# Patient Record
Sex: MALE | Race: WHITE | NOT HISPANIC OR LATINO | Employment: OTHER | URBAN - METROPOLITAN AREA
[De-identification: names, ages, dates, MRNs, and addresses within clinical notes are randomized per-mention and may not be internally consistent; named-entity substitution may affect disease eponyms.]

---

## 2017-05-12 ENCOUNTER — TRANSFERRED RECORDS (OUTPATIENT)
Dept: HEALTH INFORMATION MANAGEMENT | Facility: CLINIC | Age: 62
End: 2017-05-12

## 2019-01-02 ENCOUNTER — TELEPHONE (OUTPATIENT)
Dept: INTERNAL MEDICINE | Facility: CLINIC | Age: 64
End: 2019-01-02

## 2019-01-02 NOTE — TELEPHONE ENCOUNTER
Reason for call:  Other   Patient called regarding (reason for call): Pt is new in [facility] and really wants to see Dr Garcia to establish care.  Please contact Pt to advise if exception can be made  Additional comments:     Phone number to reach patient:  Home number on file [phone] (home)    Best Time:  any    Can we leave a detailed message on this number?  Not Applicable

## 2019-01-06 NOTE — TELEPHONE ENCOUNTER
Was patient recommended to see me by a physician, friend or family member? I have made most of these exceptions under these circumstances.

## 2019-01-09 NOTE — TELEPHONE ENCOUNTER
Called pt, states he was looking at the [employer] website, already scheduled with a different provider. Ade Braun RN

## 2019-02-05 ENCOUNTER — OFFICE VISIT (OUTPATIENT)
Dept: INTERNAL MEDICINE | Facility: CLINIC | Age: 64
End: 2019-02-05
Payer: COMMERCIAL

## 2019-02-05 VITALS
WEIGHT: 299 LBS | SYSTOLIC BLOOD PRESSURE: 132 MMHG | BODY MASS INDEX: 41.86 KG/M2 | RESPIRATION RATE: 20 BRPM | OXYGEN SATURATION: 99 % | TEMPERATURE: 98 F | DIASTOLIC BLOOD PRESSURE: 82 MMHG | HEIGHT: 71 IN | HEART RATE: 80 BPM

## 2019-02-05 DIAGNOSIS — E78.00 HYPERCHOLESTEREMIA: ICD-10-CM

## 2019-02-05 DIAGNOSIS — D86.9 SARCOIDOSIS: ICD-10-CM

## 2019-02-05 DIAGNOSIS — Z96.643 S/P HIP REPLACEMENT, BILATERAL: ICD-10-CM

## 2019-02-05 DIAGNOSIS — R97.20 ELEVATED PROSTATE SPECIFIC ANTIGEN (PSA): ICD-10-CM

## 2019-02-05 DIAGNOSIS — Z12.5 SCREENING FOR PROSTATE CANCER: ICD-10-CM

## 2019-02-05 DIAGNOSIS — E66.01 MORBID OBESITY (H): ICD-10-CM

## 2019-02-05 DIAGNOSIS — I10 ESSENTIAL HYPERTENSION: Primary | ICD-10-CM

## 2019-02-05 DIAGNOSIS — Z96.653 S/P TKR (TOTAL KNEE REPLACEMENT), BILATERAL: ICD-10-CM

## 2019-02-05 DIAGNOSIS — H91.93 BILATERAL HEARING LOSS, UNSPECIFIED HEARING LOSS TYPE: ICD-10-CM

## 2019-02-05 DIAGNOSIS — Z90.49 S/P APPENDECTOMY: ICD-10-CM

## 2019-02-05 LAB
ALBUMIN SERPL-MCNC: 3.7 G/DL (ref 3.4–5)
ALP SERPL-CCNC: 70 U/L (ref 40–150)
ALT SERPL W P-5'-P-CCNC: 32 U/L (ref 0–70)
ANION GAP SERPL CALCULATED.3IONS-SCNC: 4 MMOL/L (ref 3–14)
AST SERPL W P-5'-P-CCNC: 30 U/L (ref 0–45)
BILIRUB SERPL-MCNC: 0.7 MG/DL (ref 0.2–1.3)
BUN SERPL-MCNC: 11 MG/DL (ref 7–30)
CALCIUM SERPL-MCNC: 9.3 MG/DL (ref 8.5–10.1)
CHLORIDE SERPL-SCNC: 103 MMOL/L (ref 94–109)
CHOLEST SERPL-MCNC: 79 MG/DL
CO2 SERPL-SCNC: 29 MMOL/L (ref 20–32)
CREAT SERPL-MCNC: 0.89 MG/DL (ref 0.66–1.25)
ERYTHROCYTE [DISTWIDTH] IN BLOOD BY AUTOMATED COUNT: 14.4 % (ref 10–15)
GFR SERPL CREATININE-BSD FRML MDRD: >90 ML/MIN/{1.73_M2}
GLUCOSE SERPL-MCNC: 111 MG/DL (ref 70–99)
HCT VFR BLD AUTO: 52.3 % (ref 40–53)
HDLC SERPL-MCNC: 38 MG/DL
HGB BLD-MCNC: 17 G/DL (ref 13.3–17.7)
LDLC SERPL CALC-MCNC: 16 MG/DL
MCH RBC QN AUTO: 29 PG (ref 26.5–33)
MCHC RBC AUTO-ENTMCNC: 32.5 G/DL (ref 31.5–36.5)
MCV RBC AUTO: 89 FL (ref 78–100)
NONHDLC SERPL-MCNC: 41 MG/DL
PLATELET # BLD AUTO: 149 10E9/L (ref 150–450)
POTASSIUM SERPL-SCNC: 3.9 MMOL/L (ref 3.4–5.3)
PROT SERPL-MCNC: 8.3 G/DL (ref 6.8–8.8)
RBC # BLD AUTO: 5.86 10E12/L (ref 4.4–5.9)
SODIUM SERPL-SCNC: 136 MMOL/L (ref 133–144)
TRIGL SERPL-MCNC: 126 MG/DL
TSH SERPL DL<=0.005 MIU/L-ACNC: 2.23 MU/L (ref 0.4–4)
WBC # BLD AUTO: 7.6 10E9/L (ref 4–11)

## 2019-02-05 PROCEDURE — G0103 PSA SCREENING: HCPCS | Performed by: INTERNAL MEDICINE

## 2019-02-05 PROCEDURE — 99204 OFFICE O/P NEW MOD 45 MIN: CPT | Performed by: INTERNAL MEDICINE

## 2019-02-05 PROCEDURE — 80061 LIPID PANEL: CPT | Performed by: INTERNAL MEDICINE

## 2019-02-05 PROCEDURE — 36415 COLL VENOUS BLD VENIPUNCTURE: CPT | Performed by: INTERNAL MEDICINE

## 2019-02-05 PROCEDURE — 80053 COMPREHEN METABOLIC PANEL: CPT | Performed by: INTERNAL MEDICINE

## 2019-02-05 PROCEDURE — 84443 ASSAY THYROID STIM HORMONE: CPT | Performed by: INTERNAL MEDICINE

## 2019-02-05 PROCEDURE — 85027 COMPLETE CBC AUTOMATED: CPT | Performed by: INTERNAL MEDICINE

## 2019-02-05 RX ORDER — MULTIVITAMIN
1 TABLET ORAL EVERY MORNING
Qty: 90 TABLET | Refills: 3 | COMMUNITY
Start: 2019-02-05

## 2019-02-05 RX ORDER — ATORVASTATIN CALCIUM 40 MG/1
40 TABLET, FILM COATED ORAL EVERY MORNING
Qty: 90 TABLET | Refills: 3 | COMMUNITY
Start: 2019-02-05 | End: 2019-07-23

## 2019-02-05 RX ORDER — HYDROCHLOROTHIAZIDE 12.5 MG/1
12.5 TABLET ORAL EVERY MORNING
Qty: 180 TABLET | Refills: 3 | COMMUNITY
Start: 2019-02-05 | End: 2019-07-23

## 2019-02-05 RX ORDER — CETIRIZINE HYDROCHLORIDE 10 MG/1
10 TABLET ORAL EVERY MORNING
Qty: 90 TABLET | Refills: 3 | COMMUNITY
Start: 2019-02-05

## 2019-02-05 ASSESSMENT — MIFFLIN-ST. JEOR: SCORE: 2160.45

## 2019-02-05 NOTE — PROGRESS NOTES
SUBJECTIVE:   Cy Contreras is a 63 year old male who presents to clinic today for the following health issues:    Fasting. New Patient/Transfer of Care. Hx Sarcoidosis.     Hyperlipidemia Follow-Up      Rate your low fat/cholesterol diet?: not monitoring fat    Taking statin?  Yes, no muscle aches from statin    Other lipid medications/supplements?:  none    Hypertension Follow-up      Outpatient blood pressures are not being checked.    Low Salt Diet: no added salt      Amount of exercise or physical activity: 3-4 days/week for an average of 15-30 minutes    Problems taking medications regularly: No    Medication side effects: none    Diet: low salt      HPI:   Derek Contreras a 63 year old male here to establish care. Past medical history, surgical history, social history, medications and allergies reviewed and updated in chart. Family history is negative for premature ASCVD/CVA or cancer    For years he has gotten care at [facility]. Moved herewith his wife to be closer to their two children and 2 grandchildren. 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     [facility] has thought he had Sarcoidosis but he never received any treatment. He has had chronic dyspnea on exertion for years but no cough or wheezing.Now that he is retired he uses a treadmill almost every day. Is working hard to loose wt as he knows that would help his breathing.     He had a stress echo in the past 2 years that was unremarkable. And [facility] did pulmonary functions every few years the last was a year ago.     He has hypertension and has no side effects on his medication. He also is on Lipitor for elevated cholesterol. He takes a baby ASA every day.     His hearing has been getting worse slowly and he is ready to see an audiologist and consider hearing aids.     Problem list and histories reviewed & adjusted, as indicated.  Additional history: as documented    BP Readings from Last 3 Encounters:   02/05/19 132/82    Wt Readings  "from Last 3 Encounters:   02/05/19 135.6 kg (299 lb)                    Reviewed and updated as needed this visit by clinical staff  Tobacco  Allergies  Med Hx  Surg Hx  Fam Hx  Soc Hx      Reviewed and updated as needed this visit by Provider         ROS:  Constitutional, HEENT, cardiovascular, pulmonary, GI, , musculoskeletal, neuro, skin, endocrine and psych systems are negative, except as otherwise noted.    OBJECTIVE:     /82 (BP Location: Right arm, Patient Position: Chair, Cuff Size: Adult Large)   Pulse 80   Temp 98  F (36.7  C) (Oral)   Resp 20   Ht 1.791 m (5' 10.5\")   Wt 135.6 kg (299 lb)   SpO2 99%   BMI 42.30 kg/m    Body mass index is 42.3 kg/m .  GENERAL: healthy, alert and no distress  EYES: Eyes grossly normal to inspection, PERRL and conjunctivae and sclerae normal  HENT: ear canals and TM's normal, nose and mouth without ulcers or lesions  NECK: no adenopathy, no asymmetry, masses, or scars and thyroid normal to palpation  RESP: lungs clear to auscultation - no rales, rhonchi or wheezes  CV: regular rate and rhythm, normal S1 S2, no S3 or S4, no murmur, click or rub, no peripheral edema and peripheral pulses strong  ABDOMEN: soft, nontender, no hepatosplenomegaly, no masses and bowel sounds normal  MS: no gross musculoskeletal defects noted, no edema  NEURO: Normal strength and tone, mentation intact and speech normal  PSYCH: mentation appears normal, affect normal/bright      ASSESSMENT/PLAN:       1. Essential hypertension  under good control Continue current medications. Update labs  - TSH with free T4 reflex  - Comprehensive metabolic panel (BMP + Alb, Alk Phos, ALT, AST, Total. Bili, TP)  - CBC with platelets  - Lipid Profile    2. Morbid obesity (H)  Strongly encourage wt loss, will help with his breathing. He seems both motivated and knowledgeable as to what he needs to do with his diet    3. Sarcoidosis  Seemingly stable. Would like to get pulmonary functions from " [facility]    4. Bilateral hearing loss, unspecified hearing loss type  Will refer to   - OTOLARYNGOLOGY REFERRAL    5. Hypercholesteremia  Update labs  - Comprehensive metabolic panel (BMP + Alb, Alk Phos, ALT, AST, Total. Bili, TP)  - Lipid Profile    6. Screening for prostate cancer  Due for  - PSA, screen    7. S/P hip replacement, bilateral       8. S/P TKR (total knee replacement), bilateral       9. S/P appendectomy         Follow up in 6 months     Carlyn Payne MD  [facility]

## 2019-02-06 LAB — PSA SERPL-ACNC: 29.2 UG/L (ref 0–4)

## 2019-02-11 DIAGNOSIS — R97.20 ELEVATED PROSTATE SPECIFIC ANTIGEN (PSA): Primary | ICD-10-CM

## 2019-02-12 ENCOUNTER — OFFICE VISIT (OUTPATIENT)
Dept: UROLOGY | Facility: CLINIC | Age: 64
End: 2019-02-12
Payer: COMMERCIAL

## 2019-02-12 VITALS
OXYGEN SATURATION: 95 % | SYSTOLIC BLOOD PRESSURE: 150 MMHG | HEART RATE: 101 BPM | HEIGHT: 72 IN | DIASTOLIC BLOOD PRESSURE: 80 MMHG | WEIGHT: 299 LBS | BODY MASS INDEX: 40.5 KG/M2

## 2019-02-12 DIAGNOSIS — N20.0 NEPHROLITHIASIS: Primary | ICD-10-CM

## 2019-02-12 DIAGNOSIS — N40.0 ENLARGED PROSTATE: ICD-10-CM

## 2019-02-12 DIAGNOSIS — R97.20 ELEVATED PROSTATE SPECIFIC ANTIGEN (PSA): ICD-10-CM

## 2019-02-12 LAB
ALBUMIN UR-MCNC: NEGATIVE MG/DL
APPEARANCE UR: CLEAR
BILIRUB UR QL STRIP: NEGATIVE
COLOR UR AUTO: YELLOW
GLUCOSE UR STRIP-MCNC: NEGATIVE MG/DL
HGB UR QL STRIP: NEGATIVE
KETONES UR STRIP-MCNC: NEGATIVE MG/DL
LEUKOCYTE ESTERASE UR QL STRIP: NEGATIVE
NITRATE UR QL: NEGATIVE
PH UR STRIP: 7 PH (ref 5–7)
PSA SERPL-MCNC: 41.2 NG/ML (ref 0–4)
RESIDUAL VOLUME (RV) (EXTERNAL): 10
SOURCE: NORMAL
SP GR UR STRIP: 1.01 (ref 1–1.03)
UROBILINOGEN UR STRIP-ACNC: 0.2 EU/DL (ref 0.2–1)

## 2019-02-12 PROCEDURE — 99244 OFF/OP CNSLTJ NEW/EST MOD 40: CPT | Mod: 25 | Performed by: UROLOGY

## 2019-02-12 PROCEDURE — 81003 URINALYSIS AUTO W/O SCOPE: CPT | Performed by: UROLOGY

## 2019-02-12 PROCEDURE — 84153 ASSAY OF PSA TOTAL: CPT | Performed by: UROLOGY

## 2019-02-12 PROCEDURE — 51798 US URINE CAPACITY MEASURE: CPT | Performed by: UROLOGY

## 2019-02-12 PROCEDURE — 36415 COLL VENOUS BLD VENIPUNCTURE: CPT | Performed by: UROLOGY

## 2019-02-12 RX ORDER — CIPROFLOXACIN 500 MG/1
500 TABLET, FILM COATED ORAL 2 TIMES DAILY
Qty: 6 TABLET | Refills: 0 | Status: SHIPPED | OUTPATIENT
Start: 2019-02-12 | End: 2019-03-28

## 2019-02-12 ASSESSMENT — MIFFLIN-ST. JEOR: SCORE: 2184.26

## 2019-02-12 ASSESSMENT — PAIN SCALES - GENERAL: PAINLEVEL: NO PAIN (0)

## 2019-02-12 NOTE — PROGRESS NOTES
[facility]  Main Office: [address]  [address]  [place] [address]       CHIEF COMPLAINT:  Elevated PSA    HISTORY:   I was asked by Dr. Payne to see this 63-year-old gentleman who presents with an elevated PSA. He recently moved here from [facility] and was receiving his primary care through the [facility]. He is not certain if his PSA had been checked there previously. When he established care with Dr. Payne last week he was on the elevated PSA of 29.2. He has no urinary symptoms or complaints. He has no nocturia. He reports normal urinary stream. No frequency or urgency. No history of gross hematuria or infections. He has no family history of prostate cancer.    After an initial discussion today we rechecked his PSA todaybefore examination and his PSA today is 41.2.    He also incidentally notes to me that he thinks he has passed kidney stones recently. He has seen stones dropped into the toilet recently.  He has had no pain from this. He has no prior history of stones.      PAST MEDICAL HISTORY: History reviewed. No pertinent past medical history.    PAST SURGICAL HISTORY:   Past Surgical History:   Procedure Laterality Date     APPENDECTOMY       C TOTAL HIP ARTHROPLASTY Bilateral      C TOTAL KNEE ARTHROPLASTY Bilateral      VASECTOMY         FAMILY HISTORY:   Family History   Problem Relation Age of Onset     Alzheimer Disease Mother      Heart Disease Father        SOCIAL HISTORY:   Social History     Tobacco Use     Smoking status: Never Smoker     Smokeless tobacco: Never Used   Substance Use Topics     Alcohol use: Yes        Not on File      Current Outpatient Medications:      aspirin (ASA) 81 MG tablet, Take 1 tablet (81 mg) by mouth daily, Disp: 90 tablet, Rfl: 3     atorvastatin (LIPITOR) 40 MG tablet, Take 1 tablet (40 mg) by mouth daily, Disp: 90 tablet, Rfl: 3     cetirizine (ZYRTEC) 10 MG tablet, Take 1 tablet (10 mg) by mouth daily, Disp: 90 tablet, Rfl: 3      ciprofloxacin (CIPRO) 500 MG tablet, Take 1 tablet (500 mg) by mouth 2 times daily for 3 days, Disp: 6 tablet, Rfl: 0     hydrochlorothiazide (HYDRODIURIL) 12.5 MG tablet, Take 2 tablets (25 mg) by mouth daily, Disp: 180 tablet, Rfl: 3     multivitamin (ONE-DAILY) tablet, Take 1 tablet by mouth daily, Disp: 90 tablet, Rfl: 3    Review Of Systems:  Skin: No rash, pruritis, or skin pigmentation  Eyes: No changes in vision  Ears/Nose/Throat: No changes in hearing, no nosebleeds  Respiratory: No shortness of breath, dyspnea on exertion, cough, or hemoptysis  Cardiovascular: No chest pain or palpitations  Gastrointestinal: No diarrhea or constipation. No abdominal pain. No hematochezia  Genitourinary: see HPI  Musculoskeletal: No pain or swelling of joints, normal range of motion  Neurologic: No weakness or tremors  Psychiatric: No recent changes in memory or mood  Hematologic/Lymphatic/Immunologic: No easy bruising or enlarged lymph nodes  Endocrine: No weight gain or loss      PHYSICAL EXAM:    /80 (BP Location: Left arm, Patient Position: Sitting, Cuff Size: Adult Regular)   Pulse 101   Ht 1.829 m (6')   Wt 135.6 kg (299 lb)   SpO2 95%   BMI 40.55 kg/m    General appearance: In NAD, conversant  HEENT: Normocephalic and atraumatic, anicteric sclera  Cardiovascular: Not examined  Respiratory: normal, non-labored breathing  Gastrointestinal: negative, Abdomen soft, non-tender, and non-distended.   Musculoskeletal: Not Examined  Peripheral Vascular/extremity: No peripheral edema  Skin: Normal temperature, turgor, and texture. No rash  Psychiatric: Appropriate affect, alert and oriented to person, place, and time    Penis: Normal  Scrotal skin: Normal, no lesions  Testicles: Normal to palpation bilaterally  Epididymis: Normal to palpation bilaterally  Lymphatic: Normal inguinal lymph nodes  Digital Rectal Exam: his prostate is moderately enlarged, benign and symmetric to palpation. I am not able to palpate the  base of the prostate due to body habitus.    Cystoscopy: Not done      PSA: 41.2    UA RESULTS:  Recent Labs   Lab Test 02/12/19  1245   COLOR Yellow   APPEARANCE Clear   URINEGLC Negative   URINEBILI Negative   URINEKETONE Negative   SG 1.015   UBLD Negative   URINEPH 7.0   PROTEIN Negative   UROBILINOGEN 0.2   NITRITE Negative   LEUKEST Negative       Bladder Scan:     Other Labs:      Imaging Studies: None      CLINICAL IMPRESSION:   Elevated PSA, enlarged prostate, possible kidney stones    PLAN:   Is PSA has been significantly elevated on 2 recent checks. It is also quite volatile but he has no evidence of urinary tract infection, no evidence of prostatitis on exam, and no urinary symptoms. We discussed his options and I recommended a prostate biopsy to evaluate him for potential prostate cancer. We discussed prostate biopsy in detail today along with its risks, including bleeding and infection. All of his questions were answered. He wishes to proceed. Ciprofloxacin prescribed for prophylaxis.    I also recommended a noncontrast CT scan since he reports possible kidney stones being passed recently. We will get the CT scan this week before he returns for his biopsy next week.      Matteo Coughlin MD

## 2019-02-12 NOTE — LETTER
Date:February 13, 2019      Patient was self referred, no letter generated. Do not send.        [facility] Health Information

## 2019-02-12 NOTE — LETTER
2/12/2019       RE: Derek Contreras  [address] [place] [address]     Dear Colleague,    Thank you for referring your patient, Derek Contreras, to the [facility] [place] [facility]. Please see a copy of my visit note below.    [facility]  Main Office: [address]  [address]  [place] [address]       CHIEF COMPLAINT:  Elevated PSA    HISTORY:   I was asked by Dr. Payne to see this 63-year-old Alondra who presents with an elevated PSA. He recently moved here from [place] and was receiving his primary care through the [facility]. He is not certain if his PSA had been checked there previously. When he established care with Dr. Payne last week he was on the elevated PSA of 29.2. He has no urinary symptoms or complaints. He has no nocturia. He reports normal urinary stream. No frequency or urgency. No history of gross hematuria or infections. He has no family history of prostate cancer.    After an initial discussion today we rechecked his PSA todaybefore examination and his PSA today is 41.2.    He also incidentally notes to me that he thinks he has passed kidney stones recently. He has seen stones dropped into the toilet recently.  He has had no pain from this. He has no prior history of stones.      PAST MEDICAL HISTORY: History reviewed. No pertinent past medical history.    PAST SURGICAL HISTORY:   Past Surgical History:   Procedure Laterality Date     APPENDECTOMY       C TOTAL HIP ARTHROPLASTY Bilateral      C TOTAL KNEE ARTHROPLASTY Bilateral      VASECTOMY         FAMILY HISTORY:   Family History   Problem Relation Age of Onset     Alzheimer Disease Mother      Heart Disease Father        SOCIAL HISTORY:   Social History     Tobacco Use     Smoking status: Never Smoker     Smokeless tobacco: Never Used   Substance Use Topics     Alcohol use: Yes        Not on File      Current Outpatient  Medications:      aspirin (ASA) 81 MG tablet, Take 1 tablet (81 mg) by mouth daily, Disp: 90 tablet, Rfl: 3     atorvastatin (LIPITOR) 40 MG tablet, Take 1 tablet (40 mg) by mouth daily, Disp: 90 tablet, Rfl: 3     cetirizine (ZYRTEC) 10 MG tablet, Take 1 tablet (10 mg) by mouth daily, Disp: 90 tablet, Rfl: 3     ciprofloxacin (CIPRO) 500 MG tablet, Take 1 tablet (500 mg) by mouth 2 times daily for 3 days, Disp: 6 tablet, Rfl: 0     hydrochlorothiazide (HYDRODIURIL) 12.5 MG tablet, Take 2 tablets (25 mg) by mouth daily, Disp: 180 tablet, Rfl: 3     multivitamin (ONE-DAILY) tablet, Take 1 tablet by mouth daily, Disp: 90 tablet, Rfl: 3    Review Of Systems:  Skin: No rash, pruritis, or skin pigmentation  Eyes: No changes in vision  Ears/Nose/Throat: No changes in hearing, no nosebleeds  Respiratory: No shortness of breath, dyspnea on exertion, cough, or hemoptysis  Cardiovascular: No chest pain or palpitations  Gastrointestinal: No diarrhea or constipation. No abdominal pain. No hematochezia  Genitourinary: see HPI  Musculoskeletal: No pain or swelling of joints, normal range of motion  Neurologic: No weakness or tremors  Psychiatric: No recent changes in memory or mood  Hematologic/Lymphatic/Immunologic: No easy bruising or enlarged lymph nodes  Endocrine: No weight gain or loss      PHYSICAL EXAM:    /80 (BP Location: Left arm, Patient Position: Sitting, Cuff Size: Adult Regular)   Pulse 101   Ht 1.829 m (6')   Wt 135.6 kg (299 lb)   SpO2 95%   BMI 40.55 kg/m     General appearance: In NAD, conversant  HEENT: Normocephalic and atraumatic, anicteric sclera  Cardiovascular: Not examined  Respiratory: normal, non-labored breathing  Gastrointestinal: negative, Abdomen soft, non-tender, and non-distended.   Musculoskeletal: Not Examined  Peripheral Vascular/extremity: No peripheral edema  Skin: Normal temperature, turgor, and texture. No rash  Psychiatric: Appropriate affect, alert and oriented to person,  place, and time    Penis: Normal  Scrotal skin: Normal, no lesions  Testicles: Normal to palpation bilaterally  Epididymis: Normal to palpation bilaterally  Lymphatic: Normal inguinal lymph nodes  Digital Rectal Exam: his prostate is moderately enlarged, benign and symmetric to palpation. I am not able to palpate the base of the prostate due to body habitus.    Cystoscopy: Not done      PSA: 41.2    UA RESULTS:  Recent Labs   Lab Test 02/12/19  1245   COLOR Yellow   APPEARANCE Clear   URINEGLC Negative   URINEBILI Negative   URINEKETONE Negative   SG 1.015   UBLD Negative   URINEPH 7.0   PROTEIN Negative   UROBILINOGEN 0.2   NITRITE Negative   LEUKEST Negative       Bladder Scan:     Other Labs:      Imaging Studies: None      CLINICAL IMPRESSION:   Elevated PSA, enlarged prostate, possible kidney stones    PLAN:   Is PSA has been significantly elevated on 2 recent checks. It is also quite volatile but he has no evidence of urinary tract infection, no evidence of prostatitis on exam, and no urinary symptoms. We discussed his options and I recommended a prostate biopsy to evaluate him for potential prostate cancer. We discussed prostate biopsy in detail today along with its risks, including bleeding and infection. All of his questions were answered. He wishes to proceed. Ciprofloxacin prescribed for prophylaxis.    I also recommended a noncontrast CT scan since he reports possible kidney stones being passed recently. We will get the CT scan this week before he returns for his biopsy next week.      Matteo Coughlin MD      Again, thank you for allowing me to participate in the care of your patient.      Sincerely,    Matteo Coughlin MD

## 2019-02-12 NOTE — NURSING NOTE
Chief Complaint   Patient presents with     Clinic Care Coordination - Follow-up     Pt here for elevated PSA     Bindu Guerra CMA

## 2019-02-12 NOTE — PATIENT INSTRUCTIONS
Ultrasound Guided Prostate Biopsy    What is a prostate biopsy? A prostate biopsy is a relatively painless procedure performed in the physician's office, outpatient facility or hospital.  A long, thin needle is inserted into the prostate to collect a sample of tissue from the prostate.  These samples are then examined by a pathologist for abnormal cells.    Why do I need a prostate biopsy? During a man's lifetime the prostate gradually increases in size.  The patient may or may not experience symptoms.  Symptoms of an enlarged prostate include:    Increased frequency of urination    Decreased force of urinary stream    Trouble with urination    Awakening at night to urinate    When the prostate is examined, the physician may feel a nodule (hard or firm growth) which would require a biopsy.    Another reason for having a prostate biopsy is an increase in the prostate specific androgen (PSA) in your blood.  A prostate biopsy may be necessary to determine the cause of the increased PSA.    Your physician will also perform an ultrasound (an image created by sound waves).  The ultrasound is performed by placing a small probe in the rectum to image the prostate gland.    Preparation for the Prostate Biopsy    1. Blood thinning medications must be stopped prior to your biopsy.  Please stop the following medication the appropriate number of days before:  a. 10 days-acetylsalicylic acid, vitamin E, fish oil, aspirin, baby aspirin  b. 7 days- Plavix, Brilinta, ibuprofen (Advil, Motrin, Aleve)  c. 5 days-Pradaxa  d. 4 days-Coumadin/warfarin  e. 3 days-Eliquis, Xarelto    2.  If you have any bleeding problems (thin blood), please tell your doctor.    3.  If you have been told by another doctor to take antibiotics before dental work or surgery, please tell the doctor.  This is common for patients that have had a joint replacement.    YOU HAVE BEEN PRESCRIBED AN ANTIBIOTIC.  Please follow the directions written on the bottle.   The directions usually will tell you to start the antibiotic the day before your biopsy.  You will continue taking the medication until it is gone.    The day of the biopsy you will be asked to use an enema one hour before you leave for your appointment.    Unless you have been prescribed a sedative (Valium or a similar drug) you will be able to drive to and from your appointment.  If you have taken a sedative you must have a ride.    AFTER THE BIOPSY    DANGER SIGNS    Small amount of blood in the urine for 10-14 days Excessive blood in the urine, stool or ejaculate  Small amount of blood in the stool for 48 hours Fever over 100 or chills  Small amount of blood in the ejaculate for up to Frequent urination or burning when you urinate   4 weeks          CALL THE DOCTOR'S OFFICE -[phone] IF YOU HAVE ANY OF THESE SYMPTOMS.  IF YOU CANNOT CONTACT THE OFFICE, GO TO THE EMERGENCY ROOM.          Your biopsy is scheduled on____02/21/19___________________________ at our [place] office.  Please be at the office at    ___765am_________________.  A follow up visit has been scheduled for you on ____03/07/19________________@___1130am____________     at the  ___[place]___________________________office. Your doctor will discuss your results with you at this visit

## 2019-02-21 ENCOUNTER — HOSPITAL ENCOUNTER (OUTPATIENT)
Dept: CT IMAGING | Facility: CLINIC | Age: 64
Discharge: HOME | End: 2019-02-21
Attending: UROLOGY | Admitting: UROLOGY
Payer: COMMERCIAL

## 2019-02-21 ENCOUNTER — OFFICE VISIT (OUTPATIENT)
Dept: UROLOGY | Facility: CLINIC | Age: 64
End: 2019-02-21
Payer: COMMERCIAL

## 2019-02-21 VITALS
DIASTOLIC BLOOD PRESSURE: 62 MMHG | HEART RATE: 60 BPM | HEIGHT: 72 IN | WEIGHT: 299 LBS | SYSTOLIC BLOOD PRESSURE: 148 MMHG | BODY MASS INDEX: 40.5 KG/M2

## 2019-02-21 DIAGNOSIS — R97.20 ELEVATED PROSTATE SPECIFIC ANTIGEN (PSA): Primary | ICD-10-CM

## 2019-02-21 DIAGNOSIS — N20.0 NEPHROLITHIASIS: ICD-10-CM

## 2019-02-21 PROCEDURE — 55700 ZZHC BIOPSY PROSTATE NEEDLE/PUNCH: CPT | Performed by: UROLOGY

## 2019-02-21 PROCEDURE — 76872 US TRANSRECTAL: CPT | Performed by: UROLOGY

## 2019-02-21 PROCEDURE — 88305 TISSUE EXAM BY PATHOLOGIST: CPT | Performed by: UROLOGY

## 2019-02-21 PROCEDURE — 74176 CT ABD & PELVIS W/O CONTRAST: CPT

## 2019-02-21 ASSESSMENT — PAIN SCALES - GENERAL: PAINLEVEL: NO PAIN (0)

## 2019-02-21 ASSESSMENT — MIFFLIN-ST. JEOR: SCORE: 2184.26

## 2019-02-21 NOTE — PROGRESS NOTES
Derek Contreras is here for a transrectal altrasound guided needle biopsy of the prostate for a significant risk of potentially lethal prostate cancer.    The risks, benefits, of the procudure were discussed.  All questions were answered.  A written informed consent was obtained.      PSA   Date Value Ref Range Status   02/05/2019 29.20 (H) 0 - 4 ug/L Final     Comment:     Assay Method:  Chemiluminescence using Siemens Vista analyzer       An enema was completed and 500 mg of Cipro twice daily was started prior to the biopsy.  After obtaining informed consent patient was placed in lateral decubitus position.  The ultrasound probe was placed in the rectum.  The prostate was numbed using ultrasound guidance with 1% lidocaine 5 mls along each nerve bundle.      The volume was measured and estimated to be 28 cubic centimeters.      US images were used to guide the biopsies of the prostate.  12 cores were taken with 6 on each side, 2 at the base,  2 at the midgland and  2 at the apex.  The patient tolerated the procedure well.      We will follow up with the results in 7-10 days and contact patient with these results.

## 2019-02-21 NOTE — NURSING NOTE
Chief Complaint   Patient presents with     Prostate Biopsy     Elevated PSA      Prior to the start of the procedure and with procedural staff participation, I verbally confirmed the patient s identity using two indicators, relevant allergies, that the procedure was appropriate and matched the consent or emergent situation, and that the correct equipment/implants were available. Immediately prior to starting the procedure I conducted the Time Out with the procedural staff and re-confirmed the patient s name, procedure, and site/side. (The Joint Commission universal protocol was followed.)  Yes    Sedation (Moderate or Deep): None  Consent read and signed: Yes     Allergies   Allergen Reactions     Morphine Sulfate Nausea and Vomiting and Cramps     Pre-operative antibiotics taken: Yes  Aspirin or other blood thinning medications not taken in 7-10 days:  Yes  Time of Fleet's enema: 5:30am    Darya Braun LPN

## 2019-02-21 NOTE — LETTER
2/21/2019       RE: Derek Contreras  [address] [place] [address]     Dear Colleague,    Thank you for referring your patient, Derek Contreras, to the [facility] [place] [facility]. Please see a copy of my visit note below.    Chief Complaint   Patient presents with     Prostate Biopsy     Elevated PSA      Prior to the start of the procedure and with procedural staff participation, I verbally confirmed the patient s identity using two indicators, relevant allergies, that the procedure was appropriate and matched the consent or emergent situation, and that the correct equipment/implants were available. Immediately prior to starting the procedure I conducted the Time Out with the procedural staff and re-confirmed the patient s name, procedure, and site/side. (The Joint Commission universal protocol was followed.)  Yes    Sedation (Moderate or Deep): None    Darya Braun LPN      Derek Contreras is here for a transrectal altrasound guided needle biopsy of the prostate for a significant risk of potentially lethal prostate cancer.    The risks, benefits, of the procudure were discussed.  All questions were answered.  A written informed consent was obtained.      PSA   Date Value Ref Range Status   02/05/2019 29.20 (H) 0 - 4 ug/L Final     Comment:     Assay Method:  Chemiluminescence using Siemens Vista analyzer       An enema was completed and 500 mg of Cipro twice daily was started prior to the biopsy.  After obtaining informed consent patient was placed in lateral decubitus position.  The ultrasound probe was placed in the rectum.  The prostate was numbed using ultrasound guidance with 1% lidocaine 5 mls along each nerve bundle.      The volume was measured and estimated to be 28 cubic centimeters.      US images were used to guide the biopsies of the prostate.  12 cores were taken with 6 on each side, 2 at the  base,  2 at the midgland and  2 at the apex.  The patient tolerated the procedure well.      We will follow up with the results in 7-10 days and contact patient with these results.        Again, thank you for allowing me to participate in the care of your patient.      Sincerely,    Matteo Coughlin MD

## 2019-02-21 NOTE — LETTER
Date:February 25, 2019      Patient was self referred, no letter generated. Do not send.        [facility] Information

## 2019-02-21 NOTE — PATIENT INSTRUCTIONS
[facility]  Transrectal Ultrasound  Post Operative Information    The physician who performed your Transrectal Ultrasound is Dr. Coughlin (telephone number [phone]).  Please contact this doctor if you have any problems or questions.  If unable to reach your doctor, please return to the Emergency Department.      Take one antibiotic the evening of the procedure and then as directed on your prescription.    Drink at least 6-8 glasses of fluids for the first 48 hours.    Avoid heavy lifting and strenuous activity for 48 hours.    Avoid sexual intercourse for the first 24 hours.    No aspirin or ibuprofen products (Motrin, Advil, Nuprin, ect.) for one week.  You may take acetaminophen (Tylenol) for pain.    You may notice a small amount of blood on the tissue after a bowel movement.    You may pass blood with clots in your urine following the procedure.  The amount will decrease with time but may be visible for up to two weeks.     You make have blood in your semen for 4 weeks after the procedure.    You may experience mild perineal (groin area) discomfort after the procedure.    Please call you doctor if you have any of the follow symptoms:  Fever  Increase in the amount of blood passed  Severe discomfort or pain

## 2019-02-21 NOTE — PROGRESS NOTES
Chief Complaint   Patient presents with     Prostate Biopsy     Elevated PSA      Prior to the start of the procedure and with procedural staff participation, I verbally confirmed the patient s identity using two indicators, relevant allergies, that the procedure was appropriate and matched the consent or emergent situation, and that the correct equipment/implants were available. Immediately prior to starting the procedure I conducted the Time Out with the procedural staff and re-confirmed the patient s name, procedure, and site/side. (The Joint Commission universal protocol was followed.)  Yes    Sedation (Moderate or Deep): None    Darya Braun LPN

## 2019-02-22 LAB — COPATH REPORT: NORMAL

## 2019-02-26 ENCOUNTER — TELEPHONE (OUTPATIENT)
Dept: SURGERY | Facility: CLINIC | Age: 64
End: 2019-02-26

## 2019-02-26 DIAGNOSIS — C61 PROSTATE CANCER (H): Primary | ICD-10-CM

## 2019-02-28 ENCOUNTER — HOSPITAL ENCOUNTER (OUTPATIENT)
Dept: NUCLEAR MEDICINE | Facility: CLINIC | Age: 64
Setting detail: NUCLEAR MEDICINE
End: 2019-02-28
Attending: UROLOGY
Payer: COMMERCIAL

## 2019-02-28 ENCOUNTER — HOSPITAL ENCOUNTER (OUTPATIENT)
Dept: NUCLEAR MEDICINE | Facility: CLINIC | Age: 64
Setting detail: NUCLEAR MEDICINE
Discharge: HOME | End: 2019-02-28
Attending: UROLOGY | Admitting: UROLOGY
Payer: COMMERCIAL

## 2019-02-28 DIAGNOSIS — C61 PROSTATE CANCER (H): ICD-10-CM

## 2019-02-28 PROCEDURE — 78306 BONE IMAGING WHOLE BODY: CPT

## 2019-02-28 PROCEDURE — 34300033 ZZH RX 343: Performed by: UROLOGY

## 2019-02-28 PROCEDURE — A9561 TC99M OXIDRONATE: HCPCS | Performed by: UROLOGY

## 2019-02-28 RX ADMIN — Medication 25 MCI.: at 13:06

## 2019-03-07 ENCOUNTER — TELEPHONE (OUTPATIENT)
Dept: INTERNAL MEDICINE | Facility: CLINIC | Age: 64
End: 2019-03-07

## 2019-03-07 ENCOUNTER — OFFICE VISIT (OUTPATIENT)
Dept: UROLOGY | Facility: CLINIC | Age: 64
End: 2019-03-07
Payer: COMMERCIAL

## 2019-03-07 VITALS
DIASTOLIC BLOOD PRESSURE: 66 MMHG | BODY MASS INDEX: 40.36 KG/M2 | SYSTOLIC BLOOD PRESSURE: 132 MMHG | HEIGHT: 72 IN | HEART RATE: 80 BPM | WEIGHT: 298 LBS

## 2019-03-07 DIAGNOSIS — C61 PROSTATE CANCER (H): Primary | ICD-10-CM

## 2019-03-07 DIAGNOSIS — R91.8 PULMONARY NODULES: ICD-10-CM

## 2019-03-07 DIAGNOSIS — R59.0 HILAR ADENOPATHY: Primary | ICD-10-CM

## 2019-03-07 PROCEDURE — 99214 OFFICE O/P EST MOD 30 MIN: CPT | Performed by: UROLOGY

## 2019-03-07 ASSESSMENT — PAIN SCALES - GENERAL: PAINLEVEL: NO PAIN (0)

## 2019-03-07 ASSESSMENT — MIFFLIN-ST. JEOR: SCORE: 2179.72

## 2019-03-07 NOTE — PROGRESS NOTES
Office Visit Note  [facility]  [phone]    UROLOGIC DIAGNOSES:   T1C Tricia 4+3=7 prostate cancer    CURRENT INTERVENTIONS:       HISTORY:   Derek returns to clinic today along with his wife to discuss recent prostate biopsy results. jenny was found to have a [place] 4+3 = 7 prostate cancer at the right apex. He also had a Tricia 3+4 = 7 prostate cancer found at the right base and at the left apex. A fourth core was positive for [place] grade 6 cancer. The other 8 cores were negative. Because his PSA was significantly elevated prior to the biopsy are recommended a CT scan and bone scan be performed. He has a history of sarcoidosis and a CT scan revealed hilar and mediastinal lymphadenopathy.Hehas not yet established a local pulmonologist so it is difficult to say as to whether these findings are unchanged from previous scans  For his sarcoidosis. His bone scan was negative. His prostate was only slightly enlarged measuring 28 cm . He has no urinary symptoms or complaints. He has felt well since his biopsy. He has had no prior abdominal surgeries.      PAST MEDICAL HISTORY: No past medical history on file.    PAST SURGICAL HISTORY:   Past Surgical History:   Procedure Laterality Date     APPENDECTOMY       C TOTAL HIP ARTHROPLASTY Bilateral      C TOTAL KNEE ARTHROPLASTY Bilateral      VASECTOMY         FAMILY HISTORY:   Family History   Problem Relation Age of Onset     Alzheimer Disease Mother      Heart Disease Father        SOCIAL HISTORY:   Social History     Tobacco Use     Smoking status: Never Smoker     Smokeless tobacco: Never Used   Substance Use Topics     Alcohol use: Yes       Current Outpatient Medications   Medication     aspirin (ASA) 81 MG tablet     atorvastatin (LIPITOR) 40 MG tablet     cetirizine (ZYRTEC) 10 MG tablet     hydrochlorothiazide (HYDRODIURIL) 12.5 MG tablet     multivitamin (ONE-DAILY) tablet     No current facility-administered medications for this visit.           PHYSICAL EXAM:    /66 (BP Location: Left arm)   Pulse 80   Ht 1.829 m (6')   Wt 135.2 kg (298 lb)   BMI 40.42 kg/m      Constitutional: Well developed. Conversant and in no acute distress  Eyes: Anicteric sclera, conjunctiva clear, normal extraocular movements  ENT: Normocephalic and atraumatic,   Skin: Warm and dry. No rashes or lesions  Cardiac: No peripheral edema  Back/Flank: Not done  CNS/PNS: Normal musculature and movements, moves all extremities normally  Respiratory: Normal non-labored breathing  Abdomen: Soft nontender and nondistended  Peripheral Vascular: No peripheral edema  Mental Status/Psych: Alert and Oriented x 3. Normal mood and affect    Penis: Not done  Scrotal Skin: Not done  Testicles: Not done  Epididymis: Not done  Digital Rectal Exam:     Cystoscopy: Not done    Imaging: None    Urinalysis: UA RESULTS:  Recent Labs   Lab Test 02/12/19  1245   COLOR Yellow   APPEARANCE Clear   URINEGLC Negative   URINEBILI Negative   URINEKETONE Negative   SG 1.015   UBLD Negative   URINEPH 7.0   PROTEIN Negative   UROBILINOGEN 0.2   NITRITE Negative   LEUKEST Negative       PSA: 41    Post Void Residual:     Other labs: None today      IMPRESSION:  High risk prostate cancer    PLAN:  His significant elevated PSA puts him in the high risk category for localized prostate cancer. We do not see any evidence of metastatic disease on studies but we did discuss the risk of micrometastatic disease that may not be detected by any of our studies. We discussed  His treatment options. I would not recommend active surveillance given  These findings. I also would not recommend less invasive/ablative treatment options given his high-risk disease. I would recommend he consider either a radical prostatectomy or radiotherapy combined with 3 years of hormonal therapy. We discussed the pros and cons of each option. He stated his wish to undergo surgery if possible. We did discuss robotic prostatectomy in  detail today along with its risks and expected recovery. However, he does have these findings in the chest  On his CT scan that need to be sorted out first.I suspect that these may be similar to previous findings regarding his sarcoidosis but he needs to establish care with a local pulmonologist as soon as possible. I am also curious as to whether his sarcoidosis would increase his surgical risks and I would leave that question for the pulmonologist. He will attempt to establish care with a pulmonologist in the near future and then I will see him back soon after that to discuss his treatment options and get a treatment plan together.    Total Time: 45 min                                      Total in Consultation: 45 min      Matteo Coughlin M.D.

## 2019-03-07 NOTE — NURSING NOTE
Chief Complaint   Patient presents with     Clinic Care Coordination - Follow-up     Elevated PSA      Darya Braun LPN

## 2019-03-07 NOTE — LETTER
RE: Derek Contreras  [address] [place] [address]     Dear Colleague,    Thank you for referring your patient, Derek Contreras, to the [facility] [place] [facility]. Please see a copy of my visit note below.    Office Visit Note  [facility]  [phone]    UROLOGIC DIAGNOSES:   T1C [place] 4+3=7 prostate cancer    CURRENT INTERVENTIONS:     HISTORY:   Derek returns to clinic today along with his wife to discuss recent prostate biopsy results. hhe was found to have a [place] 4+3 = 7 prostate cancer at the right apex. He also had a Tricia 3+4 = 7 prostate cancer found at the right base and at the left apex. A fourth core was positive for [place] grade 6 cancer. The other 8 cores were negative. Because his PSA was significantly elevated prior to the biopsy are recommended a CT scan and bone scan be performed. He has a history of sarcoidosis and a CT scan revealed hilar and mediastinal lymphadenopathy.Hehas not yet established a local pulmonologist so it is difficult to say as to whether these findings are unchanged from previous scans  For his sarcoidosis. His bone scan was negative. His prostate was only slightly enlarged measuring 28 cm . He has no urinary symptoms or complaints. He has felt well since his biopsy. He has had no prior abdominal surgeries.      PAST MEDICAL HISTORY: No past medical history on file.    PAST SURGICAL HISTORY:   Past Surgical History:   Procedure Laterality Date     APPENDECTOMY       C TOTAL HIP ARTHROPLASTY Bilateral      C TOTAL KNEE ARTHROPLASTY Bilateral      VASECTOMY         FAMILY HISTORY:   Family History   Problem Relation Age of Onset     Alzheimer Disease Mother      Heart Disease Father        SOCIAL HISTORY:   Social History     Tobacco Use     Smoking status: Never Smoker     Smokeless tobacco: Never Used   Substance Use Topics     Alcohol use: Yes       Current  Outpatient Medications   Medication     aspirin (ASA) 81 MG tablet     atorvastatin (LIPITOR) 40 MG tablet     cetirizine (ZYRTEC) 10 MG tablet     hydrochlorothiazide (HYDRODIURIL) 12.5 MG tablet     multivitamin (ONE-DAILY) tablet     No current facility-administered medications for this visit.      PHYSICAL EXAM:    /66 (BP Location: Left arm)   Pulse 80   Ht 1.829 m (6')   Wt 135.2 kg (298 lb)   BMI 40.42 kg/m       Constitutional: Well developed. Conversant and in no acute distress  Eyes: Anicteric sclera, conjunctiva clear, normal extraocular movements  ENT: Normocephalic and atraumatic,   Skin: Warm and dry. No rashes or lesions  Cardiac: No peripheral edema  Back/Flank: Not done  CNS/PNS: Normal musculature and movements, moves all extremities normally  Respiratory: Normal non-labored breathing  Abdomen: Soft nontender and nondistended  Peripheral Vascular: No peripheral edema  Mental Status/Psych: Alert and Oriented x 3. Normal mood and affect    Penis: Not done  Scrotal Skin: Not done  Testicles: Not done  Epididymis: Not done  Digital Rectal Exam:     Cystoscopy: Not done    Imaging: None    Urinalysis: UA RESULTS:  Recent Labs   Lab Test 02/12/19  1245   COLOR Yellow   APPEARANCE Clear   URINEGLC Negative   URINEBILI Negative   URINEKETONE Negative   SG 1.015   UBLD Negative   URINEPH 7.0   PROTEIN Negative   UROBILINOGEN 0.2   NITRITE Negative   LEUKEST Negative       PSA: 41    Post Void Residual:     Other labs: None today      IMPRESSION:  High risk prostate cancer    PLAN:  His significant elevated PSA puts him in the high risk category for localized prostate cancer. We do not see any evidence of metastatic disease on studies but we did discuss the risk of micrometastatic disease that may not be detected by any of our studies. We discussed  His treatment options. I would not recommend active surveillance given  These findings. I also would not recommend less invasive/ablative treatment  options given his high-risk disease. I would recommend he consider either a radical prostatectomy or radiotherapy combined with 3 years of hormonal therapy. We discussed the pros and cons of each option. He stated his wish to undergo surgery if possible. We did discuss robotic prostatectomy in detail today along with its risks and expected recovery. However, he does have these findings in the chest  On his CT scan that need to be sorted out first.I suspect that these may be similar to previous findings regarding his sarcoidosis but he needs to establish care with a local pulmonologist as soon as possible. I am also curious as to whether his sarcoidosis would increase his surgical risks and I would leave that question for the pulmonologist. He will attempt to establish care with a pulmonologist in the near future and then I will see him back soon after that to discuss his treatment options and get a treatment plan together.    Total Time: 45 min                                      Total in Consultation: 45 min    Matteo Coughlin M.D.

## 2019-03-07 NOTE — TELEPHONE ENCOUNTER
Pt calling to get referral for a pulmonologist.      Can call pt  At [phone] and leave a detailed message if needed on VM.    Patrica Mayo  Pt Service Rep.

## 2019-03-11 ENCOUNTER — HOSPITAL ENCOUNTER (OUTPATIENT)
Dept: CT IMAGING | Facility: CLINIC | Age: 64
Discharge: HOME | End: 2019-03-11
Attending: INTERNAL MEDICINE | Admitting: INTERNAL MEDICINE
Payer: COMMERCIAL

## 2019-03-11 DIAGNOSIS — R59.0 HILAR ADENOPATHY: ICD-10-CM

## 2019-03-11 DIAGNOSIS — R91.8 PULMONARY NODULES: ICD-10-CM

## 2019-03-11 PROCEDURE — 71250 CT THORAX DX C-: CPT

## 2019-03-14 ENCOUNTER — TRANSFERRED RECORDS (OUTPATIENT)
Dept: HEALTH INFORMATION MANAGEMENT | Facility: CLINIC | Age: 64
End: 2019-03-14

## 2019-03-18 ENCOUNTER — TELEPHONE (OUTPATIENT)
Dept: INTERNAL MEDICINE | Facility: CLINIC | Age: 64
End: 2019-03-18

## 2019-03-18 ENCOUNTER — HOSPITAL ENCOUNTER (OUTPATIENT)
Facility: CLINIC | Age: 64
End: 2019-03-18
Attending: INTERNAL MEDICINE | Admitting: INTERNAL MEDICINE
Payer: COMMERCIAL

## 2019-03-18 ENCOUNTER — ANESTHESIA EVENT (OUTPATIENT)
Dept: SURGERY | Facility: CLINIC | Age: 64
End: 2019-03-18

## 2019-03-18 DIAGNOSIS — C61 MALIGNANT NEOPLASM OF PROSTATE (H): Primary | ICD-10-CM

## 2019-03-18 DIAGNOSIS — R91.8 OTHER NONSPECIFIC ABNORMAL FINDING OF LUNG FIELD: ICD-10-CM

## 2019-03-18 DIAGNOSIS — R59.0 LOCALIZED ENLARGED LYMPH NODES: ICD-10-CM

## 2019-03-18 NOTE — TELEPHONE ENCOUNTER
patient dropped off a disc with imaging from the [facility]. The disc will be sent to [facility] radiology to be scanned in. The patient requests that this also be read by one of our radiologists per Dr Fish's request.  Dr Payne can you make that request?  Patient is aware Dr Payne is out of office until 3/26/19.

## 2019-03-19 DIAGNOSIS — C61 MALIGNANT NEOPLASM OF PROSTATE (H): ICD-10-CM

## 2019-03-19 DIAGNOSIS — R59.0 LOCALIZED ENLARGED LYMPH NODES: ICD-10-CM

## 2019-03-19 DIAGNOSIS — R91.8 OTHER NONSPECIFIC ABNORMAL FINDING OF LUNG FIELD: ICD-10-CM

## 2019-03-19 PROCEDURE — 36415 COLL VENOUS BLD VENIPUNCTURE: CPT | Performed by: INTERNAL MEDICINE

## 2019-03-19 PROCEDURE — 82164 ANGIOTENSIN I ENZYME TEST: CPT | Mod: 90 | Performed by: INTERNAL MEDICINE

## 2019-03-19 PROCEDURE — 99000 SPECIMEN HANDLING OFFICE-LAB: CPT | Performed by: INTERNAL MEDICINE

## 2019-03-20 LAB — ACE SERPL-CCNC: 33 U/L (ref 9–67)

## 2019-03-25 ENCOUNTER — TELEPHONE (OUTPATIENT)
Dept: PULMONOLOGY | Facility: CLINIC | Age: 64
End: 2019-03-25

## 2019-03-25 NOTE — TELEPHONE ENCOUNTER
Complete    ONCOLOGY INTAKE: Records Information      APPT INFORMATION:  Referring provider:  Elmer  Referring provider s clinic:    Reason for visit/diagnosis:  hilar Adenopathy    Were the records received with the referral (via [employer])? No, internal referral    Has patient been seen for any external appt for this diagnosis (enter clinic/location)?     ADDITIONAL INFORMATION:  Complete: Chest CT on 3/11

## 2019-03-26 NOTE — TELEPHONE ENCOUNTER
"Per [facility], the radiologists \"will not re-read or over read studies done elsewhere\". Patient advised of this.  He is now asking if most recent CT scan from 3/11/19 can be compared with studies done at [facility]. Tried calling [facility] radiology again twice without answer to see if they received the disc and to ask if they will compare studies. OCHOA Santana R.N.    "

## 2019-03-27 ENCOUNTER — TELEPHONE (OUTPATIENT)
Dept: PULMONOLOGY | Facility: CLINIC | Age: 64
End: 2019-03-27

## 2019-03-27 ENCOUNTER — OFFICE VISIT (OUTPATIENT)
Dept: PULMONOLOGY | Facility: CLINIC | Age: 64
End: 2019-03-27
Attending: INTERNAL MEDICINE
Payer: COMMERCIAL

## 2019-03-27 VITALS
HEART RATE: 82 BPM | SYSTOLIC BLOOD PRESSURE: 141 MMHG | OXYGEN SATURATION: 91 % | BODY MASS INDEX: 40.43 KG/M2 | WEIGHT: 298.5 LBS | TEMPERATURE: 98.1 F | DIASTOLIC BLOOD PRESSURE: 88 MMHG | RESPIRATION RATE: 16 BRPM | HEIGHT: 72 IN

## 2019-03-27 DIAGNOSIS — R91.8 PULMONARY NODULES: ICD-10-CM

## 2019-03-27 DIAGNOSIS — R59.0 MEDIASTINAL ADENOPATHY: Primary | ICD-10-CM

## 2019-03-27 DIAGNOSIS — R59.0 HILAR ADENOPATHY: ICD-10-CM

## 2019-03-27 PROCEDURE — G0463 HOSPITAL OUTPT CLINIC VISIT: HCPCS | Mod: ZF

## 2019-03-27 RX ORDER — PREDNISONE 10 MG/1
TABLET ORAL
Refills: 2 | COMMUNITY
Start: 2019-03-14 | End: 2019-03-28

## 2019-03-27 RX ORDER — ALBUTEROL SULFATE 90 UG/1
AEROSOL, METERED RESPIRATORY (INHALATION) EVERY 4 HOURS PRN
Refills: 4 | COMMUNITY
Start: 2019-03-14 | End: 2020-09-29

## 2019-03-27 ASSESSMENT — PAIN SCALES - GENERAL: PAINLEVEL: NO PAIN (0)

## 2019-03-27 ASSESSMENT — MIFFLIN-ST. JEOR: SCORE: 2182.12

## 2019-03-27 NOTE — TELEPHONE ENCOUNTER
Spoke with patient to schedule procedure with Dr. Felicia Leger   Procedure was scheduled on 03/29 at [facility] OR  Patient will have H&P with PAC, 03/28  Patient is aware a / is needed day of surgery.   Surgery letter was sent via [employer], patient has my direct contact information for any further questions.

## 2019-03-27 NOTE — PROGRESS NOTES
[facility]  Lung Nodule Clinic Note  March 27, 2019    Chief complaint:  Derek Contreras is a 63 year old male seen in the Pulmonary Clinic  for   Chief Complaint   Patient presents with     Oncology Clinic Visit     new pt lung nodule        Assessment and Plan:  #1 bilateral hilar and mediastinal lymph node enlargements for many years.  Patient was seen at [facility] 8 years ago when he was told that he probably has sarcoidosis based on CT images but no biopsies were performed.  He was recently seen by [place] [facility] and suggested to have biopsies.  He has no other systemic findings (symptoms) suggesting sarcoidosis such as eye problems, joint issues or skin rash.  He was recently given a prednisone burst and inhaled steroids which helped his breathing.  His CT scan of of the chest also reveals elevated right hemidiaphragm at least for 5 years based on previous CT scans done outside institution.  His pulmonary function test is also reviewed reveals mixed obstructive and restrictive defect.  His diffusing capacity was decreased at 54% of predicted.  This pulmonary function test performed 2 weeks ago at [facility].  We reviewed his CT findings as well as other records.  I indicated him that we will plan to do flexible bronchoscopy with endobronchial ultrasound-guided lymph node biopsies, transbronchial biopsies and bronchoalveolar lavage I discussed pros and cons and diagnostic yield of the procedure.  He is in agreement with proceeding the procedure.  He indicates that he had difficulty with intubation in his previous surgeries however he cannot recall the details therefore I will send him to preanesthesia clinic for further evaluation.    History of Present Illness:  This is a 63 years old gentleman with history of prostate cancer and pending surgical resection.  Comes in today for second opinion and further evaluation of mediastinal lymph node enlargements and pulmonary nodules.  His main symptom  is dyspnea on exertion and occasional cough both improved after given prednisone burst and inhaled steroids.    Exposure history: Asbestos;  No , TB;  No , Radiation;   No     Allergies   Allergen Reactions     Cat Hair Extract Itching     itchy tearful eyes     Adhesive Tape Rash     Iodine Rash        No past medical history on file.     Past Surgical History:   Procedure Laterality Date     APPENDECTOMY       C TOTAL HIP ARTHROPLASTY Bilateral      C TOTAL KNEE ARTHROPLASTY Bilateral      VASECTOMY          Social History     Socioeconomic History     Marital status:      Spouse name: Not on file     Number of children: Not on file     Years of education: Not on file     Highest education level: Not on file   Occupational History     Not on file   Social Needs     Financial resource strain: Not on file     Food insecurity:     Worry: Not on file     Inability: Not on file     Transportation needs:     Medical: Not on file     Non-medical: Not on file   Tobacco Use     Smoking status: Never Smoker     Smokeless tobacco: Never Used   Substance and Sexual Activity     Alcohol use: Yes     Drug use: No     Sexual activity: Yes     Partners: Female   Lifestyle     Physical activity:     Days per week: Not on file     Minutes per session: Not on file     Stress: Not on file   Relationships     Social connections:     Talks on phone: Not on file     Gets together: Not on file     Attends Jew service: Not on file     Active member of club or organization: Not on file     Attends meetings of clubs or organizations: Not on file     Relationship status: Not on file     Intimate partner violence:     Fear of current or ex partner: Not on file     Emotionally abused: Not on file     Physically abused: Not on file     Forced sexual activity: Not on file   Other Topics Concern     Parent/sibling w/ CABG, MI or angioplasty before 65F 55M? Not Asked   Social History Narrative     Not on file        Family History    Problem Relation Age of Onset     Alzheimer Disease Mother      Heart Disease Father         Immunization History   Administered Date(s) Administered     HepB, Unspecified 01/12/2000, 03/07/2000, 07/19/2000     Influenza (H1N1) 10/15/2018     Pneumo Conj 13-V (2010&after) 04/01/2018     Pneumococcal 23 valent 06/19/2018     TDAP Vaccine (Adacel) 04/18/2011     Zoster vaccine, live 12/27/2016       Current Outpatient Medications   Medication Sig     albuterol (PROAIR HFA/PROVENTIL HFA/VENTOLIN HFA) 108 (90 Base) MCG/ACT inhaler INHALE 2 PUFFS BY MOUTH Q 4 TO 6 H FOR COUGH AND BREATHLESSNESS     aspirin (ASA) 81 MG tablet Take 1 tablet (81 mg) by mouth daily     atorvastatin (LIPITOR) 40 MG tablet Take 1 tablet (40 mg) by mouth daily     BREO ELLIPTA 200-25 MCG/INH Inhaler INL 1 PUFF PO AT THE SAME TIME Q DAY. RINSE AND SPIT AFTER U     cetirizine (ZYRTEC) 10 MG tablet Take 1 tablet (10 mg) by mouth daily     hydrochlorothiazide (HYDRODIURIL) 12.5 MG tablet Take 2 tablets (25 mg) by mouth daily     multivitamin (ONE-DAILY) tablet Take 1 tablet by mouth daily     predniSONE (DELTASONE) 10 MG tablet      No current facility-administered medications for this visit.         Review of Systems:  I have done 10 points of review systems and pertinent findings are  ,otherwise negative.    Physical examination  Constitutional: Alert, oriented, not in distress  Vitals: B/P: 141/88, T: 98.1, P: 82, R: 16  Eyes: No icterus, nystagmus, pupils isocoric  ENT/Mouth:  No ear discharge, moist mucosa, no ulceration, tonsillar hypertrophy  Cardiovascular: Normal S1 and S2, no additional heart sounds, murmur, rub, normal peripheral pulses  Respiratory: Both hemithoraces are symmetrical, normal to palpation, no dullness to percussion, auscultation of lungs clear  Musculoskeletal: Normal muscle mass, no dephormity  Integumentary:  No rash, normal texture and turgor, no edema  Neurological: Alert, orientedx3, no motor deficits, cranial  nerves grossly normal  Psychiatric:  Mood and affect are appropriate with insight into his/her medical condition    Data:  Lab Results   Component Value Date    WBC 7.6 02/05/2019     Lab Results   Component Value Date    RBC 5.86 02/05/2019     Lab Results   Component Value Date    HGB 17.0 02/05/2019     Lab Results   Component Value Date    HCT 52.3 02/05/2019     Lab Results   Component Value Date    MCV 89 02/05/2019     Lab Results   Component Value Date    MCH 29.0 02/05/2019     Lab Results   Component Value Date    MCHC 32.5 02/05/2019     Lab Results   Component Value Date    RDW 14.4 02/05/2019     Lab Results   Component Value Date     02/05/2019       Lab Results   Component Value Date     02/05/2019      Lab Results   Component Value Date    POTASSIUM 3.9 02/05/2019     Lab Results   Component Value Date    CHLORIDE 103 02/05/2019     Lab Results   Component Value Date    ANTONIO 9.3 02/05/2019     Lab Results   Component Value Date    CO2 29 02/05/2019     Lab Results   Component Value Date    BUN 11 02/05/2019     Lab Results   Component Value Date    CR 0.89 02/05/2019     Lab Results   Component Value Date     02/05/2019       Thank you for allowing me participate in the care of Derek Contreras.    MOHINI Leger MD        Answers for HPI/ROS submitted by the patient on 3/25/2019   General Symptoms: No  Skin Symptoms: No  HENT Symptoms: No  EYE SYMPTOMS: No  HEART SYMPTOMS: No  LUNG SYMPTOMS: No  INTESTINAL SYMPTOMS: No  URINARY SYMPTOMS: No  REPRODUCTIVE SYMPTOMS: No  SKELETAL SYMPTOMS: No  BLOOD SYMPTOMS: No  NERVOUS SYSTEM SYMPTOMS: No  MENTAL HEALTH SYMPTOMS: No

## 2019-03-27 NOTE — NURSING NOTE
"Oncology Rooming Note    March 27, 2019 10:12 AM   Derek Contreras is a 63 year old male who presents for:    Chief Complaint   Patient presents with     Oncology Clinic Visit     new pt lung nodule      Initial Vitals: /88 (BP Location: Right arm, Patient Position: Sitting, Cuff Size: Adult Regular)   Pulse 82   Temp 98.1  F (36.7  C) (Oral)   Resp 16   Ht 1.829 m (6' 0.01\")   Wt 135.4 kg (298 lb 8 oz)   SpO2 91%   BMI 40.47 kg/m   Estimated body mass index is 40.47 kg/m  as calculated from the following:    Height as of this encounter: 1.829 m (6' 0.01\").    Weight as of this encounter: 135.4 kg (298 lb 8 oz). Body surface area is 2.62 meters squared.  No Pain (0) Comment: Data Unavailable   No LMP for male patient.  Allergies reviewed: Yes  Medications reviewed: Yes    Medications: Medication refills not needed today.  Pharmacy name entered into [employer]: [facility] [address] [place] [id number] [facility] AT [place] [address] & [facility]    Clinical concerns: none        Rachel Cuate, CMA              "

## 2019-03-27 NOTE — LETTER
3/27/2019     RE: Derek Contreras  [id number] [place] [address]     Dear Colleague,    Thank you for referring your patient, Derek Contreras, to the [facility] at [facility]. Please see a copy of my visit note below.    [facility]  Lung Nodule Clinic Note  March 27, 2019    Chief complaint:  Derek Contreras is a 63 year old male seen in the Pulmonary Clinic  for   Chief Complaint   Patient presents with     Oncology Clinic Visit     new pt lung nodule        Assessment and Plan:  #1 bilateral hilar and mediastinal lymph node enlargements for many years.  Patient was seen at [facility] 8 years ago when he was told that he probably has sarcoidosis based on CT images but no biopsies were performed.  He was recently seen by [place] [facility] and suggested to have biopsies.  He has no other systemic findings (symptoms) suggesting sarcoidosis such as eye problems, joint issues or skin rash.  He was recently given a prednisone burst and inhaled steroids which helped his breathing.  His CT scan of of the chest also reveals elevated right hemidiaphragm at least for 5 years based on previous CT scans done outside institution.  His pulmonary function test is also reviewed reveals mixed obstructive and restrictive defect.  His diffusing capacity was decreased at 54% of predicted.  This pulmonary function test performed 2 weeks ago at [facility].  We reviewed his CT findings as well as other records.  I indicated him that we will plan to do flexible bronchoscopy with endobronchial ultrasound-guided lymph node biopsies, transbronchial biopsies and bronchoalveolar lavage I discussed pros and cons and diagnostic yield of the procedure.  He is in agreement with proceeding the procedure.  He indicates that he had difficulty with intubation in his previous surgeries however he cannot recall the details therefore I will send him to  preanesthesia clinic for further evaluation.    History of Present Illness:  This is a 63 years old gentleman with history of prostate cancer and pending surgical resection.  Comes in today for second opinion and further evaluation of mediastinal lymph node enlargements and pulmonary nodules.  His main symptom is dyspnea on exertion and occasional cough both improved after given prednisone burst and inhaled steroids.    Exposure history: Asbestos;  No , TB;  No , Radiation;   No     Allergies   Allergen Reactions     Cat Hair Extract Itching     itchy tearful eyes     Adhesive Tape Rash     Iodine Rash        No past medical history on file.     Past Surgical History:   Procedure Laterality Date     APPENDECTOMY       C TOTAL HIP ARTHROPLASTY Bilateral      C TOTAL KNEE ARTHROPLASTY Bilateral      VASECTOMY          Social History     Socioeconomic History     Marital status:      Spouse name: Not on file     Number of children: Not on file     Years of education: Not on file     Highest education level: Not on file   Occupational History     Not on file   Social Needs     Financial resource strain: Not on file     Food insecurity:     Worry: Not on file     Inability: Not on file     Transportation needs:     Medical: Not on file     Non-medical: Not on file   Tobacco Use     Smoking status: Never Smoker     Smokeless tobacco: Never Used   Substance and Sexual Activity     Alcohol use: Yes     Drug use: No     Sexual activity: Yes     Partners: Female   Lifestyle     Physical activity:     Days per week: Not on file     Minutes per session: Not on file     Stress: Not on file   Relationships     Social connections:     Talks on phone: Not on file     Gets together: Not on file     Attends Yarsani service: Not on file     Active member of club or organization: Not on file     Attends meetings of clubs or organizations: Not on file     Relationship status: Not on file     Intimate partner violence:     Fear  of current or ex partner: Not on file     Emotionally abused: Not on file     Physically abused: Not on file     Forced sexual activity: Not on file   Other Topics Concern     Parent/sibling w/ CABG, MI or angioplasty before 65F 55M? Not Asked   Social History Narrative     Not on file        Family History   Problem Relation Age of Onset     Alzheimer Disease Mother      Heart Disease Father         Immunization History   Administered Date(s) Administered     HepB, Unspecified 01/12/2000, 03/07/2000, 07/19/2000     Influenza (H1N1) 10/15/2018     Pneumo Conj 13-V (2010&after) 04/01/2018     Pneumococcal 23 valent 06/19/2018     TDAP Vaccine (Adacel) 04/18/2011     Zoster vaccine, live 12/27/2016       Current Outpatient Medications   Medication Sig     albuterol (PROAIR HFA/PROVENTIL HFA/VENTOLIN HFA) 108 (90 Base) MCG/ACT inhaler INHALE 2 PUFFS BY MOUTH Q 4 TO 6 H FOR COUGH AND BREATHLESSNESS     aspirin (ASA) 81 MG tablet Take 1 tablet (81 mg) by mouth daily     atorvastatin (LIPITOR) 40 MG tablet Take 1 tablet (40 mg) by mouth daily     BREO ELLIPTA 200-25 MCG/INH Inhaler INL 1 PUFF PO AT THE SAME TIME Q DAY. RINSE AND SPIT AFTER U     cetirizine (ZYRTEC) 10 MG tablet Take 1 tablet (10 mg) by mouth daily     hydrochlorothiazide (HYDRODIURIL) 12.5 MG tablet Take 2 tablets (25 mg) by mouth daily     multivitamin (ONE-DAILY) tablet Take 1 tablet by mouth daily     predniSONE (DELTASONE) 10 MG tablet      No current facility-administered medications for this visit.         Review of Systems:  I have done 10 points of review systems and pertinent findings are  ,otherwise negative.    Physical examination  Constitutional: Alert, oriented, not in distress  Vitals: B/P: 141/88, T: 98.1, P: 82, R: 16  Eyes: No icterus, nystagmus, pupils isocoric  ENT/Mouth:  No ear discharge, moist mucosa, no ulceration, tonsillar hypertrophy  Cardiovascular: Normal S1 and S2, no additional heart sounds, murmur, rub, normal peripheral  pulses  Respiratory: Both hemithoraces are symmetrical, normal to palpation, no dullness to percussion, auscultation of lungs clear  Musculoskeletal: Normal muscle mass, no dephormity  Integumentary:  No rash, normal texture and turgor, no edema  Neurological: Alert, orientedx3, no motor deficits, cranial nerves grossly normal  Psychiatric:  Mood and affect are appropriate with insight into his/her medical condition    Data:  Lab Results   Component Value Date    WBC 7.6 02/05/2019     Lab Results   Component Value Date    RBC 5.86 02/05/2019     Lab Results   Component Value Date    HGB 17.0 02/05/2019     Lab Results   Component Value Date    HCT 52.3 02/05/2019     Lab Results   Component Value Date    MCV 89 02/05/2019     Lab Results   Component Value Date    MCH 29.0 02/05/2019     Lab Results   Component Value Date    MCHC 32.5 02/05/2019     Lab Results   Component Value Date    RDW 14.4 02/05/2019     Lab Results   Component Value Date     02/05/2019       Lab Results   Component Value Date     02/05/2019      Lab Results   Component Value Date    POTASSIUM 3.9 02/05/2019     Lab Results   Component Value Date    CHLORIDE 103 02/05/2019     Lab Results   Component Value Date    ANTONIO 9.3 02/05/2019     Lab Results   Component Value Date    CO2 29 02/05/2019     Lab Results   Component Value Date    BUN 11 02/05/2019     Lab Results   Component Value Date    CR 0.89 02/05/2019     Lab Results   Component Value Date     02/05/2019     Thank you for allowing me participate in the care of Derek Contreras.    MOHINI Leger MD    Answers for HPI/ROS submitted by the patient on 3/25/2019   General Symptoms: No  Skin Symptoms: No  HENT Symptoms: No  EYE SYMPTOMS: No  HEART SYMPTOMS: No  LUNG SYMPTOMS: No  INTESTINAL SYMPTOMS: No  URINARY SYMPTOMS: No  REPRODUCTIVE SYMPTOMS: No  SKELETAL SYMPTOMS: No  BLOOD SYMPTOMS: No  NERVOUS SYSTEM SYMPTOMS: No  MENTAL HEALTH SYMPTOMS: No      Again,  thank you for allowing me to participate in the care of your patient.      Sincerely,    Curtis Leger MD

## 2019-03-28 ENCOUNTER — ANESTHESIA EVENT (OUTPATIENT)
Dept: SURGERY | Facility: CLINIC | Age: 64
End: 2019-03-28
Payer: COMMERCIAL

## 2019-03-28 ENCOUNTER — OFFICE VISIT (OUTPATIENT)
Dept: SURGERY | Facility: CLINIC | Age: 64
End: 2019-03-28
Payer: COMMERCIAL

## 2019-03-28 VITALS
BODY MASS INDEX: 40.39 KG/M2 | SYSTOLIC BLOOD PRESSURE: 169 MMHG | WEIGHT: 298.2 LBS | TEMPERATURE: 97.7 F | DIASTOLIC BLOOD PRESSURE: 80 MMHG | OXYGEN SATURATION: 97 % | HEART RATE: 86 BPM | HEIGHT: 72 IN | RESPIRATION RATE: 18 BRPM

## 2019-03-28 DIAGNOSIS — Z01.818 PRE-OPERATIVE EXAMINATION: Primary | ICD-10-CM

## 2019-03-28 RX ORDER — ACETAMINOPHEN 500 MG
1000 TABLET ORAL PRN
Status: ON HOLD | COMMUNITY
End: 2019-05-23

## 2019-03-28 ASSESSMENT — MIFFLIN-ST. JEOR: SCORE: 2180.63

## 2019-03-28 NOTE — ANESTHESIA PREPROCEDURE EVALUATION
Anesthesia Pre-Procedure Evaluation    Patient: Derek Contreras   MRN:     [id number] Gender:   male   Age:    63 year old :      1955        Preoperative Diagnosis: * No surgery found *        Past Medical History:   Diagnosis Date     Claustrophobia      Hypercholesteremia      Hypertension      Mediastinal adenopathy      Obesity      BEULAH (obstructive sleep apnea)      Prostate cancer (H)      Sarcoidosis       Past Surgical History:   Procedure Laterality Date     APPENDECTOMY       C TOTAL HIP ARTHROPLASTY Bilateral      C TOTAL KNEE ARTHROPLASTY Bilateral      VASECTOMY            Anesthesia Evaluation     . Pt has had prior anesthetic. Type: General, MAC and Regional    History of anesthetic complications   - difficult intubation        ROS/MED HX    ENT/Pulmonary:     (+)sleep apnea, asthma Treatment: Inhaler daily and Inhaler prn,  doesn't use CPAP , . Other pulmonary disease sarcoidosis.    Neurologic:  - neg neurologic ROS     Cardiovascular:     (+) Dyslipidemia, hypertension----. : . . KIRBY, . :. . Previous cardiac testing Echodate:11results:EF 66%Stress Testdate:16 results:ECG reviewed date:16 results:SR, PVC and left anterior fascicular block date: results:          METS/Exercise Tolerance: Comment: Walk on treadmill 1 mile holding on to railings. Walking outside: daily  >4 METS   Hematologic:        (-) history of blood clots and History of Transfusion   Musculoskeletal:   (+) , , other musculoskeletal- S/p TKA bilateral and s/p SILVERIO bilateral      GI/Hepatic:  - neg GI/hepatic ROS       Renal/Genitourinary:  - ROS Renal section negative       Endo:     (+) Obesity, .      Psychiatric:     (+) psychiatric history other (comment) (claustrophobia only with CPAP)      Infectious Disease:  - neg infectious disease ROS       Malignancy:   (+) Malignancy History of Prostate  Prostate CA Active status post,         Other:    (+) no H/O Chronic Pain,no other significant disability  "                       PHYSICAL EXAM:   Mental Status/Neuro: A/A/O; Age Appropriate   Airway: Facies: Thick Neck  Mallampati: II  Mouth/Opening: Full  TM distance: > 6 cm  Neck ROM: Limited   Respiratory: Auscultation: CTAB     Resp. Rate: Normal     Resp. Effort: Normal      CV: Rhythm: Regular  Heart: Normal Sounds  Pulses: Normal   Comments:      Dental:  Dentures: Upper; Lower                  Lab Results   Component Value Date    WBC 7.6 02/05/2019    HGB 17.0 02/05/2019    HCT 52.3 02/05/2019     (L) 02/05/2019     02/05/2019    POTASSIUM 3.9 02/05/2019    CHLORIDE 103 02/05/2019    CO2 29 02/05/2019    BUN 11 02/05/2019    CR 0.89 02/05/2019     (H) 02/05/2019    ANTONIO 9.3 02/05/2019    ALBUMIN 3.7 02/05/2019    PROTTOTAL 8.3 02/05/2019    ALT 32 02/05/2019    AST 30 02/05/2019    ALKPHOS 70 02/05/2019    BILITOTAL 0.7 02/05/2019    TSH 2.23 02/05/2019       Preop Vitals  BP Readings from Last 3 Encounters:   03/27/19 141/88   03/07/19 132/66   02/21/19 148/62    Pulse Readings from Last 3 Encounters:   03/27/19 82   03/07/19 80   02/21/19 60      Resp Readings from Last 3 Encounters:   03/27/19 16   02/05/19 20    SpO2 Readings from Last 3 Encounters:   03/27/19 91%   02/12/19 95%   02/05/19 99%      Temp Readings from Last 1 Encounters:   03/27/19 98.1  F (36.7  C) (Oral)    Ht Readings from Last 1 Encounters:   03/27/19 1.829 m (6' 0.01\")      Wt Readings from Last 1 Encounters:   03/27/19 135.4 kg (298 lb 8 oz)    Estimated body mass index is 40.47 kg/m  as calculated from the following:    Height as of 3/27/19: 1.829 m (6' 0.01\").    Weight as of 3/27/19: 135.4 kg (298 lb 8 oz).     LDA:            Assessment:   ASA SCORE: 3    NPO Status: > 6 hours since completed Solid [employer]   Documentation: H&P complete; Preop Testing complete; Consents complete   Proceeding: Proceed without further delay  Tobacco Use:  NO Active use of Tobacco/UNKNOWN Tobacco use status     Plan:   Anes. Type:  " General   Pre-Induction: Midazolam IV      Airway: Oral ETT; CMAC/VL     Advanced: Difficult Airway Cart; FOB (awake fiberoptic intubation)   Access/Monitoring: PIV   Maintenance: Balanced   Emergence: Recovery Site (PACU/ICU)   Logistics: Same Day Surgery     Postop Pain/Sedation Strategy:  Standard-Options: Opioids PRN     PONV Management:  Adult Risk Factors:, Non-Smoker, Postop Opioids  Prevention: Ondansetron; Dexamethasone     CONSENT: Direct conversation   Plan and risks discussed with: Patient   Blood Products: Consent Deferred (Minimal Blood Loss)                  PAC Discussion and Assessment    ASA Classification: 3  Case is suitable for: [place]  Anesthetic techniques and relevant risks discussed: GA  Invasive monitoring and risk discussed:   Types:   Possibility and Risk of blood transfusion discussed:   NPO instructions given:   Additional anesthetic preparation and risks discussed:   Needs early admission to pre-op area:   Other:     PAC Resident/NP Anesthesia Assessment:  Derek is a 63-year-old man who is scheduled for flexible bronchoscopy, endobronchial ultrasound, transbronchial biopsy on 3/29/2019 by Dr. Leger in treatment of mediastinal adenopathy.  PAC referral for risk assessment and optimization for anesthesia with comorbid conditions of history of difficult intubation, hypertension, hyperlipidemia, allergies, sarcoidosis, obstructive sleep apnea, asthma, obesity, prostate cancer, claustrophobia:    Pre-operative considerations:  1.  Cardiac:    ~ HTN - ASA 81 mg and hydrochlorothiazide. Has been holding ASA 81 mg since 3/14/19. Will continue to hold. Hold hydrochlorothiazide DOS.   ~ HLD - Continue atorvastatin DOS.   ~ Previous EKG (12/27/16), echo (5/25/11) and stress test (12/27/16) done at [place]. Sinus rhythm with PVC and left anterior fascicular block. EF 66%, no ischemia or wall motion abnormalities. Functional status- METS >4, patient walks daily outside or on the treadmill for 1  baldo.  Low risk surgery with 0.9% (RCRI #) risk of major adverse cardiac event. No further cardiac testing indicated.     2.  Pulm:    ~ Airway feasible. Patient with history of difficult intubation with hip and knee replacement at [facility]. Patient with limited ROM of the neck. Also evaluated by Dr. Rasmussen. See below for any further recommendations.   ~ BEULAH - secondary to claustrophobia the patient doesn't tolerate CPAP  ~ Asthma - Recent PFTs on 3/14/19 show FEV1 1.41, FEV1/FVC 71%. Continue on albuterol and ellipta. Patient reports KIRBY and cough improved after 8 day tapering course of prednisone and albuterol. Hasn't used albuterol for 2 weeks.   ~ Sarcoidosis/Mediastinal adenopathy - procedure as above.   ~ Allergies - continue on Zyrtec.     3. Endo: Obesity, class III: BMI 40. Consideration for safe lifting techniques due to BMI    4. GI:  Risk of PONV score = 1.  If > 2, anti-emetic intervention recommended.    5. : Prostate cancer - Patient followed by Dr. Coughlin. Denies any urinary symptoms.     6. Psych: Claustrophobia only with CPAP.     7. Musculoskeletal: S/p bilateral total knee and total hip replacement. Consideration for careful positioning and padding.     VTE risk: 3%    Patient is optimized and is acceptable candidate for the proposed procedure.  No further diagnostic evaluation is needed.     Patient also evaluated by Dr. Rasmussen. See recommendations below.     For further details of assessment, testing, and physical exam please see H and P completed on same date.    Radha Acuna PA-C        Mid-Level Provider/Resident: Radha Acuna PA-C  Date: 3/28/19  Time:     Attending Anesthesiologist Anesthesia Assessment:  Reviewed history and discussed difficult intubation history with patient.  Difficult intubations occurred at [facility] which prompted a change in his management to regional anesthesia in subsequent surgeries.  Knowing the [facility] system as I do, he can be presumed to have a difficult  intubation.  On physical examination, he has a shallow oropharynx and limited neck mobility.    I strongly recommend an awake intubation for the patient and have counseled him on the likelihood of an awake fiberoptic intubation for the bronchoscopy with Dr. Leger.  Given the fiberoptic intubation equipment required to perform the therapeutic bronchoscopy will already be in the OR, airway management should be uneventful.  I advised Dr. Dumont, EB OOD, of the patient and issue.    No further evaluation is required prior to the procedure.    Reviewed and Signed by PAC Anesthesiologist  Anesthesiologist: Grady  Date: March 29, 2019  Time:   Pass/Fail: Pass  Disposition:     PAC Pharmacist Assessment:        Pharmacist:   Date:   Time:        Radha Acuna PA-C

## 2019-03-28 NOTE — H&P
Pre-Operative H & P     CC:  Preoperative exam to assess for increased cardiopulmonary risk while undergoing surgery and anesthesia.    Date of Encounter: 3/28/2019  Primary Care Physician:  Wilfredo Reyes     Reason for visit: pre operative examination, mediastinal adenopathy    HPI  Derek Contreras is a 63 year old male who presents for pre-operative H & P in preparation for flexible bronchoscopy, endobronchial ultrasound, transbronchial biopsy with Dr. Leger on 3/29/19 at [facility].     The patient is a 63 year old man who has had symptoms of dyspnea and cough for the past 8 years.  8 years ago was evaluated at the [facility] and based on CT images was diagnosed with sarcoidosis. The patient does currently have prostate cancer and would like to pursue surgical resection. Due to his pulmonary symptoms prior to surgery, he was referred to the [facility] on 3/14/19. They suggested that he have biopsies given his known mediastinal lymph node enlargement and pulmonary nodules. He did complete a course of prednisone and inhaled steroids which helped his breathing. He had PFTs which showed mixed obstructive and restructure defect. He then was seen by Dr. Leger on 3/27/19 for a second opinion and is now scheduled for the procedure as above.     History is obtained from the patient and chart review    Past Medical History  Past Medical History:   Diagnosis Date     Asthma      Claustrophobia      Hypercholesteremia      Hypertension      Mediastinal adenopathy      Obesity      BEULAH (obstructive sleep apnea)      Prostate cancer (H)      Sarcoidosis        Past Surgical History  Past Surgical History:   Procedure Laterality Date     APPENDECTOMY       C TOTAL HIP ARTHROPLASTY Bilateral      C TOTAL KNEE ARTHROPLASTY Bilateral      VASECTOMY         Hx of Blood transfusions/reactions: denies     Hx of abnormal bleeding or anti-platelet use: ASA 81 MG  - On hold since 3/14/19    Menstrual history: No LMP for male patient.:     Steroid use in the last year: prednisone 8 day taper course 3/14/19-3/21/19    Personal or FH with difficulty with Anesthesia:  yes    Prior to Admission Medications  Current Outpatient Medications   Medication Sig Dispense Refill     acetaminophen (TYLENOL) 500 MG tablet Take 1,000 mg by mouth as needed for mild pain       aspirin (ASA) 81 MG tablet Take 81 mg by mouth every morning ON HOLD FOR SURGERY SINCE 03/14/2019 90 tablet 3     atorvastatin (LIPITOR) 40 MG tablet Take 40 mg by mouth every morning  90 tablet 3     BREO ELLIPTA 200-25 MCG/INH Inhaler INL 1 PUFF PO AT THE SAME TIME Q DAY. RINSE AND SPIT AFTER U  4     cetirizine (ZYRTEC) 10 MG tablet Take 10 mg by mouth every morning  90 tablet 3     hydrochlorothiazide (HYDRODIURIL) 12.5 MG tablet Take 12.5 mg by mouth every morning  180 tablet 3     multivitamin (ONE-DAILY) tablet Take 1 tablet by mouth every morning  90 tablet 3     albuterol (PROAIR HFA/PROVENTIL HFA/VENTOLIN HFA) 108 (90 Base) MCG/ACT inhaler INHALE 2 PUFFS BY MOUTH Q 4 TO 6 H FOR COUGH AND BREATHLESSNESS  4       Allergies  Allergies   Allergen Reactions     Cat Hair Extract Itching     itchy tearful eyes     Adhesive Tape Rash     Iodine Rash       Social History  Social History     Socioeconomic History     Marital status:      Spouse name: Not on file     Number of children: Not on file     Years of education: Not on file     Highest education level: Not on file   Occupational History     Not on file   Social Needs     Financial resource strain: Not on file     Food insecurity:     Worry: Not on file     Inability: Not on file     Transportation needs:     Medical: Not on file     Non-medical: Not on file   Tobacco Use     Smoking status: Never Smoker     Smokeless tobacco: Never Used   Substance and Sexual Activity     Alcohol use: Yes     Comment: twice a week     Drug use: No     Sexual activity: Yes      Partners: Female   Lifestyle     Physical activity:     Days per week: Not on file     Minutes per session: Not on file     Stress: Not on file   Relationships     Social connections:     Talks on phone: Not on file     Gets together: Not on file     Attends Jewish service: Not on file     Active member of club or organization: Not on file     Attends meetings of clubs or organizations: Not on file     Relationship status: Not on file     Intimate partner violence:     Fear of current or ex partner: Not on file     Emotionally abused: Not on file     Physically abused: Not on file     Forced sexual activity: Not on file   Other Topics Concern     Parent/sibling w/ CABG, MI or angioplasty before 65F 55M? Not Asked   Social History Narrative     Not on file       Family History  Family History   Problem Relation Age of Onset     Alzheimer Disease Mother      Heart Disease Father        Review of Systems      ROS/MED HX    ENT/Pulmonary:     (+)sleep apnea, asthma Treatment: Inhaler daily and Inhaler prn,  doesn't use CPAP , . Other pulmonary disease sarcoidosis.    Neurologic:  - neg neurologic ROS     Cardiovascular:     (+) Dyslipidemia, hypertension----. : . . KIRBY, . :. . Previous cardiac testing Echodate:5/25/11results:EF 66%Stress Testdate:12/27/16 results:ECG reviewed date:12/27/16 results:SR, PVC and left anterior fascicular block date: results:          METS/Exercise Tolerance: Comment: Walk on treadmill 1 mile holding on to railings. Walking outside: daily  >4 METS   Hematologic:        (-) history of blood clots and History of Transfusion   Musculoskeletal:   (+) , , other musculoskeletal- S/p TKR bilateral and s/p THR bilateral      GI/Hepatic:  - neg GI/hepatic ROS       Renal/Genitourinary:  - ROS Renal section negative       Endo:     (+) Obesity, .      Psychiatric:     (+) psychiatric history other (comment) (claustrophobia only with CPAP)      Infectious Disease:  - neg infectious disease ROS   "     Malignancy:   (+) Malignancy History of Prostate  Prostate CA Active status post,         Other:    (+) no H/O Chronic Pain,no other significant disability          The complete review of systems is negative other than noted in the HPI or here.   Temp: 97.7  F (36.5  C) Temp src: Oral BP: 169/80 Pulse: 86   Resp: 18 SpO2: 97 %         298 lbs 3.2 oz  6' 0\"   Body mass index is 40.44 kg/m .       Physical Exam  Constitutional: Awake, alert, cooperative, no apparent distress, and appears stated age. Obese  Eyes: Pupils equal, round and reactive to light, extra ocular muscles intact, sclera clear, conjunctiva normal.  HENT: Normocephalic, oral pharynx with moist mucus membranes, dentures. No goiter appreciated.   Respiratory: Clear to auscultation bilaterally, no crackles or wheezing.  Cardiovascular: Regular rate and rhythm, normal S1 and S2, and no murmur noted.  Carotids +2, no bruits. No edema. Palpable pulses to radial  DP and PT arteries.   GI: Normal bowel sounds, soft, non-distended, non-tender, exam limited secondary to body habitus  Lymph/Hematologic: No cervical lymphadenopathy and no supraclavicular lymphadenopathy.  Genitourinary:  defer  Skin: Warm and dry.  No rashes at anticipated surgical site.   Musculoskeletal: Limited ROM of neck. There is no redness, warmth, or swelling of the joints. Gross motor strength is normal.    Neurologic: Awake, alert, oriented to name, place and time. Cranial nerves II-XII are grossly intact. Gait is normal.   Neuropsychiatric: Calm, cooperative. Normal affect.     Labs: (personally reviewed)  Lab Results   Component Value Date    WBC 7.6 02/05/2019    HGB 17.0 02/05/2019    HCT 52.3 02/05/2019     (L) 02/05/2019     02/05/2019    POTASSIUM 3.9 02/05/2019    CHLORIDE 103 02/05/2019    CO2 29 02/05/2019    BUN 11 02/05/2019    CR 0.89 02/05/2019     (H) 02/05/2019    ANTONIO 9.3 02/05/2019    ALBUMIN 3.7 02/05/2019    PROTTOTAL 8.3 02/05/2019    ALT 32 " 2019    AST 30 2019    ALKPHOS 70 2019    BILITOTAL 0.7 2019    TSH 2.23 2019     EK16  Sinus rhythm, premature ventricular complexes complexes singly and in repair, left anterior fascicular block  Cardiac echo: 2011  Normal left ventricular chamber size, calculi left ventricular ejection fraction 66%, no regional wall motion abnormalities, indeterminate left ventricular diastolic function grade, estimated right ventricular systolic pressure 28 mg of mercury, no significant valvular heart disease, no previous study to compare  Stress test: 2016  Submaximal test, limits sensitivity of findings, stress ECG is negative for ischemia, exercise capacity is limited, normal blood pressure response to exercise, normal heart rate response to exercise, abnormal heart rate recovery, minor degree arrhythmias noted, no chest pain reported during test  PFT's: 3/14/19  Spirometry reveals a moderate mixed obstructive/restrictive impairment. Lung volumes demonstrate moderate restriction. Diffusing capcity when corrected for alveolar volume is normal. The flow-volume loop is narrowed and coved both.     The patient's records and results personally reviewed by this provider.     Outside records reviewed from: [facility] everywhere    ASSESSMENT and PLAN  Derek is a 63-year-old man who is scheduled for flexible bronchoscopy, endobronchial ultrasound, transbronchial biopsy on 3/29/2019 by Dr. Leger in treatment of mediastinal adenopathy.  PAC referral for risk assessment and optimization for anesthesia with comorbid conditions of history of difficult intubation, hypertension, hyperlipidemia, allergies, sarcoidosis, obstructive sleep apnea, asthma, obesity, prostate cancer, claustrophobia:    Pre-operative considerations:  1.  Cardiac:    ~ HTN - ASA 81 mg and hydrochlorothiazide. Has been holding ASA 81 mg since 3/14/19. Will continue to hold. Hold hydrochlorothiazide DOS.   ~ HLD - Continue  atorvastatin DOS.   ~ Previous EKG (12/27/16), echo (5/25/11) and stress test (12/27/16) done at [facility]. Sinus rhythm with PVC and left anterior fascicular block. EF 66%, no ischemia or wall motion abnormalities. Functional status- METS >4, patient walks daily outside or on the treadmill for 1 mile.  Low risk surgery with 0.9% (RCRI #) risk of major adverse cardiac event. No further cardiac testing indicated.     2.  Pulm:    ~ Airway feasible. Patient with history of difficult intubation with hip and knee replacement at [facility]. Patient with limited ROM of the neck. Also evaluated by Dr. Rasmussen and will plan for likelihood of an awake fiberoptic intubation for the bronchoscopy with Dr. Leger.  Anesthesia team for tomorrow has been updated about the patient.   ~ BEULAH - secondary to claustrophobia the patient doesn't tolerate CPAP  ~ Asthma - Recent PFTs on 3/14/19 show FEV1 1.41, FEV1/FVC 71%. Continue on albuterol and ellipta. Patient reports KIRBY and cough improved after 8 day tapering course of prednisone and albuterol. Hasn't used albuterol for 2 weeks.   ~ Sarcoidosis/Mediastinal adenopathy - procedure as above.   ~ Allergies - continue on Zyrtec.     3. Endo: Obesity, class III: BMI 40. Consideration for safe lifting techniques due to BMI    4. GI:  Risk of PONV score = 1.  If > 2, anti-emetic intervention recommended.    5. : Prostate cancer - Patient followed by Dr. Coughlin. Denies any urinary symptoms.     6. Psych: Claustrophobia only with CPAP.     7. Musculoskeletal: S/p bilateral total knee and total hip replacement. Consideration for careful positioning and padding.     VTE risk: 3%    Patient was discussed with Dr Rasmussen.    The patient is optimized for their procedure. AVS with information on surgery time/arrival time, meds and NPO status given by nursing staff.        Radha Acuna PA-C  Preoperative Assessment Center  [facility]  Clinic and Surgery Center  Phone:  [phone]  Fax: [phone]

## 2019-03-28 NOTE — PATIENT INSTRUCTIONS
Preparing for Your Surgery      Name:  Derek Contreras   MRN:  [id number]   :  1955   Today's Date:  3/28/2019     Arriving for surgery:  Surgery date:  3/29/19  Arrival time:  7 am    Please come to:  [facility] [address]  [address] [place]  [address]    -   parking is available in front of the hospital from 5:15 am to 8:00 pm    -  Stop at the Information Desk in the lobby    -   Inform the information person that you are here for surgery. An escort to 3C will be provided. If you would not like an escort, please proceed to 3C on the 3rd floor. [phone]     -  Bring your ID and insurance card.    What can I eat or drink?  -  You may have solid food or milk products until 8 hours prior to your surgery. (Until 1 am )  -  You may have water, apple juice or 7up/Sprite until 2 hours prior to your surgery. (Until 7 am )    Which medicines can I take?       -  Do not bring your own medications to the hospital, except for inhalers.        -  Follow Pulmonary Clinic instructions regarding Ibuprofen. If no instructions given, NO Ibuprofen the day prior to surgery.         -  Patient reports Aspirin is on hold. Last dose 3-14-19.        -  Hold Multivitamin starting now, if you haven't already.    -  Do NOT take these medications in the morning, the day of surgery:  Hydrochlorothiazide    -  Please take these medications the morning of surgery:  Atorvastatin, Cetirizine, Breo inhaler,      Albuterol inhaler if needed      Acetaminophen (Tylenol) if needed    How do I prepare myself?  -  Take two showers: one the night before surgery; and one the morning of surgery.         Use Scrubcare or Hibiclens to wash from neck down.  You may use your own shampoo and conditioner. No other hair products.   -  Do NOT use lotion, powder, colognes, deodorant, or antiperspirant the day of your surgery.  -  Do NOT wear any jewelry.    Questions or Concerns:  If you have  questions or concerns prior to your surgery, call [phone]. (Mon - Fri   8 am- 5:30 pm)  Questions about surgery, contact your Surgeons office.      AFTER YOUR SURGERY  Breathing exercises   Breathing exercises help you recover faster. Take deep breaths and let the air out slowly. This will:     Help you wake up after surgery.    Help prevent complications like pneumonia.  Preventing complications will help you go home sooner.   We may give you a breathing device (incentive spirometer) to encourage you to breathe deeply.   Nausea and vomiting   You may feel sick to your stomach after surgery; if so, let your nurse know.    Pain control:  After surgery, you may have pain. Our goal is to help you manage your pain. Pain medicine will help you feel comfortable enough to do activities that will help you heal.  These activities may include breathing exercises, walking and physical therapy.   To help your health care team treat your pain we will ask: 1) If you have pain  2) where it is located 3) describe your pain in your words  Methods of pain control include medications given by mouth, vein or by nerve block for some surgeries.  Sequential Compression Device (SCD) or Pneumo Boots:  You may need to wear SCD S on your legs or feet. These are wraps connected to a machine that pumps in air and releases it. The repeated pumping helps prevent blood clots from forming.

## 2019-03-29 ENCOUNTER — ANESTHESIA (OUTPATIENT)
Dept: SURGERY | Facility: CLINIC | Age: 64
End: 2019-03-29
Payer: COMMERCIAL

## 2019-03-29 ENCOUNTER — TRANSFERRED RECORDS (OUTPATIENT)
Dept: SURGERY | Facility: CLINIC | Age: 64
End: 2019-03-29

## 2019-03-29 ENCOUNTER — HOSPITAL ENCOUNTER (OUTPATIENT)
Facility: CLINIC | Age: 64
Discharge: HOME | End: 2019-03-29
Attending: INTERNAL MEDICINE | Admitting: INTERNAL MEDICINE
Payer: COMMERCIAL

## 2019-03-29 ENCOUNTER — ANESTHESIA (OUTPATIENT)
Dept: SURGERY | Facility: CLINIC | Age: 64
End: 2019-03-29

## 2019-03-29 VITALS
HEIGHT: 72 IN | HEART RATE: 96 BPM | DIASTOLIC BLOOD PRESSURE: 78 MMHG | RESPIRATION RATE: 18 BRPM | SYSTOLIC BLOOD PRESSURE: 127 MMHG | OXYGEN SATURATION: 93 % | BODY MASS INDEX: 39.95 KG/M2 | WEIGHT: 294.98 LBS | TEMPERATURE: 96.6 F

## 2019-03-29 LAB
APPEARANCE FLD: NORMAL
CD19 CELLS NFR BRONCH: 1 %
CD3 CELLS NFR BRONCH: 90 %
CD3+CD4+ CELLS NFR BRONCH: 64 %
CD3+CD4+ CELLS/CD3+CD8+ CLL BRONCH: 2.29 %
CD3+CD8+ CELLS NFR BRONCH: 28 %
CD3-CD16+CD56+ CELLS NFR SPEC: 5 %
COLOR FLD: COLORLESS
COPATH REPORT: NORMAL
EOSINOPHIL NFR FLD MANUAL: 1 %
GLUCOSE BLDC GLUCOMTR-MCNC: 103 MG/DL (ref 70–99)
GRAM STN SPEC: NORMAL
IFC SPECIMEN: NORMAL
KOH PREP SPEC: NORMAL
LYMPHOCYTES NFR FLD MANUAL: 11 %
MONOS+MACROS NFR FLD MANUAL: 82 %
NEUTS BAND NFR FLD MANUAL: 6 %
RBC # FLD: NORMAL /UL
SPECIMEN SOURCE FLD: NORMAL
SPECIMEN SOURCE: NORMAL
SPECIMEN SOURCE: NORMAL
T-CELL SUBSET INTERPRETATION: NORMAL
WBC # FLD AUTO: 102 /UL

## 2019-03-29 PROCEDURE — 27210794 ZZH OR GENERAL SUPPLY STERILE: Performed by: INTERNAL MEDICINE

## 2019-03-29 PROCEDURE — 87070 CULTURE OTHR SPECIMN AEROBIC: CPT | Performed by: INTERNAL MEDICINE

## 2019-03-29 PROCEDURE — 36000064 ZZH SURGERY LEVEL 4 EA 15 ADDTL MIN - UMMC: Performed by: INTERNAL MEDICINE

## 2019-03-29 PROCEDURE — 89051 BODY FLUID CELL COUNT: CPT | Performed by: INTERNAL MEDICINE

## 2019-03-29 PROCEDURE — 88172 CYTP DX EVAL FNA 1ST EA SITE: CPT | Performed by: INTERNAL MEDICINE

## 2019-03-29 PROCEDURE — 87205 SMEAR GRAM STAIN: CPT | Performed by: INTERNAL MEDICINE

## 2019-03-29 PROCEDURE — 37000009 ZZH ANESTHESIA TECHNICAL FEE, EACH ADDTL 15 MIN: Performed by: INTERNAL MEDICINE

## 2019-03-29 PROCEDURE — 25800030 ZZH RX IP 258 OP 636: Performed by: ANESTHESIOLOGY

## 2019-03-29 PROCEDURE — 37000008 ZZH ANESTHESIA TECHNICAL FEE, 1ST 30 MIN: Performed by: INTERNAL MEDICINE

## 2019-03-29 PROCEDURE — 87015 SPECIMEN INFECT AGNT CONCNTJ: CPT | Mod: 91 | Performed by: INTERNAL MEDICINE

## 2019-03-29 PROCEDURE — 86356 MONONUCLEAR CELL ANTIGEN: CPT | Performed by: INTERNAL MEDICINE

## 2019-03-29 PROCEDURE — 87102 FUNGUS ISOLATION CULTURE: CPT | Mod: 91 | Performed by: INTERNAL MEDICINE

## 2019-03-29 PROCEDURE — 87210 SMEAR WET MOUNT SALINE/INK: CPT | Performed by: INTERNAL MEDICINE

## 2019-03-29 PROCEDURE — 87206 SMEAR FLUORESCENT/ACID STAI: CPT | Performed by: INTERNAL MEDICINE

## 2019-03-29 PROCEDURE — 25000128 H RX IP 250 OP 636: Performed by: NURSE ANESTHETIST, CERTIFIED REGISTERED

## 2019-03-29 PROCEDURE — 87015 SPECIMEN INFECT AGNT CONCNTJ: CPT | Performed by: INTERNAL MEDICINE

## 2019-03-29 PROCEDURE — 88305 TISSUE EXAM BY PATHOLOGIST: CPT | Performed by: INTERNAL MEDICINE

## 2019-03-29 PROCEDURE — 40000170 ZZH STATISTIC PRE-PROCEDURE ASSESSMENT II: Performed by: INTERNAL MEDICINE

## 2019-03-29 PROCEDURE — 71000027 ZZH RECOVERY PHASE 2 EACH 15 MINS: Performed by: INTERNAL MEDICINE

## 2019-03-29 PROCEDURE — 25000132 ZZH RX MED GY IP 250 OP 250 PS 637: Performed by: NURSE ANESTHETIST, CERTIFIED REGISTERED

## 2019-03-29 PROCEDURE — 25000125 ZZHC RX 250: Performed by: NURSE ANESTHETIST, CERTIFIED REGISTERED

## 2019-03-29 PROCEDURE — 36000062 ZZH SURGERY LEVEL 4 1ST 30 MIN - UMMC: Performed by: INTERNAL MEDICINE

## 2019-03-29 PROCEDURE — 88108 CYTOPATH CONCENTRATE TECH: CPT | Performed by: INTERNAL MEDICINE

## 2019-03-29 PROCEDURE — 88312 SPECIAL STAINS GROUP 1: CPT | Performed by: INTERNAL MEDICINE

## 2019-03-29 PROCEDURE — 87102 FUNGUS ISOLATION CULTURE: CPT | Performed by: INTERNAL MEDICINE

## 2019-03-29 PROCEDURE — 71000014 ZZH RECOVERY PHASE 1 LEVEL 2 FIRST HR: Performed by: INTERNAL MEDICINE

## 2019-03-29 PROCEDURE — 82962 GLUCOSE BLOOD TEST: CPT

## 2019-03-29 PROCEDURE — 88173 CYTOPATH EVAL FNA REPORT: CPT | Mod: 91 | Performed by: INTERNAL MEDICINE

## 2019-03-29 PROCEDURE — 87116 MYCOBACTERIA CULTURE: CPT | Performed by: INTERNAL MEDICINE

## 2019-03-29 PROCEDURE — 00000155 ZZHCL STATISTIC H-CELL BLOCK W/STAIN: Performed by: INTERNAL MEDICINE

## 2019-03-29 PROCEDURE — 87106 FUNGI IDENTIFICATION YEAST: CPT | Performed by: INTERNAL MEDICINE

## 2019-03-29 RX ORDER — ONDANSETRON 2 MG/ML
4 INJECTION INTRAMUSCULAR; INTRAVENOUS EVERY 30 MIN PRN
Status: DISCONTINUED | OUTPATIENT
Start: 2019-03-29 | End: 2019-03-29 | Stop reason: HOSPADM

## 2019-03-29 RX ORDER — FENTANYL CITRATE 50 UG/ML
25-50 INJECTION, SOLUTION INTRAMUSCULAR; INTRAVENOUS
Status: DISCONTINUED | OUTPATIENT
Start: 2019-03-29 | End: 2019-03-29 | Stop reason: HOSPADM

## 2019-03-29 RX ORDER — SODIUM CHLORIDE, SODIUM LACTATE, POTASSIUM CHLORIDE, CALCIUM CHLORIDE 600; 310; 30; 20 MG/100ML; MG/100ML; MG/100ML; MG/100ML
INJECTION, SOLUTION INTRAVENOUS CONTINUOUS
Status: DISCONTINUED | OUTPATIENT
Start: 2019-03-29 | End: 2019-03-29 | Stop reason: HOSPADM

## 2019-03-29 RX ORDER — FENTANYL CITRATE 50 UG/ML
INJECTION, SOLUTION INTRAMUSCULAR; INTRAVENOUS PRN
Status: DISCONTINUED | OUTPATIENT
Start: 2019-03-29 | End: 2019-03-29

## 2019-03-29 RX ORDER — ALBUTEROL SULFATE 90 UG/1
AEROSOL, METERED RESPIRATORY (INHALATION) PRN
Status: DISCONTINUED | OUTPATIENT
Start: 2019-03-29 | End: 2019-03-29

## 2019-03-29 RX ORDER — ONDANSETRON 2 MG/ML
INJECTION INTRAMUSCULAR; INTRAVENOUS PRN
Status: DISCONTINUED | OUTPATIENT
Start: 2019-03-29 | End: 2019-03-29

## 2019-03-29 RX ORDER — LIDOCAINE HYDROCHLORIDE 20 MG/ML
INJECTION, SOLUTION INFILTRATION; PERINEURAL PRN
Status: DISCONTINUED | OUTPATIENT
Start: 2019-03-29 | End: 2019-03-29

## 2019-03-29 RX ORDER — ONDANSETRON 4 MG/1
4 TABLET, ORALLY DISINTEGRATING ORAL EVERY 30 MIN PRN
Status: DISCONTINUED | OUTPATIENT
Start: 2019-03-29 | End: 2019-03-29 | Stop reason: HOSPADM

## 2019-03-29 RX ORDER — PROPOFOL 10 MG/ML
INJECTION, EMULSION INTRAVENOUS PRN
Status: DISCONTINUED | OUTPATIENT
Start: 2019-03-29 | End: 2019-03-29

## 2019-03-29 RX ORDER — LIDOCAINE 40 MG/G
CREAM TOPICAL
Status: DISCONTINUED | OUTPATIENT
Start: 2019-03-29 | End: 2019-03-29 | Stop reason: HOSPADM

## 2019-03-29 RX ORDER — ESMOLOL HYDROCHLORIDE 10 MG/ML
INJECTION INTRAVENOUS PRN
Status: DISCONTINUED | OUTPATIENT
Start: 2019-03-29 | End: 2019-03-29

## 2019-03-29 RX ORDER — HYDROMORPHONE HYDROCHLORIDE 1 MG/ML
.3-.5 INJECTION, SOLUTION INTRAMUSCULAR; INTRAVENOUS; SUBCUTANEOUS EVERY 5 MIN PRN
Status: DISCONTINUED | OUTPATIENT
Start: 2019-03-29 | End: 2019-03-29 | Stop reason: HOSPADM

## 2019-03-29 RX ORDER — PROPOFOL 10 MG/ML
INJECTION, EMULSION INTRAVENOUS CONTINUOUS PRN
Status: DISCONTINUED | OUTPATIENT
Start: 2019-03-29 | End: 2019-03-29

## 2019-03-29 RX ORDER — DEXAMETHASONE SODIUM PHOSPHATE 4 MG/ML
INJECTION, SOLUTION INTRA-ARTICULAR; INTRALESIONAL; INTRAMUSCULAR; INTRAVENOUS; SOFT TISSUE PRN
Status: DISCONTINUED | OUTPATIENT
Start: 2019-03-29 | End: 2019-03-29

## 2019-03-29 RX ORDER — GLYCOPYRROLATE 0.2 MG/ML
INJECTION, SOLUTION INTRAMUSCULAR; INTRAVENOUS PRN
Status: DISCONTINUED | OUTPATIENT
Start: 2019-03-29 | End: 2019-03-29

## 2019-03-29 RX ORDER — NALOXONE HYDROCHLORIDE 0.4 MG/ML
.1-.4 INJECTION, SOLUTION INTRAMUSCULAR; INTRAVENOUS; SUBCUTANEOUS
Status: DISCONTINUED | OUTPATIENT
Start: 2019-03-29 | End: 2019-03-29 | Stop reason: HOSPADM

## 2019-03-29 RX ADMIN — DEXAMETHASONE SODIUM PHOSPHATE 10 MG: 4 INJECTION, SOLUTION INTRA-ARTICULAR; INTRALESIONAL; INTRAMUSCULAR; INTRAVENOUS; SOFT TISSUE at 09:20

## 2019-03-29 RX ADMIN — FENTANYL CITRATE 25 MCG: 50 INJECTION, SOLUTION INTRAMUSCULAR; INTRAVENOUS at 08:58

## 2019-03-29 RX ADMIN — MIDAZOLAM 2 MG: 1 INJECTION INTRAMUSCULAR; INTRAVENOUS at 08:54

## 2019-03-29 RX ADMIN — PROPOFOL 100 MG: 10 INJECTION, EMULSION INTRAVENOUS at 09:06

## 2019-03-29 RX ADMIN — SODIUM CHLORIDE, POTASSIUM CHLORIDE, SODIUM LACTATE AND CALCIUM CHLORIDE: 600; 310; 30; 20 INJECTION, SOLUTION INTRAVENOUS at 08:45

## 2019-03-29 RX ADMIN — ALBUTEROL SULFATE 6 PUFF: 90 AEROSOL, METERED RESPIRATORY (INHALATION) at 09:46

## 2019-03-29 RX ADMIN — PROPOFOL 175 MCG/KG/MIN: 10 INJECTION, EMULSION INTRAVENOUS at 09:06

## 2019-03-29 RX ADMIN — ONDANSETRON 4 MG: 2 INJECTION INTRAMUSCULAR; INTRAVENOUS at 09:20

## 2019-03-29 RX ADMIN — LIDOCAINE HYDROCHLORIDE 5 ML: 20 SOLUTION ORAL; TOPICAL at 08:45

## 2019-03-29 RX ADMIN — GLYCOPYRROLATE 0.2 MG: 0.2 INJECTION, SOLUTION INTRAMUSCULAR; INTRAVENOUS at 08:45

## 2019-03-29 RX ADMIN — FENTANYL CITRATE 75 MCG: 50 INJECTION, SOLUTION INTRAMUSCULAR; INTRAVENOUS at 09:05

## 2019-03-29 RX ADMIN — ESMOLOL HYDROCHLORIDE 30 MG: 10 INJECTION, SOLUTION INTRAVENOUS at 09:15

## 2019-03-29 RX ADMIN — ROCURONIUM BROMIDE 50 MG: 10 INJECTION INTRAVENOUS at 09:06

## 2019-03-29 RX ADMIN — SUGAMMADEX 200 MG: 100 INJECTION, SOLUTION INTRAVENOUS at 09:43

## 2019-03-29 RX ADMIN — LIDOCAINE HYDROCHLORIDE 100 MG: 20 INJECTION, SOLUTION INFILTRATION; PERINEURAL at 08:58

## 2019-03-29 ASSESSMENT — MIFFLIN-ST. JEOR: SCORE: 2166

## 2019-03-29 NOTE — ANESTHESIA POSTPROCEDURE EVALUATION
Anesthesia POST Procedure Evaluation    Patient: Derek Contreras   MRN:     [id number] Gender:   male   Age:    63 year old :      1955        Preoperative Diagnosis: Mediastinal Adenopathy   Procedure(s):  Flexible Bronchoscopy, Endobronchial Ultrasound  Transbronchial Biopsies   Postop Comments: No value filed.       Anesthesia Type:  General    Reportable Event: NO     PAIN: Uncomplicated   Sign Out status: Comfortable, Well controlled pain     PONV: No PONV   Sign Out status:  No Nausea or Vomiting     Neuro/Psych: Uneventful perioperative course   Sign Out Status: Preoperative baseline; Age appropriate mentation     Airway/Resp.: Uneventful perioperative course   Sign Out Status: Non labored breathing, age appropriate RR; Resp. Status within EXPECTED Parameters     CV: Uneventful perioperative course   Sign Out status: Appropriate BP and perfusion indices; Appropriate HR/Rhythm     Disposition:   Sign Out in:  PACU  Disposition:  Phase II; Home  Recovery Course: Uneventful  Follow-Up: Not required           Last Anesthesia Record Vitals:  CRNA VITALS  3/29/2019 0931 - 3/29/2019 1031      3/29/2019             NIBP:  128/86    Pulse:  103    SpO2:  99 %    Resp Rate (observed):  16          Last PACU/Preop Vitals:  Vitals:    19 1015 19 1030 19 1114   BP: 131/89 139/86 127/78   Pulse:  96 96   Resp: 16 16 18   Temp:  36.8  C (98.3  F) 35.9  C (96.6  F)   SpO2: 98% 94% 93%         Electronically Signed By: Andrey Collado MD, 2019, 11:19 AM

## 2019-03-29 NOTE — OR NURSING
Writing RN notified Dr. Andrey Collado, [facility] anesthesia, patient has not had any labs drawn in the past month. See results review for February Labs. No labs needed per Dr. Collado.

## 2019-03-29 NOTE — PROCEDURES
INTERVENTIONAL PULMONOLOGY     Procedure(s):    A flexible bronchoscopy  BAL  EBUS-TBNA (3 sites)    Indication:  Bilateral lymph node enlargements    Attending of Record:  Felicia Leger MD    Interventional Pulmonary Fellow   None    Trainees Present:   None    Medications:    General Anesthesia - See anesthesia flowsheet for details    Sedation Time:   Per Anesthesia Care Provider    Time Out:  Performed    I have reviewed the patient's medical record and indication for the procedure. Physical examination was performed.  The risks, benefits and alternatives of the procedure were discussed with the the patient in detail and he had the opportunity to ask questions.  I discussed in particular the potential complications including risks of minor or life-threatening bleeding and/or infection, respiratory failure, vocal cord trauma / paralysis, pneumothorax, and discomfort. Sedation risks were also discussed including abnormal heart rhythms, low blood pressure, and respiratory failure. All questions were answered to the best of my ability.  Verbal and written informed consent was obtained.  The proposed procedure and the patient's identification were verified prior to the procedure by the physician and the nurse.    The patient was assessed for the adequacy for the procedure and to receive medications.   Mental Status:  Alert and oriented x 3  Airway examination:  Class I (Complete visualization of soft palate)  Pulmonary:  Clear to ausculation bilaterally  CV:  RRR, no murmurs or gallops  ASA Grade:  (II)  Mild systemic disease    After clinical evaluation and reviewing the indication, risks, alternatives and benefits of the procedure the patient was deemed to be in satisfactory condition to undergo the procedure.      A Tuberculosis risk assessment was performed:  The patient has no known RISK of Tuberculosis    The procedure was performed in a negative airflow room: The patient could not be moved to a negative  airflow room because of no risk of TB    Maneuvers / Procedure:      Airway Examination: A complete airway examination was performed from the distal trachea to the subsegmental level in each lobe of both lungs.  Pertinent findings include no endobronchial lesions.       BAL: The bronchoscope was wedged into the Lingula bronchus. A total of 120cc of saline was instilled and a total of 55 was aspirated. This was sent for analysis.   EBUS-TBNA: The EBUS scope was inserted and biopsies were obtained from Station 7 with 7 passes, 7 samples obtained with NENA present, a combination of suction and no suction was used to obtain the samples  Station 12L with 4 passes, 4 samples obtained with NENA present, a combination of suction and no suction was used to obtain the samples  Station 4R with 4 passes, 4 samples obtained with NENA present, a combination of suction and no suction was used to obtain the samples. EBUS samples were sent for cytology, bacterial culture, gram stain, fungal culture, KOH prep and CD4:CD8 ratio    Any disposable equipment was visually inspected and deemed to be intact immediately post procedure.      Relevant Pictures    Station 7 biopsy        12L biopsy      4R biopsy        Recommendations:     -->  Home  -->  Await for cytology, cultures      HMariano Leger MD  [phone]

## 2019-03-29 NOTE — ANESTHESIA CARE TRANSFER NOTE
Patient: Derek Contreras    Procedure(s):  Flexible Bronchoscopy, Endobronchial Ultrasound  Transbronchial Biopsies    Diagnosis: Mediastinal Adenopathy  Diagnosis Additional Information: No value filed.    Anesthesia Type:   No value filed.     Note:  Airway :Face Mask  Patient transferred to:PACU  Comments: Pt denies pain or nausea. Report given to RN. Handoff Report: Identifed the Patient, Identified the Reponsible Provider, Reviewed the pertinent medical history, Discussed the surgical course, Reviewed Intra-OP anesthesia mangement and issues during anesthesia, Set expectations for post-procedure period and Allowed opportunity for questions and acknowledgement of understanding      Vitals: (Last set prior to Anesthesia Care Transfer)    CRNA VITALS  3/29/2019 0931 - 3/29/2019 1006      3/29/2019             NIBP:  128/86    Pulse:  103    SpO2:  99 %    Resp Rate (observed):  16                Electronically Signed By: DENY Le CRNA  March 29, 2019  10:06 AM

## 2019-03-29 NOTE — DISCHARGE INSTRUCTIONS
[facility]  Same-Day Surgery   Adult Discharge Orders & Instructions     For 24 hours after surgery    1. Get plenty of rest.  A responsible adult must stay with you for at least 24 hours after you leave the hospital.   2. Do not drive or use heavy equipment.  If you have weakness or tingling, don't drive or use heavy equipment until this feeling goes away.  3. Do not drink alcohol.  4. Avoid strenuous or risky activities.  Ask for help when climbing stairs.   5. You may feel lightheaded.  IF so, sit for a few minutes before standing.  Have someone help you get up.   6. If you have nausea (feel sick to your stomach): Drink only clear liquids such as apple juice, ginger ale, broth or 7-Up.  Rest may also help.  Be sure to drink enough fluids.  Move to a regular diet as you feel able.  7. You may have a slight fever. Call the doctor if your fever is over 100 F (37.7 C) (taken under the tongue) or lasts longer than 24 hours.  8. You may have a dry mouth, a sore throat, muscle aches or trouble sleeping.  These should go away after 24 hours.  9. Do not make important or legal decisions.   Call your doctor for any of the followin.  Signs of infection (fever, growing tenderness at the surgery site, a large amount of drainage or bleeding, severe pain, foul-smelling drainage, redness, swelling).    2. It has been over 8 to 10 hours since surgery and you are still not able to urinate (pass water).    3.  Headache for over 24 hours.    4.  Numbness, tingling or weakness the day after surgery (if you had spinal anesthesia).  To contact a doctor, call __Dr. Leger's office at [phone]   or:        [phone] and ask for the resident on call for   Pulmonology (answered 24 hours a day)      Emergency Department:    [facility]: [phone]       (TTY for hearing impaired: [phone])    [facility]: [phone]       (TTY for hearing impaired:  [phone])        Post Bronchoscopy Patient Instructions:    March 29, 2019  Derek Contreras    Your procedure completed (bronchoscopy with biopsies) without any immediate complications.  You may cough up scant amount of blood for the next 12 hours. If you have excessive cough with blood, chest pain, shortness of breath, please report to the closest emergency room.    You may experience low grade (less than 100.5 F) fever next 24 hours, if so you can take tylenol. If the fever persists more than 24 hours contact to our office or your primary care provider.    Our office (Thoracic/Pulmonary--[phone]) will call you as soon as the results of biopsies are ready.    May resume your regular diet as it was prior to procedure.    Should you have any question, please do not hesitate to call our office.    MOHINI Leger MD

## 2019-03-31 LAB
BACTERIA SPEC CULT: NORMAL
SPECIMEN SOURCE: NORMAL

## 2019-04-01 LAB
ACID FAST STN SPEC QL: NORMAL
ACID FAST STN SPEC QL: NORMAL
SPECIMEN SOURCE: NORMAL

## 2019-04-03 LAB — COPATH REPORT: NORMAL

## 2019-04-18 ENCOUNTER — OFFICE VISIT (OUTPATIENT)
Dept: UROLOGY | Facility: CLINIC | Age: 64
End: 2019-04-18
Payer: COMMERCIAL

## 2019-04-18 VITALS
BODY MASS INDEX: 39.55 KG/M2 | HEIGHT: 72 IN | WEIGHT: 292 LBS | HEART RATE: 93 BPM | DIASTOLIC BLOOD PRESSURE: 84 MMHG | OXYGEN SATURATION: 93 % | SYSTOLIC BLOOD PRESSURE: 122 MMHG

## 2019-04-18 DIAGNOSIS — C61 PROSTATE CANCER (H): Primary | ICD-10-CM

## 2019-04-18 PROCEDURE — 99213 OFFICE O/P EST LOW 20 MIN: CPT | Performed by: UROLOGY

## 2019-04-18 RX ORDER — CEFAZOLIN SODIUM 1 G
1 VIAL (EA) INJECTION SEE ADMIN INSTRUCTIONS
Status: CANCELLED | OUTPATIENT
Start: 2019-04-18

## 2019-04-18 ASSESSMENT — PAIN SCALES - GENERAL: PAINLEVEL: NO PAIN (0)

## 2019-04-18 ASSESSMENT — MIFFLIN-ST. JEOR: SCORE: 2152.5

## 2019-04-18 NOTE — PROGRESS NOTES
"Office Visit Note  [facility]  [phone]    UROLOGIC DIAGNOSES:   T1C Tricia 4+3 = 7 prostate cancer    CURRENT INTERVENTIONS:       HISTORY:   Derek returns to clinic today for prostate cancer followup. He has seen a pulmonologist for bronchoscopy plus biopsies. He has been verbally given an \"all clear\" but has not yet officially followed up with his pulmonologist for the results. He is interested in further discussing surgery for prostate cancer      PAST MEDICAL HISTORY:   Past Medical History:   Diagnosis Date     Asthma      Claustrophobia      Hypercholesteremia      Hypertension      Mediastinal adenopathy      Obesity      BEULAH (obstructive sleep apnea)      Prostate cancer (H)      Sarcoidosis        PAST SURGICAL HISTORY:   Past Surgical History:   Procedure Laterality Date     APPENDECTOMY       C TOTAL HIP ARTHROPLASTY Bilateral      C TOTAL KNEE ARTHROPLASTY Bilateral      ENDOBRONCHIAL ULTRASOUND FLEXIBLE N/A 3/29/2019    Procedure: Flexible Bronchoscopy, Endobronchial Ultrasound;  Surgeon: Curtis Leger MD;  Location: [facility] OR     VASECTOMY         FAMILY HISTORY:   Family History   Problem Relation Age of Onset     Alzheimer Disease Mother      Heart Disease Father        SOCIAL HISTORY:   Social History     Tobacco Use     Smoking status: Never Smoker     Smokeless tobacco: Never Used   Substance Use Topics     Alcohol use: Yes     Comment: twice a week       Current Outpatient Medications   Medication     acetaminophen (TYLENOL) 500 MG tablet     albuterol (PROAIR HFA/PROVENTIL HFA/VENTOLIN HFA) 108 (90 Base) MCG/ACT inhaler     aspirin (ASA) 81 MG tablet     atorvastatin (LIPITOR) 40 MG tablet     BREO ELLIPTA 200-25 MCG/INH Inhaler     cetirizine (ZYRTEC) 10 MG tablet     hydrochlorothiazide (HYDRODIURIL) 12.5 MG tablet     multivitamin (ONE-DAILY) tablet     No current facility-administered medications for this visit.          PHYSICAL EXAM:    There were no vitals taken " for this visit.    Constitutional: Well developed. Conversant and in no acute distress  Eyes: Anicteric sclera, conjunctiva clear, normal extraocular movements  ENT: Normocephalic and atraumatic,   Skin: Warm and dry. No rashes or lesions  Cardiac: No peripheral edema  Back/Flank: Not done  CNS/PNS: Normal musculature and movements, moves all extremities normally  Respiratory: Normal non-labored breathing  Abdomen: Soft nontender and nondistended  Peripheral Vascular: No peripheral edema  Mental Status/Psych: Alert and Oriented x 3. Normal mood and affect    Penis: Not done  Scrotal Skin: Not done  Testicles: Not done  Epididymis: Not done  Digital Rectal Exam:     Cystoscopy: Not done    Imaging: None    Urinalysis: [facility] RESULTS:  Recent Labs   Lab Test 02/12/19  1245   COLOR Yellow   APPEARANCE Clear   URINEGLC Negative   URINEBILI Negative   URINEKETONE Negative   SG 1.015   UBLD Negative   URINEPH 7.0   PROTEIN Negative   UROBILINOGEN 0.2   NITRITE Negative   LEUKEST Negative       PSA:     Post Void Residual:     Other labs: None today      IMPRESSION:  Prostate cancer    PLAN:  We again discussed Tx options for prostate cancer, he wishes to proceed with robotic assisted laparoscopic radical prostatectomy with bilateral pelvic lymph node dissection. The risks of the procedure were discussed. I will await final word from his pulmonologist, but if he is cleared for surgery by pulmonology we will get him scheduled in the operating room    Total Time: 15 min                                      Total in Consultation: 15 min      Matteo Coughlin M.D.

## 2019-04-18 NOTE — LETTER
"  RE: Derek Contreras  [address] [place] [address]     Dear Colleague,    Thank you for referring your patient, Derek Contreras, to the [facility] at [facility]. Please see a copy of my visit note below.    Office Visit Note  [facility]  [phone]    UROLOGIC DIAGNOSES:   T1C Tricia 4+3 = 7 prostate cancer    CURRENT INTERVENTIONS:       HISTORY:   Derek returns to clinic today for prostate cancer followup. He has seen a pulmonologist for bronchoscopy plus biopsies. He has been verbally given an \"all clear\" but has not yet officially followed up with his pulmonologist for the results. He is interested in further discussing surgery for prostate cancer      PAST MEDICAL HISTORY:   Past Medical History:   Diagnosis Date     Asthma      Claustrophobia      Hypercholesteremia      Hypertension      Mediastinal adenopathy      Obesity      BEULAH (obstructive sleep apnea)      Prostate cancer (H)      Sarcoidosis        PAST SURGICAL HISTORY:   Past Surgical History:   Procedure Laterality Date     APPENDECTOMY       C TOTAL HIP ARTHROPLASTY Bilateral      C TOTAL KNEE ARTHROPLASTY Bilateral      ENDOBRONCHIAL ULTRASOUND FLEXIBLE N/A 3/29/2019    Procedure: Flexible Bronchoscopy, Endobronchial Ultrasound;  Surgeon: Curtis Leger MD;  Location: [facility] OR     VASECTOMY       FAMILY HISTORY:   Family History   Problem Relation Age of Onset     Alzheimer Disease Mother      Heart Disease Father      SOCIAL HISTORY:   Social History     Tobacco Use     Smoking status: Never Smoker     Smokeless tobacco: Never Used   Substance Use Topics     Alcohol use: Yes     Comment: twice a week       Current Outpatient Medications   Medication     acetaminophen (TYLENOL) 500 MG tablet     albuterol (PROAIR HFA/PROVENTIL HFA/VENTOLIN HFA) 108 (90 Base) MCG/ACT inhaler     aspirin (ASA) 81 MG tablet     atorvastatin " (LIPITOR) 40 MG tablet     BREO ELLIPTA 200-25 MCG/INH Inhaler     cetirizine (ZYRTEC) 10 MG tablet     hydrochlorothiazide (HYDRODIURIL) 12.5 MG tablet     multivitamin (ONE-DAILY) tablet     No current facility-administered medications for this visit.      PHYSICAL EXAM:    There were no vitals taken for this visit.    Constitutional: Well developed. Conversant and in no acute distress  Eyes: Anicteric sclera, conjunctiva clear, normal extraocular movements  ENT: Normocephalic and atraumatic,   Skin: Warm and dry. No rashes or lesions  Cardiac: No peripheral edema  Back/Flank: Not done  CNS/PNS: Normal musculature and movements, moves all extremities normally  Respiratory: Normal non-labored breathing  Abdomen: Soft nontender and nondistended  Peripheral Vascular: No peripheral edema  Mental Status/Psych: Alert and Oriented x 3. Normal mood and affect    Penis: Not done  Scrotal Skin: Not done  Testicles: Not done  Epididymis: Not done  Digital Rectal Exam:     Cystoscopy: Not done    Imaging: None    Urinalysis: [facility] RESULTS:  Recent Labs   Lab Test 02/12/19  1245   COLOR Yellow   APPEARANCE Clear   URINEGLC Negative   URINEBILI Negative   URINEKETONE Negative   SG 1.015   UBLD Negative   URINEPH 7.0   PROTEIN Negative   UROBILINOGEN 0.2   NITRITE Negative   LEUKEST Negative     PSA:     Post Void Residual:     Other labs: None today    IMPRESSION:  Prostate cancer    PLAN:  We again discussed Tx options for prostate cancer, he wishes to proceed with robotic assisted laparoscopic radical prostatectomy with bilateral pelvic lymph node dissection. The risks of the procedure were discussed. I will await final word from his pulmonologist, but if he is cleared for surgery by pulmonology we will get him scheduled in the operating room    Total Time: 15 min                                      Total in Consultation: 15 min    Again, thank you for allowing me to participate in the care of your patient.       Sincerely,    Matteo Coughlin MD

## 2019-04-18 NOTE — NURSING NOTE
Chief Complaint   Patient presents with     Clinic Care Coordination - Follow-up     Pt here to discuss surgery     Bindu Guerra CMA

## 2019-04-23 ENCOUNTER — TELEPHONE (OUTPATIENT)
Dept: PULMONOLOGY | Facility: CLINIC | Age: 64
End: 2019-04-23

## 2019-04-23 DIAGNOSIS — D86.9 SARCOIDOSIS: Primary | ICD-10-CM

## 2019-04-26 LAB
BACTERIA SPEC CULT: NORMAL
FUNGUS SPEC CULT: ABNORMAL
FUNGUS SPEC CULT: ABNORMAL
SPECIMEN SOURCE: ABNORMAL
SPECIMEN SOURCE: NORMAL

## 2019-05-07 ENCOUNTER — OFFICE VISIT (OUTPATIENT)
Dept: INTERNAL MEDICINE | Facility: CLINIC | Age: 64
End: 2019-05-07
Payer: COMMERCIAL

## 2019-05-07 VITALS
HEART RATE: 100 BPM | SYSTOLIC BLOOD PRESSURE: 136 MMHG | RESPIRATION RATE: 20 BRPM | WEIGHT: 294 LBS | HEIGHT: 72 IN | OXYGEN SATURATION: 93 % | TEMPERATURE: 98.3 F | DIASTOLIC BLOOD PRESSURE: 76 MMHG | BODY MASS INDEX: 39.82 KG/M2

## 2019-05-07 DIAGNOSIS — J45.909 MODERATE ASTHMA WITHOUT COMPLICATION, UNSPECIFIED WHETHER PERSISTENT: ICD-10-CM

## 2019-05-07 DIAGNOSIS — E66.01 MORBID OBESITY (H): ICD-10-CM

## 2019-05-07 DIAGNOSIS — D86.9 SARCOIDOSIS: ICD-10-CM

## 2019-05-07 DIAGNOSIS — C61 PROSTATE CANCER (H): ICD-10-CM

## 2019-05-07 DIAGNOSIS — I10 ESSENTIAL HYPERTENSION: ICD-10-CM

## 2019-05-07 DIAGNOSIS — Z01.818 PREOP GENERAL PHYSICAL EXAM: Primary | ICD-10-CM

## 2019-05-07 LAB
ALBUMIN SERPL-MCNC: 2.4 G/DL (ref 3.4–5)
ALBUMIN UR-MCNC: NEGATIVE MG/DL
ALP SERPL-CCNC: 78 U/L (ref 40–150)
ALT SERPL W P-5'-P-CCNC: 36 U/L (ref 0–70)
ANION GAP SERPL CALCULATED.3IONS-SCNC: 4 MMOL/L (ref 3–14)
APPEARANCE UR: CLEAR
AST SERPL W P-5'-P-CCNC: 31 U/L (ref 0–45)
BILIRUB SERPL-MCNC: 0.8 MG/DL (ref 0.2–1.3)
BILIRUB UR QL STRIP: NEGATIVE
BUN SERPL-MCNC: 11 MG/DL (ref 7–30)
CALCIUM SERPL-MCNC: 9 MG/DL (ref 8.5–10.1)
CHLORIDE SERPL-SCNC: 105 MMOL/L (ref 94–109)
CO2 SERPL-SCNC: 29 MMOL/L (ref 20–32)
COLOR UR AUTO: YELLOW
CREAT SERPL-MCNC: 0.96 MG/DL (ref 0.66–1.25)
ERYTHROCYTE [DISTWIDTH] IN BLOOD BY AUTOMATED COUNT: 14.9 % (ref 10–15)
GFR SERPL CREATININE-BSD FRML MDRD: 83 ML/MIN/{1.73_M2}
GLUCOSE SERPL-MCNC: 105 MG/DL (ref 70–99)
GLUCOSE UR STRIP-MCNC: NEGATIVE MG/DL
HCT VFR BLD AUTO: 50 % (ref 40–53)
HGB BLD-MCNC: 16.4 G/DL (ref 13.3–17.7)
HGB UR QL STRIP: NEGATIVE
KETONES UR STRIP-MCNC: NEGATIVE MG/DL
LEUKOCYTE ESTERASE UR QL STRIP: NEGATIVE
MCH RBC QN AUTO: 29.2 PG (ref 26.5–33)
MCHC RBC AUTO-ENTMCNC: 32.8 G/DL (ref 31.5–36.5)
MCV RBC AUTO: 89 FL (ref 78–100)
NITRATE UR QL: NEGATIVE
PH UR STRIP: 7 PH (ref 5–7)
PLATELET # BLD AUTO: 153 10E9/L (ref 150–450)
POTASSIUM SERPL-SCNC: 3.8 MMOL/L (ref 3.4–5.3)
PROT SERPL-MCNC: 8 G/DL (ref 6.8–8.8)
RBC # BLD AUTO: 5.61 10E12/L (ref 4.4–5.9)
RBC #/AREA URNS AUTO: NORMAL /HPF
SODIUM SERPL-SCNC: 138 MMOL/L (ref 133–144)
SOURCE: NORMAL
SP GR UR STRIP: 1.01 (ref 1–1.03)
UROBILINOGEN UR STRIP-ACNC: 0.2 EU/DL (ref 0.2–1)
WBC # BLD AUTO: 7.6 10E9/L (ref 4–11)
WBC #/AREA URNS AUTO: NORMAL /HPF

## 2019-05-07 PROCEDURE — 36415 COLL VENOUS BLD VENIPUNCTURE: CPT | Performed by: INTERNAL MEDICINE

## 2019-05-07 PROCEDURE — 80053 COMPREHEN METABOLIC PANEL: CPT | Performed by: INTERNAL MEDICINE

## 2019-05-07 PROCEDURE — 99215 OFFICE O/P EST HI 40 MIN: CPT | Performed by: INTERNAL MEDICINE

## 2019-05-07 PROCEDURE — 85027 COMPLETE CBC AUTOMATED: CPT | Performed by: INTERNAL MEDICINE

## 2019-05-07 PROCEDURE — 81001 URINALYSIS AUTO W/SCOPE: CPT | Performed by: INTERNAL MEDICINE

## 2019-05-07 ASSESSMENT — MIFFLIN-ST. JEOR: SCORE: 2161.58

## 2019-05-07 NOTE — PROGRESS NOTES
[place] [address]  [facility] [address]  [phone]  Dept: [phone]    PRE-OP EVALUATION:  Today's date: 2019    Derek Contreras (: 1955) presents for pre-operative evaluation assessment.    Primary Physician: Carlyn Payne  Type of Anesthesia Anticipated: General    Patient has a Health Care Directive or Living Will:  YES     Preop Questions 2019   Who is doing your surgery? Dr Coughlin   What are you having done? Remove Prostate   Date of Surgery/Procedure: May 23   Facility or Hospital where procedure/surgery will be performed: [facility]   1.  Do you have a history of Heart attack, stroke, stent, coronary bypass surgery, or other heart surgery? No   2.  Do you ever have any pain or discomfort in your chest? No   3.  Do you have a history of  Heart Failure? No   4.   Are you troubled by shortness of breath when:  walking on a level surface, or up a slight hill, or at night? No   5.  Do you currently have a cold, bronchitis or other respiratory infection? No   6.  Do you have a cough, shortness of breath, or wheezing? No   7.  Do you sometimes get pains in the calves of your legs when you walk? No   8. Do you or anyone in your family have previous history of blood clots? No   9.  Do you or does anyone in your family have a serious bleeding problem such as prolonged bleeding following surgeries or cuts? No   10. Have you ever had problems with anemia or been told to take iron pills? No   11. Have you had any abnormal blood loss such as black, tarry or bloody stools? No   12. Have you ever had a blood transfusion? No   13. Have you or any of your relatives ever had problems with anesthesia? No   14. Do you have sleep apnea, excessive snoring or daytime drowsiness? No   15. Do you have any prosthetic heart valves? No   16. Do you have prosthetic joints? YES -           HPI:     HPI related to upcoming procedure: elevated PSA without  symptoms. Biopsy positive for prostate cancer. Going in for resection.      See problem list for active medical problems.  Problems all longstanding and stable, except as noted/documented.  See ROS for pertinent symptoms related to these conditions.                                                                                                                                                          .    MEDICAL HISTORY:     Patient Active Problem List    Diagnosis Date Noted     Morbid obesity (H) 02/05/2019     Priority: Medium     Sarcoidosis 02/05/2019     Priority: Medium     Essential hypertension 02/05/2019     Priority: Medium     S/P hip replacement, bilateral 02/05/2019     Priority: Medium     S/P TKR (total knee replacement), bilateral 02/05/2019     Priority: Medium     S/P appendectomy 02/05/2019     Priority: Medium     Bilateral hearing loss, unspecified hearing loss type 02/05/2019     Priority: Medium     Hypercholesteremia 02/05/2019     Priority: Medium      Past Medical History:   Diagnosis Date     Asthma      Claustrophobia      Hypercholesteremia      Hypertension      Mediastinal adenopathy      Obesity      BEULAH (obstructive sleep apnea)      Prostate cancer (H)      Sarcoidosis      Past Surgical History:   Procedure Laterality Date     APPENDECTOMY       C TOTAL HIP ARTHROPLASTY Bilateral      C TOTAL KNEE ARTHROPLASTY Bilateral      ENDOBRONCHIAL ULTRASOUND FLEXIBLE N/A 3/29/2019    Procedure: Flexible Bronchoscopy, Endobronchial Ultrasound;  Surgeon: Curtis Leger MD;  Location: [facility] OR     VASECTOMY       Current Outpatient Medications   Medication Sig Dispense Refill     acetaminophen (TYLENOL) 500 MG tablet Take 1,000 mg by mouth as needed for mild pain       albuterol (PROAIR HFA/PROVENTIL HFA/VENTOLIN HFA) 108 (90 Base) MCG/ACT inhaler INHALE 2 PUFFS BY MOUTH Q 4 TO 6 H FOR COUGH AND BREATHLESSNESS  4     aspirin (ASA) 81 MG tablet Take 81 mg by mouth every morning ON HOLD  FOR SURGERY SINCE 03/14/2019 90 tablet 3     atorvastatin (LIPITOR) 40 MG tablet Take 40 mg by mouth every morning  90 tablet 3     BREO ELLIPTA 200-25 MCG/INH Inhaler INL 1 PUFF PO AT THE SAME TIME Q DAY. RINSE AND SPIT AFTER U  4     cetirizine (ZYRTEC) 10 MG tablet Take 10 mg by mouth every morning  90 tablet 3     hydrochlorothiazide (HYDRODIURIL) 12.5 MG tablet Take 12.5 mg by mouth every morning  180 tablet 3     multivitamin (ONE-DAILY) tablet Take 1 tablet by mouth every morning  90 tablet 3     OTC products: None, except as noted above    Allergies   Allergen Reactions     Cat Hair Extract Itching     itchy tearful eyes     Adhesive Tape Rash     Iodine Rash      Latex Allergy: NO    Social History     Tobacco Use     Smoking status: Never Smoker     Smokeless tobacco: Never Used   Substance Use Topics     Alcohol use: Yes     Comment: twice a week     History   Drug Use No       REVIEW OF SYSTEMS:   CONSTITUTIONAL: NEGATIVE for fever, chills, change in weight  INTEGUMENTARY/SKIN: NEGATIVE for worrisome rashes, moles or lesions  EYES: NEGATIVE for vision changes or irritation  ENT/MOUTH: NEGATIVE for ear, mouth and throat problems  RESP: NEGATIVE for significant cough or SOB  BREAST: NEGATIVE for masses, tenderness or discharge  CV: NEGATIVE for chest pain, palpitations or peripheral edema  GI: NEGATIVE for nausea, abdominal pain, heartburn, or change in bowel habits  : NEGATIVE for frequency, dysuria, or hematuria  MUSCULOSKELETAL: NEGATIVE for significant arthralgias or myalgia  NEURO: NEGATIVE for weakness, dizziness or paresthesias  ENDOCRINE: NEGATIVE for temperature intolerance, skin/hair changes  HEME: NEGATIVE for bleeding problems  PSYCHIATRIC: NEGATIVE for changes in mood or affect    EXAM:   There were no vitals taken for this visit.    GENERAL APPEARANCE: healthy, alert and no distress     EYES: EOMI,  PERRL     HENT: ear canals and TM's normal and nose and mouth without ulcers or lesions      NECK: no adenopathy, no asymmetry, masses, or scars and thyroid normal to palpation     RESP: lungs clear to auscultation - no rales, rhonchi or wheezes     CV: regular rates and rhythm, normal S1 S2, no S3 or S4 and no murmur, click or rub     ABDOMEN:  soft, nontender, no HSM or masses and bowel sounds normal     MS: extremities normal- no gross deformities noted, no evidence of inflammation in joints, FROM in all extremities.     SKIN: no suspicious lesions or rashes     NEURO: Normal strength and tone, sensory exam grossly normal, mentation intact and speech normal     PSYCH: mentation appears normal. and affect normal/bright     LYMPHATICS: No cervical adenopathy    DIAGNOSTICS:     EKG: stress test at [place] 12/27 no sign of ischemia  Labs Drawn and in Process:   Unresulted Labs Ordered in the Past 30 Days of this Admission     Date and Time Order Name Status Description    3/29/2019 0941 AFB Culture Non Blood Preliminary           Recent Labs   Lab Test 02/05/19  0847   HGB 17.0   *      POTASSIUM 3.9   CR 0.89        IMPRESSION:   Reason for surgery/procedure: prostatectomy   Diagnosis/reason for consult: asthma, sarcoidosis    The proposed surgical procedure is considered INTERMEDIATE risk.    REVISED CARDIAC RISK INDEX  The patient has the following serious cardiovascular risks for perioperative complications such as (MI, PE, VFib and 3  AV Block):  No serious cardiac risks  INTERPRETATION: 1 risks: Class II (low risk - 0.9% complication rate)    The patient has the following additional risks for perioperative complications:  Morbid obesity    No diagnosis found.    RECOMMENDATIONS:     --Consult hospital rounder / IM to assist post-op medical management    --Patient is to take all scheduled medications on the day of surgery EXCEPT for modifications listed below.    APPROVAL GIVEN to proceed with proposed procedure, without further diagnostic evaluation       Signed Electronically by: Carlyn  Danya Payne MD    Copy of this evaluation report is provided to requesting physician.    Portal Preop Guidelines    Revised Cardiac Risk Index

## 2019-05-08 ASSESSMENT — ASTHMA QUESTIONNAIRES: ACT_TOTALSCORE: 25

## 2019-05-23 ENCOUNTER — ANESTHESIA (OUTPATIENT)
Dept: SURGERY | Facility: CLINIC | Age: 64
End: 2019-05-23
Payer: COMMERCIAL

## 2019-05-23 ENCOUNTER — ANESTHESIA EVENT (OUTPATIENT)
Dept: SURGERY | Facility: CLINIC | Age: 64
End: 2019-05-23
Payer: COMMERCIAL

## 2019-05-23 ENCOUNTER — HOSPITAL ENCOUNTER (OUTPATIENT)
Facility: CLINIC | Age: 64
Discharge: HOME | End: 2019-05-25
Attending: UROLOGY | Admitting: UROLOGY
Payer: COMMERCIAL

## 2019-05-23 DIAGNOSIS — R42 VERTIGO: ICD-10-CM

## 2019-05-23 DIAGNOSIS — C61 PROSTATE CANCER (H): Primary | ICD-10-CM

## 2019-05-23 LAB
ANION GAP SERPL CALCULATED.3IONS-SCNC: 7 MMOL/L (ref 3–14)
BUN SERPL-MCNC: 10 MG/DL (ref 7–30)
BUN SERPL-MCNC: 11 MG/DL (ref 7–30)
CALCIUM SERPL-MCNC: 8.5 MG/DL (ref 8.5–10.1)
CALCIUM SERPL-MCNC: 8.9 MG/DL (ref 8.5–10.1)
CHLORIDE SERPL-SCNC: 103 MMOL/L (ref 94–109)
CHLORIDE SERPL-SCNC: 106 MMOL/L (ref 94–109)
CO2 SERPL-SCNC: 26 MMOL/L (ref 20–32)
CO2 SERPL-SCNC: 27 MMOL/L (ref 20–32)
CREAT SERPL-MCNC: 0.9 MG/DL (ref 0.66–1.25)
CREAT SERPL-MCNC: 0.92 MG/DL (ref 0.66–1.25)
ERYTHROCYTE [DISTWIDTH] IN BLOOD BY AUTOMATED COUNT: 14.2 % (ref 10–15)
GFR SERPL CREATININE-BSD FRML MDRD: 87 ML/MIN/{1.73_M2}
GFR SERPL CREATININE-BSD FRML MDRD: 90 ML/MIN/{1.73_M2}
GLUCOSE SERPL-MCNC: 103 MG/DL (ref 70–99)
GLUCOSE SERPL-MCNC: 140 MG/DL (ref 70–99)
HCT VFR BLD AUTO: 47.6 % (ref 40–53)
HGB BLD-MCNC: 15.9 G/DL (ref 13.3–17.7)
MAGNESIUM SERPL-MCNC: 2.5 MG/DL (ref 1.6–2.3)
MCH RBC QN AUTO: 29.6 PG (ref 26.5–33)
MCHC RBC AUTO-ENTMCNC: 33.4 G/DL (ref 31.5–36.5)
MCV RBC AUTO: 89 FL (ref 78–100)
PHOSPHATE SERPL-MCNC: 2.5 MG/DL (ref 2.5–4.5)
PLATELET # BLD AUTO: 158 10E9/L (ref 150–450)
POTASSIUM SERPL-SCNC: 3.7 MMOL/L (ref 3.4–5.3)
POTASSIUM SERPL-SCNC: 3.9 MMOL/L (ref 3.4–5.3)
RBC # BLD AUTO: 5.38 10E12/L (ref 4.4–5.9)
SODIUM SERPL-SCNC: 137 MMOL/L (ref 133–144)
SODIUM SERPL-SCNC: 140 MMOL/L (ref 133–144)
WBC # BLD AUTO: 17 10E9/L (ref 4–11)

## 2019-05-23 PROCEDURE — 25000566 ZZH SEVOFLURANE, EA 15 MIN: Performed by: UROLOGY

## 2019-05-23 PROCEDURE — 25000125 ZZHC RX 250: Performed by: NURSE ANESTHETIST, CERTIFIED REGISTERED

## 2019-05-23 PROCEDURE — 25000128 H RX IP 250 OP 636: Performed by: UROLOGY

## 2019-05-23 PROCEDURE — 36000087 ZZH SURGERY LEVEL 8 EA 15 ADDTL MIN: Performed by: UROLOGY

## 2019-05-23 PROCEDURE — 38571 LAPAROSCOPY LYMPHADENECTOMY: CPT | Mod: 51 | Performed by: UROLOGY

## 2019-05-23 PROCEDURE — 55866 LAPS SURG PRST8ECT RPBIC RAD: CPT | Performed by: UROLOGY

## 2019-05-23 PROCEDURE — 40000169 ZZH STATISTIC PRE-PROCEDURE ASSESSMENT I: Performed by: UROLOGY

## 2019-05-23 PROCEDURE — 84100 ASSAY OF PHOSPHORUS: CPT | Performed by: ANESTHESIOLOGY

## 2019-05-23 PROCEDURE — 80048 BASIC METABOLIC PNL TOTAL CA: CPT | Performed by: ANESTHESIOLOGY

## 2019-05-23 PROCEDURE — 25800025 ZZH RX 258: Performed by: UROLOGY

## 2019-05-23 PROCEDURE — 25000128 H RX IP 250 OP 636: Performed by: NURSE ANESTHETIST, CERTIFIED REGISTERED

## 2019-05-23 PROCEDURE — 88309 TISSUE EXAM BY PATHOLOGIST: CPT | Mod: 26 | Performed by: UROLOGY

## 2019-05-23 PROCEDURE — 37000008 ZZH ANESTHESIA TECHNICAL FEE, 1ST 30 MIN: Performed by: UROLOGY

## 2019-05-23 PROCEDURE — 85027 COMPLETE CBC AUTOMATED: CPT | Performed by: UROLOGY

## 2019-05-23 PROCEDURE — 25000125 ZZHC RX 250: Performed by: UROLOGY

## 2019-05-23 PROCEDURE — 83735 ASSAY OF MAGNESIUM: CPT | Performed by: ANESTHESIOLOGY

## 2019-05-23 PROCEDURE — 25800030 ZZH RX IP 258 OP 636: Performed by: NURSE ANESTHETIST, CERTIFIED REGISTERED

## 2019-05-23 PROCEDURE — 27210794 ZZH OR GENERAL SUPPLY STERILE: Performed by: UROLOGY

## 2019-05-23 PROCEDURE — 25800030 ZZH RX IP 258 OP 636: Performed by: UROLOGY

## 2019-05-23 PROCEDURE — 37000009 ZZH ANESTHESIA TECHNICAL FEE, EACH ADDTL 15 MIN: Performed by: UROLOGY

## 2019-05-23 PROCEDURE — 36415 COLL VENOUS BLD VENIPUNCTURE: CPT | Performed by: UROLOGY

## 2019-05-23 PROCEDURE — 36000085 ZZH SURGERY LEVEL 8 1ST 30 MIN: Performed by: UROLOGY

## 2019-05-23 PROCEDURE — 71000012 ZZH RECOVERY PHASE 1 LEVEL 1 FIRST HR: Performed by: UROLOGY

## 2019-05-23 PROCEDURE — 80048 BASIC METABOLIC PNL TOTAL CA: CPT | Performed by: UROLOGY

## 2019-05-23 PROCEDURE — 25000128 H RX IP 250 OP 636: Performed by: ANESTHESIOLOGY

## 2019-05-23 PROCEDURE — 88309 TISSUE EXAM BY PATHOLOGIST: CPT | Performed by: UROLOGY

## 2019-05-23 PROCEDURE — 36415 COLL VENOUS BLD VENIPUNCTURE: CPT | Performed by: ANESTHESIOLOGY

## 2019-05-23 RX ORDER — ONDANSETRON 4 MG/1
4 TABLET, ORALLY DISINTEGRATING ORAL EVERY 30 MIN PRN
Status: DISCONTINUED | OUTPATIENT
Start: 2019-05-23 | End: 2019-05-23 | Stop reason: HOSPADM

## 2019-05-23 RX ORDER — ONDANSETRON 2 MG/ML
INJECTION INTRAMUSCULAR; INTRAVENOUS PRN
Status: DISCONTINUED | OUTPATIENT
Start: 2019-05-23 | End: 2019-05-23

## 2019-05-23 RX ORDER — PROCHLORPERAZINE MALEATE 10 MG
10 TABLET ORAL EVERY 6 HOURS PRN
Status: DISCONTINUED | OUTPATIENT
Start: 2019-05-23 | End: 2019-05-23

## 2019-05-23 RX ORDER — CETIRIZINE HYDROCHLORIDE 10 MG/1
10 TABLET ORAL EVERY MORNING
Status: DISCONTINUED | OUTPATIENT
Start: 2019-05-24 | End: 2019-05-25 | Stop reason: HOSPADM

## 2019-05-23 RX ORDER — ONDANSETRON 4 MG/1
4 TABLET, ORALLY DISINTEGRATING ORAL EVERY 6 HOURS PRN
Status: DISCONTINUED | OUTPATIENT
Start: 2019-05-23 | End: 2019-05-23

## 2019-05-23 RX ORDER — LIDOCAINE 40 MG/G
CREAM TOPICAL
Status: DISCONTINUED | OUTPATIENT
Start: 2019-05-23 | End: 2019-05-25 | Stop reason: HOSPADM

## 2019-05-23 RX ORDER — BUPIVACAINE HYDROCHLORIDE 2.5 MG/ML
INJECTION, SOLUTION INFILTRATION; PERINEURAL PRN
Status: DISCONTINUED | OUTPATIENT
Start: 2019-05-23 | End: 2019-05-23 | Stop reason: HOSPADM

## 2019-05-23 RX ORDER — NALOXONE HYDROCHLORIDE 0.4 MG/ML
.1-.4 INJECTION, SOLUTION INTRAMUSCULAR; INTRAVENOUS; SUBCUTANEOUS
Status: DISCONTINUED | OUTPATIENT
Start: 2019-05-23 | End: 2019-05-23

## 2019-05-23 RX ORDER — FENTANYL CITRATE 50 UG/ML
INJECTION, SOLUTION INTRAMUSCULAR; INTRAVENOUS PRN
Status: DISCONTINUED | OUTPATIENT
Start: 2019-05-23 | End: 2019-05-23

## 2019-05-23 RX ORDER — ONDANSETRON 2 MG/ML
4 INJECTION INTRAMUSCULAR; INTRAVENOUS EVERY 6 HOURS PRN
Status: DISCONTINUED | OUTPATIENT
Start: 2019-05-23 | End: 2019-05-25 | Stop reason: HOSPADM

## 2019-05-23 RX ORDER — HYDROCHLOROTHIAZIDE 12.5 MG/1
12.5 TABLET ORAL EVERY MORNING
Status: DISCONTINUED | OUTPATIENT
Start: 2019-05-24 | End: 2019-05-25 | Stop reason: HOSPADM

## 2019-05-23 RX ORDER — SODIUM CHLORIDE, SODIUM LACTATE, POTASSIUM CHLORIDE, CALCIUM CHLORIDE 600; 310; 30; 20 MG/100ML; MG/100ML; MG/100ML; MG/100ML
INJECTION, SOLUTION INTRAVENOUS CONTINUOUS
Status: DISCONTINUED | OUTPATIENT
Start: 2019-05-23 | End: 2019-05-23 | Stop reason: HOSPADM

## 2019-05-23 RX ORDER — KETOROLAC TROMETHAMINE 30 MG/ML
30 INJECTION, SOLUTION INTRAMUSCULAR; INTRAVENOUS EVERY 6 HOURS
Status: COMPLETED | OUTPATIENT
Start: 2019-05-23 | End: 2019-05-24

## 2019-05-23 RX ORDER — ONDANSETRON 2 MG/ML
4 INJECTION INTRAMUSCULAR; INTRAVENOUS EVERY 30 MIN PRN
Status: CANCELLED | OUTPATIENT
Start: 2019-05-23

## 2019-05-23 RX ORDER — SENNA AND DOCUSATE SODIUM 50; 8.6 MG/1; MG/1
1 TABLET, FILM COATED ORAL 2 TIMES DAILY PRN
Qty: 30 TABLET | Refills: 0 | Status: SHIPPED | OUTPATIENT
Start: 2019-05-23 | End: 2019-09-11

## 2019-05-23 RX ORDER — ALBUTEROL SULFATE 90 UG/1
2 AEROSOL, METERED RESPIRATORY (INHALATION) EVERY 4 HOURS PRN
Status: DISCONTINUED | OUTPATIENT
Start: 2019-05-23 | End: 2019-05-25 | Stop reason: HOSPADM

## 2019-05-23 RX ORDER — ONDANSETRON 2 MG/ML
4 INJECTION INTRAMUSCULAR; INTRAVENOUS EVERY 30 MIN PRN
Status: DISCONTINUED | OUTPATIENT
Start: 2019-05-23 | End: 2019-05-23 | Stop reason: HOSPADM

## 2019-05-23 RX ORDER — PROCHLORPERAZINE MALEATE 10 MG
10 TABLET ORAL EVERY 6 HOURS PRN
Status: DISCONTINUED | OUTPATIENT
Start: 2019-05-23 | End: 2019-05-25 | Stop reason: HOSPADM

## 2019-05-23 RX ORDER — NALOXONE HYDROCHLORIDE 0.4 MG/ML
.1-.4 INJECTION, SOLUTION INTRAMUSCULAR; INTRAVENOUS; SUBCUTANEOUS
Status: DISCONTINUED | OUTPATIENT
Start: 2019-05-23 | End: 2019-05-25 | Stop reason: HOSPADM

## 2019-05-23 RX ORDER — SODIUM CHLORIDE, SODIUM LACTATE, POTASSIUM CHLORIDE, CALCIUM CHLORIDE 600; 310; 30; 20 MG/100ML; MG/100ML; MG/100ML; MG/100ML
INJECTION, SOLUTION INTRAVENOUS CONTINUOUS PRN
Status: DISCONTINUED | OUTPATIENT
Start: 2019-05-23 | End: 2019-05-23

## 2019-05-23 RX ORDER — OXYCODONE HYDROCHLORIDE 5 MG/1
5-10 TABLET ORAL
Qty: 20 TABLET | Refills: 0 | Status: SHIPPED | OUTPATIENT
Start: 2019-05-23 | End: 2019-05-25

## 2019-05-23 RX ORDER — OXYCODONE HYDROCHLORIDE 5 MG/1
5-10 TABLET ORAL
Status: DISCONTINUED | OUTPATIENT
Start: 2019-05-23 | End: 2019-05-25 | Stop reason: HOSPADM

## 2019-05-23 RX ORDER — NEOSTIGMINE METHYLSULFATE 1 MG/ML
VIAL (ML) INJECTION PRN
Status: DISCONTINUED | OUTPATIENT
Start: 2019-05-23 | End: 2019-05-23

## 2019-05-23 RX ORDER — HYDROMORPHONE HYDROCHLORIDE 1 MG/ML
.3-.5 INJECTION, SOLUTION INTRAMUSCULAR; INTRAVENOUS; SUBCUTANEOUS EVERY 5 MIN PRN
Status: CANCELLED | OUTPATIENT
Start: 2019-05-23

## 2019-05-23 RX ORDER — CEFAZOLIN SODIUM 1 G/3ML
1 INJECTION, POWDER, FOR SOLUTION INTRAMUSCULAR; INTRAVENOUS SEE ADMIN INSTRUCTIONS
Status: DISCONTINUED | OUTPATIENT
Start: 2019-05-23 | End: 2019-05-23 | Stop reason: HOSPADM

## 2019-05-23 RX ORDER — ATORVASTATIN CALCIUM 40 MG/1
40 TABLET, FILM COATED ORAL EVERY MORNING
Status: DISCONTINUED | OUTPATIENT
Start: 2019-05-25 | End: 2019-05-25 | Stop reason: HOSPADM

## 2019-05-23 RX ORDER — DEXAMETHASONE SODIUM PHOSPHATE 4 MG/ML
INJECTION, SOLUTION INTRA-ARTICULAR; INTRALESIONAL; INTRAMUSCULAR; INTRAVENOUS; SOFT TISSUE PRN
Status: DISCONTINUED | OUTPATIENT
Start: 2019-05-23 | End: 2019-05-23

## 2019-05-23 RX ORDER — HYDROMORPHONE HYDROCHLORIDE 1 MG/ML
0.2 INJECTION, SOLUTION INTRAMUSCULAR; INTRAVENOUS; SUBCUTANEOUS
Status: DISCONTINUED | OUTPATIENT
Start: 2019-05-23 | End: 2019-05-25 | Stop reason: HOSPADM

## 2019-05-23 RX ORDER — FENTANYL CITRATE 50 UG/ML
25-50 INJECTION, SOLUTION INTRAMUSCULAR; INTRAVENOUS
Status: DISCONTINUED | OUTPATIENT
Start: 2019-05-23 | End: 2019-05-23 | Stop reason: HOSPADM

## 2019-05-23 RX ORDER — ONDANSETRON 2 MG/ML
4 INJECTION INTRAMUSCULAR; INTRAVENOUS EVERY 6 HOURS PRN
Status: DISCONTINUED | OUTPATIENT
Start: 2019-05-23 | End: 2019-05-23

## 2019-05-23 RX ORDER — ONDANSETRON 4 MG/1
4 TABLET, ORALLY DISINTEGRATING ORAL EVERY 6 HOURS PRN
Status: DISCONTINUED | OUTPATIENT
Start: 2019-05-23 | End: 2019-05-25 | Stop reason: HOSPADM

## 2019-05-23 RX ORDER — MAGNESIUM HYDROXIDE 1200 MG/15ML
LIQUID ORAL PRN
Status: DISCONTINUED | OUTPATIENT
Start: 2019-05-23 | End: 2019-05-23 | Stop reason: HOSPADM

## 2019-05-23 RX ORDER — ACETAMINOPHEN 325 MG/1
650 TABLET ORAL EVERY 6 HOURS PRN
Status: DISCONTINUED | OUTPATIENT
Start: 2019-05-23 | End: 2019-05-25 | Stop reason: HOSPADM

## 2019-05-23 RX ORDER — FENTANYL CITRATE 50 UG/ML
25-50 INJECTION, SOLUTION INTRAMUSCULAR; INTRAVENOUS
Status: CANCELLED | OUTPATIENT
Start: 2019-05-23

## 2019-05-23 RX ORDER — HEPARIN SODIUM 5000 [USP'U]/.5ML
5000 INJECTION, SOLUTION INTRAVENOUS; SUBCUTANEOUS EVERY 8 HOURS
Status: DISCONTINUED | OUTPATIENT
Start: 2019-05-24 | End: 2019-05-25 | Stop reason: HOSPADM

## 2019-05-23 RX ORDER — CALCIUM CARBONATE 500 MG/1
1000 TABLET, CHEWABLE ORAL 4 TIMES DAILY PRN
Status: DISCONTINUED | OUTPATIENT
Start: 2019-05-23 | End: 2019-05-25 | Stop reason: HOSPADM

## 2019-05-23 RX ORDER — CEFAZOLIN SODIUM IN 0.9 % NACL 3 G/100 ML
3 INTRAVENOUS SOLUTION, PIGGYBACK (ML) INTRAVENOUS
Status: COMPLETED | OUTPATIENT
Start: 2019-05-23 | End: 2019-05-23

## 2019-05-23 RX ORDER — SODIUM CHLORIDE, SODIUM LACTATE, POTASSIUM CHLORIDE, CALCIUM CHLORIDE 600; 310; 30; 20 MG/100ML; MG/100ML; MG/100ML; MG/100ML
INJECTION, SOLUTION INTRAVENOUS CONTINUOUS
Status: CANCELLED | OUTPATIENT
Start: 2019-05-23

## 2019-05-23 RX ORDER — SODIUM CHLORIDE 9 MG/ML
INJECTION, SOLUTION INTRAVENOUS CONTINUOUS
Status: DISCONTINUED | OUTPATIENT
Start: 2019-05-23 | End: 2019-05-25 | Stop reason: HOSPADM

## 2019-05-23 RX ORDER — GLYCOPYRROLATE 0.2 MG/ML
INJECTION, SOLUTION INTRAMUSCULAR; INTRAVENOUS PRN
Status: DISCONTINUED | OUTPATIENT
Start: 2019-05-23 | End: 2019-05-23

## 2019-05-23 RX ORDER — ONDANSETRON 4 MG/1
4 TABLET, ORALLY DISINTEGRATING ORAL EVERY 30 MIN PRN
Status: CANCELLED | OUTPATIENT
Start: 2019-05-23

## 2019-05-23 RX ORDER — PROPOFOL 10 MG/ML
INJECTION, EMULSION INTRAVENOUS PRN
Status: DISCONTINUED | OUTPATIENT
Start: 2019-05-23 | End: 2019-05-23

## 2019-05-23 RX ORDER — HYDROMORPHONE HYDROCHLORIDE 1 MG/ML
.3-.5 INJECTION, SOLUTION INTRAMUSCULAR; INTRAVENOUS; SUBCUTANEOUS EVERY 5 MIN PRN
Status: DISCONTINUED | OUTPATIENT
Start: 2019-05-23 | End: 2019-05-23 | Stop reason: HOSPADM

## 2019-05-23 RX ADMIN — ROCURONIUM BROMIDE 20 MG: 10 INJECTION INTRAVENOUS at 12:50

## 2019-05-23 RX ADMIN — DEXMEDETOMIDINE HYDROCHLORIDE 0.5 MCG/KG/HR: 100 INJECTION, SOLUTION INTRAVENOUS at 12:13

## 2019-05-23 RX ADMIN — SODIUM CHLORIDE: 9 INJECTION, SOLUTION INTRAVENOUS at 19:39

## 2019-05-23 RX ADMIN — SODIUM CHLORIDE, POTASSIUM CHLORIDE, SODIUM LACTATE AND CALCIUM CHLORIDE: 600; 310; 30; 20 INJECTION, SOLUTION INTRAVENOUS at 12:13

## 2019-05-23 RX ADMIN — ROCURONIUM BROMIDE 10 MG: 10 INJECTION INTRAVENOUS at 14:31

## 2019-05-23 RX ADMIN — CEFAZOLIN 1 G: 1 INJECTION, POWDER, FOR SOLUTION INTRAMUSCULAR; INTRAVENOUS at 14:43

## 2019-05-23 RX ADMIN — ROCURONIUM BROMIDE 20 MG: 10 INJECTION INTRAVENOUS at 13:19

## 2019-05-23 RX ADMIN — Medication 3 G: at 12:44

## 2019-05-23 RX ADMIN — GLYCOPYRROLATE 0.8 MG: 0.2 INJECTION, SOLUTION INTRAMUSCULAR; INTRAVENOUS at 15:32

## 2019-05-23 RX ADMIN — DEXMEDETOMIDINE HYDROCHLORIDE 12 MCG: 100 INJECTION, SOLUTION INTRAVENOUS at 13:37

## 2019-05-23 RX ADMIN — ROCURONIUM BROMIDE 10 MG: 10 INJECTION INTRAVENOUS at 15:01

## 2019-05-23 RX ADMIN — ROCURONIUM BROMIDE 50 MG: 10 INJECTION INTRAVENOUS at 12:24

## 2019-05-23 RX ADMIN — ROCURONIUM BROMIDE 10 MG: 10 INJECTION INTRAVENOUS at 14:15

## 2019-05-23 RX ADMIN — GLYCOPYRROLATE 0.4 MG: 0.2 INJECTION, SOLUTION INTRAMUSCULAR; INTRAVENOUS at 12:13

## 2019-05-23 RX ADMIN — NEOSTIGMINE METHYLSULFATE 5 MG: 1 INJECTION, SOLUTION INTRAVENOUS at 15:32

## 2019-05-23 RX ADMIN — DEXMEDETOMIDINE HYDROCHLORIDE 20 MCG: 100 INJECTION, SOLUTION INTRAVENOUS at 12:19

## 2019-05-23 RX ADMIN — ONDANSETRON 4 MG: 2 INJECTION INTRAMUSCULAR; INTRAVENOUS at 12:28

## 2019-05-23 RX ADMIN — PHENYLEPHRINE HYDROCHLORIDE 100 MCG: 10 INJECTION INTRAVENOUS at 14:56

## 2019-05-23 RX ADMIN — HYDROMORPHONE HYDROCHLORIDE 0.5 MG: 1 INJECTION, SOLUTION INTRAMUSCULAR; INTRAVENOUS; SUBCUTANEOUS at 15:33

## 2019-05-23 RX ADMIN — ONDANSETRON 4 MG: 2 INJECTION INTRAMUSCULAR; INTRAVENOUS at 15:22

## 2019-05-23 RX ADMIN — KETOROLAC TROMETHAMINE 30 MG: 30 INJECTION, SOLUTION INTRAMUSCULAR; INTRAVENOUS at 19:46

## 2019-05-23 RX ADMIN — HYDROMORPHONE HYDROCHLORIDE 0.5 MG: 1 INJECTION, SOLUTION INTRAMUSCULAR; INTRAVENOUS; SUBCUTANEOUS at 17:02

## 2019-05-23 RX ADMIN — FENTANYL CITRATE 50 MCG: 50 INJECTION, SOLUTION INTRAMUSCULAR; INTRAVENOUS at 12:51

## 2019-05-23 RX ADMIN — PROPOFOL 20 MG: 10 INJECTION, EMULSION INTRAVENOUS at 12:19

## 2019-05-23 RX ADMIN — DEXAMETHASONE SODIUM PHOSPHATE 4 MG: 4 INJECTION, SOLUTION INTRA-ARTICULAR; INTRALESIONAL; INTRAMUSCULAR; INTRAVENOUS; SOFT TISSUE at 12:28

## 2019-05-23 RX ADMIN — HYDROMORPHONE HYDROCHLORIDE 0.5 MG: 1 INJECTION, SOLUTION INTRAMUSCULAR; INTRAVENOUS; SUBCUTANEOUS at 13:14

## 2019-05-23 RX ADMIN — FENTANYL CITRATE 50 MCG: 50 INJECTION, SOLUTION INTRAMUSCULAR; INTRAVENOUS at 12:56

## 2019-05-23 RX ADMIN — PROPOFOL 150 MG: 10 INJECTION, EMULSION INTRAVENOUS at 12:24

## 2019-05-23 RX ADMIN — MIDAZOLAM 2 MG: 1 INJECTION INTRAMUSCULAR; INTRAVENOUS at 12:18

## 2019-05-23 RX ADMIN — SODIUM CHLORIDE, POTASSIUM CHLORIDE, SODIUM LACTATE AND CALCIUM CHLORIDE: 600; 310; 30; 20 INJECTION, SOLUTION INTRAVENOUS at 13:50

## 2019-05-23 ASSESSMENT — LIFESTYLE VARIABLES: TOBACCO_USE: 0

## 2019-05-23 NOTE — PLAN OF CARE
Pt ok'd to transfer to 88 by MD Feliz. Pt to room via cart with NA in escort.  All belongings sent with pt.  Family juhi notified of transfer.

## 2019-05-23 NOTE — BRIEF OP NOTE
[facility]    Brief Operative Note    Pre-operative diagnosis: PROSTATE CANCER  Post-operative diagnosis * No post-op diagnosis entered *  Procedure: Procedure(s):  ROBOTIC ASSISTED LAPAROSCOPIC RADICAL PROSTATECTOMY WITH BILATERAL PELVIC LYMPH NODE DISSECTION  Surgeon: Surgeon(s) and Role:     * Matteo Coughlin MD - Primary  Anesthesia: General   Estimated blood loss: Less than 50 ml  Drains:  20 Fr myers catheter, 19 Fr CHEMA drain  Specimens:   ID Type Source Tests Collected by Time Destination   A : PROSTATE WITH BILATERAL PELVIC LYMPH NODES Tissue Prostate SURGICAL PATHOLOGY EXAM Matteo Coughlin MD 5/23/2019  3:28 PM      Findings:   None.  Complications: None.  Implants:  * No implants in log *

## 2019-05-23 NOTE — ANESTHESIA POSTPROCEDURE EVALUATION
Patient: Derek Contreras    Procedure(s):  ROBOTIC ASSISTED LAPAROSCOPIC RADICAL PROSTATECTOMY WITH BILATERAL PELVIC LYMPH NODE DISSECTION    Diagnosis:PROSTATE CANCER  Diagnosis Additional Information: No value filed.    Anesthesia Type:  General, ETT, Awake Technique    Note:  Anesthesia Post Evaluation    Patient location during evaluation: PACU  Patient participation: Able to fully participate in evaluation  Level of consciousness: awake  Pain management: adequate  Airway patency: patent  Cardiovascular status: acceptable  Respiratory status: acceptable  Hydration status: acceptable  PONV: none     Anesthetic complications: None          Last vitals:  Vitals:    05/23/19 1610 05/23/19 1620 05/23/19 1630   BP: 134/88 139/86 147/82   Pulse: 98 95 96   Resp: 20 18 20   Temp: 36.8  C (98.2  F) 36.7  C (98.1  F) 36.8  C (98.2  F)   SpO2: 92% 91% 92%         Electronically Signed By: Dewayne Feliz MD  May 23, 2019  4:41 PM

## 2019-05-23 NOTE — PROGRESS NOTES
Admission medication history interview status for the 5/23/2019  admission is complete. See EPIC admission navigator for prior to admission medications     Medication history source reliability:Good    Medication history interview source(s):Patient    Medication history resources (including written lists, pill bottles, clinic record):None    Primary pharmacy.[facility]    Additional medication history information not noted on PTA med list :None    Time spent in this activity: 45 minutes    Prior to Admission medications    Medication Sig Last Dose Taking? Auth Provider   albuterol (PROAIR HFA/PROVENTIL HFA/VENTOLIN HFA) 108 (90 Base) MCG/ACT inhaler INHALE 2 PUFFS BY MOUTH Q 4 TO 6 H FOR COUGH AND BREATHLESSNESS more than a week at prn Yes Reported, Patient   aspirin (ASA) 81 MG tablet Take 81 mg by mouth every morning ON HOLD FOR SURGERY SINCE 03/14/2019 5/13/2019 Yes Carlyn Payne MD   atorvastatin (LIPITOR) 40 MG tablet Take 40 mg by mouth every morning  5/23/2019 at 0700 Yes Carlyn Payne MD   BREO ELLIPTA 200-25 MCG/INH Inhaler INL 1 PUFF PO AT THE SAME TIME Q DAY. RINSE AND SPIT AFTER U 5/23/2019 at 0700 Yes Reported, Patient   cetirizine (ZYRTEC) 10 MG tablet Take 10 mg by mouth every morning  5/23/2019 at 0700 Yes Carlyn Payne MD   hydrochlorothiazide (HYDRODIURIL) 12.5 MG tablet Take 12.5 mg by mouth every morning  5/21/2019 Yes Carlyn Payne MD   multivitamin (ONE-DAILY) tablet Take 1 tablet by mouth every morning  5/22/2019 at Unknown time Yes Carlyn Payne MD

## 2019-05-23 NOTE — ANESTHESIA CARE TRANSFER NOTE
Patient: Derek Contreras    Procedure(s):  ROBOTIC ASSISTED LAPAROSCOPIC RADICAL PROSTATECTOMY WITH BILATERAL PELVIC LYMPH NODE DISSECTION    Diagnosis: PROSTATE CANCER  Diagnosis Additional Information: No value filed.    Anesthesia Type:   General, ETT, Awake Technique     Note:  Airway :Face Mask  Patient transferred to:PACU  Comments: Neuromuscular blockade reversed after TOF 4/4, spontaneous respirations, adequate tidal volumes, followed commands to voice, oropharynx suctioned with soft flexible catheter, extubated atraumatically, extubated with suction, airway patent after extubation.  Oxygen via facemask at 8 liters per minute to PACU. Oxygen tubing connected to wall O2 in PACU, SpO2, NiBP, and EKG monitors and alarms on and functioning, Vivi Hugger warmer connected to patient gown, report on patient's clinical status given to PACU RN, RN questions answered. Handoff Report: Identifed the Patient, Identified the Reponsible Provider, Reviewed the pertinent medical history, Discussed the surgical course, Reviewed Intra-OP anesthesia mangement and issues during anesthesia, Set expectations for post-procedure period and Allowed opportunity for questions and acknowledgement of understanding      Vitals: (Last set prior to Anesthesia Care Transfer)    CRNA VITALS  5/23/2019 1521 - 5/23/2019 1556      5/23/2019             Pulse:  93    Ht Rate:  110    SpO2:  96 %    Resp Rate (observed):  1  (Abnormal)     Resp Rate (set):  10                Electronically Signed By: DENY Villalobos CRNA  May 23, 2019  3:56 PM

## 2019-05-23 NOTE — ANESTHESIA PREPROCEDURE EVALUATION
Anesthesia Pre-Procedure Evaluation    Patient: Derek Contreras   MRN: [id number] : 1955          Preoperative Diagnosis: PROSTATE CANCER    Procedure(s):  ROBOTIC ASSISTED XI LAPAROSCOPIC PROSTATECTOMY, BILATERAL PELVIC LYMPH NODE DISSECTION    Past Medical History:   Diagnosis Date     Asthma      Claustrophobia      Hypercholesteremia      Hypertension      Mediastinal adenopathy      Obesity      BEULAH (obstructive sleep apnea)      Prostate cancer (H)      Sarcoidosis      Past Surgical History:   Procedure Laterality Date     APPENDECTOMY       C TOTAL HIP ARTHROPLASTY Bilateral      C TOTAL KNEE ARTHROPLASTY Bilateral      ENDOBRONCHIAL ULTRASOUND FLEXIBLE N/A 3/29/2019    Procedure: Flexible Bronchoscopy, Endobronchial Ultrasound;  Surgeon: Curtis Leger MD;  Location: [facility] OR     VASECTOMY         Anesthesia Evaluation     . Pt has had prior anesthetic. Type: General, MAC and Regional    History of anesthetic complications   - difficult intubation        ROS/MED HX    ENT/Pulmonary:     (+)sleep apnea, Intermittent asthma Treatment: Inhaler daily and Inhaler prn,  doesn't use CPAP , . Other pulmonary disease Sarcoidosis.   (-) tobacco use   Neurologic:  - neg neurologic ROS     Cardiovascular:     (+) Dyslipidemia, hypertension----. : . . KIRBY, . :. . Previous cardiac testing Echodate:11results:EF 66%Stress Testdate:16 results:ECG reviewed date:16 results:SR, PVC and left anterior fascicular block date: results:          METS/Exercise Tolerance:  3 - Able to walk 1-2 blocks without stopping   Hematologic:        (-) history of blood clots and History of Transfusion   Musculoskeletal:   (+) arthritis,  -       GI/Hepatic:  - neg GI/hepatic ROS       Renal/Genitourinary:  - ROS Renal section negative       Endo:     (+) Obesity (Class 3), .      Psychiatric:     (+) psychiatric history other (comment) (claustrophobia only with CPAP)      Infectious Disease:  - neg infectious  disease ROS       Malignancy:   (+) Malignancy History of Prostate  Prostate CA Active status post,         Other:    (+) no H/O Chronic Pain,no other significant disability                         Physical Exam  Normal systems: cardiovascular and pulmonary    Airway   Mallampati: III  TM distance: >3 FB  Neck ROM: full    Dental   (+) missing, caps and chipped    Cardiovascular       Pulmonary             Lab Results   Component Value Date    WBC 7.6 05/07/2019    HGB 16.4 05/07/2019    HCT 50.0 05/07/2019     05/07/2019     05/23/2019    POTASSIUM 3.7 05/23/2019    CHLORIDE 103 05/23/2019    CO2 29 05/07/2019    BUN 11 05/07/2019    CR 0.96 05/07/2019     (H) 05/07/2019    ANTONIO 9.0 05/07/2019    ALBUMIN 2.4 (L) 05/07/2019    PROTTOTAL 8.0 05/07/2019    ALT 36 05/07/2019    AST 31 05/07/2019    ALKPHOS 78 05/07/2019    BILITOTAL 0.8 05/07/2019    TSH 2.23 02/05/2019       Preop Vitals  BP Readings from Last 3 Encounters:   05/23/19 135/65   05/07/19 136/76   04/18/19 122/84    Pulse Readings from Last 3 Encounters:   05/07/19 100   04/18/19 93   03/29/19 96      Resp Readings from Last 3 Encounters:   05/23/19 20   05/07/19 20   03/29/19 18    SpO2 Readings from Last 3 Encounters:   05/23/19 96%   05/07/19 93%   04/18/19 93%      Temp Readings from Last 1 Encounters:   05/23/19 35.7  C (96.2  F)    Ht Readings from Last 1 Encounters:   05/07/19 1.829 m (6')      Wt Readings from Last 1 Encounters:   05/07/19 133.4 kg (294 lb)    Estimated body mass index is 39.87 kg/m  as calculated from the following:    Height as of 5/7/19: 1.829 m (6').    Weight as of 5/7/19: 133.4 kg (294 lb).       Anesthesia Plan      History & Physical Review  History and physical reviewed and following examination; no interval change.    ASA Status:  3 .    NPO Status:  > 8 hours    Plan for General, ETT and Awake Technique with Intravenous and Propofol induction. Maintenance will be Balanced.    PONV prophylaxis:   Ondansetron (or other 5HT-3), Dexamethasone or Solumedrol and Other (See comment) (Propofol gtt)  Additional equipment: Fiberoptic bronchoscope and Difficult Airway Cart Precedex gtt      Postoperative Care  Postoperative pain management:  IV analgesics, Oral pain medications and Multi-modal analgesia.      Consents  Anesthetic plan, risks, benefits and alternatives discussed with:  Patient..                 Jelani Dong MD

## 2019-05-24 ENCOUNTER — APPOINTMENT (OUTPATIENT)
Dept: PHYSICAL THERAPY | Facility: CLINIC | Age: 64
End: 2019-05-24
Attending: NURSE PRACTITIONER
Payer: COMMERCIAL

## 2019-05-24 LAB
ANION GAP SERPL CALCULATED.3IONS-SCNC: 1 MMOL/L (ref 3–14)
BUN SERPL-MCNC: 12 MG/DL (ref 7–30)
CALCIUM SERPL-MCNC: 8.1 MG/DL (ref 8.5–10.1)
CHLORIDE SERPL-SCNC: 109 MMOL/L (ref 94–109)
CO2 SERPL-SCNC: 31 MMOL/L (ref 20–32)
CREAT FLD-MCNC: 0.8 MG/DL
CREAT SERPL-MCNC: 0.86 MG/DL (ref 0.66–1.25)
ERYTHROCYTE [DISTWIDTH] IN BLOOD BY AUTOMATED COUNT: 14.2 % (ref 10–15)
GFR SERPL CREATININE-BSD FRML MDRD: >90 ML/MIN/{1.73_M2}
GLUCOSE SERPL-MCNC: 113 MG/DL (ref 70–99)
HCT VFR BLD AUTO: 44 % (ref 40–53)
HGB BLD-MCNC: 14.6 G/DL (ref 13.3–17.7)
MCH RBC QN AUTO: 29.6 PG (ref 26.5–33)
MCHC RBC AUTO-ENTMCNC: 33.2 G/DL (ref 31.5–36.5)
MCV RBC AUTO: 89 FL (ref 78–100)
PHOSPHATE SERPL-MCNC: 3.9 MG/DL (ref 2.5–4.5)
PLATELET # BLD AUTO: 139 10E9/L (ref 150–450)
POTASSIUM SERPL-SCNC: 4.2 MMOL/L (ref 3.4–5.3)
RBC # BLD AUTO: 4.93 10E12/L (ref 4.4–5.9)
SODIUM SERPL-SCNC: 141 MMOL/L (ref 133–144)
SPECIMEN SOURCE FLD: NORMAL
TSH SERPL DL<=0.005 MIU/L-ACNC: 0.46 MU/L (ref 0.4–4)
WBC # BLD AUTO: 13.4 10E9/L (ref 4–11)

## 2019-05-24 PROCEDURE — 25000132 ZZH RX MED GY IP 250 OP 250 PS 637: Performed by: UROLOGY

## 2019-05-24 PROCEDURE — 97162 PT EVAL MOD COMPLEX 30 MIN: CPT | Mod: GP | Performed by: PHYSICAL THERAPIST

## 2019-05-24 PROCEDURE — 25800030 ZZH RX IP 258 OP 636: Performed by: UROLOGY

## 2019-05-24 PROCEDURE — 84443 ASSAY THYROID STIM HORMONE: CPT | Performed by: UROLOGY

## 2019-05-24 PROCEDURE — 93010 ELECTROCARDIOGRAM REPORT: CPT | Performed by: INTERNAL MEDICINE

## 2019-05-24 PROCEDURE — 36415 COLL VENOUS BLD VENIPUNCTURE: CPT | Performed by: UROLOGY

## 2019-05-24 PROCEDURE — 97530 THERAPEUTIC ACTIVITIES: CPT | Mod: GP | Performed by: PHYSICAL THERAPIST

## 2019-05-24 PROCEDURE — 80048 BASIC METABOLIC PNL TOTAL CA: CPT | Performed by: UROLOGY

## 2019-05-24 PROCEDURE — 85027 COMPLETE CBC AUTOMATED: CPT | Performed by: UROLOGY

## 2019-05-24 PROCEDURE — 99207 ZZC CDG-CODE CATEGORY CHANGED: CPT | Performed by: NURSE PRACTITIONER

## 2019-05-24 PROCEDURE — 93005 ELECTROCARDIOGRAM TRACING: CPT

## 2019-05-24 PROCEDURE — 25000132 ZZH RX MED GY IP 250 OP 250 PS 637: Performed by: NURSE PRACTITIONER

## 2019-05-24 PROCEDURE — 84100 ASSAY OF PHOSPHORUS: CPT | Performed by: NURSE PRACTITIONER

## 2019-05-24 PROCEDURE — 99205 OFFICE O/P NEW HI 60 MIN: CPT | Performed by: NURSE PRACTITIONER

## 2019-05-24 PROCEDURE — 84100 ASSAY OF PHOSPHORUS: CPT | Performed by: UROLOGY

## 2019-05-24 PROCEDURE — 25000128 H RX IP 250 OP 636: Performed by: UROLOGY

## 2019-05-24 PROCEDURE — 82570 ASSAY OF URINE CREATININE: CPT | Performed by: UROLOGY

## 2019-05-24 RX ORDER — MECLIZINE HCL 12.5 MG 12.5 MG/1
12.5 TABLET ORAL 3 TIMES DAILY PRN
Status: DISCONTINUED | OUTPATIENT
Start: 2019-05-24 | End: 2019-05-25 | Stop reason: HOSPADM

## 2019-05-24 RX ADMIN — HEPARIN SODIUM 5000 UNITS: 5000 INJECTION, SOLUTION INTRAVENOUS; SUBCUTANEOUS at 15:35

## 2019-05-24 RX ADMIN — KETOROLAC TROMETHAMINE 30 MG: 30 INJECTION, SOLUTION INTRAMUSCULAR; INTRAVENOUS at 18:49

## 2019-05-24 RX ADMIN — KETOROLAC TROMETHAMINE 30 MG: 30 INJECTION, SOLUTION INTRAMUSCULAR; INTRAVENOUS at 03:14

## 2019-05-24 RX ADMIN — SODIUM CHLORIDE 1000 ML: 9 INJECTION, SOLUTION INTRAVENOUS at 11:13

## 2019-05-24 RX ADMIN — MECLIZINE 12.5 MG: 12.5 TABLET ORAL at 15:35

## 2019-05-24 RX ADMIN — SODIUM CHLORIDE: 9 INJECTION, SOLUTION INTRAVENOUS at 04:15

## 2019-05-24 RX ADMIN — MECLIZINE 12.5 MG: 12.5 TABLET ORAL at 20:56

## 2019-05-24 RX ADMIN — SODIUM CHLORIDE: 9 INJECTION, SOLUTION INTRAVENOUS at 13:57

## 2019-05-24 RX ADMIN — HYDROCHLOROTHIAZIDE 12.5 MG: 12.5 TABLET ORAL at 10:17

## 2019-05-24 RX ADMIN — CETIRIZINE HYDROCHLORIDE 10 MG: 10 TABLET, FILM COATED ORAL at 10:18

## 2019-05-24 RX ADMIN — ACETAMINOPHEN 650 MG: 325 TABLET, FILM COATED ORAL at 10:33

## 2019-05-24 RX ADMIN — FLUTICASONE FUROATE AND VILANTEROL TRIFENATATE 1 PUFF: 200; 25 POWDER RESPIRATORY (INHALATION) at 10:18

## 2019-05-24 RX ADMIN — HEPARIN SODIUM 5000 UNITS: 5000 INJECTION, SOLUTION INTRAVENOUS; SUBCUTANEOUS at 23:56

## 2019-05-24 RX ADMIN — KETOROLAC TROMETHAMINE 30 MG: 30 INJECTION, SOLUTION INTRAMUSCULAR; INTRAVENOUS at 12:11

## 2019-05-24 NOTE — PLAN OF CARE
Discharge Planner PT   Patient plan for discharge: Didn't state but indicated home.   Current status: Patient seen for initial eval and treatment provided. Patient and wife report patient was active and independent prior to admission. Patient was placed in Trendelenberg position to be scooted up in bed around 5 yesterday after surgery and after that he started having vertiginous symptoms and has been unable to tolerate even sitting up due to these symptoms. Therapist got him partially to sitting one time and into full sitting the second time but he only tolerated for about 10 seconds due to strong vertiginous symptoms. Tried testing with pillows under his back and head extended but he could not tolerate this either. Smooth pursuit and saccades -. No spontaneous nystagmus or gaze evoked nystagmus. Subjective report of symptoms indicates peripheral cause of vertigo (BPPV or hypofunction) but unable to verify this due to inability to tolerate position. Had nurse give patient meclizine and came back after that took affect to see if he would tolerate positions better but still couldn't. Therapist requested patient have meclizine 2 times tonight and then not have it tomorrow until seen by PT.   Barriers to return to prior living situation: Unable to tolerate any functional mobility.   Recommendations for discharge: Dependent on resolution of vertigo.   Rationale for recommendations: Patient currently unable to even tolerate sitting on edge of bed.        Entered by: Zeynep Guaman 05/24/2019 5:28 PM

## 2019-05-24 NOTE — OP NOTE
Procedure Date: 05/23/2019      SURGEON:  aMtteo Coughlin MD       ASSISTANT:  Torres Wild MD, Tamara Norman MD       PREOPERATIVE DIAGNOSIS:  Prostate cancer.      POSTOPERATIVE DIAGNOSIS:  Prostate cancer.      PROCEDURE PERFORMED:  Robotic-assisted laparoscopic radical prostatectomy with bilateral pelvic lymph node dissection, bilateral wide excision procedure.      ANESTHESIA:  General.      COMPLICATIONS:  None.      ESTIMATED BLOOD LOSS:  100 mL.      INDICATIONS FOR PROCEDURE:  Derek Contreras is a 63-year-old gentleman with an elevated PSA, was found to have a [place] 4+3 equals 7 prostate cancer.  He now presents for robotic-assisted laparoscopic radical prostatectomy.      DETAILS OF THE PROCEDURE:  Risks and benefits of the procedure were explained in detail to the patient and informed consent was obtained.  The patient was brought to the operating room and placed on the supine on the table where he underwent general endotracheal anesthetic.  He was then put in a slight frogleg position and secured to the table.  The abdomen was shaved and prepped and draped in standard sterile fashion.      After appropriate timeout, I inserted a Link catheter through the urethra on the field.  I tented up the umbilicus and inserted the Veress needle at the umbilicus.  I achieved 50 mmHg pressure with CO2 in the abdomen.  The camera port was then placed above the umbilicus.  I inspected the abdomen and no injury had occurred.  Under direct visualization, I then placed 2 right-sided robotic arm ports and one on the left side.  A 12 mm assistant port was placed off to the left side of the camera port.  The patient was then brought to a 25-degree Trendelenburg position and the robot was docked.      At the end the case, I took down some adhesions of the sigmoid colon.  I then incised anteriorly into the peritoneal reflection until I identified vas deferens and seminal vesicles bilaterally.  The vas deferens were cut on  each side and the 4 structures were cleared of their surrounding tissues.  I then incised Denonvilliers fascia at the midline and dissected the posterior plane of the prostate up to the apex of the prostate along the midline.  I released my retraction of the sigmoid colon and then and then incised the medial umbilical ligament on each side to develop the space of Retzius and to bring down the bladder.  This was carried through until I saw my pubic arch and endopelvic fascia.  The endopelvic fascia was cleared of its surrounding tissues and the superficial dorsal vein of the prostate was taken down with bipolar electrocautery.  I then incised endopelvic fascia on each side to identify the lateral limits of the prostate.        I next pulled on the Link catheter to identify the prostatovesical junction and I incised anteriorly into the bladder neck to free the anterior bladder neck from the anterior base of the prostate.  This dissection was carried through until I reached my Link catheter.  The Link catheter balloon was let down and pulled through for retraction purposes.  I then created a new bladder neck and incised the posterior bladder neck away from the posterior base of the prostate.  This dissection was carried through until I reached my initial dissection with the  vas deferens and seminal vesicles.  I then took down the pedicles of the prostate with a series of Weck clips.  I did not perform these on either side.  I continued to clip all the way to the apex, leaving neurovascular bundle attached to the prostate on each side.  I next looked anteriorly and incised the dorsal venous complex anterior with minimal bleeding.  I then cut the urethra sharply to remove the prostate.  The specimen was placed in an Endo Catch specimen bag through the assistant port.      I next performed a bilateral pelvic lymph node dissection by dissecting the lymph node packet between the cephalic vein and obturator nerve on each  side.  This was taken down with a series of Weck clips.  These were removed from the body and sent for pathology.  I then performed the vesicourethral reanastomosis by running a double-armed 3-0 V-Loc suture at the 6 o'clock and ending at the 12 o'clock position bilaterally.  The needles were removed and a new Link catheter was placed.  The bladder was irrigated thoroughly and output was clear.  I removed the left-sided instruments and placed a Aryan-Noriega drain at this site.  The Aryan-Noriega drain was then tacked at the skin level with a 2-0 silk suture.  The robot was then undocked.  The patient was brought out of Trendelenburg position.  I increased the size of the supraumbilical incision such that I could remove the Endo Catch specimen intact through this site.  Fascia was reclosed at this site with 4 interrupted #1 PDS sutures.  Skin incisions were all closed with 4-0 Monocryl  subcuticular stitches and covered with Dermabond.  The procedure was concluded at this point.      The patient tolerated the procedure without complications.  He went to the post-anesthetic care in good condition.  He will go to the hospital floor for overnight from there.         JOSE CARABALLO MD             D: 2019   T: 2019   MT: EDOUARD      Name:     LISETH GIRON   MRN:      [id number]        Account:        [id number]   :      1955           Procedure Date: 2019      Document: [id number]

## 2019-05-24 NOTE — PROGRESS NOTES
Urology  Progress Note    No acute events overnight. Feeling dizzy when ambulating but otherwise without complaint. Pain controlled. No nausea or vomiting. Tolerating ice chips with no flatus.     Exam  /81 (BP Location: Right arm)   Pulse 92   Temp 96.2  F (35.7  C)   Resp 16   SpO2 95%   No acute distress  Unlabored breathing  Abdomen soft, nontender, nondistended. Incisions C/D/I with dermabond  CHEMA serosanguinous  Myers with clear yellow urine in tubing    /300  CHEMA 70/NR    Labs  WBC 13.4  Hgb 14.6  Cr 0.86  CHEMA Cr 0.8    Assessment/Plan  63 year old y/o male POD#1 s/p RALP.    -Advance diet  -CHEMA out prior to discharge  -Anticipate discharge today vs tomorrow with myers in place pending improvement in dizziness, tolerance of regular diet    Discussed with Dr. Coughlin.     Torres Wild, PGY-5  Urology Resident

## 2019-05-24 NOTE — CONSULTS
[facility]  Consult Note - Hospitalist Service     Date of Admission:  5/23/2019  Consult Requested by: Dr. Torres Wild  Reason for Consult: Dizziness    Assessment & Plan   Derek Contreras is a 63 year old male admitted on 5/23/2019. He postop day 1 from robotic assisted laparoscopic radical prostatectomy with bilateral pelvic lymph node dissection.  PMH includes prostate CA, vertigo, hypertension, hyperlipidemia, sarcoidosis.    Prostate cancer S/P laparoscopic radical prostatectomy with bilateral pelvic lymph node dissection, bilateral wide excision procedure  Procedure completed by Dr. Coughlin, under general anesthesia.  No surgical complications noted.   cc. Hgb stable>14, BMP stable.   --Defer management to primary, urology surgery    Post operative Dizziness 2/2 suspected vertigo  Hx of Vertigo, tinnitus  Vitals stable since surgery, BP ~140s/60s, HR 80-90s, sats ~95% on RA. Volume status, net positive +2L since admission.  On exam, the patient has no neurological focal deficits, pupils are reactive, round and brisk.  Nystagmus present when looking to the right on eye exam, tinnitus present chronically.  Dizziness is provoked only by position change and is completely resolved when still.  Patient's wife, states after surgery patient was placed in Trendelenburg position to be boosted in bed, and his symptoms are probably started following this. The patient has a history of vertigo and has had a successful Epley maneuver.  DDx: most likely vertigo, considered arrhythmia, metabolic derangements, medication side effects (anesthesia, but no narcotics post op), infectious process (WBC mildly elevated, but trending down, suspected stress response after surgery, no fever/chills, or hypotension)  --orthostatics unable to be performed due to profound dizziness, surgery provided 500 cc NS bolus  --telemetry/EKG- sinus rhythm with frequent PVCs, RBBB, Q waves V1  --add on phosphorus,  TSH  --Vertigo treatment-PT consult for Epley maneuver, meclizine 3 times daily as needed  --consider neuroimaging if symptoms do not respond to vertigo treatment, will defer to Hospitalist marina on 5/25  --neuro checks every 4 hours    Hypertension  Hyperlipidemia   [PTA regimen hydrochlorothiazide, Lipitor, ASA]  Typically well controlled on PTA regimen, BP regimen has been restarted. BP ~140s/60s, denies symptoms referable to his CP.  --continue HCTZ    Asthma, BEULAH   Sarcoidosis, 3/2018  Had PFTs done showing mixed obstructive and restructure defect prompting evaluation by [facility] due to mediastinal lymph node enlargement and pulmonary nodules. Sarcoidosis was then diagnosed via flex bronch, endobronchial ultrasound and transbronchial biopsy with Dr. Leger in March 2019. No other systemic symptoms of sarcoidis  --PTA Breo Ellipta  --could consider dizziness is related to sarcoidosis (cardiac vs neuro)    The patient's care was discussed with the Attending Physician, Dr. Vasiliy Murillo, Bedside Nurse, Patient and Patient's Family.    Deidra Samson, DENY [facility]    ______________________________________________________________________    Chief Complaint   Postoperative dizziness    History is obtained from the patient    History of Present Illness   Derek Contreras is a 63 year old male who is postop day 1 from a prostatectomy with bilateral pelvic lymph node dissection, bilateral wide excision procedure.  The patient is a past medical history of prostate cancer, vertigo, hypertension, hyperlipidemia, sarcoidosis.  Of note, the patient has had treatment for vertigo approximately 10 years prior, with Epley maneuver successfully.  The patient's wife notes, following surgery the patient was placed in a Trendelenburg position for boosting in the bed, and she notes the symptoms abruptly started following this.    I evaluate the patient with his wife at the bedside in the patient's  room.  On first arrival, the patient appears well he is in no distress, appears comfortable with no pain.  He denies any dyspnea, headache, vision changes, nausea/vomiting, chest pain, heaviness, palpitations, fever/chills.    Review of Systems   The 10 point Review of Systems is negative other than noted in the HPI or here.     Past Medical History    I have reviewed this patient's medical history and updated it with pertinent information if needed.   Past Medical History:   Diagnosis Date     Asthma      Claustrophobia      Hypercholesteremia      Hypertension      Mediastinal adenopathy      Obesity      BEULAH (obstructive sleep apnea)      Prostate cancer (H)      Sarcoidosis        Past Surgical History   I have reviewed this patient's surgical history and updated it with pertinent information if needed.  Past Surgical History:   Procedure Laterality Date     APPENDECTOMY       C TOTAL HIP ARTHROPLASTY Bilateral      C TOTAL KNEE ARTHROPLASTY Bilateral      DAVINCI PROSTATECTOMY, LYMPHADENECTOMY N/A 5/23/2019    Procedure: ROBOTIC ASSISTED LAPAROSCOPIC RADICAL PROSTATECTOMY WITH BILATERAL PELVIC LYMPH NODE DISSECTION;  Surgeon: Matteo Coughlin MD;  Location: [facility] OR     ENDOBRONCHIAL ULTRASOUND FLEXIBLE N/A 3/29/2019    Procedure: Flexible Bronchoscopy, Endobronchial Ultrasound;  Surgeon: Curtis Leger MD;  Location: [facility] OR     VASECTOMY         Social History   I have reviewed this patient's social history and updated it with pertinent information if needed.  Social History     Tobacco Use     Smoking status: Never Smoker     Smokeless tobacco: Never Used   Substance Use Topics     Alcohol use: Yes     Comment: twice a week     Drug use: No       Family History   I have reviewed this patient's family history and updated it with pertinent information if needed.   Family History   Problem Relation Age of Onset     Alzheimer Disease Mother      Heart Disease Father        Medications   I have reviewed  this patient's current medications    Allergies   Allergies   Allergen Reactions     Cat Hair Extract Itching     itchy tearful eyes     Adhesive Tape Rash     Iodine Rash       Physical Exam   Vital Signs: Temp: 98.2  F (36.8  C) Temp src: Axillary BP: 140/65 Pulse: 92 Heart Rate: 97 Resp: 16 SpO2: 91 % O2 Device: None (Room air) Oxygen Delivery: 1.5 LPM  Weight: 0 lbs 0 oz    Constitutional: awake, alert, cooperative, no apparent distress, and appears stated age  Eyes: lids and lashes normal, pupils equal, round and reactive to light, extra-ocular muscles intact, sclera clear, vision intact, visual fields intact to confrontation bilaterally, and nystagmus present in right eye when looking to the right  Respiratory: no increased work of breathing, good air exchange and clear to auscultation, no crackles or wheezing  Cardiovascular: regular rate and rhythm with ectopic beats, no murmur noted and no edema  GI: firm and tense, distended, tenderness noted diffusely and lap sites are well approximated, no drainage or discharge, no signs of infection  Skin: normal skin color, texture, turgor with exception of abdominal sites   Neurologic: Mental Status Exam:  Level of Alertness:   awake  Orientation:   person, place, time  Memory:   normal  Cranial Nerves:  II: Visual acuity:  normal  II: Visual fields:  normal  III: Pupils:  equal, round, reactive to light  III,IV,VI: Extra Ocular Movements: intact  V: Facial sensation:  intact  VII: Facial strength: intact  VIII: Hearing:  Non intact  XI: Shoulder shrug:  intact  Motor Exam:  Motor exam is symmetrical 5 out of 5 all extremities bilaterally  Neuropsychiatric: General: normal, calm and normal eye contact  Level of consciousness: alert / normal  Affect: normal    Data   I personally reviewed the EKG tracing showing SR with freq PVCs, RBBB and qs in V1.  Results for orders placed or performed during the hospital encounter of 05/23/19 (from the past 24 hour(s))   CBC with  platelets   Result Value Ref Range    WBC 17.0 (H) 4.0 - 11.0 10e9/L    RBC Count 5.38 4.4 - 5.9 10e12/L    Hemoglobin 15.9 13.3 - 17.7 g/dL    Hematocrit 47.6 40.0 - 53.0 %    MCV 89 78 - 100 fl    MCH 29.6 26.5 - 33.0 pg    MCHC 33.4 31.5 - 36.5 g/dL    RDW 14.2 10.0 - 15.0 %    Platelet Count 158 150 - 450 10e9/L   Basic metabolic panel   Result Value Ref Range    Sodium 140 133 - 144 mmol/L    Potassium 3.9 3.4 - 5.3 mmol/L    Chloride 106 94 - 109 mmol/L    Carbon Dioxide 27 20 - 32 mmol/L    Anion Gap 7 3 - 14 mmol/L    Glucose 140 (H) 70 - 99 mg/dL    Urea Nitrogen 11 7 - 30 mg/dL    Creatinine 0.90 0.66 - 1.25 mg/dL    GFR Estimate 90 >60 mL/min/[1.73_m2]    GFR Estimate If Black >90 >60 mL/min/[1.73_m2]    Calcium 8.5 8.5 - 10.1 mg/dL   Basic metabolic panel   Result Value Ref Range    Sodium 141 133 - 144 mmol/L    Potassium 4.2 3.4 - 5.3 mmol/L    Chloride 109 94 - 109 mmol/L    Carbon Dioxide 31 20 - 32 mmol/L    Anion Gap 1 (L) 3 - 14 mmol/L    Glucose 113 (H) 70 - 99 mg/dL    Urea Nitrogen 12 7 - 30 mg/dL    Creatinine 0.86 0.66 - 1.25 mg/dL    GFR Estimate >90 >60 mL/min/[1.73_m2]    GFR Estimate If Black >90 >60 mL/min/[1.73_m2]    Calcium 8.1 (L) 8.5 - 10.1 mg/dL   CBC with platelets   Result Value Ref Range    WBC 13.4 (H) 4.0 - 11.0 10e9/L    RBC Count 4.93 4.4 - 5.9 10e12/L    Hemoglobin 14.6 13.3 - 17.7 g/dL    Hematocrit 44.0 40.0 - 53.0 %    MCV 89 78 - 100 fl    MCH 29.6 26.5 - 33.0 pg    MCHC 33.2 31.5 - 36.5 g/dL    RDW 14.2 10.0 - 15.0 %    Platelet Count 139 (L) 150 - 450 10e9/L   Phosphorus   Result Value Ref Range    Phosphorus 3.9 2.5 - 4.5 mg/dL   TSH with free T4 reflex   Result Value Ref Range    TSH 0.46 0.40 - 4.00 mU/L   Creatinine fluid   Result Value Ref Range    Creatinine Fluid Source Aryan Noriega drainage     Creatinine Fluid 0.8 mg/dL   EKG 12-lead, tracing only   Result Value Ref Range    Interpretation ECG Click View Image link to view waveform and result

## 2019-05-24 NOTE — PLAN OF CARE
Pt with sig vetigo, unable to lift body off bed at all without sig symptoms. MD aware, PT to see. Can try meclizine after PT.  IVF bolus given without improvements. VSS.  Pt with mild abd pain, tordol and tylenol effective.  Antonio full liq, can advance. HypoBS, enc movement of LE and IS as unable to get OOB. Wife at bedside.

## 2019-05-24 NOTE — PROGRESS NOTES
Pt arrived on unit @1800.  VSS on 2L NC. Denies pain.  Orders for NPO w/ sips of clears until notified other.  Link with adequate output, bowel sounds faint.  Has not been out of bed yet.  PIV infusing NS@100.  L CHEMA with sm amt of bloody output.  Continue to monitor, possible discharge tomorrow.

## 2019-05-24 NOTE — PROGRESS NOTES
"   05/24/19 1445   Quick Adds   Quick Adds Edema Eval   Living Environment   Lives With spouse   Living Arrangements   ([facility])   Home Accessibility not wheelchair accessible;stairs to enter home   Number of Stairs, Main Entrance 2  (no rail)   Stair Railings, Main Entrance none   Transportation Anticipated car, drives self;family or friend will provide   Living Environment Comment Patient is active watching [employer].    Self-Care   Usual Activity Tolerance good   Current Activity Tolerance good   Functional Level Prior   Ambulation 0-->independent   Transferring 0-->independent   Toileting 0-->independent   Bathing 0-->independent   Communication 0-->understands/communicates without difficulty   Swallowing 0-->swallows foods/liquids without difficulty   Cognition 0 - no cognition issues reported   Fall history within last six months no   Which of the above functional risks had a recent onset or change? none   General Information   Onset of Illness/Injury or Date of Surgery - Date 05/23/19   Referring Physician Deidra Samson   Patient/Family Goals Statement Didn't state.    Pertinent History of Current Problem (include personal factors and/or comorbidities that impact the POC) After surgery about 9pm when they tried to move him, patient noticed \"the whole room was spinning.\" Reports ringing in his ears chronically. Denies nausea and vomiting.    Precautions/Limitations   (Has several small abdominal incision. )   General Info Comments Recently diagnosed with asthma.    Cognitive Status Examination   Orientation orientation to person, place and time   Level of Consciousness alert   Follows Commands and Answers Questions 100% of the time   Personal Safety and Judgment intact   Memory intact   Pain Assessment   Patient Currently in Pain No   Range of Motion (ROM)   ROM Quick Adds No deficits were identified   Bed Mobility   Bed Mobility Comments Unable to get to sitting even with assist of 2 due to " vertiginous symptoms.    Oculomotor Exam   Smooth Pursuit Normal   Saccades Normal   Infrared Goggle Exam or Frenzel Lense Exam   Exam completed with Infrared Goggles   Spontaneous Nystagmus Negative   Gaze Evoked Nystagmus Negative   Positional Testing Comments Attempted times 2 to get to seated position but couldn't tolerate. Attempted to extend head over pillow to attempt testing but pateint unable to tolerate so unable to complete positional testing.    General Therapy Interventions   Planned Therapy Interventions bed mobility training;gait training;transfer training;other (see comments)  (Further evaluation and treatment of vestibular symptoms. )   Clinical Impression   Criteria for Skilled Therapeutic Intervention yes, treatment indicated   PT Diagnosis Strong vertigo with changing from supine to sitting position.    Influenced by the following impairments Vertigo   Functional limitations due to impairments Unable to tolerate sitting position or lying flat due to vertigo   Clinical Presentation Evolving/Changing   Clinical Presentation Rationale Testing limited by strong symptoms. Needing to modify testing.    Clinical Decision Making (Complexity) Moderate complexity   Therapy Frequency` daily   Predicted Duration of Therapy Intervention (days/wks) 2-3 days   Anticipated Equipment Needs at Discharge   (Unsure. )   Anticipated Discharge Disposition   (Dependent upon resolution of vertigo. )   Risk & Benefits of therapy have been explained Yes   Patient, Family & other staff in agreement with plan of care Yes   Total Evaluation Time   Total Evaluation Time (Minutes) 15

## 2019-05-24 NOTE — PROVIDER NOTIFICATION
MD Notification    Notified Person: lisa meeks    Notified Person Name:    Notification Date/Time: 5/24/2019 10:50 AM      Notification Interaction:    Purpose of Notification: extreme dizziness with any movement off of bed, even slighty dizziness with HOB elevated or lowered    Orders Received: Will consider hospitalist and neuro consult, TBD    Comments:

## 2019-05-24 NOTE — PLAN OF CARE
A & O x 4, VSS, weaned to RA.  C/o some pain, gave scheduled toradol. Per order, NPO w/sips and tolerating. Has not been able to get out of bed yet 2nd to being too dizzy, have made 2 attempts. Myers patent, BS hypoactive and no flatus yet. Lap sites w/liquid bandage, no drainage; myers patent w/yellow UOP; CHEMA dressing w/shadow drainage, bright red output. IVF infusing at 100 mL/hr. Discharge when medically stable with myers.

## 2019-05-25 ENCOUNTER — APPOINTMENT (OUTPATIENT)
Dept: PHYSICAL THERAPY | Facility: CLINIC | Age: 64
End: 2019-05-25
Attending: UROLOGY
Payer: COMMERCIAL

## 2019-05-25 VITALS
DIASTOLIC BLOOD PRESSURE: 80 MMHG | TEMPERATURE: 97.9 F | HEART RATE: 81 BPM | RESPIRATION RATE: 16 BRPM | OXYGEN SATURATION: 92 % | SYSTOLIC BLOOD PRESSURE: 142 MMHG

## 2019-05-25 LAB
MYCOBACTERIUM SPEC CULT: NORMAL
MYCOBACTERIUM SPEC CULT: NORMAL
SPECIMEN SOURCE: NORMAL

## 2019-05-25 PROCEDURE — 99207 ZZC CDG-CODE CATEGORY CHANGED: CPT | Performed by: HOSPITALIST

## 2019-05-25 PROCEDURE — 97112 NEUROMUSCULAR REEDUCATION: CPT | Mod: GP | Performed by: PHYSICAL THERAPIST

## 2019-05-25 PROCEDURE — 99214 OFFICE O/P EST MOD 30 MIN: CPT | Performed by: HOSPITALIST

## 2019-05-25 PROCEDURE — 25000128 H RX IP 250 OP 636: Performed by: UROLOGY

## 2019-05-25 PROCEDURE — 25000132 ZZH RX MED GY IP 250 OP 250 PS 637: Performed by: NURSE PRACTITIONER

## 2019-05-25 PROCEDURE — 25000132 ZZH RX MED GY IP 250 OP 250 PS 637: Performed by: UROLOGY

## 2019-05-25 PROCEDURE — 97530 THERAPEUTIC ACTIVITIES: CPT | Mod: GP | Performed by: PHYSICAL THERAPIST

## 2019-05-25 RX ORDER — TRAMADOL HYDROCHLORIDE 50 MG/1
50 TABLET ORAL EVERY 6 HOURS PRN
Qty: 10 TABLET | Refills: 0 | Status: SHIPPED | OUTPATIENT
Start: 2019-05-25 | End: 2019-09-11

## 2019-05-25 RX ORDER — KETOROLAC TROMETHAMINE 15 MG/ML
15 INJECTION, SOLUTION INTRAMUSCULAR; INTRAVENOUS EVERY 6 HOURS PRN
Status: DISCONTINUED | OUTPATIENT
Start: 2019-05-25 | End: 2019-05-25 | Stop reason: HOSPADM

## 2019-05-25 RX ORDER — MECLIZINE HCL 12.5 MG 12.5 MG/1
12.5 TABLET ORAL 3 TIMES DAILY PRN
Qty: 21 TABLET | Refills: 0 | Status: SHIPPED | OUTPATIENT
Start: 2019-05-25 | End: 2019-09-11

## 2019-05-25 RX ADMIN — CETIRIZINE HYDROCHLORIDE 10 MG: 10 TABLET, FILM COATED ORAL at 08:37

## 2019-05-25 RX ADMIN — ONDANSETRON 4 MG: 2 INJECTION INTRAMUSCULAR; INTRAVENOUS at 09:03

## 2019-05-25 RX ADMIN — HYDROCHLOROTHIAZIDE 12.5 MG: 12.5 TABLET ORAL at 08:37

## 2019-05-25 RX ADMIN — MECLIZINE 12.5 MG: 12.5 TABLET ORAL at 03:34

## 2019-05-25 RX ADMIN — HEPARIN SODIUM 5000 UNITS: 5000 INJECTION, SOLUTION INTRAVENOUS; SUBCUTANEOUS at 08:36

## 2019-05-25 RX ADMIN — FLUTICASONE FUROATE AND VILANTEROL TRIFENATATE 1 PUFF: 200; 25 POWDER RESPIRATORY (INHALATION) at 08:44

## 2019-05-25 RX ADMIN — ATORVASTATIN CALCIUM 40 MG: 40 TABLET, FILM COATED ORAL at 08:37

## 2019-05-25 RX ADMIN — MECLIZINE 12.5 MG: 12.5 TABLET ORAL at 11:46

## 2019-05-25 RX ADMIN — ACETAMINOPHEN 650 MG: 325 TABLET, FILM COATED ORAL at 06:30

## 2019-05-25 NOTE — PROGRESS NOTES
[facility]    Medicine Progress Note - Hospitalist Service       Date of Admission:  5/23/2019  Assessment & Plan   Derek Contreras is a 63 year old male admitted on 5/23/2019. He is postop day 2 from robotic assisted laparoscopic radical prostatectomy with bilateral pelvic lymph node dissection.  PMH includes prostate CA, vertigo, hypertension, hyperlipidemia, sarcoidosis. Hospitalist service was consulted for medical management related to postop vertigo symptoms.     Prostate cancer S/P laparoscopic radical prostatectomy with bilateral pelvic lymph node dissection, bilateral wide excision procedure  Procedure completed by Dr. Coughlin, under general anesthesia.  No surgical complications noted.   cc. Hgb stable>14, BMP stable.   - Defer management to primary - Urology     Post operative Dizziness 2/2 suspected vertigo  Hx of Vertigo, tinnitus  Vitals stable since surgery, BP ~140s/60s, HR 80-90s, sats ~95% on RA. Volume status, net positive +2L since admission.  On exam, the patient has no neurological focal deficits, pupils are reactive, round and brisk.  Nystagmus present when looking to the right on eye exam, tinnitus present chronically.  Dizziness is provoked only by position change and is completely resolved when still.  Patient's wife, states after surgery patient was placed in Trendelenburg position to be boosted in bed, and his symptoms are probably started following this. The patient has a history of vertigo and has had a successful Epley maneuver.    DDx: likely vertigo, considered arrhythmia, metabolic derangements, medication side effects (anesthesia, but no narcotics post op), infectious process (WBC mildly elevated, but trending down, suspected stress response after surgery, no fever/chills, or hypotension)  - orthostatics unable to be performed due to profound dizziness, surgery provided 500 cc NS bolus  - telemetry/EKG- sinus rhythm with frequent PVCs, RBBB, Q waves  V1  - Phosphorus, TSH - normal  - PT consulted for Epley maneuver, meclizine 3 times daily as needed;   --> 5/25 per notes on 5/24, PT returning to re-attempt Epley today 5/25. If dizziness alleviated, will defer discharge plans to primary service (Urology)     Hypertension  Hyperlipidemia   [PTA regimen hydrochlorothiazide, Lipitor, ASA]  Typically well controlled on PTA regimen, BP regimen has been restarted. BP ~140s/60s, denies symptoms referable to his CP.  - continue HCTZ     Asthma, BEULAH   Sarcoidosis, 3/2018  Had PFTs done showing mixed obstructive and restructure defect prompting evaluation by [facility] due to mediastinal lymph node enlargement and pulmonary nodules. Sarcoidosis was then diagnosed via flex bronch, endobronchial ultrasound and transbronchial biopsy with Dr. Leger in March 2019. No other systemic symptoms of sarcoidis  - PTA Breo Ellipta    Diet: Regular Diet Adult    DVT Prophylaxis: Defer to primary service  Link Catheter: in place, indication: /GI/GYN Pelvic Procedure  Code Status: defer to primary service    Disposition Plan   Expected discharge: Defer to primary service.  Entered: Cody Arteaga MD 05/25/2019, 9:02 AM       The patient's care was discussed with the Bedside Nurse, Patient and Patient's Family.    Cody Arteaga MD  Hospitalist Service  [facility]    ______________________________________________________________________    Interval History   No new complaints overnight or this morning. Eager to discharge but has not really sat up or gotten up - awaiting PT Epley maneuver / vertigo management.     Data reviewed today: I reviewed all medications, new labs and imaging results over the last 24 hours. I personally reviewed no images or EKG's today.    Physical Exam   Vital Signs: Temp: 97.9  F (36.6  C) Temp src: Oral BP: 142/80 Pulse: 81 Heart Rate: 98 Resp: 16 SpO2: 92 % O2 Device: None (Room air)    Weight: 0 lbs 0 oz    Gen: NAD,  pleasant  HEENT: Normocephalic, EOMI, MMM  Resp: no crackles,  no wheezes, no increased work of resp  CV: S1S2 heard, reg rhythm, reg rate, no pedal edema  Abdo: soft, nontender, nondistended, bowel sounds present  Ext: calves nontender, well perfused  Neuro: AAOx3, CN grossly intact, no facial asymmetry      Data   Recent Labs   Lab 05/24/19  0534 05/23/19  1751 05/23/19  1048   WBC 13.4* 17.0*  --    HGB 14.6 15.9  --    MCV 89 89  --    * 158  --     140 137   POTASSIUM 4.2 3.9 3.7   CHLORIDE 109 106 103   CO2 31 27 26   BUN 12 11 10   CR 0.86 0.90 0.92   ANIONGAP 1* 7  --    ANTONIO 8.1* 8.5 8.9   * 140* 103*     No results found for this or any previous visit (from the past 24 hour(s)).

## 2019-05-25 NOTE — PLAN OF CARE
A & O x 4, VSS, tele: SR w/BBB and PVC's.  Neuros and CMS intact. No c/o pain. Still not OOB due to vertigo, PT working with patient. Gave meclizine at 0330 5/25 so patient can get another dose when advised by PT in AM. Tolerating regular diet. BS hypoactive, no gas yet. Link patent. Lap sites w/liquid bandage, no drainage. CHEMA dressing w/shadow drainage marked but not extended; output serosanguinous. Discharge pending.    0625 Addendum: pt now passing gas. C/o pain, gve prn tylenol.

## 2019-05-25 NOTE — PLAN OF CARE
Discharge Planner PT   Patient plan for discharge: Home  Current status: Pt with improved tolerance of supine>sit with long sit pivot technique (to avoid rolling). Prior to treatment unable to tolerate up to standing EOB. Pt tolerated Isaac Hallpike assessment this date; declined infrared goggles but symptoms most consistent with R BPPV as symptoms have latent onset and fatiguable. Pt tolerated Epley maneuver x1 with good resolution of symptoms (~75% improvement). Pt able to transfer and ambulate 100' with SBA, tolerated turning. Denied symptoms throughout all mobility except with return to bed, HOB at 60 deg.     Educated pt on precaution of not lying flat for 24hrs and having HOB elevated at least 30 deg at home/sleep in recliner for 1-2 nights. Educated on OP Vestibular PT for continued assessment/treatment. Pt/spouse feel if pt continues to feel this way into afternoon, feel safe returning home as spouse can provide 24hr assist and have children nearby to assist as well. Encouraged RN to attempt mobility 1 more time prior to potential disch; RN in agreement and in to give meclizine upon PT exit.  Barriers to return to prior living situation: supervision for mobility, mild symptoms persist  Recommendations for discharge: Home with 24hr assist, OP Vestibular PT  Rationale for recommendations: If pt continues to mobilize with just supervision, anticipate pt would be safe to return home with spouse and follow-up with OP Vestibular PT; handout/resources given. Pt/spouse in agreement with plan.       Entered by: Rafaela Jang 05/25/2019 12:00 PM

## 2019-05-25 NOTE — PLAN OF CARE
A/O x4. Neuros intact. Did not get out of bed this shift due to vertigo. Meclizine given x2. Lap sites WNL. Scant shadowing on CHEMA dressing- output blood/bright red. Link patent with adequate output- dre. Mild swelling noted in B hand- IV saline locked. Tele SR w/ frequent PVCs- house NP notified.

## 2019-05-25 NOTE — PROGRESS NOTES
/80 (BP Location: Right arm)   Pulse 81   Temp 97.9  F (36.6  C) (Oral)   Resp 16   SpO2 92%      The patient has clear urine, is getting some significant pain but now well controlled with Toradol.  Patient is troubled by vertigo, likely benign positional vertigo which he has had before.  The patient will be staying here today until this issue is resolved.  Will be followed by the hospital service for this.

## 2019-05-25 NOTE — PLAN OF CARE
Nursing note  Pt a/o x 4, up w/assist of 1/gb/walker in the room/hallway. Pt denies any pain, no c/o CP or headache. Pt c/o nausea and had emesis about 60 ml yellowish color this morning, prn Zofran given once. Pt c/o KIRBY with activities pt reported that was his baseline, that he has pulmonary issues. Pt denies any dizziness or lightheadedness, meclizine was given once after Epley maneuver was done per PT directions. C/o generalized weakness/fatigue. PT working with pt. VSS./80 (BP Location: Right arm)   Pulse 81   Temp 97.9  F (36.6  C) (Oral)   Resp 16   SpO2 92% patient was discharged home with wife and son. Pt and his wife watched home myers catheter care video, the writer demonstrated(how to change from drainage bag to leg bag, and leg bag back to drainage bag) it to pt and his wife. Both verbalized understanding. The writer gave pt all the supplies after teaching including a new drainage bag. All the necessary informations was also given to pt including prescriptions, pt verbalized understanding and signed. Pt was wheeled down by the floor aide.Will continue to monitor.

## 2019-05-25 NOTE — DISCHARGE INSTRUCTIONS
.Discharge Instructions following   Radical Prostatectomy/DaVinci Prostatectomy  [facility]  Diet:    Return to the diet you were on before surgery, unless you were given specific diet instructions.    It is important to drink 6-8 glasses of fluids per day at home.  At least 3-4 glasses should be water.  Activities:    Walk short distances and increase as your strength allows.    You may climb stairs.    Do not do strenuous exercise or heavy lifting until approved by the surgeon.    Do not drive until approved by your doctor.  Bathing/Incision Care    You may take a shower.  Pat your incisions dry.    If you have small strips of white tape (steri-strips) over your incision, they will fall off on their own in 7-10 days.    If staples are present, they will be removed in the office in one week.  What to expect after surgery:    You will be going home with a urinary catheter.  This catheter will be left in for about 2-3 weeks.  Refer to the  Catheter Care at Home  discharge instructions.  Be aware that: you may have bladder spasms because of the catheter, there may be some leaking or urine or blood around the catheter, and your urine may be slightly bloody.    Clean tip of penis/catheter twice daily as instructed.  Call your surgeon if you have these signs or symptoms:    Pain in your incision that is not relieved by a short rest or ordered pain medications.    Chills or temperature of 101 oF or greater.    Redness or swelling in your incision.    No urine in catheter bag or excessively bloody urine in your catheter.    Foul smelling green/yellow drainage from incisions.     Any questions or concerns.

## 2019-05-28 LAB
COPATH REPORT: NORMAL
INTERPRETATION ECG - MUSE: NORMAL

## 2019-05-29 ENCOUNTER — HOSPITAL ENCOUNTER (OUTPATIENT)
Dept: PHYSICAL THERAPY | Facility: CLINIC | Age: 64
End: 2019-05-29
Attending: HOSPITALIST
Payer: COMMERCIAL

## 2019-05-29 DIAGNOSIS — C61 PROSTATE CANCER (H): ICD-10-CM

## 2019-05-29 DIAGNOSIS — H81.11 BENIGN PAROXYSMAL POSITIONAL VERTIGO, RIGHT: Primary | ICD-10-CM

## 2019-05-29 DIAGNOSIS — R26.89 BALANCE PROBLEMS: ICD-10-CM

## 2019-05-29 PROCEDURE — 95992 CANALITH REPOSITIONING PROC: CPT | Mod: GP | Performed by: PHYSICAL THERAPIST

## 2019-05-29 PROCEDURE — 97162 PT EVAL MOD COMPLEX 30 MIN: CPT | Mod: GP | Performed by: PHYSICAL THERAPIST

## 2019-05-29 NOTE — PROGRESS NOTES
05/29/19 1030   Quick Adds   Quick Adds Vestibular Eval   Type of Visit Initial OP PT Evaluation   General Information   Start of Care Date 05/29/19   Referring Physician Cody Arteaga MD   Orders Evaluate and Treat as Indicated   Order Date 05/25/19   Medical Diagnosis Vertigo/BPPV; Prostate cancer   Onset of illness/injury or Date of Surgery 05/23/19   Surgical/Medical history reviewed Yes   Pertinent history of current problem (include personal factors and/or comorbidities that impact the POC) Patient is a 63 y.o. male referred to outpatient PT to address vertigo/BPPV.  Patient reported onset of vertigo following robotic assisted laparoscopic radical prostatectomy with bilateral pelvic lymph node dissection; patient was unable to tolerate sitting up.  PMH includes prostate CA, vertigo, hypertension, hyperlipidemia, sarcoidosis. Patient reporting history of vertigo x 2 in the past, denies changes in hearing or vision, and reports ringing in his ears chronically.  Patient   describes the dizziness as room spinning, it occurs with positional changes, and lasts < 1 minute.  Patient has been taking meclizine (last dose yesterday evening) and has been using a walker per recommendation of inpatient PT.  Patient participated in epley x 1 (right side) during hospital stay.  Patient reporting a slight improvement with dizziness since hospital discharge and has not had any episodes since yesterday evening.   Prior level of function comment active and independent no issues/concerns with mobility; ambulated without an assistive device   Current Community Support Family/friend caregiver  (wife)   Patient role/Employment history Retired  (mental health therapist)   Living environment House/[facility]   Home/Community Accessibility Comments 2 steps to enter from garage, once in home everything on one level   Current Assistive Devices Front Wheeled Walker   ADL Devices   (None)   Patient/Family Goals Statement resolve  dizziness; walk without walker   Fall Risk Screen   Fall screen completed by PT   Have you fallen 2 or more times in the past year? No   Have you fallen and had an injury in the past year? No   Is patient a fall risk? Yes   Fall screen comments elevated risk for falls when symptomatic   System Outcome Measures   Dizziness Handicap Inventory (score out of 100) A decrease in score by 17.18 or greater indicates a clinically significant change in symptoms. 62  (did not fillout 4 questions F3, F14, F17, E18)   Pain   Patient currently in pain No   Cognitive Status Examination   Orientation orientation to person, place and time   Level of Consciousness alert   Follows Commands and Answers Questions 100% of the time   Personal Safety and Judgment intact   Memory intact   Integumentary   Integumentary No deficits were identified   Posture   Posture Forward head position;Protracted shoulders   Range of Motion (ROM)   ROM Comment Not formally assessed, WFL for tasks performed   Strength   Strength Comments Not formally assessed, WFL for tasks performed   Bed Mobility   Bed Mobility Comments Reports independence   Transfer Skills   Transfer Comments Modified independence with use of bilateral upper extremities   Gait   Gait Comments Modified independence with use of front wheeled walker   Balance   Balance Comments Not formally assessed, no significant deficits observed with performance of functional mobility tasks with use of front wheeled walker.   Cervicogenic Screen   Neck ROM Active bilateral rotation and flexion WFL and symmetrical, some limitations noted with extension   Vertebral Artery Test Normal   Oculomotor Exam   Smooth Pursuit Normal   Saccades Normal   VOR Normal   Rapid Head Thrust Normal   Infrared Goggle Exam or Frenzel Lense Exam   Vestibular Suppressant in Last 24 Hours? Yes   Exam completed with Infrared Goggles   Spontaneous Nystagmus Negative   Gaze Evoked Nystagmus Negative   Everson-Hallpike (right) Other    Isaac-Hallpike (right) comments Patient very symptomatic once placed in position; difficulty observing eyes even with goggles on as patient had difficulty keeping eyes opened and when first placed in position attempted to get out of position.  Symptoms lasting < 10-15 seconds.   Isaac-Hallpike (Left) Negative   BPPV Canal(s)   (right BPPV; see below comments)   BPPV Type Canalithasis   Planned Therapy Interventions   Planned Therapy Interventions neuromuscular re-education;balance training;other (see comments)  (vestibular rehab)   Clinical Impression   Criteria for Skilled Therapeutic Interventions Met yes, treatment indicated   PT Diagnosis Right BPPV   Influenced by the following impairments Vertigo and/or dizziness with positional changes, deficits with balance when symptomatic    Functional limitations due to impairments Decreased safety with functional mobility and risk for falls when symptomatic; decreased tolerance for bed mobility   Clinical Presentation Evolving/Changing   Clinical Presentation Rationale Based on current presentation, PMH, and social support.   Clinical Decision Making (Complexity) Moderate complexity   Therapy Frequency 1 time/week   Predicted Duration of Therapy Intervention (days/wks) 30 days   Risk & Benefits of therapy have been explained Yes   Patient, Family & other staff in agreement with plan of care Yes   Clinical Impression Comments Patient presenting with signs and symptoms consistent with right BPPV; difficulty determining anterior vs posterior because patient having difficulty keeping eyes open and patient has been taking meclizine (had to switch rooms, therefore, could not use goggles after first isaac hallpike and epley).   Education Assessment   Preferred Learning Style Listening;Demonstration;Pictures/video   Barriers to Learning No barriers   GOALS   PT Eval Goals 1;2   Goal 1   Goal Identifier DHI   Goal Description The patient will score <6/100 on the DHI to demonstrate a  significant improvement in dizziness symptoms severity.   Target Date 06/27/19   Goal 2   Goal Identifier Positional testing   Goal Description Patient will be able to complete bilateral castrejon pike and roll tests without observable nystagmus or complaints of dizziness to demonstrate resolution of BPPV.   Target Date 06/27/19   Goal 3   Goal Identifier Mobility   Goal Description Patient will report resolution of dizziness/vertigo symptoms in order return to ambulating without an assistive device.   Target Date 06/27/19   Total Evaluation Time   PT Xochilt, Moderate Complexity Minutes ([id number]) 20

## 2019-05-30 ENCOUNTER — DOCUMENTATION ONLY (OUTPATIENT)
Dept: OTHER | Facility: CLINIC | Age: 64
End: 2019-05-30

## 2019-06-04 ENCOUNTER — HOSPITAL ENCOUNTER (OUTPATIENT)
Dept: PHYSICAL THERAPY | Facility: CLINIC | Age: 64
End: 2019-06-04
Payer: COMMERCIAL

## 2019-06-04 DIAGNOSIS — C61 PROSTATE CANCER (H): ICD-10-CM

## 2019-06-04 DIAGNOSIS — H81.11 BENIGN PAROXYSMAL POSITIONAL VERTIGO, RIGHT: ICD-10-CM

## 2019-06-04 DIAGNOSIS — R26.89 BALANCE PROBLEMS: Primary | ICD-10-CM

## 2019-06-04 PROCEDURE — 97112 NEUROMUSCULAR REEDUCATION: CPT | Mod: GP | Performed by: PHYSICAL THERAPIST

## 2019-06-05 ENCOUNTER — OFFICE VISIT (OUTPATIENT)
Dept: UROLOGY | Facility: CLINIC | Age: 64
End: 2019-06-05
Payer: COMMERCIAL

## 2019-06-05 DIAGNOSIS — C61 PROSTATE CANCER (H): Primary | ICD-10-CM

## 2019-06-05 PROCEDURE — 99211 OFF/OP EST MAY X REQ PHY/QHP: CPT

## 2019-06-05 NOTE — NURSING NOTE
Derek Contreras comes into clinic today at the request of Dr Coughlin  Ordering Provider for myers catheter removal .          This service provided today was under the supervising provider of the day Dr Coughlin, who was available if needed.      Derek was to see Isabelle MCDERMOTTtoday for removal of myers s/p prostatectomy. Urine is clear in drainage bag . Ok to remove  Per Dr Coughlin . Balloon deflated with 13 ml  And myers remove d with ease. Incontinent pad given to patient. H arnulfoill  F/u with Dr Coughlin in  6 weeks with a  PSA test.  Rani Corona LPN

## 2019-06-10 ENCOUNTER — HOSPITAL ENCOUNTER (OUTPATIENT)
Dept: PHYSICAL THERAPY | Facility: CLINIC | Age: 64
End: 2019-06-10
Payer: COMMERCIAL

## 2019-06-10 DIAGNOSIS — C61 PROSTATE CANCER (H): ICD-10-CM

## 2019-06-10 DIAGNOSIS — H81.11 BENIGN PAROXYSMAL POSITIONAL VERTIGO, RIGHT: ICD-10-CM

## 2019-06-10 DIAGNOSIS — R26.89 BALANCE PROBLEMS: Primary | ICD-10-CM

## 2019-06-10 PROCEDURE — 97110 THERAPEUTIC EXERCISES: CPT | Mod: GP | Performed by: PHYSICAL THERAPIST

## 2019-06-10 PROCEDURE — 97112 NEUROMUSCULAR REEDUCATION: CPT | Mod: GP | Performed by: PHYSICAL THERAPIST

## 2019-06-28 ENCOUNTER — HOSPITAL ENCOUNTER (OUTPATIENT)
Dept: PHYSICAL THERAPY | Facility: CLINIC | Age: 64
End: 2019-06-28
Payer: COMMERCIAL

## 2019-06-28 DIAGNOSIS — R26.89 BALANCE PROBLEMS: Primary | ICD-10-CM

## 2019-06-28 DIAGNOSIS — C61 PROSTATE CANCER (H): ICD-10-CM

## 2019-06-28 DIAGNOSIS — H81.11 BENIGN PAROXYSMAL POSITIONAL VERTIGO, RIGHT: ICD-10-CM

## 2019-06-28 PROCEDURE — 97112 NEUROMUSCULAR REEDUCATION: CPT | Mod: GP | Performed by: PHYSICAL THERAPIST

## 2019-06-28 NOTE — PROGRESS NOTES
Outpatient Physical Therapy Discharge Note     Patient: Derek Contreras  : 1955    Beginning/End Dates of Reporting Period:  2019 to 2019; total of 4 visits    Referring Provider: Cody Arteaga MD    Therapy Diagnosis: Right BBPV     Client Self Report: Patient arriving today and states that balance is better.  Patient reporting continued difficulty with toe to heel exercise at home, but it is getting better.  Patiet reports the he is walking more; will bring walking stick when walking longer distances but doesn't really use it.  Patient reports that he is back to prior level with even a little bit of improvement with regards to balance and activity.    Objective Measurements:  Objective Measure: FGA  Details: (on )    Outcome Measures (most recent score): 0/100 on 2019     Goals:  Goal Identifier MET: DHI   Goal Description The patient will score <6/100 on the DHI to demonstrate a significant improvement in dizziness symptoms severity.   Target Date 19   Date Met  19   Progress:     Goal Identifier MET: Positional testing   Goal Description Patient will be able to complete bilateral castrejon pike and roll tests without observable nystagmus or complaints of dizziness to demonstrate resolution of BPPV.   Target Date 19   Date Met  19   Progress:     Goal Identifier MET: Mobility   Goal Description Patient will report resolution of dizziness/vertigo symptoms in order return to ambulating without an assistive device.   Target Date 19   Date Met  19   Progress:     Progress Toward Goals: Patient has met all of the above goals and is ready to discharge to independent [facility] addressing balance and general conditioning.  FGA score today indicating no significant risk for falls.    Plan:  Discharge from therapy.    Discharge:    Reason for Discharge: Patient has met all goals.    Equipment Issued: N/A    Discharge Plan: Patient to continue home  program.  Patient to obtain a new order if he wants to address high level balance.

## 2019-07-08 PROBLEM — C61 PROSTATE CANCER (H): Status: ACTIVE | Noted: 2019-02-21

## 2019-07-11 DIAGNOSIS — C61 PROSTATE CANCER (H): ICD-10-CM

## 2019-07-11 PROCEDURE — 84153 ASSAY OF PSA TOTAL: CPT | Performed by: UROLOGY

## 2019-07-11 PROCEDURE — 36415 COLL VENOUS BLD VENIPUNCTURE: CPT | Performed by: UROLOGY

## 2019-07-12 LAB — PSA SERPL-MCNC: <0.01 UG/L (ref 0–4)

## 2019-07-15 ENCOUNTER — OFFICE VISIT (OUTPATIENT)
Dept: UROLOGY | Facility: CLINIC | Age: 64
End: 2019-07-15
Payer: COMMERCIAL

## 2019-07-15 VITALS — BODY MASS INDEX: 39.82 KG/M2 | HEIGHT: 72 IN | WEIGHT: 294 LBS | OXYGEN SATURATION: 96 % | HEART RATE: 90 BPM

## 2019-07-15 DIAGNOSIS — Z85.46 PERSONAL HISTORY OF MALIGNANT NEOPLASM OF PROSTATE: Primary | ICD-10-CM

## 2019-07-15 PROCEDURE — 99024 POSTOP FOLLOW-UP VISIT: CPT | Performed by: UROLOGY

## 2019-07-15 ASSESSMENT — MIFFLIN-ST. JEOR: SCORE: 2161.58

## 2019-07-15 ASSESSMENT — PAIN SCALES - GENERAL: PAINLEVEL: NO PAIN (0)

## 2019-07-15 NOTE — PROGRESS NOTES
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 His PSA is undetectable. He has felt well since his surgery. He had vertigo after the surgery that is now resolved.He says he is having minimal amounts of urinary leakage at this point but he is still wearing 3 pads per day. He otherwise has a normal urinary stream.      PAST MEDICAL HISTORY:   Past Medical History:   Diagnosis Date     Asthma      Claustrophobia      Hypercholesteremia      Hypertension      Mediastinal adenopathy      Obesity      BEULAH (obstructive sleep apnea)      Prostate cancer (H)      Sarcoidosis        PAST SURGICAL HISTORY:   Past Surgical History:   Procedure Laterality Date     APPENDECTOMY       C TOTAL HIP ARTHROPLASTY Bilateral      C TOTAL KNEE ARTHROPLASTY Bilateral      DAVINCI PROSTATECTOMY, LYMPHADENECTOMY N/A 5/23/2019    Procedure: ROBOTIC ASSISTED LAPAROSCOPIC RADICAL PROSTATECTOMY WITH BILATERAL PELVIC LYMPH NODE DISSECTION;  Surgeon: Matteo Coughlin MD;  Location: [facility] OR     ENDOBRONCHIAL ULTRASOUND FLEXIBLE N/A 3/29/2019    Procedure: Flexible Bronchoscopy, Endobronchial Ultrasound;  Surgeon: Curtis Leger MD;  Location: [facility] OR     VASECTOMY         FAMILY HISTORY:   Family History   Problem Relation Age of Onset     Alzheimer Disease Mother      Heart Disease Father        SOCIAL HISTORY:   Social History     Tobacco Use     Smoking status: Never Smoker     Smokeless tobacco: Never Used   Substance Use Topics     Alcohol use: Yes     Comment: twice a week       Current Outpatient Medications   Medication     albuterol (PROAIR HFA/PROVENTIL HFA/VENTOLIN HFA) 108 (90 Base) MCG/ACT inhaler     aspirin (ASA) 81 MG tablet     atorvastatin (LIPITOR) 40 MG tablet     BREO ELLIPTA 200-25 MCG/INH Inhaler      cetirizine (ZYRTEC) 10 MG tablet     hydrochlorothiazide (HYDRODIURIL) 12.5 MG tablet     multivitamin (ONE-DAILY) tablet     meclizine (ANTIVERT) 12.5 MG tablet     order for DME     SENNA-docusate sodium (SENNA S) 8.6-50 MG tablet     No current facility-administered medications for this visit.          PHYSICAL EXAM:    Pulse 90   Ht 1.829 m (6')   Wt 133.4 kg (294 lb)   SpO2 96%   BMI 39.87 kg/m      Constitutional: Well developed. Conversant and in no acute distress  Eyes: Anicteric sclera, conjunctiva clear, normal extraocular movements  ENT: Normocephalic and atraumatic,   Skin: Warm and dry. No rashes or lesions  Cardiac: No peripheral edema  Back/Flank: Not done  CNS/PNS: Normal musculature and movements, moves all extremities normally  Respiratory: Normal non-labored breathing  Abdomen: Soft nontender and nondistended  Peripheral Vascular: No peripheral edema  Mental Status/Psych: Alert and Oriented x 3. Normal mood and affect    Penis: Not done  Scrotal Skin: Not done  Testicles: Not done  Epididymis: Not done  Digital Rectal Exam:     Cystoscopy: Not done    Imaging: None    Urinalysis: UA RESULTS:  Recent Labs   Lab Test 05/07/19  0748   COLOR Yellow   APPEARANCE Clear   URINEGLC Negative   URINEBILI Negative   URINEKETONE Negative   SG 1.015   UBLD Negative   URINEPH 7.0   PROTEIN Negative   UROBILINOGEN 0.2   NITRITE Negative   LEUKEST Negative   RBCU O - 2   WBCU 0 - 5       PSA: Undetectable    Post Void Residual:     Other labs: None today      IMPRESSION:  Doing well, PSA undetectable    PLAN:  He is doing very well 2 months after his robotic prostatectomy. His PSA is undetectable.We discussed Kegel exercises.I will see him back in 3 months for his next PSA.      Matteo Coughlin M.D.

## 2019-07-15 NOTE — NURSING NOTE
Pt here po.  Pt denies any voiding trouble.  No pain.  Pt denies gross hem.  Pt denies trouble after having cath out.  NATO Watson CMA

## 2019-07-15 NOTE — LETTER
7/15/2019       RE: Derek Contreras  [address] [place] [address]     Dear Colleague,    Thank you for referring your patient, Derek Contreras, to the [facility] [place] at [facility]. Please see a copy of my visit note below.    Office Visit Note  [facility]  [phone]    UROLOGIC DIAGNOSES:   pT3a Tricia 4+3=7 prostate cancer    CURRENT INTERVENTIONS:   Robotic prostatectomy in May    HISTORY:   Derek returns to clinic today, now 2 months after his robotic prostatectomy. His PSA is undetectable. He has felt well since his surgery. He had vertigo after the surgery that is now resolved.He says he is having minimal amounts of urinary leakage at this point but he is still wearing 3 pads per day. He otherwise has a normal urinary stream.      PAST MEDICAL HISTORY:   Past Medical History:   Diagnosis Date     Asthma      Claustrophobia      Hypercholesteremia      Hypertension      Mediastinal adenopathy      Obesity      BEULAH (obstructive sleep apnea)      Prostate cancer (H)      Sarcoidosis        PAST SURGICAL HISTORY:   Past Surgical History:   Procedure Laterality Date     APPENDECTOMY       C TOTAL HIP ARTHROPLASTY Bilateral      C TOTAL KNEE ARTHROPLASTY Bilateral      DAVINCI PROSTATECTOMY, LYMPHADENECTOMY N/A 5/23/2019    Procedure: ROBOTIC ASSISTED LAPAROSCOPIC RADICAL PROSTATECTOMY WITH BILATERAL PELVIC LYMPH NODE DISSECTION;  Surgeon: Matteo Coughlin MD;  Location: [facility] OR     ENDOBRONCHIAL ULTRASOUND FLEXIBLE N/A 3/29/2019    Procedure: Flexible Bronchoscopy, Endobronchial Ultrasound;  Surgeon: Curtis Leger MD;  Location: [facility]     VASECTOMY         FAMILY HISTORY:   Family History   Problem Relation Age of Onset     Alzheimer Disease Mother      Heart Disease Father        SOCIAL HISTORY:   Social History     Tobacco Use     Smoking status: Never Smoker     Smokeless  tobacco: Never Used   Substance Use Topics     Alcohol use: Yes     Comment: twice a week       Current Outpatient Medications   Medication     albuterol (PROAIR HFA/PROVENTIL HFA/VENTOLIN HFA) 108 (90 Base) MCG/ACT inhaler     aspirin (ASA) 81 MG tablet     atorvastatin (LIPITOR) 40 MG tablet     BREO ELLIPTA 200-25 MCG/INH Inhaler     cetirizine (ZYRTEC) 10 MG tablet     hydrochlorothiazide (HYDRODIURIL) 12.5 MG tablet     multivitamin (ONE-DAILY) tablet     meclizine (ANTIVERT) 12.5 MG tablet     order for DME     SENNA-docusate sodium (SENNA S) 8.6-50 MG tablet     No current facility-administered medications for this visit.          PHYSICAL EXAM:    Pulse 90   Ht 1.829 m (6')   Wt 133.4 kg (294 lb)   SpO2 96%   BMI 39.87 kg/m       Constitutional: Well developed. Conversant and in no acute distress  Eyes: Anicteric sclera, conjunctiva clear, normal extraocular movements  ENT: Normocephalic and atraumatic,   Skin: Warm and dry. No rashes or lesions  Cardiac: No peripheral edema  Back/Flank: Not done  CNS/PNS: Normal musculature and movements, moves all extremities normally  Respiratory: Normal non-labored breathing  Abdomen: Soft nontender and nondistended  Peripheral Vascular: No peripheral edema  Mental Status/Psych: Alert and Oriented x 3. Normal mood and affect    Penis: Not done  Scrotal Skin: Not done  Testicles: Not done  Epididymis: Not done  Digital Rectal Exam:     Cystoscopy: Not done    Imaging: None    Urinalysis: UA RESULTS:  Recent Labs   Lab Test 05/07/19  0748   COLOR Yellow   APPEARANCE Clear   URINEGLC Negative   URINEBILI Negative   URINEKETONE Negative   SG 1.015   UBLD Negative   URINEPH 7.0   PROTEIN Negative   UROBILINOGEN 0.2   NITRITE Negative   LEUKEST Negative   RBCU O - 2   WBCU 0 - 5       PSA: Undetectable    Post Void Residual:     Other labs: None today      IMPRESSION:  Doing well, PSA undetectable    PLAN:  He is doing very well 2 months after his robotic prostatectomy.  His PSA is undetectable.We discussed Kegel exercises.I will see him back in 3 months for his next PSA.      Matteo Coughlin M.D.

## 2019-07-23 ENCOUNTER — MYC REFILL (OUTPATIENT)
Dept: INTERNAL MEDICINE | Facility: CLINIC | Age: 64
End: 2019-07-23

## 2019-07-23 DIAGNOSIS — E78.00 HYPERCHOLESTEREMIA: Primary | ICD-10-CM

## 2019-07-23 DIAGNOSIS — I10 ESSENTIAL HYPERTENSION: ICD-10-CM

## 2019-07-23 RX ORDER — ATORVASTATIN CALCIUM 40 MG/1
40 TABLET, FILM COATED ORAL EVERY MORNING
Qty: 90 TABLET | Refills: 1 | Status: SHIPPED | OUTPATIENT
Start: 2019-07-23 | End: 2020-01-16

## 2019-07-23 RX ORDER — HYDROCHLOROTHIAZIDE 12.5 MG/1
12.5 TABLET ORAL EVERY MORNING
Qty: 90 TABLET | Refills: 1 | Status: SHIPPED | OUTPATIENT
Start: 2019-07-23 | End: 2020-01-16

## 2019-07-23 NOTE — TELEPHONE ENCOUNTER
"Requested Prescriptions   Pending Prescriptions Disp Refills     atorvastatin (LIPITOR) 40 MG tablet 90 tablet 3     Sig: Take 1 tablet (40 mg) by mouth every morning       Statins Protocol Passed - 7/23/2019  8:16 AM        Passed - LDL on file in past 12 months     Recent Labs   Lab Test 02/05/19  0847   LDL 16             Passed - No abnormal creatine kinase in past 12 months     No lab results found.             Passed - Recent (12 mo) or future (30 days) visit within the authorizing provider's specialty     Patient had office visit in the last 12 months or has a visit in the next 30 days with authorizing provider or within the authorizing provider's specialty.  See \"Patient Info\" tab in inbasket, or \"Choose Columns\" in Meds & Orders section of the refill encounter.              Passed - Medication is active on med list        Passed - Patient is age 18 or older        hydrochlorothiazide (HYDRODIURIL) 12.5 MG tablet 180 tablet 3     Sig: Take 1 tablet (12.5 mg) by mouth every morning       Diuretics (Including Combos) Protocol Failed - 7/23/2019  8:16 AM        Failed - Blood pressure under 140/90 in past 12 months     BP Readings from Last 3 Encounters:   05/25/19 142/80   05/07/19 136/76   04/18/19 122/84                 Passed - Recent (12 mo) or future (30 days) visit within the authorizing provider's specialty     Patient had office visit in the last 12 months or has a visit in the next 30 days with authorizing provider or within the authorizing provider's specialty.  See \"Patient Info\" tab in inbasket, or \"Choose Columns\" in Meds & Orders section of the refill encounter.              Passed - Medication is active on med list        Passed - Patient is age 18 or older        Passed - Normal serum creatinine on file in past 12 months     Recent Labs   Lab Test 05/24/19  0534   CR 0.86              Passed - Normal serum potassium on file in past 12 months     Recent Labs   Lab Test 05/24/19  0534   POTASSIUM " 4.2                    Passed - Normal serum sodium on file in past 12 months     Recent Labs   Lab Test 05/24/19  0534                 Last office visit 5/7/19 patient advised follow up in 6 months. Prescription approved per [facility] Refill Protocol.

## 2019-07-31 ENCOUNTER — OFFICE VISIT (OUTPATIENT)
Dept: PULMONOLOGY | Facility: CLINIC | Age: 64
End: 2019-07-31
Payer: COMMERCIAL

## 2019-07-31 ENCOUNTER — PATIENT OUTREACH (OUTPATIENT)
Dept: PULMONOLOGY | Facility: CLINIC | Age: 64
End: 2019-07-31

## 2019-07-31 ENCOUNTER — ANCILLARY PROCEDURE (OUTPATIENT)
Dept: CT IMAGING | Facility: CLINIC | Age: 64
End: 2019-07-31
Attending: INTERNAL MEDICINE
Payer: COMMERCIAL

## 2019-07-31 VITALS
HEART RATE: 100 BPM | HEIGHT: 72 IN | RESPIRATION RATE: 18 BRPM | OXYGEN SATURATION: 95 % | BODY MASS INDEX: 39.82 KG/M2 | WEIGHT: 294 LBS | SYSTOLIC BLOOD PRESSURE: 134 MMHG | DIASTOLIC BLOOD PRESSURE: 81 MMHG

## 2019-07-31 DIAGNOSIS — D86.9 SARCOIDOSIS: Primary | ICD-10-CM

## 2019-07-31 DIAGNOSIS — Z85.46 PERSONAL HISTORY OF MALIGNANT NEOPLASM OF PROSTATE: ICD-10-CM

## 2019-07-31 DIAGNOSIS — D86.9 SARCOIDOSIS: ICD-10-CM

## 2019-07-31 LAB
6 MIN WALK (FT): 1200 FT
6 MIN WALK (FT): 990 FT
6 MIN WALK (M): 302 M
6 MIN WALK (M): 366 M
ALBUMIN SERPL-MCNC: 3.7 G/DL (ref 3.4–5)
ALP SERPL-CCNC: 90 U/L (ref 40–150)
ALT SERPL W P-5'-P-CCNC: 39 U/L (ref 0–70)
ANION GAP SERPL CALCULATED.3IONS-SCNC: 5 MMOL/L (ref 3–14)
AST SERPL W P-5'-P-CCNC: 30 U/L (ref 0–45)
BASOPHILS # BLD AUTO: 0.1 10E9/L (ref 0–0.2)
BASOPHILS NFR BLD AUTO: 0.6 %
BILIRUB DIRECT SERPL-MCNC: 0.2 MG/DL (ref 0–0.2)
BILIRUB SERPL-MCNC: 0.6 MG/DL (ref 0.2–1.3)
BUN SERPL-MCNC: 11 MG/DL (ref 7–30)
CALCIUM SERPL-MCNC: 8.9 MG/DL (ref 8.5–10.1)
CHLORIDE SERPL-SCNC: 106 MMOL/L (ref 94–109)
CO2 SERPL-SCNC: 26 MMOL/L (ref 20–32)
CREAT SERPL-MCNC: 0.87 MG/DL (ref 0.66–1.25)
DEPRECATED CALCIDIOL+CALCIFEROL SERPL-MC: 30 UG/L (ref 20–75)
DIFFERENTIAL METHOD BLD: NORMAL
EOSINOPHIL # BLD AUTO: 0.1 10E9/L (ref 0–0.7)
EOSINOPHIL NFR BLD AUTO: 1.5 %
ERYTHROCYTE [DISTWIDTH] IN BLOOD BY AUTOMATED COUNT: 13.2 % (ref 10–15)
GFR SERPL CREATININE-BSD FRML MDRD: >90 ML/MIN/{1.73_M2}
GLUCOSE SERPL-MCNC: 98 MG/DL (ref 70–99)
HCT VFR BLD AUTO: 48.7 % (ref 40–53)
HGB BLD-MCNC: 16 G/DL (ref 13.3–17.7)
IMM GRANULOCYTES # BLD: 0 10E9/L (ref 0–0.4)
IMM GRANULOCYTES NFR BLD: 0.3 %
LYMPHOCYTES # BLD AUTO: 1.3 10E9/L (ref 0.8–5.3)
LYMPHOCYTES NFR BLD AUTO: 15.4 %
MCH RBC QN AUTO: 29.2 PG (ref 26.5–33)
MCHC RBC AUTO-ENTMCNC: 32.9 G/DL (ref 31.5–36.5)
MCV RBC AUTO: 89 FL (ref 78–100)
MONOCYTES # BLD AUTO: 0.8 10E9/L (ref 0–1.3)
MONOCYTES NFR BLD AUTO: 9 %
NEUTROPHILS # BLD AUTO: 6.4 10E9/L (ref 1.6–8.3)
NEUTROPHILS NFR BLD AUTO: 73.2 %
NRBC # BLD AUTO: 0 10*3/UL
NRBC BLD AUTO-RTO: 0 /100
NT-PROBNP SERPL-MCNC: 43 PG/ML (ref 0–125)
PLATELET # BLD AUTO: 165 10E9/L (ref 150–450)
POTASSIUM SERPL-SCNC: 3.4 MMOL/L (ref 3.4–5.3)
PROT SERPL-MCNC: 8.2 G/DL (ref 6.8–8.8)
PSA SERPL-MCNC: <0.01 UG/L (ref 0–4)
PTH-INTACT SERPL-MCNC: 33 PG/ML (ref 18–80)
RBC # BLD AUTO: 5.48 10E12/L (ref 4.4–5.9)
SODIUM SERPL-SCNC: 138 MMOL/L (ref 133–144)
WBC # BLD AUTO: 8.7 10E9/L (ref 4–11)

## 2019-07-31 PROCEDURE — 36415 COLL VENOUS BLD VENIPUNCTURE: CPT | Performed by: INTERNAL MEDICINE

## 2019-07-31 PROCEDURE — 86612 BLASTOMYCES ANTIBODY: CPT | Performed by: INTERNAL MEDICINE

## 2019-07-31 PROCEDURE — 84153 ASSAY OF PSA TOTAL: CPT | Performed by: INTERNAL MEDICINE

## 2019-07-31 PROCEDURE — 82787 IGG 1 2 3 OR 4 EACH: CPT | Performed by: INTERNAL MEDICINE

## 2019-07-31 PROCEDURE — 83970 ASSAY OF PARATHORMONE: CPT | Performed by: INTERNAL MEDICINE

## 2019-07-31 PROCEDURE — 82784 ASSAY IGA/IGD/IGG/IGM EACH: CPT | Performed by: INTERNAL MEDICINE

## 2019-07-31 PROCEDURE — 86481 TB AG RESPONSE T-CELL SUSP: CPT | Performed by: INTERNAL MEDICINE

## 2019-07-31 PROCEDURE — 85025 COMPLETE CBC W/AUTO DIFF WBC: CPT | Performed by: INTERNAL MEDICINE

## 2019-07-31 PROCEDURE — 83520 IMMUNOASSAY QUANT NOS NONAB: CPT | Performed by: INTERNAL MEDICINE

## 2019-07-31 PROCEDURE — 86606 ASPERGILLUS ANTIBODY: CPT | Performed by: INTERNAL MEDICINE

## 2019-07-31 PROCEDURE — 80076 HEPATIC FUNCTION PANEL: CPT | Performed by: INTERNAL MEDICINE

## 2019-07-31 PROCEDURE — 86698 HISTOPLASMA ANTIBODY: CPT | Performed by: INTERNAL MEDICINE

## 2019-07-31 PROCEDURE — G0463 HOSPITAL OUTPT CLINIC VISIT: HCPCS | Mod: ZF

## 2019-07-31 PROCEDURE — 86635 COCCIDIOIDES ANTIBODY: CPT | Performed by: INTERNAL MEDICINE

## 2019-07-31 PROCEDURE — 80048 BASIC METABOLIC PNL TOTAL CA: CPT | Performed by: INTERNAL MEDICINE

## 2019-07-31 PROCEDURE — 82164 ANGIOTENSIN I ENZYME TEST: CPT | Performed by: INTERNAL MEDICINE

## 2019-07-31 PROCEDURE — 87385 HISTOPLASMA CAPSUL AG IA: CPT | Performed by: INTERNAL MEDICINE

## 2019-07-31 PROCEDURE — 82652 VIT D 1 25-DIHYDROXY: CPT | Performed by: INTERNAL MEDICINE

## 2019-07-31 PROCEDURE — 82306 VITAMIN D 25 HYDROXY: CPT | Performed by: INTERNAL MEDICINE

## 2019-07-31 PROCEDURE — 83880 ASSAY OF NATRIURETIC PEPTIDE: CPT | Performed by: INTERNAL MEDICINE

## 2019-07-31 ASSESSMENT — PAIN SCALES - GENERAL: PAINLEVEL: NO PAIN (0)

## 2019-07-31 ASSESSMENT — MIFFLIN-ST. JEOR: SCORE: 2161.71

## 2019-07-31 NOTE — PROGRESS NOTES
CHIEF COMPLAINT:  Possible sarcoidosis.      HISTORY OF PRESENT ILLNESS:  This is a very pleasant 63-year-old male who is here for followup of abnormal bronchoscopy results.  He had a bronchoscopy done in 03/2019 where there was a question of granulomatous inflammation for which this visit was set up.      On talking to Mr. Contreras he tells me that around 8 or so years back he had an evaluation done at the [facility], where he was suspected to have sarcoidosis based on abnormal chest imaging.  No other evaluation for extrapulmonary disease was done at that time.  More recently, he needed surgical intervention for prostate cancer.  At that time, chest imaging was performed which demonstrated nodular infiltrates and bilateral hilar and mediastinal adenopathy.  He saw Dr. Leger in March and ultimately had a bronchoscopy with BAL and EBUS-guided TBNA done.  His BAL had 102 white cells of which 1% was eosinophils, 11% were lymphocytes and 82% monocyte macrophages were present, and 6% neutrophils were there.  His CD4/CD8 ratio was 2.29.  TBNA of station 7 lymph node demonstrated rare noncaseating granulomas, 12L demonstrated noncaseating granulomas, but 4R was nondiagnostic.  On station 12L the AFB stain and GMS stains were negative.  He has since had prostate surgery done and this visit was set up.  Today, he tells me that he has minimal symptoms.  He had cough, dyspnea, both of which improved after him starting Breo Ellipta in March of this year.  He still has shortness of breath with activity.  He denies any orthopnea or PND.  He has an intermittent rash in the left forearm.  He denies any chest pain,  palpitations, nausea, vomiting, diarrhea, blurry vision or painful red eye, tingling, numbness, weakness, or lower extremity edema.      PAST MEDICAL HISTORY:   1.  Possible sarcoidosis based on abnormal chest imaging as far back as 2011 with a bronchoscopy demonstrating granulomatous lymphadenitis in 03/2019 with  negative lacey and GMS stain.  No eye examination done.  On my review he had an EKG done in 05/2019, which demonstrated right bundle branch block.   2.  Hypertension.   3.  Hypercholesterolemia.   4.  Hip and knee replacement surgeries.   5.  Seasonal allergies.   6.  Sleep disordered breathing, not currently on treatment.      CURRENT MEDICATIONS:  Reviewed by me and are listed in EHR.      SOCIAL HISTORY:  He is retired.  He was a mental health therapist.  His hobbies include walking, gardening and puzzles.  In the past he used to play golf and tennis.  They had a dog but none since 10/2018 and never had any birds.  He does not use hot tubs.  He lives in a house which is new, approximately a year old.  There is no mold in his current tolerance or otherwise.  There is no asbestos exposure.  He is a lifelong nonsmoker but was exposed to secondhand smoke when he was growing up.  He drinks 2-3 alcoholic drinks per week.      FAMILY HISTORY:  Two children and 1 sister, all without a diagnosis of sarcoidosis.      ALLERGIES:  Several seasonal allergies.      REVIEW OF SYSTEMS:  A comprehensive review of systems was done.  Positives include cough, shortness of breath, both of which are better since he has gone on Breo.  He still has dyspnea on exertion.  The remaining review of systems is complete and negative.      PHYSICAL EXAMINATION:   VITAL SIGNS:  Blood pressure 132/81, pulse rate 100, respiratory rate 18, O2 saturation 95% on room air, BMI is 39.7.   HEENT:  Normocephalic, atraumatic.  Pupils round, react to light, extraocular movement intact.  Moist mucosa.  Anicteric sclerae.   NECK:  No JVD, no lymphadenopathy, no thyromegaly.   HEART:  S1, S2, no murmurs, rubs, gallops.   CHEST:  Clear to auscultation.   EXTREMITIES:  No clubbing, no cyanosis, no edema.   NEUROLOGIC:  Nonfocal.      PFTs done today were reviewed by me.  He also had a 6-minute walk test done where he had desaturation to 87% at 2 minutes.  He had  to stop at that time.  He restarted without any oxygen supplementation and desaturate again to 86% at 6 minutes.  Total walk distance was 990 feet, which is below the lower limit of normal.  FVC is 2.16 liters, which is 44% of predicted.  FEV1 is 1.68 liters, which is 45% of predicted.  The ratio is 78.  Total lung capacity is 4.57, which is 60% of predicted.  DLCO is 20.74 which is 72% of predicted.  My interpretation is that he has moderate restriction with mildly decreased diffusion capacity.  I see an EKG which was done in which showed right bundle branch block.  Chest CT scan in March showed nodular infiltrates and bilateral hilar and mediastinal adenopathy.  The BAL and TBNA results of station 7, 12R and 4 are as mentioned above.      ASSESSMENT AND PLAN:  A 63-year-old male with a history of hypertension, hyperlipidemia, obesity, prostate cancer and abnormal chest imaging with TBNA in 03/2019 demonstrating granulomatous inflammation at station 7 and 12L lymph nodes with BAL demonstrating 102 white cells, 11% lymphocytes and CD4/CD8 ratio of 2.29.   1.  Sarcoidosis is a diagnosis of exclusion.  I think infectious etiologies have been excluded, but I will check IgE levels to make sure that is not contributing.  We will also check metabolic labs to assess calcium level, vitamin D and PTH.  We will check electrolytes.  CBC and differential is also being ordered.  I will check an ACE levels and soluable IL-2R.   2.  He does have pulmonary limitation.  This seems to be out of proportion to imaging abnormalities.  He does have elevated right hemidiaphragm and was told about this a long time back.  A sniff test is being ordered.  I am also checking a high-resolution chest CT scan to see if there is any interstitial involvement.   3.  EKG demonstrates right bundle branch block.  Cardiac MRI is being done.  He also had hypoxia with activity.  I am suspicious that he could have pulmonary hypertension.  An echo with  bubble study is being ordered for that.  We will check other metabolic labs to evaluate for extrapulmonary sarcoidosis.   4.  Obesity.  He will need to lose weight.   5.  Sleep disordered breathing.  There is a high likelihood of sleep apnea.  He will need a sleep evaluation later on.   6.  Exertional hypoxia and O2 titration studies are being ordered.      I will see him back in 3 months and we will review the results over the phone.        FVC FEV1 TLC DLCO   5/9/2011 2.74 (54%) 2.09 (53%) 4.70 (66%) 17.7 (59%)   12/1012 2.47 (51) 1.83 (48) 4.55 (66%) 17.1(59%)   1/14/2013 2.71 (57%) 2.20 (58%)     4/24/2013 2.56 (53%) 1.94 (51%)     3/14/19 1.98 (40%) 1.41 (38%) 3.85 (54%) 16 (52%)

## 2019-07-31 NOTE — PROGRESS NOTES
Contacted patient to discuss results of 6 minute walk test and O2 titration.  Patient should be using 2 LPM oxygen with activity.  Patient had been reluctant to use oxygen during testing.  Left message for patient requesting return call to discuss oxygen options.

## 2019-07-31 NOTE — NURSING NOTE
Chief Complaint   Patient presents with     Interstitial Lung Disease (ILD)     Consult Sarcoids      Hansa Bustamante CMA

## 2019-08-01 LAB
1,25(OH)2D SERPL-MCNC: 59.1 PG/ML (ref 19.9–79.3)
ACE SERPL-CCNC: 46 U/L (ref 9–67)
DLCOUNC-%PRED-PRE: 72 %
DLCOUNC-PRE: 20.74 ML/MIN/MMHG
DLCOUNC-PRED: 28.62 ML/MIN/MMHG
ERV-%PRED-PRE: 55 %
ERV-PRE: 0.39 L
ERV-PRED: 0.7 L
EXPTIME-PRE: 6.42 SEC
FEF2575-%PRED-PRE: 47 %
FEF2575-PRE: 1.37 L/SEC
FEF2575-PRED: 2.9 L/SEC
FEFMAX-%PRED-PRE: 69 %
FEFMAX-PRE: 6.53 L/SEC
FEFMAX-PRED: 9.37 L/SEC
FEV1-%PRED-PRE: 45 %
FEV1-PRE: 1.68 L
FEV1FEV6-PRE: 78 %
FEV1FEV6-PRED: 78 %
FEV1FVC-PRE: 78 %
FEV1FVC-PRED: 77 %
FEV1SVC-PRE: 72 %
FEV1SVC-PRED: 70 %
FIFMAX-PRE: 4.32 L/SEC
FIO2-PRE: 21 %
FIO2-PRE: 28 %
FRCPLETH-%PRED-PRE: 69 %
FRCPLETH-PRE: 2.62 L
FRCPLETH-PRED: 3.77 L
FVC-%PRED-PRE: 44 %
FVC-PRE: 2.16 L
FVC-PRED: 4.8 L
IC-%PRED-PRE: 42 %
IC-PRE: 1.94 L
IC-PRED: 4.55 L
IGG SERPL-MCNC: 1390 MG/DL (ref 695–1620)
IGG SERPL-MCNC: NORMAL MG/DL (ref 695–1620)
IGG1 SER-MCNC: NORMAL MG/DL (ref 300–856)
IGG2 SER-MCNC: NORMAL MG/DL (ref 158–761)
IGG3 SER-MCNC: NORMAL MG/DL (ref 24–192)
IGG4 SER-MCNC: NORMAL MG/DL (ref 11–86)
RVPLETH-%PRED-PRE: 86 %
RVPLETH-PRE: 2.23 L
RVPLETH-PRED: 2.57 L
TLCPLETH-%PRED-PRE: 60 %
TLCPLETH-PRE: 4.57 L
TLCPLETH-PRED: 7.53 L
VA-%PRED-PRE: 54 %
VA-PRE: 3.77 L
VC-%PRED-PRE: 44 %
VC-PRE: 2.33 L
VC-PRED: 5.25 L

## 2019-08-02 LAB
GAMMA INTERFERON BACKGROUND BLD IA-ACNC: 0.09 IU/ML
LAB SCANNED RESULT: NORMAL
LAB SCANNED RESULT: NORMAL
M TB IFN-G BLD-IMP: NEGATIVE
M TB IFN-G CD4+ BCKGRND COR BLD-ACNC: 8.81 IU/ML
MITOGEN IGNF BCKGRD COR BLD-ACNC: 0 IU/ML
MITOGEN IGNF BCKGRD COR BLD-ACNC: 0.01 IU/ML

## 2019-08-03 LAB
ASPERGILLUS AB TITR SER CF: NORMAL {TITER}
B DERMAT AB SER-ACNC: 0.6 IV
COCCIDIOIDES AB TITR SER CF: NORMAL {TITER}
H CAPSUL MYC AB TITR SER CF: NORMAL {TITER}
H CAPSUL YST AB TITR SER CF: NORMAL {TITER}
RESULT: NORMAL
SEND OUTS MISC TEST CODE: NORMAL
SEND OUTS MISC TEST SPECIMEN: NORMAL
TEST NAME: NORMAL

## 2019-08-06 ENCOUNTER — PATIENT OUTREACH (OUTPATIENT)
Dept: PULMONOLOGY | Facility: CLINIC | Age: 64
End: 2019-08-06

## 2019-08-06 NOTE — PROGRESS NOTES
Contacted patient regarding results of chest CT scan.  CT scan stable as compared to previous scan.  Patient mentioned that he bought a pulse oximeter and has been monitoring his oxygen saturation.  His oxygen saturation has been running between 87 and 92%.  Patient states that he is not ready for oxygen and wants to continue to monitor oxygen saturations and contemplate oxygen use over the next couple of weeks.

## 2019-08-12 ENCOUNTER — TEAM CONFERENCE (OUTPATIENT)
Dept: PULMONOLOGY | Facility: CLINIC | Age: 64
End: 2019-08-12

## 2019-08-12 NOTE — TELEPHONE ENCOUNTER
ILD Conference      Patient Name: Derek Contreras    Pertinent Clinical History: 63 year old  With abnormal bronchoscopy results.  Question of granulomatous inflammation.Originally told he had sarcoidosis in 2011.    Reason for conference discussion/Specific Question:      Referring Physician:      Radiology Interpretation: CT scan has nodules and adenopathy c/w sarcoidosis. Slightly improved nodules and adenopathy.      Pathology Interpretation: Non necrotizing granuloma. Special stains negative.  Biopsy c/w sarcoidosis.       There was a consensus recommendation for the following actions:       Pulmonary/ILD Provider: Dr. Jamie Martin

## 2019-08-22 NOTE — PROGRESS NOTES
CHIEF COMPLAINT:  Possible sarcoidosis.      HISTORY OF PRESENT ILLNESS:  This is a very pleasant 63-year-old male who is here for followup of abnormal bronchoscopy results.  He had a bronchoscopy done in 03/2019 where there was a question of granulomatous inflammation for which this visit was set up.      On talking to Mr. Contreras he tells me that around eight or so years back he had an evaluation done at the [facility], where he was suspected to have sarcoidosis based on abnormal chest imaging and no biopsy was performed. No other evaluation for extrapulmonary disease was done at that time.  More recently, he needed surgical intervention for prostate cancer.  At that time, chest imaging was performed which demonstrated nodular infiltrates and bilateral hilar and mediastinal adenopathy.  He saw Dr. Leger in March and ultimately had a bronchoscopy with BAL and EBUS-guided TBNA done.  His BAL had 102 white cells of which 1% was eosinophils, 11% were lymphocytes and 82% monocyte macrophages were present, and 6% neutrophils were there.  His CD4/CD8 ratio was 2.29.  TBNA of station 7 lymph node demonstrated rare noncaseating granulomas, 12L demonstrated noncaseating granulomas, but 4R was nondiagnostic.  On station 12L the AFB stain and GMS stains were negative.  He has since had prostate surgery done and this visit was set up.  Today, he tells me that he has minimal symptoms.  He had cough, dyspnea, both of which improved after him starting Breo Ellipta in March of this year.  He still has shortness of breath with activity.  He denies any orthopnea or PND.  He has an intermittent rash in the left forearm.  He denies any chest pain,  palpitations, nausea, vomiting, diarrhea, blurry vision or painful red eye, tingling, numbness, weakness, or lower extremity edema.      PAST MEDICAL HISTORY:   1.  Possible sarcoidosis based on abnormal chest imaging as far back as 2011 with a bronchoscopy demonstrating granulomatous  lymphadenitis in 03/2019 with negative lacey and GMS stain.  No eye examination done.  On my review he had an EKG done in 05/2019, which demonstrated right bundle branch block.   2.  Hypertension.   3.  Hypercholesterolemia.   4.  Hip and knee replacement surgeries.   5.  Seasonal allergies.   6.  Sleep disordered breathing, not currently on treatment.      CURRENT MEDICATIONS:  Reviewed by me and are listed in EHR.      SOCIAL HISTORY:  He is retired.  He was a mental health therapist.  His hobbies include walking, gardening and puzzles.  In the past he used to play golf and tennis.  They had a dog but none since 10/2018 and never had any birds.  He does not use hot tubs.  He lives in a house which is new, approximately a year old.  There is no mold in his current tolerance or otherwise.  There is no asbestos exposure.  He is a lifelong nonsmoker but was exposed to secondhand smoke when he was growing up.  He drinks 2-3 alcoholic drinks per week.      FAMILY HISTORY:  Two children and 1 sister, all without a diagnosis of sarcoidosis.      ALLERGIES:  Several seasonal allergies.      REVIEW OF SYSTEMS:  A comprehensive review of systems was done.  Positives include cough, shortness of breath, both of which are better since he has gone on Breo.  He still has dyspnea on exertion.  The remaining review of systems is complete and negative.      PHYSICAL EXAMINATION:   VITAL SIGNS:  Blood pressure 132/81, pulse rate 100, respiratory rate 18, O2 saturation 95% on room air, BMI is 39.7.   HEENT:  Normocephalic, atraumatic.  Pupils round, react to light, extraocular movement intact.  Moist mucosa.  Anicteric sclerae.   NECK:  No JVD, no lymphadenopathy, no thyromegaly.   HEART:  S1, S2, no murmurs, rubs, gallops.   CHEST:  Clear to auscultation.   EXTREMITIES:  No clubbing, no cyanosis, no edema.   NEUROLOGIC:  Nonfocal.         FVC FEV1 TLC DLCO   5/9/2011 2.74 (54%) 2.09 (53%) 4.70 (66%) 17.7 (59%)   12/1012 2.47 (51) 1.83  (48) 4.55 (66%) 17.1(59%)   1/14/2013 2.71 (57%) 2.20 (58%)     4/24/2013 2.56 (53%) 1.94 (51%)     3/14/19 1.98 (40%) 1.41 (38%) 3.85 (54%) 16 (52%)       PFTs done today were reviewed by me.  He also had a 6-minute walk test done where he had desaturation to 87% at 2 minutes.  He had to stop at that time.  He restarted without any oxygen supplementation and desaturate again to 86% at 6 minutes.  Total walk distance was 990 feet, which is below the lower limit of normal.  FVC is 2.16 liters, which is 44% of predicted.  FEV1 is 1.68 liters, which is 45% of predicted.  The ratio is 78.  Total lung capacity is 4.57, which is 60% of predicted.  DLCO is 20.74 which is 72% of predicted.  My interpretation is that he has moderate restriction with mildly decreased diffusion capacity.  I see an EKG which was done in which showed right bundle branch block.  Chest CT scan in March showed nodular infiltrates and bilateral hilar and mediastinal adenopathy.  The BAL and TBNA results of station 7, 12R and 4 are as mentioned above.      ASSESSMENT AND PLAN:  A 63-year-old male with a history of hypertension, hyperlipidemia, obesity, prostate cancer and abnormal chest imaging with TBNA in 03/2019 demonstrating granulomatous inflammation at station 7 and 12L lymph nodes with BAL demonstrating 102 white cells, 11% lymphocytes and CD4/CD8 ratio of 2.29.   1.  Sarcoidosis is a diagnosis of exclusion.  I think infectious etiologies have been excluded, but I will check IgG levels to make sure that is not contributing.  We will also check metabolic labs to assess calcium level, vitamin D and PTH.  We will check electrolytes.  CBC and differential is also being ordered.  I will check an ACE levels and soluable IL-2R.   2.  He does have pulmonary limitation.  This seems to be out of proportion to imaging abnormalities.  He does have elevated right hemidiaphragm and was told about this a long time back.  A sniff test is being ordered.  I am  also checking a high-resolution chest CT scan to see if there is any interstitial involvement.   3.  EKG demonstrates right bundle branch block.  Cardiac MRI is being ordered.  He also had hypoxia with activity.  I am suspicious that he could have pulmonary hypertension.  An echo with bubble study is being ordered for that.  We will check other metabolic labs to evaluate for extrapulmonary sarcoidosis.   4.  Obesity.  He will need to lose weight.   5.  Sleep disordered breathing.  There is a high likelihood of sleep apnea.  He will need a sleep evaluation later on.   6.  Exertional hypoxia and O2 titration studies are being ordered.      I will see him back in 3 months and we will review the results over the phone.     Addendum: Case discussed in radiology / pathology rounds. FNA station 7 and 12 L with non-necrotizing granulomas. CT scan is constant with a sarcoidosis pattern. Has declined oxygen use for now. Awaiting Sniff test and CMR.  CMR scheduled for 8/29/19.Echo for evaluation of PTHN also pending,

## 2019-08-29 ENCOUNTER — HOSPITAL ENCOUNTER (OUTPATIENT)
Dept: MRI IMAGING | Facility: CLINIC | Age: 64
Discharge: HOME | End: 2019-08-29
Attending: INTERNAL MEDICINE | Admitting: INTERNAL MEDICINE
Payer: COMMERCIAL

## 2019-08-29 ENCOUNTER — HOSPITAL ENCOUNTER (OUTPATIENT)
Dept: CARDIOLOGY | Facility: CLINIC | Age: 64
End: 2019-08-29
Attending: INTERNAL MEDICINE
Payer: COMMERCIAL

## 2019-08-29 VITALS — SYSTOLIC BLOOD PRESSURE: 128 MMHG | DIASTOLIC BLOOD PRESSURE: 93 MMHG

## 2019-08-29 DIAGNOSIS — D86.9 SARCOIDOSIS: ICD-10-CM

## 2019-08-29 PROCEDURE — 40000264 ECHOCARDIOGRAM COMPLETE

## 2019-08-29 PROCEDURE — 25000128 H RX IP 250 OP 636: Performed by: INTERNAL MEDICINE

## 2019-08-29 PROCEDURE — 25500064 ZZH RX 255 OP 636: Performed by: INTERNAL MEDICINE

## 2019-08-29 PROCEDURE — 40000065 ZZH STATISTIC EKG NON-CHARGEABLE

## 2019-08-29 PROCEDURE — 93017 CV STRESS TEST TRACING ONLY: CPT

## 2019-08-29 PROCEDURE — 93306 TTE W/DOPPLER COMPLETE: CPT | Mod: 26 | Performed by: INTERNAL MEDICINE

## 2019-08-29 PROCEDURE — 75563 CARD MRI W/STRESS IMG & DYE: CPT

## 2019-08-29 PROCEDURE — A9585 GADOBUTROL INJECTION: HCPCS | Performed by: INTERNAL MEDICINE

## 2019-08-29 PROCEDURE — 93010 ELECTROCARDIOGRAM REPORT: CPT | Mod: 76 | Performed by: INTERNAL MEDICINE

## 2019-08-29 RX ORDER — DIAZEPAM 5 MG
5 TABLET ORAL EVERY 30 MIN PRN
Status: DISCONTINUED | OUTPATIENT
Start: 2019-08-29 | End: 2019-08-30 | Stop reason: HOSPADM

## 2019-08-29 RX ORDER — REGADENOSON 0.08 MG/ML
0.4 INJECTION, SOLUTION INTRAVENOUS ONCE
Status: COMPLETED | OUTPATIENT
Start: 2019-08-29 | End: 2019-08-29

## 2019-08-29 RX ORDER — GADOBUTROL 604.72 MG/ML
10 INJECTION INTRAVENOUS ONCE
Status: COMPLETED | OUTPATIENT
Start: 2019-08-29 | End: 2019-08-29

## 2019-08-29 RX ORDER — ACYCLOVIR 200 MG/1
0-1 CAPSULE ORAL
Status: DISCONTINUED | OUTPATIENT
Start: 2019-08-29 | End: 2019-08-30 | Stop reason: HOSPADM

## 2019-08-29 RX ORDER — ACYCLOVIR 200 MG/1
30 CAPSULE ORAL ONCE
Status: DISCONTINUED | OUTPATIENT
Start: 2019-08-29 | End: 2019-08-30 | Stop reason: HOSPADM

## 2019-08-29 RX ORDER — AMINOPHYLLINE 25 MG/ML
100 INJECTION, SOLUTION INTRAVENOUS ONCE
Status: DISCONTINUED | OUTPATIENT
Start: 2019-08-29 | End: 2019-08-30 | Stop reason: HOSPADM

## 2019-08-29 RX ORDER — ALBUTEROL SULFATE 90 UG/1
2 AEROSOL, METERED RESPIRATORY (INHALATION) EVERY 5 MIN PRN
Status: DISCONTINUED | OUTPATIENT
Start: 2019-08-29 | End: 2019-08-30 | Stop reason: HOSPADM

## 2019-08-29 RX ADMIN — GADOBUTROL 10 ML: 604.72 INJECTION INTRAVENOUS at 14:15

## 2019-08-29 RX ADMIN — HUMAN ALBUMIN MICROSPHERES AND PERFLUTREN 6 ML: 10; .22 INJECTION, SOLUTION INTRAVENOUS at 12:30

## 2019-08-29 RX ADMIN — REGADENOSON 0.4 MG: 0.08 INJECTION, SOLUTION INTRAVENOUS at 14:28

## 2019-08-31 LAB
INTERPRETATION ECG - MUSE: NORMAL
INTERPRETATION ECG - MUSE: NORMAL

## 2019-09-09 ENCOUNTER — MYC MEDICAL ADVICE (OUTPATIENT)
Dept: INTERNAL MEDICINE | Facility: CLINIC | Age: 64
End: 2019-09-09

## 2019-09-11 ENCOUNTER — OFFICE VISIT (OUTPATIENT)
Dept: INTERNAL MEDICINE | Facility: CLINIC | Age: 64
End: 2019-09-11
Payer: COMMERCIAL

## 2019-09-11 VITALS
SYSTOLIC BLOOD PRESSURE: 124 MMHG | RESPIRATION RATE: 22 BRPM | HEIGHT: 72 IN | BODY MASS INDEX: 39.52 KG/M2 | WEIGHT: 291.8 LBS | OXYGEN SATURATION: 94 % | HEART RATE: 100 BPM | TEMPERATURE: 98.4 F | DIASTOLIC BLOOD PRESSURE: 76 MMHG

## 2019-09-11 DIAGNOSIS — J06.9 UPPER RESPIRATORY TRACT INFECTION, UNSPECIFIED TYPE: Primary | ICD-10-CM

## 2019-09-11 PROCEDURE — 99213 OFFICE O/P EST LOW 20 MIN: CPT | Performed by: INTERNAL MEDICINE

## 2019-09-11 RX ORDER — AMOXICILLIN 500 MG/1
500 CAPSULE ORAL 3 TIMES DAILY
Qty: 42 CAPSULE | Refills: 0 | Status: SHIPPED | OUTPATIENT
Start: 2019-09-11 | End: 2019-10-16

## 2019-09-11 ASSESSMENT — MIFFLIN-ST. JEOR: SCORE: 2151.6

## 2019-09-11 NOTE — PROGRESS NOTES
Subjective     Derek Contreras is a 63 year old male who presents to clinic today for the following health issues:    HPI     Cough for 2-3 weeks (from Wife).    HPI:   Denies any fever chills or night sweats. No shortness of breath. He has a history of asthma, seems stable. He is taking his inhaler. Bringing up yellow phlegm.     BP Readings from Last 3 Encounters:   09/11/19 124/76   08/29/19 (!) 128/93   07/31/19 134/81    Wt Readings from Last 3 Encounters:   09/11/19 132.4 kg (291 lb 12.8 oz)   07/31/19 133.4 kg (294 lb)   07/15/19 133.4 kg (294 lb)                 Reviewed and updated as needed this visit by Provider         Review of Systems   ROS COMP: Constitutional, HEENT, cardiovascular, pulmonary, gi and gu systems are negative, except as otherwise noted.      Objective    /76 (BP Location: Right arm, Patient Position: Chair, Cuff Size: Adult Large)   Pulse 100   Temp 98.4  F (36.9  C) (Oral)   Resp 22   Ht 1.829 m (6')   Wt 132.4 kg (291 lb 12.8 oz)   SpO2 94%   BMI 39.58 kg/m    Body mass index is 39.58 kg/m .  Physical Exam   GENERAL: healthy, alert and no distress  EYES: Eyes grossly normal to inspection, PERRL and conjunctivae and sclerae normal  HENT: ear canals and TM's normal, nose and mouth without ulcers or lesions  NECK: no adenopathy, no asymmetry, masses, or scars and thyroid normal to palpation  RESP: lungs clear to auscultation - no rales, rhonchi or wheezes  CV: regular rate and rhythm, normal S1 S2, no S3 or S4, no murmur, click or rub, no peripheral edema and peripheral pulses strong  ABDOMEN: soft, nontender, no hepatosplenomegaly, no masses and bowel sounds normal          Assessment & Plan     1. Upper respiratory tract infection, unspecified type   will try Amoxicillin   - amoxicillin (AMOXIL) 500 MG capsule; Take 1 capsule (500 mg) by mouth 3 times daily  Dispense: 42 capsule; Refill: 0     BMI:   Estimated body mass index is 39.58 kg/m  as calculated from the  following:    Height as of this encounter: 1.829 m (6').    Weight as of this encounter: 132.4 kg (291 lb 12.8 oz).       No follow-ups on file.    Carlyn Payne MD  [facility]

## 2019-09-13 ENCOUNTER — TEAM CONFERENCE (OUTPATIENT)
Dept: PULMONOLOGY | Facility: CLINIC | Age: 64
End: 2019-09-13

## 2019-09-13 DIAGNOSIS — R91.8 PULMONARY NODULES: ICD-10-CM

## 2019-09-13 DIAGNOSIS — I27.20 PULMONARY HYPERTENSION (H): Primary | ICD-10-CM

## 2019-09-13 DIAGNOSIS — D86.9 SARCOIDOSIS: ICD-10-CM

## 2019-09-13 NOTE — TELEPHONE ENCOUNTER
Sarcoidosis Multidisciplinary Meeting     Physician: Dr Martin     Question for Group: Any evidence of cardiac sarcoid?      Plan: No evidence of cardiac sarcoid but some concern about pulmonary hypertension, recommending right heart cath. Also recommending close monitoring of lung nodules due to hx of prostate cancer.      Patient Notification and Response: Informed patient, agreed with plan.

## 2019-09-19 DIAGNOSIS — R93.1 ABNORMAL ECHOCARDIOGRAM: ICD-10-CM

## 2019-09-19 DIAGNOSIS — R91.8 PULMONARY NODULES: Primary | ICD-10-CM

## 2019-09-23 ENCOUNTER — MYC MEDICAL ADVICE (OUTPATIENT)
Dept: INTERNAL MEDICINE | Facility: CLINIC | Age: 64
End: 2019-09-23

## 2019-09-24 ENCOUNTER — DOCUMENTATION ONLY (OUTPATIENT)
Dept: CARE COORDINATION | Facility: CLINIC | Age: 64
End: 2019-09-24

## 2019-09-24 NOTE — TELEPHONE ENCOUNTER
I talked with him. He clearly is getting better and I would do nothing more at this point. If he starts to get worse he will let me know.

## 2019-09-30 ENCOUNTER — DOCUMENTATION ONLY (OUTPATIENT)
Dept: UROLOGY | Facility: CLINIC | Age: 64
End: 2019-09-30

## 2019-09-30 DIAGNOSIS — N40.0 ENLARGED PROSTATE: Primary | ICD-10-CM

## 2019-10-02 ENCOUNTER — DOCUMENTATION ONLY (OUTPATIENT)
Dept: CARE COORDINATION | Facility: CLINIC | Age: 64
End: 2019-10-02

## 2019-10-10 DIAGNOSIS — N40.0 ENLARGED PROSTATE: ICD-10-CM

## 2019-10-10 LAB — PSA SERPL-MCNC: <0.01 UG/L (ref 0–4)

## 2019-10-10 PROCEDURE — 84153 ASSAY OF PSA TOTAL: CPT | Performed by: UROLOGY

## 2019-10-10 PROCEDURE — 36415 COLL VENOUS BLD VENIPUNCTURE: CPT | Performed by: UROLOGY

## 2019-10-14 ENCOUNTER — OFFICE VISIT (OUTPATIENT)
Dept: UROLOGY | Facility: CLINIC | Age: 64
End: 2019-10-14
Payer: COMMERCIAL

## 2019-10-14 VITALS
HEIGHT: 72 IN | SYSTOLIC BLOOD PRESSURE: 142 MMHG | HEART RATE: 88 BPM | DIASTOLIC BLOOD PRESSURE: 70 MMHG | WEIGHT: 292 LBS | BODY MASS INDEX: 39.55 KG/M2

## 2019-10-14 DIAGNOSIS — Z85.46 PERSONAL HISTORY OF MALIGNANT NEOPLASM OF PROSTATE: Primary | ICD-10-CM

## 2019-10-14 PROCEDURE — 99213 OFFICE O/P EST LOW 20 MIN: CPT | Performed by: UROLOGY

## 2019-10-14 ASSESSMENT — MIFFLIN-ST. JEOR: SCORE: 2152.5

## 2019-10-14 ASSESSMENT — PAIN SCALES - GENERAL: PAINLEVEL: NO PAIN (0)

## 2019-10-14 NOTE — PROGRESS NOTES
Office Visit Note  [facility]  [phone]    UROLOGIC DIAGNOSES:   pT3a [place] 4+3=7 prostate cancer    CURRENT INTERVENTIONS:   Robotic prostatectomy May 2019    HISTORY:   Derek returns to clinic today for prostate cancer follow-up.  His PSA remains undetectable.  He is feeling well.  He has minimal urinary leakage and wears 1 pad per day.      PAST MEDICAL HISTORY:   Past Medical History:   Diagnosis Date     Asthma      Claustrophobia      Hypercholesteremia      Hypertension      Mediastinal adenopathy      Obesity      BEULAH (obstructive sleep apnea)      Prostate cancer ([facility])      Sarcoidosis        PAST SURGICAL HISTORY:   Past Surgical History:   Procedure Laterality Date     APPENDECTOMY       C TOTAL HIP ARTHROPLASTY Bilateral      C TOTAL KNEE ARTHROPLASTY Bilateral      DAVINCI PROSTATECTOMY, LYMPHADENECTOMY N/A 5/23/2019    Procedure: ROBOTIC ASSISTED LAPAROSCOPIC RADICAL PROSTATECTOMY WITH BILATERAL PELVIC LYMPH NODE DISSECTION;  Surgeon: Matteo Coughlin MD;  Location: [facility] OR     ENDOBRONCHIAL ULTRASOUND FLEXIBLE N/A 3/29/2019    Procedure: Flexible Bronchoscopy, Endobronchial Ultrasound;  Surgeon: Curtis Leger MD;  Location: [facility] OR     VASECTOMY         FAMILY HISTORY:   Family History   Problem Relation Age of Onset     Alzheimer Disease Mother      Heart Disease Father        SOCIAL HISTORY:   Social History     Tobacco Use     Smoking status: Never Smoker     Smokeless tobacco: Never Used   Substance Use Topics     Alcohol use: Yes     Comment: twice a week       Current Outpatient Medications   Medication     albuterol (PROAIR HFA/PROVENTIL HFA/VENTOLIN HFA) 108 (90 Base) MCG/ACT inhaler     aspirin (ASA) 81 MG tablet     atorvastatin (LIPITOR) 40 MG tablet     BREO ELLIPTA 200-25 MCG/INH Inhaler     cetirizine (ZYRTEC) 10 MG tablet     hydrochlorothiazide (HYDRODIURIL) 12.5 MG tablet     multivitamin (ONE-DAILY) tablet     amoxicillin (AMOXIL) 500 MG capsule     No  current facility-administered medications for this visit.          PHYSICAL EXAM:    BP (!) 142/70   Pulse 88   Ht 1.829 m (6')   Wt 132.5 kg (292 lb)   BMI 39.60 kg/m      Constitutional: Well developed. Conversant and in no acute distress  Eyes: Anicteric sclera, conjunctiva clear, normal extraocular movements  ENT: Normocephalic and atraumatic,   Skin: Warm and dry. No rashes or lesions  Cardiac: No peripheral edema  Back/Flank: Not done  CNS/PNS: Normal musculature and movements, moves all extremities normally  Respiratory: Normal non-labored breathing  Abdomen: Soft nontender and nondistended  Peripheral Vascular: No peripheral edema  Mental Status/Psych: Alert and Oriented x 3. Normal mood and affect    Penis: Not done  Scrotal Skin: Not done  Testicles: Not done  Epididymis: Not done  Digital Rectal Exam:     Cystoscopy: Not done    Imaging: None    Urinalysis: [facility] RESULTS:  Recent Labs   Lab Test 05/07/19  0748   COLOR Yellow   APPEARANCE Clear   URINEGLC Negative   URINEBILI Negative   URINEKETONE Negative   SG 1.015   UBLD Negative   URINEPH 7.0   PROTEIN Negative   UROBILINOGEN 0.2   NITRITE Negative   LEUKEST Negative   RBCU O - 2   WBCU 0 - 5       PSA: Undetectable    Post Void Residual:     Other labs: None today      IMPRESSION:  Doing well, PSA undetectable    PLAN:  He is doing very well now 5 months out from his prostate surgery.  We discussed his PSA and discussed the surveillance plan.  I will see him back in 6 months for his next PSA check.      Matteo Coughlin M.D.

## 2019-10-14 NOTE — NURSING NOTE
Here for follow up. He reports urinating well and incontinence has improve however he has a cough right now so is leaking some with coughing..Rani Corona, ZANEN

## 2019-10-14 NOTE — LETTER
10/14/2019       RE: Derek Contreras  [id number] [place] [address]     Dear Colleague,    Thank you for referring your patient, Derek Contreras, to the [facility] [place] at [facility]. Please see a copy of my visit note below.    Office Visit Note  [facility]  [phone]    UROLOGIC DIAGNOSES:   pT3a [place] 4+3=7 prostate cancer    CURRENT INTERVENTIONS:   Robotic prostatectomy May 2019    HISTORY:   Derek returns to clinic today for prostate cancer follow-up.  His PSA remains undetectable.  He is feeling well.  He has minimal urinary leakage and wears 1 pad per day.    PAST MEDICAL HISTORY:   Past Medical History:   Diagnosis Date     Asthma      Claustrophobia      Hypercholesteremia      Hypertension      Mediastinal adenopathy      Obesity      BEULAH (obstructive sleep apnea)      Prostate cancer (H)      Sarcoidosis        PAST SURGICAL HISTORY:   Past Surgical History:   Procedure Laterality Date     APPENDECTOMY       C TOTAL HIP ARTHROPLASTY Bilateral      C TOTAL KNEE ARTHROPLASTY Bilateral      DAVINCI PROSTATECTOMY, LYMPHADENECTOMY N/A 5/23/2019    Procedure: ROBOTIC ASSISTED LAPAROSCOPIC RADICAL PROSTATECTOMY WITH BILATERAL PELVIC LYMPH NODE DISSECTION;  Surgeon: Matteo Coughlin MD;  Location: [facility] OR     ENDOBRONCHIAL ULTRASOUND FLEXIBLE N/A 3/29/2019    Procedure: Flexible Bronchoscopy, Endobronchial Ultrasound;  Surgeon: Curtis Leger MD;  Location: [facility] OR     VASECTOMY       FAMILY HISTORY:   Family History   Problem Relation Age of Onset     Alzheimer Disease Mother      Heart Disease Father      SOCIAL HISTORY:   Social History     Tobacco Use     Smoking status: Never Smoker     Smokeless tobacco: Never Used   Substance Use Topics     Alcohol use: Yes     Comment: twice a week     Current Outpatient Medications   Medication     albuterol (PROAIR HFA/PROVENTIL  HFA/VENTOLIN HFA) 108 (90 Base) MCG/ACT inhaler     aspirin (ASA) 81 MG tablet     atorvastatin (LIPITOR) 40 MG tablet     BREO ELLIPTA 200-25 MCG/INH Inhaler     cetirizine (ZYRTEC) 10 MG tablet     hydrochlorothiazide (HYDRODIURIL) 12.5 MG tablet     multivitamin (ONE-DAILY) tablet     amoxicillin (AMOXIL) 500 MG capsule     No current facility-administered medications for this visit.      PHYSICAL EXAM:  BP (!) 142/70   Pulse 88   Ht 1.829 m (6')   Wt 132.5 kg (292 lb)   BMI 39.60 kg/m       Constitutional: Well developed. Conversant and in no acute distress  Eyes: Anicteric sclera, conjunctiva clear, normal extraocular movements  ENT: Normocephalic and atraumatic,   Skin: Warm and dry. No rashes or lesions  Cardiac: No peripheral edema  Back/Flank: Not done  CNS/PNS: Normal musculature and movements, moves all extremities normally  Respiratory: Normal non-labored breathing  Abdomen: Soft nontender and nondistended  Peripheral Vascular: No peripheral edema  Mental Status/Psych: Alert and Oriented x 3. Normal mood and affect    Penis: Not done  Scrotal Skin: Not done  Testicles: Not done  Epididymis: Not done  Digital Rectal Exam:     Cystoscopy: Not done    Imaging: None    Urinalysis: U[facility] RESULTS:  Recent Labs   Lab Test 05/07/19  0748   COLOR Yellow   APPEARANCE Clear   URINEGLC Negative   URINEBILI Negative   URINEKETONE Negative   SG 1.015   UBLD Negative   URINEPH 7.0   PROTEIN Negative   UROBILINOGEN 0.2   NITRITE Negative   LEUKEST Negative   RBCU O - 2   WBCU 0 - 5     PSA: Undetectable    Post Void Residual:     Other labs: None today    IMPRESSION:  Doing well, PSA undetectable    PLAN:  He is doing very well now 5 months out from his prostate surgery.  We discussed his PSA and discussed the surveillance plan.  I will see him back in 6 months for his next PSA check.      Matteo Coughlin M.D.

## 2019-10-16 ENCOUNTER — OFFICE VISIT (OUTPATIENT)
Dept: INTERNAL MEDICINE | Facility: CLINIC | Age: 64
End: 2019-10-16
Payer: COMMERCIAL

## 2019-10-16 VITALS
WEIGHT: 296.8 LBS | OXYGEN SATURATION: 93 % | HEIGHT: 72 IN | SYSTOLIC BLOOD PRESSURE: 128 MMHG | RESPIRATION RATE: 18 BRPM | DIASTOLIC BLOOD PRESSURE: 80 MMHG | HEART RATE: 100 BPM | BODY MASS INDEX: 40.2 KG/M2 | TEMPERATURE: 98.3 F

## 2019-10-16 DIAGNOSIS — R05.3 PERSISTENT COUGH FOR 3 WEEKS OR LONGER: Primary | ICD-10-CM

## 2019-10-16 DIAGNOSIS — J45.40 MODERATE PERSISTENT ASTHMA WITHOUT COMPLICATION: ICD-10-CM

## 2019-10-16 DIAGNOSIS — Z86.2 HISTORY OF SARCOIDOSIS: ICD-10-CM

## 2019-10-16 PROCEDURE — 99214 OFFICE O/P EST MOD 30 MIN: CPT | Performed by: FAMILY MEDICINE

## 2019-10-16 RX ORDER — AZITHROMYCIN 250 MG/1
TABLET, FILM COATED ORAL
Qty: 6 TABLET | Refills: 0 | Status: SHIPPED | OUTPATIENT
Start: 2019-10-16 | End: 2019-10-28

## 2019-10-16 RX ORDER — FLUTICASONE PROPIONATE 50 MCG
1-2 SPRAY, SUSPENSION (ML) NASAL DAILY
Qty: 16 G | Refills: 0 | Status: ON HOLD | OUTPATIENT
Start: 2019-10-16 | End: 2022-01-19

## 2019-10-16 ASSESSMENT — ENCOUNTER SYMPTOMS
HEMATURIA: 0
SHORTNESS OF BREATH: 0
UNEXPECTED WEIGHT CHANGE: 0
DYSURIA: 0

## 2019-10-16 ASSESSMENT — MIFFLIN-ST. JEOR: SCORE: 2174.28

## 2019-10-16 NOTE — PROGRESS NOTES
SUBJECTIVE:   Derek Contreras is a 63 year old male presenting with a chief complaint of   Chief Complaint   Patient presents with     URI     cough since August, was seeing in early September by Dr Payne and prescribed amoxicillin,still has cough, ( had recent prostate surgery and cough is really bothering him, because it causes leaking )        He is an established patient of [facility].    HPI: The patient is a 63-year-old male, with a known history of moderate persistent asthma (on Breo Ellipta) and sarcoidosis, followed by Pulmonary.  Patient presents with a persistent cough, starting 8+ weeks ago.  Patient was last seen for this 9/11/2019 (reviewed in Epic), at which time he was diagnosed with a URI and prescribed Amoxicillin.    Patient states that he had some improvement with Amoxicillin treatment.  He states that his primary MD considered prescribing Prednisone if his cough did not improve.  Recent Pulmonary appointment was rescheduled, per patient.  Patient has been monitoring his oxygen saturation, which has been 91 to 92% on room air with exercise.  Cough is productive of light phlegm, seeming to originate from the back of his throat.  Throat is slightly irritated, with some postnasal drainage noted.  Patient denies history of seasonal allergies.  Patient is able to exercise and do his usual activities without chest pain, shortness of breath, wheezing, or exertional symptoms.  No fever, chills, nausea, vomiting, hemoptysis, edema, or orthopnea reported.  No recent travel.  Wife and grandchildren recently had similar symptoms, which were responsive to Amoxicillin treatment.  Patient presents for reevaluation today, given his persistent cough.    Patient is post recent prostate surgery 5/2019, with cough induced stress incontinence noted.  No dysuria, abdominal pain, or other UTI symptoms reported.  Patient was seen by Urology 10/10/2019, at which time his PSA was < 0.01.     Review of Systems    Constitutional: Negative for unexpected weight change.   Respiratory: Negative for shortness of breath.    Cardiovascular: Negative for chest pain.   Genitourinary: Negative for dysuria and hematuria.       Patient Active Problem List   Diagnosis     Morbid obesity (H)     Sarcoidosis     Essential hypertension     S/P hip replacement, bilateral     S/P TKR (total knee replacement), bilateral     S/P appendectomy     Bilateral hearing loss, unspecified hearing loss type     Hypercholesteremia     Moderate asthma without complication     Prostate cancer (H)       Past Medical History:   Diagnosis Date     Asthma      Claustrophobia      Hypercholesteremia      Hypertension      Mediastinal adenopathy      Obesity      BEULAH (obstructive sleep apnea)      Prostate cancer (H)      Sarcoidosis        Allergies   Allergen Reactions     Cat Hair Extract Itching     itchy tearful eyes     Adhesive Tape Rash     Iodine Rash       Family History   Problem Relation Age of Onset     Alzheimer Disease Mother      Heart Disease Father        Current Outpatient Medications   Medication Sig Dispense Refill     albuterol (PROAIR HFA/PROVENTIL HFA/VENTOLIN HFA) 108 (90 Base) MCG/ACT inhaler INHALE 2 PUFFS BY MOUTH Q 4 TO 6 H FOR COUGH AND BREATHLESSNESS  4     aspirin (ASA) 81 MG tablet Take 81 mg by mouth every morning ON HOLD FOR SURGERY SINCE 03/14/2019 90 tablet 3     atorvastatin (LIPITOR) 40 MG tablet Take 1 tablet (40 mg) by mouth every morning 90 tablet 1     BREO ELLIPTA 200-25 MCG/INH Inhaler INL 1 PUFF PO AT THE SAME TIME Q DAY. RINSE AND SPIT AFTER U  4     cetirizine (ZYRTEC) 10 MG tablet Take 10 mg by mouth every morning  90 tablet 3     hydrochlorothiazide (HYDRODIURIL) 12.5 MG tablet Take 1 tablet (12.5 mg) by mouth every morning 90 tablet 1     multivitamin (ONE-DAILY) tablet Take 1 tablet by mouth every morning  90 tablet 3       Social History     Tobacco Use     Smoking status: Never Smoker     Smokeless tobacco:  Never Used   Substance Use Topics     Alcohol use: Yes     Comment: twice a week       OBJECTIVE  /80   Pulse 100   Temp 98.3  F (36.8  C) (Oral)   Resp 18   Ht 1.829 m (6')   Wt 134.6 kg (296 lb 12.8 oz)   SpO2 93%   BMI 40.25 kg/m      Physical Exam    GENERAL APPEARANCE:  Awake, alert, and in no acute distress.  PSYCHIATRIC:  Pleasant affect.  HEENT:  Sclera anicteric.  No conjunctivitis.  PERRLA.  Extraocular movements are intact.  Bilateral TM's and canals are within normal limits.  Mild nasal congestion, with clear postnasal drainage.  Sinuses are not tender to palpation.  No significant erythema in the posterior pharynx.  No edema or exudates of the oral mucosa or posterior pharynx.  Mucous membranes moist.  NECK:  Spontaneous full range of motion.  No thyromegaly or mass.  No lymphadenopathy.  HEART:  Normal S1, S2.  Regular rate and rhythm.  No murmurs, rubs, or gallops.  LUNGS:  No respiratory distress.  Good air entry throughout the lung fields.  No wheezes, rales, or rhonchi.  ABDOMEN:  Not distended.  Soft.  Not tender.  No mass.  EXTREMITIES:  Moves 4 extremities.   No edema or calf tenderness.  NEUROLOGIC:  Gait within normal limits.  No facial droop or acute neurologic deficits.  SKIN:  No rash.    Labs:  Urinalysis discussed with and declined by patient.    Chest X-ray:  Discussed with and declined by patient.      ASSESSMENT:      ICD-10-CM    1. Persistent cough for 3 weeks or longer.  Differential was considered, including acute bronchitis and postnasal drainage.  Moderate persistent asthma seems to be well-controlled on Breo Ellipta currently.  Patient has a known history of sarcoidosis, followed by Pulmonary.  Doubt pulmonary embolus or CHF at this time. R05 azithromycin (ZITHROMAX) 250 MG tablet     fluticasone (FLONASE) 50 MCG/ACT nasal spray   2. Moderate persistent asthma without complication. J45.40    3. History of sarcoidosis. Z86.2         PLAN:    Patient declines a Chest  X-ray, Urinalysis, or further evaluation today.    Patient declines Prednisone, but he is in agreement with the following plan:    Patient Instructions     Symptomatic cares, humidified air, fluids, and rest, only as discussed.    Over-the-counter medications (e.g. cough suppressants, cough drops), only as discussed.    Continue your Breo Ellipta, as before.    Use Albuterol every 4 hours as needed.    Z-pack, as prescribed.    Flonase x 2 weeks, as discussed/prescribed.    Follow up if:  fever, worsening symptoms, or not gradually improving over the next week.    Follow-up with your Pulmonary specialist, as discussed.    Follow up in the ER immediately if:  chest pain > 5 minutes (outside of coughing), breathing concerns, hemoptysis (coughing up blood), or other severe/emergent symptoms.    Discussed risks and benefits of treatment strategies, as noted in the Assessment and Plan sections.    The patient was discharged ambulatory and in stable condition post discussion of follow up.     The above dictation was composed using Dragon software.    Diana Lima MD

## 2019-10-16 NOTE — PATIENT INSTRUCTIONS
Symptomatic cares, humidified air, fluids, and rest, only as discussed.    Over-the-counter medications (e.g. cough suppressants, cough drops), only as discussed.    Continue your Breo Ellipta, as before.    Use Albuterol every 4 hours as needed.    Z-pack, as prescribed.    Flonase x 2 weeks, as discussed/prescribed.    Follow up if:  fever, worsening symptoms, or not gradually improving over the next week.    Follow-up with your Pulmonary specialist, as discussed.    Follow up in the ER immediately if:  chest pain > 5 minutes (outside of coughing), breathing concerns, hemoptysis (coughing up blood), or other severe/emergent symptoms.

## 2019-10-17 NOTE — PROVIDER NOTIFICATION
Text page sent to Deidra Samson- BARBARA: At 1542, pt had PVCs every other to bigeminy and trigeminy. Asymptomatic (except vertigo) and VSS. PVCs are less frequent now. I know you did a workup but wanted you to be aware.     1630- Deidra Samson called back and stated she is aware. No change to plan of care.   Patient is a 79y old  Male who presents with a chief complaint of SOB (16 Oct 2019 10:51)      BRIEF HOSPITAL COURSE:     79M w/ PMH listed below, admitted to ICU with AECOPD, requiring high level BiPAP support, and further complicated by afib with RVR.     Events last 24 hours:   -BiPAp need now much less and afib with RVR has resolved.     PAST MEDICAL & SURGICAL HISTORY:  PVD (peripheral vascular disease)  Hypertension  COPD (chronic obstructive pulmonary disease): on 2L at home and BiPAP at night; intubated 6/18  Osteomyelitis  Dyslipidemia  CAD (Coronary Artery Disease): s/p 3v CABG 2004  Diabetes Mellitus, Type II  S/P primary angioplasty with coronary stent  Compound fracture: left leg  CABG (Coronary Artery Bypass Graft): 2004      Review of Systems:  CONSTITUTIONAL: No fever, chills, or fatigue  RESPIRATORY: No cough, wheezing, chills or hemoptysis; No shortness of breath  CARDIOVASCULAR: No chest pain, palpitations, dizziness, or leg swelling  GASTROINTESTINAL: No abdominal or epigastric pain. No nausea, vomiting, or hematemesis; No diarrhea or constipation. No melena or hematochezia.        Medications:  piperacillin/tazobactam IVPB.. 3.375 Gram(s) IV Intermittent every 8 hours  vancomycin  IVPB 1000 milliGRAM(s) IV Intermittent every 12 hours    metoprolol tartrate 25 milliGRAM(s) Oral daily  tamsulosin 0.4 milliGRAM(s) Oral at bedtime    ALBUTerol/ipratropium for Nebulization 3 milliLiter(s) Nebulizer every 6 hours  buDESOnide    Inhalation Suspension 0.5 milliGRAM(s) Inhalation two times a day  guaiFENesin  milliGRAM(s) Oral every 12 hours    acetaminophen   Tablet .. 650 milliGRAM(s) Oral every 6 hours PRN      aspirin enteric coated 81 milliGRAM(s) Oral daily  clopidogrel Tablet 75 milliGRAM(s) Oral daily  heparin  Injectable 5000 Unit(s) SubCutaneous every 8 hours    pantoprazole  Injectable 40 milliGRAM(s) IV Push daily      atorvastatin 40 milliGRAM(s) Oral at bedtime  dextrose 40% Gel 15 Gram(s) Oral once PRN  dextrose 50% Injectable 12.5 Gram(s) IV Push once  dextrose 50% Injectable 25 Gram(s) IV Push once  dextrose 50% Injectable 25 Gram(s) IV Push once  glucagon  Injectable 1 milliGRAM(s) IntraMuscular once PRN  insulin lispro (HumaLOG) corrective regimen sliding scale   SubCutaneous every 4 hours  predniSONE   Tablet 50 milliGRAM(s) Oral every 12 hours    dextrose 5%. 1000 milliLiter(s) IV Continuous <Continuous>    influenza   Vaccine 0.5 milliLiter(s) IntraMuscular once              ICU Vital Signs Last 24 Hrs  T(C): 36.8 (17 Oct 2019 00:01), Max: 36.9 (16 Oct 2019 20:08)  T(F): 98.2 (17 Oct 2019 00:01), Max: 98.5 (16 Oct 2019 20:08)  HR: 87 (17 Oct 2019 02:00) (82 - 130)  BP: 133/63 (17 Oct 2019 02:00) (106/55 - 137/62)  BP(mean): 90 (17 Oct 2019 02:00) (77 - 93)  RR: 23 (17 Oct 2019 02:00) (15 - 46)  SpO2: 100% (17 Oct 2019 02:00) (99% - 100%)      ABG - ( 16 Oct 2019 04:51 )  pH, Arterial: 7.23  pH, Blood: x     /  pCO2: 73    /  pO2: 296   / HCO3: 25    / Base Excess: 2.3   /  SaO2: 100                 I&O's Detail    15 Oct 2019 07:01  -  16 Oct 2019 07:00  --------------------------------------------------------  IN:    sodium chloride 0.9%: 300 mL    Solution: 250 mL  Total IN: 550 mL    OUT:  Total OUT: 0 mL    Total NET: 550 mL      16 Oct 2019 07:01  -  17 Oct 2019 03:06  --------------------------------------------------------  IN:    Oral Fluid: 550 mL    sodium chloride 0.9%: 800 mL    Solution: 250 mL    Solution: 200 mL  Total IN: 1800 mL    OUT:    Voided: 1470 mL  Total OUT: 1470 mL    Total NET: 330 mL            LABS:                        12.4   13.73 )-----------( 317      ( 16 Oct 2019 01:35 )             40.5     10-16    142  |  104  |  15  ----------------------------<  153<H>  4.3   |  30  |  1.20    Ca    9.9      16 Oct 2019 01:35    TPro  7.2  /  Alb  4.0  /  TBili  0.4  /  DBili  x   /  AST  23  /  ALT  36  /  AlkPhos  90  10-16      CARDIAC MARKERS ( 16 Oct 2019 17:24 )  .021 ng/mL / x     / x     / x     / x      CARDIAC MARKERS ( 16 Oct 2019 08:23 )  .021 ng/mL / x     / x     / x     / x      CARDIAC MARKERS ( 16 Oct 2019 01:35 )  <.015 ng/mL / x     / 87 U/L / x     / 3.9 ng/mL      CAPILLARY BLOOD GLUCOSE      POCT Blood Glucose.: 241 mg/dL (17 Oct 2019 01:56)        CULTURES:  Rapid RVP Result: NotDetec (10-16-19 @ 16:28)      Physical Examination:    General: No acute distress.  Alert, oriented, interactive, nonfocal    HEENT: Pupils equal, reactive to light.  Symmetric.    PULM: Clear to auscultation bilaterally, no significant sputum production    CVS: Regular rate and rhythm, no murmurs, rubs, or gallops    ABD: Soft, nondistended, nontender, normoactive bowel sounds, no masses    EXT: No edema, nontender    SKIN: Warm and well perfused, no rashes noted.

## 2019-10-28 ENCOUNTER — OFFICE VISIT (OUTPATIENT)
Dept: OPHTHALMOLOGY | Facility: CLINIC | Age: 64
End: 2019-10-28
Attending: OPHTHALMOLOGY
Payer: COMMERCIAL

## 2019-10-28 DIAGNOSIS — D86.9 SARCOIDOSIS: ICD-10-CM

## 2019-10-28 DIAGNOSIS — H25.13 NUCLEAR SENILE CATARACT OF BOTH EYES: Primary | ICD-10-CM

## 2019-10-28 PROBLEM — E66.01 MORBID OBESITY (H): Status: ACTIVE | Noted: 2017-03-10

## 2019-10-28 PROCEDURE — G0463 HOSPITAL OUTPT CLINIC VISIT: HCPCS | Mod: ZF

## 2019-10-28 ASSESSMENT — SLIT LAMP EXAM - LIDS
COMMENTS: NORMAL
COMMENTS: NORMAL

## 2019-10-28 ASSESSMENT — REFRACTION_WEARINGRX
OS_CYLINDER: +1.25
OS_SPHERE: -2.25
OD_CYLINDER: +1.00
OD_AXIS: 070
OD_SPHERE: -2.00
OS_AXIS: 085
SPECS_TYPE: SVL DIST

## 2019-10-28 ASSESSMENT — CUP TO DISC RATIO
OD_RATIO: 0.2
OS_RATIO: 0.2

## 2019-10-28 ASSESSMENT — TONOMETRY
IOP_METHOD: ICARE
OS_IOP_MMHG: 12
OD_IOP_MMHG: 9

## 2019-10-28 ASSESSMENT — CONF VISUAL FIELD
METHOD: COUNTING FINGERS
OS_NORMAL: 1
OD_NORMAL: 1

## 2019-10-28 ASSESSMENT — VISUAL ACUITY
OD_SC: J2
OS_SC: J2
METHOD: SNELLEN - LINEAR
CORRECTION_TYPE: GLASSES
OD_CC: 20/20
OS_CC: 20/20

## 2019-10-28 ASSESSMENT — EXTERNAL EXAM - RIGHT EYE: OD_EXAM: NORMAL

## 2019-10-28 ASSESSMENT — EXTERNAL EXAM - LEFT EYE: OS_EXAM: NORMAL

## 2019-10-28 NOTE — PROGRESS NOTES
CC: baseline eye exam, h/o sarcoidosis    HPI: Derek Contreras is a 63 year old male here for comprehensive eye exam. He was clinically diagnosed with sarcoidosis ~10 years ago but definitively confirmed via bronchoscopy in 2019. It was recommended that he have a baseline eye exam, as he has never had an eye exam with an ophthalmologist (had previously seen optometrists, last exam 1.5 years ago).     Denies history of prior ocular surgeries or injuries. Denies any problems with his vision. Family history of glaucoma (mother).    Interval Hx: First visit to Dr. Huff. Wears glasses for distance only. No pain, redness, tearing, new flashes, floaters, diplopia. No sensitivity to light    PMHx: Sarcoidosis, prostate cancer (s/p prostatectomy in May 2019, now in remission), hypertension (well controlled)  POHx: none  Gtts: none    ASSESSMENT/PLAN:   1)Sarcoidosis  -diagnosed via bronchoscopy, mostly pulmonary involvement  -no history of ocular involvement and eyes quiet today  -recommend annual eye exam, sooner with symptoms of redness, light sensitivity, flashes, floaters    2)Cataract, both eyes  -NVS, mild nuclear sclerosis OU    3) Family history of glaucoma  -mother  -IOPs low/normal, optic nerves appear healthy  -recommend annual exam    RTC 1 year, sooner matt Lares MD  [id number], [facility] Fellow  Ophthalmology    Attending Physician Attestation:  Complete documentation of historical and exam elements from today's encounter can be found in the full encounter summary report (not reduplicated in this progress note).  I personally obtained the chief complaint(s) and history of present illness.  I confirmed and edited as necessary the review of systems, past medical/surgical history, family history, social history, and examination findings as documented by others; and I examined the patient myself.  I personally reviewed the relevant tests, images, and reports as documented above.  I formulated and edited  as necessary the assessment and plan and discussed the findings and management plan with the patient and family. - Miles Huff MD

## 2019-10-28 NOTE — LETTER
10/28/2019       RE: Derek Contreras  [id number] [place] [address]     Dear Colleague,    Thank you for referring your patient, Derek Contreras, to the EYE CLINIC at [facility]. Please see a copy of my visit note below.    CC: baseline eye exam, h/o sarcoidosis    HPI: Derek Contreras is a 63 year old male here for comprehensive eye exam. He was clinically diagnosed with sarcoidosis ~10 years ago but definitively confirmed via bronchoscopy in 2019. It was recommended that he have a baseline eye exam, as he has never had an eye exam with an ophthalmologist (had previously seen optometrists, last exam 1.5 years ago).     Denies history of prior ocular surgeries or injuries. Denies any problems with his vision. Family history of glaucoma (mother).    Interval Hx: First visit to Dr. Huff. Wears glasses for distance only. No pain, redness, tearing, new flashes, floaters, diplopia. No sensitivity to light    PMHx: Sarcoidosis, prostate cancer (s/p prostatectomy in May 2019, now in remission), hypertension (well controlled)  POHx: none  Gtts: none    ASSESSMENT/PLAN:   1)Sarcoidosis  -diagnosed via bronchoscopy, mostly pulmonary involvement  -no history of ocular involvement and eyes quiet today  -recommend annual eye exam, sooner with symptoms of redness, light sensitivity, flashes, floaters    2)Cataract, both eyes  -NVS, mild nuclear sclerosis OU    3) Family history of glaucoma  -mother  -IOPs low/normal, optic nerves appear healthy  -recommend annual exam    RTC 1 year, sooner prn        Again, thank you for allowing me to participate in the care of your patient.      Sincerely,    Miles Huff MD

## 2019-10-30 ENCOUNTER — OFFICE VISIT (OUTPATIENT)
Dept: PULMONOLOGY | Facility: CLINIC | Age: 64
End: 2019-10-30
Attending: INTERNAL MEDICINE
Payer: COMMERCIAL

## 2019-10-30 ENCOUNTER — ANCILLARY PROCEDURE (OUTPATIENT)
Dept: GENERAL RADIOLOGY | Facility: CLINIC | Age: 64
End: 2019-10-30
Attending: INTERNAL MEDICINE
Payer: COMMERCIAL

## 2019-10-30 VITALS
OXYGEN SATURATION: 95 % | HEIGHT: 72 IN | SYSTOLIC BLOOD PRESSURE: 134 MMHG | DIASTOLIC BLOOD PRESSURE: 76 MMHG | BODY MASS INDEX: 40.09 KG/M2 | HEART RATE: 67 BPM | WEIGHT: 296 LBS

## 2019-10-30 DIAGNOSIS — D86.9 SARCOIDOSIS: ICD-10-CM

## 2019-10-30 DIAGNOSIS — D86.9 SARCOIDOSIS: Primary | ICD-10-CM

## 2019-10-30 DIAGNOSIS — G47.33 OBSTRUCTIVE SLEEP APNEA: ICD-10-CM

## 2019-10-30 DIAGNOSIS — G47.33 OBSTRUCTIVE SLEEP APNEA: Primary | ICD-10-CM

## 2019-10-30 DIAGNOSIS — D86.0 PULMONARY SARCOIDOSIS (H): Primary | ICD-10-CM

## 2019-10-30 LAB
6 MIN WALK (FT): 1045 FT
6 MIN WALK (FT): 808 FT
6 MIN WALK (M): 246 M
6 MIN WALK (M): 319 M

## 2019-10-30 PROCEDURE — G0463 HOSPITAL OUTPT CLINIC VISIT: HCPCS

## 2019-10-30 RX ORDER — PREDNISONE 10 MG/1
30 TABLET ORAL DAILY
Qty: 21 TABLET | Refills: 0 | Status: SHIPPED | OUTPATIENT
Start: 2019-10-30 | End: 2019-11-06

## 2019-10-30 RX ORDER — LEVOFLOXACIN 250 MG/1
750 TABLET, FILM COATED ORAL DAILY
Qty: 21 TABLET | Refills: 0 | Status: SHIPPED | OUTPATIENT
Start: 2019-10-30 | End: 2019-11-06

## 2019-10-30 ASSESSMENT — ASTHMA QUESTIONNAIRES
QUESTION_1 LAST FOUR WEEKS HOW MUCH OF THE TIME DID YOUR ASTHMA KEEP YOU FROM GETTING AS MUCH DONE AT WORK, SCHOOL OR AT HOME: NONE OF THE TIME
QUESTION_3 LAST FOUR WEEKS HOW OFTEN DID YOUR ASTHMA SYMPTOMS (WHEEZING, COUGHING, SHORTNESS OF BREATH, CHEST TIGHTNESS OR PAIN) WAKE YOU UP AT NIGHT OR EARLIER THAN USUAL IN THE MORNING: NOT AT ALL
QUESTION_4 LAST FOUR WEEKS HOW OFTEN HAVE YOU USED YOUR RESCUE INHALER OR NEBULIZER MEDICATION (SUCH AS ALBUTEROL): NOT AT ALL
ACT_TOTALSCORE: 23
QUESTION_5 LAST FOUR WEEKS HOW WOULD YOU RATE YOUR ASTHMA CONTROL: WELL CONTROLLED
QUESTION_2 LAST FOUR WEEKS HOW OFTEN HAVE YOU HAD SHORTNESS OF BREATH: ONCE OR TWICE A WEEK

## 2019-10-30 ASSESSMENT — PAIN SCALES - GENERAL: PAINLEVEL: NO PAIN (0)

## 2019-10-30 ASSESSMENT — MIFFLIN-ST. JEOR: SCORE: 2170.65

## 2019-10-30 NOTE — NURSING NOTE
Chief Complaint   Patient presents with     Follow Up     sarcoids     Vitals were taken and medications were reconciled.     JACKIE Peñaloza

## 2019-10-30 NOTE — LETTER
10/30/2019       RE: Derek Contreras  [address] [place] [address]     Dear Colleague,    Thank you for referring your patient, Derek Contreras, to the [facility] at [facility]. Please see a copy of my visit note below.    Reason for Visit  Derek Contreras is a 63 year old year old male who is being seen for   Pulmonary HPI    The patient was seen and examined by Jamie Martin MD     Mr. Contreras was seen in pulmonary clinic on 7/31/2019 for his initial visit with me.  He has a long-standing history of abnormal chest imaging for which he had EBUS guided TBNA in March 2019 that demonstrated nonnecrotizing granulomas.  The plan at the time of last clinic visit was to review his pathology in multidisciplinary conference, evaluate the  right bundle branch block with cardiac MRI, obtain a sniff test for elevated right hemidiaphragm and obtain labs including IgG, fungal serologies, urine histo antigen to evaluate for alternate causes of granulomatous inflammation and do a metabolic work-up to identify the extent of involvement.  He comes in for follow-up.  In the interim, we reviewed his pathology and findings are consistent with sarcoidosis.  His cardiac MRI did not show any LGE but there was a question of low EF (50%) and  elevated right-sided pressures.  An evaluation for pulmonary hypertension was recommended and a clinic visit is scheduled for November 11.  For his hypoxia he decided to decline nasal cannula oxygen supplementation and monitor his oxygen levels    He comes in today with no major changes.  He tells me that most of the times his oxygen levels are in low to mid 90s but with activity he might see lower numbers.  He has known sleep disordered breathing and he is not using CPAP.  He continues to be dyspneic with one block of activity.  For the last 3 weeks he has had cough, sputum production and nasal  congestion for which she is received a course of amoxicillin and Z-Eleazar without significant improvement.  He has no orthopnea PND or other symptoms.    Current Outpatient Medications   Medication     albuterol (PROAIR HFA/PROVENTIL HFA/VENTOLIN HFA) 108 (90 Base) MCG/ACT inhaler     aspirin (ASA) 81 MG tablet     atorvastatin (LIPITOR) 40 MG tablet     BREO ELLIPTA 200-25 MCG/INH Inhaler     cetirizine (ZYRTEC) 10 MG tablet     fluticasone (FLONASE) 50 MCG/ACT nasal spray     hydrochlorothiazide (HYDRODIURIL) 12.5 MG tablet     multivitamin (ONE-DAILY) tablet     No current facility-administered medications for this visit.      Allergies   Allergen Reactions     Cat Hair Extract Itching     itchy tearful eyes     Adhesive Tape Rash     Iodine Rash     Past Medical History:   Diagnosis Date     Asthma      Claustrophobia      Hypercholesteremia      Hypertension      Mediastinal adenopathy      Obesity      BEULAH (obstructive sleep apnea)      Prostate cancer (H)      Sarcoidosis        Past Surgical History:   Procedure Laterality Date     APPENDECTOMY       C TOTAL HIP ARTHROPLASTY Bilateral      C TOTAL KNEE ARTHROPLASTY Bilateral      DAVINCI PROSTATECTOMY, LYMPHADENECTOMY N/A 5/23/2019    Procedure: ROBOTIC ASSISTED LAPAROSCOPIC RADICAL PROSTATECTOMY WITH BILATERAL PELVIC LYMPH NODE DISSECTION;  Surgeon: Matteo Coughlin MD;  Location: [facility] OR     ENDOBRONCHIAL ULTRASOUND FLEXIBLE N/A 3/29/2019    Procedure: Flexible Bronchoscopy, Endobronchial Ultrasound;  Surgeon: Curtis Leger MD;  Location: [facility] OR     VASECTOMY         Social History     Socioeconomic History     Marital status:      Spouse name: Not on file     Number of children: Not on file     Years of education: Not on file     Highest education level: Not on file   Occupational History     Not on file   Social Needs     Financial resource strain: Not on file     Food insecurity:     Worry: Not on file     Inability: Not on file      Transportation needs:     Medical: Not on file     Non-medical: Not on file   Tobacco Use     Smoking status: Never Smoker     Smokeless tobacco: Never Used   Substance and Sexual Activity     Alcohol use: Yes     Comment: twice a week     Drug use: No     Sexual activity: Yes     Partners: Female   Lifestyle     Physical activity:     Days per week: Not on file     Minutes per session: Not on file     Stress: Not on file   Relationships     Social connections:     Talks on phone: Not on file     Gets together: Not on file     Attends Buddhism service: Not on file     Active member of club or organization: Not on file     Attends meetings of clubs or organizations: Not on file     Relationship status: Not on file     Intimate partner violence:     Fear of current or ex partner: Not on file     Emotionally abused: Not on file     Physically abused: Not on file     Forced sexual activity: Not on file   Other Topics Concern     Parent/sibling w/ CABG, MI or angioplasty before 65F 55M? Not Asked   Social History Narrative     Not on file       ROS Pulmonary    A complete ROS was otherwise negative except as noted in the HPI.  /76   Pulse 67   Ht 1.829 m (6')   Wt 134.3 kg (296 lb)   SpO2 95%   BMI 40.14 kg/m     Exam:   GENERAL APPEARANCE: Well developed, well nourished, alert, and in no apparent distress.  EYES: PERRL, EOMI  HENT: Nasal mucosa with no edema and no hyperemia.  MOUTH: Oral mucosa is moist, without any lesions, no tonsillar enlargement, no oropharyngeal exudate.  NECK: supple, no masses, no thyromegaly.  LYMPHATICS: No significant axillary, cervical, or supraclavicular nodes.  RESP: normal percussion, good air flow throughout.  No crackles. No rhonchi. No wheezes.  CV: Normal S1, S2, regular rhythm, normal rate. No murmur.  No rub. No gallop. No LE edema.   ABDOMEN:  Bowel sounds normal, soft, nontender, no HSM or masses.   MS: extremities normal. No clubbing. No cyanosis.  SKIN: no rash on  limited exam  NEURO: Mentation intact, speech normal, normal strength and tone, normal gait and stance    Results:   PFTs done today were reviewed by me with the patient.  He also had a 6-minute walk test done on 4 L nasal cannula oxygen supplementation where his O2 saturation decreased from 97% to 93%.      FVC FEV1 TLC DLCO   5/9/2011 2.74 (54%) 2.09 (53%) 4.70 (66%) 17.7 (59%)   12/1012 2.47 (51) 1.83 (48) 4.55 (66%) 17.1(59%)   1/14/2013 2.71 (57%) 2.20 (58%)       4/24/2013 2.56 (53%) 1.94 (51%)       3/14/19 1.98 (40%) 1.41 (38%) 3.85 (54%) 16 (52%)   10/30/19 1.95 (40%  1.48 L (40%)   18.75 (65%)     Sniff test with normal movement of the Right hemidiaphragm.  Assessment and plan:   A 63-year-old male with a history of hypertension, hyperlipidemia, obesity, prostate cancer and abnormal chest imaging with TBNA in 03/2019 demonstrating granulomatous inflammation at station 7 and 12L lymph nodes with BAL demonstrating 102 white cells, 11% lymphocytes and CD4/CD8 ratio of 2.29.   1.  Pulmonary: Moderate restriction on PFTs, hypoxia with minimal changes on chest CT scan.  The formal read of the echo did not report PFO.  The patient is somewhat reluctant to use supplemental oxygen.  There is no history of venous thrombolic disease for him but he reports his mother had venous thrombosis.  No other siblings with reported history of clotting.  I do not think there is active inflammation to consider anti-inflammatory therapy at this point.  I would be curious to see what the formal read on sniff test would be.  2.  Extrapulmonary: No evidence of LGE on cardiac MRI.  No evidence of ocular inflammation.  Metabolic labs within normal range.  We will continue to monitor  3.  Suggestion of pulmonary hypertension on cardiac MRI: He is scheduled for evaluation and pulmonary hypertension clinic.  I suspect he will require a VQ scan.  He does have untreated sleep apnea.  A sleep referral is being made.  I believe his hypoxia is  also contributing and we counseled him about it.  4.  Obesity: Not interested in pursuing a weight loss program at this point but will try to lose 10 pounds by the time of next clinic visit in 3 months.  5.  A request is been placed for pulmonary rehab.    Addendum: Case discussed in sarcoidosis pulmonary/radiology meeting.  Minimal parenchymal disease.  Spirometry likely decreased secondary to obesity and restrictive process.  Unable to completely explain the extent of restriction as CT scan of the chest is quite unremarkable.  Right hemidiaphragm elevated but sniff test demonstrating normal excursion.  We will check MVV, MIP, and repeat.    Again, thank you for allowing me to participate in the care of your patient.      Sincerely,    Jamie Martin MD

## 2019-10-30 NOTE — PROGRESS NOTES
Reason for Visit  Derek Contreras is a 63 year old year old male who is being seen for   Pulmonary HPI    The patient was seen and examined by Jamie Martin MD     Mr. Contreras was seen in pulmonary clinic on 7/31/2019 for his initial visit with me.  He has a long-standing history of abnormal chest imaging for which he had EBUS guided TBNA in March 2019 that demonstrated nonnecrotizing granulomas.  The plan at the time of last clinic visit was to review his pathology in multidisciplinary conference, evaluate the  right bundle branch block with cardiac MRI, obtain a sniff test for elevated right hemidiaphragm and obtain labs including IgG, fungal serologies, urine histo antigen to evaluate for alternate causes of granulomatous inflammation and do a metabolic work-up to identify the extent of involvement.  He comes in for follow-up.  In the interim, we reviewed his pathology and findings are consistent with sarcoidosis.  His cardiac MRI did not show any LGE but there was a question of low EF (50%) and  elevated right-sided pressures.  An evaluation for pulmonary hypertension was recommended and a clinic visit is scheduled for November 11.  For his hypoxia he decided to decline nasal cannula oxygen supplementation and monitor his oxygen levels    He comes in today with no major changes.  He tells me that most of the times his oxygen levels are in low to mid 90s but with activity he might see lower numbers.  He has known sleep disordered breathing and he is not using CPAP.  He continues to be dyspneic with one block of activity.  For the last 3 weeks he has had cough, sputum production and nasal congestion for which she is received a course of amoxicillin and Z-Eleazar without significant improvement.  He has no orthopnea PND or other symptoms.    Current Outpatient Medications   Medication     albuterol (PROAIR HFA/PROVENTIL HFA/VENTOLIN HFA) 108 (90 Base) MCG/ACT inhaler     aspirin (ASA) 81 MG tablet      atorvastatin (LIPITOR) 40 MG tablet     BREO ELLIPTA 200-25 MCG/INH Inhaler     cetirizine (ZYRTEC) 10 MG tablet     fluticasone (FLONASE) 50 MCG/ACT nasal spray     hydrochlorothiazide (HYDRODIURIL) 12.5 MG tablet     multivitamin (ONE-DAILY) tablet     No current facility-administered medications for this visit.      Allergies   Allergen Reactions     Cat Hair Extract Itching     itchy tearful eyes     Adhesive Tape Rash     Iodine Rash     Past Medical History:   Diagnosis Date     Asthma      Claustrophobia      Hypercholesteremia      Hypertension      Mediastinal adenopathy      Obesity      BEULAH (obstructive sleep apnea)      Prostate cancer (H)      Sarcoidosis        Past Surgical History:   Procedure Laterality Date     APPENDECTOMY       C TOTAL HIP ARTHROPLASTY Bilateral      C TOTAL KNEE ARTHROPLASTY Bilateral      DAVINCI PROSTATECTOMY, LYMPHADENECTOMY N/A 5/23/2019    Procedure: ROBOTIC ASSISTED LAPAROSCOPIC RADICAL PROSTATECTOMY WITH BILATERAL PELVIC LYMPH NODE DISSECTION;  Surgeon: Matteo Coughlin MD;  Location: [facility] OR     ENDOBRONCHIAL ULTRASOUND FLEXIBLE N/A 3/29/2019    Procedure: Flexible Bronchoscopy, Endobronchial Ultrasound;  Surgeon: Curtis Leger MD;  Location: [facility] OR     VASECTOMY         Social History     Socioeconomic History     Marital status:      Spouse name: Not on file     Number of children: Not on file     Years of education: Not on file     Highest education level: Not on file   Occupational History     Not on file   Social Needs     Financial resource strain: Not on file     Food insecurity:     Worry: Not on file     Inability: Not on file     Transportation needs:     Medical: Not on file     Non-medical: Not on file   Tobacco Use     Smoking status: Never Smoker     Smokeless tobacco: Never Used   Substance and Sexual Activity     Alcohol use: Yes     Comment: twice a week     Drug use: No     Sexual activity: Yes     Partners: Female   Lifestyle      Physical activity:     Days per week: Not on file     Minutes per session: Not on file     Stress: Not on file   Relationships     Social connections:     Talks on phone: Not on file     Gets together: Not on file     Attends Bahai service: Not on file     Active member of club or organization: Not on file     Attends meetings of clubs or organizations: Not on file     Relationship status: Not on file     Intimate partner violence:     Fear of current or ex partner: Not on file     Emotionally abused: Not on file     Physically abused: Not on file     Forced sexual activity: Not on file   Other Topics Concern     Parent/sibling w/ CABG, MI or angioplasty before 65F 55M? Not Asked   Social History Narrative     Not on file       ROS Pulmonary    A complete ROS was otherwise negative except as noted in the HPI.  /76   Pulse 67   Ht 1.829 m (6')   Wt 134.3 kg (296 lb)   SpO2 95%   BMI 40.14 kg/m    Exam:   GENERAL APPEARANCE: Well developed, well nourished, alert, and in no apparent distress.  EYES: PERRL, EOMI  HENT: Nasal mucosa with no edema and no hyperemia.  MOUTH: Oral mucosa is moist, without any lesions, no tonsillar enlargement, no oropharyngeal exudate.  NECK: supple, no masses, no thyromegaly.  LYMPHATICS: No significant axillary, cervical, or supraclavicular nodes.  RESP: normal percussion, good air flow throughout.  No crackles. No rhonchi. No wheezes.  CV: Normal S1, S2, regular rhythm, normal rate. No murmur.  No rub. No gallop. No LE edema.   ABDOMEN:  Bowel sounds normal, soft, nontender, no HSM or masses.   MS: extremities normal. No clubbing. No cyanosis.  SKIN: no rash on limited exam  NEURO: Mentation intact, speech normal, normal strength and tone, normal gait and stance    Results:   PFTs done today were reviewed by me with the patient.  He also had a 6-minute walk test done on 4 L nasal cannula oxygen supplementation where his O2 saturation decreased from 97% to 93%.      FVC FEV1  TLC DLCO   5/9/2011 2.74 (54%) 2.09 (53%) 4.70 (66%) 17.7 (59%)   12/1012 2.47 (51) 1.83 (48) 4.55 (66%) 17.1(59%)   1/14/2013 2.71 (57%) 2.20 (58%)       4/24/2013 2.56 (53%) 1.94 (51%)       3/14/19 1.98 (40%) 1.41 (38%) 3.85 (54%) 16 (52%)   10/30/19 1.95 (40%  1.48 L (40%)   18.75 (65%)     Sniff test with normal movement of the Right hemidiaphragm.  Assessment and plan:   A 63-year-old male with a history of hypertension, hyperlipidemia, obesity, prostate cancer and abnormal chest imaging with TBNA in 03/2019 demonstrating granulomatous inflammation at station 7 and 12L lymph nodes with BAL demonstrating 102 white cells, 11% lymphocytes and CD4/CD8 ratio of 2.29.   1.  Pulmonary: Moderate restriction on PFTs, hypoxia with minimal changes on chest CT scan.  The formal read of the echo did not report PFO.  The patient is somewhat reluctant to use supplemental oxygen.  There is no history of venous thrombolic disease for him but he reports his mother had venous thrombosis.  No other siblings with reported history of clotting.  I do not think there is active inflammation to consider anti-inflammatory therapy at this point.  I would be curious to see what the formal read on sniff test would be.  2.  Extrapulmonary: No evidence of LGE on cardiac MRI.  No evidence of ocular inflammation.  Metabolic labs within normal range.  We will continue to monitor  3.  Suggestion of pulmonary hypertension on cardiac MRI: He is scheduled for evaluation and pulmonary hypertension clinic.  I suspect he will require a VQ scan.  He does have untreated sleep apnea.  A sleep referral is being made.  I believe his hypoxia is also contributing and we counseled him about it.  4.  Obesity: Not interested in pursuing a weight loss program at this point but will try to lose 10 pounds by the time of next clinic visit in 3 months.  5.  A request is been placed for pulmonary rehab.    Addendum: Case discussed in sarcoidosis pulmonary/radiology  meeting.  Minimal parenchymal disease.  Spirometry likely decreased secondary to obesity and restrictive process.  Unable to completely explain the extent of restriction as CT scan of the chest is quite unremarkable.  Right hemidiaphragm elevated but sniff test demonstrating normal excursion.  We will check MVV, MIP, and repeat.

## 2019-10-31 ENCOUNTER — PRE VISIT (OUTPATIENT)
Dept: CARDIOLOGY | Facility: CLINIC | Age: 64
End: 2019-10-31

## 2019-10-31 DIAGNOSIS — J06.9 URI (UPPER RESPIRATORY INFECTION): ICD-10-CM

## 2019-10-31 LAB
GRAM STN SPEC: ABNORMAL
Lab: ABNORMAL
SPECIMEN SOURCE: ABNORMAL

## 2019-10-31 PROCEDURE — 87070 CULTURE OTHR SPECIMN AEROBIC: CPT | Performed by: INTERNAL MEDICINE

## 2019-10-31 PROCEDURE — 87077 CULTURE AEROBIC IDENTIFY: CPT | Performed by: INTERNAL MEDICINE

## 2019-10-31 PROCEDURE — 87205 SMEAR GRAM STAIN: CPT | Performed by: INTERNAL MEDICINE

## 2019-10-31 PROCEDURE — 87185 SC STD ENZYME DETCJ PER NZM: CPT | Performed by: INTERNAL MEDICINE

## 2019-10-31 ASSESSMENT — ASTHMA QUESTIONNAIRES: ACT_TOTALSCORE: 23

## 2019-10-31 NOTE — TELEPHONE ENCOUNTER
New Pulmonary Hypertension Patient Form   Patient Name:  Derek Contreras Age: 63M [birth year]   Referring Provider:  Dr. Martin MRN: [id number]   Date Test Epic Media/Scan Care Everywhere     [facility] ?  ?   ?      Angio/Stress ?  ?   ?     7/31/19 Echo ?  ?   ?      EKG ?   ?   ?     10/30/19 6MWT ?   ?   ?     10/30/19 PFT ?   ?   ?    3/14/19 Sleep Study ?  ?   ?     7/31/19 Chest CT ?  ?   ?      V/Q Scan ?   ?   ?      Abd/Liver US ?   ?   ?      Misc:  ?   ?   ?         ?   ?   ?         ?   ?   ?      Pt has a PMH of hypertension, hyperlipidemia, obesity, prostate cancer and abnormal chest imaging with TBNA in 03/2019 demonstrating granulomatous inflammation at station 7 and 12L lymph nodes with BAL  7/31 Echo [facility] - RV not well visualized but TAPSE suggest Normal Function.  EF 55-60%   RVSP not assessed.  8/29 - Cardiac MRI - RV mod enlarged and function mild reduced.  LGE shows no MI, fibrosis or infiltrative disease.  10/30 - 6MWT - 246 m walked

## 2019-11-02 LAB
BACTERIA SPEC CULT: ABNORMAL
BACTERIA SPEC CULT: ABNORMAL
SPECIMEN SOURCE: ABNORMAL

## 2019-11-04 LAB
DLCOUNC-%PRED-PRE: 65 %
DLCOUNC-PRE: 18.75 ML/MIN/MMHG
DLCOUNC-PRED: 28.62 ML/MIN/MMHG
ERV-%PRED-PRE: 27 %
ERV-PRE: 0.19 L
ERV-PRED: 0.68 L
EXPTIME-PRE: 7.57 SEC
FEF2575-%PRED-PRE: 38 %
FEF2575-PRE: 1.1 L/SEC
FEF2575-PRED: 2.9 L/SEC
FEFMAX-%PRED-PRE: 51 %
FEFMAX-PRE: 4.83 L/SEC
FEFMAX-PRED: 9.37 L/SEC
FEV1-%PRED-PRE: 40 %
FEV1-PRE: 1.48 L
FEV1FEV6-PRE: 77 %
FEV1FEV6-PRED: 78 %
FEV1FVC-PRE: 76 %
FEV1FVC-PRED: 77 %
FEV1SVC-PRE: 71 %
FEV1SVC-PRED: 70 %
FIFMAX-PRE: 2.83 L/SEC
FIO2-PRE: 21 %
FVC-%PRED-PRE: 40 %
FVC-PRE: 1.95 L
FVC-PRED: 4.8 L
IC-%PRED-PRE: 41 %
IC-PRE: 1.9 L
IC-PRED: 4.57 L
VA-%PRED-PRE: 52 %
VA-PRE: 3.66 L
VC-%PRED-PRE: 39 %
VC-PRE: 2.09 L
VC-PRED: 5.25 L

## 2019-11-05 RX ORDER — LEVOFLOXACIN 250 MG/1
750 TABLET, FILM COATED ORAL DAILY
Qty: 30 TABLET | Refills: 1 | Status: SHIPPED | OUTPATIENT
Start: 2019-11-05 | End: 2019-12-05

## 2019-11-06 ENCOUNTER — TELEPHONE (OUTPATIENT)
Dept: PULMONOLOGY | Facility: CLINIC | Age: 64
End: 2019-11-06

## 2019-11-06 NOTE — TELEPHONE ENCOUNTER
----- Message from Jamie Martin MD sent at 11/5/2019  8:10 PM CST -----  Could you please call the patient and discuss the results. We should start him on Levofloxacin 750 mg Daily x 10 days

## 2019-11-06 NOTE — TELEPHONE ENCOUNTER
Spoke with patient to discuss sputum results per Dr. Martin's request.   Pt recently completed the levaquin course that was ordered by Dr. Martin last week and reports feeling much better; confirmed with Dr. Martin that he does not want patient to take additional course of antibiotics.     Patient updated; agrees with plan and will not  the additional levaquin Rx.

## 2019-11-07 ENCOUNTER — TEAM CONFERENCE (OUTPATIENT)
Dept: PULMONOLOGY | Facility: CLINIC | Age: 64
End: 2019-11-07

## 2019-11-07 DIAGNOSIS — D86.9 SARCOIDOSIS: Primary | ICD-10-CM

## 2019-11-07 NOTE — TELEPHONE ENCOUNTER
Sarcoidosis Multidisciplinary Meeting     Physician: Dr. Jamie Martin   Question for Group: PFT's are abnormal and out of proportion to what lung parenchyma on CT shows. Could patient have neuromuscular dysfunction?     Plan: Obtain MIP/MEP and MVV.     Patient Notification and Response: Patient notified and agrees with plan.  He will get testing done on Monday, November 11 prior to appointment in PH clinic.

## 2019-11-11 ENCOUNTER — OFFICE VISIT (OUTPATIENT)
Dept: CARDIOLOGY | Facility: CLINIC | Age: 64
End: 2019-11-11
Attending: INTERNAL MEDICINE
Payer: COMMERCIAL

## 2019-11-11 VITALS
SYSTOLIC BLOOD PRESSURE: 147 MMHG | DIASTOLIC BLOOD PRESSURE: 85 MMHG | WEIGHT: 292 LBS | HEART RATE: 94 BPM | HEIGHT: 72 IN | OXYGEN SATURATION: 96 % | BODY MASS INDEX: 39.55 KG/M2

## 2019-11-11 DIAGNOSIS — I27.20 PULMONARY HYPERTENSION (H): Primary | ICD-10-CM

## 2019-11-11 DIAGNOSIS — E61.1 IRON DEFICIENCY: ICD-10-CM

## 2019-11-11 DIAGNOSIS — D86.9 SARCOIDOSIS: ICD-10-CM

## 2019-11-11 DIAGNOSIS — R06.02 SOB (SHORTNESS OF BREATH): ICD-10-CM

## 2019-11-11 DIAGNOSIS — E78.5 DYSLIPIDEMIA: ICD-10-CM

## 2019-11-11 DIAGNOSIS — Z11.59 NEED FOR HEPATITIS B SCREENING TEST: ICD-10-CM

## 2019-11-11 LAB
EXPTIME-PRE: 6.6 SEC
FEF2575-%PRED-PRE: 55 %
FEF2575-PRE: 1.6 L/SEC
FEF2575-PRED: 2.9 L/SEC
FEFMAX-%PRED-PRE: 69 %
FEFMAX-PRE: 6.48 L/SEC
FEFMAX-PRED: 9.37 L/SEC
FEV1-%PRED-PRE: 47 %
FEV1-PRE: 1.74 L
FEV1FEV6-PRE: 81 %
FEV1FEV6-PRED: 78 %
FEV1FVC-PRE: 79 %
FEV1FVC-PRED: 77 %
FIFMAX-PRE: 4.19 L/SEC
FVC-%PRED-PRE: 45 %
FVC-PRE: 2.2 L
FVC-PRED: 4.8 L
MEP-PRE: 159 CMH2O
MIP-PRE: -63 CMH2O
MVV-%PRED-PRE: 53 %
MVV-PRE: 75 L/MIN
MVV-PRED: 142 L/MIN

## 2019-11-11 PROCEDURE — 99205 OFFICE O/P NEW HI 60 MIN: CPT | Mod: ZP | Performed by: INTERNAL MEDICINE

## 2019-11-11 PROCEDURE — G0463 HOSPITAL OUTPT CLINIC VISIT: HCPCS | Mod: ZF

## 2019-11-11 RX ORDER — LIDOCAINE 40 MG/G
CREAM TOPICAL
Status: CANCELLED | OUTPATIENT
Start: 2019-11-11

## 2019-11-11 ASSESSMENT — MIFFLIN-ST. JEOR: SCORE: 2152.5

## 2019-11-11 ASSESSMENT — ENCOUNTER SYMPTOMS
WHEEZING: 0
SNORES LOUDLY: 0
COUGH DISTURBING SLEEP: 0
TASTE DISTURBANCE: 0
SORE THROAT: 0
POSTURAL DYSPNEA: 0
TROUBLE SWALLOWING: 0
HEMOPTYSIS: 0
SPUTUM PRODUCTION: 1
SMELL DISTURBANCE: 0
DYSPNEA ON EXERTION: 0
SHORTNESS OF BREATH: 0
SINUS PAIN: 0
COUGH: 1
NECK MASS: 0
SINUS CONGESTION: 0
HOARSE VOICE: 0

## 2019-11-11 ASSESSMENT — PAIN SCALES - GENERAL: PAINLEVEL: NO PAIN (0)

## 2019-11-11 NOTE — LETTER
2019      RE: Derek Contreras  [address] [place] [address]       Service Date: 2019      Jamie Martin MD   [facility]    [address] [place] [address]      Carlyn Payne MD   [facility] [address] [place] [address]      RE: Derek Contreras    MRN: [id number]   : 1955      Dear Doctors Mario and Elmer:       We had the pleasure of seeing Mr. Derek Contreras in our Pulmonary Hypertension Clinic at the [facility].  Although you are familiar with his history, please allow me to summarize it for the purpose of our records.      Mr. Contreras is a very pleasant, 63-year-old male who carries a diagnosis of sarcoidosis, which was diagnosed approximately 7 years ago.  He was healthy up until 56 when he started to notice exertional shortness of breath.  CT scan of the chest showed nodular opacities concerning for sarcoidosis.  He was evaluated at the [facility], and he was given a diagnosis of sarcoidosis.  He did not have a biopsy at that time.      He has been having exertional shortness of breath since then, which has been gradually getting worse to the point this past March he was not able to do his activities of daily living.  He was seen by a local pulmonologist.  He was diagnosed with asthma.  He was started on Breo and albuterol inhaler.  At the same time, he was also referred to Dr. Jamie Martin for the diagnosis of sarcoidosis.  He had a mediastinal lymph node biopsy, which showed noncaseating granuloma.  He states that his symptom of exertional shortness of breath has improved since March after being on Breo and albuterol.  He has no symptoms at rest.  He is independent for his activities of daily living.  He can walk up to a block.  I would currently characterize him as NYHA functional class IIB.  He has no PND and no orthopnea.  He has no leg swelling.  He has no abdominal  distention.  He has no exertional presyncope or syncope.  He has no exertional chest pain or chest pressure.      As a part of his sarcoidosis workup, he had a cardiac MRI that showed a dilated right ventricle with moderately reduced right ventricular systolic function with an estimated ejection fraction of 43%.  He also had flattening of the intraventricular septum concerning for pressure and volume overload.  His EKG also showed a right bundle branch block.  His echo was a technically difficult study and could not well visualize the right ventricle.  Given his flattening of the intraventricular septum and dilated right ventricle, he was referred to us for further evaluation to exclude pulmonary hypertension.      PAST MEDICAL HISTORY:   1.  Asthma diagnosed in 03/2019, on bronchodilator therapy.   2.  Sarcoidosis, biopsy proven with pulmonary involvement.   3.  Obstructive sleep apnea.  The patient has not been compliant with his CPAP therapy due to inconvenience.   4.  Hypertension.      PAST SURGICAL HISTORY:   1.  Bilateral hip replacement.   2.  Bilateral knee replacement.   3.  Prostate resection for localized cancer.      MEDICATIONS:    Current Outpatient Medications   Medication Sig     albuterol (PROAIR HFA/PROVENTIL HFA/VENTOLIN HFA) 108 (90 Base) MCG/ACT inhaler every 4 hours as needed      aspirin (ASA) 81 MG tablet Take 81 mg by mouth every morning ON HOLD FOR SURGERY SINCE 03/14/2019     atorvastatin (LIPITOR) 40 MG tablet Take 1 tablet (40 mg) by mouth every morning     BREO ELLIPTA 200-25 MCG/INH Inhaler INL 1 PUFF PO AT THE SAME TIME Q DAY. RINSE AND SPIT AFTER U     cetirizine (ZYRTEC) 10 MG tablet Take 10 mg by mouth every morning      fluticasone (FLONASE) 50 MCG/ACT nasal spray Spray 1-2 sprays into both nostrils daily     hydrochlorothiazide (HYDRODIURIL) 12.5 MG tablet Take 1 tablet (12.5 mg) by mouth every morning     levofloxacin (LEVAQUIN) 250 MG tablet Take 3 tablets (750 mg) by mouth  daily for 10 days     multivitamin (ONE-DAILY) tablet Take 1 tablet by mouth every morning      No current facility-administered medications for this visit.      REVIEW OF SYSTEMS:  A detailed 10 point review of systems was obtained as described in the History of Present Illness.  All other systems were reviewed and are negative.       PERSONAL AND SOCIAL HISTORY:  He is  and living with his wife.  They have 2 grown kids who are healthy.  He is retired.  He worked as a mental health therapist.  He does not smoke.  He drinks alcohol socially.  He does not use any illicit drugs.      FAMILY HISTORY:  His father had coronary artery bypass grafting surgery and a myocardial infarction.  He had his MI at the age of 50s.  Mom had atrial fibrillation in her older age.  He has a sibling who is healthy.      PHYSICAL EXAMINATION:  He was comfortable.  He was in no apparent distress.  His blood pressure was 147/85.  His pulse rate was 94.  His respiratory rate was 16.  He was saturating 96% on room air.  He weighed 292 pounds.  He was 6 feet tall.  His BMI was 39.6.  He had no pallor, cyanosis or jaundice.  His neck exam revealed no jugular venous distention.  His carotids were 1+ bilaterally.  He had no palpable P2 or parasternal heave.  Cardiac auscultation revealed a normal S1 and a normal S2; no murmur, rub or gallop.  Auscultation of his lungs revealed normal vesicular breath sounds bilaterally.  He had no crepitations or wheezing.  His abdomen was soft with normal bowel sounds; no tenderness, no rigidity, no guarding.  He had no focal neurological deficit.  His extremities showed trace edema.      STUDIES:  His EKG showed sinus rhythm, a right bundle branch block and PVCs.      His echocardiogram dated 08/2019 showed normal left ventricular size and systolic function with an estimated ejection fraction of 55%-60%.  His right ventricle could not be well visualized.  He had mild left atrial enlargement.  His  interatrial septum was intact by bubble study.  He had no significant valvular abnormalities.  He did not have significant tricuspid regurgitation to estimate his right ventricular systolic pressure.  He did not have any evidence of diastolic dysfunction, either.      His cardiac MRI dated 08/29/2019 showed normal left ventricular size and systolic function with an estimated ejection fraction of 50%.  He had PVCs and systolic flattening of the intraventricular septum.  His RV was moderately enlarged on visual examination, but not based on volumes.  His right ventricular ejection fraction was moderately reduced with an estimated ejection fraction of 43%.  He had a severely enlarged right atrium.  His left atrium was mildly enlarged.  He had trace mitral regurgitation.  Delayed gadolinium enhancement showed no fibrosis, MI or infiltrative disease.  His regurgitant stress perfusion showed no ischemia.  He had no pericardial effusion or thickening.      He had a 6 minute walk test on 10/30/2019.  He walked for 319 m.  The lowest oxygen saturation on room air was 92%.  His heart rate increased up to 137.      His most recent pulmonary function tests showed an FVC of 45%, FEV1 of 47%, FEV1/FVC of 79%, TLCO of 60% predicted and DLCO of 65% predicted.      He had a high-resolution CT scan of the chest on 07/31/2019 that showed mediastinal and perihilar lymphadenopathy and numerous solitary pulmonary nodules, but no parenchymal lung disease.      ASSESSMENT AND PLAN:      Mr. Derek Contreras is a 63-year-old gentleman with pulmonary sarcoidosis who was recently found to have a dilated right ventricle with reduced systolic function concerning for coexisting pulmonary hypertension.      Mr. Conrteras is definitely at risk for pulmonary hypertension given his sarcoidosis.  He does have moderate restrictive lung disease, which could increase his risk of pulmonary hypertension due to hypoxemia.  As you know, sarcoidosis by itself can  cause pulmonary hypertension due to direct vascular involvement.      Thus, I have recommended him to have a right heart catheterization, which is the diagnostic test of choice for pulmonary hypertension.  This will help us confirm the diagnosis as well as assess the severity and look for vasodilator response.  We will also do a V/Q scan to exclude chronic thromboembolic pulmonary hypertension.  He will also get a routine serological workup for pulmonary hypertension.      He appears euvolemic currently.  We will decide on further treatment after he completes further workup.  He does not have significant parenchymal lung disease to explain his restriction.  I wonder whether this is due to his obesity and chest wall abnormality.  We will discuss this with Dr. Jamie Martin.      It was a pleasure meeting Mr. Liseth Contreras in our Pulmonary Hypertension Clinic at the [facility].  We thank you for involving us in his care.  Please do not hesitate to call us in the interim if you have any further questions.      Sincerely,   Torrey Hirsch MD   [facility]pertension  Heart Failure, Transplant, and Mechanical Circulatory Support Cardiology   Cardiovascular Division  [facility]   [phone]               D: 2019   T: 2019   MT: roverto      Name:     LISETH CONTRERAS   MRN:      [id number]        Account:      [id number]   :      1955           Service Date: 2019      Document: [id number]        Torrey Hirsch MD

## 2019-11-11 NOTE — PROGRESS NOTES
Service Date: 2019      Jamie Martin MD   [facility]    [address] [place] [address]      Carlyn Payne MD   [facility] [address] [place] [address]      RE: Derek Contreras    MRN: [id number]   : 1955      Dear Doctors Mario and Elmer:       We had the pleasure of seeing Mr. Derek Contreras in our Pulmonary Hypertension Clinic at the [facility].  Although you are familiar with his history, please allow me to summarize it for the purpose of our records.      Mr. Contreras is a very pleasant, 63-year-old male who carries a diagnosis of sarcoidosis, which was diagnosed approximately 7 years ago.  He was healthy up until 56 when he started to notice exertional shortness of breath.  CT scan of the chest showed nodular opacities concerning for sarcoidosis.  He was evaluated at the [facility], and he was given a diagnosis of sarcoidosis.  He did not have a biopsy at that time.      He has been having exertional shortness of breath since then, which has been gradually getting worse to the point this past March he was not able to do his activities of daily living.  He was seen by a local pulmonologist.  He was diagnosed with asthma.  He was started on Breo and albuterol inhaler.  At the same time, he was also referred to Dr. Jamie Martin for the diagnosis of sarcoidosis.  He had a mediastinal lymph node biopsy, which showed noncaseating granuloma.  He states that his symptom of exertional shortness of breath has improved since March after being on Breo and albuterol.  He has no symptoms at rest.  He is independent for his activities of daily living.  He can walk up to a block.  I would currently characterize him as NYHA functional class IIB.  He has no PND and no orthopnea.  He has no leg swelling.  He has no abdominal distention.  He has no exertional presyncope or syncope.  He has no exertional chest pain or chest  pressure.      As a part of his sarcoidosis workup, he had a cardiac MRI that showed a dilated right ventricle with moderately reduced right ventricular systolic function with an estimated ejection fraction of 43%.  He also had flattening of the intraventricular septum concerning for pressure and volume overload.  His EKG also showed a right bundle branch block.  His echo was a technically difficult study and could not well visualize the right ventricle.  Given his flattening of the intraventricular septum and dilated right ventricle, he was referred to us for further evaluation to exclude pulmonary hypertension.      PAST MEDICAL HISTORY:   1.  Asthma diagnosed in 03/2019, on bronchodilator therapy.   2.  Sarcoidosis, biopsy proven with pulmonary involvement.   3.  Obstructive sleep apnea.  The patient has not been compliant with his CPAP therapy due to inconvenience.   4.  Hypertension.      PAST SURGICAL HISTORY:   1.  Bilateral hip replacement.   2.  Bilateral knee replacement.   3.  Prostate resection for localized cancer.      MEDICATIONS:    Current Outpatient Medications   Medication Sig     albuterol (PROAIR HFA/PROVENTIL HFA/VENTOLIN HFA) 108 (90 Base) MCG/ACT inhaler every 4 hours as needed      aspirin (ASA) 81 MG tablet Take 81 mg by mouth every morning ON HOLD FOR SURGERY SINCE 03/14/2019     atorvastatin (LIPITOR) 40 MG tablet Take 1 tablet (40 mg) by mouth every morning     BREO ELLIPTA 200-25 MCG/INH Inhaler INL 1 PUFF PO AT THE SAME TIME Q DAY. RINSE AND SPIT AFTER U     cetirizine (ZYRTEC) 10 MG tablet Take 10 mg by mouth every morning      fluticasone (FLONASE) 50 MCG/ACT nasal spray Spray 1-2 sprays into both nostrils daily     hydrochlorothiazide (HYDRODIURIL) 12.5 MG tablet Take 1 tablet (12.5 mg) by mouth every morning     levofloxacin (LEVAQUIN) 250 MG tablet Take 3 tablets (750 mg) by mouth daily for 10 days     multivitamin (ONE-DAILY) tablet Take 1 tablet by mouth every morning      No  current facility-administered medications for this visit.      REVIEW OF SYSTEMS:  A detailed 10 point review of systems was obtained as described in the History of Present Illness.  All other systems were reviewed and are negative.       PERSONAL AND SOCIAL HISTORY:  He is  and living with his wife.  They have 2 grown kids who are healthy.  He is retired.  He worked as a mental health therapist.  He does not smoke.  He drinks alcohol socially.  He does not use any illicit drugs.      FAMILY HISTORY:  His father had coronary artery bypass grafting surgery and a myocardial infarction.  He had his MI at the age of 50s.  Mom had atrial fibrillation in her older age.  He has a sibling who is healthy.      PHYSICAL EXAMINATION:  He was comfortable.  He was in no apparent distress.  His blood pressure was 147/85.  His pulse rate was 94.  His respiratory rate was 16.  He was saturating 96% on room air.  He weighed 292 pounds.  He was 6 feet tall.  His BMI was 39.6.  He had no pallor, cyanosis or jaundice.  His neck exam revealed no jugular venous distention.  His carotids were 1+ bilaterally.  He had no palpable P2 or parasternal heave.  Cardiac auscultation revealed a normal S1 and a normal S2; no murmur, rub or gallop.  Auscultation of his lungs revealed normal vesicular breath sounds bilaterally.  He had no crepitations or wheezing.  His abdomen was soft with normal bowel sounds; no tenderness, no rigidity, no guarding.  He had no focal neurological deficit.  His extremities showed trace edema.      STUDIES:  His EKG showed sinus rhythm, a right bundle branch block and PVCs.      His echocardiogram dated 08/2019 showed normal left ventricular size and systolic function with an estimated ejection fraction of 55%-60%.  His right ventricle could not be well visualized.  He had mild left atrial enlargement.  His interatrial septum was intact by bubble study.  He had no significant valvular abnormalities.  He did not  have significant tricuspid regurgitation to estimate his right ventricular systolic pressure.  He did not have any evidence of diastolic dysfunction, either.      His cardiac MRI dated 08/29/2019 showed normal left ventricular size and systolic function with an estimated ejection fraction of 50%.  He had PVCs and systolic flattening of the intraventricular septum.  His RV was moderately enlarged on visual examination, but not based on volumes.  His right ventricular ejection fraction was moderately reduced with an estimated ejection fraction of 43%.  He had a severely enlarged right atrium.  His left atrium was mildly enlarged.  He had trace mitral regurgitation.  Delayed gadolinium enhancement showed no fibrosis, MI or infiltrative disease.  His regurgitant stress perfusion showed no ischemia.  He had no pericardial effusion or thickening.      He had a 6 minute walk test on 10/30/2019.  He walked for 319 m.  The lowest oxygen saturation on room air was 92%.  His heart rate increased up to 137.      His most recent pulmonary function tests showed an FVC of 45%, FEV1 of 47%, FEV1/FVC of 79%, TLCO of 60% predicted and DLCO of 65% predicted.      He had a high-resolution CT scan of the chest on 07/31/2019 that showed mediastinal and perihilar lymphadenopathy and numerous solitary pulmonary nodules, but no parenchymal lung disease.      ASSESSMENT AND PLAN:      Mr. Derek Contreras is a 63-year-old gentleman with pulmonary sarcoidosis who was recently found to have a dilated right ventricle with reduced systolic function concerning for coexisting pulmonary hypertension.      Mr. Contreras is definitely at risk for pulmonary hypertension given his sarcoidosis.  He does have moderate restrictive lung disease, which could increase his risk of pulmonary hypertension due to hypoxemia.  As you know, sarcoidosis by itself can cause pulmonary hypertension due to direct vascular involvement.      Thus, I have recommended him to have  a right heart catheterization, which is the diagnostic test of choice for pulmonary hypertension.  This will help us confirm the diagnosis as well as assess the severity and look for vasodilator response.  We will also do a V/Q scan to exclude chronic thromboembolic pulmonary hypertension.  He will also get a routine serological workup for pulmonary hypertension.      He appears euvolemic currently.  We will decide on further treatment after he completes further workup.  He does not have significant parenchymal lung disease to explain his restriction.  I wonder whether this is due to his obesity and chest wall abnormality.  We will discuss this with Dr. Jamie Martin.      It was a pleasure meeting Mr. Liseth Contreras in our Pulmonary Hypertension Clinic at the [facility].  We thank you for involving us in his care.  Please do not hesitate to call us in the interim if you have any further questions.      Sincerely,   Torrey Hirsch MD   [facility] Pulmonary Hypertension  Heart Failure, Transplant, and Mechanical Circulatory Support Cardiology   Cardiovascular Division  [facility]   [phone]               D: 2019   T: 2019   MT: roverto      Name:     LISETH CONTRERAS   MRN:      [id number]        Account:      [id number]   :      1955           Service Date: 2019      Document: [id number]

## 2019-11-11 NOTE — LETTER
2019      RE: Derek Contreras  [address] [place] [address]       Dear Colleague,    Thank you for the opportunity to participate in the care of your patient, Derek Contreras, at the [facility]. Please see a copy of my visit note below.    Service Date: 2019      Jamie Martin MD   [facility]    [address] [place] [address]      Carlyn Payne MD   [facility] [address] [place] [address]      RE: Derek Contreras    MRN: [id number]   : 1955      Dear Doctors Mario and Elmer:       We had the pleasure of seeing Mr. Derek Contreras in our Pulmonary Hypertension Clinic at the [facility].  Although you are familiar with his history, please allow me to summarize it for the purpose of our records.      Mr. Contreras is a very pleasant, 63-year-old male who carries a diagnosis of sarcoidosis, which was diagnosed approximately 7 years ago.  He was healthy up until 56 when he started to notice exertional shortness of breath.  CT scan of the chest showed nodular opacities concerning for sarcoidosis.  He was evaluated at the [facility], and he was given a diagnosis of sarcoidosis.  He did not have a biopsy at that time.      He has been having exertional shortness of breath since then, which has been gradually getting worse to the point this past March he was not able to do his activities of daily living.  He was seen by a local pulmonologist.  He was diagnosed with asthma.  He was started on Breo and albuterol inhaler.  At the same time, he was also referred to Dr. Jamie Martin for the diagnosis of sarcoidosis.  He had a mediastinal lymph node biopsy, which showed noncaseating granuloma.  He states that his symptom of exertional shortness of breath has improved since March after being on Breo and albuterol.  He has no symptoms at  rest.  He is independent for his activities of daily living.  He can walk up to a block.  I would currently characterize him as NYHA functional class IIB.  He has no PND and no orthopnea.  He has no leg swelling.  He has no abdominal distention.  He has no exertional presyncope or syncope.  He has no exertional chest pain or chest pressure.      As a part of his sarcoidosis workup, he had a cardiac MRI that showed a dilated right ventricle with moderately reduced right ventricular systolic function with an estimated ejection fraction of 43%.  He also had flattening of the intraventricular septum concerning for pressure and volume overload.  His EKG also showed a right bundle branch block.  His echo was a technically difficult study and could not well visualize the right ventricle.  Given his flattening of the intraventricular septum and dilated right ventricle, he was referred to us for further evaluation to exclude pulmonary hypertension.      PAST MEDICAL HISTORY:   1.  Asthma diagnosed in 03/2019, on bronchodilator therapy.   2.  Sarcoidosis, biopsy proven with pulmonary involvement.   3.  Obstructive sleep apnea.  The patient has not been compliant with his CPAP therapy due to inconvenience.   4.  Hypertension.      PAST SURGICAL HISTORY:   1.  Bilateral hip replacement.   2.  Bilateral knee replacement.   3.  Prostate resection for localized cancer.      MEDICATIONS:    Current Outpatient Medications   Medication Sig     albuterol (PROAIR HFA/PROVENTIL HFA/VENTOLIN HFA) 108 (90 Base) MCG/ACT inhaler every 4 hours as needed      aspirin (ASA) 81 MG tablet Take 81 mg by mouth every morning ON HOLD FOR SURGERY SINCE 03/14/2019     atorvastatin (LIPITOR) 40 MG tablet Take 1 tablet (40 mg) by mouth every morning     BREO ELLIPTA 200-25 MCG/INH Inhaler INL 1 PUFF PO AT THE SAME TIME Q DAY. RINSE AND SPIT AFTER U     cetirizine (ZYRTEC) 10 MG tablet Take 10 mg by mouth every morning      fluticasone (FLONASE) 50  MCG/ACT nasal spray Spray 1-2 sprays into both nostrils daily     hydrochlorothiazide (HYDRODIURIL) 12.5 MG tablet Take 1 tablet (12.5 mg) by mouth every morning     levofloxacin (LEVAQUIN) 250 MG tablet Take 3 tablets (750 mg) by mouth daily for 10 days     multivitamin (ONE-DAILY) tablet Take 1 tablet by mouth every morning      No current facility-administered medications for this visit.      REVIEW OF SYSTEMS:  A detailed 10 point review of systems was obtained as described in the History of Present Illness.  All other systems were reviewed and are negative.       PERSONAL AND SOCIAL HISTORY:  He is  and living with his wife.  They have 2 grown kids who are healthy.  He is retired.  He worked as a mental health therapist.  He does not smoke.  He drinks alcohol socially.  He does not use any illicit drugs.      FAMILY HISTORY:  His father had coronary artery bypass grafting surgery and a myocardial infarction.  He had his MI at the age of 50s.  Mom had atrial fibrillation in her older age.  He has a sibling who is healthy.      PHYSICAL EXAMINATION:  He was comfortable.  He was in no apparent distress.  His blood pressure was 147/85.  His pulse rate was 94.  His respiratory rate was 16.  He was saturating 96% on room air.  He weighed 292 pounds.  He was 6 feet tall.  His BMI was 39.6.  He had no pallor, cyanosis or jaundice.  His neck exam revealed no jugular venous distention.  His carotids were 1+ bilaterally.  He had no palpable P2 or parasternal heave.  Cardiac auscultation revealed a normal S1 and a normal S2; no murmur, rub or gallop.  Auscultation of his lungs revealed normal vesicular breath sounds bilaterally.  He had no crepitations or wheezing.  His abdomen was soft with normal bowel sounds; no tenderness, no rigidity, no guarding.  He had no focal neurological deficit.  His extremities showed trace edema.      STUDIES:  His EKG showed sinus rhythm, a right bundle branch block and PVCs.      His  echocardiogram dated 08/2019 showed normal left ventricular size and systolic function with an estimated ejection fraction of 55%-60%.  His right ventricle could not be well visualized.  He had mild left atrial enlargement.  His interatrial septum was intact by bubble study.  He had no significant valvular abnormalities.  He did not have significant tricuspid regurgitation to estimate his right ventricular systolic pressure.  He did not have any evidence of diastolic dysfunction, either.      His cardiac MRI dated 08/29/2019 showed normal left ventricular size and systolic function with an estimated ejection fraction of 50%.  He had PVCs and systolic flattening of the intraventricular septum.  His RV was moderately enlarged on visual examination, but not based on volumes.  His right ventricular ejection fraction was moderately reduced with an estimated ejection fraction of 43%.  He had a severely enlarged right atrium.  His left atrium was mildly enlarged.  He had trace mitral regurgitation.  Delayed gadolinium enhancement showed no fibrosis, MI or infiltrative disease.  His regurgitant stress perfusion showed no ischemia.  He had no pericardial effusion or thickening.      He had a 6 minute walk test on 10/30/2019.  He walked for 319 m.  The lowest oxygen saturation on room air was 92%.  His heart rate increased up to 137.      His most recent pulmonary function tests showed an FVC of 45%, FEV1 of 47%, FEV1/FVC of 79%, TLCO of 60% predicted and DLCO of 65% predicted.      He had a high-resolution CT scan of the chest on 07/31/2019 that showed mediastinal and perihilar lymphadenopathy and numerous solitary pulmonary nodules, but no parenchymal lung disease.      ASSESSMENT AND PLAN:      Mr. Derek Contreras is a 63-year-old gentleman with pulmonary sarcoidosis who was recently found to have a dilated right ventricle with reduced systolic function concerning for coexisting pulmonary hypertension.      Mr. Contreras is  definitely at risk for pulmonary hypertension given his sarcoidosis.  He does have moderate restrictive lung disease, which could increase his risk of pulmonary hypertension due to hypoxemia.  As you know, sarcoidosis by itself can cause pulmonary hypertension due to direct vascular involvement.      Thus, I have recommended him to have a right heart catheterization, which is the diagnostic test of choice for pulmonary hypertension.  This will help us confirm the diagnosis as well as assess the severity and look for vasodilator response.  We will also do a V/Q scan to exclude chronic thromboembolic pulmonary hypertension.  He will also get a routine serological workup for pulmonary hypertension.      He appears euvolemic currently.  We will decide on further treatment after he completes further workup.  He does not have significant parenchymal lung disease to explain his restriction.  I wonder whether this is due to his obesity and chest wall abnormality.  We will discuss this with Dr. Jamie Martin.      It was a pleasure meeting Mr. Liseth Contreras in our Pulmonary Hypertension Clinic at the [facility].  We thank you for involving us in his care.  Please do not hesitate to call us in the interim if you have any further questions.      Sincerely,   Torrey Hirsch MD   [facility]  Heart Failure, Transplant, and Mechanical Circulatory Support Cardiology   Cardiovascular Division  [facility]   [phone]         D: 2019   T: 2019   MT: [email or web address]      Name:     LISETH CONTRERAS   MRN:      [id number]        Account:      [id number]   :      1955           Service Date: 2019      Document: [id number]

## 2019-11-11 NOTE — NURSING NOTE
Procedures and/or Testing: Patient given instructions regarding  V/Q Scan,. Discussed purpose, preparation, procedure and when to expect results reported back to the patient. Patient demonstrated understanding of this information and agreed to call with further questions or concerns.    Right Heart Catheterization: Patient was instructed regarding right heart catheterization, including discussion of the procedure, preparation, intra-procedural steps, and recovery at home. Patient demonstrated understanding of this information and agreed to call with further questions or concerns.    Med Reconcile: Reviewed and verified all current medications with the patient. The updated medication list was printed and given to the patient.    Patient was instructed to return for the next laboratory testing Prior to [facility]    Diet: Patient instructed regarding a heart healthy diet, including discussion of reduced fat and sodium intake. Patient demonstrated understanding of this information and agreed to call with further questions or concerns.    Return Appointment: Patient given instructions regarding scheduling next clinic visit. Patient demonstrated understanding of this information and agreed to call with further questions or concerns.    Patient stated he understood all health information given and agreed to call with further questions or concerns.    Medication Changes:   No medication changes at this time. Please continue current medication regiment.    Patient Instructions:  1. Continue staying active and eat a heart healthy diet.    2. Please keep current list of medications with you at all times.    3. Remember to weigh yourself daily after voiding and before you consume any food or beverages and log the numbers.  If you have gained 2 pounds overnight or 5 pounds in a week contact us immediately for medication adjustments or further instructions.    4. **Please call us immediately if you have any syncope (fainting or passing  out), chest pain, edema (swelling or weight gain), or decline in your functional status (general decline in how you are feeling).    Check-In  Time Check-In Location Estimated Length Procedure   Name     12/11/19 @ 6:30am   [facility]  waiting room 60-90 minutes Right Heart Catheterization**     Procedure Preparations & Instructions     This is an invasive procedure that DOES require preparation:    - Nothing to eat for 6 hours   - You may have clear liquids up until the time of your procedure  - A ride should be arranged for you in the instance you are unable to drive home, however you should be able to function as you normally would after the procedure     Check-In  Time Check-In Location Estimated Length Procedure   Name     11/21/19 @ 9am   [facility]   waiting room 60 minutes VQ/Lung Perfusion Scan**   Procedure Preparations & Instructions     This is a non-invasive procedure and does NOT require any preparation       Follow up Appointment Information:  -Next office visit will be decided after all testing is complete

## 2019-11-11 NOTE — PATIENT INSTRUCTIONS
Medication Changes:   No medication changes at this time. Please continue current medication regiment.    Patient Instructions:  1. Continue staying active and eat a heart healthy diet.    2. Please keep current list of medications with you at all times.    3. Remember to weigh yourself daily after voiding and before you consume any food or beverages and log the numbers.  If you have gained 2 pounds overnight or 5 pounds in a week contact us immediately for medication adjustments or further instructions.    4. **Please call us immediately if you have any syncope (fainting or passing out), chest pain, edema (swelling or weight gain), or decline in your functional status (general decline in how you are feeling).    Check-In  Time Check-In Location Estimated Length Procedure   Name     12/11/19 @ 6:30am   [facility]  waiting room 60-90 minutes Right Heart Catheterization**     Procedure Preparations & Instructions     This is an invasive procedure that DOES require preparation:    - Nothing to eat for 6 hours   - You may have clear liquids up until the time of your procedure  - A ride should be arranged for you in the instance you are unable to drive home, however you should be able to function as you normally would after the procedure     Check-In  Time Check-In Location Estimated Length Procedure   Name     11/21/19 @ 9am   [facility]   waiting room 60 minutes VQ/Lung Perfusion Scan**   Procedure Preparations & Instructions     This is a non-invasive procedure and does NOT require any preparation       Follow up Appointment Information:  -Next office visit will be decided after all testing is complete    We are located on the third floor of the Clinic and [facility] Center ([facility] on the [facility].  Our address is     [address] [place] [address] [address] [place] [address]      Thank you for allowing us to be a part of your care here at the [facility]    If you have  questions or concerns please contact us at:    Honey Jackson, RN, BSN    Amanda Diop (Schedule,Prior Auth)  Nurse Coordinator     Clinic   Pulmonary Hypertension   Pulmonary Hypertension  [facility]  (Phone)[phone]   (Phone) [phone]        (Fax)[phone]                ** Please note that you will NOT receive a reminder call regarding your scheduled testing, reminder calls are for provider appointments only.  If you are scheduled for testing within the [facility] system you may receive a call regarding pre-registration for billing purposes only.**     Support Group:  Pulmonary Hypertension Association  Https://www.phassociation.org/  **Look at the Events Tab** They even have Support Groups that you can call into    [facility] PH Support Group  Second Saturday of the Month from 1-3 PM   Location: [address] [place] [id number]  Leader: Lovely Mejia  Phone: [phone] or [phone]  Email: [email or web address]

## 2019-11-21 ENCOUNTER — HOSPITAL ENCOUNTER (OUTPATIENT)
Dept: NUCLEAR MEDICINE | Facility: CLINIC | Age: 64
Setting detail: NUCLEAR MEDICINE
Discharge: HOME | End: 2019-11-21
Attending: INTERNAL MEDICINE | Admitting: INTERNAL MEDICINE
Payer: COMMERCIAL

## 2019-11-21 ENCOUNTER — HOSPITAL ENCOUNTER (OUTPATIENT)
Dept: GENERAL RADIOLOGY | Facility: CLINIC | Age: 64
Discharge: HOME | End: 2019-11-21
Attending: INTERNAL MEDICINE | Admitting: INTERNAL MEDICINE
Payer: COMMERCIAL

## 2019-11-21 DIAGNOSIS — Z11.59 NEED FOR HEPATITIS B SCREENING TEST: ICD-10-CM

## 2019-11-21 DIAGNOSIS — R06.02 SOB (SHORTNESS OF BREATH): ICD-10-CM

## 2019-11-21 DIAGNOSIS — I27.20 PULMONARY HYPERTENSION (H): ICD-10-CM

## 2019-11-21 DIAGNOSIS — R06.02 SHORTNESS OF BREATH: ICD-10-CM

## 2019-11-21 DIAGNOSIS — E78.5 DYSLIPIDEMIA: ICD-10-CM

## 2019-11-21 DIAGNOSIS — E61.1 IRON DEFICIENCY: ICD-10-CM

## 2019-11-21 PROCEDURE — 34300033 ZZH RX 343: Performed by: INTERNAL MEDICINE

## 2019-11-21 PROCEDURE — 27210210 NM LUNG SCAN VENTILATION AND PERFUSION

## 2019-11-21 PROCEDURE — A9540 TC99M MAA: HCPCS | Performed by: INTERNAL MEDICINE

## 2019-11-21 PROCEDURE — A9567 TECHNETIUM TC-99M AEROSOL: HCPCS | Performed by: INTERNAL MEDICINE

## 2019-11-21 PROCEDURE — 71046 X-RAY EXAM CHEST 2 VIEWS: CPT

## 2019-11-21 RX ADMIN — KIT FOR THE PREPARATION OF TECHNETIUM TC 99M ALBUMIN AGGREGATED 7 MILLICURIE: 2.5 INJECTION, POWDER, FOR SOLUTION INTRAVENOUS at 09:55

## 2019-11-21 RX ADMIN — KIT FOR THE PREPARATION OF TECHNETIUM TC 99M PENTETATE 2 MILLICURIE: 20 INJECTION, POWDER, LYOPHILIZED, FOR SOLUTION INTRAVENOUS; RESPIRATORY (INHALATION) at 09:48

## 2019-12-11 ENCOUNTER — APPOINTMENT (OUTPATIENT)
Dept: MEDSURG UNIT | Facility: CLINIC | Age: 64
End: 2019-12-11
Attending: INTERNAL MEDICINE
Payer: MEDICARE

## 2019-12-11 ENCOUNTER — TELEPHONE (OUTPATIENT)
Dept: CARDIOLOGY | Facility: CLINIC | Age: 64
End: 2019-12-11

## 2019-12-11 ENCOUNTER — HOSPITAL ENCOUNTER (OUTPATIENT)
Facility: CLINIC | Age: 64
Discharge: HOME | End: 2019-12-11
Attending: INTERNAL MEDICINE | Admitting: INTERNAL MEDICINE
Payer: MEDICARE

## 2019-12-11 VITALS
BODY MASS INDEX: 39.55 KG/M2 | SYSTOLIC BLOOD PRESSURE: 152 MMHG | RESPIRATION RATE: 20 BRPM | WEIGHT: 292 LBS | DIASTOLIC BLOOD PRESSURE: 86 MMHG | OXYGEN SATURATION: 96 % | HEIGHT: 72 IN | TEMPERATURE: 97.8 F

## 2019-12-11 DIAGNOSIS — E78.5 DYSLIPIDEMIA: ICD-10-CM

## 2019-12-11 DIAGNOSIS — M34.9 SCLERODERMA (H): Primary | ICD-10-CM

## 2019-12-11 DIAGNOSIS — E61.1 IRON DEFICIENCY: ICD-10-CM

## 2019-12-11 DIAGNOSIS — R06.02 SOB (SHORTNESS OF BREATH): ICD-10-CM

## 2019-12-11 DIAGNOSIS — I27.20 PULMONARY HYPERTENSION (H): ICD-10-CM

## 2019-12-11 DIAGNOSIS — Z11.59 NEED FOR HEPATITIS B SCREENING TEST: ICD-10-CM

## 2019-12-11 LAB
ALBUMIN SERPL-MCNC: 3.6 G/DL (ref 3.4–5)
ALP SERPL-CCNC: 86 U/L (ref 40–150)
ALT SERPL W P-5'-P-CCNC: 35 U/L (ref 0–70)
ANION GAP SERPL CALCULATED.3IONS-SCNC: 7 MMOL/L (ref 3–14)
AST SERPL W P-5'-P-CCNC: 26 U/L (ref 0–45)
BILIRUB DIRECT SERPL-MCNC: 0.1 MG/DL (ref 0–0.2)
BILIRUB SERPL-MCNC: 0.6 MG/DL (ref 0.2–1.3)
BUN SERPL-MCNC: 13 MG/DL (ref 7–30)
CALCIUM SERPL-MCNC: 9.3 MG/DL (ref 8.5–10.1)
CHLORIDE SERPL-SCNC: 105 MMOL/L (ref 94–109)
CHOLEST SERPL-MCNC: 84 MG/DL
CO2 SERPL-SCNC: 24 MMOL/L (ref 20–32)
CREAT SERPL-MCNC: 0.83 MG/DL (ref 0.66–1.25)
CRP SERPL-MCNC: 3.4 MG/L (ref 0–8)
ERYTHROCYTE [DISTWIDTH] IN BLOOD BY AUTOMATED COUNT: 14 % (ref 10–15)
GFR SERPL CREATININE-BSD FRML MDRD: >90 ML/MIN/{1.73_M2}
GLUCOSE SERPL-MCNC: 92 MG/DL (ref 70–99)
HBV CORE AB SERPL QL IA: NONREACTIVE
HBV SURFACE AB SERPL IA-ACNC: 6.61 M[IU]/ML
HBV SURFACE AG SERPL QL IA: NONREACTIVE
HCT VFR BLD AUTO: 53.3 % (ref 40–53)
HCV AB SERPL QL IA: NONREACTIVE
HDLC SERPL-MCNC: 45 MG/DL
HGB BLD-MCNC: 17 G/DL (ref 13.3–17.7)
HIV 1+2 AB+HIV1 P24 AG SERPL QL IA: NONREACTIVE
INR PPP: 1.17 (ref 0.86–1.14)
IRON SATN MFR SERPL: 18 % (ref 15–46)
IRON SERPL-MCNC: 51 UG/DL (ref 35–180)
LDLC SERPL CALC-MCNC: 17 MG/DL
MCH RBC QN AUTO: 28.6 PG (ref 26.5–33)
MCHC RBC AUTO-ENTMCNC: 31.9 G/DL (ref 31.5–36.5)
MCV RBC AUTO: 90 FL (ref 78–100)
NONHDLC SERPL-MCNC: 39 MG/DL
NT-PROBNP SERPL-MCNC: 21 PG/ML (ref 0–125)
PLATELET # BLD AUTO: 168 10E9/L (ref 150–450)
POTASSIUM SERPL-SCNC: 3.8 MMOL/L (ref 3.4–5.3)
PROT SERPL-MCNC: 8.3 G/DL (ref 6.8–8.8)
RBC # BLD AUTO: 5.94 10E12/L (ref 4.4–5.9)
SODIUM SERPL-SCNC: 137 MMOL/L (ref 133–144)
TIBC SERPL-MCNC: 278 UG/DL (ref 240–430)
TRIGL SERPL-MCNC: 110 MG/DL
TSH SERPL DL<=0.005 MIU/L-ACNC: 1.92 MU/L (ref 0.4–4)
WBC # BLD AUTO: 8.3 10E9/L (ref 4–11)

## 2019-12-11 PROCEDURE — 85613 RUSSELL VIPER VENOM DILUTED: CPT | Performed by: INTERNAL MEDICINE

## 2019-12-11 PROCEDURE — 85610 PROTHROMBIN TIME: CPT | Performed by: INTERNAL MEDICINE

## 2019-12-11 PROCEDURE — 86431 RHEUMATOID FACTOR QUANT: CPT | Performed by: INTERNAL MEDICINE

## 2019-12-11 PROCEDURE — G0499 HEPB SCREEN HIGH RISK INDIV: HCPCS | Performed by: INTERNAL MEDICINE

## 2019-12-11 PROCEDURE — 40000166 ZZH STATISTIC PP CARE STAGE 1

## 2019-12-11 PROCEDURE — 80061 LIPID PANEL: CPT | Performed by: INTERNAL MEDICINE

## 2019-12-11 PROCEDURE — 85027 COMPLETE CBC AUTOMATED: CPT | Performed by: INTERNAL MEDICINE

## 2019-12-11 PROCEDURE — 27210788 ZZH MANIFOLD CR3: Performed by: INTERNAL MEDICINE

## 2019-12-11 PROCEDURE — 84443 ASSAY THYROID STIM HORMONE: CPT | Performed by: INTERNAL MEDICINE

## 2019-12-11 PROCEDURE — 83550 IRON BINDING TEST: CPT | Performed by: INTERNAL MEDICINE

## 2019-12-11 PROCEDURE — 84238 ASSAY NONENDOCRINE RECEPTOR: CPT | Performed by: INTERNAL MEDICINE

## 2019-12-11 PROCEDURE — C1894 INTRO/SHEATH, NON-LASER: HCPCS | Performed by: INTERNAL MEDICINE

## 2019-12-11 PROCEDURE — 85730 THROMBOPLASTIN TIME PARTIAL: CPT | Performed by: INTERNAL MEDICINE

## 2019-12-11 PROCEDURE — 83520 IMMUNOASSAY QUANT NOS NONAB: CPT | Performed by: INTERNAL MEDICINE

## 2019-12-11 PROCEDURE — 83880 ASSAY OF NATRIURETIC PEPTIDE: CPT | Performed by: INTERNAL MEDICINE

## 2019-12-11 PROCEDURE — 87389 HIV-1 AG W/HIV-1&-2 AB AG IA: CPT | Performed by: INTERNAL MEDICINE

## 2019-12-11 PROCEDURE — 25000125 ZZHC RX 250: Performed by: INTERNAL MEDICINE

## 2019-12-11 PROCEDURE — 80048 BASIC METABOLIC PNL TOTAL CA: CPT | Performed by: INTERNAL MEDICINE

## 2019-12-11 PROCEDURE — 86704 HEP B CORE ANTIBODY TOTAL: CPT | Performed by: INTERNAL MEDICINE

## 2019-12-11 PROCEDURE — 86706 HEP B SURFACE ANTIBODY: CPT | Performed by: INTERNAL MEDICINE

## 2019-12-11 PROCEDURE — G0472 HEP C SCREEN HIGH RISK/OTHER: HCPCS | Performed by: INTERNAL MEDICINE

## 2019-12-11 PROCEDURE — 93451 RIGHT HEART CATH: CPT | Performed by: INTERNAL MEDICINE

## 2019-12-11 PROCEDURE — 36415 COLL VENOUS BLD VENIPUNCTURE: CPT | Performed by: INTERNAL MEDICINE

## 2019-12-11 PROCEDURE — 86038 ANTINUCLEAR ANTIBODIES: CPT | Performed by: INTERNAL MEDICINE

## 2019-12-11 PROCEDURE — 80076 HEPATIC FUNCTION PANEL: CPT | Performed by: INTERNAL MEDICINE

## 2019-12-11 PROCEDURE — 86140 C-REACTIVE PROTEIN: CPT | Performed by: INTERNAL MEDICINE

## 2019-12-11 PROCEDURE — 93451 RIGHT HEART CATH: CPT | Mod: 26 | Performed by: INTERNAL MEDICINE

## 2019-12-11 PROCEDURE — 27210794 ZZH OR GENERAL SUPPLY STERILE: Performed by: INTERNAL MEDICINE

## 2019-12-11 PROCEDURE — 00000401 ZZHCL STATISTIC THROMBIN TIME NC: Performed by: INTERNAL MEDICINE

## 2019-12-11 PROCEDURE — 86039 ANTINUCLEAR ANTIBODIES (ANA): CPT | Performed by: INTERNAL MEDICINE

## 2019-12-11 PROCEDURE — 83540 ASSAY OF IRON: CPT | Performed by: INTERNAL MEDICINE

## 2019-12-11 RX ORDER — LIDOCAINE 40 MG/G
CREAM TOPICAL
Status: COMPLETED | OUTPATIENT
Start: 2019-12-11 | End: 2019-12-11

## 2019-12-11 RX ADMIN — LIDOCAINE: 40 CREAM TOPICAL at 13:19

## 2019-12-11 ASSESSMENT — MIFFLIN-ST. JEOR: SCORE: 2147.5

## 2019-12-11 NOTE — PROGRESS NOTES
Patient was seen with Dr. Hirsch in 2A post procedure where all results were reviewed and plan for follow up was discussed.      Follow up in 1 year with office visit, CMRI, Stress test and labs.  Patient will have 7 day ZioPatch placed soon.  Patient verbalized understanding, agreed with plan and denied any further questions. Diana Dillard RN on 12/11/2019 at 4:08 PM

## 2019-12-11 NOTE — IP AVS SNAPSHOT
MRN:[id number]                      After Visit Summary   12/11/2019    Derek Contreras    MRN: [id number]           Visit Information        Department      12/11/2019 12:19 PM [address] [facility]          Review of your medicines      UNREVIEWED medicines. Ask your doctor about these medicines       Dose / Directions   albuterol 108 (90 Base) MCG/ACT inhaler  Commonly known as:  PROAIR HFA/PROVENTIL HFA/VENTOLIN HFA      every 4 hours as needed  Refills:  4     aspirin 81 MG tablet  Commonly known as:  ASA      Dose:  81 mg  Take 81 mg by mouth every morning ON HOLD FOR SURGERY SINCE 03/14/2019  Quantity:  90 tablet  Refills:  3     atorvastatin 40 MG tablet  Commonly known as:  LIPITOR  Used for:  Hypercholesteremia      Dose:  40 mg  Take 1 tablet (40 mg) by mouth every morning  Quantity:  90 tablet  Refills:  1     Breo Ellipta 200-25 MCG/INH inhaler  Generic drug:  fluticasone-vilanterol      INL 1 PUFF PO AT THE SAME TIME Q DAY. RINSE AND SPIT AFTER U  Refills:  4     cetirizine 10 MG tablet  Commonly known as:  zyrTEC      Dose:  10 mg  Take 10 mg by mouth every morning  Quantity:  90 tablet  Refills:  3     fluticasone 50 MCG/ACT nasal spray  Commonly known as:  Flonase  Used for:  Persistent cough for 3 weeks or longer      Dose:  1-2 spray  Spray 1-2 sprays into both nostrils daily  Quantity:  16 g  Refills:  0     hydrochlorothiazide 12.5 MG tablet  Commonly known as:  HYDRODIURIL  Used for:  Essential hypertension      Dose:  12.5 mg  Take 1 tablet (12.5 mg) by mouth every morning  Quantity:  90 tablet  Refills:  1     * levofloxacin 250 MG tablet  Commonly known as:  LEVAQUIN  Ask about: Should I take this medication?      Dose:  750 mg  Take 3 tablets (750 mg) by mouth daily for 7 days  Quantity:  21 tablet  Refills:  0     * levofloxacin 250 MG tablet  Commonly known as:  LEVAQUIN  Used for:  URI (upper respiratory infection)      Dose:  750 mg  Take 3 tablets (750 mg) by  mouth daily  Quantity:  30 tablet  Refills:  0     multivitamin tablet      Dose:  1 tablet  Take 1 tablet by mouth every morning  Quantity:  90 tablet  Refills:  3     predniSONE 10 MG tablet  Commonly known as:  DELTASONE  Ask about: Should I take this medication?      Dose:  30 mg  Take 3 tablets (30 mg) by mouth daily for 7 days  Quantity:  21 tablet  Refills:  0         * This list has 2 medication(s) that are the same as other medications prescribed for you. Read the directions carefully, and ask your doctor or other care provider to review them with you.                  Protect others around you: Learn how to safely use, store and throw away your medicines at www.disposemymeds.org.       Follow-ups after your visit       Your next 10 appointments already scheduled    Dec 12, 2019  9:10 AM CST  New Sleep Patient with Shannon Patel MD  [facility] ([facility]) [address] [facility] [address]  [phone]      Feb 05, 2020  8:00 AM CST  SIX MINUTE WALK with UC PFL C, UC PFL 6 MINUTE WALK 2  [facility] Pulmonary Function Testing ([facility]) [address]  3rd Floor  [facility] [address] [phone]      Feb 05, 2020 10:00 AM CST  (Arrive by 9:45 AM)  Return Interstitial Lung with Jamie Martin MD  [facility] ([facility]) [address]  [address] [place] [address]  [phone]      Apr 09, 2020  8:30 AM CDT  LAB with RI LAB  [facility] ([facility]) [address]  [facility] [address]  [phone]   Please do not eat 10-12 hours before your appointment if you are coming in fasting for labs on lipids, cholesterol, or glucose (sugar). Does not apply to pregnant women. Water, tea and black coffee (with nothing added) is okay. Do not drink other fluids, diet soda or gum. If you have  concerns about taking your medications, please send a message by clicking on Secure Messaging, Message Your Care Team.     Apr 13, 2020  8:30 AM CDT  Return Visit with Matteo Coughlin MD  [facility] ([facility]) [address]  [address]  [facility] [address] [phone]         Care Instructions       Further instructions from your care team       [facility]                        Interventional Cardiology  Discharge Instructions   Post Right Heart Cath      AFTER YOU GO HOME:    DO drink plenty of fluids    DO resume your regular diet and medications unless otherwise instructed by your Primary Physician    Do Not scrub the procedure site vigorously    No lotion or powder to the puncture site for 3 days    CALL YOUR PRIMARY PHYSICIAN IF: You may resume all normal activity.  Monitor neck site for bleeding, swelling, or voice changes. If you notice bleeding or swelling immediately apply pressure to the site and call number below to speak with Cardiology Fellow.  If you experience any changes in your breathing you should call your doctor immediately or come to the closest Emergency Department.  Do not drive yourself.    ADDITIONAL INSTRUCTIONS: Medications: You are to resume all home medications including anticoagulation therapy unless otherwise advised by your primary cardiologist or nurse coordinator.    Follow Up:   1)Follow up with Dr. Hirsch in 1 year with Cardiac MRI, Exercise Stress test and labs.  2)We will have a ZioPatch monitor mailed to your home to be worn for 7 days.  Please allow 1 week for us to receive results after you mail it back.      If you have any questions or concerns regarding your procedure site please call [phone] at anytime and ask for Cardiology Fellow on call.  They are available 24 hours a day.  You may also contact the Cardiology Clinic after hours number at  [phone].                                                       Telephone Numbers [phone] Monday-Friday 8:00 am to 4:30 pm    [phone] After 4:30 pm Monday-Friday, Weekends & Holidays  Ask for Interventional Cardiologist on call. Someone is on call 24 hours/day   [facility] toll free number [phone] Monday-Friday 8:00 am to 4:30 pm   [facility] Emergency Dept [phone]                   Statement of Approval (From admission, onward)     Ordered          12/11/19 8288  I have reviewed and agree with all the recommendations and orders detailed in this document.  EFFECTIVE NOW     Approved and electronically signed by:  Antonio Christopher PA-C                   Additional Information About Your Visit       [employer] Information    [employer] gives you secure access to your electronic health record. If you see a primary care provider, you can also send messages to your care team and make appointments. If you have questions, please call your primary care clinic.  If you do not have a primary care provider, please call [phone] and they will assist you.       Care EveryWhere ID    This is your Care EveryWhere ID. This could be used by other organizations to access your [facility] medical records  [id number]       Your Vitals Were  Most recent update: 12/11/2019  4:05 PM    Blood Pressure   152/86   (BP Location: Left arm)          Temperature   97.8  F (36.6  C) (Oral)          Respirations   20          Height   1.829 m (6')          Weight   132.5 kg (292 lb)             Pulse Oximetry   96%    BMI (Body Mass Index)   39.60 kg/m           Primary Care Provider Office Phone # Fax #    Carlyn Payne [phone]      Equal Access to Services    ERIN [facility] AH: Hadii manjeet kennedy hadasho Soomaali, waaxda luqadaha, qaybta kaalmada adeegyada, darren shell. So [facility] [phone].    ATENCIÓN: Si habla español, tiene a pearce disposición servicios gratuitos de  jeri reisrossy. Alex al [phone].    We comply with applicable federal and state civil rights laws, including the [place] Human Rights Act. We do not discriminate on the basis of race, color, creed, Bahai, national origin, marital status, age, disability, sex, sexual orientation, or gender identity.       Thank you!    Thank you for choosing [facility] for your care. Our goal is always to provide you with excellent care. Hearing back from our patients is one way we can continue to improve our services. Please take a few minutes to complete the written survey that you may receive in the mail after you visit with us. Thank you!            Medication List      ASK your doctor about these medications          Morning Afternoon Evening Bedtime As Needed    albuterol 108 (90 Base) MCG/ACT inhaler  Also known as:  PROAIR HFA/PROVENTIL HFA/VENTOLIN HFA  INSTRUCTIONS:  every 4 hours as needed                     aspirin 81 MG tablet  Also known as:  ASA  INSTRUCTIONS:  Take 81 mg by mouth every morning ON HOLD FOR SURGERY SINCE 03/14/2019                     atorvastatin 40 MG tablet  Also known as:  LIPITOR  INSTRUCTIONS:  Take 1 tablet (40 mg) by mouth every morning                     Breo Ellipta 200-25 MCG/INH inhaler  INSTRUCTIONS:  INL 1 PUFF PO AT THE SAME TIME Q DAY. RINSE AND SPIT AFTER U  Generic drug:  fluticasone-vilanterol                     cetirizine 10 MG tablet  Also known as:  zyrTEC  INSTRUCTIONS:  Take 10 mg by mouth every morning                     fluticasone 50 MCG/ACT nasal spray  Also known as:  Flonase  INSTRUCTIONS:  Spray 1-2 sprays into both nostrils daily                     hydrochlorothiazide 12.5 MG tablet  Also known as:  HYDRODIURIL  INSTRUCTIONS:  Take 1 tablet (12.5 mg) by mouth every morning                     * levofloxacin 250 MG tablet  Also known as:  LEVAQUIN  INSTRUCTIONS:  Take 3 tablets (750 mg) by mouth daily for 7 days  Ask about: Should I take this  medication?                     * levofloxacin 250 MG tablet  Also known as:  LEVAQUIN  INSTRUCTIONS:  Take 3 tablets (750 mg) by mouth daily                     multivitamin tablet  INSTRUCTIONS:  Take 1 tablet by mouth every morning                     predniSONE 10 MG tablet  Also known as:  DELTASONE  INSTRUCTIONS:  Take 3 tablets (30 mg) by mouth daily for 7 days  Ask about: Should I take this medication?                        * This list has 2 medication(s) that are the same as other medications prescribed for you. Read the directions carefully, and ask your doctor or other care provider to review them with you.

## 2019-12-11 NOTE — IP AVS SNAPSHOT
[address] [address]                                    After Visit Summary   12/11/2019    Derek Contreras    MRN: [id number]           After Visit Summary Signature Page    I have received my discharge instructions, and my questions have been answered. I have discussed any challenges I see with this plan with the nurse or doctor.    ..........................................................................................................................................  Patient/Patient Representative Signature      ..........................................................................................................................................  Patient Representative Print Name and Relationship to Patient    ..................................................               ................................................  Date                                   Time    ..........................................................................................................................................  Reviewed by Signature/Title    ...................................................              ..............................................  Date                                               Time          22EPIC Rev 08/18

## 2019-12-11 NOTE — PROGRESS NOTES
Returned to unit following RHC. Right neck dressing in place. No sedation. Alert and orient. No complaint of nausea or discomfort. Respiratory status stable. Vital signs within normal limits. Discharge instructions given and pt voiced understanding. No scripts needed from pharmacy. Adequate for discharge. Discharge to home with family.

## 2019-12-11 NOTE — DISCHARGE INSTRUCTIONS
[facility]                        Interventional Cardiology  Discharge Instructions   Post Right Heart Cath      AFTER YOU GO HOME:    DO drink plenty of fluids    DO resume your regular diet and medications unless otherwise instructed by your Primary Physician    Do Not scrub the procedure site vigorously    No lotion or powder to the puncture site for 3 days    CALL YOUR PRIMARY PHYSICIAN IF: You may resume all normal activity.  Monitor neck site for bleeding, swelling, or voice changes. If you notice bleeding or swelling immediately apply pressure to the site and call number below to speak with Cardiology Fellow.  If you experience any changes in your breathing you should call your doctor immediately or come to the closest Emergency Department.  Do not drive yourself.    ADDITIONAL INSTRUCTIONS: Medications: You are to resume all home medications including anticoagulation therapy unless otherwise advised by your primary cardiologist or nurse coordinator.    Follow Up:   1)Follow up with Dr. Hirsch in 1 year with Cardiac MRI, Exercise Stress test and labs.  2)We will have a ZioPatch monitor mailed to your home to be worn for 7 days.  Please allow 1 week for us to receive results after you mail it back.      If you have any questions or concerns regarding your procedure site please call [phone] at anytime and ask for Cardiology Fellow on call.  They are available 24 hours a day.  You may also contact the Cardiology Clinic after hours number at [phone].                                                       Telephone Numbers [phone] Monday-Friday 8:00 am to 4:30 pm    [phone] After 4:30 pm Monday-Friday, Weekends & Holidays  Ask for Interventional Cardiologist on call. Someone is on call 24 hours/day   [facility] toll free number [phone] Monday-Friday 8:00 am to 4:30 pm   [facility] Emergency Dept [phone]

## 2019-12-11 NOTE — PROGRESS NOTES
Mr. Contreras returns today after completing his evaluation for pulmonary hypertension.    As you know he is a very pleasant 63-year-old gentleman who we saw recently in clinic for evaluation of pulmonary hypertension given his dilated right ventricle and mildly reduced right ventricular function on cardiac MRI.    His VQ scan was low probability for pulmonary thromboembolism.    His pulmonary function tests showed reduced lung volumes consistent with restrictive lung disease from sarcoidosis.    His serological work-up for pulmonary hypertension including GEOVANNA rheumatoid factor and HIV hepatitis and other serologies are still pending.    His NT proBNP is within normal limits.    His right heart catheterization showed normal PA pressure, normal biventricular filling pressure, normal cardiac output.    I personally again reviewed his cardiac MRI with our imaging cardiologist.  He does not have any evidence of late gadolinium enhancement to suggest cardiac sarcoidosis.  His left ventricular function is also mildly reduced.  His right ventricle appears to be dilated but normal by volume assessment.  His right ventricular function was also mildly reduced.      Assessment and Plan    I am delighted to say that Mr. Contreras does not have pulmonary hypertension.  However, the etiology for his mild right ventricular dilatation and dysfunction is not totally clear.  He does not have evidence of cardiac sarcoidosis.  It is possible that his RV dilatation and dysfunction is secondary to his hypoxia and restrictive lung disease.  We have recently published showing that RV function is disproportionately low in patients with chronic lung disease.  Furthermore, there is data suggesting that patients with COPD have RV dysfunction even in the absence of pulmonary hypertension.  I highly suspect that this is the case with him.    Given his PVCs, to be certain, I have recommended him to have a 7-day ZIO patch to rule out PVC induced  ventricular dysfunction.    On a positive note, he does not have any clinical evidence of right ventricular failure.  His NT proBNP is normal.    I do not think there is a necessity to change his therapy at this time in point.    I have recommended him to return to our clinic in a year with a repeat cardiac MRI to assess his right ventricular size and function.  I havrecommended him to call us in the interim of any further worsening symptoms.  We will we will follow-up on his zio patch monitor.    It was a pleasure meeting Mr. Derek Contreras in our pulmonary hypertension clinic at [facility].  We thank you for involving us in his care.  Please do not hesitate to call us if you have any further questions.  Patch results.    Sincerely,  Torrey Hirsch MD   [facility]  Heart Failure, Transplant, and Mechanical Circulatory Support Cardiology   Cardiovascular Division  [facility]   [phone]

## 2019-12-11 NOTE — TELEPHONE ENCOUNTER
Patient was seen in 2A post testing with Dr. Hirsch.  Plan is for patient to have 7 day Zio patch now, then 1 year follow up with [facility], labs and Exercise ST (NB). Diana Dillard RN on 12/11/2019 at 4:13 PM

## 2019-12-12 ENCOUNTER — OFFICE VISIT (OUTPATIENT)
Dept: SLEEP MEDICINE | Facility: CLINIC | Age: 64
End: 2019-12-12
Attending: INTERNAL MEDICINE
Payer: MEDICARE

## 2019-12-12 VITALS
RESPIRATION RATE: 18 BRPM | DIASTOLIC BLOOD PRESSURE: 84 MMHG | BODY MASS INDEX: 39.55 KG/M2 | WEIGHT: 292 LBS | SYSTOLIC BLOOD PRESSURE: 141 MMHG | OXYGEN SATURATION: 93 % | HEART RATE: 81 BPM | HEIGHT: 72 IN

## 2019-12-12 DIAGNOSIS — I10 HYPERTENSION, UNSPECIFIED TYPE: ICD-10-CM

## 2019-12-12 DIAGNOSIS — E66.01 MORBID OBESITY (H): ICD-10-CM

## 2019-12-12 DIAGNOSIS — D86.0 PULMONARY SARCOIDOSIS (H): ICD-10-CM

## 2019-12-12 DIAGNOSIS — G47.33 OBSTRUCTIVE SLEEP APNEA: Primary | ICD-10-CM

## 2019-12-12 LAB
IL6 SERPL-MCNC: 4.39 PG/ML
LA PPP-IMP: NEGATIVE
RHEUMATOID FACT SER NEPH-ACNC: 20 IU/ML (ref 0–20)
STFR SERPL-SCNC: 3.9 MG/L (ref 2.2–5)

## 2019-12-12 PROCEDURE — 99204 OFFICE O/P NEW MOD 45 MIN: CPT | Performed by: PEDIATRICS

## 2019-12-12 ASSESSMENT — MIFFLIN-ST. JEOR: SCORE: 2147.5

## 2019-12-12 NOTE — PATIENT INSTRUCTIONS
Your BMI is Body mass index is 39.6 kg/m .  Weight management is a personal decision.  If you are interested in exploring weight loss strategies, the following discussion covers the approaches that may be successful. Body mass index (BMI) is one way to tell whether you are at a healthy weight, overweight, or obese. It measures your weight in relation to your height.  A BMI of 18.5 to 24.9 is in the healthy range. A person with a BMI of 25 to 29.9 is considered overweight, and someone with a BMI of 30 or greater is considered obese. More than two-thirds of American adults are considered overweight or obese.  Being overweight or obese increases the risk for further weight gain. Excess weight may lead to heart disease and diabetes.  Creating and following plans for healthy eating and physical activity may help you improve your health.  Weight control is part of healthy lifestyle and includes exercise, emotional health, and healthy eating habits. Careful eating habits lifelong are the mainstay of weight control. Though there are significant health benefits from weight loss, long-term weight loss with diet alone may be very difficult to achieve- studies show long-term success with dietary management in less than 10% of people. Attaining a healthy weight may be especially difficult to achieve in those with severe obesity. In some cases, medications, devices and surgical management might be considered.  What can you do?  If you are overweight or obese and are interested in methods for weight loss, you should discuss this with your provider.     Consider reducing daily calorie intake by 500 calories.     Keep a food journal.     Avoiding skipping meals, consider cutting portions instead.    Diet combined with exercise helps maintain muscle while optimizing fat loss. Strength training is particularly important for building and maintaining muscle mass. Exercise helps reduce stress, increase energy, and improves fitness.  Increasing exercise without diet control, however, may not burn enough calories to loose weight.       Start walking three days a week 10-20 minutes at a time    Work towards walking thirty minutes five days a week     Eventually, increase the speed of your walking for 1-2 minutes at time    In addition, we recommend that you review healthy lifestyles and methods for weight loss available through the National Institutes of Health patient information sites:  http://win.niddk.nih.gov/publications/index.htm    And look into health and wellness programs that may be available through your health insurance provider, employer, local community center, or amy club.    Weight management plan: Patient was referred to their PCP to discuss a diet and exercise plan.

## 2019-12-12 NOTE — PROGRESS NOTES
[facility]  Outpatient Sleep Medicine Consultation, New Patient      Name: Derek Contreras MRN# [id number]   Age: 64 year old YOB: 1955     Date of Consultation: December 12, 2019  Consultation is requested by: Jamie Martin MD  [address] [facility] [address]  [place] [address]  Primary care provider: Carlyn Payne           Assessment and Plan:     1. Obstructive sleep apnea  Patient is being evaluated for Obstructive Sleep Apnea (BEULAH).  He has a history of sleep disordered breathing but reports that snoring has improved over time.  The patient is willing to try Pap therapy again despite his previous experience with claustrophobia.  An attended split-night polysomnogram study (PSG) is recommended so transcutaneous monitoring can be achieved. If insurance will not cover an in-lab study, home study testing will be performed. Patient has agreed to this plan.  Counseling included a comprehensive review of diagnostic and therapeutic strategies as well as risks of inadequate therapy.  He will follow up via [facility] for results.    2. Pulmonary sarcoidosis (H)  Patients with sarcoidosis have increased risk for sleep apnea related to increased upper airway resistance, steroid-induced obesity, or parenchymal lung disease.      3. Morbid obesity (H)  Patients with obesity are at higher risk for sleep apnea due to fat deposition in the posterior airway and tongue.  Sleep disruption associated with untreated sleep disorders (including, but not limited to sleep apnea) can cause hormonal disruption that predisposes individuals to gain weight.  Weight loss can lead to improvement in sleep apnea severity.  Counseled regarding weight loss through diet modification and increased physical activity.     4. Hypertension, unspecified type  BEULAH is an independent risk factor for hypertension; risk for hypertension is related to severity of sleep apnea and episodic nocturnal  "hypoxemia.  Untreated BEULAH can cause loss of blood pressure dipping during nighttime sleep.  PAP therapy can improve blood pressure control in individuals with co-existing BEULAH and hypertension.  Patient's blood pressure was noted to be elevated today.  Additionally, I noted an irregularly irregular heart rate on exam.    The patient was strongly advised to avoid driving, operating any heavy machinery or engaging in other hazardous situations while drowsy or sleepy.  Patient was counseled on the importance of driving while alert and to pull over if drowsy.           Chief Complaint/Reason for Consult:     \"Continue evaluation for treatment of sarcoidosis\"         History of Present Illness:     Derek Contreras is a 64 year old male who I am seeing for the first time in my adult sleep medicine clinic.  He reports being diagnosed with obstructive sleep apnea circa 2011.  He recalls that his sleep apnea was mild but data from the sleep test is not available to me.  He was prescribed CPAP at that time which he briefly attempted but ultimately discontinued use due to discomfort with his nasal mask.  He attributes discomfort to claustrophobia.    Since that time he reports that his snoring has improved.  Apneic events are not observed.  He perceives that his sleep quality is adequate.  His score on the Elliott Sleepiness Scale is normal at 3 out of 24.  He consumes soda but otherwise no caffeine intake.  He is a non-smoker.  He estimates 6 to 7 hours of sleep at night.  He may awaken once to use the bathroom.  He denies symptoms consistent with parasomnia, limb movement disorder, or hypersomnia disorder.  Family members are known to snore but there have been no formal diagnoses of sleep apnea.  Patient lives at home with his wife Araceli.  He is a retired mental health therapist.    In addition to sarcoidosis medical history is significant for a diagnosis of COPD responsive to inhaled treatments, hip replacement, knee " was replaced, prostate cancer removal earlier this year, and hypertension.  I noted that today's blood pressure is elevated with systolic at 141 mmHg.  He reports undergoing a heart catheterization yesterday which did not reveal disease per his report.  Cardiac dysrhythmias were not noted.           Medications:     Current Outpatient Medications   Medication Sig     albuterol (PROAIR HFA/PROVENTIL HFA/VENTOLIN HFA) 108 (90 Base) MCG/ACT inhaler every 4 hours as needed      aspirin (ASA) 81 MG tablet Take 81 mg by mouth every morning ON HOLD FOR SURGERY SINCE 03/14/2019     atorvastatin (LIPITOR) 40 MG tablet Take 1 tablet (40 mg) by mouth every morning     BREO ELLIPTA 200-25 MCG/INH Inhaler INL 1 PUFF PO AT THE SAME TIME Q DAY. RINSE AND SPIT AFTER U     cetirizine (ZYRTEC) 10 MG tablet Take 10 mg by mouth every morning      fluticasone (FLONASE) 50 MCG/ACT nasal spray Spray 1-2 sprays into both nostrils daily     hydrochlorothiazide (HYDRODIURIL) 12.5 MG tablet Take 1 tablet (12.5 mg) by mouth every morning     levofloxacin (LEVAQUIN) 250 MG tablet Take 3 tablets (750 mg) by mouth daily     multivitamin (ONE-DAILY) tablet Take 1 tablet by mouth every morning      No current facility-administered medications for this visit.         Allergies   Allergen Reactions     Cat Hair Extract Itching     itchy tearful eyes     Adhesive Tape Rash     Iodine Rash            Past Medical History:     Past Medical History:   Diagnosis Date     Asthma      Claustrophobia      CPAP (continuous positive airway pressure) dependence      Dyslipidemia      [place] (hard of hearing)      Hypercholesteremia      Hypertension      Iron deficiency      Mediastinal adenopathy      Obesity      BEULAH (obstructive sleep apnea)      Prostate cancer (H)      Pulmonary hypertension (H)      Sarcoidosis              Past Surgical History:      Past Surgical History:   Procedure Laterality Date     APPENDECTOMY       C TOTAL HIP ARTHROPLASTY Bilateral       C TOTAL KNEE ARTHROPLASTY Bilateral      DAVINCI PROSTATECTOMY, LYMPHADENECTOMY N/A 5/23/2019    Procedure: ROBOTIC ASSISTED LAPAROSCOPIC RADICAL PROSTATECTOMY WITH BILATERAL PELVIC LYMPH NODE DISSECTION;  Surgeon: Matteo Coughlin MD;  Location: [facility] OR     ENDOBRONCHIAL ULTRASOUND FLEXIBLE N/A 3/29/2019    Procedure: Flexible Bronchoscopy, Endobronchial Ultrasound;  Surgeon: Curtis Leger MD;  Location: [facility] OR     VASECTOMY              Social History:     Social History     Tobacco Use     Smoking status: Never Smoker     Smokeless tobacco: Never Used   Substance Use Topics     Alcohol use: Yes     Comment: twice a week            Family History:     Family History   Problem Relation Age of Onset     Alzheimer Disease Mother      Heart Disease Father      Glaucoma No family hx of      Macular Degeneration No family hx of      Diabetes No family hx of      Thyroid Disease No family hx of              Review of Systems:     A complete 10 point review of systems was negative other than HPI or as commented below.         Physical Examination:   BP (!) 141/84   Pulse 81   Resp 18   Ht 1.829 m (6')   Wt 132.5 kg (292 lb)   SpO2 93%   BMI 39.60 kg/m     Neck Circumference: 18.5 inches   Constitutional:  Awake, alert, cooperative, in no apparent distress  Eyes: No icterus  ENT: Mallampati Class: II.  Nose: patent.  Tongue:  large.  Cardiovascular: Irregularly irregular S1 and S2, soft systolic murmur  Neck: Supple, no thyroid enlargement  Pulmonary:  Chest symmetric, lungs clear bilaterally and no crackles, wheezes or rales  Extremities:  No pretibial edema  Skin:  No rash or significant lesions visible  Gait:  Normal  Neurologic: Alert, oriented, no focal neurological deficit  Psychological: euthymic; affect appropriate            Data: All pertinent previous laboratory data reviewed     No results found for: PH, PHARTERIAL, PO2, [id number], SAT, PCO2, HCO3, BASEEXCESS, JUAN ANTONIO, BEB  Lab Results    Component Value Date    TSH 1.92 12/11/2019    TSH 0.46 05/24/2019     Lab Results   Component Value Date    GLC 92 12/11/2019    GLC 98 07/31/2019     Lab Results   Component Value Date    HGB 17.0 12/11/2019    HGB 16.0 07/31/2019     Lab Results   Component Value Date    BUN 13 12/11/2019    BUN 11 07/31/2019    CR 0.83 12/11/2019    CR 0.87 07/31/2019     Lab Results   Component Value Date    AST 26 12/11/2019    AST 30 07/31/2019    ALT 35 12/11/2019    ALT 39 07/31/2019    ALKPHOS 86 12/11/2019    ALKPHOS 90 07/31/2019    BILITOTAL 0.6 12/11/2019    BILITOTAL 0.6 07/31/2019     No results found for: UAMP, UBARB, BENZODIAZEUR, UCANN, UCOC, OPIT, UPCP    Shannon Patel MD   12/12/2019   [address], [facility] [address]  [place] [address] [phone]    Copy to: Carlyn Payne

## 2019-12-12 NOTE — NURSING NOTE
No chief complaint on file.      Initial BP (!) 146/83   Pulse 81   Resp 18   Ht 1.829 m (6')   Wt 132.5 kg (292 lb)   SpO2 93%   BMI 39.60 kg/m   Estimated body mass index is 39.6 kg/m  as calculated from the following:    Height as of this encounter: 1.829 m (6').    Weight as of this encounter: 132.5 kg (292 lb).    Medication Reconciliation: complete    Neck circumference: 18.5 inches / 47 centimeters.    ESS 3    Rina Parikh MA

## 2019-12-13 LAB
ANA PAT SER IF-IMP: ABNORMAL
ANA SER QL IF: POSITIVE
ANA TITR SER IF: ABNORMAL {TITER}

## 2020-01-06 ENCOUNTER — THERAPY VISIT (OUTPATIENT)
Dept: SLEEP MEDICINE | Facility: CLINIC | Age: 65
End: 2020-01-06
Payer: MEDICARE

## 2020-01-06 ENCOUNTER — DOCUMENTATION ONLY (OUTPATIENT)
Dept: SLEEP MEDICINE | Facility: CLINIC | Age: 65
End: 2020-01-06

## 2020-01-06 DIAGNOSIS — G47.33 OBSTRUCTIVE SLEEP APNEA: ICD-10-CM

## 2020-01-06 PROCEDURE — 95810 POLYSOM 6/> YRS 4/> PARAM: CPT | Performed by: PEDIATRICS

## 2020-01-08 DIAGNOSIS — R09.02 HYPOXEMIA: ICD-10-CM

## 2020-01-08 DIAGNOSIS — G47.33 OSA (OBSTRUCTIVE SLEEP APNEA): Primary | ICD-10-CM

## 2020-01-08 NOTE — PROCEDURES
SLEEP STUDY INTERPRETATION  DIAGNOSTIC POLYSOMNOGRAPHY REPORT      Patient: LISETH GIRON  YOB: 1955  Study Date: 1/6/2020  MRN: [id number]  Referring Provider: -  Ordering Provider: Shannon Green    Indications for Polysomnography: The patient is a 64 y old Male who is 6' and weighs 292.0 lbs. His BMI is 40.0, Calvin sleepiness scale 3.0 and neck circumference is 47.0 cm. He reports being diagnosed with obstructive sleep apnea circa 2011.  He recalls that his sleep apnea was mild.  He was prescribed CPAP at that time which he briefly attempted but ultimately discontinued use due to discomfort with his nasal mask.  Since that time he reports that his snoring has improved.  Apneic events are not observed.  He perceives that his sleep quality is adequate.  His score on the Calvin Sleepiness Scale is normal at 3 out of 24.  He consumes soda but otherwise no caffeine intake.  He is a non-smoker.  In addition to sarcoidosis medical history is significant for a diagnosis of COPD responsive to inhaled treatments, hip replacement, knee replacement, prostate cancer s/p prostatectomy, and hypertension.      Polysomnogram Data: A full night polysomnogram recorded the standard physiologic parameters including EEG, EOG, EMG, ECG, nasal and oral airflow. Respiratory parameters of chest and abdominal movements were recorded with respiratory inductance plethysmography. Oxygen saturation was recorded by pulse oximetry. Hypopnea scoring rule used: 1B 4%.    Sleep Architecture: Sufficient sleep was captured for interpretation, although overall sleep efficiency was reduced (45.6%) due to delayed sleep latency and awakenings throughout the recording. Supine sleep was not captured.    The total recording time of the polysomnogram was 497.9 minutes. The total sleep time was 227.0 minutes. Sleep latency was increased at 47.8 minutes without the use of a sleep aid. REM latency was 285.0 minutes. Arousal index  was increased at 27.8 arousals per hour. Sleep efficiency was decreased at 45.6% due to awakenings that were both brief and prolonged. Wake after sleep onset was 190.5 minutes. The patient spent 11.5% of total sleep time in Stage N1, 40.1% in Stage N2, 32.6% in Stage N3, and 15.9% in REM.  Supine sleep was not captured.    Respiration: Obstructive sleep apnea was demonstrated, with respiratory events essentially isolated to REM sleep.  Oxyhemoglobin desaturations were prominent during REM, and the patient did not always recover baseline saturations before onset of the next airway obstruction.  Nocturnal hypoxemia was present; there were 23 minutes with SpO2 less than or equal to 88%.  Hypoventilation was not observed.  The study may be limited due to the absence of supine sleep.      Events ? The polysomnogram revealed a presence of 0 obstructive, 0 central, and 0 mixed apneas resulting in an apnea index of 0 events per hour. There were 19 obstructive hypopneas and 0 central hypopneas resulting in an obstructive hypopnea index of 5.0 and central hypopnea index of 0 events per hour. The combined apnea/hypopnea index was 5.0 events per hour (central apnea/hypopnea index was 0 events per hour). The REM AHI was 25.0 events per hour. Supine sleep was not captured. The RERA index was 5.8 events per hour.  The RDI was 10.8 events per hour.    Snoring - was reported as loud.    Respiratory rate and pattern - was consistent with sleep-disordered breathing during REM sleep.    Sustained Sleep Associated Hypoventilation - Transcutaneous carbon dioxide monitoring was used; hypoventilation was not present with a maximum change from 35.9 to 47.6 mmHg and 0 minutes at or greater than 55 mmHg.    Sleep Associated Hypoxemia - (Greater than 5 minutes O2 sat at or below 88%) was present. Baseline oxygen saturation was 92.3%. Lowest oxygen saturation was 77.1%. Time spent less than or equal to 88% was 23.2 minutes. Time spent less than  or equal to 89% was 31.1 minutes.    Movement Activity: Periodic limb movements were observed but limb movements did not cause a significant number of arousals from sleep.     Periodic Limb Activity - There were 59 PLMs during the entire study. The PLM index was 15.6 movements per hour. The PLM Arousal Index was 1.9 per hour.    REM EMG Activity - Excessive muscle activity was not present.    Nocturnal Behavior - Abnormal sleep related behaviors were not noted during sleep.     Bruxism - None apparent.    Cardiac Summary: An irregularly irregular heart rhythm was observed intermittently throughout the recording.    The average pulse rate was 74.0 bpm. The minimum pulse rate was 46.0 bpm while the maximum pulse rate was 117.5 bpm.  An irregularly irregular heart rhythm was observed intermittently throughout the recording.       Assessment:     Obstructive sleep apnea was demonstrated (AHI 5; REM AHI 25), with respiratory events essentially isolated to REM sleep.  The study may be limited due to the absence of supine sleep.      Oxyhemoglobin desaturations were prominent during REM, and the patient did not always recover baseline saturations before onset of the next airway obstruction.  Nocturnal hypoxemia was present; there were 23 minutes with SpO2 less than or equal to 88%.      Hypoventilation was not observed.      An irregularly irregular heart rhythm was observed intermittently throughout the recording.    Recommendations:    Recommend repeat polysomnography with full night titration of positive airway pressure therapy for the control of sleep disordered breathing.    Advice regarding the risks of drowsy driving.    Suggest optimizing sleep schedule and avoiding sleep deprivation.    Weight management (if BMI > 30).    Diagnostic Codes:   Obstructive Sleep Apnea G47.33  Sleep Hypoxemia/Hypoventilation G47.36     1/6/2020 [place] [facility] Sleep Study (292.0 lbs) - AHI 5.0, RDI 10.8, Supine AHI -, REM AHI  25.0, Low O2 77.1%, Time Spent ?88% 23.2 minutes / Time Spent ?89% 31.1 minutes.      _____________________________________   Electronically Signed By:  Shannon Green MD  1/8/2020

## 2020-01-12 ENCOUNTER — THERAPY VISIT (OUTPATIENT)
Dept: SLEEP MEDICINE | Facility: CLINIC | Age: 65
End: 2020-01-12
Payer: MEDICARE

## 2020-01-12 DIAGNOSIS — R09.02 HYPOXEMIA: ICD-10-CM

## 2020-01-12 DIAGNOSIS — G47.33 OSA (OBSTRUCTIVE SLEEP APNEA): ICD-10-CM

## 2020-01-12 PROCEDURE — 95811 POLYSOM 6/>YRS CPAP 4/> PARM: CPT | Performed by: PEDIATRICS

## 2020-01-13 DIAGNOSIS — E78.00 HYPERCHOLESTEREMIA: ICD-10-CM

## 2020-01-13 DIAGNOSIS — I10 ESSENTIAL HYPERTENSION: ICD-10-CM

## 2020-01-13 NOTE — PROGRESS NOTES
Completed a all night titration PSG per provider order.    Preliminary AHI .  A final therapeutic PAP pressure was achieved.    Supine REM was not seen on therapeutic pressure.    Patient reports feeling refreshed in AM.

## 2020-01-14 ENCOUNTER — MYC REFILL (OUTPATIENT)
Dept: INTERNAL MEDICINE | Facility: CLINIC | Age: 65
End: 2020-01-14

## 2020-01-14 DIAGNOSIS — E78.00 HYPERCHOLESTEREMIA: ICD-10-CM

## 2020-01-14 DIAGNOSIS — I10 ESSENTIAL HYPERTENSION: ICD-10-CM

## 2020-01-14 RX ORDER — ATORVASTATIN CALCIUM 40 MG/1
40 TABLET, FILM COATED ORAL EVERY MORNING
Qty: 90 TABLET | Refills: 1 | Status: CANCELLED | OUTPATIENT
Start: 2020-01-14

## 2020-01-14 RX ORDER — HYDROCHLOROTHIAZIDE 12.5 MG/1
12.5 TABLET ORAL EVERY MORNING
Qty: 90 TABLET | Refills: 1 | Status: CANCELLED | OUTPATIENT
Start: 2020-01-14

## 2020-01-14 NOTE — TELEPHONE ENCOUNTER
"Requested Prescriptions   Pending Prescriptions Disp Refills     hydrochlorothiazide (HYDRODIURIL) 12.5 MG tablet [Pharmacy Med Name:   HYDROCHLOROTHIAZIDE 12.5MG TABLETS]    Last Written Prescription Date:  7/23/19  Last Fill Quantity: 90,  # refills: 1   Last office visit: 10/16/2019 with prescribing provider:  Hemanth   Future Office Visit:     90 tablet 1     Sig: TAKE 1 TABLET(12.5 MG) BY MOUTH EVERY MORNING       Diuretics (Including Combos) Protocol Failed - 1/13/2020 12:43 PM        Failed - Blood pressure under 140/90 in past 12 months     BP Readings from Last 3 Encounters:   12/12/19 (!) 141/84   12/11/19 (!) 152/86   11/11/19 (!) 147/85                 Passed - Recent (12 mo) or future (30 days) visit within the authorizing provider's specialty     Patient has had an office visit with the authorizing provider or a provider within the authorizing providers department within the previous 12 mos or has a future within next 30 days. See \"Patient Info\" tab in inbasket, or \"Choose Columns\" in Meds & Orders section of the refill encounter.              Passed - Medication is active on med list        Passed - Patient is age 18 or older        Passed - Normal serum creatinine on file in past 12 months     Recent Labs   Lab Test 12/11/19  1254   CR 0.83              Passed - Normal serum potassium on file in past 12 months     Recent Labs   Lab Test 12/11/19  1254   POTASSIUM 3.8                    Passed - Normal serum sodium on file in past 12 months     Recent Labs   Lab Test 12/11/19  1254                 atorvastatin (LIPITOR) 40 MG tablet [Pharmacy Med Name: ATORVASTATIN 40MG   TABLETS]    Last Written Prescription Date:  7/23/19  Last Fill Quantity: 90,  # refills: 1   Last office visit: 10/16/2019 with prescribing provider:  Hemanth  Future Office Visit:     90 tablet 1     Sig: TAKE 1 TABLET(40 MG) BY MOUTH EVERY MORNING       Statins Protocol Passed - 1/13/2020 12:43 PM        Passed - LDL on file " "in past 12 months     Recent Labs   Lab Test 12/11/19  1254   LDL 17             Passed - No abnormal creatine kinase in past 12 months     No lab results found.             Passed - Recent (12 mo) or future (30 days) visit within the authorizing provider's specialty     Patient has had an office visit with the authorizing provider or a provider within the authorizing providers department within the previous 12 mos or has a future within next 30 days. See \"Patient Info\" tab in inbasket, or \"Choose Columns\" in Meds & Orders section of the refill encounter.              Passed - Medication is active on med list        Passed - Patient is age 18 or older           "

## 2020-01-15 ENCOUNTER — DOCUMENTATION ONLY (OUTPATIENT)
Dept: SLEEP MEDICINE | Facility: CLINIC | Age: 65
End: 2020-01-15

## 2020-01-15 DIAGNOSIS — G47.33 OSA (OBSTRUCTIVE SLEEP APNEA): Primary | ICD-10-CM

## 2020-01-15 DIAGNOSIS — R09.02 HYPOXEMIA: ICD-10-CM

## 2020-01-15 LAB — SLPCOMP: NORMAL

## 2020-01-15 NOTE — PROCEDURES
SLEEP STUDY INTERPRETATION  TITRATION STUDY      Patient: LISETH GIRON  YOB: 1955  Study Date: 1/12/2020  MRN: [id number]  Referring Provider: SELF  Ordering Provider: JORGE ALBERTO OSBORN MD    Indications for Polysomnography: The patient is a 64 y old Male who is 6' and weighs 292.0 lbs. His BMI is 40.0, [place] sleepiness scale 3.0 and neck circumference is 47.0 cm.  He had a diagnostic polysomnogram study on 1/6/2020.  Obstructive sleep apnea was demonstrated (AHI 5; REM AHI 25), with respiratory events essentially isolated to REM sleep.  Supine sleep was not captured.  Oxyhemoglobin desaturations were prominent during REM, and the patient did not always recover baseline saturations before onset of the next airway obstruction.  Nocturnal hypoxemia was present; there were 23 minutes with SpO2 less than or equal to 88%. Hypoventilation was not observed.  An irregularly irregular heart rhythm was observed intermittently throughout the recording.    Polysomnogram Data: A full night polysomnogram recorded the standard physiologic parameters including EEG, EOG, EMG, ECG, nasal and oral airflow. Respiratory parameters of chest and abdominal movements were recorded with respiratory inductance plethysmography. Oxygen saturation was recorded by pulse oximetry. Hypopnea scoring rule used: 1B 4%.    Treatment PSG  Sleep Architecture: Total sleep time was reduced due to prolonged awakenings during the recording. Sleep quality improved significantly in the last two hours of the study.  The patient had difficulty maintaining supine sleep; very little supine sleep was captured.   The total recording time of the polysomnogram was 469.8 minutes. The total sleep time was 256.5 minutes. Sleep latency was mildly increased at 30.3 minutes without the use of a sleep aid. REM latency was 307.0 minutes. Arousal index was normal at 14.0 arousals per hour. Sleep efficiency was decreased at 54.6%. Wake after sleep onset was 177.5  minutes. The patient spent 9.2% of total sleep time in Stage N1, 46.6% in Stage N2, 28.1% in Stage N3, and 16.2% in REM. Time in REM supine was 0 minutes.    Respiration: CPAP at 8 cmH2O was effective at resolving obstructive sleep apnea and improving oxyhemoglobin saturations to the normal range. REM/supine sleep was not captured.   Hypoxemia was observed during REM/lateral sleep at a pressure of 7 cmH2O.  Hypoxemia resolved at a pressure of 8 cmH2O.  Supplemental oxygen was not indicated during this study.      The patient was titrated at pressures ranging from CPAP 5 cmH2O up to CPAP 8 cmH2O. The final pressure achieved was CPAP 8 cmH2O with a residual AHI of 0 events per hour. Time in REM supine on final pressure was - minutes.     This titration was considered adequate (residual AHI with 75% decrease or above constraints without REM supine sleep at final pressure).     Snoring - was reported as mild with low CPAP pressures.    Respiratory rate and pattern - was notable for normal respiratory rate and pattern.    Sustained Sleep Associated Hypoventilation - Transcutaneous carbon dioxide monitoring was not used, however significant hypoventilation was not suggested by oximetry.    Sleep Associated Hypoxemia - (Greater than 5 minutes O2 sat at or below 88%) was present during this study during REM sleep at 7 cmH2O.  Hypoxemia resolved when the CPAP pressure was increased to 8 cmH2O.  Baseline oxygen saturation was 93.0%. Lowest oxygen saturation was 85.3%. Time spent less than or equal to 88% was 6.1 minutes. Time spent less than or equal to 89% was 7.3 minutes.    Movement Activity: Leg movements were observed but did not cause a significant number of arousals from sleep.    Periodic Limb Activity - There were 56 PLMs during the entire study. The PLM index was 13.1 movements per hour. The PLM Arousal Index was 0.7 per hour.    REM EMG Activity - Excessive muscle activity was not present.    Nocturnal Behavior -  Abnormal sleep related behaviors were not noted during sleep.     Bruxism - None apparent.    Cardiac Summary: An irregular heart rhythm was observed intermittently throughout the study.  The average pulse rate was 74.0 bpm. The minimum pulse rate was 45.6 bpm while the maximum pulse rate was 111.0 bpm. An irregular heart rhythm was observed intermittently throughout the study.    Assessment:     CPAP at 8 cmH2O was effective at resolving obstructive sleep apnea and improving oxyhemoglobin saturations to the normal range. REM/supine sleep was not captured.   Hypoxemia was observed during REM/lateral sleep at a pressure of 7 cmH2O.  Hypoxemia resolved at a pressure of 8 cmH2O.  Supplemental oxygen was not indicated during this study.    An irregular heart rhythm was observed intermittently throughout the study.    Recommendations:    Treatment of BEULAH with Auto?titrating PAP therapy with a range of 8 cmH2O to 15 cmH2O. Recommend clinical follow up with sleep management team, including review of compliance measures.    An irregular heart rhythm is observed on both the diagnostic and treatment sleep studies.  Recommend follow-up with primary care provider.      Advice regarding the risks of drowsy driving.    Suggest optimizing sleep schedule and avoiding sleep deprivation.    Weight management (if BMI > 30).    Pharmacologic therapy should be used for management of restless legs syndrome only if present and clinically indicated and not based on the presence of periodic limb movements alone.    Diagnostic Codes:   Obstructive Sleep Apnea G47.33  Sleep Hypoxemia/Hypoventilation G47.36     1/12/2020 [place] Titration Sleep Study (292.0 lbs) - Treatment was titrated to a pressure of CPAP 8 with an AHI of 0; nocturnal hypoxemia during REM resolved at 8 cmH2O. REM supine sleep was not captured during this study.     _____________________________________   Electronically Signed By:  Shannon Green MD  1/15/2020

## 2020-01-16 ENCOUNTER — TRANSFERRED RECORDS (OUTPATIENT)
Dept: HEALTH INFORMATION MANAGEMENT | Facility: CLINIC | Age: 65
End: 2020-01-16

## 2020-01-16 ENCOUNTER — MYC MEDICAL ADVICE (OUTPATIENT)
Dept: PULMONOLOGY | Facility: CLINIC | Age: 65
End: 2020-01-16

## 2020-01-16 DIAGNOSIS — D86.9 SARCOIDOSIS: Primary | ICD-10-CM

## 2020-01-16 RX ORDER — ATORVASTATIN CALCIUM 40 MG/1
TABLET, FILM COATED ORAL
Qty: 90 TABLET | Refills: 2 | Status: SHIPPED | OUTPATIENT
Start: 2020-01-16 | End: 2020-10-02

## 2020-01-16 RX ORDER — HYDROCHLOROTHIAZIDE 12.5 MG/1
TABLET ORAL
Qty: 90 TABLET | Refills: 2 | Status: SHIPPED | OUTPATIENT
Start: 2020-01-16 | End: 2020-10-02

## 2020-01-16 NOTE — TELEPHONE ENCOUNTER
hydrochlorothiazide - Routing refill request to provider for review/approval because:  BP elevated    Atorvastatin - Prescription approved per [facility].

## 2020-01-17 ENCOUNTER — ALLIED HEALTH/NURSE VISIT (OUTPATIENT)
Dept: CARDIOLOGY | Facility: CLINIC | Age: 65
End: 2020-01-17
Attending: INTERNAL MEDICINE
Payer: MEDICARE

## 2020-01-17 DIAGNOSIS — M34.9 SCLERODERMA (H): ICD-10-CM

## 2020-01-17 DIAGNOSIS — R06.02 SOB (SHORTNESS OF BREATH): ICD-10-CM

## 2020-01-17 PROCEDURE — 0296T ZIO PATCH HOLTER ADULT PEDIATRIC GREATER THAN 48 HRS: CPT | Mod: ZF

## 2020-01-17 PROCEDURE — 0298T ZZC EXT ECG > 48HR TO 21 DAY REVIEW AND INTERPRETATN: CPT | Performed by: INTERNAL MEDICINE

## 2020-01-17 RX ORDER — PREDNISONE 10 MG/1
TABLET ORAL
Qty: 50 TABLET | Refills: 0 | Status: SHIPPED | OUTPATIENT
Start: 2020-01-17 | End: 2020-03-16

## 2020-01-17 RX ORDER — AZITHROMYCIN 250 MG/1
TABLET, FILM COATED ORAL
Qty: 6 TABLET | Refills: 0 | Status: SHIPPED | OUTPATIENT
Start: 2020-01-17 | End: 2020-06-29

## 2020-01-20 ENCOUNTER — MYC MEDICAL ADVICE (OUTPATIENT)
Dept: PULMONOLOGY | Facility: CLINIC | Age: 65
End: 2020-01-20

## 2020-01-20 DIAGNOSIS — D86.9 SARCOIDOSIS: Primary | ICD-10-CM

## 2020-01-20 LAB — SLPCOMP: NORMAL

## 2020-01-24 ENCOUNTER — OFFICE VISIT (OUTPATIENT)
Dept: SLEEP MEDICINE | Facility: CLINIC | Age: 65
End: 2020-01-24
Payer: MEDICARE

## 2020-01-24 VITALS
SYSTOLIC BLOOD PRESSURE: 145 MMHG | HEIGHT: 72 IN | DIASTOLIC BLOOD PRESSURE: 85 MMHG | HEART RATE: 91 BPM | WEIGHT: 290 LBS | BODY MASS INDEX: 39.28 KG/M2 | OXYGEN SATURATION: 93 %

## 2020-01-24 DIAGNOSIS — G47.33 OSA (OBSTRUCTIVE SLEEP APNEA): Primary | ICD-10-CM

## 2020-01-24 PROCEDURE — 99214 OFFICE O/P EST MOD 30 MIN: CPT | Performed by: INTERNAL MEDICINE

## 2020-01-24 ASSESSMENT — MIFFLIN-ST. JEOR: SCORE: 2138.56

## 2020-01-24 NOTE — PATIENT INSTRUCTIONS
Your blood pressure was checked while you were in clinic today.  Please read the guidelines below about what these numbers mean and what you should do about them.  Your systolic blood pressure is the top number.  This is the pressure when the heart is pumping.  Your diastolic blood pressure is the bottom number.  This is the pressure in between beats.  If your systolic blood pressure is less than 120 and your diastolic blood pressure is less than 80, then your blood pressure is normal. There is nothing more that you need to do about it  If your systolic blood pressure is 120-139 or your diastolic blood pressure is 80-89, your blood pressure may be higher than it should be.  You should have your blood pressure re-checked within a year by a primary care provider.  If your systolic blood pressure is 140 or greater or your diastolic blood pressure is 90 or greater, you may have high blood pressure.  High blood pressure is treatable, but if left untreated over time it can put you at risk for heart attack, stroke, or kidney failure.  You should have your blood pressure re-checked by a primary care provider within the next four weeks.  Your BMI is There is no height or weight on file to calculate BMI.  Weight management is a personal decision.  If you are interested in exploring weight loss strategies, the following discussion covers the approaches that may be successful. Body mass index (BMI) is one way to tell whether you are at a healthy weight, overweight, or obese. It measures your weight in relation to your height.  A BMI of 18.5 to 24.9 is in the healthy range. A person with a BMI of 25 to 29.9 is considered overweight, and someone with a BMI of 30 or greater is considered obese. More than two-thirds of American adults are considered overweight or obese.  Being overweight or obese increases the risk for further weight gain. Excess weight may lead to heart disease and diabetes.  Creating and following plans for  healthy eating and physical activity may help you improve your health.  Weight control is part of healthy lifestyle and includes exercise, emotional health, and healthy eating habits. Careful eating habits lifelong are the mainstay of weight control. Though there are significant health benefits from weight loss, long-term weight loss with diet alone may be very difficult to achieve- studies show long-term success with dietary management in less than 10% of people. Attaining a healthy weight may be especially difficult to achieve in those with severe obesity. In some cases, medications, devices and surgical management might be considered.  What can you do?  If you are overweight or obese and are interested in methods for weight loss, you should discuss this with your provider.     Consider reducing daily calorie intake by 500 calories.     Keep a food journal.     Avoiding skipping meals, consider cutting portions instead.    Diet combined with exercise helps maintain muscle while optimizing fat loss. Strength training is particularly important for building and maintaining muscle mass. Exercise helps reduce stress, increase energy, and improves fitness. Increasing exercise without diet control, however, may not burn enough calories to loose weight.       Start walking three days a week 10-20 minutes at a time    Work towards walking thirty minutes five days a week     Eventually, increase the speed of your walking for 1-2 minutes at time    In addition, we recommend that you review healthy lifestyles and methods for weight loss available through the National Institutes of Health patient information sites:  http://win.niddk.nih.gov/publications/index.htm    And look into health and wellness programs that may be available through your health insurance provider, employer, local community center, or amy club.

## 2020-01-24 NOTE — NURSING NOTE
"Chief Complaint   Patient presents with     RECHECK     f/u sleep study results       Initial BP (!) 145/85   Pulse 91   Ht 1.829 m (6' 0.01\")   Wt 131.5 kg (290 lb)   SpO2 93%   BMI 39.32 kg/m   Estimated body mass index is 39.32 kg/m  as calculated from the following:    Height as of this encounter: 1.829 m (6' 0.01\").    Weight as of this encounter: 131.5 kg (290 lb).    Medication Reconciliation: complete      ESS 6  "

## 2020-01-26 NOTE — PROGRESS NOTES
SLEEP CLINIC FOLLOW UP VISIT:     Date of visit: 1/24/2020    Chief complaint/ Purpose of visit:review results of PSGs and discuss plan of care    HPI: Derek Contreras is a 64 y old male with h/o previously diagnosed with obstructive sleep apnea in 2011,  sarcoidosis, COPD responsive to inhaled treatments, hip replacement, knee replacement, prostate cancer s/p prostatectomy, and hypertension.    He underwent a diagnostic polysomnogram on January 6, 2020 that showed mild obstructive sleep apnea predominant during REM sleep with evidence of sleep associated hypoxemia.   He was recommend repeat polysomnography with full night titration of positive airway pressure therapy for the control of sleep-related breathing disorder. He underwent repeat polysomnography with CPAP  titration on 1/12/20 and it was observed at the CPAP at 8 cm water during REM sleep in lateral position disordered breathing events was effective in controlling the sleep-related breathing disorder.    He presents to sleep clinic  today to review the test results and discuss plan of care.  He is accompanied by his wife       Initial PSG results (1/6/20)    Obstructive sleep apnea was demonstrated (AHI 5; REM AHI 25), with respiratory events essentially isolated to REM sleep.  The study may be limited due to the absence of supine sleep.      Oxyhemoglobin desaturations were prominent during REM, and the patient did not always recover baseline saturations before onset of the next airway obstruction.  Nocturnal hypoxemia was present; there were 23 minutes with SpO2 less than or equal to 88%.      Hypoventilation was not observed.      An irregularly irregular heart rhythm was observed intermittently throughout the recording.     Repeat polysomnography(1/12/20)    CPAP at 8 cmH2O was effective at resolving obstructive sleep apnea and improving oxyhemoglobin saturations to the normal range. REM supine sleep was not captured.   Hypoxemia resolved at a pressure of  8 cmH2O.  Supplemental oxygen was not indicated during this study.    An irregular heart rhythm was observed intermittently throughout the study.    The test results were discussed with patient in detail.      Current meds:  Current Outpatient Medications   Medication Sig Dispense Refill     albuterol (PROAIR HFA/PROVENTIL HFA/VENTOLIN HFA) 108 (90 Base) MCG/ACT inhaler every 4 hours as needed   4     aspirin (ASA) 81 MG tablet Take 81 mg by mouth every morning ON HOLD FOR SURGERY SINCE 03/14/2019 90 tablet 3     atorvastatin (LIPITOR) 40 MG tablet TAKE 1 TABLET(40 MG) BY MOUTH EVERY MORNING 90 tablet 2     BREO ELLIPTA 200-25 MCG/INH Inhaler INL 1 PUFF PO AT THE SAME TIME Q DAY. RINSE AND SPIT AFTER U  4     cetirizine (ZYRTEC) 10 MG tablet Take 10 mg by mouth every morning  90 tablet 3     fluticasone (FLONASE) 50 MCG/ACT nasal spray Spray 1-2 sprays into both nostrils daily 16 g 0     hydrochlorothiazide (HYDRODIURIL) 12.5 MG tablet TAKE 1 TABLET(12.5 MG) BY MOUTH EVERY MORNING 90 tablet 2     multivitamin (ONE-DAILY) tablet Take 1 tablet by mouth every morning  90 tablet 3     predniSONE (DELTASONE) 10 MG tablet Take 40 mg (4 tabs) by mouth for 5 days,Take 30 mg (3 tabs) for 5 days,Take 20 mg (2 tabs) for 5 days,Take 10 mg (1 tab) for 5 days, then stop. 50 tablet 0     azithromycin (ZITHROMAX) 250 MG tablet Take 2 tablets (500 mg) the first day, then take 1 tablet (250 mg) by mouth daily for a total of 5 days. (Patient not taking: Reported on 1/24/2020) 6 tablet 0     levofloxacin (LEVAQUIN) 250 MG tablet Take 3 tablets (750 mg) by mouth daily (Patient not taking: Reported on 1/24/2020) 30 tablet 0     Problem list:  Patient Active Problem List   Diagnosis     Morbid obesity (H)     Pulmonary sarcoidosis (H)     Hypertension     S/P hip replacement, bilateral     S/P TKR (total knee replacement), bilateral     S/P appendectomy     Bilateral hearing loss, unspecified hearing loss type     Hypercholesteremia      "Moderate asthma without complication     Prostate cancer (H)     Pulmonary hypertension (H)     SOB (shortness of breath)     Dyslipidemia     Iron deficiency     Need for hepatitis B screening test       Past medical history, Past surgical history, Allergies, Social history, Family history: reviewed, per EMR    Exam:  BP (!) 145/85   Pulse 91   Ht 1.829 m (6' 0.01\")   Wt 131.5 kg (290 lb)   SpO2 93%   BMI 39.32 kg/m    General appearance:  in no apparent distress  Pt is dressed casually, cooperative with good eye contact.   Speech is spontaneous with regular rate and volume.   Mood: euthymic; affect congruent with full range and intensity.   Sensorium: awake, alert and oriented to person, place, time, and situation.      Assessment/plan:  Mild Obstructive sleep apnea, with respiratory events essentially isolated to REM sleep. (AHI 5; REM AHI 25) with sleep associated hypoxemia.  CPAP at pressure setting of 8 cmH2O was effective in controlling  the sleep-related breathing disorder during lateral REM sleep.  Patient reports that he is a side sleeper.  We discussed the consequences of untreated sleep-related breathing disorder.  Patient was interested in starting treatment with CPAP.  Prescription was provided for auto titrating CPAP with pressure settings between 8 to 11 cm water.  He was recommended to use the device regularly during sleep and instructed to get back to us if he has any interface problems or problems tolerating the pressure settings.  He will be followed through sleep therapy management program.  He will follow-up at  sleep clinic for a face-to-face visit in approximately 6 to 8 weeks after initiating PAP  treatment  when the compliance measures be reviewed.      We discussed weight management with diet exercise.      He will continue to follow-up with cardiology.      Patient was strongly advised to avoid driving, operating any heavy machinery or other hazardous situations while drowsy or " "sleepy.  Patient was counseled on the importance of driving while alert, to pull over if drowsy, or nap before getting into the vehicle if sleepy.     The above note was dictated using voice recognition software. Although reviewed after completion, some word and grammatical error may remain . Please contact the author for any clarifications.    \"I spent a total of 25 minutes face to face with Derek Contreras during today's office visit, all of which was spent  counseling, consulting, coordinating plan of care and going over both sleep studies and chart review.\"     Frank Barron MD   of Medicine,  Division of Pulmonary/Sleep Medicine  [facility].    "

## 2020-01-27 ENCOUNTER — DOCUMENTATION ONLY (OUTPATIENT)
Dept: SLEEP MEDICINE | Facility: CLINIC | Age: 65
End: 2020-01-27
Payer: MEDICARE

## 2020-01-27 NOTE — PROGRESS NOTES
Patient was offered choice of vendor and chose [facility].  Patient Derek Contreras was set up at [place] on January 27, 2020. Patient received a Resmed AirSense 10 Auto. Pressures were set at 8-11 cm H2O.   Patient s ramp is 5 cm H2O for Auto and FLEX/EPR is 2.  Patient received a Resmed Airfit N30I  Nasal mask size Medium, heated tubing and heated humidifier.  Patient does need to meet compliance. Patient has a follow up on 03/16/2020 with Dr. Barron.    JULEE DOUGLASS

## 2020-01-30 ENCOUNTER — DOCUMENTATION ONLY (OUTPATIENT)
Dept: SLEEP MEDICINE | Facility: CLINIC | Age: 65
End: 2020-01-30

## 2020-01-30 NOTE — PROGRESS NOTES
3 DAY STM VISIT    Diagnostic AHI: 5    PSG    Patient contacted for 3 day STM visit  Subjective measures:  Pt reports that he has not yet started to use the device    Replacement device: No  STM ordered by provider: Yes     Device type: Auto-CPAP  PAP settings from order::  CPAP min 8 cm  H20       CPAP max 11 cm  H20        Mask type:    Nasal Mask        Assessment: No usage reporting but has a profile in [facility].  Action plan: Patient to have 14 day STM visit. Patient has a follow up visit scheduled:   yes within 31-90 days of set up.    Total time spent on accessing, reviewing and interpreting remote patient PAP therapy data:   0 minutes      Total time spent with direct patient communication :   5 minutes

## 2020-02-05 ENCOUNTER — OFFICE VISIT (OUTPATIENT)
Dept: PULMONOLOGY | Facility: CLINIC | Age: 65
End: 2020-02-05
Attending: INTERNAL MEDICINE
Payer: MEDICARE

## 2020-02-05 ENCOUNTER — ANCILLARY PROCEDURE (OUTPATIENT)
Dept: CT IMAGING | Facility: CLINIC | Age: 65
End: 2020-02-05
Attending: INTERNAL MEDICINE
Payer: MEDICARE

## 2020-02-05 VITALS
WEIGHT: 292 LBS | SYSTOLIC BLOOD PRESSURE: 135 MMHG | HEIGHT: 72 IN | DIASTOLIC BLOOD PRESSURE: 80 MMHG | OXYGEN SATURATION: 92 % | BODY MASS INDEX: 39.55 KG/M2 | HEART RATE: 99 BPM

## 2020-02-05 DIAGNOSIS — G70.9 NEUROMUSCULAR WEAKNESS (H): ICD-10-CM

## 2020-02-05 DIAGNOSIS — D86.9 SARCOIDOSIS: ICD-10-CM

## 2020-02-05 DIAGNOSIS — D86.9 SARCOIDOSIS: Primary | ICD-10-CM

## 2020-02-05 DIAGNOSIS — G47.33 OBSTRUCTIVE SLEEP APNEA: ICD-10-CM

## 2020-02-05 LAB
6 MIN WALK (FT): 860 FT
6 MIN WALK (M): 262 M

## 2020-02-05 PROCEDURE — G0463 HOSPITAL OUTPT CLINIC VISIT: HCPCS

## 2020-02-05 ASSESSMENT — MIFFLIN-ST. JEOR: SCORE: 2147.5

## 2020-02-05 ASSESSMENT — PAIN SCALES - GENERAL: PAINLEVEL: NO PAIN (0)

## 2020-02-05 NOTE — LETTER
2/5/2020       RE: Derek Contreras  [id number] [place] [address]     Dear Colleague,    Thank you for referring your patient, Derek Contreras, to the [facility] at [facility]. Please see a copy of my visit note below.    Reason for Visit  Derek Contreras is a 64 year old year old male who is being seen for Follow Up (pt is here for a ild follow up for sarcoids)  Pulmonary HPI    The patient was seen and examined by Jamie Martin MD     Mr. Contreras comes in for follow-up.  He was last seen in the pulmonary clinic in October at that time he was found to have restrictive physiology on PFTs, hypoxia with minimal changes in chest imaging.  MIP, MVV and MEP were ordered.  He also had a pulmonary pretension evaluation pending.  His EF is 50% and there was a question of RV dysfunction.    The patient comes in today and has evaluation done for pulmonary pretension.  A right heart catheterization showed PA pressures of 30/12 with a mean PA pressure of 17 and pulmonary capillary wedge pressure of 5 mmHg.  VQ scan did not show any pulmonary abnormality.  He was also evaluated for sleep disordered breathing and was found to have sleep apnea for which CPAP 8 cm of water was recommended.  He has used it twice.    He has had respiratory tract infection with symptoms lasting for over 2 to 3 weeks now.  He was initially given levofloxacin which did not improve the symptoms.  He was subsequently given prednisone burst with azithromycin.  Of note he has some symptoms of allergy and was told that he has asthma based on wheezing episodes in the past.      Current Outpatient Medications   Medication     albuterol (PROAIR HFA/PROVENTIL HFA/VENTOLIN HFA) 108 (90 Base) MCG/ACT inhaler     aspirin (ASA) 81 MG tablet     atorvastatin (LIPITOR) 40 MG tablet     BREO ELLIPTA 200-25 MCG/INH Inhaler     cetirizine (ZYRTEC) 10 MG tablet      fluticasone (FLONASE) 50 MCG/ACT nasal spray     hydrochlorothiazide (HYDRODIURIL) 12.5 MG tablet     multivitamin (ONE-DAILY) tablet     predniSONE (DELTASONE) 10 MG tablet     azithromycin (ZITHROMAX) 250 MG tablet     levofloxacin (LEVAQUIN) 250 MG tablet     No current facility-administered medications for this visit.      Allergies   Allergen Reactions     Cat Hair Extract Itching     itchy tearful eyes     Adhesive Tape Rash     Iodine Rash     Past Medical History:   Diagnosis Date     Asthma      Claustrophobia      CPAP (continuous positive airway pressure) dependence      Dyslipidemia      [place] (hard of hearing)      Hypercholesteremia      Hypertension      Iron deficiency      Mediastinal adenopathy      Obesity      BEULAH (obstructive sleep apnea)      Prostate cancer (H)      Pulmonary hypertension (H)      Sarcoidosis        Past Surgical History:   Procedure Laterality Date     APPENDECTOMY       C TOTAL HIP ARTHROPLASTY Bilateral      C TOTAL KNEE ARTHROPLASTY Bilateral      CV RIGHT HEART CATH N/A 12/11/2019    Procedure: CV RIGHT HEART CATH;  Surgeon: Torrey Hirsch MD;  Location:  HEART CARDIAC CATH LAB     DAVINCI PROSTATECTOMY, LYMPHADENECTOMY N/A 5/23/2019    Procedure: ROBOTIC ASSISTED LAPAROSCOPIC RADICAL PROSTATECTOMY WITH BILATERAL PELVIC LYMPH NODE DISSECTION;  Surgeon: Matteo Coughlin MD;  Location: [facility] OR     ENDOBRONCHIAL ULTRASOUND FLEXIBLE N/A 3/29/2019    Procedure: Flexible Bronchoscopy, Endobronchial Ultrasound;  Surgeon: Curtis Leger MD;  Location: [facility] OR     VASECTOMY         Social History     Socioeconomic History     Marital status:      Spouse name: Not on file     Number of children: Not on file     Years of education: Not on file     Highest education level: Not on file   Occupational History     Not on file   Social Needs     Financial resource strain: Not on file     Food insecurity:     Worry: Not on file     Inability: Not on file      Transportation needs:     Medical: Not on file     Non-medical: Not on file   Tobacco Use     Smoking status: Never Smoker     Smokeless tobacco: Never Used   Substance and Sexual Activity     Alcohol use: Yes     Comment: twice a week     Drug use: No     Sexual activity: Yes     Partners: Female   Lifestyle     Physical activity:     Days per week: Not on file     Minutes per session: Not on file     Stress: Not on file   Relationships     Social connections:     Talks on phone: Not on file     Gets together: Not on file     Attends Scientologist service: Not on file     Active member of club or organization: Not on file     Attends meetings of clubs or organizations: Not on file     Relationship status: Not on file     Intimate partner violence:     Fear of current or ex partner: Not on file     Emotionally abused: Not on file     Physically abused: Not on file     Forced sexual activity: Not on file   Other Topics Concern     Parent/sibling w/ CABG, MI or angioplasty before 65F 55M? Not Asked   Social History Narrative     Not on file       ROS Pulmonary  A complete ROS was otherwise negative except as noted in the HPI.  /80   Pulse 99   Ht 1.829 m (6')   Wt 132.5 kg (292 lb)   SpO2 92%   BMI 39.60 kg/m     Exam:   GENERAL APPEARANCE: Well developed, well nourished, alert, and in no apparent distress.  EYES: PERRL, EOMI  HENT: Nasal mucosa with no edema and no hyperemia.   MOUTH: Oral mucosa is moist, without any lesions, no tonsillar enlargement, no oropharyngeal exudate.  NECK: supple, no masses, no thyromegaly.  LYMPHATICS: No significant axillary, cervical, or supraclavicular nodes.  RESP: normal percussion, good air flow throughout.  No crackles. No rhonchi. No wheezes.  CV: Normal S1, S2, regular rhythm, normal rate. No murmur.  No rub. No gallop. No LE edema.   MS: extremities normal. No clubbing. No cyanosis.  SKIN: no rash on limited exam  NEURO: Mentation intact, speech normal, normal strength  and tone, normal gait and stance    Results:    PFTs done today were reviewed by me with the patient.6-minute walk test on room air with decrease 6-minute walk distance and desaturation from 98% to 93%.  Below the lowest O2 sat at 92%.  FVC is 2.19 L which is 46% of predicted, FEV1 1.69 L which is 46% of predicted the ratio is 77.  Total lung capacity is 4.72 (62%) and DLCO is 21.84 which is 76% of predicted.  My interpretation is that he has mild to moderate restriction with mildly decreased diffusion capacity.    Assessment and plan: 64-year-old male with history of hypertension, hyperlipidemia, obesity, prior history of prostate cancer, abnormal chest imaging with TBNA demonstrating granulomatous Inflammation (station 7 and 12 L lymph nodes) with BAL demonstrating 102 white cells and 11% lymphocytes.  The CD4 CD8 ratio was 2.29.  Cardiac MRI with no LGE to suggest cardiac sarcoidosis.  Abnormal EF of 50%.  RV dilatation of unclear etiology but no pulmonary hypertension based on right heart cath.  Recent Holter is abnormal.  1.  Pulmonary: He has out of proportion decrease in his PFTs despite limited changes on his imaging.  His MIP and MVV are abnormal.  We will have him get evaluated by neurology to evaluate for neuromuscular weakness of the respiratory muscles.  He will see Dr. Plasencia for this.    2.  History of allergies with reports of wheezing by auscultation when other providers have examined him and currently on Breo.  He has had prolonged symptoms which improved with prednisone and antibiotic.  He is currently on the last 2 days of his prednisone burst.  We will continue to follow.    3.  Extrapulmonary symptoms concern about diaphragmatic weakness which was evaluated by neurology.  He also has abnormal Holter but negative cardiac MRI in August 2019. Discussed with Dr. Ferrell and plan for EP evaluation. We will also order CPET.  Labs done recently so will not repeat today.    4.  Obstructive sleep  apnea on CPAP.    I will see him back in 4 to 6 months.       Again, thank you for allowing me to participate in the care of your patient.      Sincerely,    Jamie Martin MD

## 2020-02-05 NOTE — NURSING NOTE
Chief Complaint   Patient presents with     Follow Up     pt is here for a ild follow up for sarcoids     Vitals were taken and medications were reconciled.     JACKIE Peñaloza

## 2020-02-05 NOTE — PROGRESS NOTES
Reason for Visit  Derek Contreras is a 64 year old year old male who is being seen for Follow Up (pt is here for a ild follow up for sarcoids)  Pulmonary HPI    The patient was seen and examined by Jamie Martin MD     Mr. Contreras comes in for follow-up.  He was last seen in the pulmonary clinic in October at that time he was found to have restrictive physiology on PFTs, hypoxia with minimal changes in chest imaging.  MIP, MVV and MEP were ordered.  He also had a pulmonary pretension evaluation pending.  His EF is 50% and there was a question of RV dysfunction.    The patient comes in today and has evaluation done for pulmonary pretension.  A right heart catheterization showed PA pressures of 30/12 with a mean PA pressure of 17 and pulmonary capillary wedge pressure of 5 mmHg.  VQ scan did not show any pulmonary abnormality.  He was also evaluated for sleep disordered breathing and was found to have sleep apnea for which CPAP 8 cm of water was recommended.  He has used it twice.    He has had respiratory tract infection with symptoms lasting for over 2 to 3 weeks now.  He was initially given levofloxacin which did not improve the symptoms.  He was subsequently given prednisone burst with azithromycin.  Of note he has some symptoms of allergy and was told that he has asthma based on wheezing episodes in the past.      Current Outpatient Medications   Medication     albuterol (PROAIR HFA/PROVENTIL HFA/VENTOLIN HFA) 108 (90 Base) MCG/ACT inhaler     aspirin (ASA) 81 MG tablet     atorvastatin (LIPITOR) 40 MG tablet     BREO ELLIPTA 200-25 MCG/INH Inhaler     cetirizine (ZYRTEC) 10 MG tablet     fluticasone (FLONASE) 50 MCG/ACT nasal spray     hydrochlorothiazide (HYDRODIURIL) 12.5 MG tablet     multivitamin (ONE-DAILY) tablet     predniSONE (DELTASONE) 10 MG tablet     azithromycin (ZITHROMAX) 250 MG tablet     levofloxacin (LEVAQUIN) 250 MG tablet     No current facility-administered medications for this  visit.      Allergies   Allergen Reactions     Cat Hair Extract Itching     itchy tearful eyes     Adhesive Tape Rash     Iodine Rash     Past Medical History:   Diagnosis Date     Asthma      Claustrophobia      CPAP (continuous positive airway pressure) dependence      Dyslipidemia      [place] (hard of hearing)      Hypercholesteremia      Hypertension      Iron deficiency      Mediastinal adenopathy      Obesity      BEULAH (obstructive sleep apnea)      Prostate cancer (H)      Pulmonary hypertension (H)      Sarcoidosis        Past Surgical History:   Procedure Laterality Date     APPENDECTOMY       C TOTAL HIP ARTHROPLASTY Bilateral      C TOTAL KNEE ARTHROPLASTY Bilateral      CV RIGHT HEART CATH N/A 12/11/2019    Procedure: CV RIGHT HEART CATH;  Surgeon: Torrey Hirsch MD;  [facility]     DAVINCI PROSTATECTOMY, LYMPHADENECTOMY N/A 5/23/2019    Procedure: ROBOTIC ASSISTED LAPAROSCOPIC RADICAL PROSTATECTOMY WITH BILATERAL PELVIC LYMPH NODE DISSECTION;  Surgeon: Matteo Coughlin MD;  Location: [facility]     ENDOBRONCHIAL ULTRASOUND FLEXIBLE N/A 3/29/2019    Procedure: Flexible Bronchoscopy, Endobronchial Ultrasound;  Surgeon: Curtis Leger MD;  Location: [facility]     VASECTOMY         Social History     Socioeconomic History     Marital status:      Spouse name: Not on file     Number of children: Not on file     Years of education: Not on file     Highest education level: Not on file   Occupational History     Not on file   Social Needs     Financial resource strain: Not on file     Food insecurity:     Worry: Not on file     Inability: Not on file     Transportation needs:     Medical: Not on file     Non-medical: Not on file   Tobacco Use     Smoking status: Never Smoker     Smokeless tobacco: Never Used   Substance and Sexual Activity     Alcohol use: Yes     Comment: twice a week     Drug use: No     Sexual activity: Yes     Partners: Female   Lifestyle      Physical activity:     Days per week: Not on file     Minutes per session: Not on file     Stress: Not on file   Relationships     Social connections:     Talks on phone: Not on file     Gets together: Not on file     Attends [employer] service: Not on file     Active member of club or organization: Not on file     Attends meetings of clubs or organizations: Not on file     Relationship status: Not on file     Intimate partner violence:     Fear of current or ex partner: Not on file     Emotionally abused: Not on file     Physically abused: Not on file     Forced sexual activity: Not on file   Other Topics Concern     Parent/sibling w/ CABG, MI or angioplasty before 65F 55M? Not Asked   Social History Narrative     Not on file       ROS Pulmonary  A complete ROS was otherwise negative except as noted in the HPI.  /80   Pulse 99   Ht 1.829 m (6')   Wt 132.5 kg (292 lb)   SpO2 92%   BMI 39.60 kg/m    Exam:   GENERAL APPEARANCE: Well developed, well nourished, alert, and in no apparent distress.  EYES: PERRL, EOMI  HENT: Nasal mucosa with no edema and no hyperemia.   MOUTH: Oral mucosa is moist, without any lesions, no tonsillar enlargement, no oropharyngeal exudate.  NECK: supple, no masses, no thyromegaly.  LYMPHATICS: No significant axillary, cervical, or supraclavicular nodes.  RESP: normal percussion, good air flow throughout.  No crackles. No rhonchi. No wheezes.  CV: Normal S1, S2, regular rhythm, normal rate. No murmur.  No rub. No gallop. No LE edema.   MS: extremities normal. No clubbing. No cyanosis.  SKIN: no rash on limited exam  NEURO: Mentation intact, speech normal, normal strength and tone, normal gait and stance    Results:    PFTs done today were reviewed by me with the patient.6-minute walk test on room air with decrease 6-minute walk distance and desaturation from 98% to 93%.  Below the lowest O2 sat at 92%.  FVC is 2.19 L which is 46% of predicted, FEV1 1.69 L which is 46% of predicted  the ratio is 77.  Total lung capacity is 4.72 (62%) and DLCO is 21.84 which is 76% of predicted.  My interpretation is that he has mild to moderate restriction with mildly decreased diffusion capacity.    Assessment and plan: 64-year-old male with history of hypertension, hyperlipidemia, obesity, prior history of prostate cancer, abnormal chest imaging with TBNA demonstrating granulomatous Inflammation (station 7 and 12 L lymph nodes) with BAL demonstrating 102 white cells and 11% lymphocytes.  The CD4 CD8 ratio was 2.29.  Cardiac MRI with no LGE to suggest cardiac sarcoidosis.  Abnormal EF of 50%.  RV dilatation of unclear etiology but no pulmonary hypertension based on right heart cath.  Recent Holter is abnormal.  1.  Pulmonary: He has out of proportion decrease in his PFTs despite limited changes on his imaging.  His MIP and MVV are abnormal.  We will have him get evaluated by neurology to evaluate for neuromuscular weakness of the respiratory muscles.  He will see Dr. Plasencia for this.    2.  History of allergies with reports of wheezing by auscultation when other providers have examined him and currently on Breo.  He has had prolonged symptoms which improved with prednisone and antibiotic.  He is currently on the last 2 days of his prednisone burst.  We will continue to follow.    3.  Extrapulmonary symptoms concern about diaphragmatic weakness which was evaluated by neurology.  He also has abnormal Holter but negative cardiac MRI in August 2019. Discussed with Dr. Ferrell and plan for EP evaluation. We will also order CPET.  Labs done recently so will not repeat today.    4.  Obstructive sleep apnea on CPAP.    I will see him back in 4 to 6 months.

## 2020-02-07 LAB
DLCOUNC-%PRED-PRE: 76 %
DLCOUNC-PRE: 21.84 ML/MIN/MMHG
DLCOUNC-PRED: 28.44 ML/MIN/MMHG
ERV-%PRED-PRE: 47 %
ERV-PRE: 0.33 L
ERV-PRED: 0.69 L
EXPTIME-PRE: 7.5 SEC
FEF2575-%PRED-PRE: 52 %
FEF2575-PRE: 1.48 L/SEC
FEF2575-PRED: 2.84 L/SEC
FEFMAX-%PRED-PRE: 62 %
FEFMAX-PRE: 5.84 L/SEC
FEFMAX-PRED: 9.28 L/SEC
FEV1-%PRED-PRE: 46 %
FEV1-PRE: 1.69 L
FEV1FEV6-PRE: 79 %
FEV1FEV6-PRED: 78 %
FEV1FVC-PRE: 77 %
FEV1FVC-PRED: 76 %
FEV1SVC-PRE: 70 %
FEV1SVC-PRED: 69 %
FIFMAX-PRE: 3.26 L/SEC
FRCPLETH-%PRED-PRE: 69 %
FRCPLETH-PRE: 2.63 L
FRCPLETH-PRED: 3.77 L
FVC-%PRED-PRE: 46 %
FVC-PRE: 2.19 L
FVC-PRED: 4.76 L
IC-%PRED-PRE: 46 %
IC-PRE: 2.09 L
IC-PRED: 4.54 L
RVPLETH-%PRED-PRE: 88 %
RVPLETH-PRE: 2.3 L
RVPLETH-PRED: 2.6 L
TLCPLETH-%PRED-PRE: 62 %
TLCPLETH-PRE: 4.72 L
TLCPLETH-PRED: 7.53 L
VA-%PRED-PRE: 62 %
VA-PRE: 4.33 L
VC-%PRED-PRE: 46 %
VC-PRE: 2.42 L
VC-PRED: 5.23 L

## 2020-02-11 ENCOUNTER — DOCUMENTATION ONLY (OUTPATIENT)
Dept: SLEEP MEDICINE | Facility: CLINIC | Age: 65
End: 2020-02-11

## 2020-02-11 NOTE — PROGRESS NOTES
14  DAY STM VISIT    Diagnostic AHI: 5    PSG    Subjective measures:  Pt states things are going well and has no issues or complaints.  Pt is benefiting from therapy.       Assessment: Pt not meeting objective benchmarks for compliance, late start, most nights over four hour.    Patient meeting subjective benchmarks.     Action plan: pt to have 30 day STM visit.      Device type: Auto-CPAP    PAP settings: CPAP min 8.0 cm  H20       CPAP max 11.0 cm  H20     95th% pressure 10.7 cm  H20     Mask type:  Nasal Mask    Objective measures: 14 day rolling measures      Compliance  21 %      Leak  25.73 lpm  last  upload      AHI 1.11   last  upload      Average number of minutes 120      Objective measure goal  Compliance   Goal >70%  Leak   Goal < 24 lpm  AHI  Goal < 5  Usage  Goal >240        Total time spent on accessing and interpreting remote patient PAP therapy data  10 minutes    Total time spent counseling, coaching  and reviewing PAP therapy data with patient  2 minutes    80999  no  [id number]  no (3 day STM)

## 2020-02-13 NOTE — TELEPHONE ENCOUNTER
RECORDS RECEIVED FROM:Internal   DATE RECEIVED:3.4.20   NOTES STATUS DETAILS   OFFICE NOTE from referring provider    Internal 9.13.19 Dr. Mario Ramirez clinic   OFFICE NOTE from other cardiologist    N/A    DISCHARGE SUMMARY from hospital    N/A    DISCHARGE REPORT from the ER   N/A    OPERATIVE REPORT    Internal 12.11.19 CV Right heart cath   MEDICATION LIST   Internal    LABS     BMP   Internal 12.11.19   CBC   Internal 12.11.19   CMP   N/A    Lipids   Internal 12.11.19   TSH   Internal 12.11.19   DIAGNOSTIC PROCEDURES     EKG   Internal 8.29.19 5.24.19  12.27.18 12.27.16     Monitor Reports   Internal 1.17.2020   IMAGING (DISC & REPORT)      Echo   N/A    Stress Tests   N/A    Cath   N/A    MRI/MRA   Internal 8.29.19   CT/CTA   N/A

## 2020-02-19 ENCOUNTER — ANCILLARY PROCEDURE (OUTPATIENT)
Dept: PET IMAGING | Facility: CLINIC | Age: 65
End: 2020-02-19
Attending: INTERNAL MEDICINE
Payer: MEDICARE

## 2020-02-19 DIAGNOSIS — D86.9 SARCOIDOSIS: ICD-10-CM

## 2020-02-19 DIAGNOSIS — I50.9 CHF (CONGESTIVE HEART FAILURE) (H): Primary | ICD-10-CM

## 2020-02-19 DIAGNOSIS — Z09 ENCOUNTER FOR FOLLOW-UP EXAMINATION AFTER COMPLETED TREATMENT FOR CONDITIONS OTHER THAN MALIGNANT NEOPLASM: ICD-10-CM

## 2020-02-19 LAB — GLUCOSE SERPL-MCNC: 87 MG/DL (ref 70–99)

## 2020-02-28 ENCOUNTER — DOCUMENTATION ONLY (OUTPATIENT)
Dept: SLEEP MEDICINE | Facility: CLINIC | Age: 65
End: 2020-02-28
Payer: MEDICARE

## 2020-02-28 ENCOUNTER — TEAM CONFERENCE (OUTPATIENT)
Dept: PULMONOLOGY | Facility: CLINIC | Age: 65
End: 2020-02-28

## 2020-02-28 NOTE — PROGRESS NOTES
30 DAY [facility] VISIT    Diagnostic AHI: 5  PSG    Message left for patient to return call.    Assessment: Pt meeting objective benchmarks.     Action plan: waiting for patient to return call.  and 2 week STM recheck appt scheduled  Patient has scheduled a follow up visit with Dr. Barron on 3/16/2020.   Device type: Auto-CPAP  PAP settings: CPAP min 8.0 cm  H20     CPAP max 11.0 cm  H20    95th% pressure 10.6 cm  H20   Mask type:  Nasal Mask  Objective measures: 14 day rolling measures      Compliance  21 %      Leak  24.08 lpm  last  upload      AHI 1.11   last  upload      Average number of minutes 215      Objective measure goal  Compliance   Goal >70%  Leak   Goal < 24 lpm  AHI  Goal < 5  Usage  Goal >240        Total time spent on accessing and interpreting remote patient PAP therapy data  10 minutes    Total time spent counseling, coaching  and reviewing PAP therapy data with patient  1 minutes     [id number] no this call  [id number] no  at 3 or 14 day [facility]

## 2020-02-28 NOTE — TELEPHONE ENCOUNTER
Sarcoidosis Multidisciplinary Meeting     Physician: Dr. Martin     Question for Group: Is this cardiac sarcoidosis?    Radiology:  No cardiac uptake.  Some type of perfusion abnormality which is probably not accurate on PET scan.  MRI normal.  No enhancement.  No evidence of cardiac sarcoidosis.  Has nonsustained VT on ZIo patch.      Plan: No indication to treat for sarcoid.  Will see Dr. Figueroa (scheduled in early March)     Patient Notification and Response: Left message for patient to return call.

## 2020-03-04 ENCOUNTER — OFFICE VISIT (OUTPATIENT)
Dept: CARDIOLOGY | Facility: CLINIC | Age: 65
End: 2020-03-04
Attending: INTERNAL MEDICINE
Payer: MEDICARE

## 2020-03-04 ENCOUNTER — PRE VISIT (OUTPATIENT)
Dept: CARDIOLOGY | Facility: CLINIC | Age: 65
End: 2020-03-04

## 2020-03-04 VITALS
WEIGHT: 295 LBS | DIASTOLIC BLOOD PRESSURE: 92 MMHG | OXYGEN SATURATION: 93 % | HEART RATE: 97 BPM | HEIGHT: 72 IN | SYSTOLIC BLOOD PRESSURE: 148 MMHG | BODY MASS INDEX: 39.96 KG/M2

## 2020-03-04 DIAGNOSIS — I47.29 PVT (PAROXYSMAL VENTRICULAR TACHYCARDIA) (H): Primary | ICD-10-CM

## 2020-03-04 LAB — INTERPRETATION ECG - MUSE: NORMAL

## 2020-03-04 PROCEDURE — 99204 OFFICE O/P NEW MOD 45 MIN: CPT | Mod: GC | Performed by: INTERNAL MEDICINE

## 2020-03-04 PROCEDURE — G0463 HOSPITAL OUTPT CLINIC VISIT: HCPCS | Mod: 25,ZF

## 2020-03-04 PROCEDURE — 93010 ELECTROCARDIOGRAM REPORT: CPT | Mod: ZP | Performed by: INTERNAL MEDICINE

## 2020-03-04 PROCEDURE — 93005 ELECTROCARDIOGRAM TRACING: CPT | Mod: ZF

## 2020-03-04 ASSESSMENT — ENCOUNTER SYMPTOMS
SHORTNESS OF BREATH: 1
POSTURAL DYSPNEA: 0
WHEEZING: 0
COUGH: 1
DYSPNEA ON EXERTION: 1
HEMOPTYSIS: 0
SNORES LOUDLY: 0
SPUTUM PRODUCTION: 0
COUGH DISTURBING SLEEP: 0

## 2020-03-04 ASSESSMENT — PAIN SCALES - GENERAL: PAINLEVEL: NO PAIN (0)

## 2020-03-04 ASSESSMENT — MIFFLIN-ST. JEOR: SCORE: 2161.11

## 2020-03-04 NOTE — PROGRESS NOTES
Electrophysiology Clinic Note  HPI:   Mr. Derek Contreras is a 65 yo male with a history of sarcoidois, HTN, HLD who presents for evaluation of NSVT noted on recent ZioPatch.  Was arousable diagnosed with sarcoidosis at [facility] back in 2013, which has since been confirmed with BAL.  He currently follows with Dr. Martin.  He saw Dr. Hirsch in November for concern of cardiac sarcoid and pulmonary hypertension.  Fortunately his cardiac MR shows no LGE to suggest infiltrative or inflammatory disease.  Further, cardiac MR showed mild dilated cardiomyopathy with an LVEF of 50% and RV EF of 43%.  He had a right heart cath performed in December which showed normal right, left, pulmonary artery pressures and Dr. Hirsch's assessment was no pulmonary hypertension and no evidence of cardiac sarcoidosis.  A ZIO monitor was ordered with plans for repeat cardiac MRI in 1 year.  ZIO Patch was notable for several episodes of NSVT, the longest of which lasted 19 beats.  Patient had no symptomatic complaints during the ZIO Patch.  He then had a myocardial PET scan, which showed no FDG uptake to suggest active cardiac sarcoid.  There was mention of decreased myocardial blood flow in the RCA distribution, however it should be noted that stress cardiac MR did not show any inferior wall abnormality.    Derek presents today for evaluation of NSVT noted on recent ZIO Patch.  Derek denies to me any history of lightheadedness/dizziness, presyncope, or syncope.  He does endorse increased shortness of breath, however is unsure if this is due to his pulmonary condition or if this could be a heart condition.  He denies any chest pain or palpitations.  No lower extremity edema.  No orthopnea/PND.    PAST MEDICAL HISTORY:  Past Medical History:   Diagnosis Date     Asthma      Claustrophobia      CPAP (continuous positive airway pressure) dependence      Dyslipidemia      [place] (hard of hearing)      Hypercholesteremia      Hypertension       Iron deficiency      Mediastinal adenopathy      Obesity      BEULAH (obstructive sleep apnea)      Prostate cancer (H)      Pulmonary hypertension (H)      Sarcoidosis        CURRENT MEDICATIONS:  Current Outpatient Medications   Medication Sig Dispense Refill     albuterol (PROAIR HFA/PROVENTIL HFA/VENTOLIN HFA) 108 (90 Base) MCG/ACT inhaler every 4 hours as needed   4     aspirin (ASA) 81 MG tablet Take 81 mg by mouth every morning ON HOLD FOR SURGERY SINCE 03/14/2019 90 tablet 3     atorvastatin (LIPITOR) 40 MG tablet TAKE 1 TABLET(40 MG) BY MOUTH EVERY MORNING 90 tablet 2     BREO ELLIPTA 200-25 MCG/INH Inhaler INL 1 PUFF PO AT THE SAME TIME Q DAY. RINSE AND SPIT AFTER U  4     cetirizine (ZYRTEC) 10 MG tablet Take 10 mg by mouth every morning  90 tablet 3     fluticasone (FLONASE) 50 MCG/ACT nasal spray Spray 1-2 sprays into both nostrils daily 16 g 0     hydrochlorothiazide (HYDRODIURIL) 12.5 MG tablet TAKE 1 TABLET(12.5 MG) BY MOUTH EVERY MORNING 90 tablet 2     multivitamin (ONE-DAILY) tablet Take 1 tablet by mouth every morning  90 tablet 3     azithromycin (ZITHROMAX) 250 MG tablet Take 2 tablets (500 mg) the first day, then take 1 tablet (250 mg) by mouth daily for a total of 5 days. (Patient not taking: Reported on 1/24/2020) 6 tablet 0     levofloxacin (LEVAQUIN) 250 MG tablet Take 3 tablets (750 mg) by mouth daily (Patient not taking: Reported on 3/4/2020) 30 tablet 0     predniSONE (DELTASONE) 10 MG tablet Take 40 mg (4 tabs) by mouth for 5 days,Take 30 mg (3 tabs) for 5 days,Take 20 mg (2 tabs) for 5 days,Take 10 mg (1 tab) for 5 days, then stop. (Patient not taking: Reported on 3/4/2020) 50 tablet 0       PAST SURGICAL HISTORY:  Past Surgical History:   Procedure Laterality Date     APPENDECTOMY       C TOTAL HIP ARTHROPLASTY Bilateral      C TOTAL KNEE ARTHROPLASTY Bilateral      CV RIGHT HEART CATH N/A 12/11/2019    Procedure: CV RIGHT HEART CATH;  Surgeon: Torrey Hirsch MD;  Location:   [facility]     DAVINCI PROSTATECTOMY, LYMPHADENECTOMY N/A 5/23/2019    Procedure: ROBOTIC ASSISTED LAPAROSCOPIC RADICAL PROSTATECTOMY WITH BILATERAL PELVIC LYMPH NODE DISSECTION;  Surgeon: Matteo Coughlin MD;  Location: [facility]     ENDOBRONCHIAL ULTRASOUND FLEXIBLE N/A 3/29/2019    Procedure: Flexible Bronchoscopy, Endobronchial Ultrasound;  Surgeon: Curtis Leger MD;  Location: [facility] OR     VASECTOMY         ALLERGIES:     Allergies   Allergen Reactions     Cat Hair Extract Itching     itchy tearful eyes     Adhesive Tape Rash     Iodine Rash       FAMILY HISTORY:  Family History   Problem Relation Age of Onset     Alzheimer Disease Mother      Heart Disease Father      Glaucoma No family hx of      Macular Degeneration No family hx of      Diabetes No family hx of      Thyroid Disease No family hx of        SOCIAL HISTORY:  Social History     Tobacco Use     Smoking status: Never Smoker     Smokeless tobacco: Never Used   Substance Use Topics     Alcohol use: Yes     Comment: twice a week     Drug use: No       ROS:   A comprehensive 10 point review of systems negative other than as mentioned in HPI.  Exam:  BP (!) 148/92 (BP Location: Right arm, Patient Position: Chair, Cuff Size: Adult Large)   Pulse 97   Ht 1.829 m (6')   Wt 133.8 kg (295 lb)   SpO2 93%   BMI 40.01 kg/m    GENERAL APPEARANCE: alert and no distress  HEENT: no icterus, no xanthelasmas, normal pupil size and reaction, normal palate, mucosa moist, no central cyanosis  NECK: no asymmetry, masses, or scars, thyroid normal to palpation and no bruits, JVP not elevated  RESPIRATORY: lungs clear to auscultation - no rales, rhonchi or wheezes, no use of accessory muscles, no retractions, respirations are unlabored, normal respiratory rate  CARDIOVASCULAR: regular rhythm, normal S1 with physiologic split S2, no S3 or S4 and no murmur, click or rub, precordium quiet with normal PMI.  ABDOMEN: soft, non tender, bowel sounds  normal, non-distended  EXTREMITIES: peripheral pulses normal, no edema  NEURO: alert and oriented to person/place/time, normal speech, gait and affect  SKIN: no ecchymoses, no rashes  PSYCH: normal affect, cooperative    Labs:  Reviewed.     Testing/Procedures:  PULMONARY FUNCTION TESTS:   PFT Latest Ref Rng & Units 2/5/2020   FVC L 2.19   FEV1 L 1.69   FVC% % 46   FEV1% % 46     ECHOCARDIOGRAM: 7/31/19  Interpretation Summary  Global and regional left ventricular function is normal with an EF of 55-60%.  Right ventricle not well visualized, but TAPSE and tissue doppler assessment  is consistent with normal global function.  No hemodynamically significant valve abnormalities.  No pericardial effusion is present.        No prior study for comparison.    Right heart cath 12/11/19  Conclusion       Normal pulmonary artery presure at rest    Normal biventricualr filling pressure    Normal cardiac output    Mildly reduced biventricular function in the setting of sarcoidosis    No MRI evidence of cardiac sarcoidosis.    Plan to do Zio monitor to assess PVC's burden anf repeat cardiac MRI in a year       Cardiac MRI 8/29/19  1. The LV is normal in cavity size and wall thickness. The global systolic function is normal. The LVEF is  50%. There is systolic flattening of the interventricular septum and global dyssynchrony due to PVCs.     2. The RV is moderately enlarged based on the visual examination. The global systolic function is mildly  reduced. The RVEF is 43%.      3. The left atrium is mildly enlarged and the right atrium is severely enlarged.     4. There is at least mild mitral regurgitation and trace aortic regurgitation.     5. Late gadolinium enhancement imaging shows no MI, fibrosis or infiltrative disease.      6. Regadenoson stress perfusion imaging shows no ischemia.     7. There is no pericardial effusion or thickening.     8. There is no intracardiac thrombus.     CONCLUSIONS: Non-ischemic cardiomyopathy,  likely due to pulmonary hypertension and dyssynchrony from PVCs.   There is mildly reduced biventricular function.  There is no evidence of myocardial perfusion defects or  myocardial fibrosis.     PET Myocardial Perfusion 2/19/20  Impression:  1. No FDG active cardiac sarcoid.  2. Decreased perfusion in the inferoseptal wall, findings may be  related to sequela of previous cardiac sarcoid with microvascular  injury.  3. Enlarged left ventricle with borderline low ejection fraction.  4. Decreased myocardial blood flow in the RCA distribution.  5. Findings of a dilated cardiomyopathy a concern for possible  ischemia/myocardial injury of the septum, consider CTA or coronary  angiography for further evaluation of this region.  6. Chest and upper abdominal hypermetabolic lymphadenopathy which is  indicative of active systemic sarcoid.    ZioPatch 1/17/20      Assessment and Plan:   Mr. Derek Contreras is a 65 yo male with a history of sarcoidois, HTN, HLD who presents for evaluation of NSVT noted on recent ZioPatch.  Patient has known pulmonary sarcoidosis, however has no evidence for cardiac sarcoidosis.  He had recent stress cardiac MRI as well as cardiac PET scan which showed no active disease.  There is some discrepancy in the PET scan suggesting some inferior wall malperfusion, however this was not noted on cardiac MRI, and quite frankly the PET scan is sensitive or specific for perfusion defects compared to stress cardiac MRI.  Patient does complain of shortness of breath and it is possible he may have some either macrovascular or microvascular coronary disease, however the normal MRI stress suggests against this. I do think it is reasonable to repeat a stress cardiac MRI (along with planned cMRI with contrast and flow) to further rule out possible ischemic etiology for SOB. Will plan to do this around June.     As for the NSVT noted on ZIO Patch, patient does not have evidence for active sarcoid.  Further he does  not have any high risk features such as unexplained presyncope or syncope and the longest run was only 19 beats.  As a result there is no indication for further intervention such as defibrillator.  It should be noted, that while he does have occasional PVCs on EKG and that was noted on cardiac MRI, his PVC burden was low on the cardiac monitor and thus not suggestive of an etiology for his  Cardiomyopathy.    # NSVT  # PVCs  - No high risk features to suggest need for ICD  - could start metoprolol if PVCs become more prevalent    # Pulmonary Sarcoidosis  # SOB  - will include stress cMRI in addition to w/ contrast and flow and move up schedule to ~June to exclude cardiac etiology for SOB  - Continue to encourage increased exercise.     - follow up with Dr. Hirsch in 1 year as planned. Follow with EP as needed.    The patient states understanding and is agreeable with plan.     This patient was seen and evaluated with Dr. Figueroa. The above note reflects our joint assessment and plan.   Abdias Kumar MD  Cardiovascular Fellow    EP STAFF NOTE  Patient seen and examined and management plan personally reviewed with the patient. I agree with the note above by the CV/EP fellow.  Anuel Figueroa MD [employer]  Cardiology - Electrophysiology

## 2020-03-04 NOTE — PATIENT INSTRUCTIONS
You were seen in the Electrophysiology today by: Dr. Figueroa    Plan:     Labs/Tests Needed:    Stress cardiac MRI    Follow up visit: see Dr. Hirsch as scheduled      Your Care Team:  EP Cardiology   Telephone Number     Sherry Velazquez RN [phone]     For scheduling appts or procedures:    Pao Gamble   [phone]   For the Device Clinic (Pacemakers, ICDs, Loop Recorders)    During business hours: [phone]  After business hours:   [phone]- select option 4 and ask for job code [phone].       Cardiovascular Clinic:   [address] [place] [address]. [place] [address]      As always, Thank you for trusting us with your health care needs!        Cardiac Stress MRI:  Please arrive to the Gold Waiting Room in the Main Hospital.   1. NO caffeine for 12 hours prior to test (includes coffee (decaf too), tea (decaf too), soda, chocolate, energy drinks, etc. )  2. Nothing to eat or drink 3 hours prior.  3. You will be required to lay flat and follow breath-hold instructions.   4. You will need to remove all metal and answer a safety questionnaire.

## 2020-03-04 NOTE — LETTER
3/4/2020      RE: Derek Contreras  [address] [place] [address]       Dear Colleague,    Thank you for the opportunity to participate in the care of your patient, Derek Contreras, at the [facility] at [facility]. Please see a copy of my visit note below.    Electrophysiology Clinic Note  HPI:   Mr. Derek Contreras is a 65 yo male with a history of sarcoidois, HTN, HLD who presents for evaluation of NSVT noted on recent ZioPatch.  Was arousable diagnosed with sarcoidosis at [facility] back in 2013, which has since been confirmed with BAL.  He currently follows with Dr. Martin.  He saw Dr. Hirsch in November for concern of cardiac sarcoid and pulmonary hypertension.  Fortunately his cardiac MR shows no LGE to suggest infiltrative or inflammatory disease.  Further, cardiac MR showed mild dilated cardiomyopathy with an LVEF of 50% and RV EF of 43%.  He had a right heart cath performed in December which showed normal right, left, pulmonary artery pressures and Dr. Hirsch's assessment was no pulmonary hypertension and no evidence of cardiac sarcoidosis.  A ZIO monitor was ordered with plans for repeat cardiac MRI in 1 year.  ZIO Patch was notable for several episodes of NSVT, the longest of which lasted 19 beats.  Patient had no symptomatic complaints during the ZIO Patch.  He then had a myocardial PET scan, which showed no FDG uptake to suggest active cardiac sarcoid.  There was mention of decreased myocardial blood flow in the RCA distribution, however it should be noted that stress cardiac MR did not show any inferior wall abnormality.    Derek presents today for evaluation of NSVT noted on recent ZIO Patch.  Derek denies to me any history of lightheadedness/dizziness, presyncope, or syncope.  He does endorse increased shortness of breath, however is unsure if this is due to his pulmonary condition or if this could be a heart condition.  He  denies any chest pain or palpitations.  No lower extremity edema.  No orthopnea/PND.    PAST MEDICAL HISTORY:  Past Medical History:   Diagnosis Date     Asthma      Claustrophobia      CPAP (continuous positive airway pressure) dependence      Dyslipidemia      [place] (hard of hearing)      Hypercholesteremia      Hypertension      Iron deficiency      Mediastinal adenopathy      Obesity      BEULAH (obstructive sleep apnea)      Prostate cancer (H)      Pulmonary hypertension (H)      Sarcoidosis        CURRENT MEDICATIONS:  Current Outpatient Medications   Medication Sig Dispense Refill     albuterol (PROAIR HFA/PROVENTIL HFA/VENTOLIN HFA) 108 (90 Base) MCG/ACT inhaler every 4 hours as needed   4     aspirin (ASA) 81 MG tablet Take 81 mg by mouth every morning ON HOLD FOR SURGERY SINCE 03/14/2019 90 tablet 3     atorvastatin (LIPITOR) 40 MG tablet TAKE 1 TABLET(40 MG) BY MOUTH EVERY MORNING 90 tablet 2     BREO ELLIPTA 200-25 MCG/INH Inhaler INL 1 PUFF PO AT THE SAME TIME Q DAY. RINSE AND SPIT AFTER U  4     cetirizine (ZYRTEC) 10 MG tablet Take 10 mg by mouth every morning  90 tablet 3     fluticasone (FLONASE) 50 MCG/ACT nasal spray Spray 1-2 sprays into both nostrils daily 16 g 0     hydrochlorothiazide (HYDRODIURIL) 12.5 MG tablet TAKE 1 TABLET(12.5 MG) BY MOUTH EVERY MORNING 90 tablet 2     multivitamin (ONE-DAILY) tablet Take 1 tablet by mouth every morning  90 tablet 3     azithromycin (ZITHROMAX) 250 MG tablet Take 2 tablets (500 mg) the first day, then take 1 tablet (250 mg) by mouth daily for a total of 5 days. (Patient not taking: Reported on 1/24/2020) 6 tablet 0     levofloxacin (LEVAQUIN) 250 MG tablet Take 3 tablets (750 mg) by mouth daily (Patient not taking: Reported on 3/4/2020) 30 tablet 0     predniSONE (DELTASONE) 10 MG tablet Take 40 mg (4 tabs) by mouth for 5 days,Take 30 mg (3 tabs) for 5 days,Take 20 mg (2 tabs) for 5 days,Take 10 mg (1 tab) for 5 days, then stop. (Patient not taking: Reported  on 3/4/2020) 50 tablet 0       PAST SURGICAL HISTORY:  Past Surgical History:   Procedure Laterality Date     APPENDECTOMY       C TOTAL HIP ARTHROPLASTY Bilateral      C TOTAL KNEE ARTHROPLASTY Bilateral      CV RIGHT HEART CATH N/A 12/11/2019    Procedure: CV RIGHT HEART CATH;  Surgeon: Torrey Hirsch MD;  Location:  HEART CARDIAC CATH LAB     DAVINCI PROSTATECTOMY, LYMPHADENECTOMY N/A 5/23/2019    Procedure: ROBOTIC ASSISTED LAPAROSCOPIC RADICAL PROSTATECTOMY WITH BILATERAL PELVIC LYMPH NODE DISSECTION;  Surgeon: Matteo Coughlin MD;  Location: [facility] OR     ENDOBRONCHIAL ULTRASOUND FLEXIBLE N/A 3/29/2019    Procedure: Flexible Bronchoscopy, Endobronchial Ultrasound;  Surgeon: Curtis Leger MD;  Location: [facility] OR     VASECTOMY         ALLERGIES:     Allergies   Allergen Reactions     Cat Hair Extract Itching     itchy tearful eyes     Adhesive Tape Rash     Iodine Rash       FAMILY HISTORY:  Family History   Problem Relation Age of Onset     Alzheimer Disease Mother      Heart Disease Father      Glaucoma No family hx of      Macular Degeneration No family hx of      Diabetes No family hx of      Thyroid Disease No family hx of        SOCIAL HISTORY:  Social History     Tobacco Use     Smoking status: Never Smoker     Smokeless tobacco: Never Used   Substance Use Topics     Alcohol use: Yes     Comment: twice a week     Drug use: No       ROS:   A comprehensive 10 point review of systems negative other than as mentioned in HPI.  Exam:  BP (!) 148/92 (BP Location: Right arm, Patient Position: Chair, Cuff Size: Adult Large)   Pulse 97   Ht 1.829 m (6')   Wt 133.8 kg (295 lb)   SpO2 93%   BMI 40.01 kg/m     GENERAL APPEARANCE: alert and no distress  HEENT: no icterus, no xanthelasmas, normal pupil size and reaction, normal palate, mucosa moist, no central cyanosis  NECK: no asymmetry, masses, or scars, thyroid normal to palpation and no bruits, JVP not elevated  RESPIRATORY: lungs clear to  auscultation - no rales, rhonchi or wheezes, no use of accessory muscles, no retractions, respirations are unlabored, normal respiratory rate  CARDIOVASCULAR: regular rhythm, normal S1 with physiologic split S2, no S3 or S4 and no murmur, click or rub, precordium quiet with normal PMI.  ABDOMEN: soft, non tender, bowel sounds normal, non-distended  EXTREMITIES: peripheral pulses normal, no edema  NEURO: alert and oriented to person/place/time, normal speech, gait and affect  SKIN: no ecchymoses, no rashes  PSYCH: normal affect, cooperative    Labs:  Reviewed.     Testing/Procedures:  PULMONARY FUNCTION TESTS:   PFT Latest Ref Rng & Units 2/5/2020   FVC L 2.19   FEV1 L 1.69   FVC% % 46   FEV1% % 46     ECHOCARDIOGRAM: 7/31/19  Interpretation Summary  Global and regional left ventricular function is normal with an EF of 55-60%.  Right ventricle not well visualized, but TAPSE and tissue doppler assessment  is consistent with normal global function.  No hemodynamically significant valve abnormalities.  No pericardial effusion is present.        No prior study for comparison.    Right heart cath 12/11/19  Conclusion       Normal pulmonary artery presure at rest    Normal biventricualr filling pressure    Normal cardiac output    Mildly reduced biventricular function in the setting of sarcoidosis    No MRI evidence of cardiac sarcoidosis.    Plan to do Zio monitor to assess PVC's burden anf repeat cardiac MRI in a year       Cardiac MRI 8/29/19  1. The LV is normal in cavity size and wall thickness. The global systolic function is normal. The LVEF is  50%. There is systolic flattening of the interventricular septum and global dyssynchrony due to PVCs.     2. The RV is moderately enlarged based on the visual examination. The global systolic function is mildly  reduced. The RVEF is 43%.      3. The left atrium is mildly enlarged and the right atrium is severely enlarged.     4. There is at least mild mitral regurgitation  and trace aortic regurgitation.     5. Late gadolinium enhancement imaging shows no MI, fibrosis or infiltrative disease.      6. Regadenoson stress perfusion imaging shows no ischemia.     7. There is no pericardial effusion or thickening.     8. There is no intracardiac thrombus.     CONCLUSIONS: Non-ischemic cardiomyopathy, likely due to pulmonary hypertension and dyssynchrony from PVCs.   There is mildly reduced biventricular function.  There is no evidence of myocardial perfusion defects or  myocardial fibrosis.     PET Myocardial Perfusion 2/19/20  Impression:  1. No FDG active cardiac sarcoid.  2. Decreased perfusion in the inferoseptal wall, findings may be  related to sequela of previous cardiac sarcoid with microvascular  injury.  3. Enlarged left ventricle with borderline low ejection fraction.  4. Decreased myocardial blood flow in the RCA distribution.  5. Findings of a dilated cardiomyopathy a concern for possible  ischemia/myocardial injury of the septum, consider CTA or coronary  angiography for further evaluation of this region.  6. Chest and upper abdominal hypermetabolic lymphadenopathy which is  indicative of active systemic sarcoid.    ZioPatch 1/17/20      Assessment and Plan:   Mr. Derek Contreras is a 63 yo male with a history of sarcoidois, HTN, HLD who presents for evaluation of NSVT noted on recent ZioPatch.  Patient has known pulmonary sarcoidosis, however has no evidence for cardiac sarcoidosis.  He had recent stress cardiac MRI as well as cardiac PET scan which showed no active disease.  There is some discrepancy in the PET scan suggesting some inferior wall malperfusion, however this was not noted on cardiac MRI, and quite frankly the PET scan is sensitive or specific for perfusion defects compared to stress cardiac MRI.  Patient does complain of shortness of breath and it is possible he may have some either macrovascular or microvascular coronary disease, however the normal MRI stress  suggests against this. I do think it is reasonable to repeat a stress cardiac MRI (along with planned cMRI with contrast and flow) to further rule out possible ischemic etiology for SOB. Will plan to do this around June.     As for the NSVT noted on ZIO Patch, patient does not have evidence for active sarcoid.  Further he does not have any high risk features such as unexplained presyncope or syncope and the longest run was only 19 beats.  As a result there is no indication for further intervention such as defibrillator.  It should be noted, that while he does have occasional PVCs on EKG and that was noted on cardiac MRI, his PVC burden was low on the cardiac monitor and thus not suggestive of an etiology for his  Cardiomyopathy.    # NSVT  # PVCs  - No high risk features to suggest need for ICD  - could start metoprolol if PVCs become more prevalent    # Pulmonary Sarcoidosis  # SOB  - will include stress cMRI in addition to w/ contrast and flow and move up schedule to ~June to exclude cardiac etiology for SOB  - Continue to encourage increased exercise.     - follow up with Dr. Hirsch in 1 year as planned. Follow with EP as needed.    The patient states understanding and is agreeable with plan.     This patient was seen and evaluated with Dr. Figueroa. The above note reflects our joint assessment and plan.   Abdias Kumar MD  Cardiovascular Fellow    EP STAFF NOTE  Patient seen and examined and management plan personally reviewed with the patient. I agree with the note above by the CV/EP fellow.    Aneul Figueroa MD [facility]  Cardiology - Electrophysiology

## 2020-03-05 ENCOUNTER — DOCUMENTATION ONLY (OUTPATIENT)
Dept: SLEEP MEDICINE | Facility: CLINIC | Age: 65
End: 2020-03-05

## 2020-03-05 NOTE — PROGRESS NOTES
STM Recheck: Patient wakes up and has to readust the mask. Patient trying to make CPAP work. He will practice using CPAP during the day. Will recheck data in 2 weeks.

## 2020-03-11 ENCOUNTER — HEALTH MAINTENANCE LETTER (OUTPATIENT)
Age: 65
End: 2020-03-11

## 2020-03-11 DIAGNOSIS — I27.20 PULMONARY HYPERTENSION (H): ICD-10-CM

## 2020-03-11 DIAGNOSIS — R06.02 SOB (SHORTNESS OF BREATH): Primary | ICD-10-CM

## 2020-03-16 ENCOUNTER — VIRTUAL VISIT (OUTPATIENT)
Dept: SLEEP MEDICINE | Facility: CLINIC | Age: 65
End: 2020-03-16
Payer: MEDICARE

## 2020-03-16 DIAGNOSIS — G47.33 OSA (OBSTRUCTIVE SLEEP APNEA): ICD-10-CM

## 2020-03-16 DIAGNOSIS — G47.33 OSA ON CPAP: Primary | ICD-10-CM

## 2020-03-16 PROCEDURE — 99442 ZZC PHYSICIAN TELEPHONE EVALUATION 11-20 MIN: CPT | Performed by: INTERNAL MEDICINE

## 2020-03-16 NOTE — PROGRESS NOTES
"Derek Contreras is a 64 year old male who is being evaluated via a billable telephone visit.      The patient has been notified of following:     \"This telephone visit will be conducted via a call between you and your physician/provider. We have found that certain health care needs can be provided without the need for a physical exam.  This service lets us provide the care you need with a short phone conversation.  If a prescription is necessary we can send it directly to your pharmacy.  If lab work is needed we can place an order for that and you can then stop by our lab to have the test done at a later time.    If during the course of the call the physician/provider feels a telephone visit is not appropriate, you will not be charged for this service.\"     Derek Contreras's chief complaint/purpose of viist:  for follow-up of BEULAH and to review the CPAP compliance measures   Chief Complaint   Patient presents with     RECHECK     Follow up beulah, cpap check     HPI: Derek Contreras is a 64 y old male with h/o previously diagnosed with obstructive sleep apnea in 2011, sarcoidosis, COPD responsive to inhaled treatments, hip replacement, knee replacement, prostate cancer s/p prostatectomy, and hypertension.  He underwent a diagnostic polysomnogram on January 6, 2020 that showed mild obstructive sleep apnea predominant during REM sleep with evidence of sleep associated hypoxemia.   He was recommend repeat polysomnography with full night titration of positive airway pressure therapy for the control of sleep-related breathing disorder. He underwent repeat polysomnography with CPAP  titration on 1/12/20 and it was observed at the CPAP at 8 cm water during REM sleep in lateral position disordered breathing events was effective in controlling the sleep-related breathing disorder.     He was set up at [facility] on January 27, 2020. Patient received a Resmed AirSense 10 Auto with ressures were set at 8-11 cm H2O. " Patient s ramp is 5 cm H2O for Auto and FLEX/EPR is 2.  Patient received a Resmed Airfit N30I  Nasal mask size Medium, heated tubing and heated humidifier.  Patient does need to meet compliance.       He is currently being treated with auto titrating CPAP with pressure settings 8-11 cmH2O. He is using a nasal mask and denies any  interface concerns.  It took a while for him to get used to but his usage getting better.  He denies any air hunger with the device. There have not been any reports of snoring or awakenings due to gasping for air or observed apneas during sleep with the device.  Patient reports improved sleep quality with the device and notices improvement in his overall energy levels and reduced daytime sleepiness.  DOWNLOADABLE COMPLIANCE DATA:   From February 15, 2020 through March 15, 2020 reveals that he used the device for 30/30 days with an averag use of 4.12 hours on the days used. 95th percentile on leak was 25.7 L/min. Residual AHI was 1.0 per hour. Median pressure settings: 9.6; 95th percentile : 10.6 and max pressure: 10.8 cm  water    Previous sleep study results:  Initial PSG results (1/6/20)    Obstructive sleep apnea was demonstrated (AHI 5; REM AHI 25), with respiratory events essentially isolated to REM sleep.  The study may be limited due to the absence of supine sleep.      Oxyhemoglobin desaturations were prominent during REM, and the patient did not always recover baseline saturations before onset of the next airway obstruction.  Nocturnal hypoxemia was present; there were 23 minutes with SpO2 less than or equal to 88%.      Hypoventilation was not observed.      An irregularly irregular heart rhythm was observed intermittently throughout the recording.     Repeat polysomnography(1/12/20)    CPAP at 8 cmH2O was effective at resolving obstructive sleep apnea and improving oxyhemoglobin saturations to the normal range. REM supine sleep was not captured.   Hypoxemia resolved at a pressure  "of 8 cmH2O.  Supplemental oxygen was not indicated during this study.    An irregular heart rhythm was observed intermittently throughout the study.      I have reviewed and updated the patient's Past Medical History, Social History, Family History and Medication List.    ALLERGIES  Cat hair extract; Adhesive tape; and Iodine      Assessment/Plan:  (G47.33) BEULAH (obstructive sleep apnea)  (primary encounter diagnosis)  Comment: Patient's reports improvement in the compliance and  is getting acclimated to treatment and reports positive benefits with the device usage including improved sleep quality, resolution of snoring with the device and improvement in the energy levels.  Based on compliance measures, BEULAH is well controlled with CPAP at the current pressure setting.    Based on the compliance download, recommended revision of the pressure settings on his auto CPAP to range between 8 to 13 cm water.  We discussed the need to meet the compliance requirements and encouraged him to use the device during the entire sleep duration and also to use it during naps.  He was instructed to get the supplies for the device regularly replaced.  He will continue follow-up at pulmonary clinic.  We discussed weight management with diet and exercise.  Patient was instructed to avoid driving or operating heavy machinery if drowsy or sleepy prevent accidents.    Plan: Follow-up at the sleep clinic in 1 year or sooner if any concerns.    Phone call contact time  Call Started at 8:40  Call Ended at 8:56    I have reviewed the note as documented above.  This accurately captures the substance of my conversation with the patient.    The above note was dictated using voice recognition software. Although reviewed after completion, some word and grammatical error may remain . Please contact the author for any clarifications.    \"I spent a total of 16 minutes face to face with Derek Contreras during today's office  visit. Over 50% of this " "time was spent counseling the patient and  coordinating care regarding , BEULAH, CPAP treatment, going over PAP download/sleep study and chart review..\"     Frank Barron MD   of Medicine,  [facility].  [facility] [address] [phone]  Dept: [phone]    "

## 2020-03-18 ENCOUNTER — MYC MEDICAL ADVICE (OUTPATIENT)
Dept: INTERNAL MEDICINE | Facility: CLINIC | Age: 65
End: 2020-03-18

## 2020-03-18 ENCOUNTER — DOCUMENTATION ONLY (OUTPATIENT)
Dept: SLEEP MEDICINE | Facility: CLINIC | Age: 65
End: 2020-03-18
Payer: MEDICARE

## 2020-03-19 NOTE — TELEPHONE ENCOUNTER
As covering provider, I reviewed the chart.  He recently saw Cardiology on 3/4/2020.  If patient has a BP cuff he can take his BP 1-2 times/week and record on paper.  Goal is < 140/90 consistently.  If BP is consistently higher then notify the clinic in 1 month. If any issues arises, contact the clinic.

## 2020-03-19 NOTE — TELEPHONE ENCOUNTER
BP Readings from Last 3 Encounters:   03/04/20 (!) 148/92   02/05/20 135/80   01/24/20 (!) 145/85

## 2020-03-19 NOTE — TELEPHONE ENCOUNTER
"Please see patient's mychart messages below regarding blood pressure readings.    Patient would like to wait until \"after all of the virus issues are taken care of\" before he comes to the clinic for an appointment.     Patient states he has been taking his Hydrochlorothiazide 12.5mg daily but not monitoring his blood pressure outside of appointments.    BP Readings from Last 3 Encounters:   03/04/20 (!) 148/92   02/05/20 135/80   01/24/20 (!) 145/85       Next step?    Please advise, thanks.      "

## 2020-03-23 ENCOUNTER — TELEPHONE (OUTPATIENT)
Dept: SLEEP MEDICINE | Facility: CLINIC | Age: 65
End: 2020-03-23

## 2020-03-27 ENCOUNTER — DOCUMENTATION ONLY (OUTPATIENT)
Dept: SLEEP MEDICINE | Facility: CLINIC | Age: 65
End: 2020-03-27
Payer: MEDICARE

## 2020-03-27 NOTE — PROGRESS NOTES
STM Recheck: Patient called and said his pressure is to high. Patient called again and stated he figured out his machine his water chamber wasn't in all the way. Patient no longer has dry mouth.

## 2020-04-09 DIAGNOSIS — Z85.46 PERSONAL HISTORY OF MALIGNANT NEOPLASM OF PROSTATE: ICD-10-CM

## 2020-04-09 LAB — PSA SERPL-MCNC: <0.01 UG/L (ref 0–4)

## 2020-04-09 PROCEDURE — 36415 COLL VENOUS BLD VENIPUNCTURE: CPT | Performed by: UROLOGY

## 2020-04-09 PROCEDURE — 84153 ASSAY OF PSA TOTAL: CPT | Performed by: UROLOGY

## 2020-04-20 ENCOUNTER — VIRTUAL VISIT (OUTPATIENT)
Dept: UROLOGY | Facility: CLINIC | Age: 65
End: 2020-04-20
Payer: MEDICARE

## 2020-04-20 VITALS — WEIGHT: 295 LBS | HEIGHT: 72 IN | BODY MASS INDEX: 39.96 KG/M2

## 2020-04-20 DIAGNOSIS — Z85.46 PERSONAL HISTORY OF MALIGNANT NEOPLASM OF PROSTATE: Primary | ICD-10-CM

## 2020-04-20 PROCEDURE — 99207 ZZC NO BILLABLE SERVICE THIS VISIT: CPT | Performed by: UROLOGY

## 2020-04-20 ASSESSMENT — MIFFLIN-ST. JEOR: SCORE: 2161.11

## 2020-04-20 ASSESSMENT — PAIN SCALES - GENERAL: PAINLEVEL: NO PAIN (0)

## 2020-04-20 NOTE — NURSING NOTE
Chief Complaint   Patient presents with     Hx Prostate Cancer     Review latest PSA results     Gila Crenshaw, EMT

## 2020-04-20 NOTE — PROGRESS NOTES
"Derek Contreras is a 64 year old male who is being evaluated via a billable telephone visit.      The patient has been notified of following:     \"This telephone visit will be conducted via a call between you and your physician/provider. We have found that certain health care needs can be provided without the need for a physical exam.  This service lets us provide the care you need with a short phone conversation.  If a prescription is necessary we can send it directly to your pharmacy.  If lab work is needed we can place an order for that and you can then stop by our lab to have the test done at a later time.    Telephone visits are billed at different rates depending on your insurance coverage. During this emergency period, for some insurers they may be billed the same as an in-person visit.  Please reach out to your insurance provider with any questions.    If during the course of the call the physician/provider feels a telephone visit is not appropriate, you will not be charged for this service.\"    Patient has given verbal consent for Telephone visit?  Yes    How would you like to obtain your AVS?     Additional provider notes: Derek was set up for a phone visit today due to Covid pandemic. His PSA remains undetectable. He continues to have no urinary leakage and feels well    We discussed the surveillance plan. I recommended he had another PSA checked in 6 months for prostate cancer surveillance    Phone call duration: 2 minutes    Matteo Coughlin M.D.  [facility]        "

## 2020-05-13 ENCOUNTER — TELEPHONE (OUTPATIENT)
Dept: NEUROLOGY | Facility: CLINIC | Age: 65
End: 2020-05-13

## 2020-05-19 ENCOUNTER — OFFICE VISIT (OUTPATIENT)
Dept: NEUROLOGY | Facility: CLINIC | Age: 65
End: 2020-05-19
Payer: MEDICARE

## 2020-05-19 DIAGNOSIS — M62.81 MUSCLE WEAKNESS (GENERALIZED): Primary | ICD-10-CM

## 2020-05-19 DIAGNOSIS — G60.8 SENSORY POLYNEUROPATHY: ICD-10-CM

## 2020-05-19 DIAGNOSIS — J96.10 CHRONIC RESPIRATORY FAILURE, UNSPECIFIED WHETHER WITH HYPOXIA OR HYPERCAPNIA (H): ICD-10-CM

## 2020-05-19 DIAGNOSIS — R79.9 ABNORMAL FINDING OF BLOOD CHEMISTRY, UNSPECIFIED: ICD-10-CM

## 2020-05-19 DIAGNOSIS — M62.81 MUSCLE WEAKNESS (GENERALIZED): ICD-10-CM

## 2020-05-19 DIAGNOSIS — R94.131 MYOTONIC DISCHARGES ON EMG: ICD-10-CM

## 2020-05-19 LAB
CK SERPL-CCNC: 118 U/L (ref 30–300)
HBA1C MFR BLD: 5.6 % (ref 0–5.6)
VIT B12 SERPL-MCNC: 522 PG/ML (ref 193–986)

## 2020-05-19 PROCEDURE — 86334 IMMUNOFIX E-PHORESIS SERUM: CPT | Performed by: PSYCHIATRY & NEUROLOGY

## 2020-05-19 PROCEDURE — 82784 ASSAY IGA/IGD/IGG/IGM EACH: CPT | Performed by: PSYCHIATRY & NEUROLOGY

## 2020-05-19 NOTE — LETTER
5/19/2020       RE: Derek Contreras  [address] [place] [address]     Dear Colleague,    Thank you for referring your patient, Derek Contreras, to the [facility] EMG at [facility]. Please see a copy of my visit note below.    H&P: 64 year old man with history of sarcoidosis proven by TBNA 7 years ago, referred with concerns about diaphragm muscle weakness causing his dyspnea. Chronic dyspnea on exertion, not particularly changed recently. History of obstructive component (COPD)? responsive to inhalers. PFT show restrictive pattern (FEV1/FVC both 44-45%, ratio nl), with little decline over years. MIP low (-63) but MEP normal. Previous X ray and chest CT showed elevated right hemidiaphragm but no significant interstitial lung disease from sarcoidosis. Patient denies orthopnea. Has BEULAH on CPAP. He denies muscle weakness in upper or lower extremities. He has no cramps, twitching, myoglobinuria, dysphagia, dysarthria, ptosis, or diplopia. Obese, weight has increased in the last years.   Neuromuscular exam shows normal cranial nerves II-XI and normal strength of neck flexors, extensors, and all proximal and distal muscles in 4 limbs. Reflexes normal in upper extremities, 2+ left knee, 1+ right (both prosthetic), absent ankles. Toes downgoing.  EMG today showed abnormal spontaneous activity in thoracic paraspinal and trapezius muscle possibly indicative of an axial myopathy. Complementary ultrasonographic study showed mild atrophy of the right hemidiaphragm (thickness at rest 0.12 cm, normal is 0.14 and above), and maximal thickness of 0.16 cm after deep breath (1.333, normal is >1.2).    Impression: It is possible that Mr Contreras has a myopathy restricted to the truncal muscles and diaphragm. This may be due to sarcoidosis but this should be proven before taking action. I will order labs today including CK and leukocyte acid maltase activity to rule out Pompe  disease. If they are negative, I would consider and recommend a muscle biopsy (left trapezius). I would not treat him empirically with immunosuppression until the above workup is completed. The EMG also showed a mild sensory polyneuropathy and I will investigate that too with basic labs (A1c, immunofixation, B12).     Glynn Omer MD                  [facility]  Electrodiagnostic Laboratory    Nerve Conduction & EMG Report          Patient:       Derek Contreras  Patient ID:    [id number]  Gender:        Male  YOB: 1955  Age:           64 Years 5 Months        Referring Physician: Jamie Martin MD    History & Examination:    64 year old man with history of sarcoidosis, proven by hilar node biopsy, and chronic restrictive respiratory dysfunction without sufficient abnormalities on chest CT to explain it. Query neuromuscular causes of weakness/respiratory failure.     Techniques: Motor and sensory nerve conduction studies were done with surface recording electrodes. EMG was done with a concentric needle electrode.     Results:    Right median and ulnar antidromic sensory NCSs were normal. Left sural SNAP was absent. Right sural antidromic sensory NCS showed attenuated SNAP amplitude and normal conduction velocity. Right median motor NCS showed prolonged distal latency, normal CMAP amplitudes, and conduction velocity. Right ulnar, deep peroneal, and tibial motor NCSs were normal.  Right phrenic motor NCS done with the Gonzalez technique showed attenuated CMAP amplitude. The left phrenic motor response was absent. 3 Hz repetitive nerve stimulation of the right ulnar nerve at the wrist was normal at rest as well as after 1 min of maximal isometric exercise. EMG of right TA, medial gastrocnemius, vastus lateralis, tensor fascia latae, FDI, triceps, deltoid, and infraspinatus, was normal. EMG of the right mid thoracic paraspinal muscles showed 3+ fibs and myotonic discharges. EMG of the  right upper trapezius showed 2+ fibs, myotonic discharges, and some small, short duration, polyphasic MUPs with probably normal recruitment patterns.     Interpretation:    Abnormal study. 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 If biopsy is contemplated, the left trapezius would be an appropriate target. 2) A length dependent, axonal sensory polyneuropathy, and 3) A possible right median neuropathy at the wrist. The very low right and absent left phrenic motor nerve responses may be due to myopathy or denervation of the diaphragm, but the results should be interpreted with caution as the study was technically difficult due to obesity.     EMG Physician:    Glynn Omer MD       Sensory NCS      Nerve / Sites Rec. Site Onset Peak NP Amp Ref. PP Amp Dist Burak Ref. Temp     ms ms  V  V  V cm m/s m/s  C   R MEDIAN - Dig II Anti      Wrist Dig II 2.71 3.70 10.0 10.0 19.4 14 51.7 48.0 33   R ULNAR - Dig V Anti      Wrist Dig V 2.50 3.33 9.7 8.0 7.7 12.5 50.0 48.0 33.2   R SURAL - Lat Mall 60      Calf Ankle 3.02 4.01 1.5 5.0 1.1 14 46.3 38.0 31.4   L SURAL - Lat Mall 60      Calf Ankle NR NR NR 5.0 NR 14 NR 38.0 31.6       Motor NCS      Nerve / Sites Rec. Site Lat Ref. Amp Ref. Rel Amp Dist Burak Ref. Dur. Area Temp.     ms ms mV mV % cm m/s m/s ms %  C   R MEDIAN - APB      Wrist APB 4.84 4.40 7.3 5.0 100 8   6.30 100 33.2      Elbow APB 9.43  7.1  97.4 24.5 53.5 48.0 6.77 94.8 33.1   R ULNAR - ADM      Wrist ADM 3.02 3.50 12.1 5.0 100 8   7.03 100 32.4      B.Elbow ADM 6.98  10.8  89.6 21 53.1 48.0 6.61 94.5 32.5      A.Elbow ADM 8.65  10.6  87.6 9 54.0 48.0 6.41 92.2 32.5   R DEEP PERONEAL - EDB 60      Ankle EDB 4.22 6.00 2.8 2.0 100 8   5.05 100 31.5      FibHead EDB 12.08  2.3  82 32 40.7 38.0 5.78 74.7 31.7      Pop Fos EDB 14.11  2.3  83.4 9 44.3 38.0 5.83 75.5 31.7   R TIBIAL - AH       Ankle AH 4.11 6.00 4.9 4.0 100 8   5.73 100 31.5      Pop Fos AH 16.15  3.9  80.9 46 38.2 38.0 7.34 90.7 31.4   R PHRENIC - Xiphoid      Post SCM Border Xiphoid Pr 8.85  0.2  100      24.8   L PHRENIC - Xiphoid      Post SCM Border Xiphoid Pr NR  NR  NR    NR NR 24.8       Rep Nerve Stim 6      Muscle / Train Time Rate Amp 4-1 Fac Ampl Area 4-1 Fac Area     pps mV % % mVms % %   R ABD DIG MIN (UL)   Baseline 0:00:00 3 12.7 -1.4 100 36.3 -5.8 100   Post Exercise : 1 0:01:19 3 13.6 -1.3 107 40.8 -4.6 112       EMG Summary Table     Spontaneous MUAP Recruitment    IA Fib/PSW Fasc H.F. Amp Dur. PPP Pattern   R. TIB ANTERIOR N None None None N N 1+ N   R. GASTROCN (MED) N None None None N N N N   R. VAST LATERALIS N None None None N N N N   R. T FASCIA ARACELI Increased None None None N N N N   R. FIRST D INTEROSS N None None None N N N N   R. TRICEPS N None None None N N N N   R. DELTOID N None None None N N N N   R. THOR PSP (M) N 3+ None Myotonia N N N N   R. INFRASPINATUS N None None None N N N N   R. LUMB PSP (U) N None None None N N N N   R. TRAPEZIUS (U) N 2+ None Myotonia 1- 1- 2+ N                                  Again, thank you for allowing me to participate in the care of your patient.      Sincerely,    Glynn Omer MD

## 2020-05-19 NOTE — PROGRESS NOTES
H&P: 64 year old man with history of sarcoidosis proven by TBNA 7 years ago, referred with concerns about diaphragm muscle weakness causing his dyspnea. Chronic dyspnea on exertion, not particularly changed recently. History of obstructive component (COPD)? responsive to inhalers. PFT show restrictive pattern (FEV1/FVC both 44-45%, ratio nl), with little decline over years. MIP low (-63) but MEP normal. Previous X ray and chest CT showed elevated right hemidiaphragm but no significant interstitial lung disease from sarcoidosis. Patient denies orthopnea. Has BEULAH on CPAP. He denies muscle weakness in upper or lower extremities. He has no cramps, twitching, myoglobinuria, dysphagia, dysarthria, ptosis, or diplopia. Obese, weight has increased in the last years.   Neuromuscular exam shows normal cranial nerves II-XI and normal strength of neck flexors, extensors, and all proximal and distal muscles in 4 limbs. Reflexes normal in upper extremities, 2+ left knee, 1+ right (both prosthetic), absent ankles. Toes downgoing.  EMG today showed abnormal spontaneous activity in thoracic paraspinal and trapezius muscle possibly indicative of an axial myopathy. Complementary ultrasonographic study showed mild atrophy of the right hemidiaphragm (thickness at rest 0.12 cm, normal is 0.14 and above), and maximal thickness of 0.16 cm after deep breath (1.333, normal is >1.2).    Impression: It is possible that Mr Contreras has a myopathy restricted to the truncal muscles and diaphragm. This may be due to sarcoidosis but this should be proven before taking action. I will order labs today including CK and leukocyte acid maltase activity to rule out Pompe disease. If they are negative, I would consider and recommend a muscle biopsy (left trapezius). I would not treat him empirically with immunosuppression until the above workup is completed. The EMG also showed a mild sensory polyneuropathy and I will investigate that too with basic labs  (A1c, immunofixation, B12).     Glynn Omer MD                  [facility]  Electrodiagnostic Laboratory    Nerve Conduction & EMG Report          Patient:       Derek Contreras  Patient ID:    [id number]  Gender:        Male  YOB: 1955  Age:           64 Years 5 Months        Referring Physician: Jamie Martin MD    History & Examination:    64 year old man with history of sarcoidosis, proven by hilar node biopsy, and chronic restrictive respiratory dysfunction without sufficient abnormalities on chest CT to explain it. Query neuromuscular causes of weakness/respiratory failure.     Techniques: Motor and sensory nerve conduction studies were done with surface recording electrodes. EMG was done with a concentric needle electrode.     Results:    Right median and ulnar antidromic sensory NCSs were normal. Left sural SNAP was absent. Right sural antidromic sensory NCS showed attenuated SNAP amplitude and normal conduction velocity. Right median motor NCS showed prolonged distal latency, normal CMAP amplitudes, and conduction velocity. Right ulnar, deep peroneal, and tibial motor NCSs were normal.  Right phrenic motor NCS done with the Gonzalez technique showed attenuated CMAP amplitude. The left phrenic motor response was absent. 3 Hz repetitive nerve stimulation of the right ulnar nerve at the wrist was normal at rest as well as after 1 min of maximal isometric exercise. EMG of right TA, medial gastrocnemius, vastus lateralis, tensor fascia latae, FDI, triceps, deltoid, and infraspinatus, was normal. EMG of the right mid thoracic paraspinal muscles showed 3+ fibs and myotonic discharges. EMG of the right upper trapezius showed 2+ fibs, myotonic discharges, and some small, short duration, polyphasic MUPs with probably normal recruitment patterns.     Interpretation:    Abnormal study. 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 If biopsy is contemplated, the left trapezius would be an appropriate target. 2) A length dependent, axonal sensory polyneuropathy, and 3) A possible right median neuropathy at the wrist. The very low right and absent left phrenic motor nerve responses may be due to myopathy or denervation of the diaphragm, but the results should be interpreted with caution as the study was technically difficult due to obesity.     EMG Physician:    Glynn Omer MD       Sensory NCS      Nerve / Sites Rec. Site Onset Peak NP Amp Ref. PP Amp Dist Burak Ref. Temp     ms ms  V  V  V cm m/s m/s  C   R MEDIAN - Dig II Anti      Wrist Dig II 2.71 3.70 10.0 10.0 19.4 14 51.7 48.0 33   R ULNAR - Dig V Anti      Wrist Dig V 2.50 3.33 9.7 8.0 7.7 12.5 50.0 48.0 33.2   R SURAL - Lat Mall 60      Calf Ankle 3.02 4.01 1.5 5.0 1.1 14 46.3 38.0 31.4   L SURAL - Lat Mall 60      Calf Ankle NR NR NR 5.0 NR 14 NR 38.0 31.6       Motor NCS      Nerve / Sites Rec. Site Lat Ref. Amp Ref. Rel Amp Dist Burak Ref. Dur. Area Temp.     ms ms mV mV % cm m/s m/s ms %  C   R MEDIAN - APB      Wrist APB 4.84 4.40 7.3 5.0 100 8   6.30 100 33.2      Elbow APB 9.43  7.1  97.4 24.5 53.5 48.0 6.77 94.8 33.1   R ULNAR - ADM      Wrist ADM 3.02 3.50 12.1 5.0 100 8   7.03 100 32.4      B.Elbow ADM 6.98  10.8  89.6 21 53.1 48.0 6.61 94.5 32.5      A.Elbow ADM 8.65  10.6  87.6 9 54.0 48.0 6.41 92.2 32.5   R DEEP PERONEAL - EDB 60      Ankle EDB 4.22 6.00 2.8 2.0 100 8   5.05 100 31.5      FibHead EDB 12.08  2.3  82 32 40.7 38.0 5.78 74.7 31.7      Pop Fos EDB 14.11  2.3  83.4 9 44.3 38.0 5.83 75.5 31.7   R TIBIAL - AH      Ankle AH 4.11 6.00 4.9 4.0 100 8   5.73 100 31.5      Pop Fos AH 16.15  3.9  80.9 46 38.2 38.0 7.34 90.7 31.4   R PHRENIC - Xiphoid      Post SCM Border Xiphoid Pr 8.85  0.2  100      24.8   L PHRENIC - Xiphoid      Post SCM Border Xiphoid Pr NR  NR  NR    NR NR 24.8       Rep Nerve  Stim 6      Muscle / Train Time Rate Amp 4-1 Fac Ampl Area 4-1 Fac Area     pps mV % % mVms % %   R ABD DIG MIN (UL)   Baseline 0:00:00 3 12.7 -1.4 100 36.3 -5.8 100   Post Exercise : 1 0:01:19 3 13.6 -1.3 107 40.8 -4.6 112       EMG Summary Table     Spontaneous MUAP Recruitment    IA Fib/PSW Fasc H.F. Amp Dur. PPP Pattern   R. TIB ANTERIOR N None None None N N 1+ N   R. GASTROCN (MED) N None None None N N N N   R. VAST LATERALIS N None None None N N N N   R. T FASCIA ARACELI Increased None None None N N N N   R. FIRST D INTEROSS N None None None N N N N   R. TRICEPS N None None None N N N N   R. DELTOID N None None None N N N N   R. THOR PSP (M) N 3+ None Myotonia N N N N   R. INFRASPINATUS N None None None N N N N   R. LUMB PSP (U) N None None None N N N N   R. TRAPEZIUS (U) N 2+ None Myotonia 1- 1- 2+ N

## 2020-05-20 LAB
IGA SERPL-MCNC: 913 MG/DL (ref 84–499)
IGG SERPL-MCNC: 1393 MG/DL (ref 610–1616)
IGM SERPL-MCNC: 72 MG/DL (ref 35–242)
MISCELLANEOUS TEST: NORMAL
PROT PATTERN SERPL IFE-IMP: ABNORMAL

## 2020-05-21 LAB
RESULT: NORMAL
SEND OUTS MISC TEST CODE: NORMAL
SEND OUTS MISC TEST SPECIMEN: NORMAL
TEST NAME: NORMAL

## 2020-05-22 ENCOUNTER — PATIENT OUTREACH (OUTPATIENT)
Dept: ONCOLOGY | Facility: CLINIC | Age: 65
End: 2020-05-22

## 2020-05-22 DIAGNOSIS — D47.2 MGUS (MONOCLONAL GAMMOPATHY OF UNKNOWN SIGNIFICANCE): Primary | ICD-10-CM

## 2020-05-22 NOTE — PROGRESS NOTES
New Patient Oncology Nurse Navigator Note     Referring provider:    Glynn Omer       Referring Clinic/Organization: [facility]     Referred to: hematology    Requested provider (if applicable): First available - did not specify     Referral Received: 05/21/20       Evaluation for : MGUS     Clinical History (per Nurse review of records provided):  reviewed referral recent lab  May 19, 2020      IGA  84 - 499 mg/dL  [id number]          Clinical Assessment / Barriers to Care (Per Nurse):    OUTGOING CALL to pt: Introduced self as role of navigator with [email or web address] / [email or web address]. Pt states he prefers [place] location if possible and understands that we will set up video consult and next call will be from our scheduling intake team. Pt voiced understanding of above instructions and information and denied further questions      Records Location: [facility]     Records Needed:       Additional testing needed prior to consult: CBC/diff, CMP, SPEP, serum free light chains, VLAD with serum immunoglobulins, B2-microglobulins    Ready to schedule for NPS with Dr Trimble at [facility] 6/2    Malena Perkins, RN, BSN, OCN

## 2020-05-26 NOTE — TELEPHONE ENCOUNTER
ONCOLOGY INTAKE: Records Information      APPT INFORMATION:  Referring provider:  Glynn Omer MD  Referring provider s clinic:  [facility]  Reason for visit/diagnosis: MGUS (monoclonal gammopathy of unknown significance).   Has patient been notified of appointment date and time?: Yes    RECORDS INFORMATION:  Were the records received with the referral (via [employer])? No    Has patient been seen for any external appt for this diagnosis? No    If yes, where? N/a    Has patient had any imaging or procedures outside of [place] for this condition? No      If Yes, where? N/a    ADDITIONAL INFORMATION:  Advise Pt of lab orders

## 2020-05-27 DIAGNOSIS — D47.2 MGUS (MONOCLONAL GAMMOPATHY OF UNKNOWN SIGNIFICANCE): ICD-10-CM

## 2020-05-27 LAB
BASOPHILS # BLD AUTO: 0 10E9/L (ref 0–0.2)
BASOPHILS NFR BLD AUTO: 0.5 %
DIFFERENTIAL METHOD BLD: ABNORMAL
EOSINOPHIL # BLD AUTO: 0.2 10E9/L (ref 0–0.7)
EOSINOPHIL NFR BLD AUTO: 2.9 %
ERYTHROCYTE [DISTWIDTH] IN BLOOD BY AUTOMATED COUNT: 13.9 % (ref 10–15)
HCT VFR BLD AUTO: 49.7 % (ref 40–53)
HGB BLD-MCNC: 16.1 G/DL (ref 13.3–17.7)
LYMPHOCYTES # BLD AUTO: 1.5 10E9/L (ref 0.8–5.3)
LYMPHOCYTES NFR BLD AUTO: 19.8 %
MCH RBC QN AUTO: 29.1 PG (ref 26.5–33)
MCHC RBC AUTO-ENTMCNC: 32.4 G/DL (ref 31.5–36.5)
MCV RBC AUTO: 90 FL (ref 78–100)
MONOCYTES # BLD AUTO: 0.8 10E9/L (ref 0–1.3)
MONOCYTES NFR BLD AUTO: 10.2 %
NEUTROPHILS # BLD AUTO: 5 10E9/L (ref 1.6–8.3)
NEUTROPHILS NFR BLD AUTO: 66.6 %
PLATELET # BLD AUTO: 146 10E9/L (ref 150–450)
RBC # BLD AUTO: 5.54 10E12/L (ref 4.4–5.9)
WBC # BLD AUTO: 7.6 10E9/L (ref 4–11)

## 2020-05-27 PROCEDURE — 00000402 ZZHCL STATISTIC TOTAL PROTEIN: Performed by: INTERNAL MEDICINE

## 2020-05-27 PROCEDURE — 84165 PROTEIN E-PHORESIS SERUM: CPT | Performed by: INTERNAL MEDICINE

## 2020-05-27 PROCEDURE — 80053 COMPREHEN METABOLIC PANEL: CPT | Performed by: INTERNAL MEDICINE

## 2020-05-27 PROCEDURE — 82232 ASSAY OF BETA-2 PROTEIN: CPT | Performed by: INTERNAL MEDICINE

## 2020-05-27 PROCEDURE — 36415 COLL VENOUS BLD VENIPUNCTURE: CPT | Performed by: INTERNAL MEDICINE

## 2020-05-27 PROCEDURE — 82784 ASSAY IGA/IGD/IGG/IGM EACH: CPT | Performed by: INTERNAL MEDICINE

## 2020-05-27 PROCEDURE — 86334 IMMUNOFIX E-PHORESIS SERUM: CPT | Performed by: INTERNAL MEDICINE

## 2020-05-27 PROCEDURE — 83883 ASSAY NEPHELOMETRY NOT SPEC: CPT | Performed by: INTERNAL MEDICINE

## 2020-05-27 PROCEDURE — 85025 COMPLETE CBC W/AUTO DIFF WBC: CPT | Performed by: INTERNAL MEDICINE

## 2020-05-27 NOTE — TELEPHONE ENCOUNTER
RECORDS STATUS - ALL OTHER DIAGNOSIS      RECORDS RECEIVED FROM: [facility]   DATE RECEIVED: 6/2/2020    NOTES STATUS DETAILS   OFFICE NOTE from referring provider Complete  Glynn Omer MD   [facility]      OFFICE NOTE from medical oncologist Complete EPIC   DISCHARGE SUMMARY from hospital N/A    DISCHARGE REPORT from the ER     OPERATIVE REPORT N/A    MEDICATION LIST Complete EPIC   CLINICAL TRIAL TREATMENTS TO DATE     LABS     PATHOLOGY REPORTS     ANYTHING RELATED TO DIAGNOSIS Complete Labs last updated on 5/27/2020    GENONOMIC TESTING     TYPE:     IMAGING (NEED IMAGES & REPORT)     CT SCANS     MRI     MAMMO     ULTRASOUND     PET

## 2020-05-28 LAB
ALBUMIN SERPL ELPH-MCNC: 3.6 G/DL (ref 3.7–5.1)
ALBUMIN SERPL-MCNC: 3.4 G/DL (ref 3.4–5)
ALP SERPL-CCNC: 69 U/L (ref 40–150)
ALPHA1 GLOB SERPL ELPH-MCNC: 0.3 G/DL (ref 0.2–0.4)
ALPHA2 GLOB SERPL ELPH-MCNC: 0.7 G/DL (ref 0.5–0.9)
ALT SERPL W P-5'-P-CCNC: 29 U/L (ref 0–70)
ANION GAP SERPL CALCULATED.3IONS-SCNC: 10 MMOL/L (ref 3–14)
AST SERPL W P-5'-P-CCNC: 25 U/L (ref 0–45)
B-GLOBULIN SERPL ELPH-MCNC: 1.4 G/DL (ref 0.6–1)
B2 MICROGLOB SERPL-MCNC: 2.6 MG/L
BILIRUB SERPL-MCNC: 0.6 MG/DL (ref 0.2–1.3)
BUN SERPL-MCNC: 13 MG/DL (ref 7–30)
CALCIUM SERPL-MCNC: 8.6 MG/DL (ref 8.5–10.1)
CHLORIDE SERPL-SCNC: 105 MMOL/L (ref 94–109)
CO2 SERPL-SCNC: 24 MMOL/L (ref 20–32)
CREAT SERPL-MCNC: 0.85 MG/DL (ref 0.66–1.25)
GAMMA GLOB SERPL ELPH-MCNC: 1.3 G/DL (ref 0.7–1.6)
GFR SERPL CREATININE-BSD FRML MDRD: >90 ML/MIN/{1.73_M2}
GLUCOSE SERPL-MCNC: 100 MG/DL (ref 70–99)
M PROTEIN SERPL ELPH-MCNC: 0.5 G/DL
POTASSIUM SERPL-SCNC: 3.8 MMOL/L (ref 3.4–5.3)
PROT PATTERN SERPL ELPH-IMP: ABNORMAL
PROT SERPL-MCNC: 7.8 G/DL (ref 6.8–8.8)
SODIUM SERPL-SCNC: 139 MMOL/L (ref 133–144)

## 2020-05-30 LAB
IGA SERPL-MCNC: 847 MG/DL (ref 84–499)
IGG SERPL-MCNC: 1287 MG/DL (ref 610–1616)
IGM SERPL-MCNC: 68 MG/DL (ref 35–242)
KAPPA LC UR-MCNC: 10.73 MG/DL (ref 0.33–1.94)
KAPPA LC/LAMBDA SER: 4.47 {RATIO} (ref 0.26–1.65)
LAMBDA LC SERPL-MCNC: 2.4 MG/DL (ref 0.57–2.63)
PROT PATTERN SERPL IFE-IMP: ABNORMAL

## 2020-06-02 ENCOUNTER — HOSPITAL ENCOUNTER (OUTPATIENT)
Facility: CLINIC | Age: 65
Setting detail: SPECIMEN
End: 2020-06-02
Attending: PSYCHIATRY & NEUROLOGY
Payer: MEDICARE

## 2020-06-02 ENCOUNTER — VIRTUAL VISIT (OUTPATIENT)
Dept: ONCOLOGY | Facility: CLINIC | Age: 65
End: 2020-06-02
Attending: PSYCHIATRY & NEUROLOGY
Payer: MEDICARE

## 2020-06-02 ENCOUNTER — PRE VISIT (OUTPATIENT)
Dept: ONCOLOGY | Facility: CLINIC | Age: 65
End: 2020-06-02

## 2020-06-02 DIAGNOSIS — D47.2 MGUS (MONOCLONAL GAMMOPATHY OF UNKNOWN SIGNIFICANCE): ICD-10-CM

## 2020-06-02 PROCEDURE — 40001009 ZZH VIDEO/TELEPHONE VISIT; NO CHARGE

## 2020-06-02 PROCEDURE — 99213 OFFICE O/P EST LOW 20 MIN: CPT | Mod: 95 | Performed by: INTERNAL MEDICINE

## 2020-06-02 NOTE — PROGRESS NOTES
"Derek Contreras is a 64 year old male who is being evaluated via a billable video visit.      The patient has been notified of following:     \"This video visit will be conducted via a call between you and your physician/provider. We have found that certain health care needs can be provided without the need for an in-person physical exam.  This service lets us provide the care you need with a video conversation.  If a prescription is necessary we can send it directly to your pharmacy.  If lab work is needed we can place an order for that and you can then stop by our lab to have the test done at a later time.    Video visits are billed at different rates depending on your insurance coverage.  Please reach out to your insurance provider with any questions.    If during the course of the call the physician/provider feels a video visit is not appropriate, you will not be charged for this service.\"    Patient has given verbal consent for Video visit? Yes    How would you like to obtain your AVS? Kaurhart    Patient would like the video invitation sent by: Text to cell phone: [phone]    Will anyone else be joining your video visit? No     Please use Washington Ngo CMA on 6/2/2020 at 12:51 PM        Video-Visit Details    Type of service:  Video Visit    Video Start Time: 1:08 pm  Video End Time: 1:15 PM    Originating Location (pt. Location): Home    Distant Location (provider location):  [facility] ([facility]3]     Platform used for Video Visit: [facility]    Hematology/Oncology New Patient Note      Today's Date: 06/02/20    Reason for Consult: MGUS      HISTORY OF PRESENT ILLNESS: Derek Contreras is a 64 year old male with PMHx of sleep apnea, sarcoidosis, COPD, prostate cancer s/p prostatectomy, history of hip and knee replacement, HTN who presents with MGUS. An VLAD was checked by his neurologist, which showed an elevated IgA level.      He says that he " feels fantastic currently.      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  Kappa light chain is elevated to 10.73, with kappa/lambda ratio of 4.47.  His hemoglobin, calcium, and renal function are normal.          REVIEW OF SYSTEMS:   14 point ROS was reviewed and is negative other than as noted above in HPI.       HOME MEDICATIONS:  Current Outpatient Medications   Medication Sig Dispense Refill     albuterol (PROAIR HFA/PROVENTIL HFA/VENTOLIN HFA) 108 (90 Base) MCG/ACT inhaler every 4 hours as needed   4     aspirin (ASA) 81 MG tablet Take 81 mg by mouth every morning ON HOLD FOR SURGERY SINCE 03/14/2019 90 tablet 3     atorvastatin (LIPITOR) 40 MG tablet TAKE 1 TABLET(40 MG) BY MOUTH EVERY MORNING 90 tablet 2     azithromycin (ZITHROMAX) 250 MG tablet Take 2 tablets (500 mg) the first day, then take 1 tablet (250 mg) by mouth daily for a total of 5 days. 6 tablet 0     azithromycin (ZITHROMAX) 250 MG tablet Take 2 tablets (500 mg) the first day, then take 1 tablet (250 mg) by mouth daily for a total of 5 days. 6 tablet 0     BREO ELLIPTA 200-25 MCG/INH Inhaler INL 1 PUFF PO AT THE SAME TIME Q DAY. RINSE AND SPIT AFTER U 1 Inhaler 3     cetirizine (ZYRTEC) 10 MG tablet Take 10 mg by mouth every morning  90 tablet 3     fluticasone (FLONASE) 50 MCG/ACT nasal spray Spray 1-2 sprays into both nostrils daily 16 g 0     hydrochlorothiazide (HYDRODIURIL) 12.5 MG tablet TAKE 1 TABLET(12.5 MG) BY MOUTH EVERY MORNING 90 tablet 2     multivitamin (ONE-DAILY) tablet Take 1 tablet by mouth every morning  90 tablet 3         ALLERGIES:  Allergies   Allergen Reactions     Cat Hair Extract Itching     itchy tearful eyes     Adhesive Tape Rash     Iodine Rash         PAST MEDICAL HISTORY:  Past Medical History:   Diagnosis Date     Asthma      Claustrophobia      CPAP (continuous positive airway pressure) dependence      Dyslipidemia      [place] (hard of hearing)       Hypercholesteremia      Hypertension      Iron deficiency      Mediastinal adenopathy      Obesity      BEULAH (obstructive sleep apnea)      Prostate cancer (H)      Pulmonary hypertension (H)      Sarcoidosis          PAST SURGICAL HISTORY:  Past Surgical History:   Procedure Laterality Date     APPENDECTOMY       C TOTAL HIP ARTHROPLASTY Bilateral      C TOTAL KNEE ARTHROPLASTY Bilateral      CV RIGHT HEART CATH N/A 12/11/2019    Procedure: CV RIGHT HEART CATH;  Surgeon: Torrey Hirsch MD;  Location:  HEART CARDIAC CATH LAB     DAVINCI PROSTATECTOMY, LYMPHADENECTOMY N/A 5/23/2019    Procedure: ROBOTIC ASSISTED LAPAROSCOPIC RADICAL PROSTATECTOMY WITH BILATERAL PELVIC LYMPH NODE DISSECTION;  Surgeon: Matteo Coughlin MD;  Location: [facility] OR     ENDOBRONCHIAL ULTRASOUND FLEXIBLE N/A 3/29/2019    Procedure: Flexible Bronchoscopy, Endobronchial Ultrasound;  Surgeon: Curtis Leger MD;  Location: [facility] OR     VASECTOMY           SOCIAL HISTORY:  Social History     Socioeconomic History     Marital status:      Spouse name: Not on file     Number of children: Not on file     Years of education: Not on file     Highest education level: Not on file   Occupational History     Not on file   Social Needs     Financial resource strain: Not on file     Food insecurity     Worry: Not on file     Inability: Not on file     Transportation needs     Medical: Not on file     Non-medical: Not on file   Tobacco Use     Smoking status: Never Smoker     Smokeless tobacco: Never Used   Substance and Sexual Activity     Alcohol use: Yes     Comment: twice a week     Drug use: No     Sexual activity: Yes     Partners: Female   Lifestyle     Physical activity     Days per week: Not on file     Minutes per session: Not on file     Stress: Not on file   Relationships     Social connections     Talks on phone: Not on file     Gets together: Not on file     Attends Anabaptism service: Not on file     Active member of club  or organization: Not on file     Attends meetings of clubs or organizations: Not on file     Relationship status: Not on file     Intimate partner violence     Fear of current or ex partner: Not on file     Emotionally abused: Not on file     Physically abused: Not on file     Forced sexual activity: Not on file   Other Topics Concern     Parent/sibling w/ CABG, MI or angioplasty before 65F 55M? Not Asked   Social History Narrative     Not on file         FAMILY HISTORY:  Family History   Problem Relation Age of Onset     Alzheimer Disease Mother      Heart Disease Father      Glaucoma No family hx of      Macular Degeneration No family hx of      Diabetes No family hx of      Thyroid Disease No family hx of          PHYSICAL EXAM:  Vital signs:  There were no vitals taken for this visit.   ECO  GENERAL/CONSTITUTIONAL: No acute distress. Healthy, alert. Wife also on video call.  EYES: No scleral icterus.  No redness or discharge.    RESPIRATORY: No audible wheeze, cough, or visible cyanosis.  No visible retractions or increased work of breathing.  Able to speak fully in complete sentences.  NEUROLOGIC: Alert, oriented, answers questions appropriately. No tremor. Mentation intact and speech normal  INTEGUMENTARY: No jaundice.  No obvious rash or skin lesions.  PSYCHIATRIC:  Mentation appears normal, affect normal/bright, judgement and insight intact, normal speech and appearance well-groomed.    The rest of a comprehensive physical exam is deferred due to public health emergency video visit restrictions.      LABS:  CBC RESULTS:   Recent Labs   Lab Test 20  0831   WBC 7.6   RBC 5.54   HGB 16.1   HCT 49.7   MCV 90   MCH 29.1   MCHC 32.4   RDW 13.9   *       Recent Labs   Lab Test 20  0831 20  1000 19  1254     --  137   POTASSIUM 3.8  --  3.8   CHLORIDE 105  --  105   CO2 24  --  24   ANIONGAP 10  --  7   * 87 92   BUN 13  --  13   CR 0.85  --  0.83   ANTONIO 8.6  --  9.3      SPEP:  Monoclonal peak:  5/27/20: 0.5 g/dL IgA kappa    Component      Latest Ref Rng & Units 5/27/2020   [place] Free Lt Chain      0.33 - 1.94 mg/dL 10.73 (H)   Lambda Free Lt Chain      0.57 - 2.63 mg/dL 2.40   Kappa Lambda Ratio      0.26 - 1.65 4.47 (H)           ASSESSMENT/PLAN:  Derek oCntreras is a 64 year old male with:    1) MGUS: Monoclonal peak of 0.5 g/dL, IgA kappa.  Kappa/lambda ratio of 4.47.  His hemoglobin, calcium, and renal function are normal.  We discussed MGUS.  We can likely monitor this for now.  He has no evidence of progression to multiple myeloma or indication for treatment at this point.  We will obtain skeletal survey to rule out lytic lesions.  If that is negative, we can monitor his labs in ~3-4 months.  If still stable then, can likely go out to every 6 months.  -skeletal survey  -RTC in 3-4 months with CBC, CMP, SPEP, serum free light chains, serum immunoglobulins    2) History of prostate cancer: s/p prostatectomy  -followed by urology    3) COPD, sarcoidosis:  -followed by pulmonology        Radha Trimble MD  Hematology/Oncology  [facility]

## 2020-06-02 NOTE — LETTER
"    6/2/2020         RE: Derek Contreras  [address] [place] [address]        Dear Colleague,    Thank you for referring your patient, Derek Contreras, to the [facility]. Please see a copy of my visit note below.    Derek Contreras is a 64 year old male who is being evaluated via a billable video visit.      The patient has been notified of following:     \"This video visit will be conducted via a call between you and your physician/provider. We have found that certain health care needs can be provided without the need for an in-person physical exam.  This service lets us provide the care you need with a video conversation.  If a prescription is necessary we can send it directly to your pharmacy.  If lab work is needed we can place an order for that and you can then stop by our lab to have the test done at a later time.    Video visits are billed at different rates depending on your insurance coverage.  Please reach out to your insurance provider with any questions.    If during the course of the call the physician/provider feels a video visit is not appropriate, you will not be charged for this service.\"    Patient has given verbal consent for Video visit? Yes    How would you like to obtain your AVS? MyChart    Patient would like the video invitation sent by: Text to cell phone: [phone]    Will anyone else be joining your video visit? No     Please use Washington Ngo CMA on 6/2/2020 at 12:51 PM        Video-Visit Details    Type of service:  Video Visit    Video Start Time: 1:08 pm  Video End Time: 1:15 PM    Originating Location (pt. Location): Home    Distant Location (provider location):  [facility]     Platform used for Video Visit: [employer]      [facility]    Hematology/Oncology New Patient Note      Today's Date: 06/02/20    Reason for Consult: MGUS      HISTORY OF PRESENT ILLNESS: Derek Contreras is a 64 " year old male with PMHx of sleep apnea, sarcoidosis, COPD, prostate cancer s/p prostatectomy, history of hip and knee replacement, HTN who presents with MGUS. An VLAD was checked by his neurologist, which showed an elevated IgA level.      He says that he feels fantastic currently.      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  Kappa light chain is elevated to 10.73, with kappa/lambda ratio of 4.47.  His hemoglobin, calcium, and renal function are normal.          REVIEW OF SYSTEMS:   14 point ROS was reviewed and is negative other than as noted above in HPI.       HOME MEDICATIONS:  Current Outpatient Medications   Medication Sig Dispense Refill     albuterol (PROAIR HFA/PROVENTIL HFA/VENTOLIN HFA) 108 (90 Base) MCG/ACT inhaler every 4 hours as needed   4     aspirin (ASA) 81 MG tablet Take 81 mg by mouth every morning ON HOLD FOR SURGERY SINCE 03/14/2019 90 tablet 3     atorvastatin (LIPITOR) 40 MG tablet TAKE 1 TABLET(40 MG) BY MOUTH EVERY MORNING 90 tablet 2     azithromycin (ZITHROMAX) 250 MG tablet Take 2 tablets (500 mg) the first day, then take 1 tablet (250 mg) by mouth daily for a total of 5 days. 6 tablet 0     azithromycin (ZITHROMAX) 250 MG tablet Take 2 tablets (500 mg) the first day, then take 1 tablet (250 mg) by mouth daily for a total of 5 days. 6 tablet 0     BREO ELLIPTA 200-25 MCG/INH Inhaler INL 1 PUFF PO AT THE SAME TIME Q DAY. RINSE AND SPIT AFTER U 1 Inhaler 3     cetirizine (ZYRTEC) 10 MG tablet Take 10 mg by mouth every morning  90 tablet 3     fluticasone (FLONASE) 50 MCG/ACT nasal spray Spray 1-2 sprays into both nostrils daily 16 g 0     hydrochlorothiazide (HYDRODIURIL) 12.5 MG tablet TAKE 1 TABLET(12.5 MG) BY MOUTH EVERY MORNING 90 tablet 2     multivitamin (ONE-DAILY) tablet Take 1 tablet by mouth every morning  90 tablet 3         ALLERGIES:  Allergies   Allergen Reactions     Cat Hair Extract Itching     itchy tearful eyes      Adhesive Tape Rash     Iodine Rash         PAST MEDICAL HISTORY:  Past Medical History:   Diagnosis Date     Asthma      Claustrophobia      CPAP (continuous positive airway pressure) dependence      Dyslipidemia      [place] (hard of hearing)      Hypercholesteremia      Hypertension      Iron deficiency      Mediastinal adenopathy      Obesity      BEULAH (obstructive sleep apnea)      Prostate cancer (H)      Pulmonary hypertension (H)      Sarcoidosis          PAST SURGICAL HISTORY:  Past Surgical History:   Procedure Laterality Date     APPENDECTOMY       C TOTAL HIP ARTHROPLASTY Bilateral      C TOTAL KNEE ARTHROPLASTY Bilateral      CV RIGHT HEART CATH N/A 12/11/2019    Procedure: CV RIGHT HEART CATH;  Surgeon: Torrey Hirsch MD;  Location:  HEART CARDIAC CATH LAB     DAVINCI PROSTATECTOMY, LYMPHADENECTOMY N/A 5/23/2019    Procedure: ROBOTIC ASSISTED LAPAROSCOPIC RADICAL PROSTATECTOMY WITH BILATERAL PELVIC LYMPH NODE DISSECTION;  Surgeon: Matteo Coughlin MD;  Location:  OR     ENDOBRONCHIAL ULTRASOUND FLEXIBLE N/A 3/29/2019    Procedure: Flexible Bronchoscopy, Endobronchial Ultrasound;  Surgeon: Curtis Leger MD;  Location: [facility] OR     VASECTOMY           SOCIAL HISTORY:  Social History     Socioeconomic History     Marital status:      Spouse name: Not on file     Number of children: Not on file     Years of education: Not on file     Highest education level: Not on file   Occupational History     Not on file   Social Needs     Financial resource strain: Not on file     Food insecurity     Worry: Not on file     Inability: Not on file     Transportation needs     Medical: Not on file     Non-medical: Not on file   Tobacco Use     Smoking status: Never Smoker     Smokeless tobacco: Never Used   Substance and Sexual Activity     Alcohol use: Yes     Comment: twice a week     Drug use: No     Sexual activity: Yes     Partners: Female   Lifestyle     Physical activity     Days per week:  Not on file     Minutes per session: Not on file     Stress: Not on file   Relationships     Social connections     Talks on phone: Not on file     Gets together: Not on file     Attends Tenriism service: Not on file     Active member of club or organization: Not on file     Attends meetings of clubs or organizations: Not on file     Relationship status: Not on file     Intimate partner violence     Fear of current or ex partner: Not on file     Emotionally abused: Not on file     Physically abused: Not on file     Forced sexual activity: Not on file   Other Topics Concern     Parent/sibling w/ CABG, MI or angioplasty before 65F 55M? Not Asked   Social History Narrative     Not on file         FAMILY HISTORY:  Family History   Problem Relation Age of Onset     Alzheimer Disease Mother      Heart Disease Father      Glaucoma No family hx of      Macular Degeneration No family hx of      Diabetes No family hx of      Thyroid Disease No family hx of          PHYSICAL EXAM:  Vital signs:  There were no vitals taken for this visit.   ECO  GENERAL/CONSTITUTIONAL: No acute distress. Healthy, alert. Wife also on video call.  EYES: No scleral icterus.  No redness or discharge.    RESPIRATORY: No audible wheeze, cough, or visible cyanosis.  No visible retractions or increased work of breathing.  Able to speak fully in complete sentences.  NEUROLOGIC: Alert, oriented, answers questions appropriately. No tremor. Mentation intact and speech normal  INTEGUMENTARY: No jaundice.  No obvious rash or skin lesions.  PSYCHIATRIC:  Mentation appears normal, affect normal/bright, judgement and insight intact, normal speech and appearance well-groomed.    The rest of a comprehensive physical exam is deferred due to public health emergency video visit restrictions.      LABS:  CBC RESULTS:   Recent Labs   Lab Test 20  0831   WBC 7.6   RBC 5.54   HGB 16.1   HCT 49.7   MCV 90   MCH 29.1   MCHC 32.4   RDW 13.9   *        Recent Labs   Lab Test 05/27/20  0831 02/19/20  1000 12/11/19  1254     --  137   POTASSIUM 3.8  --  3.8   CHLORIDE 105  --  105   CO2 24  --  24   ANIONGAP 10  --  7   * 87 92   BUN 13  --  13   CR 0.85  --  0.83   ANTONIO 8.6  --  9.3     SPEP:  Monoclonal peak:  5/27/20: 0.5 g/dL IgA kappa    Component      Latest Ref Rng & Units 5/27/2020   [place] Free Lt Chain      0.33 - 1.94 mg/dL 10.73 (H)   Lambda Free Lt Chain      0.57 - 2.63 mg/dL 2.40   Kappa Lambda Ratio      0.26 - 1.65 4.47 (H)           ASSESSMENT/PLAN:  Derek Contreras is a 64 year old male with:    1) MGUS: Monoclonal peak of 0.5 g/dL, IgA kappa.  Kappa/lambda ratio of 4.47.  His hemoglobin, calcium, and renal function are normal.  We discussed MGUS.  We can likely monitor this for now.  He has no evidence of progression to multiple myeloma or indication for treatment at this point.  We will obtain skeletal survey to rule out lytic lesions.  If that is negative, we can monitor his labs in ~3-4 months.  If still stable then, can likely go out to every 6 months.  -skeletal survey  -RTC in 3-4 months with CBC, CMP, SPEP, serum free light chains, serum immunoglobulins    2) History of prostate cancer: s/p prostatectomy  -followed by urology    3) COPD, sarcoidosis:  -followed by pulmonology        Radha Trimble MD  Hematology/Oncology  [facility]      Again, thank you for allowing me to participate in the care of your patient.        Sincerely,        Radha Trimble MD

## 2020-06-02 NOTE — LETTER
"    6/2/2020         RE: Derek Contreras  [address] [place] [address]        Dear Colleague,    Thank you for referring your patient, Derek Contreras, to the [facility]. Please see a copy of my visit note below.    Derek Contreras is a 64 year old male who is being evaluated via a billable video visit.      The patient has been notified of following:     \"This video visit will be conducted via a call between you and your physician/provider. We have found that certain health care needs can be provided without the need for an in-person physical exam.  This service lets us provide the care you need with a video conversation.  If a prescription is necessary we can send it directly to your pharmacy.  If lab work is needed we can place an order for that and you can then stop by our lab to have the test done at a later time.    Video visits are billed at different rates depending on your insurance coverage.  Please reach out to your insurance provider with any questions.    If during the course of the call the physician/provider feels a video visit is not appropriate, you will not be charged for this service.\"    Patient has given verbal consent for Video visit? Yes    How would you like to obtain your AVS? MyChart    Patient would like the video invitation sent by: Text to cell phone: [phone]    Will anyone else be joining your video visit? No     Please use Washington Ngo CMA on 6/2/2020 at 12:51 PM        Video-Visit Details    Type of service:  Video Visit    Video Start Time: 1:08 pm  Video End Time: 1:15 PM    Originating Location (pt. Location): Home    Distant Location (provider location):  [facility]     Platform used for Video Visit: Yvon[place]ander      [facility]    Hematology/Oncology New Patient Note      Today's Date: 06/02/20    Reason for Consult: MGUS      HISTORY OF PRESENT ILLNESS: Derek Contreras is a 64 " year old male with PMHx of sleep apnea, sarcoidosis, COPD, prostate cancer s/p prostatectomy, history of hip and knee replacement, HTN who presents with MGUS. An VLAD was checked by his neurologist, which showed an elevated IgA level.      He says that he feels fantastic currently.      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  Kappa light chain is elevated to 10.73, with kappa/lambda ratio of 4.47.  His hemoglobin, calcium, and renal function are normal.          REVIEW OF SYSTEMS:   14 point ROS was reviewed and is negative other than as noted above in HPI.       HOME MEDICATIONS:  Current Outpatient Medications   Medication Sig Dispense Refill     albuterol (PROAIR HFA/PROVENTIL HFA/VENTOLIN HFA) 108 (90 Base) MCG/ACT inhaler every 4 hours as needed   4     aspirin (ASA) 81 MG tablet Take 81 mg by mouth every morning ON HOLD FOR SURGERY SINCE 03/14/2019 90 tablet 3     atorvastatin (LIPITOR) 40 MG tablet TAKE 1 TABLET(40 MG) BY MOUTH EVERY MORNING 90 tablet 2     azithromycin (ZITHROMAX) 250 MG tablet Take 2 tablets (500 mg) the first day, then take 1 tablet (250 mg) by mouth daily for a total of 5 days. 6 tablet 0     azithromycin (ZITHROMAX) 250 MG tablet Take 2 tablets (500 mg) the first day, then take 1 tablet (250 mg) by mouth daily for a total of 5 days. 6 tablet 0     BREO ELLIPTA 200-25 MCG/INH Inhaler INL 1 PUFF PO AT THE SAME TIME Q DAY. RINSE AND SPIT AFTER U 1 Inhaler 3     cetirizine (ZYRTEC) 10 MG tablet Take 10 mg by mouth every morning  90 tablet 3     fluticasone (FLONASE) 50 MCG/ACT nasal spray Spray 1-2 sprays into both nostrils daily 16 g 0     hydrochlorothiazide (HYDRODIURIL) 12.5 MG tablet TAKE 1 TABLET(12.5 MG) BY MOUTH EVERY MORNING 90 tablet 2     multivitamin (ONE-DAILY) tablet Take 1 tablet by mouth every morning  90 tablet 3         ALLERGIES:  Allergies   Allergen Reactions     Cat Hair Extract Itching     itchy tearful eyes      Adhesive Tape Rash     Iodine Rash         PAST MEDICAL HISTORY:  Past Medical History:   Diagnosis Date     Asthma      Claustrophobia      CPAP (continuous positive airway pressure) dependence      Dyslipidemia      [place] (hard of hearing)      Hypercholesteremia      Hypertension      Iron deficiency      Mediastinal adenopathy      Obesity      BEULAH (obstructive sleep apnea)      Prostate cancer (H)      Pulmonary hypertension (H)      Sarcoidosis          PAST SURGICAL HISTORY:  Past Surgical History:   Procedure Laterality Date     APPENDECTOMY       C TOTAL HIP ARTHROPLASTY Bilateral      C TOTAL KNEE ARTHROPLASTY Bilateral      CV RIGHT HEART CATH N/A 12/11/2019    Procedure: CV RIGHT HEART CATH;  Surgeon: Torrey Hirsch MD;  Location:  HEART CARDIAC CATH LAB     DAVINCI PROSTATECTOMY, LYMPHADENECTOMY N/A 5/23/2019    Procedure: ROBOTIC ASSISTED LAPAROSCOPIC RADICAL PROSTATECTOMY WITH BILATERAL PELVIC LYMPH NODE DISSECTION;  Surgeon: Matteo Coughlin MD;  Location:  OR     ENDOBRONCHIAL ULTRASOUND FLEXIBLE N/A 3/29/2019    Procedure: Flexible Bronchoscopy, Endobronchial Ultrasound;  Surgeon: Curtis Leger MD;  Location: [facility] OR     VASECTOMY           SOCIAL HISTORY:  Social History     Socioeconomic History     Marital status:      Spouse name: Not on file     Number of children: Not on file     Years of education: Not on file     Highest education level: Not on file   Occupational History     Not on file   Social Needs     Financial resource strain: Not on file     Food insecurity     Worry: Not on file     Inability: Not on file     Transportation needs     Medical: Not on file     Non-medical: Not on file   Tobacco Use     Smoking status: Never Smoker     Smokeless tobacco: Never Used   Substance and Sexual Activity     Alcohol use: Yes     Comment: twice a week     Drug use: No     Sexual activity: Yes     Partners: Female   Lifestyle     Physical activity     Days per week:  Not on file     Minutes per session: Not on file     Stress: Not on file   Relationships     Social connections     Talks on phone: Not on file     Gets together: Not on file     Attends Mu-ism service: Not on file     Active member of club or organization: Not on file     Attends meetings of clubs or organizations: Not on file     Relationship status: Not on file     Intimate partner violence     Fear of current or ex partner: Not on file     Emotionally abused: Not on file     Physically abused: Not on file     Forced sexual activity: Not on file   Other Topics Concern     Parent/sibling w/ CABG, MI or angioplasty before 65F 55M? Not Asked   Social History Narrative     Not on file         FAMILY HISTORY:  Family History   Problem Relation Age of Onset     Alzheimer Disease Mother      Heart Disease Father      Glaucoma No family hx of      Macular Degeneration No family hx of      Diabetes No family hx of      Thyroid Disease No family hx of          PHYSICAL EXAM:  Vital signs:  There were no vitals taken for this visit.   ECO  GENERAL/CONSTITUTIONAL: No acute distress. Healthy, alert. Wife also on video call.  EYES: No scleral icterus.  No redness or discharge.    RESPIRATORY: No audible wheeze, cough, or visible cyanosis.  No visible retractions or increased work of breathing.  Able to speak fully in complete sentences.  NEUROLOGIC: Alert, oriented, answers questions appropriately. No tremor. Mentation intact and speech normal  INTEGUMENTARY: No jaundice.  No obvious rash or skin lesions.  PSYCHIATRIC:  Mentation appears normal, affect normal/bright, judgement and insight intact, normal speech and appearance well-groomed.    The rest of a comprehensive physical exam is deferred due to public health emergency video visit restrictions.      LABS:  CBC RESULTS:   Recent Labs   Lab Test 20  0831   WBC 7.6   RBC 5.54   HGB 16.1   HCT 49.7   MCV 90   MCH 29.1   MCHC 32.4   RDW 13.9   *        Recent Labs   Lab Test 05/27/20  0831 02/19/20  1000 12/11/19  1254     --  137   POTASSIUM 3.8  --  3.8   CHLORIDE 105  --  105   CO2 24  --  24   ANIONGAP 10  --  7   * 87 92   BUN 13  --  13   CR 0.85  --  0.83   ANTONIO 8.6  --  9.3     SPEP:  Monoclonal peak:  5/27/20: 0.5 g/dL IgA kappa    Component      Latest Ref Rng & Units 5/27/2020   [place] Free Lt Chain      0.33 - 1.94 mg/dL 10.73 (H)   Lambda Free Lt Chain      0.57 - 2.63 mg/dL 2.40   Kappa Lambda Ratio      0.26 - 1.65 4.47 (H)           ASSESSMENT/PLAN:  Derek Contreras is a 64 year old male with:    1) MGUS: Monoclonal peak of 0.5 g/dL, IgA kappa.  Kappa/lambda ratio of 4.47.  His hemoglobin, calcium, and renal function are normal.  We discussed MGUS.  We can likely monitor this for now.  He has no evidence of progression to multiple myeloma or indication for treatment at this point.  We will obtain skeletal survey to rule out lytic lesions.  If that is negative, we can monitor his labs in ~3-4 months.  If still stable then, can likely go out to every 6 months.  -skeletal survey  -RTC in 3-4 months with CBC, CMP, SPEP, serum free light chains, serum immunoglobulins    2) History of prostate cancer: s/p prostatectomy  -followed by urology    3) COPD, sarcoidosis:  -followed by pulmonology        Radha Trimble MD  Hematology/Oncology  [facility]      Again, thank you for allowing me to participate in the care of your patient.        Sincerely,        Radha Trimble MD

## 2020-06-03 NOTE — PATIENT INSTRUCTIONS
Skeletal survey scheduled 6/9  Appt order for follow-up with Dr. Trimble has been deferred until 8/2, per Carrie Mckinney, RN, BSN, [facility]  Patient Care Coordinator  [facility]  [phone]

## 2020-06-09 ENCOUNTER — HOSPITAL ENCOUNTER (OUTPATIENT)
Dept: GENERAL RADIOLOGY | Facility: CLINIC | Age: 65
Discharge: HOME | End: 2020-06-09
Attending: INTERNAL MEDICINE | Admitting: INTERNAL MEDICINE
Payer: MEDICARE

## 2020-06-09 DIAGNOSIS — D47.2 MGUS (MONOCLONAL GAMMOPATHY OF UNKNOWN SIGNIFICANCE): ICD-10-CM

## 2020-06-09 PROCEDURE — 77075 RADEX OSSEOUS SURVEY COMPL: CPT

## 2020-06-10 ENCOUNTER — PATIENT OUTREACH (OUTPATIENT)
Dept: ONCOLOGY | Facility: CLINIC | Age: 65
End: 2020-06-10

## 2020-06-10 ENCOUNTER — VIRTUAL VISIT (OUTPATIENT)
Dept: PULMONOLOGY | Facility: CLINIC | Age: 65
End: 2020-06-10
Attending: INTERNAL MEDICINE
Payer: MEDICARE

## 2020-06-10 DIAGNOSIS — D86.9 SARCOIDOSIS: Primary | ICD-10-CM

## 2020-06-10 RX ORDER — MONTELUKAST SODIUM 10 MG/1
10 TABLET ORAL AT BEDTIME
Qty: 30 TABLET | Refills: 3 | Status: SHIPPED | OUTPATIENT
Start: 2020-06-10 | End: 2020-06-29

## 2020-06-10 NOTE — LETTER
"    6/10/2020         RE: Derek Contreras  [address] [place] [address]        Dear Colleague,    Thank you for referring your patient, Derek Contreras, to the [facility]. Please see a copy of my visit note below.    Derek Contreras is a 64 year old male who is being evaluated via a billable video visit.      The patient has been notified of following:     \"This video visit will be conducted via a call between you and your physician/provider. We have found that certain health care needs can be provided without the need for an in-person physical exam.  This service lets us provide the care you need with a video conversation.  If a prescription is necessary we can send it directly to your pharmacy.  If lab work is needed we can place an order for that and you can then stop by our lab to have the test done at a later time.    Video visits are billed at different rates depending on your insurance coverage.  Please reach out to your insurance provider with any questions.    If during the course of the call the physician/provider feels a video visit is not appropriate, you will not be charged for this service.\"    Patient has given verbal consent for Video visit? Yes    How would you like to obtain your AVS? [facility]    Patient would like the video invitation sent by: Send to e-mail at:   [email or web address]  Will anyone else be joining your video visit? No      Reason for Visit  Derek Contreras is a 64 year old year old male who is being evaluated for sarcoidosis.  Pulmonary HPI    Mr. Contreras was last seen in pulmonary clinic on February 5, 2020.  At that time he reported doing quite well after receiving a course of levofloxacin which did not help his symptoms but improvement in symptoms with prednisone burst and azithromycin.  The plan was to consider evaluation for neuromuscular weakness due to low MIP/MEP and a restrictive pattern on PFTs without " significant pulmonary parenchymal infiltrates.  He has since been evaluated in the neuromuscular clinic.  There was concern for truncal/diaphragmatic weakness for which several labs were ordered.  There is a concern for plasma cell disorder/ MGUS for which the patient saw Dr. Trimble and skeletal surveys were ordered.    Today he tells me that after brief improvement in February the cough has returned.  He reports that he clears his throat and sometimes has productive sputum.  He has no fevers or chills.  There is no blood in the sputum.  The cough is not always productive.  It is quite bothersome and is preventing him from sleeping at night.    There is no gastroesophageal reflux symptoms.  Denies any postnasal drainage.  He is on Brio Ellipta and Zyrtec.    Current Outpatient Medications   Medication     albuterol (PROAIR HFA/PROVENTIL HFA/VENTOLIN HFA) 108 (90 Base) MCG/ACT inhaler     aspirin (ASA) 81 MG tablet     atorvastatin (LIPITOR) 40 MG tablet     azithromycin (ZITHROMAX) 250 MG tablet     azithromycin (ZITHROMAX) 250 MG tablet     BREO ELLIPTA 200-25 MCG/INH Inhaler     cetirizine (ZYRTEC) 10 MG tablet     fluticasone (FLONASE) 50 MCG/ACT nasal spray     hydrochlorothiazide (HYDRODIURIL) 12.5 MG tablet     multivitamin (ONE-DAILY) tablet     No current facility-administered medications for this visit.      Allergies   Allergen Reactions     Cat Hair Extract Itching     itchy tearful eyes     Adhesive Tape Rash     Iodine Rash     Past Medical History:   Diagnosis Date     Asthma      Claustrophobia      CPAP (continuous positive airway pressure) dependence      Dyslipidemia      [place] (hard of hearing)      Hypercholesteremia      Hypertension      Iron deficiency      Mediastinal adenopathy      Obesity      BEULAH (obstructive sleep apnea)      Prostate cancer (H)      Pulmonary hypertension (H)      Sarcoidosis        Past Surgical History:   Procedure Laterality Date     APPENDECTOMY       C TOTAL HIP  ARTHROPLASTY Bilateral      C TOTAL KNEE ARTHROPLASTY Bilateral      CV RIGHT HEART CATH N/A 12/11/2019    Procedure: CV RIGHT HEART CATH;  Surgeon: Torrey Hirsch MD;  Location: [facility] HEART CARDIAC CATH LAB     DAVINCI PROSTATECTOMY, LYMPHADENECTOMY N/A 5/23/2019    Procedure: ROBOTIC ASSISTED LAPAROSCOPIC RADICAL PROSTATECTOMY WITH BILATERAL PELVIC LYMPH NODE DISSECTION;  Surgeon: Matteo Coughlin MD;  Location: [facility] OR     ENDOBRONCHIAL ULTRASOUND FLEXIBLE N/A 3/29/2019    Procedure: Flexible Bronchoscopy, Endobronchial Ultrasound;  Surgeon: Curtis Leger MD;  Location: [facility] OR     VASECTOMY         Social History     Socioeconomic History     Marital status:      Spouse name: Not on file     Number of children: Not on file     Years of education: Not on file     Highest education level: Not on file   Occupational History     Not on file   Social Needs     Financial resource strain: Not on file     Food insecurity     Worry: Not on file     Inability: Not on file     Transportation needs     Medical: Not on file     Non-medical: Not on file   Tobacco Use     Smoking status: Never Smoker     Smokeless tobacco: Never Used   Substance and Sexual Activity     Alcohol use: Yes     Comment: twice a week     Drug use: No     Sexual activity: Yes     Partners: Female   Lifestyle     Physical activity     Days per week: Not on file     Minutes per session: Not on file     Stress: Not on file   Relationships     Social connections     Talks on phone: Not on file     Gets together: Not on file     Attends Jehovah's witness service: Not on file     Active member of club or organization: Not on file     Attends meetings of clubs or organizations: Not on file     Relationship status: Not on file     Intimate partner violence     Fear of current or ex partner: Not on file     Emotionally abused: Not on file     Physically abused: Not on file     Forced sexual activity: Not on file   Other Topics Concern      Parent/sibling w/ CABG, MI or angioplasty before 65F 55M? Not Asked   Social History Narrative     Not on file       ROS Pulmonary    A complete ROS was otherwise negative except as noted in the HPI.  There were no vitals taken for this visit.  Exam:   Limited exam due to COVID PHE.  Appears in no distress.  Breathing looks comfortable.    Results:  Recent lab studies reviewed.  Beta-2 microglobulin was elevated at 2.6.  IgA levels on VLAD were elevated.    PET / CT 2/19/20: 1. No FDG active cardiac sarcoid., 2. Decreased perfusion in the inferoseptal wall, findings may be related to sequela of previous cardiac sarcoid with microvascular injury. 3. Enlarged left ventricle with borderline low ejection fraction.  4. Decreased myocardial blood flow in the RCA distribution. 5. Findings of a dilated cardiomyopathy a concern for possible ischemia/myocardial injury of the septum, consider CTA or coronary  angiography for further evaluation of this region.  6. Chest and upper abdominal hypermetabolic lymphadenopathy which is  indicative of active systemic sarcoid.    Assessment and plan: 64-year-old male with history of hypertension, hyperlipidemia, obesity, prior history of prostate cancer, abnormal chest imaging with TBNA (3/29/2019) demonstrating granulomatous Inflammation (station 7 and 12 L lymph nodes) with BAL demonstrating 102 white cells and 11% lymphocytes.  The CD4 CD8 ratio was 2.29.  Cardiac MRI with no LGE to suggest cardiac sarcoidosis.  Abnormal EF of 50%.  RV dilatation of unclear etiology but no pulmonary hypertension based on right heart cath with mean PAP 17 mm Hg (12/11/2019) .     1.  Pulmonary: Restrictive PFTs without significant parenchymal changes.  Decreased MIP/MEP suggestive of neuromuscular weakness.  Evaluation pending currently.  2.  Cough: This is persistent and troublesome.  There is question of allergies.  The patient has eczema.  Denies any postnasal drainage.  No gastroesophageal reflux.   Currently on Breo and Zyrtec without improvement.  The differential diagnosis includes cough variant asthma versus airway sarcoid versus others.  We will maximize medical management for 6 weeks to see if there is any improvement.  He will switch from Breo to Advair 500-50.  We will add omeprazole 40 mg once a day.  Will add Singulair.  He will try these for 6 weeks to see if there is any improvement in his symptoms.  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  It has been recognized as a rare manifestation.  PET scan in February with extranodal FDG uptake.  Cardiac MR in August 2019 with LVEF of 50%, RVEF of 43% and no LGE.  Mean PAP is 17 mmHg (despite flattening septum on cardiac MRI).  Most recent ophthalmology visit in October 2019.    For sleep disordered breathing on NIPPV    The patient will take the above medications for 6 weeks and see if there is any improvement in his cough.  If there is not will need to have in person visit with full PFTs, FeNO and labs and consider systemic anti-inflammatory therapy.    Video-Visit Details    Type of service:  Video Visit    Video Start Time: 9:47 AM  Video End Time: 10:14 AM    Originating Location (pt. Location): Home    Distant Location (provider location):  [facility]     Platform used for Video Visit: Amparo Martin MD

## 2020-06-10 NOTE — PROGRESS NOTES
Notes recorded by Radha Trimble MD on 6/10/2020 at 3:54 PM CDT   Please let patient know that his skeletal survey showed no lytic lesions.       Writer called Derek to discuss the above results from his Bone survey done 6/9/20.     Derek verbalized understanding and thanked writer for the call. He had no further questions or concerns.   Christiane Mckinney RN, BSN, MAOM  Patient Care Coordinator  [facility]  [phone]

## 2020-06-10 NOTE — PROGRESS NOTES
"Derek Contreras is a 64 year old male who is being evaluated via a billable video visit.      The patient has been notified of following:     \"This video visit will be conducted via a call between you and your physician/provider. We have found that certain health care needs can be provided without the need for an in-person physical exam.  This service lets us provide the care you need with a video conversation.  If a prescription is necessary we can send it directly to your pharmacy.  If lab work is needed we can place an order for that and you can then stop by our lab to have the test done at a later time.    Video visits are billed at different rates depending on your insurance coverage.  Please reach out to your insurance provider with any questions.    If during the course of the call the physician/provider feels a video visit is not appropriate, you will not be charged for this service.\"    Patient has given verbal consent for Video visit? Yes    How would you like to obtain your AVS? Sindhu    Patient would like the video invitation sent by: Send to e-mail at:   [email or web address]  Will anyone else be joining your video visit? No      Reason for Visit  Derek Contreras is a 64 year old year old male who is being evaluated for sarcoidosis.  Pulmonary HPI    Mr. Contreras was last seen in pulmonary clinic on February 5, 2020.  At that time he reported doing quite well after receiving a course of levofloxacin which did not help his symptoms but improvement in symptoms with prednisone burst and azithromycin.  The plan was to consider evaluation for neuromuscular weakness due to low MIP/MEP and a restrictive pattern on PFTs without significant pulmonary parenchymal infiltrates.  He has since been evaluated in the neuromuscular clinic.  There was concern for truncal/diaphragmatic weakness for which several labs were ordered.  There is a concern for plasma cell disorder/ MGUS for which the patient saw  " [place] and skeletal surveys were ordered.    Today he tells me that after brief improvement in February the cough has returned.  He reports that he clears his throat and sometimes has productive sputum.  He has no fevers or chills.  There is no blood in the sputum.  The cough is not always productive.  It is quite bothersome and is preventing him from sleeping at night.    There is no gastroesophageal reflux symptoms.  Denies any postnasal drainage.  He is on Brio Ellipta and Zyrtec.    Current Outpatient Medications   Medication     albuterol (PROAIR HFA/PROVENTIL HFA/VENTOLIN HFA) 108 (90 Base) MCG/ACT inhaler     aspirin (ASA) 81 MG tablet     atorvastatin (LIPITOR) 40 MG tablet     azithromycin (ZITHROMAX) 250 MG tablet     azithromycin (ZITHROMAX) 250 MG tablet     BREO ELLIPTA 200-25 MCG/INH Inhaler     cetirizine (ZYRTEC) 10 MG tablet     fluticasone (FLONASE) 50 MCG/ACT nasal spray     hydrochlorothiazide (HYDRODIURIL) 12.5 MG tablet     multivitamin (ONE-DAILY) tablet     No current facility-administered medications for this visit.      Allergies   Allergen Reactions     Cat Hair Extract Itching     itchy tearful eyes     Adhesive Tape Rash     Iodine Rash     Past Medical History:   Diagnosis Date     Asthma      Claustrophobia      CPAP (continuous positive airway pressure) dependence      Dyslipidemia      [place] (hard of hearing)      Hypercholesteremia      Hypertension      Iron deficiency      Mediastinal adenopathy      Obesity      BEULAH (obstructive sleep apnea)      Prostate cancer (H)      Pulmonary hypertension (H)      Sarcoidosis        Past Surgical History:   Procedure Laterality Date     APPENDECTOMY       C TOTAL HIP ARTHROPLASTY Bilateral      C TOTAL KNEE ARTHROPLASTY Bilateral      CV RIGHT HEART CATH N/A 12/11/2019    Procedure: CV RIGHT HEART CATH;  Surgeon: Torrey Hirsch MD;  Location:  HEART CARDIAC CATH LAB     DAVINCI PROSTATECTOMY, LYMPHADENECTOMY N/A 5/23/2019    Procedure:  ROBOTIC ASSISTED LAPAROSCOPIC RADICAL PROSTATECTOMY WITH BILATERAL PELVIC LYMPH NODE DISSECTION;  Surgeon: Matteo Coughlin MD;  Location: [facility] OR     ENDOBRONCHIAL ULTRASOUND FLEXIBLE N/A 3/29/2019    Procedure: Flexible Bronchoscopy, Endobronchial Ultrasound;  Surgeon: Curtis Leger MD;  Location: [facility] OR     VASECTOMY         Social History     Socioeconomic History     Marital status:      Spouse name: Not on file     Number of children: Not on file     Years of education: Not on file     Highest education level: Not on file   Occupational History     Not on file   Social Needs     Financial resource strain: Not on file     Food insecurity     Worry: Not on file     Inability: Not on file     Transportation needs     Medical: Not on file     Non-medical: Not on file   Tobacco Use     Smoking status: Never Smoker     Smokeless tobacco: Never Used   Substance and Sexual Activity     Alcohol use: Yes     Comment: twice a week     Drug use: No     Sexual activity: Yes     Partners: Female   Lifestyle     Physical activity     Days per week: Not on file     Minutes per session: Not on file     Stress: Not on file   Relationships     Social connections     Talks on phone: Not on file     Gets together: Not on file     Attends [employer] service: Not on file     Active member of club or organization: Not on file     Attends meetings of clubs or organizations: Not on file     Relationship status: Not on file     Intimate partner violence     Fear of current or ex partner: Not on file     Emotionally abused: Not on file     Physically abused: Not on file     Forced sexual activity: Not on file   Other Topics Concern     Parent/sibling w/ CABG, MI or angioplasty before 65F 55M? Not Asked   Social History Narrative     Not on file       ROS Pulmonary    A complete ROS was otherwise negative except as noted in the HPI.  There were no vitals taken for this visit.  Exam:   Limited exam due to COVID PHE.   Appears in no distress.  Breathing looks comfortable.    Results:  Recent lab studies reviewed.  Beta-2 microglobulin was elevated at 2.6.  IgA levels on VLAD were elevated.    PET / CT 2/19/20: 1. No FDG active cardiac sarcoid., 2. Decreased perfusion in the inferoseptal wall, findings may be related to sequela of previous cardiac sarcoid with microvascular injury. 3. Enlarged left ventricle with borderline low ejection fraction.  4. Decreased myocardial blood flow in the RCA distribution. 5. Findings of a dilated cardiomyopathy a concern for possible ischemia/myocardial injury of the septum, consider CTA or coronary  angiography for further evaluation of this region.  6. Chest and upper abdominal hypermetabolic lymphadenopathy which is  indicative of active systemic sarcoid.    Assessment and plan: 64-year-old male with history of hypertension, hyperlipidemia, obesity, prior history of prostate cancer, abnormal chest imaging with TBNA (3/29/2019) demonstrating granulomatous Inflammation (station 7 and 12 L lymph nodes) with BAL demonstrating 102 white cells and 11% lymphocytes.  The CD4 CD8 ratio was 2.29.  Cardiac MRI with no LGE to suggest cardiac sarcoidosis.  Abnormal EF of 50%.  RV dilatation of unclear etiology but no pulmonary hypertension based on right heart cath with mean PAP 17 mm Hg (12/11/2019) .     1.  Pulmonary: Restrictive PFTs without significant parenchymal changes.  Decreased MIP/MEP suggestive of neuromuscular weakness.  Evaluation pending currently.  2.  Cough: This is persistent and troublesome.  There is question of allergies.  The patient has eczema.  Denies any postnasal drainage.  No gastroesophageal reflux.  Currently on Breo and Zyrtec without improvement.  The differential diagnosis includes cough variant asthma versus airway sarcoid versus others.  We will maximize medical management for 6 weeks to see if there is any improvement.  He will switch from Breo to Advair 500-50.  We will  add omeprazole 40 mg once a day.  Will add Singulair.  He will try these for 6 weeks to see if there is any improvement in his symptoms.  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  It has been recognized as a rare manifestation.  PET scan in February with extranodal FDG uptake.  Cardiac MR in August 2019 with LVEF of 50%, RVEF of 43% and no LGE.  Mean PAP is 17 mmHg (despite flattening septum on cardiac MRI).  Most recent ophthalmology visit in October 2019.    For sleep disordered breathing on NIPPV    The patient will take the above medications for 6 weeks and see if there is any improvement in his cough.  If there is not will need to have in person visit with full PFTs, FeNO and labs and consider systemic anti-inflammatory therapy.    Video-Visit Details    Type of service:  Video Visit    Video Start Time: 9:47 AM  Video End Time: 10:14 AM    Originating Location (pt. Location): Home    Distant Location (provider location):  [facility]     Platform used for Video Visit: Amparo Martin MD

## 2020-06-15 NOTE — TELEPHONE ENCOUNTER
FUTURE VISIT INFORMATION      FUTURE VISIT INFORMATION:    Date: 6/24/2020    Time: 3:20PM    [place] [facility] [place]ERRAL INFORMATION:    Referring provider:  Dr Omer    Referring providers clinic:  [facility]     Reason for visit/diagnosis  Right upper trapezius muscle biopsy - ref'd by Dr. Omer ( [facility]), per pt    RECORDS REQUESTED FROM:       Clinic name Comments Records Status Imaging Status   [facility] 6/2/2020 consult with Dr Atilio JUDD    [facility]  5/19/2020 note from Dr Kan JUDD

## 2020-06-22 DIAGNOSIS — D86.9 SARCOIDOSIS: ICD-10-CM

## 2020-06-23 NOTE — PROGRESS NOTES
Dear Dr. Omer:    I had the pleasure of meeting Derek Contreras in consultation today at the [facility] at your request.     History of Present Illness:   Derek Contreras is a 64 year old man who is referred for evaluation for trapezius biopsy for workup of a myopathy. The patient is undergoing workup with neurology. The patient denies any shoulder weakness. He denies any issues with range of motion of the shoulders. He himself has not noticed any issues. He denies any previous issues with his neck or shoulders.      Past medical history: hypertension, hyperlipidemia, obesity, prostate cancer, sleep disordered breathing, COPD, sarcoidosis, MGUS    Past surgical history: both hip and knee replacement, prostatectomy    Social history: No smoking history, no chewing tobacco, 1-2 alcoholic beverages per week. Lives with wife. Retired. Mental health therapist.     Family history: Negative for muscle degenerative diseases    MEDICATIONS:     Current Outpatient Medications   Medication Sig Dispense Refill     albuterol (PROAIR HFA/PROVENTIL HFA/VENTOLIN HFA) 108 (90 Base) MCG/ACT inhaler every 4 hours as needed   4     aspirin (ASA) 81 MG tablet Take 81 mg by mouth every morning ON HOLD FOR SURGERY SINCE 03/14/2019 90 tablet 3     atorvastatin (LIPITOR) 40 MG tablet TAKE 1 TABLET(40 MG) BY MOUTH EVERY MORNING 90 tablet 2     azithromycin (ZITHROMAX) 250 MG tablet Take 2 tablets (500 mg) the first day, then take 1 tablet (250 mg) by mouth daily for a total of 5 days. 6 tablet 0     azithromycin (ZITHROMAX) 250 MG tablet Take 2 tablets (500 mg) the first day, then take 1 tablet (250 mg) by mouth daily for a total of 5 days. 6 tablet 0     BREO ELLIPTA 200-25 MCG/INH Inhaler INL 1 PUFF PO AT THE SAME TIME Q DAY. RINSE AND SPIT AFTER U 1 Inhaler 3     cetirizine (ZYRTEC) 10 MG tablet Take 10 mg by mouth every morning  90 tablet 3     fluticasone (FLONASE) 50 MCG/ACT nasal spray Spray  1-2 sprays into both nostrils daily 16 g 0     fluticasone-salmeterol (ADVAIR) 500-50 MCG/DOSE inhaler Inhale 1 puff into the lungs 2 times daily 1 Inhaler 12     hydrochlorothiazide (HYDRODIURIL) 12.5 MG tablet TAKE 1 TABLET(12.5 MG) BY MOUTH EVERY MORNING 90 tablet 2     montelukast (SINGULAIR) 10 MG tablet Take 1 tablet (10 mg) by mouth At Bedtime 30 tablet 3     multivitamin (ONE-DAILY) tablet Take 1 tablet by mouth every morning  90 tablet 3     omeprazole (PRILOSEC) 20 MG DR capsule Take 2 capsules (40 mg) by mouth daily 90 capsule 3       ALLERGIES:    Allergies   Allergen Reactions     Cat Hair Extract Itching     itchy tearful eyes     Adhesive Tape Rash     Iodine Rash       HABITS/SOCIAL HISTORY:   No smoking history, no chewing tobacco, 1-2 alcoholic beverages per week.   Lives with wife.   Retired, previous mental health therapist.     Social History     Socioeconomic History     Marital status:      Spouse name: Not on file     Number of children: Not on file     Years of education: Not on file     Highest education level: Not on file   Occupational History     Not on file   Social Needs     Financial resource strain: Not on file     Food insecurity     Worry: Not on file     Inability: Not on file     Transportation needs     Medical: Not on file     Non-medical: Not on file   Tobacco Use     Smoking status: Never Smoker     Smokeless tobacco: Never Used   Substance and Sexual Activity     Alcohol use: Yes     Comment: twice a week     Drug use: No     Sexual activity: Yes     Partners: Female   Lifestyle     Physical activity     Days per week: Not on file     Minutes per session: Not on file     Stress: Not on file   Relationships     Social connections     Talks on phone: Not on file     Gets together: Not on file     Attends Mandaeism service: Not on file     Active member of club or organization: Not on file     Attends meetings of clubs or organizations: Not on file     Relationship  status: Not on file     Intimate partner violence     Fear of current or ex partner: Not on file     Emotionally abused: Not on file     Physically abused: Not on file     Forced sexual activity: Not on file   Other Topics Concern     Parent/sibling w/ CABG, MI or angioplasty before 65F 55M? Not Asked   Social History Narrative     Not on file       PAST MEDICAL HISTORY:   Past Medical History:   Diagnosis Date     Asthma      Claustrophobia      CPAP (continuous positive airway pressure) dependence      Dyslipidemia      [place] (hard of hearing)      Hypercholesteremia      Hypertension      Iron deficiency      Mediastinal adenopathy      Obesity      BEULAH (obstructive sleep apnea)      Prostate cancer (H)      Pulmonary hypertension (H)      Sarcoidosis         PAST SURGICAL HISTORY:   Past Surgical History:   Procedure Laterality Date     APPENDECTOMY       C TOTAL HIP ARTHROPLASTY Bilateral      C TOTAL KNEE ARTHROPLASTY Bilateral      CV RIGHT HEART CATH N/A 12/11/2019    Procedure: CV RIGHT HEART CATH;  Surgeon: Torrey Hirsch MD;  Location:  HEART CARDIAC CATH LAB     DAVINCI PROSTATECTOMY, LYMPHADENECTOMY N/A 5/23/2019    Procedure: ROBOTIC ASSISTED LAPAROSCOPIC RADICAL PROSTATECTOMY WITH BILATERAL PELVIC LYMPH NODE DISSECTION;  Surgeon: Matteo Coughlin MD;  Location:  OR     ENDOBRONCHIAL ULTRASOUND FLEXIBLE N/A 3/29/2019    Procedure: Flexible Bronchoscopy, Endobronchial Ultrasound;  Surgeon: Curtis Leger MD;  Location: [facility] OR     VASECTOMY         FAMILY HISTORY:    Family History   Problem Relation Age of Onset     Alzheimer Disease Mother      Heart Disease Father      Glaucoma No family hx of      Macular Degeneration No family hx of      Diabetes No family hx of      Thyroid Disease No family hx of        REVIEW OF SYSTEMS:  12 point ROS was negative other than the symptoms noted above in the HPI.  Patient Supplied Answers to Review of Systems  [facility] ROS 6/24/2020   Ears,  Nose, Throat Ringing/noise in ears   Cardiopulmonary Cough         PHYSICAL EXAMINATION:   BP (!) 149/93   Pulse (!) 49   Temp 96.7  F (35.9  C)   Resp 15   Ht 1.829 m (6')   Wt 137.4 kg (303 lb)   SpO2 100%   BMI 41.09 kg/m    Appearance:   normal; NAD, age-appropriate appearance, well-developed, obese   Communication:   normal; communicates verbally, normal voice quality   Head/Face:   inspection -  Normal; no scars or visible lesions   Facial strength -  Normal and symmetric   Skin:  normal, no rash   Ears:  auricle (AD) -  normal  auricle (AS) -  Normal  Normal clinical speech reception   Nose:  Ext. inspection -  Normal   Oral Cavity:  lips -  Normal mucosa, oral competence, and stoma size   upper and lower partial, healthy gingival mucosa   Hard palate, buccal, floor of mouth mucosa normal   Tongue - normal movement, no lesions   Neck: No visible mass or asymmetry   No scars  Normal range of motion   Lymphatic:  no abnormal nodes   Cardiovascular:  warm, pink, well-perfused extremities without swelling, tenderness, or edema   Respiratory:  Normal respiratory effort, no stridor   Neuro/Psych.:  mood/affect -  normal  mental status -  normal         RESULTS REVIEWED:   I reviewed the notes from medical oncology, neurology, and internal medicine which are summarized above      IMPRESSION AND PLAN:   Derek Contreras is a 64 year old man with concerns for a myopathy. Neurology is requesting a left trapezius biopsy per the clinic note. I discussed with the patient that the consult request we received says right trapezius biopsy.  I sent a message to the neurology team to clarify their preference on where the biopsy should come from.    I discussed possible places of access for the trapezius muscle to the patient.  We discussed the risks of the procedure including bleeding, infection, scarring, seroma formation.  We discussed this would be performed as an outpatient.  He will need a preop history/physical which  he prefers to do with anesthesia clinic.  He will need preop COVID testing.    We will work on finding a surgical date.      Thank you very much for the opportunity to participate in the care of your patient.      Ade Villa MD, M.D.  Otolaryngology- Head & Neck Surgery      This note was dictated with voice recognition software and then edited. Please excuse any unintentional errors.         CC:  Glynn Omer MD  Department of Neurology  [facility]

## 2020-06-24 ENCOUNTER — PREP FOR PROCEDURE (OUTPATIENT)
Dept: OTOLARYNGOLOGY | Facility: CLINIC | Age: 65
End: 2020-06-24

## 2020-06-24 ENCOUNTER — HOSPITAL ENCOUNTER (OUTPATIENT)
Facility: AMBULATORY SURGERY CENTER | Age: 65
End: 2020-06-24
Attending: OTOLARYNGOLOGY
Payer: MEDICARE

## 2020-06-24 ENCOUNTER — OFFICE VISIT (OUTPATIENT)
Dept: OTOLARYNGOLOGY | Facility: CLINIC | Age: 65
End: 2020-06-24
Attending: PSYCHIATRY & NEUROLOGY
Payer: MEDICARE

## 2020-06-24 ENCOUNTER — PRE VISIT (OUTPATIENT)
Dept: OTOLARYNGOLOGY | Facility: CLINIC | Age: 65
End: 2020-06-24

## 2020-06-24 VITALS
RESPIRATION RATE: 15 BRPM | SYSTOLIC BLOOD PRESSURE: 149 MMHG | OXYGEN SATURATION: 100 % | BODY MASS INDEX: 41.04 KG/M2 | TEMPERATURE: 96.7 F | DIASTOLIC BLOOD PRESSURE: 93 MMHG | HEART RATE: 49 BPM | WEIGHT: 303 LBS | HEIGHT: 72 IN

## 2020-06-24 DIAGNOSIS — G72.9 MYOPATHY: Primary | ICD-10-CM

## 2020-06-24 DIAGNOSIS — G72.9 MYOPATHY: ICD-10-CM

## 2020-06-24 ASSESSMENT — MIFFLIN-ST. JEOR: SCORE: 2197.4

## 2020-06-24 ASSESSMENT — PAIN SCALES - GENERAL: PAINLEVEL: NO PAIN (0)

## 2020-06-24 NOTE — LETTER
6/24/2020       RE: Derek Contreras  [address] [place] [address]     Dear Colleague,    Thank you for referring your patient, Derek Contreras, to the [facility] EAR NOSE AND THROAT at [facility]. Please see a copy of my visit note below.    Dear Dr. Omer:    I had the pleasure of meeting Derek Contreras in consultation today at the [facility] at your request.     History of Present Illness:   Derek Contreras is a 64 year old man who is referred for evaluation for trapezius biopsy for workup of a myopathy. The patient is undergoing workup with neurology. The patient denies any shoulder weakness. He denies any issues with range of motion of the shoulders. He himself has not noticed any issues. He denies any previous issues with his neck or shoulders.      Past medical history: hypertension, hyperlipidemia, obesity, prostate cancer, sleep disordered breathing, COPD, sarcoidosis, MGUS    Past surgical history: both hip and knee replacement, prostatectomy    Social history: No smoking history, no chewing tobacco, 1-2 alcoholic beverages per week. Lives with wife. Retired. Mental health therapist.     Family history: Negative for muscle degenerative diseases    MEDICATIONS:     Current Outpatient Medications   Medication Sig Dispense Refill     albuterol (PROAIR HFA/PROVENTIL HFA/VENTOLIN HFA) 108 (90 Base) MCG/ACT inhaler every 4 hours as needed   4     aspirin (ASA) 81 MG tablet Take 81 mg by mouth every morning ON HOLD FOR SURGERY SINCE 03/14/2019 90 tablet 3     atorvastatin (LIPITOR) 40 MG tablet TAKE 1 TABLET(40 MG) BY MOUTH EVERY MORNING 90 tablet 2     azithromycin (ZITHROMAX) 250 MG tablet Take 2 tablets (500 mg) the first day, then take 1 tablet (250 mg) by mouth daily for a total of 5 days. 6 tablet 0     azithromycin (ZITHROMAX) 250 MG tablet Take 2 tablets (500 mg) the first day, then take 1 tablet (250  mg) by mouth daily for a total of 5 days. 6 tablet 0     BREO ELLIPTA 200-25 MCG/INH Inhaler INL 1 PUFF PO AT THE SAME TIME Q DAY. RINSE AND SPIT AFTER U 1 Inhaler 3     cetirizine (ZYRTEC) 10 MG tablet Take 10 mg by mouth every morning  90 tablet 3     fluticasone (FLONASE) 50 MCG/ACT nasal spray Spray 1-2 sprays into both nostrils daily 16 g 0     fluticasone-salmeterol (ADVAIR) 500-50 MCG/DOSE inhaler Inhale 1 puff into the lungs 2 times daily 1 Inhaler 12     hydrochlorothiazide (HYDRODIURIL) 12.5 MG tablet TAKE 1 TABLET(12.5 MG) BY MOUTH EVERY MORNING 90 tablet 2     montelukast (SINGULAIR) 10 MG tablet Take 1 tablet (10 mg) by mouth At Bedtime 30 tablet 3     multivitamin (ONE-DAILY) tablet Take 1 tablet by mouth every morning  90 tablet 3     omeprazole (PRILOSEC) 20 MG DR capsule Take 2 capsules (40 mg) by mouth daily 90 capsule 3       ALLERGIES:    Allergies   Allergen Reactions     Cat Hair Extract Itching     itchy tearful eyes     Adhesive Tape Rash     Iodine Rash       HABITS/SOCIAL HISTORY:   No smoking history, no chewing tobacco, 1-2 alcoholic beverages per week.   Lives with wife.   Retired, previous mental health therapist.     Social History     Socioeconomic History     Marital status:      Spouse name: Not on file     Number of children: Not on file     Years of education: Not on file     Highest education level: Not on file   Occupational History     Not on file   Social Needs     Financial resource strain: Not on file     Food insecurity     Worry: Not on file     Inability: Not on file     Transportation needs     Medical: Not on file     Non-medical: Not on file   Tobacco Use     Smoking status: Never Smoker     Smokeless tobacco: Never Used   Substance and Sexual Activity     Alcohol use: Yes     Comment: twice a week     Drug use: No     Sexual activity: Yes     Partners: Female   Lifestyle     Physical activity     Days per week: Not on file     Minutes per session: Not on file      Stress: Not on file   Relationships     Social connections     Talks on phone: Not on file     Gets together: Not on file     Attends [employer] service: Not on file     Active member of club or organization: Not on file     Attends meetings of clubs or organizations: Not on file     Relationship status: Not on file     Intimate partner violence     Fear of current or ex partner: Not on file     Emotionally abused: Not on file     Physically abused: Not on file     Forced sexual activity: Not on file   Other Topics Concern     Parent/sibling w/ CABG, MI or angioplasty before 65F 55M? Not Asked   Social History Narrative     Not on file       PAST MEDICAL HISTORY:   Past Medical History:   Diagnosis Date     Asthma      Claustrophobia      CPAP (continuous positive airway pressure) dependence      Dyslipidemia      [place] (hard of hearing)      Hypercholesteremia      Hypertension      Iron deficiency      Mediastinal adenopathy      Obesity      BEULAH (obstructive sleep apnea)      Prostate cancer (H)      Pulmonary hypertension (H)      Sarcoidosis         PAST SURGICAL HISTORY:   Past Surgical History:   Procedure Laterality Date     APPENDECTOMY       C TOTAL HIP ARTHROPLASTY Bilateral      C TOTAL KNEE ARTHROPLASTY Bilateral      CV RIGHT HEART CATH N/A 12/11/2019    Procedure: CV RIGHT HEART CATH;  Surgeon: Torrey Hirshc MD;  Location:  HEART CARDIAC CATH LAB     DAVINCI PROSTATECTOMY, LYMPHADENECTOMY N/A 5/23/2019    Procedure: ROBOTIC ASSISTED LAPAROSCOPIC RADICAL PROSTATECTOMY WITH BILATERAL PELVIC LYMPH NODE DISSECTION;  Surgeon: Matteo Coughlin MD;  Location:  OR     ENDOBRONCHIAL ULTRASOUND FLEXIBLE N/A 3/29/2019    Procedure: Flexible Bronchoscopy, Endobronchial Ultrasound;  Surgeon: Curtis Leger MD;  Location:  OR     VASECTOMY         FAMILY HISTORY:    Family History   Problem Relation Age of Onset     Alzheimer Disease Mother      Heart Disease Father      Glaucoma No  family hx of      Macular Degeneration No family hx of      Diabetes No family hx of      Thyroid Disease No family hx of        REVIEW OF SYSTEMS:  12 point ROS was negative other than the symptoms noted above in the HPI.  Patient Supplied Answers to Review of Systems  [facility] ROS 6/24/2020   Ears, Nose, Throat Ringing/noise in ears   Cardiopulmonary Cough         PHYSICAL EXAMINATION:   BP (!) 149/93   Pulse (!) 49   Temp 96.7  F (35.9  C)   Resp 15   Ht 1.829 m (6')   Wt 137.4 kg (303 lb)   SpO2 100%   BMI 41.09 kg/m    Appearance:   normal; NAD, age-appropriate appearance, well-developed, obese   Communication:   normal; communicates verbally, normal voice quality   Head/Face:   inspection -  Normal; no scars or visible lesions   Facial strength -  Normal and symmetric   Skin:  normal, no rash   Ears:  auricle (AD) -  normal  auricle (AS) -  Normal  Normal clinical speech reception   Nose:  Ext. inspection -  Normal   Oral Cavity:  lips -  Normal mucosa, oral competence, and stoma size   upper and lower partial, healthy gingival mucosa   Hard palate, buccal, floor of mouth mucosa normal   Tongue - normal movement, no lesions   Neck: No visible mass or asymmetry   No scars  Normal range of motion   Lymphatic:  no abnormal nodes   Cardiovascular:  warm, pink, well-perfused extremities without swelling, tenderness, or edema   Respiratory:  Normal respiratory effort, no stridor   Neuro/Psych.:  mood/affect -  normal  mental status -  normal         RESULTS REVIEWED:   I reviewed the notes from medical oncology, neurology, and internal medicine which are summarized above      IMPRESSION AND PLAN:   Derek Contreras is a 64 year old man with concerns for a myopathy. Neurology is requesting a left trapezius biopsy per the clinic note. I discussed with the patient that the consult request we received says right trapezius biopsy.  I sent a message to the neurology team to clarify their preference on where the biopsy  should come from.    I discussed possible places of access for the trapezius muscle to the patient.  We discussed the risks of the procedure including bleeding, infection, scarring, seroma formation.  We discussed this would be performed as an outpatient.  He will need a preop history/physical which he prefers to do with anesthesia clinic.  He will need preop COVID testing.    We will work on finding a surgical date.      Thank you very much for the opportunity to participate in the care of your patient.      Ade Villa MD, M.D.  Otolaryngology- Head & Neck Surgery      This note was dictated with voice recognition software and then edited. Please excuse any unintentional errors.         CC:  Glynn Omer MD  Department of Neurology  [facility]      Again, thank you for allowing me to participate in the care of your patient.      Sincerely,    Ade Villa MD

## 2020-06-24 NOTE — NURSING NOTE
Teaching Flowsheet - ENT   Relevant Diagnosis: myopathy   Teaching Topic: trapezius muscle biopsy   Person(s) involved in teaching: patient        Motivation Level:  Asks Questions:   Yes  Eager to Learn:   Yes  Cooperative:   Yes  Receptive (willing/able to accept information):   Yes  Comments: Reviewed pre-op H and P,  NPO prior to  surgery,  pre-op scrub (given Hibiclens)  Reviewed post-op  cares , activity and pain.     Patient demonstrates understanding of the following:  Reason for the appointment, diagnosis and treatment plan:   Yes  Knowledge of proper use of medications and conditions for which they are ordered (with special attention to potential side effects or drug interactions):  stop aspirin products 1 week before surgery Yes  Which situations necessitate calling provider and whom to contact:   Yes  Nutritional needs and diet plan:   Yes  Pain management techniques:   Yes  Patient instructed on hand hygiene:  Yes  How and/when to access community resources:   Yes     Infection Prevention:  Patient   demonstrates understanding of the following:  Surgical procedure site care taught Yes  Signs and symptoms of infection taught Yes  Wound care taught Yes  Instructional Materials Used/Given: pre- op booklet,verbal  Instruction.    Rubia Merrill, RN, BSN

## 2020-06-24 NOTE — NURSING NOTE
Chief Complaint   Patient presents with     Consult     right upper trapezius muscle biopsy      Blood pressure (!) 149/93, pulse (!) 49, temperature 96.7  F (35.9  C), resp. rate 15, height 1.829 m (6'), weight 137.4 kg (303 lb), SpO2 100 %.    Omar Brewster LPN

## 2020-06-25 DIAGNOSIS — Z11.59 ENCOUNTER FOR SCREENING FOR OTHER VIRAL DISEASES: Primary | ICD-10-CM

## 2020-06-25 RX ORDER — CEFAZOLIN SODIUM 1 G/50ML
1 SOLUTION INTRAVENOUS SEE ADMIN INSTRUCTIONS
Status: CANCELLED | OUTPATIENT
Start: 2020-06-25

## 2020-06-25 RX ORDER — CEFAZOLIN SODIUM IN 0.9 % NACL 3 G/100 ML
3 INTRAVENOUS SOLUTION, PIGGYBACK (ML) INTRAVENOUS
Status: CANCELLED | OUTPATIENT
Start: 2020-06-25

## 2020-06-25 RX ORDER — DEXAMETHASONE SODIUM PHOSPHATE 10 MG/ML
10 INJECTION, SOLUTION INTRAMUSCULAR; INTRAVENOUS ONCE
Status: CANCELLED | OUTPATIENT
Start: 2020-06-25 | End: 2020-06-25

## 2020-06-25 NOTE — TELEPHONE ENCOUNTER
FUTURE VISIT INFORMATION      SURGERY INFORMATION:    Date: 7/1/20    [facility]    Surgeon:  Ade Villa MD     Anesthesia Type:  general    Procedure: left trapezius muscle biopsy     Consult: ov 6/24 Daisy    RECORDS REQUESTED FROM:       Primary Care Provider: Carlyn Payne MD [facility]    Pertinent Medical History: hypertension    Most recent EKG+ Tracing: 3/4/20    Most recent ECHO: 7/31/19    Most recent Cardiac Stress Test: 12/11/19    Most recent PFT's: 2/5/20    Most recent Sleep Study: 1/12/20

## 2020-06-29 ENCOUNTER — ANESTHESIA EVENT (OUTPATIENT)
Dept: SURGERY | Facility: CLINIC | Age: 65
End: 2020-06-29

## 2020-06-29 ENCOUNTER — OFFICE VISIT (OUTPATIENT)
Dept: SURGERY | Facility: CLINIC | Age: 65
End: 2020-06-29
Payer: MEDICARE

## 2020-06-29 ENCOUNTER — APPOINTMENT (OUTPATIENT)
Dept: LAB | Facility: CLINIC | Age: 65
End: 2020-06-29
Payer: MEDICARE

## 2020-06-29 ENCOUNTER — PRE VISIT (OUTPATIENT)
Dept: SURGERY | Facility: CLINIC | Age: 65
End: 2020-06-29

## 2020-06-29 VITALS
BODY MASS INDEX: 41.45 KG/M2 | DIASTOLIC BLOOD PRESSURE: 90 MMHG | HEIGHT: 72 IN | OXYGEN SATURATION: 94 % | RESPIRATION RATE: 16 BRPM | TEMPERATURE: 98.1 F | WEIGHT: 306 LBS | SYSTOLIC BLOOD PRESSURE: 129 MMHG | HEART RATE: 86 BPM

## 2020-06-29 DIAGNOSIS — Z01.818 PRE-OP EXAMINATION: Primary | ICD-10-CM

## 2020-06-29 DIAGNOSIS — J45.909 MODERATE ASTHMA WITHOUT COMPLICATION: Primary | ICD-10-CM

## 2020-06-29 DIAGNOSIS — Z11.59 ENCOUNTER FOR SCREENING FOR OTHER VIRAL DISEASES: ICD-10-CM

## 2020-06-29 DIAGNOSIS — D86.9 SARCOIDOSIS: ICD-10-CM

## 2020-06-29 PROCEDURE — U0003 INFECTIOUS AGENT DETECTION BY NUCLEIC ACID (DNA OR RNA); SEVERE ACUTE RESPIRATORY SYNDROME CORONAVIRUS 2 (SARS-COV-2) (CORONAVIRUS DISEASE [COVID-19]), AMPLIFIED PROBE TECHNIQUE, MAKING USE OF HIGH THROUGHPUT TECHNOLOGIES AS DESCRIBED BY CMS-2020-01-R: HCPCS | Performed by: OTOLARYNGOLOGY

## 2020-06-29 RX ORDER — MONTELUKAST SODIUM 10 MG/1
10 TABLET ORAL AT BEDTIME
Qty: 30 TABLET | Refills: 3 | Status: SHIPPED | OUTPATIENT
Start: 2020-06-29 | End: 2020-06-30

## 2020-06-29 ASSESSMENT — MIFFLIN-ST. JEOR: SCORE: 2211.01

## 2020-06-29 ASSESSMENT — PAIN SCALES - GENERAL: PAINLEVEL: NO PAIN (0)

## 2020-06-29 ASSESSMENT — COPD QUESTIONNAIRES
CAT_SEVERITY: MILD
COPD: 1

## 2020-06-29 NOTE — PATIENT INSTRUCTIONS
Preparing for Your Surgery      Name:  Derek Contreras   MRN:  [id number]   :  1955   Today's Date:  2020     Arriving for surgery:  Surgery date:  2020  Arrival time:  6:30 am    Restrictions due to COVID 19:  No visitors at the Surgery Center   parking is not available     Please come to:    [facility]  [address] [place] [phone]     Parking has been suspended due to the Covid 19 Crisis    Please arrive at the [facility] Entrance  And follow instructions for entering the building  You will be escorted to the 5th floor by a staff member       What can I eat or drink?    -  You may eat and drink normally until 8 hours before surgery (midnight)  -  You may have clear liquids up to 4 hours before surgery (4 am)    --No alcohol for at least 24 hours before surgery    Which medicines can I take?    Hold aspirin for 7 days before surgery     Hold multivitamins for 7 days before surgery  Hold supplements for 7 days before surgery  Hold Ibuprofen (Advil, Motrin) for 1 day before surgery--unless otherwise directed by surgeon  Hold Naproxen (Aleve) for 4 days before surgery    -  DO NOT take the following medications the day of surgery:  Hydrochlorothiazide        -  PLEASE TAKE the following medications the day of surgery   Inhalers as usual and bring to surgery center  Atorvastatin  Cetirizine (Zyrtec)  Fluticasone (flonase)          How do I prepare myself?  - Please take 2 showers before surgery using Scrubcare or Hibiclens soap    Use this soap only from the neck to your toes     Leave the soap on your skin for one minute--then rinse thoroughly.      You may use your own shampoo and conditioner; no other hair products.   -Please remove all jewelry and body piercings  -No lotions, deodorants or fragrance  -No makeup or fingernail polish   -  Bring your ID and insurance card.  - If you use a CPAP machine please bring it to the  Surgery Center         ALL PATIENTS ARE REQUIRED TO HAVE A RESPONSIBLE ADULT TO DRIVE AND BE IN ATTENDANCE WITH THEM FOR 24 HOURS FOLLOWING SURGERY       Questions or Concerns:    -For questions regarding the day of surgery please contact the Ambulatory Surgery Center at [phone].    -If you have health changes between today and your surgery please contact your surgeon.     For questions after surgery please call your surgeons office.

## 2020-06-29 NOTE — LETTER
June 30, 2020        Derek Contreras  [address]   [place] [address]    This letter provides a written record that you were tested for COVID-19 on 6/29/20.       Your result was negative. This means that we didn t find the virus that causes COVID-19 in your sample. A test may show negative when you do actually have the virus. This can happen when the virus is in the early stages of infection, before you feel illness symptoms.    If you have symptoms   Stay home and away from others (self-isolate) until you meet ALL of the guidelines below:    You ve had no fever--and no medicine that reduces fever--for 3 full days (72 hours). And      Your other symptoms have gotten better. For example, your cough or breathing has improved. And     At least 10 days have passed since your symptoms started.    During this time:    Stay home. Don t go to work, school or anywhere else.     Stay in your own room, including for meals. Use your own bathroom if you can.    Stay away from others in your home. No hugging, kissing or shaking hands. No visitors.    Clean  high touch  surfaces often (doorknobs, counters, handles, etc.). Use a household cleaning spray or wipes. You can find a full list on the EPA website at www.epa.gov/pesticide-registration/list-n-disinfectants-use-against-sars-cov-2.    Cover your mouth and nose with a mask, tissue or washcloth to avoid spreading germs.    Wash your hands and face often with soap and water.    Going back to work  Check with your employer for any guidelines to follow for going back to work.    Employers: This document serves as formal notice that your employee tested negative for COVID-19, as of the testing date shown above.

## 2020-06-29 NOTE — ANESTHESIA PREPROCEDURE EVALUATION
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]   Procedure(s):  left trapezius muscle biopsy     LABS:  CBC:   Lab Results   Component Value Date    WBC 7.6 2020    WBC 8.3 2019    HGB 16.1 2020    HGB 17.0 2019    HCT 49.7 2020    HCT 53.3 (H) 2019     (L) 2020     2019     BMP:   Lab Results   Component Value Date     2020     2019    POTASSIUM 3.8 2020    POTASSIUM 3.8 2019    CHLORIDE 105 2020    CHLORIDE 105 2019    CO2 24 2020    CO2 24 2019    BUN 13 2020    BUN 13 2019    CR 0.85 2020    CR 0.83 2019     (H) 2020    GLC 87 2020     COAGS:   Lab Results   Component Value Date    INR 1.17 (H) 2019     POC:   Lab Results   Component Value Date     (H) 2019     OTHER:   Lab Results   Component Value Date    A1C 5.6 2020    ANTONIO 8.6 2020    PHOS 3.9 2019    MAG 2.5 (H) 2019    ALBUMIN 3.4 2020    PROTTOTAL 7.8 2020    ALT 29 2020    AST 25 2020    ALKPHOS 69 2020    BILITOTAL 0.6 2020    TSH 1.92 2019    CRP 3.4 2019        Preop Vitals    BP Readings from Last 3 Encounters:   20 (!) 149/93   20 (!) 148/92   20 135/80    Pulse Readings from Last 3 Encounters:   20 (!) 49   20 97   20 99      Resp Readings from Last 3 Encounters:   20 15   19 18   19 20    SpO2 Readings from Last 3 Encounters:   20 100%   20 93%   20 92%      Temp Readings from Last 1 Encounters:   20 96.7  F (35.9  C)    Ht Readings from Last 1 Encounters:   20 1.829 m (6')      Wt Readings from Last 1 Encounters:   20 137.4 kg (303 lb)    Estimated body mass  index is 41.09 kg/m  as calculated from the following:    Height as of 6/24/20: 1.829 m (6').    Weight as of 6/24/20: 137.4 kg (303 lb).     LDA:  Urethral Catheter Latex;Straight-tip 20 fr (Active)   Number of days: 403        Past Medical History:   Diagnosis Date     Asthma      Claustrophobia      CPAP (continuous positive airway pressure) dependence      Dyslipidemia      [place] (hard of hearing)      Hypercholesteremia      Hypertension      Iron deficiency      Mediastinal adenopathy      Obesity      BEULAH (obstructive sleep apnea)      Prostate cancer (H)      Pulmonary hypertension (H)      Sarcoidosis       Past Surgical History:   Procedure Laterality Date     APPENDECTOMY       C TOTAL HIP ARTHROPLASTY Bilateral      C TOTAL KNEE ARTHROPLASTY Bilateral      CV RIGHT HEART CATH N/A 12/11/2019    Procedure: CV RIGHT HEART CATH;  Surgeon: Torrey Hirsch MD;  Location:  HEART CARDIAC CATH LAB     DAVINCI PROSTATECTOMY, LYMPHADENECTOMY N/A 5/23/2019    Procedure: ROBOTIC ASSISTED LAPAROSCOPIC RADICAL PROSTATECTOMY WITH BILATERAL PELVIC LYMPH NODE DISSECTION;  Surgeon: Matteo Coughlin MD;  Location:  OR     ENDOBRONCHIAL ULTRASOUND FLEXIBLE N/A 3/29/2019    Procedure: Flexible Bronchoscopy, Endobronchial Ultrasound;  Surgeon: Curtis Leger MD;  Location: [facility] OR     VASECTOMY        Allergies   Allergen Reactions     Cat Hair Extract Itching     itchy tearful eyes     Adhesive Tape Rash     Iodine Rash        Anesthesia Evaluation     . Pt has had prior anesthetic. Type: General    History of anesthetic complications   - difficult intubation  H/O difficulty intubation.  Used Glidesope with 5/2019 without difficulty.      ROS/MED HX    ENT/Pulmonary:     (+)sleep apnea, mild COPD (Uses maintenance inhaler as prescribed.  Cough, phlegm and KIBRY is at baseline. ), uses CPAP , . Other pulmonary disease Pulmonary sarcoidosis diagnosed about 10 years ago. .    Neurologic:  - neg neurologic ROS      Cardiovascular:     (+) Dyslipidemia, hypertension----. Taking blood thinners : Instructions Given to patient: Stopped ASA 81 mg 6/24/20. . . :. . pulmonary hypertension, Previous cardiac testing date:results:Stress Testdate: results:ECG reviewed date: results: date: results:          METS/Exercise Tolerance: Comment: Enjoys walking 20 minutes daily.  Uses a cane when walking a long distance.     Hematologic:  - neg hematologic  ROS       Musculoskeletal: Comment: Bilateral hip and knee replacements.  (+) arthritis,  -       GI/Hepatic:     (+) appendicitis (s/p appendectomy),       Renal/Genitourinary:  - ROS Renal section negative       Endo:     (+) Obesity, .      Psychiatric:  - neg psychiatric ROS       Infectious Disease:  - neg infectious disease ROS       Malignancy:   (+) Malignancy History of Prostate  Prostate CA Remission status post Surgery,         Other:    (+) No chance of pregnancy C-spine cleared: Yes, no H/O Chronic Pain,no other significant disability                        PHYSICAL EXAM:   Mental Status/Neuro: A/A/O   Airway: Facies: Thick Neck  Mallampati: II  Mouth/Opening: Full  TM distance: > 6 cm  Neck ROM: Limited   Respiratory: Auscultation: CTAB     Resp. Rate: Normal     Resp. Effort: Normal      CV: Rhythm: Irregular  Heart: Normal Sounds  Edema: None   Comments:      Dental: Dentures  Dentures: Upper; Lower                JZG FV AN PLAN NO PONV RULE       PAC Discussion and Assessment    ASA Classification: 3  Case is suitable for: ASC  Anesthetic techniques and relevant risks discussed: GA  Invasive monitoring and risk discussed:   Types:   Possibility and Risk of blood transfusion discussed:   NPO instructions given:   Additional anesthetic preparation and risks discussed:   Needs early admission to pre-op area:   Other:     PAC Resident/NP Anesthesia Assessment:  Derek Contreras is a 64-year-old male scheduled for left trapezius muscle biopsy on 7/1/20 with Dr. Villa at [facility] under general anesthesia.      Mr. Contreras was seen by Dr. Villa on 6/24/20 in consultation today at the [facility] for trapezius biopsy for workup of a myopathy.  Mr. Contreras was evaluated by neurology and they referred Mr. Contreras to Dr. Villa for the above procedure.  After Dr. Villa counseled Mr. Contreras on the above procedure, he opted to proceed.     Mr. Contreras has a history of sarcoidosis proven by [facility] seven years ago at [facility].  Mr. Contreras has chronic dyspnea on exertion and has undergone an extensive work-up with pulmonary, pulmonary hypertension clinic, electrophysiology , neurology and hematology.  The above procedure has been recommended by neurology for diagnostic purposes to determine treatment.  Per Dr. Omer note on 5/19/20, \"It is possible that Mr. Contreras has a myopathy restricted to the truncal muscles and diaphragm. This may be due to sarcoidosis but this should be proven before taking action\".     Additional notable past medical history: hypertension, hyperlipidemia, obesity, prostate cancer, sleep disordered breathing, COPD and MGUS.      PROCEDURES    Testing/Procedures:  PULMONARY FUNCTION TESTS:   PFT Latest Ref Rng & Units 2/5/2020  FVC L 2.19  FEV1 L 1.69  FVC% % 46  FEV1% % 46    ECHOCARDIOGRAM: 7/31/19  Interpretation Summary  Global and regional left ventricular function is normal with an EF of 55-60%.  Right ventricle not well visualized, but TAPSE and tissue doppler assessment  is consistent with normal global function.  No hemodynamically significant valve abnormalities.  No pericardial effusion is present.        No prior study for comparison.     Right heart cath 12/11/19  Conclusion        Normal pulmonary artery presure at rest    Normal biventricualr filling pressure    Normal cardiac output    Mildly reduced biventricular function in the setting of sarcoidosis    No MRI evidence of cardiac " "sarcoidosis.    Plan to do Zio monitor to assess PVC's burden anf repeat cardiac MRI in a year        Cardiac MRI 8/29/19  1. The LV is normal in cavity size and wall thickness. The global systolic function is normal. The LVEF is  50%. There is systolic flattening of the interventricular septum and global dyssynchrony due to PVCs.     2. The RV is moderately enlarged based on the visual examination. The global systolic function is mildly  reduced. The RVEF is 43%.      3. The left atrium is mildly enlarged and the right atrium is severely enlarged.     4. There is at least mild mitral regurgitation and trace aortic regurgitation.     5. Late gadolinium enhancement imaging shows no MI, fibrosis or infiltrative disease.      6. Regadenoson stress perfusion imaging shows no ischemia.     7. There is no pericardial effusion or thickening.     8. There is no intracardiac thrombus.     CONCLUSIONS: Non-ischemic cardiomyopathy, likely due to pulmonary hypertension and dyssynchrony from PVCs.   There is mildly reduced biventricular function.  There is no evidence of myocardial perfusion defects or  myocardial fibrosis.      PET Myocardial Perfusion 2/19/20  Impression:  1. No FDG active cardiac sarcoid.  2. Decreased perfusion in the inferoseptal wall, findings may be  related to sequela of previous cardiac sarcoid with microvascular  injury.  3. Enlarged left ventricle with borderline low ejection fraction.  4. Decreased myocardial blood flow in the RCA distribution.  5. Findings of a dilated cardiomyopathy a concern for possible  ischemia/myocardial injury of the septum, consider CTA or coronary  angiography for further evaluation of this region.  6. Chest and upper abdominal hypermetabolic lymphadenopathy which is  indicative of active systemic sarcoid.    Sleep studies  Initial PSG results (1/6/20)  \" Obstructive sleep apnea was demonstrated (AHI 5; REM AHI 25), with respiratory events essentially isolated to REM sleep. " " The study may be limited due to the absence of supine sleep.    \" Oxyhemoglobin desaturations were prominent during REM, and the patient did not always recover baseline saturations before onset of the next airway obstruction.  Nocturnal hypoxemia was present; there were 23 minutes with SpO2 less than or equal to 88%.    \" Hypoventilation was not observed.    \" An irregularly irregular heart rhythm was observed intermittently throughout the recording.     Repeat polysomnography(1/12/20)  \" CPAP at 8 cmH2O was effective at resolving obstructive sleep apnea and improving oxyhemoglobin saturations to the normal range. REM supine sleep was not captured.   Hypoxemia resolved at a pressure of 8 cmH2O.  Supplemental oxygen was not indicated during this study.  \" An irregular heart rhythm was observed intermittently throughout the study.          ZioPatch 1/17/20:  Multiple runs of NSVT.  No symptoms reported.     He has the following specific operative considerations:   - VTE risk:  1.8%  - Risk of PONV score = 1.  If > 2, anti-emetic intervention recommended.      #  Cardiology   Cardiology  - Electrophysiology evaluation on 3/4/20 for NSVT noted on ZIO patch. No pre-syncope or sycope with longest run 19 beats. No evidence for active cardiac sarcoid on evaluation.  PVC burden low on cardiac monitor thus not suggestive of an etiology for cardiomyopathy.  Please see Dr. Figueroa's note for further information.   - Mild right ventricular dilatation and dysfunction of unclear etiology.  Per Dr. Hirsch's note 12/11/19,\"he does not have evidence of cardiac sarcoidosis.  It is possible that his RV dilatation and dysfunction is secondary to his hypoxia and restrictive lung disease.  We have recently published showing that RV function is disproportionately low in patients with chronic lung disease.  Furthermore, there is data suggesting that patients with COPD have RV dysfunction even in the absence of pulmonary hypertension.  I " "highly suspect that this is the case with him\".  - METS:  >4.Normal cardiac MRI.  LVEF 55-60% on echo 7/2019.. Denies chest pain, palpitations, syncope, orthopnea, or PND.  RCRI : Congestive Heart Failure (pulmonary edema, PND, s3 donte, CXR with pulmonary congestion, basilar rales).  0.9 % risk of major adverse cardiac event.            #  Pulmonary   - no smoking hx  -   # Pulmonary  - Sarcoidosis, followed by pulmonary.    -           Chronic restrictive respiratory dysfunction without sufficient abnormalities on chest CT to explain it. Referred to neurology for possible neuromuscular causes of weakness/respiratory failure. Above procedure planned for diagnostic purposes.    - Asthma, allergy induced: reports cough, phlegm and KIRBY to be at baseline. LS clear on exam.  SpO2 94% on room air. Take inhalers as prescribed DOS.   - BEULAH, uses CPAP as prescribed.  Instructed to bring with DOS.     #  Endocrine   -  Obesity, BMI >40.0     #  Musculoskeletal   - bilateral knee and hip replacement.  Recommend careful positioning            - Anesthesia considerations:  Refer to PAC assessment in anesthesia records  - COVID testing per surgeon     Arrival time, NPO, shower and medication instructions provided by nursing staff today.  Preparing For Your Surgery handout given.  Patient was discussed with Dr Martinez.        Reviewed and Signed by PAC Mid-Level Provider/Resident  Mid-Level Provider/Resident: Renuka BARCLAY CNP  Date: 6/29/20  Time: 15:14    Attending Anesthesiologist Anesthesia Assessment:  I have examined the patient and reviewed the medical record.  I have discussed the patient with the SUSANNE and concur with her assessment  Patient is seen for evaluation prior to planned trapezius muscle biopsy under general anesthesia for confirmation of myopathy secondary to sarcoidosis.  The patient has a complicated medical history.  Patient has known biopsy-proven sarcoidosis.  Patient has known restrictive pulmonary " disease and chronic cough.  He is followed closely by pulmonology.  He is also noted to have an elevated right diaphragm of unclear etiology.  He is at baseline respiratory status on Advair, Brio Ellipta, and Singulair inhalers.  He is not on oxygen or oral steroids.  His saturation is between 96 and 100% on vital signs documented at various visits.  The patient has had multiple cardiac evaluations of his shortness of breath.  He is known to have mild dilated cardiomyopathy with an ejection fraction of 55-60% on echocardiogram July 2019.  A follow-up cardiac MRI demonstrated ejection fraction of 50% with flattened septum and mild dilation of right and left ventricles.  The possibility of this being caused by pulmonary hypertension and/or increased PVC load was raised.  A right heart catheterization was performed August 2019 and demonstrated normal pulmonary artery pressures.  AZO patch study was done demonstrating minimal SVT and infrequent PVCs.  A PET scan of the heart was read as possibly consistent with cardiac ischemia of the septum though in the narrative section of the medical record the cardiologist note that this may have been an over read since there is a normal MRI of the heart.    Patient was seen by neurology as part of his work-up for shortness of breath.  They found EMG evidence of atrophy in the thoracic paraspinal muscles and the trapezius.  They wish biopsy of trapezius muscle before starting immunosuppression for sarcoid induced myopathy.  Of note, the patient also has a history of difficult intubation.  He has to anesthesia records in our system documenting uneventful awake fiberoptic intubations followed by video laryngoscope evaluation demonstrating cords were visible.    Given the patient's complex cardiac history we have reached out to his cardiologist to see if any further testing is necessary prior to surgery under general anesthesia.  Please note the history of difficult intubation in the  past.  Final plan per attending anesthesiologist the day of surgery.      Reviewed and Signed by PAC Anesthesiologist  Anesthesiologist: Jose R Martinez MD  Date: 6/29/2020  Time:   Pass/Fail:   Disposition:     PAC Pharmacist Assessment:        Pharmacist:   Date:   Time:    Jose R Martinez MD

## 2020-06-29 NOTE — H&P
"  Pre-Operative H & P     CC:  Preoperative exam to assess for increased cardiopulmonary risk while undergoing surgery and anesthesia.    Date of Encounter: 6/29/2020  Primary Care Physician:  Carlyn Payne  Derek Contreras is a 64 year old male who presents for pre-operative H & P in preparation for left trapezius muscle biopsy on 7/1/20 with Dr. Villa at [facility] under general anesthesia.      Mr. Contreras was seen by Dr. Villa on 6/24/20 in consultation today at the [facility] for trapezius biopsy for workup of a myopathy.  Mr. Contreras was evaluated by neurology and they referred Mr. Contreras to Dr. Villa for the above procedure.  After Dr. Villa counseled Mr. Contreras on the above procedure, he opted to proceed.     Mr. Contreras has a history of sarcoidosis proven by [facility] seven years ago at [facility].  Mr. Contreras has chronic dyspnea on exertion and has undergone an extensive work-up with pulmonary, pulmonary hypertension clinic, electrophysiology , neurology and hematology.   When seen by neurology, the above procedure was recommended to determine if there is a neuromuscular causes of weakness/respiratory failure. Per Dr. Omer note on 5/19/20, \"It is possible that Mr. Contreras has a myopathy restricted to the truncal muscles and diaphragm. This may be due to sarcoidosis but this should be proven before taking action\". The above procedure has been recommended for diagnostic purposes.     Additional notable past medical history: hypertension, hyperlipidemia, obesity, prostate cancer s/p prostatectomy , BEULAH, asthma and MGUS.       History is obtained from the patient and electronic health record.     Past Medical History  Past Medical History:   Diagnosis Date     Asthma      Claustrophobia      CPAP (continuous positive airway pressure) dependence      Dyslipidemia      Belkofski (hard of hearing)      Hypercholesteremia      Hypertension  "     Iron deficiency      Mediastinal adenopathy      Obesity      BEULAH (obstructive sleep apnea)      Prostate cancer (H)      Pulmonary hypertension (H)      Sarcoidosis        Past Surgical History  Past Surgical History:   Procedure Laterality Date     APPENDECTOMY       C TOTAL HIP ARTHROPLASTY Bilateral      C TOTAL KNEE ARTHROPLASTY Bilateral      CV RIGHT HEART CATH N/A 12/11/2019    Procedure: CV RIGHT HEART CATH;  Surgeon: Torrey Hirsch MD;  Location: [facility]     DAVINCI PROSTATECTOMY, LYMPHADENECTOMY N/A 5/23/2019    Procedure: ROBOTIC ASSISTED LAPAROSCOPIC RADICAL PROSTATECTOMY WITH BILATERAL PELVIC LYMPH NODE DISSECTION;  Surgeon: Matteo Coughlin MD;  Location: [facility] OR     ENDOBRONCHIAL ULTRASOUND FLEXIBLE N/A 3/29/2019    Procedure: Flexible Bronchoscopy, Endobronchial Ultrasound;  Surgeon: Curtis Leger MD;  Location: [facility] OR     VASECTOMY         Hx of Blood transfusions/reactions: denies     Hx of abnormal bleeding or anti-platelet use: ASA    Steroid use in the last year: yes - steroid burst earlier this year.     Personal or FH with difficulty with Anesthesia:  H/O difficulty intubation.  Used Glidesope with 5/2019 without difficulty.        Prior to Admission Medications  Current Outpatient Medications   Medication Sig Dispense Refill     albuterol (PROAIR HFA/PROVENTIL HFA/VENTOLIN HFA) 108 (90 Base) MCG/ACT inhaler every 4 hours as needed   4     atorvastatin (LIPITOR) 40 MG tablet TAKE 1 TABLET(40 MG) BY MOUTH EVERY MORNING (Patient taking differently: Take 40 mg by mouth every morning ) 90 tablet 2     cetirizine (ZYRTEC) 10 MG tablet Take 10 mg by mouth every morning  90 tablet 3     fluticasone (FLONASE) 50 MCG/ACT nasal spray Spray 1-2 sprays into both nostrils daily (Patient taking differently: Spray 1-2 sprays into both nostrils as needed ) 16 g 0     fluticasone-salmeterol (ADVAIR) 500-50 MCG/DOSE inhaler Inhale 1 puff into the lungs 2 times daily 1  Inhaler 12     hydrochlorothiazide (HYDRODIURIL) 12.5 MG tablet TAKE 1 TABLET(12.5 MG) BY MOUTH EVERY MORNING (Patient taking differently: Take 12.5 mg by mouth every morning ) 90 tablet 2     multivitamin (ONE-DAILY) tablet Take 1 tablet by mouth every morning  90 tablet 3     aspirin (ASA) 81 MG tablet Take 81 mg by mouth every morning  90 tablet 3     montelukast (SINGULAIR) 10 MG tablet Take 1 tablet (10 mg) by mouth At Bedtime 30 tablet 3       Allergies  Allergies   Allergen Reactions     Cat Hair Extract Itching     itchy tearful eyes     Adhesive Tape Rash     Iodine Rash       Social History  Social History     Socioeconomic History     Marital status:      Spouse name: Not on file     Number of children: Not on file     Years of education: Not on file     Highest education level: Not on file   Occupational History     Not on file   Social Needs     Financial resource strain: Not on file     Food insecurity     Worry: Not on file     Inability: Not on file     Transportation needs     Medical: Not on file     Non-medical: Not on file   Tobacco Use     Smoking status: Never Smoker     Smokeless tobacco: Never Used   Substance and Sexual Activity     Alcohol use: Yes     Comment: twice a week     Drug use: No     Sexual activity: Yes     Partners: Female   Lifestyle     Physical activity     Days per week: Not on file     Minutes per session: Not on file     Stress: Not on file   Relationships     Social connections     Talks on phone: Not on file     Gets together: Not on file     Attends Zoroastrianism service: Not on file     Active member of club or organization: Not on file     Attends meetings of clubs or organizations: Not on file     Relationship status: Not on file     Intimate partner violence     Fear of current or ex partner: Not on file     Emotionally abused: Not on file     Physically abused: Not on file     Forced sexual activity: Not on file   Other Topics Concern     Parent/sibling w/  "CABG, MI or angioplasty before 65F 55M? Not Asked   Social History Narrative     Not on file       Family History  Family History   Problem Relation Age of Onset     Alzheimer Disease Mother      Heart Disease Father      Glaucoma No family hx of      Macular Degeneration No family hx of      Diabetes No family hx of      Thyroid Disease No family hx of      ROS/MED HX    ENT/Pulmonary:     (+)sleep apnea, mild COPD (Uses maintenance inhaler as prescribed.  Cough, phlegm and KIRBY is at baseline. ), uses CPAP , . Other pulmonary disease Pulmonary sarcoidosis diagnosed about 10 years ago. .    Neurologic:  - neg neurologic ROS     Cardiovascular:     (+) Dyslipidemia, hypertension----. Taking blood thinners : Instructions Given to patient: Stopped ASA 81 mg 6/24/20. . . :. . pulmonary hypertension, Previous cardiac testing date:results:Stress Testdate: results:ECG reviewed date: results: date: results:          METS/Exercise Tolerance: Comment: Enjoys walking 20 minutes daily.  Uses a cane when walking a long distance.     Hematologic:  - neg hematologic  ROS       Musculoskeletal: Comment: Bilateral hip and knee replacements.  (+) arthritis,  -       GI/Hepatic:     (+) appendicitis (s/p appendectomy),       Renal/Genitourinary:  - ROS Renal section negative       Endo:     (+) Obesity, .      Psychiatric:  - neg psychiatric ROS       Infectious Disease:  - neg infectious disease ROS       Malignancy:   (+) Malignancy History of Prostate  Prostate CA Remission status post Surgery,         Other:    (+) No chance of pregnancy C-spine cleared: Yes, no H/O Chronic Pain,no other significant disability          Temp: 98.1  F (36.7  C) Temp src: Oral BP: (!) 129/90 Pulse: 86   Resp: 16 SpO2: 94 %         306 lbs 0 oz  6' 0\"[PT REPORT[   Body mass index is 41.5 kg/m .       Physical Exam  Constitutional: Awake, alert, cooperative, no apparent distress, and appears stated age.  Eyes: Pupils equal, round and reactive to light, " extra ocular muscles intact, sclera clear, conjunctiva normal.  HENT: Normocephalic, oral pharynx with moist mucus membranes,upper and lower dentures.Thick neck with beard.  No goiter appreciated.   Respiratory: Clear to auscultation bilaterally, no crackles or wheezing.  Cardiovascular: Irregular rate and rhythm, normal S1 and S2, and no murmur noted.  Carotids +2, no bruits. No edema. Palpable pulses to radial  DP and PT arteries.   GI: Normal bowel sounds, soft and non-tender. Unable to adequately assess for hepatosplenomegaly given obese abdomen. No superficial masses noted.   Lymph/Hematologic: No cervical lymphadenopathy and no supraclavicular lymphadenopathy.  Genitourinary:  deferred  Skin: Warm and dry.    Musculoskeletal: Full ROM of neck.  Gross motor strength is normal.    Neurologic: Awake, alert, oriented to name, place and time.Gait is normal.   Neuropsychiatric: Calm, cooperative. Normal affect.     Labs: (personally reviewed)  Lab Results   Component Value Date    WBC 7.6 05/27/2020     Lab Results   Component Value Date    RBC 5.54 05/27/2020     Lab Results   Component Value Date    HGB 16.1 05/27/2020     Lab Results   Component Value Date    HCT 49.7 05/27/2020     Lab Results   Component Value Date    MCV 90 05/27/2020     Lab Results   Component Value Date    MCH 29.1 05/27/2020     Lab Results   Component Value Date    MCHC 32.4 05/27/2020     Lab Results   Component Value Date    RDW 13.9 05/27/2020     Lab Results   Component Value Date     05/27/2020       Last Comprehensive Metabolic Panel:  Sodium   Date Value Ref Range Status   05/27/2020 139 133 - 144 mmol/L Final     Potassium   Date Value Ref Range Status   05/27/2020 3.8 3.4 - 5.3 mmol/L Final     Chloride   Date Value Ref Range Status   05/27/2020 105 94 - 109 mmol/L Final     Carbon Dioxide   Date Value Ref Range Status   05/27/2020 24 20 - 32 mmol/L Final     Anion Gap   Date Value Ref Range Status   05/27/2020 10 3 - 14  mmol/L Final     Glucose   Date Value Ref Range Status   05/27/2020 100 (H) 70 - 99 mg/dL Final     Comment:     Non Fasting     Urea Nitrogen   Date Value Ref Range Status   05/27/2020 13 7 - 30 mg/dL Final     Creatinine   Date Value Ref Range Status   05/27/2020 0.85 0.66 - 1.25 mg/dL Final     GFR Estimate   Date Value Ref Range Status   05/27/2020 >90 >60 mL/min/[1.73_m2] Final     Comment:     Non  GFR Calc  Starting 12/18/2018, serum creatinine based estimated GFR (eGFR) will be   calculated using the Chronic Kidney Disease Epidemiology Collaboration   (CKD-EPI) equation.       Calcium   Date Value Ref Range Status   05/27/2020 8.6 8.5 - 10.1 mg/dL Final         PROCEDURES    EKG: Personally reviewed but formal cardiology read pending: SR with occasional PVCs 88 bpm, RBBB and left anterior fascicular block    Testing/Procedures:  PULMONARY FUNCTION TESTS:   PFT Latest Ref Rng & Units 2/5/2020  FVC L 2.19  FEV1 L 1.69  FVC% % 46  FEV1% % 46    ECHOCARDIOGRAM: 7/31/19  Interpretation Summary  Global and regional left ventricular function is normal with an EF of 55-60%.  Right ventricle not well visualized, but TAPSE and tissue doppler assessment  is consistent with normal global function.  No hemodynamically significant valve abnormalities.  No pericardial effusion is present.        No prior study for comparison.     Right heart cath 12/11/19  Conclusion        Normal pulmonary artery presure at rest    Normal biventricualr filling pressure    Normal cardiac output    Mildly reduced biventricular function in the setting of sarcoidosis    No MRI evidence of cardiac sarcoidosis.    Plan to do Zio monitor to assess PVC's burden anf repeat cardiac MRI in a year        Cardiac MRI 8/29/19  1. The LV is normal in cavity size and wall thickness. The global systolic function is normal. The LVEF is  50%. There is systolic flattening of the interventricular septum and global dyssynchrony due to  "PVCs.     2. The RV is moderately enlarged based on the visual examination. The global systolic function is mildly  reduced. The RVEF is 43%.      3. The left atrium is mildly enlarged and the right atrium is severely enlarged.     4. There is at least mild mitral regurgitation and trace aortic regurgitation.     5. Late gadolinium enhancement imaging shows no MI, fibrosis or infiltrative disease.      6. Regadenoson stress perfusion imaging shows no ischemia.     7. There is no pericardial effusion or thickening.     8. There is no intracardiac thrombus.     CONCLUSIONS: Non-ischemic cardiomyopathy, likely due to pulmonary hypertension and dyssynchrony from PVCs.   There is mildly reduced biventricular function.  There is no evidence of myocardial perfusion defects or  myocardial fibrosis.      PET Myocardial Perfusion 2/19/20  Impression:  1. No FDG active cardiac sarcoid.  2. Decreased perfusion in the inferoseptal wall, findings may be  related to sequela of previous cardiac sarcoid with microvascular  injury.  3. Enlarged left ventricle with borderline low ejection fraction.  4. Decreased myocardial blood flow in the RCA distribution.  5. Findings of a dilated cardiomyopathy a concern for possible  ischemia/myocardial injury of the septum, consider CTA or coronary  angiography for further evaluation of this region.  6. Chest and upper abdominal hypermetabolic lymphadenopathy which is  indicative of active systemic sarcoid.    Sleep studies  Initial PSG results (1/6/20)  \" Obstructive sleep apnea was demonstrated (AHI 5; REM AHI 25), with respiratory events essentially isolated to REM sleep.  The study may be limited due to the absence of supine sleep.    \" Oxyhemoglobin desaturations were prominent during REM, and the patient did not always recover baseline saturations before onset of the next airway obstruction.  Nocturnal hypoxemia was present; there were 23 minutes with SpO2 less than or equal to 88%.  " "  \" Hypoventilation was not observed.    \" An irregularly irregular heart rhythm was observed intermittently throughout the recording.     Repeat polysomnography(1/12/20)  \" CPAP at 8 cmH2O was effective at resolving obstructive sleep apnea and improving oxyhemoglobin saturations to the normal range. REM supine sleep was not captured.   Hypoxemia resolved at a pressure of 8 cmH2O.  Supplemental oxygen was not indicated during this study.  \" An irregular heart rhythm was observed intermittently throughout the study.          ZioPatch 1/17/20:  Multiple runs of NSVT.  No symptoms reported.       ASSESSMENT and PLAN  Derek Contreras is a 64 year old male scheduled to undergo  left trapezius muscle biopsy on 7/1/20 with Dr. Villa at [facility] under general anesthesia.      He has the following specific operative considerations:   - VTE risk:  1.8%  - Risk of PONV score = 1.  If > 2, anti-emetic intervention recommended.      #  Cardiology   - Electrophysiology evaluation on 3/4/20 for NSVT noted on ZIO patch. No pre-syncope or sycope with longest run 19 beats. No evidence for active cardiac sarcoid on evaluation.  PVC burden low on cardiac monitor thus not suggestive of an etiology for cardiomyopathy.  Please see Dr. Figueroa's note for further information.   - Mild right ventricular dilatation and dysfunction of unclear etiology.  Per Dr. Hirsch's note 12/11/19,\"he does not have evidence of cardiac sarcoidosis.  It is possible that his RV dilatation and dysfunction is secondary to his hypoxia and restrictive lung disease.  We have recently published showing that RV function is disproportionately low in patients with chronic lung disease.  Furthermore, there is data suggesting that patients with COPD have RV dysfunction even in the absence of pulmonary hypertension.  I highly suspect that this is the case with him\".  - METS:  >4.Normal cardiac MRI.  LVEF 55-60% on echo 7/2019. Denies chest " pain, palpitations, syncope, orthopnea or PND.  RCRI : No serious cardiac risks.RCRI : 0.4 % risk of major adverse cardiac event.          # Pulmonary  - Sarcoidosis, followed by pulmonary.    -           Chronic restrictive respiratory dysfunction without sufficient abnormalities on chest CT to explain it. Referred to neurology for possible neuromuscular causes of weakness/respiratory failure. Above procedure planned for diagnostic purposes.    - Asthma, allergy induced: reports cough, phlegm and KIRBY to be at baseline. LS clear on exam.  SpO2 94% on room air. Take inhalers as prescribed DOS.   - BEULAH, uses CPAP as prescribed.  Instructed to bring with DOS.       -  no smoking hx    #  Endocrine   -  Obesity, BMI >40.0     #  Musculoskeletal   - bilateral knee and hip replacement.  Recommend careful positioning            - Anesthesia considerations:  Refer to PAC assessment in anesthesia records  - COVID testing per surgeon     Arrival time, NPO, shower and medication instructions provided by nursing staff today.  Preparing For Your Surgery handout given.  Patient was discussed with Dr Martinez.      DENY Doty CNP    Attending Anesthesiologist Anesthesia Assessment:  I have examined the patient and reviewed the medical record.  I have discussed the patient with the SUSANNE and concur with her assessment  Patient is seen for evaluation prior to planned trapezius muscle biopsy under general anesthesia for confirmation of myopathy secondary to sarcoidosis.  The patient has a complicated medical history.  Patient has known biopsy-proven sarcoidosis.  Patient has known restrictive pulmonary disease and chronic cough.  He is followed closely by pulmonology.  He is also noted to have an elevated right diaphragm of unclear etiology.  He is at baseline respiratory status on Advair, Brio Ellipta, and Singulair inhalers.  He is not on oxygen or oral steroids.  His saturation is between 96 and 100% on vital signs  documented at various visits.  The patient has had multiple cardiac evaluations of his shortness of breath.  He is known to have mild dilated cardiomyopathy with an ejection fraction of 55-60% on echocardiogram July 2019.  A follow-up cardiac MRI demonstrated ejection fraction of 50% with flattened septum and mild dilation of right and left ventricles.  The possibility of this being caused by pulmonary hypertension and/or increased PVC load was raised.  A right heart catheterization was performed August 2019 and demonstrated normal pulmonary artery pressures.  AZO patch study was done demonstrating minimal SVT and infrequent PVCs.  A PET scan of the heart was read as possibly consistent with cardiac ischemia of the septum though in the narrative section of the medical record the cardiologist note that this may have been an over read since there is a normal MRI of the heart.    Patient was seen by neurology as part of his work-up for shortness of breath.  They found EMG evidence of atrophy in the thoracic paraspinal muscles and the trapezius.  They wish biopsy of trapezius muscle before starting immunosuppression for sarcoid induced myopathy.  Of note, the patient also has a history of difficult intubation.  He has to anesthesia records in our system documenting uneventful awake fiberoptic intubations followed by video laryngoscope evaluation demonstrating cords were visible.    Given the patient's complex cardiac history we have reached out to his cardiologist to see if any further testing is necessary prior to surgery under general anesthesia.  Please note the history of difficult intubation in the past.  Final plan per attending anesthesiologist the day of surgery.      Reviewed and Signed by PAC Anesthesiologist  Anesthesiologist: Jose R Martinez MD  Date: 6/29/2020    Preoperative Assessment Center  [facility]  Clinic and Surgery Center  Phone: [phone]  Fax: [phone]

## 2020-06-30 ENCOUNTER — TELEPHONE (OUTPATIENT)
Dept: OTOLARYNGOLOGY | Facility: CLINIC | Age: 65
End: 2020-06-30

## 2020-06-30 ENCOUNTER — TELEPHONE (OUTPATIENT)
Dept: PULMONOLOGY | Facility: CLINIC | Age: 65
End: 2020-06-30

## 2020-06-30 ENCOUNTER — PREP FOR PROCEDURE (OUTPATIENT)
Dept: OTOLARYNGOLOGY | Facility: CLINIC | Age: 65
End: 2020-06-30

## 2020-06-30 DIAGNOSIS — J45.909 MODERATE ASTHMA WITHOUT COMPLICATION: ICD-10-CM

## 2020-06-30 DIAGNOSIS — G72.9 MYOPATHY: Primary | ICD-10-CM

## 2020-06-30 DIAGNOSIS — D86.9 SARCOIDOSIS: ICD-10-CM

## 2020-06-30 LAB
INTERPRETATION ECG - MUSE: NORMAL
SARS-COV-2 RNA SPEC QL NAA+PROBE: NOT DETECTED
SPECIMEN SOURCE: NORMAL

## 2020-06-30 RX ORDER — MONTELUKAST SODIUM 10 MG/1
10 TABLET ORAL AT BEDTIME
Qty: 90 TABLET | Refills: 3 | Status: SHIPPED | OUTPATIENT
Start: 2020-06-30 | End: 2021-07-26

## 2020-06-30 NOTE — TELEPHONE ENCOUNTER
Patient called in regarding his scheduled surgery with Dr. Villa. Patient reports that he has a cough and would like to cancel surgery for tomorrow, 7/1 with Dr. Villa.     Explained to patient after anesthesia review they would either like him to undergo general anesthesia at our hospital, [place] instead of the [facility]. Otherwise, Dr. Villa could do local + MAC (Monitored Anesthesia) at the [facility]. Patient would like to hold off on scheduling at this time. Has direct number to reach out when he is feeling ready to schedule.   He would like MAC+Local at [facility].     Patient was notified of H&P requirements and covid testing. He is okay and will reach out in the near future.

## 2020-06-30 NOTE — PROGRESS NOTES
Case request entered for reschedule and to change to MAC instead of general.     Rubia Merrill, RN, BSN

## 2020-07-07 ENCOUNTER — HOSPITAL ENCOUNTER (OUTPATIENT)
Facility: CLINIC | Age: 65
End: 2020-07-07
Attending: OTOLARYNGOLOGY | Admitting: OTOLARYNGOLOGY
Payer: MEDICARE

## 2020-07-07 DIAGNOSIS — Z11.59 ENCOUNTER FOR SCREENING FOR OTHER VIRAL DISEASES: Primary | ICD-10-CM

## 2020-07-13 DIAGNOSIS — Z11.59 ENCOUNTER FOR SCREENING FOR OTHER VIRAL DISEASES: ICD-10-CM

## 2020-07-13 LAB
SARS-COV-2 RNA SPEC QL NAA+PROBE: NOT DETECTED
SPECIMEN SOURCE: NORMAL

## 2020-07-13 PROCEDURE — U0003 INFECTIOUS AGENT DETECTION BY NUCLEIC ACID (DNA OR RNA); SEVERE ACUTE RESPIRATORY SYNDROME CORONAVIRUS 2 (SARS-COV-2) (CORONAVIRUS DISEASE [COVID-19]), AMPLIFIED PROBE TECHNIQUE, MAKING USE OF HIGH THROUGHPUT TECHNOLOGIES AS DESCRIBED BY CMS-2020-01-R: HCPCS | Performed by: OTOLARYNGOLOGY

## 2020-07-14 ENCOUNTER — ANESTHESIA EVENT (OUTPATIENT)
Dept: SURGERY | Facility: AMBULATORY SURGERY CENTER | Age: 65
End: 2020-07-14

## 2020-07-14 ASSESSMENT — COPD QUESTIONNAIRES: CAT_SEVERITY: MILD

## 2020-07-14 NOTE — ANESTHESIA PREPROCEDURE EVALUATION
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]   Procedure(s):  left trapezius muscle biopsy     LABS:  CBC:   Lab Results   Component Value Date    WBC 7.6 2020    WBC 8.3 2019    HGB 16.1 2020    HGB 17.0 2019    HCT 49.7 2020    HCT 53.3 (H) 2019     (L) 2020     2019     BMP:   Lab Results   Component Value Date     2020     2019    POTASSIUM 3.8 2020    POTASSIUM 3.8 2019    CHLORIDE 105 2020    CHLORIDE 105 2019    CO2 24 2020    CO2 24 2019    BUN 13 2020    BUN 13 2019    CR 0.85 2020    CR 0.83 2019     (H) 2020    GLC 87 2020     COAGS:   Lab Results   Component Value Date    INR 1.17 (H) 2019     POC:   Lab Results   Component Value Date     (H) 2019     OTHER:   Lab Results   Component Value Date    A1C 5.6 2020    ANTONIO 8.6 2020    PHOS 3.9 2019    MAG 2.5 (H) 2019    ALBUMIN 3.4 2020    PROTTOTAL 7.8 2020    ALT 29 2020    AST 25 2020    ALKPHOS 69 2020    BILITOTAL 0.6 2020    TSH 1.92 2019    CRP 3.4 2019        Preop Vitals    BP Readings from Last 3 Encounters:   20 (!) 129/90   20 (!) 149/93   20 (!) 148/92    Pulse Readings from Last 3 Encounters:   20 86   20 (!) 49   20 97      Resp Readings from Last 3 Encounters:   20 16   20 15   19 18    SpO2 Readings from Last 3 Encounters:   20 94%   20 100%   20 93%      Temp Readings from Last 1 Encounters:   20 36.7  C (98.1  F) (Oral)    Ht Readings from Last 1 Encounters:   20 1.829 m (6')      Wt Readings from Last  Encounters:   20 138.8 kg (306 lb)    Estimated  "body mass index is 41.5 kg/m  as calculated from the following:    Height as of 6/29/20: 1.829 m (6').    Weight as of 6/29/20: 138.8 kg (306 lb).     LDA:  Urethral Catheter Latex;Straight-tip 20 fr (Active)   Number of days: 418        Past Medical History:   Diagnosis Date     Asthma      Claustrophobia      CPAP (continuous positive airway pressure) dependence      Dyslipidemia      [place] (hard of hearing)      Hypercholesteremia      Hypertension      Iron deficiency      Mediastinal adenopathy      Obesity      BEULAH (obstructive sleep apnea)      Prostate cancer (H)      Pulmonary hypertension (H)      Sarcoidosis       Past Surgical History:   Procedure Laterality Date     APPENDECTOMY       C TOTAL HIP ARTHROPLASTY Bilateral      C TOTAL KNEE ARTHROPLASTY Bilateral      CV RIGHT HEART CATH N/A 12/11/2019    Procedure: CV RIGHT HEART CATH;  Surgeon: Torrey Hirsch MD;  Location:  HEART CARDIAC CATH LAB     DAVINCI PROSTATECTOMY, LYMPHADENECTOMY N/A 5/23/2019    Procedure: ROBOTIC ASSISTED LAPAROSCOPIC RADICAL PROSTATECTOMY WITH BILATERAL PELVIC LYMPH NODE DISSECTION;  Surgeon: Matteo Coughlin MD;  Location:  OR     ENDOBRONCHIAL ULTRASOUND FLEXIBLE N/A 3/29/2019    Procedure: Flexible Bronchoscopy, Endobronchial Ultrasound;  Surgeon: Curtis Leger MD;  Location: [facility] OR     VASECTOMY        Allergies   Allergen Reactions     Cat Hair Extract Itching     itchy tearful eyes     Adhesive Tape Rash     Iodine Rash        Anesthesia Evaluation     . Pt has had prior anesthetic. Type: General    History of anesthetic complications   - difficult intubation  H/O difficulty intubation.  Used Glidesope with 5/2019 without difficulty.      ROS/MED HX    ENT/Pulmonary: Comment: 2/5/2020 Pulmonology note: \"PFTs done today were reviewed by me with the patient.6-minute walk test on room air with decrease 6-minute walk distance and desaturation from 98% to 93%.  Below the lowest O2 sat at 92%.  FVC is " "2.19 L which is 46% of predicted, FEV1 1.69 L which is 46% of predicted the ratio is 77.  Total lung capacity is 4.72 (62%) and DLCO is 21.84 which is 76% of predicted.  My interpretation is that he has mild to moderate restriction with mildly decreased diffusion capacity.\"    (+)sleep apnea, mild COPD (Uses maintenance inhaler as prescribed.  Cough, phlegm and KIRBY is at baseline. No exacerbation for a long time and no hospitalizations for this. ), uses CPAP , . Other pulmonary disease Pulmonary sarcoidosis diagnosed about 10 years ago. .    Neurologic:  - neg neurologic ROS     Cardiovascular:     (+) Dyslipidemia, hypertension----. Taking blood thinners : Instructions Given to patient: Stopped ASA 81 mg 6/24/20. . . :. dysrhythmias (Holter 1/2020 with frequent NSVT, ventricular ectopy) . pulmonary hypertension, Previous cardiac testing date:results:Stress Testdate: results:ECG reviewed date: results: date: results:          METS/Exercise Tolerance: Comment: Enjoys walking 20 minutes daily.  Uses a cane when walking a long distance.  3 - Able to walk 1-2 blocks without stopping   Hematologic:  - neg hematologic  ROS       Musculoskeletal: Comment: Bilateral hip and knee replacements.  (+) arthritis,  -       GI/Hepatic:     (+) appendicitis (s/p appendectomy),      (-) GERD   Renal/Genitourinary:  - ROS Renal section negative       Endo:     (+) Obesity, .      Psychiatric:  - neg psychiatric ROS       Infectious Disease:  - neg infectious disease ROS       Malignancy:   (+) Malignancy History of Prostate  Prostate CA Remission status post Surgery,         Other:    (+) No chance of pregnancy C-spine cleared: Yes, no H/O Chronic Pain,no other significant disability                        PHYSICAL EXAM:   Mental Status/Neuro: A/A/O   Airway: Facies: Thick Neck  Mallampati: III  Mouth/Opening: Full  TM distance: > 6 cm  Neck ROM: Limited   Respiratory: Auscultation: CTAB     Resp. Rate: Normal     Resp. Effort: " Normal      CV: Rhythm: Irregular  Heart: Normal Sounds  Edema: None   Comments: Heart rhythm: overall regular, with occasional ectopic beats     Dental: Dentures  Dentures: Upper; Lower                Assessment:   ASA SCORE: 3    H&P: History and physical reviewed and following examination; no interval change.   Smoking Status:  Non-Smoker/Unknown   NPO Status: NPO Appropriate     Plan:   Anes. Type:  MAC   Pre-Medication: None   Induction:  N/a   Airway: Native Airway   Access/Monitoring: PIV   Maintenance: N/a     Postop Plan:   Postop Pain: None  Postop Sedation/Airway: Not planned  Disposition: Outpatient     PONV Management:   Adult Risk Factors:, Non-Smoker   Prevention: Ondansetron     CONSENT: Direct conversation   Plan and risks discussed with: Patient          Comments for Plan/Consent:  Discussed plan for MAC, including risk of aspiration pneumonia, backup plan of general anesthesia and risks of general anesthesia, including sore throat/hoarse voice, abrasions/damage to lips/tongue/teeth, nausea, rare complications (including medication reactions, cardiac, pulmonary).  Discussed history of difficult airway, although good glidescope view after intubation during last anesthetic.   Discussed possible increased risk of hypoxia, need for intubation. Plan for lighter sedation.                   PAC Discussion and Assessment    ASA Classification: 3  Case is suitable for: ASC  Anesthetic techniques and relevant risks discussed: GA  Invasive monitoring and risk discussed:   Types:   Possibility and Risk of blood transfusion discussed:   NPO instructions given:   Additional anesthetic preparation and risks discussed:   Needs early admission to pre-op area:   Other:     PAC Resident/NP Anesthesia Assessment:  Derek Contreras is a 64-year-old male scheduled for left trapezius muscle biopsy on 7/1/20 with Dr. Villa at [facility] under general anesthesia.      Mr. Contreras was seen by Dr. Villa on  "6/24/20 in consultation today at the [facility] for trapezius biopsy for workup of a myopathy.  Mr. Contreras was evaluated by neurology and they referred Mr. Contreras to Dr. Villa for the above procedure.  After Dr. Villa counseled Mr. Contreras on the above procedure, he opted to proceed.     Mr. Contreras has a history of sarcoidosis proven by [facility] seven years ago at [facility].  Mr. Contreras has chronic dyspnea on exertion and has undergone an extensive work-up with pulmonary, pulmonary hypertension clinic, electrophysiology , neurology and hematology.  The above procedure has been recommended by neurology for diagnostic purposes to determine treatment.  Per Dr. Omer note on 5/19/20, \"It is possible that Mr. Contreras has a myopathy restricted to the truncal muscles and diaphragm. This may be due to sarcoidosis but this should be proven before taking action\".     Additional notable past medical history: hypertension, hyperlipidemia, obesity, prostate cancer, sleep disordered breathing, COPD and MGUS.      PROCEDURES    Testing/Procedures:  PULMONARY FUNCTION TESTS:   PFT Latest Ref Rng & Units 2/5/2020  FVC L 2.19  FEV1 L 1.69  FVC% % 46  FEV1% % 46    ECHOCARDIOGRAM: 7/31/19  Interpretation Summary  Global and regional left ventricular function is normal with an EF of 55-60%.  Right ventricle not well visualized, but TAPSE and tissue doppler assessment  is consistent with normal global function.  No hemodynamically significant valve abnormalities.  No pericardial effusion is present.        No prior study for comparison.     Right heart cath 12/11/19  Conclusion        Normal pulmonary artery presure at rest    Normal biventricualr filling pressure    Normal cardiac output    Mildly reduced biventricular function in the setting of sarcoidosis    No MRI evidence of cardiac sarcoidosis.    Plan to do Zio monitor to assess PVC's burden anf repeat cardiac MRI in a year        Cardiac " "MRI 8/29/19  1. The LV is normal in cavity size and wall thickness. The global systolic function is normal. The LVEF is  50%. There is systolic flattening of the interventricular septum and global dyssynchrony due to PVCs.     2. The RV is moderately enlarged based on the visual examination. The global systolic function is mildly  reduced. The RVEF is 43%.      3. The left atrium is mildly enlarged and the right atrium is severely enlarged.     4. There is at least mild mitral regurgitation and trace aortic regurgitation.     5. Late gadolinium enhancement imaging shows no MI, fibrosis or infiltrative disease.      6. Regadenoson stress perfusion imaging shows no ischemia.     7. There is no pericardial effusion or thickening.     8. There is no intracardiac thrombus.     CONCLUSIONS: Non-ischemic cardiomyopathy, likely due to pulmonary hypertension and dyssynchrony from PVCs.   There is mildly reduced biventricular function.  There is no evidence of myocardial perfusion defects or  myocardial fibrosis.      PET Myocardial Perfusion 2/19/20  Impression:  1. No FDG active cardiac sarcoid.  2. Decreased perfusion in the inferoseptal wall, findings may be  related to sequela of previous cardiac sarcoid with microvascular  injury.  3. Enlarged left ventricle with borderline low ejection fraction.  4. Decreased myocardial blood flow in the RCA distribution.  5. Findings of a dilated cardiomyopathy a concern for possible  ischemia/myocardial injury of the septum, consider CTA or coronary  angiography for further evaluation of this region.  6. Chest and upper abdominal hypermetabolic lymphadenopathy which is  indicative of active systemic sarcoid.    Sleep studies  Initial PSG results (1/6/20)  \" Obstructive sleep apnea was demonstrated (AHI 5; REM AHI 25), with respiratory events essentially isolated to REM sleep.  The study may be limited due to the absence of supine sleep.    \" Oxyhemoglobin desaturations were prominent " "during REM, and the patient did not always recover baseline saturations before onset of the next airway obstruction.  Nocturnal hypoxemia was present; there were 23 minutes with SpO2 less than or equal to 88%.    \" Hypoventilation was not observed.    \" An irregularly irregular heart rhythm was observed intermittently throughout the recording.     Repeat polysomnography(1/12/20)  \" CPAP at 8 cmH2O was effective at resolving obstructive sleep apnea and improving oxyhemoglobin saturations to the normal range. REM supine sleep was not captured.   Hypoxemia resolved at a pressure of 8 cmH2O.  Supplemental oxygen was not indicated during this study.  \" An irregular heart rhythm was observed intermittently throughout the study.          ZioPatch 1/17/20:  Multiple runs of NSVT.  No symptoms reported.     He has the following specific operative considerations:   - VTE risk:  1.8%  - Risk of PONV score = 1.  If > 2, anti-emetic intervention recommended.      #  Cardiology   Cardiology  - Electrophysiology evaluation on 3/4/20 for NSVT noted on ZIO patch. No pre-syncope or sycope with longest run 19 beats. No evidence for active cardiac sarcoid on evaluation.  PVC burden low on cardiac monitor thus not suggestive of an etiology for cardiomyopathy.  Please see Dr. Figueroa's note for further information.   - Mild right ventricular dilatation and dysfunction of unclear etiology.  Per Dr. Hirsch's note 12/11/19,\"he does not have evidence of cardiac sarcoidosis.  It is possible that his RV dilatation and dysfunction is secondary to his hypoxia and restrictive lung disease.  We have recently published showing that RV function is disproportionately low in patients with chronic lung disease.  Furthermore, there is data suggesting that patients with COPD have RV dysfunction even in the absence of pulmonary hypertension.  I highly suspect that this is the case with him\".  - METS:  >4.Normal cardiac MRI.  LVEF 55-60% on echo 7/2019.. " Denies chest pain, palpitations, syncope, orthopnea, or PND.  RCRI : Congestive Heart Failure (pulmonary edema, PND, s3 donte, CXR with pulmonary congestion, basilar rales).  0.9 % risk of major adverse cardiac event.            #  Pulmonary   - no smoking hx  -   # Pulmonary  - Sarcoidosis, followed by pulmonary.    -           Chronic restrictive respiratory dysfunction without sufficient abnormalities on chest CT to explain it. Referred to neurology for possible neuromuscular causes of weakness/respiratory failure. Above procedure planned for diagnostic purposes.    - Asthma, allergy induced: reports cough, phlegm and KIRBY to be at baseline. LS clear on exam.  SpO2 94% on room air. Take inhalers as prescribed DOS.   - BEULAH, uses CPAP as prescribed.  Instructed to bring with DOS.     #  Endocrine   -  Obesity, BMI >40.0     #  Musculoskeletal   - bilateral knee and hip replacement.  Recommend careful positioning            - Anesthesia considerations:  Refer to PAC assessment in anesthesia records  - COVID testing per surgeon     Arrival time, NPO, shower and medication instructions provided by nursing staff today.  Preparing For Your Surgery handout given.  Patient was discussed with Dr Martinez.        Reviewed and Signed by PAC Mid-Level Provider/Resident  Mid-Level Provider/Resident: Renuka BARCLAY CNP  Date: 6/29/20  Time: 15:14    Attending Anesthesiologist Anesthesia Assessment:  I have examined the patient and reviewed the medical record.  I have discussed the patient with the SUSANNE and concur with her assessment  Patient is seen for evaluation prior to planned trapezius muscle biopsy under general anesthesia for confirmation of myopathy secondary to sarcoidosis.  The patient has a complicated medical history.  Patient has known biopsy-proven sarcoidosis.  Patient has known restrictive pulmonary disease and chronic cough.  He is followed closely by pulmonology.  He is also noted to have an elevated right  diaphragm of unclear etiology.  He is at baseline respiratory status on Advair, Brio Ellipta, and Singulair inhalers.  He is not on oxygen or oral steroids.  His saturation is between 96 and 100% on vital signs documented at various visits.  The patient has had multiple cardiac evaluations of his shortness of breath.  He is known to have mild dilated cardiomyopathy with an ejection fraction of 55-60% on echocardiogram July 2019.  A follow-up cardiac MRI demonstrated ejection fraction of 50% with flattened septum and mild dilation of right and left ventricles.  The possibility of this being caused by pulmonary hypertension and/or increased PVC load was raised.  A right heart catheterization was performed August 2019 and demonstrated normal pulmonary artery pressures.  AZO patch study was done demonstrating minimal SVT and infrequent PVCs.  A PET scan of the heart was read as possibly consistent with cardiac ischemia of the septum though in the narrative section of the medical record the cardiologist note that this may have been an over read since there is a normal MRI of the heart.    Patient was seen by neurology as part of his work-up for shortness of breath.  They found EMG evidence of atrophy in the thoracic paraspinal muscles and the trapezius.  They wish biopsy of trapezius muscle before starting immunosuppression for sarcoid induced myopathy.  Of note, the patient also has a history of difficult intubation.  He has to anesthesia records in our system documenting uneventful awake fiberoptic intubations followed by video laryngoscope evaluation demonstrating cords were visible.    Given the patient's complex cardiac history we have reached out to his cardiologist to see if any further testing is necessary prior to surgery under general anesthesia.  Please note the history of difficult intubation in the past.  Final plan per attending anesthesiologist the day of surgery.      Reviewed and Signed by PAC  Anesthesiologist  Anesthesiologist: Jose R Martinez MD  Date: 6/29/2020  Time:   Pass/Fail:   Disposition:     PAC Pharmacist Assessment:        Pharmacist:   Date:   Time:    Gisele Estevez MD

## 2020-07-15 ENCOUNTER — ANESTHESIA (OUTPATIENT)
Dept: SURGERY | Facility: AMBULATORY SURGERY CENTER | Age: 65
End: 2020-07-15

## 2020-07-15 ENCOUNTER — HOSPITAL ENCOUNTER (OUTPATIENT)
Facility: AMBULATORY SURGERY CENTER | Age: 65
End: 2020-07-15
Attending: OTOLARYNGOLOGY
Payer: MEDICARE

## 2020-07-15 VITALS
HEIGHT: 72 IN | SYSTOLIC BLOOD PRESSURE: 139 MMHG | RESPIRATION RATE: 18 BRPM | BODY MASS INDEX: 41.45 KG/M2 | DIASTOLIC BLOOD PRESSURE: 85 MMHG | TEMPERATURE: 98 F | WEIGHT: 306 LBS | OXYGEN SATURATION: 96 % | HEART RATE: 92 BPM

## 2020-07-15 DIAGNOSIS — G72.9 MYOPATHY: ICD-10-CM

## 2020-07-15 DIAGNOSIS — J96.90 RESPIRATORY FAILURE, UNSPECIFIED CHRONICITY, UNSPECIFIED WHETHER WITH HYPOXIA OR HYPERCAPNIA (H): ICD-10-CM

## 2020-07-15 DIAGNOSIS — R94.131 ABNORMAL SPONTANEOUS ACTIVITY ON EMG: ICD-10-CM

## 2020-07-15 PROCEDURE — 88305 TISSUE EXAM BY PATHOLOGIST: CPT | Performed by: PSYCHIATRY & NEUROLOGY

## 2020-07-15 PROCEDURE — 88314 HISTOCHEMICAL STAINS ADD-ON: CPT | Performed by: PSYCHIATRY & NEUROLOGY

## 2020-07-15 PROCEDURE — 84999 UNLISTED CHEMISTRY PROCEDURE: CPT | Performed by: PSYCHIATRY & NEUROLOGY

## 2020-07-15 RX ORDER — LIDOCAINE HYDROCHLORIDE 20 MG/ML
INJECTION, SOLUTION INFILTRATION; PERINEURAL PRN
Status: DISCONTINUED | OUTPATIENT
Start: 2020-07-15 | End: 2020-07-15

## 2020-07-15 RX ORDER — ACETAMINOPHEN 325 MG/1
650 TABLET ORAL
Status: DISCONTINUED | OUTPATIENT
Start: 2020-07-15 | End: 2020-07-16 | Stop reason: HOSPADM

## 2020-07-15 RX ORDER — CEFAZOLIN SODIUM IN 0.9 % NACL 3 G/100 ML
3 INTRAVENOUS SOLUTION, PIGGYBACK (ML) INTRAVENOUS
Status: COMPLETED | OUTPATIENT
Start: 2020-07-15 | End: 2020-07-15

## 2020-07-15 RX ORDER — LIDOCAINE 40 MG/G
CREAM TOPICAL
Status: DISCONTINUED | OUTPATIENT
Start: 2020-07-15 | End: 2020-07-15 | Stop reason: HOSPADM

## 2020-07-15 RX ORDER — KETAMINE HYDROCHLORIDE 10 MG/ML
INJECTION, SOLUTION INTRAMUSCULAR; INTRAVENOUS PRN
Status: DISCONTINUED | OUTPATIENT
Start: 2020-07-15 | End: 2020-07-15

## 2020-07-15 RX ORDER — OXYCODONE HYDROCHLORIDE 5 MG/1
5 TABLET ORAL
Status: DISCONTINUED | OUTPATIENT
Start: 2020-07-15 | End: 2020-07-16 | Stop reason: HOSPADM

## 2020-07-15 RX ORDER — ONDANSETRON 2 MG/ML
INJECTION INTRAMUSCULAR; INTRAVENOUS PRN
Status: DISCONTINUED | OUTPATIENT
Start: 2020-07-15 | End: 2020-07-15

## 2020-07-15 RX ORDER — PROPOFOL 10 MG/ML
INJECTION, EMULSION INTRAVENOUS CONTINUOUS PRN
Status: DISCONTINUED | OUTPATIENT
Start: 2020-07-15 | End: 2020-07-15

## 2020-07-15 RX ORDER — SODIUM CHLORIDE, SODIUM LACTATE, POTASSIUM CHLORIDE, CALCIUM CHLORIDE 600; 310; 30; 20 MG/100ML; MG/100ML; MG/100ML; MG/100ML
INJECTION, SOLUTION INTRAVENOUS CONTINUOUS
Status: DISCONTINUED | OUTPATIENT
Start: 2020-07-15 | End: 2020-07-15 | Stop reason: HOSPADM

## 2020-07-15 RX ORDER — CEFAZOLIN SODIUM 1 G/50ML
1 SOLUTION INTRAVENOUS SEE ADMIN INSTRUCTIONS
Status: DISCONTINUED | OUTPATIENT
Start: 2020-07-15 | End: 2020-07-15 | Stop reason: HOSPADM

## 2020-07-15 RX ORDER — OXYCODONE HYDROCHLORIDE 5 MG/1
5-10 TABLET ORAL EVERY 4 HOURS PRN
Qty: 15 TABLET | Refills: 0 | Status: SHIPPED | OUTPATIENT
Start: 2020-07-15 | End: 2020-07-24

## 2020-07-15 RX ORDER — DEXAMETHASONE SODIUM PHOSPHATE 10 MG/ML
10 INJECTION, SOLUTION INTRAMUSCULAR; INTRAVENOUS ONCE
Status: DISCONTINUED | OUTPATIENT
Start: 2020-07-15 | End: 2020-07-15 | Stop reason: HOSPADM

## 2020-07-15 RX ORDER — LIDOCAINE HYDROCHLORIDE AND EPINEPHRINE 10; 10 MG/ML; UG/ML
INJECTION, SOLUTION INFILTRATION; PERINEURAL PRN
Status: DISCONTINUED | OUTPATIENT
Start: 2020-07-15 | End: 2020-07-15 | Stop reason: HOSPADM

## 2020-07-15 RX ORDER — GLYCOPYRROLATE 0.2 MG/ML
INJECTION, SOLUTION INTRAMUSCULAR; INTRAVENOUS PRN
Status: DISCONTINUED | OUTPATIENT
Start: 2020-07-15 | End: 2020-07-15

## 2020-07-15 RX ADMIN — GLYCOPYRROLATE 0.2 MG: 0.2 INJECTION, SOLUTION INTRAMUSCULAR; INTRAVENOUS at 08:16

## 2020-07-15 RX ADMIN — Medication 100 MCG: at 08:49

## 2020-07-15 RX ADMIN — KETAMINE HYDROCHLORIDE 10 MG: 10 INJECTION, SOLUTION INTRAMUSCULAR; INTRAVENOUS at 08:28

## 2020-07-15 RX ADMIN — ONDANSETRON 4 MG: 2 INJECTION INTRAMUSCULAR; INTRAVENOUS at 08:16

## 2020-07-15 RX ADMIN — PROPOFOL 100 MCG/KG/MIN: 10 INJECTION, EMULSION INTRAVENOUS at 08:18

## 2020-07-15 RX ADMIN — KETAMINE HYDROCHLORIDE 10 MG: 10 INJECTION, SOLUTION INTRAMUSCULAR; INTRAVENOUS at 08:38

## 2020-07-15 RX ADMIN — Medication 3 G: at 08:20

## 2020-07-15 RX ADMIN — SODIUM CHLORIDE, SODIUM LACTATE, POTASSIUM CHLORIDE, CALCIUM CHLORIDE: 600; 310; 30; 20 INJECTION, SOLUTION INTRAVENOUS at 08:07

## 2020-07-15 RX ADMIN — LIDOCAINE HYDROCHLORIDE 60 MG: 20 INJECTION, SOLUTION INFILTRATION; PERINEURAL at 08:18

## 2020-07-15 ASSESSMENT — ENCOUNTER SYMPTOMS: DYSRHYTHMIAS: 1

## 2020-07-15 ASSESSMENT — MIFFLIN-ST. JEOR: SCORE: 2211.13

## 2020-07-15 ASSESSMENT — COPD QUESTIONNAIRES: COPD: 1

## 2020-07-15 NOTE — DISCHARGE INSTRUCTIONS
[facility]  Home Care Following Anesthesia  For 24 hours after surgery:  1. Get plenty of rest.  A responsible adult must stay with you for at least 24 hours after you leave the surgery center.  2. Do not drive or use heavy equipment.  If you have weakness or tingling, don't drive or use heavy equipment until this feeling goes away.   3. Do not drink alcohol.   4. Avoid strenuous or risky activities.  Ask for help when climbing stairs.  5. You may feel lightheaded.  IF so, sit for a few minutes before standing.  Have someone help you get up.   6. If you have nausea (feel sick to your stomach): Drink only clear liquids such as apple juice, ginger ale, broth or 7-Up.  Rest may also help.  Be sure to drink enough fluids.  Move to a regular diet as you feel able.   7. You may have a slight fever.  Call the doctor if your fever is over 100 F (37.7 C) (taken under the tongue) or lasts longer than 24 hours.  8. You may have a dry mouth, a sore throat, muscle aches or trouble sleeping. These should go away after 24 hours.  9. Do not make important or legal decisions.               Tips for taking pain medications  To get the best pain relief possible, remember these points:    Take pain medications as directed, before pain becomes severe.    Pain medication can upset your stomach: taking it with food may help.    Constipation is a common side effect of pain medication. Drink plenty of  fluids.    Eat foods high in fiber. Take a stool softener if recommended by your doctor or pharmacist.    Do not drink alcohol, drive or operate machinery while taking pain medications.    Ask about other ways to control pain, such as with heat, ice or relaxation.    Tylenol/Acetaminophen Consumption  To help encourage the safe use of acetaminophen, the makers of TYLENOL  have lowered the maximum daily dose for single-ingredient Extra Strength TYLENOL  (acetaminophen) products sold in the [place] from 8  pills per day (4,000 mg) to 6 pills per day (3,000 mg). The dosing interval has also changed from 2 pills every 4-6 hours to 2 pills every 6 hours.    If you feel your pain relief is insufficient, you may take Tylenol/Acetaminophen in addition to your narcotic pain medication.     Be careful not to exceed 3,000 mg of Tylenol/Acetaminophen in a 24 hour period from all sources.    If you are taking extra strength Tylenol/acetaminophen (500 mg), the maximum dose is 6 tablets in 24 hours.    If you are taking regular strength acetaminophen (325 mg), the maximum dose is 9 tablets in 24 hours.    Call a doctor for any of the followin. Signs of infection (fever, growing tenderness at the surgery site, a large amount of drainage or bleeding, severe pain, foul-smelling drainage, redness, swelling).  2. It has been over 8 to 10 hours since surgery and you are still not able to urinate (pass water).  3. Headache for over 24 hours.  4. Numbness, tingling or weakness the day after surgery (if you had spinal anesthesia).  5. Signs of Covid-19 infection (temperature over 100 degrees, shortness of breath, cough, loss of taste/smell, generalized body aches, persistent headache, chills, sore throat, nausea/vomiting/diarrhea)  Your doctor is:  Dr. Ade Villa, ENT Otolaryngology: [phone]                    Or dial [phone] and ask for the resident on call for:  ENT Otolaryngology  For emergency care, call the:  [facility]:  [phone] (TTY for hearing impaired: [phone])

## 2020-07-15 NOTE — ANESTHESIA CARE TRANSFER NOTE
Patient: Derek Contreras    Procedure(s):  Left trapezius muscle biopsy    Diagnosis: Myopathy [G72.9]  Diagnosis Additional Information: No value filed.    Anesthesia Type:   MAC     Note:  Airway :Room Air  Patient transferred to:Phase II  Comments: Uneventful transport to Phase II: VSS; IV patent; pt comfortable; Report given to RN; pt. Responds appropriately to commandsHandoff Report: Identifed the Patient, Identified the Reponsible Provider, Reviewed the pertinent medical history, Discussed the surgical course, Reviewed Intra-OP anesthesia mangement and issues during anesthesia, Set expectations for post-procedure period and Allowed opportunity for questions and acknowledgement of understanding      Vitals: (Last set prior to Anesthesia Care Transfer)    CRNA VITALS  7/15/2020 0857 - 7/15/2020 0927      7/15/2020             Resp Rate (observed):  (!) 1    Resp Rate (set):  10                Electronically Signed By: DENY Chakraborty CRNA  July 15, 2020  9:27 AM

## 2020-07-15 NOTE — ANESTHESIA POSTPROCEDURE EVALUATION
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     Anesthesia Type: MAC       Disposition: Outpatient   Postop Pain Control: Uneventful            Sign Out: Well controlled pain   PONV: No   Neuro/Psych: Uneventful            Sign Out: Acceptable/Baseline neuro status   Airway/Respiratory: Uneventful            Sign Out: Acceptable/Baseline resp. status   CV/Hemodynamics: Uneventful            Sign Out: Acceptable CV status   Other NRE: NONE   DID A NON-ROUTINE EVENT OCCUR? No         Last Anesthesia Record Vitals:  CRNA VITALS  7/15/2020 0857 - 7/15/2020 0957      7/15/2020             Resp Rate (observed):  (!) 1    Resp Rate (set):  10          Last PACU Vitals:  Vitals Value Taken Time   BP     Temp     Pulse     Resp     SpO2     Temp src Available 7/15/2020  9:15 AM   NIBP 116/81 7/15/2020  9:25 AM   Pulse 95 7/15/2020  9:25 AM   SpO2 96 % 7/15/2020  9:25 AM   Resp     Temp     Ht Rate 93 7/15/2020  9:26 AM   Temp 2           Electronically Signed By: Gisele Estevez MD, July 15, 2020, 3:51 PM

## 2020-07-15 NOTE — OP NOTE
Date of Procedure: 7/15/2020    Attending Physician: Ade Villa MD    Resident Physicians: Rachel Chaudhry MD    Procedure Performed:  Left trapezius muscle biopsy    Preoperative Diagnosis: concerns for myopathy    Postoperative Diagnosis: same    Anesthesia: MAC + local    Blood loss: minimal    Specimens:   ID Type Source Tests Collected by Time Destination   1 : Left trapezius muscle biopsy (sent to [facility] for Neuromuscular Collection) Tissue Other OR DOCUMENTATION ONLY Ade Villa MD 7/15/2020  8:36 AM     2 : Left trapezius muscle biopsy (sent to [facility] for Neuromuscular Collection) Tissue Other OR DOCUMENTATION ONLY Ade Villa MD 7/15/2020  8:39 AM     3 : Left trapezius muscle biopsy (sent to [facility] for Neuromuscular Collection) Tissue Other OR DOCUMENTATION ONLY Ade Villa MD 7/15/2020  8:39 AM        Implants:  None    Complications: None    Findings:  3 biopsies of trapezius muscle sent for pathology as per neurology recommendation    Indications:  Derek Contreras is a 64 year old man who was referred for evaluation for a left trapezius biopsy for workup of a myopathy by neurology.     Description of Procedure:  After informed consent was obtained, the patient as brought back to the operating room and placed in a supine position. The patient was appropriately positioned in a semi lateral position. MAC anesthesia was induced. The planned horizontal incision was marked along the left trapezius muscle in a natural skin crease and injected with 1% lidocaine with 1:100,000 epinephrine. The patient was prepped and draped in usual fashion. A time out was performed. A 15 blade was used to make an incision with a 15 blade through the skin. The incision was extended down through the subcutaneous fat down to the level of the trapezius with the monopolar cautery. Skin flaps were raised sharply to improve access  to the muscle. The anterior edge of the trapezius muscle was identified and bluntly released with scissors. Per the muscle biopsy instructions, a total of three 1 cm x 1 cm biopsies were required. The ruler was used to measure the planned biopsy. A tenotomy scissors was used to take a 3 cm x 1 cm biopsy of the trapezius muscle. The biopsy was divided into 3 pieces and placed on tongue depressors as per the instructions, 2 on saline and 1 tied to a tongue depressor and placed in fixative. Hemostasis was achieved with bipolar cautery with the wound. The incision was closed with a buried interrupted 3-0 vicryl deep followed by a running 4-0 monocryl in subcuticular fashion. Dermabond was applied. The patient was taken to the recovery room in stable condition. He tolerated the procedure well with no immediate complications.    I was present for and participated in the entire procedure.      Ade Villa MD    Department of Otolaryngology

## 2020-07-17 ENCOUNTER — TELEPHONE (OUTPATIENT)
Dept: OTOLARYNGOLOGY | Facility: CLINIC | Age: 65
End: 2020-07-17

## 2020-07-17 ENCOUNTER — NURSE TRIAGE (OUTPATIENT)
Dept: INTERNAL MEDICINE | Facility: CLINIC | Age: 65
End: 2020-07-17

## 2020-07-17 ENCOUNTER — OFFICE VISIT (OUTPATIENT)
Dept: URGENT CARE | Facility: URGENT CARE | Age: 65
End: 2020-07-17
Payer: MEDICARE

## 2020-07-17 VITALS
HEART RATE: 84 BPM | BODY MASS INDEX: 41.22 KG/M2 | SYSTOLIC BLOOD PRESSURE: 144 MMHG | TEMPERATURE: 98.4 F | OXYGEN SATURATION: 95 % | WEIGHT: 304 LBS | DIASTOLIC BLOOD PRESSURE: 80 MMHG

## 2020-07-17 DIAGNOSIS — L25.8: Primary | ICD-10-CM

## 2020-07-17 PROCEDURE — 99213 OFFICE O/P EST LOW 20 MIN: CPT | Performed by: FAMILY MEDICINE

## 2020-07-17 RX ORDER — METHYLPREDNISOLONE 4 MG
4 TABLET, DOSE PACK ORAL SEE ADMIN INSTRUCTIONS
Qty: 21 TABLET | Refills: 0 | Status: SHIPPED | OUTPATIENT
Start: 2020-07-17 | End: 2020-07-24

## 2020-07-17 RX ORDER — BETAMETHASONE DIPROPIONATE 0.5 MG/G
CREAM TOPICAL 2 TIMES DAILY
Qty: 100 G | Refills: 1 | Status: SHIPPED | OUTPATIENT
Start: 2020-07-17 | End: 2021-05-12

## 2020-07-17 NOTE — TELEPHONE ENCOUNTER
Patient called to report a warm, itchy rash all over his back which started last night. He denies any other symptoms. He contacted the ENT who performed a procedure on 7/15/20 but was referred back to his PCP for advise. He uses [facility] in [place]. Call back to advise at [phone] (home)

## 2020-07-17 NOTE — TELEPHONE ENCOUNTER
[facility] Call Center    Phone Message    May a detailed message be left on voicemail: yes     Reason for Call: Symptoms or Concerns     If patient has red-flag symptoms, warm transfer to triage line    Current symptom or concern: Rash on back, Itchy, warm to touch    Symptoms have been present for: ? hour(s)    Has patient previously been seen for this? Yes    By : Dr Villa    Date: 7/15/2020    Are there any new or worsening symptoms? Yes      Action Taken: Message routed to:  [facility] ([facility] ENT    Travel Screening: Not Applicable

## 2020-07-17 NOTE — TELEPHONE ENCOUNTER
Rash on back. Red, warm, started last night took benadryl. Itchiness has improved but still red and warm to touch. Denies fever/chills. Has had this in the past with knee/hip surgeries. Prescribed steroid cream with relief. Requesting prescription for similar cream. Instructed patient to call PCP for script but patient concerned about being able to contact PCP on a Friday and does not want rash to continue over weekend. Will contact Dr. Villa regarding prescription. Will return patient call.    Jen Batres RN, DNP.  7/17/2020 12:34 PM

## 2020-07-17 NOTE — TELEPHONE ENCOUNTER
"Called patient. He had a biopsy done on Wednesday with ENT and states he had similar reactions following previous surgeries and his doctor thought that it was from something on the sheets (was living in [place] at the time). He states that his previous doctor gave him a prescription for a cream that quickly cleared the rash up. He is not able to describe the rash, but states that his wife did mention that there was small area on his low back was more a purple color.     Benadryl helped with itching today, Hydrocortisone is not helping. He asks if possible to get prescription for a cream to use on the rash.    Additional Information    Negative: Possible contact with poison ivy or oak    Negative: Insect bite(s) suspected    Negative: Athlete's Foot suspected (i.e., itchy rash between the toes)    Negative: Jock Itch suspected (i.e., itchy rash on inner thighs near genital area)    Negative: Wound infection suspected (i.e., pain, spreading redness, or pus; in a cut, puncture, scrape or sutured wound)    Negative: Rash of external female genital area (vulva)    Negative: Rash of penis or scrotum    Negative: Small spot, skin growth, or mole    Negative: Fever and localized purple or blood-colored spots or dots that are not from injury or friction    Negative: Fever and localized rash is very painful    Negative: Patient sounds very sick or weak to the triager    Negative: Looks like a boil, infected sore, deep ulcer, or other infected rash (spreading redness, pus)    Answer Assessment - Initial Assessment Questions  1. APPEARANCE of RASH: \"Describe the rash.\"       Unable to describe  2. LOCATION: \"Where is the rash located?\"       back  3. NUMBER: \"How many spots are there?\"       Covers most of his back  4. SIZE: \"How big are the spots?\" (Inches, centimeters or compare to size of a coin)       Unable to describe  5. ONSET: \"When did the rash start?\"       Last night  6. ITCHING: \"Does the rash itch?\" If so, ask: " "\"How bad is the itch?\"  (Scale 1-10; or mild, moderate, severe)      Mild itching  7. PAIN: \"Does the rash hurt?\" If so, ask: \"How bad is the pain?\"  (Scale 1-10; or mild, moderate, severe)      No pain  8. OTHER SYMPTOMS: \"Do you have any other symptoms?\" (e.g., fever)      none  9. PREGNANCY: \"Is there any chance you are pregnant?\" \"When was your last menstrual period?\"      n/a    Protocols used: RASH OR REDNESS - [id number]      "

## 2020-07-17 NOTE — TELEPHONE ENCOUNTER
There is no documentation of prescribed cream for previous rash. Recommend to try OTC anti-itch cream, if patient does not get relief with OTC cream, instructed patient to call PCP office for further evaluation or recommendations.  Patient verbalized understanding.    Jen Batres RN, DNP.  7/17/2020 2:00 PM

## 2020-07-17 NOTE — TELEPHONE ENCOUNTER
Would think he would need to be seen as I do not know what I am treating and do not know what cream helped previously

## 2020-07-17 NOTE — TELEPHONE ENCOUNTER
Advised patient he needs to be seen for appointment to have rash evaluated. He will go to Urgent care for appointment.    Additional Information    Negative: Painful rash with multiple small blisters grouped together (i.e., dermatomal distribution or 'band' or 'stripe')    Negative: Localized rash is very painful (no fever)    Negative: Localized purple or blood-colored spots or dots that are not from injury or friction (no fever)    Negative: Lyme disease suspected (e.g., bull's-eye rash or tick bite / exposure)    Negative: Patient wants to be seen    Negative: Tender bumps in armpits    Negative: Pimples (localized) and no improvement after using CARE ADVICE    Negative: SEVERE local itching persists after 2 days of steroid cream    Negative: Applying cream or ointment and it causes severe itch, burning, or pain    Negative: Localized rash present > 7 days    Negative: Red, moist, irritated area between skin folds (or under larger breasts)    Negative: Localized area of skin darkening or thickening on lower legs or ankles and has NOT been evaluated by a doctor (or NP/PA)    Mild localized rash    Protocols used: RASH OR REDNESS - [id number]

## 2020-07-18 NOTE — PROGRESS NOTES
SUBJECTIVE:  Chief Complaint   Patient presents with     Urgent Care     Derm Problem     rash on back       Derek Contreras is a 64 year old male who presents to the clinic today for a rash.  Onset of rash was 1 day(s) ago.   Rash is sudden onset, still present and constant.  Location of the rash: back.  Quality/symptoms of rash: itching, burning and red   Symptoms are moderate and rash seems to be stable.  Previous history of a similar rash? Yes: -  He had a neck muscle biopsy 2 days ago in OR,  He lay on a plastic surface and he is reacting in the region of his back that had contact with the plastic drape-  He says he has had similar reaction on exposure to surgical drape/ cleaning solution or some other cause 2 x in the past-  Had good response in the past to topical steroid     Associated symptoms include: nothing.  Patient denies: fever, throat tightness, wheezing, cough, tongue/lip swelling, shortness of breath, sore throat and URI symptoms.    Past Medical History:   Diagnosis Date     Asthma      Claustrophobia      CPAP (continuous positive airway pressure) dependence      Dyslipidemia      [place] (hard of hearing)      Hypercholesteremia      Hypertension      Iron deficiency      Mediastinal adenopathy      Obesity      BEULAH (obstructive sleep apnea)      Prostate cancer (H)      Pulmonary hypertension (H)      Sarcoidosis      Patient Active Problem List   Diagnosis     Morbid obesity (H)     Pulmonary sarcoidosis (H)     Hypertension     S/P hip replacement, bilateral     S/P TKR (total knee replacement), bilateral     S/P appendectomy     Bilateral hearing loss, unspecified hearing loss type     Hypercholesteremia     Moderate asthma without complication     Prostate cancer (H)     Pulmonary hypertension (H)     SOB (shortness of breath)     Dyslipidemia     Iron deficiency     Need for hepatitis B screening test     Myopathy       ALLERGIES:  Cat hair extract; Adhesive tape; and Iodine    albuterol  (PROAIR HFA/PROVENTIL HFA/VENTOLIN HFA) 108 (90 Base) MCG/ACT inhaler, every 4 hours as needed   aspirin (ASA) 81 MG tablet, Take 81 mg by mouth every morning   atorvastatin (LIPITOR) 40 MG tablet, TAKE 1 TABLET(40 MG) BY MOUTH EVERY MORNING (Patient taking differently: Take 40 mg by mouth every morning )  cetirizine (ZYRTEC) 10 MG tablet, Take 10 mg by mouth every morning   fluticasone (FLONASE) 50 MCG/ACT nasal spray, Spray 1-2 sprays into both nostrils daily (Patient taking differently: Spray 1-2 sprays into both nostrils as needed )  fluticasone-salmeterol (ADVAIR) 500-50 MCG/DOSE inhaler, Inhale 1 puff into the lungs 2 times daily  hydrochlorothiazide (HYDRODIURIL) 12.5 MG tablet, TAKE 1 TABLET(12.5 MG) BY MOUTH EVERY MORNING (Patient taking differently: Take 12.5 mg by mouth every morning )  montelukast (SINGULAIR) 10 MG tablet, Take 1 tablet (10 mg) by mouth At Bedtime  multivitamin (ONE-DAILY) tablet, Take 1 tablet by mouth every morning   oxyCODONE (ROXICODONE) 5 MG tablet, Take 1-2 tablets (5-10 mg) by mouth every 4 hours as needed for moderate to severe pain    No current facility-administered medications on file prior to visit.       Social History     Tobacco Use     Smoking status: Never Smoker     Smokeless tobacco: Never Used   Substance Use Topics     Alcohol use: Yes     Comment: twice a week       Family History   Problem Relation Age of Onset     Alzheimer Disease Mother      Heart Disease Father      Glaucoma No family hx of      Macular Degeneration No family hx of      Diabetes No family hx of      Thyroid Disease No family hx of          ROS:  CONSTITUTIONAL:NEGATIVE for fever, chills,    EYES: NEGATIVE for vision changes or irritation  ENT/MOUTH: NEGATIVE for ear, mouth and throat problems  RESP:NEGATIVE for significant cough or SOB  GI: NEGATIVE for nausea, abdominal pain,   or change in bowel habits    EXAM:   BP (!) 144/80 (BP Location: Right arm, Patient Position: Sitting, Cuff Size:  Adult Large)   Pulse 84   Temp 98.4  F (36.9  C) (Tympanic)   Wt 137.9 kg (304 lb)   SpO2 95%   BMI 41.22 kg/m    GENERAL: alert, no acute distress.  SKIN: Rash description:    Distribution: localized  Location: back -  Entire back neck to buttock and shoulder to shoulder  Color: red,  Lesion type: maculopapular, confluent with warmth and inflammation       EYES: EOMI,   conjunctiva clear  HENT: External ears with no swelling or lesions   Nose and lips without  Swelling, ulcers, erythema or lesions  NECK: normal pain free ROM  RESP: no labored respirations, no tachypnea  EXTREMITIES:   Full ROM without expression of pain or limitation x 4 extremities  NEURO: Normal strength and tone, ambulation without difficulty,   normal speech and mentation  PSYCH: mentation and affect appears normal and patient appearance--appropriately groomed       ASSESSMENT:  Contact dermatitis due to adhesive plaster     - augmented betamethasone dipropionate (DIPROLENE-AF) 0.05 % external cream; Apply topically 2 times daily  - methylPREDNISolone (MEDROL) 4 MG tablet therapy pack; Take 1 tablet (4 mg) by mouth See Admin Instructions follow package directions       Medrol dose pack-  Will only start if topical does not adequately manage symptoms  Had some improvement with antihistamines        Follow-up with primary clinic if not improving

## 2020-07-18 NOTE — PATIENT INSTRUCTIONS
"  Patient Education     Contact Dermatitis  Contact dermatitis is a skin rash caused by something that touches the skin and makes it irritated and inflamed. Your skin may be red, swollen, dry, and may be cracked. Blisters may form and ooze. The rash will itch.  Contact dermatitis can form on the face and neck, backs of hands, forearms, genitals, and lower legs.  People can get contact dermatitis from lots of sources. These include:    Plants such as poison ivy, oak, or sumac    Chemicals in hair dyes and rinses, soaps, solvents, waxes, fingernail polish, and deodorants     Jewelry or watchbands made of nickel  Contact dermatitis is not passed from person to person.  Talk with your healthcare provider about what may have caused the rash. A type of allergy testing called \"patch testing\" may be used to discover what you are allergic to. You will need to avoid the source of your rash in the future to prevent it from coming back.  Treatment is done to relieve itching and prevent the rash from coming back. The rash should go away in a few days to a few weeks.  Home care  Your healthcare provider may prescribe medicine to relieve swelling and itching. Follow all instructions when using these medicines.  General care:    Avoid anything that heats up your skin, such as hot showers or baths, or direct sunlight. This can make itching worse.    Apply cold compresses to soothe your sores to help relieve your symptoms. Do this for 30 minutes 3 to 4 times a day. You can make a cold compress by soaking a cloth in cold water. Squeeze out excess water. You can add colloidal oatmeal to the water to help reduce itching. For severe itching in a small area, apply an ice pack wrapped in a thin towel. Do this for 20 minutes 3 to 4 times a day.    You can also try wet dressings. One way to do this is to wear a wet piece of clothing under a dry one. Wear a damp shirt under a dry shirt if your upper body is affected. This can relieve itching " and prevent you from scratching the affected area.    You can also help relieve large areas of itching by taking a lukewarm bath with colloidal oatmeal added to the water.    Use hydrocortisone cream for redness and irritation, unless another medicine was prescribed. You can also use benzocaine anesthetic cream or spray. Calamine lotion can also relieve mild symptoms.    Use oral diphenhydramine to help reduce itching. You can buy this antihistamine at drug and grocery stores. It can make you sleepy, so use lower doses during the daytime. Or you can use loratadine. This is an antihistamine that will not make you sleepy. Do not use diphenhydramine if you have glaucoma or have trouble urinating due to an enlarged prostate.    If a plant causes your rash, make sure to wash your skin and the clothes you were wearing when you came into contact with the plant. This is to wash away the plant oils that gave you the rash and prevent more or worse symptoms.    Stay away from the substance or object that causes your symptoms. If you can t avoid it, wear gloves or some other type of protection.  Follow-up care  Follow up with your healthcare provider, or as advised.  When to seek medical advice  Call your healthcare provider right away if any of these occur:    Spreading of the rash to other parts of your body    Severe swelling of your face, eyelids, mouth, throat or tongue    Trouble urinating due to swelling in the genital area    Fever of 100.4 F (38 C) or higher    Redness or swelling that gets worse    Pain that gets worse    Foul-smelling fluid leaking from the skin    Yellow-brown crusts on the open blisters  Date Last Reviewed: 9/1/2016 2000-2019 [employer]. [address] [place] [address], [place] [address]. All rights reserved. This information is not intended as a substitute for professional medical care. Always follow your healthcare professional's instructions.

## 2020-07-20 NOTE — ADDENDUM NOTE
Addendum  created 07/20/20 [id number] by Donaldo Braun APRN CRNA    Intraprocedure Meds edited, Orders acknowledged in Narrator

## 2020-07-21 ENCOUNTER — OFFICE VISIT (OUTPATIENT)
Dept: NEUROLOGY | Facility: CLINIC | Age: 65
End: 2020-07-21

## 2020-07-21 DIAGNOSIS — Z86.2 HISTORY OF SARCOIDOSIS: Primary | ICD-10-CM

## 2020-07-21 DIAGNOSIS — M62.81 MUSCLE WEAKNESS (GENERALIZED): ICD-10-CM

## 2020-07-21 NOTE — LETTER
2020       RE: Derek Contreras  [address] [place] [address]     Dear Colleague,    Thank you for referring your patient, Derek Contreras, to the [facility] at [facility]. Please see a copy of my visit note below.    [facility]   NEUROMUSCULAR PATHOLOGY REPORT     NEUROMUSCULAR LABORATORY [phone] / [phone]  [address] [place] [address],  [place] [address]     MUSCLE BIOPSY LIGHT MICROSCOPY REPORT    NAME: Derek Contreras  : 1955  MR#: [id number]  DATE OF BIOPSY: 07/15/2020  DATE OF REPORT: 2020  SPECIMEN NO:   SURGEON: Vin Villa MD  REFERRING PHYSICIAN: Glynn Omer MD    CLINICAL INFORMATION:    This 64 year-old man had a muscle biopsy performed to investigate the possibility of having myopathy due to sarcoidosis or other etiologies. He has known thoracic sarcoidosis and reports dyspnea on exertion. Pulmonary function tests showed restrictive physiology. CK is normal. EMG examination showed abnormal spontaneous activity limited to the thoracic paraspinal muscles and the trapezius.    LEFT UPPER TRAPEZIUS MUSCLE BIOPSY:    Two pieces of muscle were quick frozen for light microscopy and histochemistry. Additional pieces were quick frozen for biochemical testing.    LIGHT MICROSCOPY:    Frozen sections stained with H&E, Gomori trichrome, VVG, PAS, [place] Black, and [place] red were available for review. There was normal fiber size variation with diameters ranging from 30-80 microns. There were no degenerating or necrotic fibers. Muscle fiber nuclei were appropriately peripheral in location. Endomysial and perimysial connective tissue and blood vessels were normal. There was no inflammation or granuloma. There were no ragged-red fibers, rods, or other aggregates identified on the trichrome stain. Glycogen and lipid content were normal. There was no abnormal congophilic  staining.    HISTOCHEMISTRY:    Frozen sections stained with ATPase (pH 4.35, 4.6 and 9.4), NADH, SDH, ESTRADA, acid and alkaline phosphatase, phosphorylase, myoadenylate deaminase (MAD) and nonspecific esterase were available for review. ATPase staining identified fibers of types 1, 2a, 2b. There was a normal fiber type distribution. There was a tendency towards fiber type grouping in a few locations but it was subtle at most. There was no consistent difference in fiber size between fibers of different types. Oxidative enzyme stain deposition was normal. There were 2 ragged blue fibers in total, which is normal for an axial muscle in a 64 year old. There were no ESRTADA deficient fibers. Esterase, acid and alkaline phosphatase staining were normal. Phosphorylase and MAD staining were normal.    DIAGNOSIS:    Normal skeletal muscle    COMMENT:    There was no evidence on this biopsy of any myopathic process. The mild tendency towards fiber type grouping noted in a few regions may be suggestive of mild denervation and reinnervation of muscle. However, this finding was felt to be too subtle to be considered diagnostic.    Glynn Omer MD, FAAN   of Neurology

## 2020-07-21 NOTE — PROGRESS NOTES
[facility]   NEUROMUSCULAR PATHOLOGY REPORT     NEUROMUSCULAR LABORATORY [phone] / [phone]  [address],  [place] [address]     MUSCLE BIOPSY LIGHT MICROSCOPY REPORT    NAME: Derek Contreras  : 1955  MR#: [id number]  DATE OF BIOPSY: 07/15/2020  DATE OF REPORT: 2020  SPECIMEN NO:   SURGEON: Vin Villa MD  REFERRING PHYSICIAN: Glynn Omer MD    CLINICAL INFORMATION:    This 64 year-old man had a muscle biopsy performed to investigate the possibility of having myopathy due to sarcoidosis or other etiologies. He has known thoracic sarcoidosis and reports dyspnea on exertion. Pulmonary function tests showed restrictive physiology. CK is normal. EMG examination showed abnormal spontaneous activity limited to the thoracic paraspinal muscles and the trapezius.    LEFT UPPER TRAPEZIUS MUSCLE BIOPSY:    Two pieces of muscle were quick frozen for light microscopy and histochemistry. Additional pieces were quick frozen for biochemical testing.    LIGHT MICROSCOPY:    Frozen sections stained with H&E, Gomori trichrome, VVG, PAS, [place] Black, and [place] red were available for review. There was normal fiber size variation with diameters ranging from 30-80 microns. There were no degenerating or necrotic fibers. Muscle fiber nuclei were appropriately peripheral in location. Endomysial and perimysial connective tissue and blood vessels were normal. There was no inflammation or granuloma. There were no ragged-red fibers, rods, or other aggregates identified on the trichrome stain. Glycogen and lipid content were normal. There was no abnormal congophilic staining.    HISTOCHEMISTRY:    Frozen sections stained with ATPase (pH 4.35, 4.6 and 9.4), NADH, SDH, ESTRADA, acid and alkaline phosphatase, phosphorylase, myoadenylate deaminase (MAD) and nonspecific esterase were available for review. ATPase staining identified fibers of types 1, 2a, 2b. There was a normal  fiber type distribution. There was a tendency towards fiber type grouping in a few locations but it was subtle at most. There was no consistent difference in fiber size between fibers of different types. Oxidative enzyme stain deposition was normal. There were 2 ragged blue fibers in total, which is normal for an axial muscle in a 64 year old. There were no ESTRADA deficient fibers. Esterase, acid and alkaline phosphatase staining were normal. Phosphorylase and MAD staining were normal.    DIAGNOSIS:    Normal skeletal muscle    COMMENT:    There was no evidence on this biopsy of any myopathic process. The mild tendency towards fiber type grouping noted in a few regions may be suggestive of mild denervation and reinnervation of muscle. However, this finding was felt to be too subtle to be considered diagnostic.    Glynn Omer MD, FAAN   of Neurology

## 2020-07-24 ENCOUNTER — OFFICE VISIT (OUTPATIENT)
Dept: OTOLARYNGOLOGY | Facility: CLINIC | Age: 65
End: 2020-07-24
Payer: MEDICARE

## 2020-07-24 VITALS
BODY MASS INDEX: 40.63 KG/M2 | WEIGHT: 300 LBS | HEIGHT: 72 IN | DIASTOLIC BLOOD PRESSURE: 98 MMHG | OXYGEN SATURATION: 94 % | SYSTOLIC BLOOD PRESSURE: 159 MMHG | TEMPERATURE: 98.1 F | HEART RATE: 94 BPM

## 2020-07-24 DIAGNOSIS — G72.9 MYOPATHY: Primary | ICD-10-CM

## 2020-07-24 ASSESSMENT — PAIN SCALES - GENERAL: PAINLEVEL: NO PAIN (0)

## 2020-07-24 ASSESSMENT — MIFFLIN-ST. JEOR: SCORE: 2183.79

## 2020-07-24 NOTE — PATIENT INSTRUCTIONS
1. Please follow-up in clinic as needed with Dr. Villa.   2. Please call the ENT clinic with any questions,concerns, new or worsening symptoms.    -Clinic number is [phone]   - [facility]'s direct line (Dr. Villa's nurse) [phone]

## 2020-07-24 NOTE — NURSING NOTE
Chief Complaint   Patient presents with     RECHECK     post op      Blood pressure (!) 159/98, pulse 94, temperature 98.1  F (36.7  C), height 1.829 m (6'), weight 136.1 kg (300 lb), SpO2 94 %.    Omar Brewster LPN

## 2020-07-24 NOTE — LETTER
7/24/2020       RE: Derek Contreras  [address] [place] [address]     Dear Colleague,    Thank you for referring your patient, Derek Contreras, to the [facility] EAR NOSE AND THROAT at [facility]. Please see a copy of my visit note below.    Dear Dr. Omer:    I had the pleasure of seeing Derek Contreras in follow-up today at the [facility].     History of Present Illness:   Derek Contreras is a 64 year old man who was referred for evaluation for trapezius biopsy for workup of a myopathy. The patient is undergoing workup with neurology.  He was taken to the operating room on 7/15/2020 for a biopsy of the left trapezius muscle.  The patient reports that he heard from neurology that the biopsy was negative.  He says he is doing well since his surgery.  He is not having any pain.  He has no limitations on his shoulder movement.  He has no concerns about the incision.        MEDICATIONS:     Current Outpatient Medications   Medication Sig Dispense Refill     albuterol (PROAIR HFA/PROVENTIL HFA/VENTOLIN HFA) 108 (90 Base) MCG/ACT inhaler every 4 hours as needed   4     aspirin (ASA) 81 MG tablet Take 81 mg by mouth every morning  90 tablet 3     atorvastatin (LIPITOR) 40 MG tablet TAKE 1 TABLET(40 MG) BY MOUTH EVERY MORNING (Patient taking differently: Take 40 mg by mouth every morning ) 90 tablet 2     augmented betamethasone dipropionate (DIPROLENE-AF) 0.05 % external cream Apply topically 2 times daily 100 g 1     cetirizine (ZYRTEC) 10 MG tablet Take 10 mg by mouth every morning  90 tablet 3     fluticasone (FLONASE) 50 MCG/ACT nasal spray Spray 1-2 sprays into both nostrils daily (Patient taking differently: Spray 1-2 sprays into both nostrils as needed ) 16 g 0     fluticasone-salmeterol (ADVAIR) 500-50 MCG/DOSE inhaler Inhale 1 puff into the lungs 2 times daily 1 Inhaler 12     hydrochlorothiazide (HYDRODIURIL)  12.5 MG tablet TAKE 1 TABLET(12.5 MG) BY MOUTH EVERY MORNING (Patient taking differently: Take 12.5 mg by mouth every morning ) 90 tablet 2     montelukast (SINGULAIR) 10 MG tablet Take 1 tablet (10 mg) by mouth At Bedtime 90 tablet 3     multivitamin (ONE-DAILY) tablet Take 1 tablet by mouth every morning  90 tablet 3       ALLERGIES:    Allergies   Allergen Reactions     Cat Hair Extract Itching     itchy tearful eyes     Adhesive Tape Rash     Iodine Rash       HABITS/SOCIAL HISTORY:   No smoking history, no chewing tobacco, 1-2 alcoholic beverages per week.   Lives with wife.   Retired, previous mental health therapist.     Social History     Socioeconomic History     Marital status:      Spouse name: Not on file     Number of children: Not on file     Years of education: Not on file     Highest education level: Not on file   Occupational History     Not on file   Social Needs     Financial resource strain: Not on file     Food insecurity     Worry: Not on file     Inability: Not on file     Transportation needs     Medical: Not on file     Non-medical: Not on file   Tobacco Use     Smoking status: Never Smoker     Smokeless tobacco: Never Used   Substance and Sexual Activity     Alcohol use: Yes     Comment: twice a week     Drug use: No     Sexual activity: Yes     Partners: Female   Lifestyle     Physical activity     Days per week: Not on file     Minutes per session: Not on file     Stress: Not on file   Relationships     Social connections     Talks on phone: Not on file     Gets together: Not on file     Attends Jehovah's witness service: Not on file     Active member of club or organization: Not on file     Attends meetings of clubs or organizations: Not on file     Relationship status: Not on file     Intimate partner violence     Fear of current or ex partner: Not on file     Emotionally abused: Not on file     Physically abused: Not on file     Forced sexual activity: Not on file   Other Topics Concern      Parent/sibling w/ CABG, MI or angioplasty before 65F 55M? Not Asked   Social History Narrative     Not on file       PAST MEDICAL HISTORY:   Past Medical History:   Diagnosis Date     Asthma      Claustrophobia      CPAP (continuous positive airway pressure) dependence      Dyslipidemia      [place] (hard of hearing)      Hypercholesteremia      Hypertension      Iron deficiency      Mediastinal adenopathy      Obesity      BEULAH (obstructive sleep apnea)      Prostate cancer (H)      Pulmonary hypertension (H)      Sarcoidosis         PAST SURGICAL HISTORY:   Past Surgical History:   Procedure Laterality Date     APPENDECTOMY       BIOPSY MASS NECK Left 7/15/2020    Procedure: Left trapezius muscle biopsy;  Surgeon: Ade Villa MD;  Location:  OR     C TOTAL HIP ARTHROPLASTY Bilateral      C TOTAL KNEE ARTHROPLASTY Bilateral      CV RIGHT HEART CATH N/A 12/11/2019    Procedure: CV RIGHT HEART CATH;  Surgeon: Torrey Hirsch MD;  Location:  HEART CARDIAC CATH LAB     DAVINCI PROSTATECTOMY, LYMPHADENECTOMY N/A 5/23/2019    Procedure: ROBOTIC ASSISTED LAPAROSCOPIC RADICAL PROSTATECTOMY WITH BILATERAL PELVIC LYMPH NODE DISSECTION;  Surgeon: Matteo Coughlin MD;  Location:  OR     ENDOBRONCHIAL ULTRASOUND FLEXIBLE N/A 3/29/2019    Procedure: Flexible Bronchoscopy, Endobronchial Ultrasound;  Surgeon: Curtis Leger MD;  Location: [facility] OR     VASECTOMY         FAMILY HISTORY:    Family History   Problem Relation Age of Onset     Alzheimer Disease Mother      Heart Disease Father      Glaucoma No family hx of      Macular Degeneration No family hx of      Diabetes No family hx of      Thyroid Disease No family hx of        REVIEW OF SYSTEMS:  12 point ROS was negative other than the symptoms noted above in the HPI.  Patient Supplied Answers to Review of Systems  [facility] ROS 6/24/2020   Ears, Nose, Throat Ringing/noise in ears   Cardiopulmonary Cough         PHYSICAL EXAMINATION:   BP (!)  159/98   Pulse 94   Temp 98.1  F (36.7  C)   Ht 1.829 m (6')   Wt 136.1 kg (300 lb)   SpO2 94%   BMI 40.69 kg/m    Patient no apparent distress  Breathing comfortably on room air  Left neck incision healing appropriately without any fluid collection, Dermabond is coming off the incision, no dehiscence  Good range of motion of the shoulder    RESULTS REVIEWED:         IMPRESSION AND PLAN:   Derek Contreras is a 64 year old man with concerns for a myopathy.  Neurology requested a trapezius biopsy which was performed last week.  This was negative per their report.  The patient is healing appropriately from the surgery.  We discussed wound care for the incision including sun protection for the next year.  He will contact us if any issues arise.    Thank you very much for the opportunity to participate in the care of your patient.      Ade Villa MD, M.D.  Otolaryngology- Head & Neck Surgery      This note was dictated with voice recognition software and then edited. Please excuse any unintentional errors.         CC:  Glynn Omer MD  Department of Neu[facility]  [facility]

## 2020-07-25 NOTE — PROGRESS NOTES
Dear Dr. Omer:    I had the pleasure of seeing Derek Contreras in follow-up today at the [facility].     History of Present Illness:   Derek Contreras is a 64 year old man who was referred for evaluation for trapezius biopsy for workup of a myopathy. The patient is undergoing workup with neurology.  He was taken to the operating room on 7/15/2020 for a biopsy of the left trapezius muscle.  The patient reports that he heard from neurology that the biopsy was negative.  He says he is doing well since his surgery.  He is not having any pain.  He has no limitations on his shoulder movement.  He has no concerns about the incision.        MEDICATIONS:     Current Outpatient Medications   Medication Sig Dispense Refill     albuterol (PROAIR HFA/PROVENTIL HFA/VENTOLIN HFA) 108 (90 Base) MCG/ACT inhaler every 4 hours as needed   4     aspirin (ASA) 81 MG tablet Take 81 mg by mouth every morning  90 tablet 3     atorvastatin (LIPITOR) 40 MG tablet TAKE 1 TABLET(40 MG) BY MOUTH EVERY MORNING (Patient taking differently: Take 40 mg by mouth every morning ) 90 tablet 2     augmented betamethasone dipropionate (DIPROLENE-AF) 0.05 % external cream Apply topically 2 times daily 100 g 1     cetirizine (ZYRTEC) 10 MG tablet Take 10 mg by mouth every morning  90 tablet 3     fluticasone (FLONASE) 50 MCG/ACT nasal spray Spray 1-2 sprays into both nostrils daily (Patient taking differently: Spray 1-2 sprays into both nostrils as needed ) 16 g 0     fluticasone-salmeterol (ADVAIR) 500-50 MCG/DOSE inhaler Inhale 1 puff into the lungs 2 times daily 1 Inhaler 12     hydrochlorothiazide (HYDRODIURIL) 12.5 MG tablet TAKE 1 TABLET(12.5 MG) BY MOUTH EVERY MORNING (Patient taking differently: Take 12.5 mg by mouth every morning ) 90 tablet 2     montelukast (SINGULAIR) 10 MG tablet Take 1 tablet (10 mg) by mouth At Bedtime 90 tablet 3     multivitamin (ONE-DAILY) tablet Take 1 tablet by mouth every  morning  90 tablet 3       ALLERGIES:    Allergies   Allergen Reactions     Cat Hair Extract Itching     itchy tearful eyes     Adhesive Tape Rash     Iodine Rash       HABITS/SOCIAL HISTORY:   No smoking history, no chewing tobacco, 1-2 alcoholic beverages per week.   Lives with wife.   Retired, previous mental health therapist.     Social History     Socioeconomic History     Marital status:      Spouse name: Not on file     Number of children: Not on file     Years of education: Not on file     Highest education level: Not on file   Occupational History     Not on file   Social Needs     Financial resource strain: Not on file     Food insecurity     Worry: Not on file     Inability: Not on file     Transportation needs     Medical: Not on file     Non-medical: Not on file   Tobacco Use     Smoking status: Never Smoker     Smokeless tobacco: Never Used   Substance and Sexual Activity     Alcohol use: Yes     Comment: twice a week     Drug use: No     Sexual activity: Yes     Partners: Female   Lifestyle     Physical activity     Days per week: Not on file     Minutes per session: Not on file     Stress: Not on file   Relationships     Social connections     Talks on phone: Not on file     Gets together: Not on file     Attends Hinduism service: Not on file     Active member of club or organization: Not on file     Attends meetings of clubs or organizations: Not on file     Relationship status: Not on file     Intimate partner violence     Fear of current or ex partner: Not on file     Emotionally abused: Not on file     Physically abused: Not on file     Forced sexual activity: Not on file   Other Topics Concern     Parent/sibling w/ CABG, MI or angioplasty before 65F 55M? Not Asked   Social History Narrative     Not on file       PAST MEDICAL HISTORY:   Past Medical History:   Diagnosis Date     Asthma      Claustrophobia      CPAP (continuous positive airway pressure) dependence      Dyslipidemia       [place] (hard of hearing)      Hypercholesteremia      Hypertension      Iron deficiency      Mediastinal adenopathy      Obesity      BEULAH (obstructive sleep apnea)      Prostate cancer (H)      Pulmonary hypertension (H)      Sarcoidosis         PAST SURGICAL HISTORY:   Past Surgical History:   Procedure Laterality Date     APPENDECTOMY       BIOPSY MASS NECK Left 7/15/2020    Procedure: Left trapezius muscle biopsy;  Surgeon: Ade Villa MD;  [facility]: [facility] OR     C TOTAL HIP ARTHROPLASTY Bilateral      C TOTAL KNEE ARTHROPLASTY Bilateral      CV RIGHT HEART CATH N/A 12/11/2019    Procedure: CV RIGHT HEART CATH;  Surgeon: Torrey Hirsch MD;  [facility]     DAVINCI PROSTATECTOMY, LYMPHADENECTOMY N/A 5/23/2019    Procedure: ROBOTIC ASSISTED LAPAROSCOPIC RADICAL PROSTATECTOMY WITH BILATERAL PELVIC LYMPH NODE DISSECTION;  Surgeon: Matteo Coughlin MD;  Location: [facility] OR     ENDOBRONCHIAL ULTRASOUND FLEXIBLE N/A 3/29/2019    Procedure: Flexible Bronchoscopy, Endobronchial Ultrasound;  Surgeon: Curtis Leger MD;  Location: [facility] OR     VASECTOMY         FAMILY HISTORY:    Family History   Problem Relation Age of Onset     Alzheimer Disease Mother      Heart Disease Father      Glaucoma No family hx of      Macular Degeneration No family hx of      Diabetes No family hx of      Thyroid Disease No family hx of        REVIEW OF SYSTEMS:  12 point ROS was negative other than the symptoms noted above in the HPI.  Patient Supplied Answers to Review of Systems   ENT ROS 6/24/2020   Ears, Nose, Throat Ringing/noise in ears   Cardiopulmonary Cough         PHYSICAL EXAMINATION:   BP (!) 159/98   Pulse 94   Temp 98.1  F (36.7  C)   Ht 1.829 m (6')   Wt 136.1 kg (300 lb)   SpO2 94%   BMI 40.69 kg/m    Patient no apparent distress  Breathing comfortably on room air  Left neck incision healing appropriately without any fluid collection, Dermabond is coming off the incision, no  dehiscence  Good range of motion of the shoulder    RESULTS REVIEWED:         IMPRESSION AND PLAN:   Derek Contreras is a 64 year old man with concerns for a myopathy.  Neurology requested a trapezius biopsy which was performed last week.  This was negative per their report.  The patient is healing appropriately from the surgery.  We discussed wound care for the incision including sun protection for the next year.  He will contact us if any issues arise.    Thank you very much for the opportunity to participate in the care of your patient.      Ade Villa MD, M.D.  Otolaryngology- Head & Neck Surgery      This note was dictated with voice recognition software and then edited. Please excuse any unintentional errors.         CC:  Glynn Omer MD  Department of Neurology  [facility]

## 2020-08-06 ENCOUNTER — MYC MEDICAL ADVICE (OUTPATIENT)
Dept: PULMONOLOGY | Facility: CLINIC | Age: 65
End: 2020-08-06

## 2020-08-06 DIAGNOSIS — D86.9 SARCOIDOSIS: Primary | ICD-10-CM

## 2020-08-06 RX ORDER — PREDNISONE 10 MG/1
TABLET ORAL
Qty: 50 TABLET | Refills: 0 | Status: SHIPPED | OUTPATIENT
Start: 2020-08-06 | End: 2020-08-26

## 2020-08-06 RX ORDER — AZITHROMYCIN 250 MG/1
TABLET, FILM COATED ORAL
Qty: 6 TABLET | Refills: 0 | Status: SHIPPED | OUTPATIENT
Start: 2020-08-06 | End: 2020-08-11

## 2020-08-06 NOTE — TELEPHONE ENCOUNTER
RN spoke with Dr. Martin who advised we treat with Azithro and Pred taper as done last time sx were present. Also wants to test for COVID. Orders entered and reviewed with patient plan of care. Pt will reach out if further questions or concerns.   Deepa Farrell RN BSN

## 2020-08-09 DIAGNOSIS — D86.9 SARCOIDOSIS: ICD-10-CM

## 2020-08-09 PROCEDURE — U0003 INFECTIOUS AGENT DETECTION BY NUCLEIC ACID (DNA OR RNA); SEVERE ACUTE RESPIRATORY SYNDROME CORONAVIRUS 2 (SARS-COV-2) (CORONAVIRUS DISEASE [COVID-19]), AMPLIFIED PROBE TECHNIQUE, MAKING USE OF HIGH THROUGHPUT TECHNOLOGIES AS DESCRIBED BY CMS-2020-01-R: HCPCS | Performed by: INTERNAL MEDICINE

## 2020-08-10 LAB
SARS-COV-2 RNA SPEC QL NAA+PROBE: NOT DETECTED
SPECIMEN SOURCE: NORMAL

## 2020-08-25 DIAGNOSIS — D47.2 MGUS (MONOCLONAL GAMMOPATHY OF UNKNOWN SIGNIFICANCE): ICD-10-CM

## 2020-08-25 LAB
ALBUMIN SERPL-MCNC: 3.3 G/DL (ref 3.4–5)
ALP SERPL-CCNC: 91 U/L (ref 40–150)
ALT SERPL W P-5'-P-CCNC: 40 U/L (ref 0–70)
ANION GAP SERPL CALCULATED.3IONS-SCNC: 5 MMOL/L (ref 3–14)
AST SERPL W P-5'-P-CCNC: 21 U/L (ref 0–45)
BASOPHILS # BLD AUTO: 0 10E9/L (ref 0–0.2)
BASOPHILS NFR BLD AUTO: 0.2 %
BILIRUB SERPL-MCNC: 0.8 MG/DL (ref 0.2–1.3)
BUN SERPL-MCNC: 13 MG/DL (ref 7–30)
CALCIUM SERPL-MCNC: 8.6 MG/DL (ref 8.5–10.1)
CHLORIDE SERPL-SCNC: 104 MMOL/L (ref 94–109)
CO2 SERPL-SCNC: 29 MMOL/L (ref 20–32)
CREAT SERPL-MCNC: 0.86 MG/DL (ref 0.66–1.25)
DIFFERENTIAL METHOD BLD: ABNORMAL
EOSINOPHIL # BLD AUTO: 0.1 10E9/L (ref 0–0.7)
EOSINOPHIL NFR BLD AUTO: 0.8 %
ERYTHROCYTE [DISTWIDTH] IN BLOOD BY AUTOMATED COUNT: 15.2 % (ref 10–15)
GFR SERPL CREATININE-BSD FRML MDRD: >90 ML/MIN/{1.73_M2}
GLUCOSE SERPL-MCNC: 89 MG/DL (ref 70–99)
HCT VFR BLD AUTO: 51.5 % (ref 40–53)
HGB BLD-MCNC: 16.7 G/DL (ref 13.3–17.7)
LYMPHOCYTES # BLD AUTO: 1.9 10E9/L (ref 0.8–5.3)
LYMPHOCYTES NFR BLD AUTO: 16.9 %
MCH RBC QN AUTO: 28.9 PG (ref 26.5–33)
MCHC RBC AUTO-ENTMCNC: 32.4 G/DL (ref 31.5–36.5)
MCV RBC AUTO: 89 FL (ref 78–100)
MONOCYTES # BLD AUTO: 0.9 10E9/L (ref 0–1.3)
MONOCYTES NFR BLD AUTO: 7.6 %
NEUTROPHILS # BLD AUTO: 8.4 10E9/L (ref 1.6–8.3)
NEUTROPHILS NFR BLD AUTO: 74.5 %
PLATELET # BLD AUTO: 142 10E9/L (ref 150–450)
POTASSIUM SERPL-SCNC: 3.7 MMOL/L (ref 3.4–5.3)
PROT SERPL-MCNC: 7.6 G/DL (ref 6.8–8.8)
RBC # BLD AUTO: 5.77 10E12/L (ref 4.4–5.9)
SODIUM SERPL-SCNC: 137 MMOL/L (ref 133–144)
WBC # BLD AUTO: 11.3 10E9/L (ref 4–11)

## 2020-08-25 PROCEDURE — 80053 COMPREHEN METABOLIC PANEL: CPT | Performed by: INTERNAL MEDICINE

## 2020-08-25 PROCEDURE — 82784 ASSAY IGA/IGD/IGG/IGM EACH: CPT | Performed by: INTERNAL MEDICINE

## 2020-08-25 PROCEDURE — 00000402 ZZHCL STATISTIC TOTAL PROTEIN: Performed by: INTERNAL MEDICINE

## 2020-08-25 PROCEDURE — 85025 COMPLETE CBC W/AUTO DIFF WBC: CPT | Performed by: INTERNAL MEDICINE

## 2020-08-25 PROCEDURE — 84165 PROTEIN E-PHORESIS SERUM: CPT | Performed by: INTERNAL MEDICINE

## 2020-08-25 PROCEDURE — 83883 ASSAY NEPHELOMETRY NOT SPEC: CPT | Performed by: INTERNAL MEDICINE

## 2020-08-25 PROCEDURE — 36415 COLL VENOUS BLD VENIPUNCTURE: CPT | Performed by: INTERNAL MEDICINE

## 2020-08-26 LAB
ALBUMIN SERPL ELPH-MCNC: 3.6 G/DL (ref 3.7–5.1)
ALPHA1 GLOB SERPL ELPH-MCNC: 0.3 G/DL (ref 0.2–0.4)
ALPHA2 GLOB SERPL ELPH-MCNC: 0.9 G/DL (ref 0.5–0.9)
B-GLOBULIN SERPL ELPH-MCNC: 1.4 G/DL (ref 0.6–1)
GAMMA GLOB SERPL ELPH-MCNC: 1.2 G/DL (ref 0.7–1.6)
IGA SERPL-MCNC: 836 MG/DL (ref 84–499)
IGG SERPL-MCNC: 1176 MG/DL (ref 610–1616)
IGM SERPL-MCNC: 70 MG/DL (ref 35–242)
KAPPA LC UR-MCNC: 7.81 MG/DL (ref 0.33–1.94)
KAPPA LC/LAMBDA SER: 4.24 {RATIO} (ref 0.26–1.65)
LAMBDA LC SERPL-MCNC: 1.84 MG/DL (ref 0.57–2.63)
M PROTEIN SERPL ELPH-MCNC: 0.4 G/DL
PROT PATTERN SERPL ELPH-IMP: ABNORMAL

## 2020-09-01 ENCOUNTER — ONCOLOGY VISIT (OUTPATIENT)
Dept: ONCOLOGY | Facility: CLINIC | Age: 65
End: 2020-09-01
Attending: INTERNAL MEDICINE
Payer: MEDICARE

## 2020-09-01 VITALS
DIASTOLIC BLOOD PRESSURE: 82 MMHG | RESPIRATION RATE: 20 BRPM | TEMPERATURE: 98.1 F | OXYGEN SATURATION: 95 % | HEART RATE: 80 BPM | SYSTOLIC BLOOD PRESSURE: 152 MMHG

## 2020-09-01 DIAGNOSIS — D47.2 MGUS (MONOCLONAL GAMMOPATHY OF UNKNOWN SIGNIFICANCE): Primary | ICD-10-CM

## 2020-09-01 PROCEDURE — G0463 HOSPITAL OUTPT CLINIC VISIT: HCPCS

## 2020-09-01 PROCEDURE — 99213 OFFICE O/P EST LOW 20 MIN: CPT | Performed by: INTERNAL MEDICINE

## 2020-09-01 NOTE — NURSING NOTE
Oncology Rooming Note    September 1, 2020 8:48 AM   Derek Contreras is a 64 year old male who presents for:    No chief complaint on file.    Initial Vitals: BP (!) 152/82 (BP Location: Right arm, Patient Position: Sitting, Cuff Size: Adult Regular)   Pulse 80   Resp 20   SpO2 95%  Estimated body mass index is 40.69 kg/m  as calculated from the following:    Height as of 7/24/20: 1.829 m (6').    Weight as of 7/24/20: 136.1 kg (300 lb). There is no height or weight on file to calculate BSA.  Data Unavailable Comment: Data Unavailable   No LMP for male patient.  Allergies reviewed: Yes  Medications reviewed: Yes    Medications: Medication refills not needed today.  Pharm[facility] entered into [facility]: [id number] - [place], [place] - [address] AT [place] [address] & [address]    Clinical concerns: no new health concerns or complaints since last visit with Dr. Atilio Garcia, RN

## 2020-09-01 NOTE — PROGRESS NOTES
[facility]    Hematology/Oncology Established Patient Note      Today's Date: 09/01/20    Reason for Follow-up: MGUS      HISTORY OF PRESENT ILLNESS: Derek Contreras is a 64 year old male with PMHx of sleep apnea, sarcoidosis, COPD, prostate cancer s/p prostatectomy, history of hip and knee replacement, HTN who presents with MGUS. An VLAD was checked by his neurologist, which showed an elevated IgA level.      He says that he feels fantastic currently.      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  Kappa light chain is elevated to 10.73, with kappa/lambda ratio of 4.47.  His hemoglobin, calcium, and renal function are normal.          INTERIM HISTORY: Derek says that he feels great.  He denies any new symptoms currently.      REVIEW OF SYSTEMS:   14 point ROS was reviewed and is negative other than as noted above in HPI.       HOME MEDICATIONS:  Current Outpatient Medications   Medication Sig Dispense Refill     albuterol (PROAIR HFA/PROVENTIL HFA/VENTOLIN HFA) 108 (90 Base) MCG/ACT inhaler every 4 hours as needed   4     aspirin (ASA) 81 MG tablet Take 81 mg by mouth every morning  90 tablet 3     atorvastatin (LIPITOR) 40 MG tablet TAKE 1 TABLET(40 MG) BY MOUTH EVERY MORNING (Patient taking differently: Take 40 mg by mouth every morning ) 90 tablet 2     cetirizine (ZYRTEC) 10 MG tablet Take 10 mg by mouth every morning  90 tablet 3     fluticasone (FLONASE) 50 MCG/ACT nasal spray Spray 1-2 sprays into both nostrils daily (Patient taking differently: Spray 1-2 sprays into both nostrils as needed ) 16 g 0     fluticasone-salmeterol (ADVAIR) 500-50 MCG/DOSE inhaler Inhale 1 puff into the lungs 2 times daily 1 Inhaler 12     hydrochlorothiazide (HYDRODIURIL) 12.5 MG tablet TAKE 1 TABLET(12.5 MG) BY MOUTH EVERY MORNING (Patient taking differently: Take 12.5 mg by mouth every morning ) 90 tablet 2     montelukast (SINGULAIR) 10 MG tablet  Take 1 tablet (10 mg) by mouth At Bedtime 90 tablet 3     multivitamin (ONE-DAILY) tablet Take 1 tablet by mouth every morning  90 tablet 3     augmented betamethasone dipropionate (DIPROLENE-AF) 0.05 % external cream Apply topically 2 times daily (Patient not taking: Reported on 9/1/2020) 100 g 1         ALLERGIES:  Allergies   Allergen Reactions     Cat Hair Extract Itching     itchy tearful eyes     Adhesive Tape Rash     Iodine Rash         PAST MEDICAL HISTORY:  Past Medical History:   Diagnosis Date     Asthma      Claustrophobia      CPAP (continuous positive airway pressure) dependence      Dyslipidemia      [place] (hard of hearing)      Hypercholesteremia      Hypertension      Iron deficiency      Mediastinal adenopathy      Obesity      BEULAH (obstructive sleep apnea)      Prostate cancer (H)      Pulmonary hypertension (H)      Sarcoidosis          PAST SURGICAL HISTORY:  Past Surgical History:   Procedure Laterality Date     APPENDECTOMY       BIOPSY MASS NECK Left 7/15/2020    Procedure: Left trapezius muscle biopsy;  Surgeon: Ade Villa MD;  [facility] OR     C TOTAL HIP ARTHROPLASTY Bilateral      C TOTAL KNEE ARTHROPLASTY Bilateral      CV RIGHT HEART CATH N/A 12/11/2019    Procedure: CV RIGHT HEART CATH;  Surgeon: Torrey Hirsch MD;  Location: [facility] HEART CARDIAC CATH LAB     DAVINCI PROSTATECTOMY, LYMPHADENECTOMY N/A 5/23/2019    Procedure: ROBOTIC ASSISTED LAPAROSCOPIC RADICAL PROSTATECTOMY WITH BILATERAL PELVIC LYMPH NODE DISSECTION;  Surgeon: Matteo Coughlin MD;  Location: [facility] OR     ENDOBRONCHIAL ULTRASOUND FLEXIBLE N/A 3/29/2019    Procedure: Flexible Bronchoscopy, Endobronchial Ultrasound;  Surgeon: Curtis Leger MD;  Location: [facility] OR     VASECTOMY           SOCIAL HISTORY:  Social History     Socioeconomic History     Marital status:      Spouse name: Not on file     Number of children: Not on file     Years of education: Not on file     Highest education  level: Not on file   Occupational History     Not on file   Social Needs     Financial resource strain: Not on file     Food insecurity     Worry: Not on file     Inability: Not on file     Transportation needs     Medical: Not on file     Non-medical: Not on file   Tobacco Use     Smoking status: Never Smoker     Smokeless tobacco: Never Used   Substance and Sexual Activity     Alcohol use: Yes     Comment: twice a week     Drug use: No     Sexual activity: Yes     Partners: Female   Lifestyle     Physical activity     Days per week: Not on file     Minutes per session: Not on file     Stress: Not on file   Relationships     Social connections     Talks on phone: Not on file     Gets together: Not on file     Attends Adventist service: Not on file     Active member of club or organization: Not on file     Attends meetings of clubs or organizations: Not on file     Relationship status: Not on file     Intimate partner violence     Fear of current or ex partner: Not on file     Emotionally abused: Not on file     Physically abused: Not on file     Forced sexual activity: Not on file   Other Topics Concern     Parent/sibling w/ CABG, MI or angioplasty before 65F 55M? Not Asked   Social History Narrative     Not on file         FAMILY HISTORY:  Family History   Problem Relation Age of Onset     Alzheimer Disease Mother      Heart Disease Father      Glaucoma No family hx of      Macular Degeneration No family hx of      Diabetes No family hx of      Thyroid Disease No family hx of          PHYSICAL EXAM:  Vital signs:  BP (!) 152/82 (BP Location: Right arm, Patient Position: Sitting, Cuff Size: Adult Regular)   Pulse 80   Resp 20   SpO2 95%    ECO  GENERAL/CONSTITUTIONAL: No acute distress.  EYES: No scleral icterus.  NEUROLOGIC: Alert, oriented, answers questions appropriately.  INTEGUMENTARY: No jaundice.  GAIT: Steady, does not use assistive device        LABS:  CBC RESULTS:   Recent Labs   Lab Test  08/25/20  0723   WBC 11.3*   RBC 5.77   HGB 16.7   HCT 51.5   MCV 89   MCH 28.9   MCHC 32.4   RDW 15.2*   *     Recent Labs   Lab Test 08/25/20  0723 05/27/20  0831    139   POTASSIUM 3.7 3.8   CHLORIDE 104 105   CO2 29 24   ANIONGAP 5 10   GLC 89 100*   BUN 13 13   CR 0.86 0.85   ANTONIO 8.6 8.6     Lab Results   Component Value Date    AST 21 08/25/2020     Lab Results   Component Value Date    ALT 40 08/25/2020     No results found for: BILICONJ   Lab Results   Component Value Date    BILITOTAL 0.8 08/25/2020     Lab Results   Component Value Date    ALBUMIN 3.3 08/25/2020     Lab Results   Component Value Date    PROTTOTAL 7.6 08/25/2020      Lab Results   Component Value Date    ALKPHOS 91 08/25/2020       SPEP:  Monoclonal peak:  5/27/20: 0.5 g/dL IgA kappa  9/1/20: 0.4 g/dL    Component      Latest Ref Rng & Units 8/25/2020   [place] Free Lt Chain      0.33 - 1.94 mg/dL 7.81 (H)   Lambda Free Lt Chain      0.57 - 2.63 mg/dL 1.84   Kappa Lambda Ratio      0.26 - 1.65 4.24 (H)     Component      Latest Ref Rng & Units 8/25/2020   IGG      610 - 1,616 mg/dL 1,176   IGA      84 - 499 mg/dL 836 (H)   IGM      35 - 242 mg/dL 70       IMAGING:  Skeletal survey 6/9/20:  No lytic or destructive lesions or evidence of compression  fracture.      ASSESSMENT/PLAN:  Derek Contreras is a 64 year old male with:    1) MGUS: Labs reviewed.  M-spike stable at 0.4 g/dL.  Skeletal survey showed no evidence of lytic lesions.  Renal function, calcium, hemoglobin are normal.  We will monitor this for now.  -RTC in 6 months with labs: CBC, CMP, SPEP, serum free light chains, serum immunoglobulins    2) History of prostate cancer: s/p prostatectomy  -followed by urology    3) COPD, sarcoidosis:  -followed by pulmonology    4) Thrombocytopenia: has been mildly low off and on.  -monitor CBC for now      I spent a total of 15 minutes with the patient, with over >50% of the time in counseling and/or coordination of care.        Radha Trimble MD  Hematology/Oncology  [facility]

## 2020-09-01 NOTE — LETTER
9/1/2020         RE: Derek Contreras  [address] [place] [address]        Dear Colleague,    Thank you for referring your patient, Derek Contreras, to the [facility]. Please see a copy of my visit note below.    [facility]    Hematology/Oncology Established Patient Note      Today's Date: 09/01/20    Reason for Follow-up: MGUS      HISTORY OF PRESENT ILLNESS: Derek Contreras is a 64 year old male with PMHx of sleep apnea, sarcoidosis, COPD, prostate cancer s/p prostatectomy, history of hip and knee replacement, HTN who presents with MGUS. An VLAD was checked by his neurologist, which showed an elevated IgA level.      He says that he feels fantastic currently.      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  Kappa light chain is elevated to 10.73, with kappa/lambda ratio of 4.47.  His hemoglobin, calcium, and renal function are normal.          INTERIM HISTORY: Derek says that he feels great.  He denies any new symptoms currently.      REVIEW OF SYSTEMS:   14 point ROS was reviewed and is negative other than as noted above in HPI.       HOME MEDICATIONS:  Current Outpatient Medications   Medication Sig Dispense Refill     albuterol (PROAIR HFA/PROVENTIL HFA/VENTOLIN HFA) 108 (90 Base) MCG/ACT inhaler every 4 hours as needed   4     aspirin (ASA) 81 MG tablet Take 81 mg by mouth every morning  90 tablet 3     atorvastatin (LIPITOR) 40 MG tablet TAKE 1 TABLET(40 MG) BY MOUTH EVERY MORNING (Patient taking differently: Take 40 mg by mouth every morning ) 90 tablet 2     cetirizine (ZYRTEC) 10 MG tablet Take 10 mg by mouth every morning  90 tablet 3     fluticasone (FLONASE) 50 MCG/ACT nasal spray Spray 1-2 sprays into both nostrils daily (Patient taking differently: Spray 1-2 sprays into both nostrils as needed ) 16 g 0     fluticasone-salmeterol (ADVAIR) 500-50 MCG/DOSE inhaler Inhale 1 puff into the  lungs 2 times daily 1 Inhaler 12     hydrochlorothiazide (HYDRODIURIL) 12.5 MG tablet TAKE 1 TABLET(12.5 MG) BY MOUTH EVERY MORNING (Patient taking differently: Take 12.5 mg by mouth every morning ) 90 tablet 2     montelukast (SINGULAIR) 10 MG tablet Take 1 tablet (10 mg) by mouth At Bedtime 90 tablet 3     multivitamin (ONE-DAILY) tablet Take 1 tablet by mouth every morning  90 tablet 3     augmented betamethasone dipropionate (DIPROLENE-AF) 0.05 % external cream Apply topically 2 times daily (Patient not taking: Reported on 9/1/2020) 100 g 1         ALLERGIES:  Allergies   Allergen Reactions     Cat Hair Extract Itching     itchy tearful eyes     Adhesive Tape Rash     Iodine Rash         PAST MEDICAL HISTORY:  Past Medical History:   Diagnosis Date     Asthma      Claustrophobia      CPAP (continuous positive airway pressure) dependence      Dyslipidemia      [place] (hard of hearing)      Hypercholesteremia      Hypertension      Iron deficiency      Mediastinal adenopathy      Obesity      BEULAH (obstructive sleep apnea)      Prostate cancer (H)      Pulmonary hypertension (H)      Sarcoidosis          PAST SURGICAL HISTORY:  Past Surgical History:   Procedure Laterality Date     APPENDECTOMY       BIOPSY MASS NECK Left 7/15/2020    Procedure: Left trapezius muscle biopsy;  Surgeon: Ade Villa MD;  [facility]     C TOTAL HIP ARTHROPLASTY Bilateral      C TOTAL KNEE ARTHROPLASTY Bilateral      CV RIGHT HEART CATH N/A 12/11/2019    Procedure: CV RIGHT HEART CATH;  Surgeon: Torrey Hirsch MD;  Location:  HEART CARDIAC CATH LAB     DAVINCI PROSTATECTOMY, LYMPHADENECTOMY N/A 5/23/2019    Procedure: ROBOTIC ASSISTED LAPAROSCOPIC RADICAL PROSTATECTOMY WITH BILATERAL PELVIC LYMPH NODE DISSECTION;  Surgeon: Matteo Coughlin MD;  Location:  OR     ENDOBRONCHIAL ULTRASOUND FLEXIBLE N/A 3/29/2019    Procedure: Flexible Bronchoscopy, Endobronchial Ultrasound;  Surgeon: Curtis Leger MD;   Location: [facility] OR     VASECTOMY           SOCIAL HISTORY:  Social History     Socioeconomic History     Marital status:      Spouse name: Not on file     Number of children: Not on file     Years of education: Not on file     Highest education level: Not on file   Occupational History     Not on file   Social Needs     Financial resource strain: Not on file     Food insecurity     Worry: Not on file     Inability: Not on file     Transportation needs     Medical: Not on file     Non-medical: Not on file   Tobacco Use     Smoking status: Never Smoker     Smokeless tobacco: Never Used   Substance and Sexual Activity     Alcohol use: Yes     Comment: twice a week     Drug use: No     Sexual activity: Yes     Partners: Female   Lifestyle     Physical activity     Days per week: Not on file     Minutes per session: Not on file     Stress: Not on file   Relationships     Social connections     Talks on phone: Not on file     Gets together: Not on file     Attends Spiritism service: Not on file     Active member of club or organization: Not on file     Attends meetings of clubs or organizations: Not on file     Relationship status: Not on file     Intimate partner violence     Fear of current or ex partner: Not on file     Emotionally abused: Not on file     Physically abused: Not on file     Forced sexual activity: Not on file   Other Topics Concern     Parent/sibling w/ CABG, MI or angioplasty before 65F 55M? Not Asked   Social History Narrative     Not on file         FAMILY HISTORY:  Family History   Problem Relation Age of Onset     Alzheimer Disease Mother      Heart Disease Father      Glaucoma No family hx of      Macular Degeneration No family hx of      Diabetes No family hx of      Thyroid Disease No family hx of          PHYSICAL EXAM:  Vital signs:  BP (!) 152/82 (BP Location: Right arm, Patient Position: Sitting, Cuff Size: Adult Regular)   Pulse 80   Resp 20   SpO2 95%    ECOG:  0  GENERAL/CONSTITUTIONAL: No acute distress.  EYES: No scleral icterus.  NEUROLOGIC: Alert, oriented, answers questions appropriately.  INTEGUMENTARY: No jaundice.  GAIT: Steady, does not use assistive device        LABS:  CBC RESULTS:   Recent Labs   Lab Test 08/25/20  0723   WBC 11.3*   RBC 5.77   HGB 16.7   HCT 51.5   MCV 89   MCH 28.9   MCHC 32.4   RDW 15.2*   *     Recent Labs   Lab Test 08/25/20  0723 05/27/20  0831    139   POTASSIUM 3.7 3.8   CHLORIDE 104 105   CO2 29 24   ANIONGAP 5 10   GLC 89 100*   BUN 13 13   CR 0.86 0.85   ANTONIO 8.6 8.6     Lab Results   Component Value Date    AST 21 08/25/2020     Lab Results   Component Value Date    ALT 40 08/25/2020     No results found for: BILICONJ   Lab Results   Component Value Date    BILITOTAL 0.8 08/25/2020     Lab Results   Component Value Date    ALBUMIN 3.3 08/25/2020     Lab Results   Component Value Date    PROTTOTAL 7.6 08/25/2020      Lab Results   Component Value Date    ALKPHOS 91 08/25/2020       SPEP:  Monoclonal peak:  5/27/20: 0.5 g/dL IgA kappa  9/1/20: 0.4 g/dL    Component      Latest Ref Rng & Units 8/25/2020   Vand[place]oort Free Lt Chain      0.33 - 1.94 mg/dL 7.81 (H)   Lambda Free Lt Chain      0.57 - 2.63 mg/dL 1.84   Kappa Lambda Ratio      0.26 - 1.65 4.24 (H)     Component      Latest Ref Rng & Units 8/25/2020   IGG      610 - 1,616 mg/dL 1,176   IGA      84 - 499 mg/dL 836 (H)   IGM      35 - 242 mg/dL 70       IMAGING:  Skeletal survey 6/9/20:  No lytic or destructive lesions or evidence of compression  fracture.      ASSESSMENT/PLAN:  Derek Contreras is a 64 year old male with:    1) MGUS: Labs reviewed.  M-spike stable at 0.4 g/dL.  Skeletal survey showed no evidence of lytic lesions.  Renal function, calcium, hemoglobin are normal.  We will monitor this for now.  -RTC in 6 months with labs: CBC, CMP, SPEP, serum free light chains, serum immunoglobulins    2) History of prostate cancer: s/p prostatectomy  -followed by  urology    3) COPD, sarcoidosis:  -followed by pulmonology    4) Thrombocytopenia: has been mildly low off and on.  -monitor CBC for now      I spent a total of 15 minutes with the patient, with over >50% of the time in counseling and/or coordination of care.       Radha Trimble MD  Hematology/Oncology  [facility]    Again, thank you for allowing me to participate in the care of your patient.        Sincerely,        Radha Trimble MD

## 2020-09-02 NOTE — PATIENT INSTRUCTIONS
Labs scheduled 2/23/21  Appt with Dr. Trimble 3/2/21  Christiane Mckinney RN on 9/2/2020 at 2:20 PM

## 2020-09-04 ENCOUNTER — TELEPHONE (OUTPATIENT)
Dept: UROLOGY | Facility: CLINIC | Age: 65
End: 2020-09-04

## 2020-09-04 NOTE — TELEPHONE ENCOUNTER
Called patient and informed him he would need a PSA. Order was already placed. Gave him number for BV lab.     Darya Braun LPN

## 2020-09-04 NOTE — TELEPHONE ENCOUNTER
[facility] Call Center    Phone Message    May a detailed message be left on voicemail: yes     Reason for Call: Order(s): Other:   Reason for requested: pt has an appt on 12/7, and is wondering if he needs labs before? Please call pt thanks  Date needed: before dec 7th  Provider name:       Action Taken: Message routed to:  [facility] urology    Travel Screening: Not Applicable

## 2020-09-09 ENCOUNTER — MYC MEDICAL ADVICE (OUTPATIENT)
Dept: INTERNAL MEDICINE | Facility: CLINIC | Age: 65
End: 2020-09-09

## 2020-09-09 DIAGNOSIS — L02.419 CELLULITIS AND ABSCESS OF LEG: Primary | ICD-10-CM
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
## 2020-09-09 DIAGNOSIS — L03.119 CELLULITIS AND ABSCESS OF LEG: Primary | ICD-10-CM

## 2020-09-09 RX ORDER — DOXYCYCLINE 100 MG/1
100 CAPSULE ORAL 2 TIMES DAILY
Qty: 20 CAPSULE | Refills: 0 | Status: SHIPPED | OUTPATIENT
Start: 2020-09-09 | End: 2020-10-14

## 2020-09-09 NOTE — TELEPHONE ENCOUNTER
Please review MyChart message and photos from patient and advise regarding rash. Routed to covering provider - Megan Avendano to review.

## 2020-09-09 NOTE — TELEPHONE ENCOUNTER
Evogent message sent to patient informing him prescription was sent to the pharmacy for Doxycycline.

## 2020-09-09 NOTE — TELEPHONE ENCOUNTER
Not sure what may have bit him   There is redness outside the [employer] in skin  Would like to do doxycycline for 10 days

## 2020-09-21 LAB
LAB SCANNED RESULT: NORMAL
MISCELLANEOUS TEST: NORMAL

## 2020-09-25 ENCOUNTER — TELEPHONE (OUTPATIENT)
Dept: PULMONOLOGY | Facility: CLINIC | Age: 65
End: 2020-09-25

## 2020-09-25 NOTE — TELEPHONE ENCOUNTER
Discussed symptoms and medications with patient as follows:    Current respiratory symptoms:  More SOB (developed in last couple of weeks; about a month after finishing a prednisone burst with Zpak)  Cough with clear phlegm; feels like cough is coming from upper chest  No increase in cough when laying down  No fevers, chills, body aches    No discomfort with indigestion after eating  Patient does not feel I its acid reflux or post nasal drip related    Current related medications:  -Advair inhaler; takes at night (two puffs); has been on 6/10/20  (Was on Breo prior to Advair; used for over a year; feels like this is more helpful than Advair).   -Omeprazole 40 mg caps (takes 2 capsules daily)  -Singulair at bedtime    Not taking:  -Albuterol; last took maybe over 6 months ago    Other medication notes:  -Not using OTC mediations   -Completed Prednisone burst + zpak in August (had green phlegm and SOB at that time); covid negative.     Other:  Walks daily but in past couple of weeks has had to stop to catch breath.

## 2020-09-25 NOTE — TELEPHONE ENCOUNTER
----- Message from Jamie Martin MD sent at 9/25/2020 11:27 AM CDT -----  Regarding: Cough etc  H Lucy,     He has had cough intermittently and I have been concerned about allergies.     We will need to review his medications and also if he has post nasal drainage. He could have GERD.     Once we know all of these we can decide what to do.     Could you call him and find out all his symptoms and meds    Thanks    Jamie

## 2020-10-02 DIAGNOSIS — I10 ESSENTIAL HYPERTENSION: ICD-10-CM

## 2020-10-02 DIAGNOSIS — E78.00 HYPERCHOLESTEREMIA: ICD-10-CM

## 2020-10-02 RX ORDER — ATORVASTATIN CALCIUM 40 MG/1
TABLET, FILM COATED ORAL
Qty: 90 TABLET | Refills: 0 | Status: SHIPPED | OUTPATIENT
Start: 2020-10-02 | End: 2020-11-10

## 2020-10-02 RX ORDER — HYDROCHLOROTHIAZIDE 12.5 MG/1
TABLET ORAL
Qty: 90 TABLET | Refills: 0 | Status: SHIPPED | OUTPATIENT
Start: 2020-10-02 | End: 2020-11-10

## 2020-10-14 ENCOUNTER — ANCILLARY PROCEDURE (OUTPATIENT)
Dept: CT IMAGING | Facility: CLINIC | Age: 65
End: 2020-10-14
Attending: INTERNAL MEDICINE
Payer: MEDICARE

## 2020-10-14 ENCOUNTER — OFFICE VISIT (OUTPATIENT)
Dept: PULMONOLOGY | Facility: CLINIC | Age: 65
End: 2020-10-14
Attending: INTERNAL MEDICINE
Payer: MEDICARE

## 2020-10-14 VITALS
RESPIRATION RATE: 18 BRPM | SYSTOLIC BLOOD PRESSURE: 150 MMHG | DIASTOLIC BLOOD PRESSURE: 90 MMHG | HEART RATE: 104 BPM | BODY MASS INDEX: 41.23 KG/M2 | WEIGHT: 304 LBS | OXYGEN SATURATION: 93 %

## 2020-10-14 DIAGNOSIS — J84.9 ILD (INTERSTITIAL LUNG DISEASE) (H): ICD-10-CM

## 2020-10-14 DIAGNOSIS — Z85.46 PERSONAL HISTORY OF MALIGNANT NEOPLASM OF PROSTATE: ICD-10-CM

## 2020-10-14 DIAGNOSIS — J84.9 ILD (INTERSTITIAL LUNG DISEASE) (H): Primary | ICD-10-CM

## 2020-10-14 DIAGNOSIS — D86.9 SARCOIDOSIS: ICD-10-CM

## 2020-10-14 DIAGNOSIS — J30.2 SEASONAL ALLERGIES: ICD-10-CM

## 2020-10-14 LAB
6 MIN WALK (FT): 715 FT
6 MIN WALK (M): 218 M
ANION GAP SERPL CALCULATED.3IONS-SCNC: 5 MMOL/L (ref 3–14)
BASOPHILS # BLD AUTO: 0 10E9/L (ref 0–0.2)
BASOPHILS NFR BLD AUTO: 0.5 %
BUN SERPL-MCNC: 12 MG/DL (ref 7–30)
CALCIUM SERPL-MCNC: 8.9 MG/DL (ref 8.5–10.1)
CHLORIDE SERPL-SCNC: 107 MMOL/L (ref 94–109)
CO2 SERPL-SCNC: 27 MMOL/L (ref 20–32)
CREAT SERPL-MCNC: 0.91 MG/DL (ref 0.66–1.25)
DIFFERENTIAL METHOD BLD: NORMAL
EOSINOPHIL # BLD AUTO: 0.1 10E9/L (ref 0–0.7)
EOSINOPHIL NFR BLD AUTO: 1.3 %
ERYTHROCYTE [DISTWIDTH] IN BLOOD BY AUTOMATED COUNT: 14.1 % (ref 10–15)
GFR SERPL CREATININE-BSD FRML MDRD: 88 ML/MIN/{1.73_M2}
GLUCOSE SERPL-MCNC: 100 MG/DL (ref 70–99)
HCT VFR BLD AUTO: 50.5 % (ref 40–53)
HGB BLD-MCNC: 16.2 G/DL (ref 13.3–17.7)
IMM GRANULOCYTES # BLD: 0 10E9/L (ref 0–0.4)
IMM GRANULOCYTES NFR BLD: 0.4 %
LYMPHOCYTES # BLD AUTO: 1.1 10E9/L (ref 0.8–5.3)
LYMPHOCYTES NFR BLD AUTO: 14.7 %
MCH RBC QN AUTO: 28.6 PG (ref 26.5–33)
MCHC RBC AUTO-ENTMCNC: 32.1 G/DL (ref 31.5–36.5)
MCV RBC AUTO: 89 FL (ref 78–100)
MONOCYTES # BLD AUTO: 0.7 10E9/L (ref 0–1.3)
MONOCYTES NFR BLD AUTO: 9.8 %
NEUTROPHILS # BLD AUTO: 5.5 10E9/L (ref 1.6–8.3)
NEUTROPHILS NFR BLD AUTO: 73.3 %
NRBC # BLD AUTO: 0 10*3/UL
NRBC BLD AUTO-RTO: 0 /100
PLATELET # BLD AUTO: 162 10E9/L (ref 150–450)
POTASSIUM SERPL-SCNC: 3.6 MMOL/L (ref 3.4–5.3)
PSA SERPL-MCNC: <0.01 UG/L (ref 0–4)
RBC # BLD AUTO: 5.67 10E12/L (ref 4.4–5.9)
SODIUM SERPL-SCNC: 139 MMOL/L (ref 133–144)
WBC # BLD AUTO: 7.5 10E9/L (ref 4–11)

## 2020-10-14 PROCEDURE — 94618 PULMONARY STRESS TESTING: CPT | Performed by: INTERNAL MEDICINE

## 2020-10-14 PROCEDURE — 94729 DIFFUSING CAPACITY: CPT | Performed by: INTERNAL MEDICINE

## 2020-10-14 PROCEDURE — 70486 CT MAXILLOFACIAL W/O DYE: CPT | Performed by: RADIOLOGY

## 2020-10-14 PROCEDURE — G0463 HOSPITAL OUTPT CLINIC VISIT: HCPCS | Mod: 25

## 2020-10-14 PROCEDURE — 99214 OFFICE O/P EST MOD 30 MIN: CPT | Mod: 25 | Performed by: INTERNAL MEDICINE

## 2020-10-14 PROCEDURE — 36415 COLL VENOUS BLD VENIPUNCTURE: CPT | Performed by: PATHOLOGY

## 2020-10-14 PROCEDURE — 71250 CT THORAX DX C-: CPT | Mod: GC | Performed by: RADIOLOGY

## 2020-10-14 PROCEDURE — 84153 ASSAY OF PSA TOTAL: CPT | Performed by: PATHOLOGY

## 2020-10-14 PROCEDURE — 80048 BASIC METABOLIC PNL TOTAL CA: CPT | Performed by: PATHOLOGY

## 2020-10-14 PROCEDURE — 82785 ASSAY OF IGE: CPT | Performed by: PATHOLOGY

## 2020-10-14 PROCEDURE — 94375 RESPIRATORY FLOW VOLUME LOOP: CPT | Performed by: INTERNAL MEDICINE

## 2020-10-14 PROCEDURE — 85025 COMPLETE CBC W/AUTO DIFF WBC: CPT | Performed by: PATHOLOGY

## 2020-10-14 PROCEDURE — 94726 PLETHYSMOGRAPHY LUNG VOLUMES: CPT | Performed by: INTERNAL MEDICINE

## 2020-10-14 ASSESSMENT — PAIN SCALES - GENERAL: PAINLEVEL: NO PAIN (0)

## 2020-10-14 NOTE — LETTER
10/14/2020         RE: Derek Contreras  [address] [place] [address]        Dear Colleague,    Thank you for referring your patient, Derek Contreras, to the [facility] LUNG [facility] AND [facility]. Please see a copy of my visit note below.    Reason for Visit  Derek Contreras is a 64 year old year old male who is being seen for  Pulmonary HPI    The patient was seen and examined by Jamie Martin MD     Mr. Contreras comes for follow-up visit for sarcoidosis.  He was last evaluated via virtual visit in June.  At that time there was concern regarding persistent troublesome cough for which his inhalers were changed from Breo to Advair and Singulair was added to his regimen.  He was already on Zantac and omeprazole empiric treatment.  He called in August and reported no improvement in his symptoms.  In fact he had worsening and productive cough for which a course of prednisone and Z-Eleazar was given.  This improved his symptoms for 3 weeks but over the last several weeks the symptoms have reappeared.  Because he believes that Breo was more effective than Advair switch back to Breo.    In the meantime he also had a trapezius biopsy for further evaluation of truncal weakness.  There was some evidence of fiber type grouping in a few regions which could be suggestive of mild denervation and reinnervation of the muscle.  However the findings were thought to be too subtle to be diagnostic.    Today the patient reports cough with intermittent clear sputum.  He also reportsl exertional shortness of breath.  He denies any sinus drainage or gastroesophageal reflux.  No fevers chills.  No exposure to: And no other sick contacts.      Current Outpatient Medications   Medication     albuterol (PROAIR HFA/PROVENTIL HFA/VENTOLIN HFA) 108 (90 Base) MCG/ACT inhaler     aspirin (ASA) 81 MG tablet     atorvastatin (LIPITOR) 40 MG tablet     BREO ELLIPTA 200-25 MCG/INH  Inhaler     cetirizine (ZYRTEC) 10 MG tablet     fluticasone (FLONASE) 50 MCG/ACT nasal spray     hydrochlorothiazide (HYDRODIURIL) 12.5 MG tablet     montelukast (SINGULAIR) 10 MG tablet     multivitamin (ONE-DAILY) tablet     augmented betamethasone dipropionate (DIPROLENE-AF) 0.05 % external cream     doxycycline hyclate (VIBRAMYCIN) 100 MG capsule     fluticasone-salmeterol (ADVAIR) 500-50 MCG/DOSE inhaler     No current facility-administered medications for this visit.      Allergies   Allergen Reactions     Cat Hair Extract Itching     itchy tearful eyes     Adhesive Tape Rash     Iodine Rash     Past Medical History:   Diagnosis Date     Asthma      Claustrophobia      CPAP (continuous positive airway pressure) dependence      Dyslipidemia      [place] (hard of hearing)      Hypercholesteremia      Hypertension      Iron deficiency      Mediastinal adenopathy      Obesity      BEULAH (obstructive sleep apnea)      Prostate cancer (H)      Pulmonary hypertension (H)      Sarcoidosis        Past Surgical History:   Procedure Laterality Date     APPENDECTOMY       BIOPSY MASS NECK Left 7/15/2020    Procedure: Left trapezius muscle biopsy;  Surgeon: Ade Villa MD;  [facility]     C TOTAL HIP ARTHROPLASTY Bilateral      C TOTAL KNEE ARTHROPLASTY Bilateral      CV RIGHT HEART CATH N/A 12/11/2019    Procedure: CV RIGHT HEART CATH;  Surgeon: Torrey Hirsch MD;  [facility]     DAVINCI PROSTATECTOMY, LYMPHADENECTOMY N/A 5/23/2019    Procedure: ROBOTIC ASSISTED LAPAROSCOPIC RADICAL PROSTATECTOMY WITH BILATERAL PELVIC LYMPH NODE DISSECTION;  Surgeon: Matteo Coughlin MD;  Location: [facility] OR     ENDOBRONCHIAL ULTRASOUND FLEXIBLE N/A 3/29/2019    Procedure: Flexible Bronchoscopy, Endobronchial Ultrasound;  Surgeon: Curtis Leger MD;  Location: [facility] OR     VASECTOMY         Social History     Socioeconomic History     Marital status:      Spouse name: Not on file      Number of children: Not on file     Years of education: Not on file     Highest education level: Not on file   Occupational History     Not on file   Social Needs     Financial resource strain: Not on file     Food insecurity     Worry: Not on file     Inability: Not on file     Transportation needs     Medical: Not on file     Non-medical: Not on file   Tobacco Use     Smoking status: Never Smoker     Smokeless tobacco: Never Used   Substance and Sexual Activity     Alcohol use: Yes     Comment: twice a week     Drug use: No     Sexual activity: Yes     Partners: Female   Lifestyle     Physical activity     Days per week: Not on file     Minutes per session: Not on file     Stress: Not on file   Relationships     Social connections     Talks on phone: Not on file     Gets together: Not on file     Attends [employer] service: Not on file     Active member of club or organization: Not on file     Attends meetings of clubs or organizations: Not on file     Relationship status: Not on file     Intimate partner violence     Fear of current or ex partner: Not on file     Emotionally abused: Not on file     Physically abused: Not on file     Forced sexual activity: Not on file   Other Topics Concern     Parent/sibling w/ CABG, MI or angioplasty before 65F 55M? Not Asked   Social History Narrative     Not on file       ROS Pulmonary  A complete ROS was otherwise negative except as noted in the HPI.  BP (!) 150/90   Pulse 104   Resp 18   Wt 137.9 kg (304 lb)   SpO2 93%   BMI 41.23 kg/m    Exam:   GENERAL APPEARANCE: Alert, and in no apparent distress.  EYES: PERRL, EOMI  HENT: Nasal mucosa with no edema and no hyperemia.   MOUTH: Oral mucosa is moist, without any lesions, no tonsillar enlargement, no oropharyngeal exudate.  NECK: supple, no masses, no thyromegaly.  LYMPHATICS: No significant axillary, cervical, or supraclavicular nodes.  RESP: normal percussion, good air flow throughout.  No crackles. No rhonchi. No  wheezes.  CV: Normal S1, S2, regular rhythm, normal rate. No murmur.  No rub. No gallop. No LE edema.   MS: extremities normal. No clubbing. No cyanosis.  SKIN: no rash on limited exam  NEURO: Mentation intact, speech normal, normal strength and tone, normal gait and stance    Results:    PFTs done today were reviewed by me with the patient. 6-minute walk test was also done.  A 6-minute walk distance was below the lower limit of normal.  Pre-walk O2 saturation was 99% and the lowest O2 saturation during the walk was 89%.  FVC is 1.94 L (40%), FEV1 1.57 L (43%) and the ratio is 81.  Total lung capacity is 4.48 L (59%) and the diffusion is 18.59 (65%).  My impression is that he has moderate restriction with moderate decrease in diffusion capacity.  In comparison to prior spirometry on 2/5/2020 there is a 250 cc decrease in FVC and 240 cc decrease in total lung capacity.  Unable to do FeNO as equipment is not working today.  MIP map also ordered and pending.    Trapezius biopsy 7/15/2020 DIAGNOSIS: Normal skeletal muscle COMMENT: There was no evidence on this biopsy of any myopathic process. The mild tendency towards fiber type grouping noted in a few regions may be suggestive of mild denervation and reinnervation of muscle. However, this finding was felt to be too subtle to be considered diagnostic.     Lab studies today show a normal electrolyte panel.  Calcium is 8.9.  CBC with differential is normal.  Hemoglobin is 16.2.  Platelet count is 162. No eosinophilia.     IgE levels pending    Sputum culture and Gram stain are pending.    CT chest: 1. No acute airspace disease. 2. Unchanged mediastinal and hilar lymphadenopathy, sequela of  Sarcoidosis. 3. Unchanged scattered solid pulmonary nodules. No new enlarging or suspicious pulmonary nodule.    CT Sinus:  Impression:   No evidence of sinusitis.      Assessment and plan:  64-year-old male with history of hypertension, hyperlipidemia, obesity, history of prostate  cancer, abnormal chest imaging with TBNA (3/29/2019) demonstrating granulomatous Inflammation (station 7 and 12 L lymph nodes) with BAL demonstrating 102 white cells and 11% lymphocytes.  The CD4 CD8 ratio was 2.29.  Cardiac MRI with no LGE to suggest cardiac sarcoidosis but abnormal EF of 50%.  RV dilatation of unclear etiology but no pulmonary hypertension based on right heart cath with mean PAP 17 mm Hg (12/11/2019) .  Concern for truncal/diaphragmatic weakness with trapezius biopsy in June 2020 without any convincing evidence of myopathy.  1.  Pulmonary: Restrictive PFTs likely related to neuromuscular weakness.  Will get MIP/MEP.  Extensive work-up thus far nondiagnostic.  Wondering if airway sarcoid versus diaphragmatic involvement by sarcoidosis will explain restrictive physiology.  His CT scan today again has minimal findings to explain a total lung capacity of 59% of predicted.  2.  Extrapulmonary sarcoidosis: Extensive cardiac evaluation.  He has EF of 50% on CMR and decreased RVEF but no pulmonary hypertension.  Right bundle branch block on EKG.  Both CMR and cardiac PET without any evidence of cardiac sarcoidosis.  Ambulatory EKG monitoring with nonsustained ventricular tachycardia.  Concern for microvascular coronary disease with a plan to obtain CMR with stress.  This was planned for June and somehow has not happened.  We will review his case with cardiology during the multidisciplinary discussion meeting.  Also extensive neurological evlaution  but no convincing evidence of myopathy.  Metabolic labs within normal limits today.  3. Cough with clear sputum: Unclear if this is related to allergic inflammation.  His sinus CT is normal.  He has no gastroesophageal reflux and is no improvement with empiric PPI.  We will check sputum Gram stain and culture today. For some reason his pulmonary symptoms responds to moderate dose prednisone with antibiotics.  Wondering if this could be a reflection of airway  sarcoid.  He does not have any obstruction on his PFTs.  Possibilities include direct visualization by bronchoscopy  We will add mycobacterial cultures to the sputum  4.  Exertional hypoxia: Possible exercise induced pulmonary hypertension vs diastolic dysfunction.   Addendum: The patient did not get FeNO as the equipment was not functioning properly.  Sputum cultures with mixed gram-negative and positive jalil greater than 25 WBCs and less than 10 epithelial cells.  He had moderate growth of normal jaill.  IgE levels elevated at 257.  Discussed the above with the patient.  Still waiting for FeNO, MIP and MAP.  He briefly stopped Zyrtec and had worsening symptoms including itching and watering of the eyes.  Plan to proceed with Zyrtec, Singulair.  He may benefit from a 10-day course of Unasyn.  Wondering if he could have aspiration events.  We will also add mycobacterial cultures to the sputum cultures already available to evaluate for atypical mycobacterial disease.  This note consists of symbols derived from keyboarding, dictation and/or voice recognition software. As a result, there may be errors in the script that have gone undetected. Please consider this when interpreting information found in this chart    Again, thank you for allowing me to participate in the care of your patient.        Sincerely,        Jamie Martin MD

## 2020-10-14 NOTE — PROGRESS NOTES
Reason for Visit  Derek Contreras is a 64 year old year old male who is being seen for  Pulmonary HPI    The patient was seen and examined by Jamie Martin MD     Mr. Contreras comes for follow-up visit for sarcoidosis.  He was last evaluated via virtual visit in June.  At that time there was concern regarding persistent troublesome cough for which his inhalers were changed from Breo to Advair and Singulair was added to his regimen.  He was already on Zantac and omeprazole empiric treatment.  He called in August and reported no improvement in his symptoms.  In fact he had worsening and productive cough for which a course of prednisone and Z-Eleazar was given.  This improved his symptoms for 3 weeks but over the last several weeks the symptoms have reappeared.  Because he believes that Breo was more effective than Advair switch back to Breo.    In the meantime he also had a trapezius biopsy for further evaluation of truncal weakness.  There was some evidence of fiber type grouping in a few regions which could be suggestive of mild denervation and reinnervation of the muscle.  However the findings were thought to be too subtle to be diagnostic.    Today the patient reports cough with intermittent clear sputum.  He also reportsl exertional shortness of breath.  He denies any sinus drainage or gastroesophageal reflux.  No fevers chills.  No exposure to: And no other sick contacts.      Current Outpatient Medications   Medication     albuterol (PROAIR HFA/PROVENTIL HFA/VENTOLIN HFA) 108 (90 Base) MCG/ACT inhaler     aspirin (ASA) 81 MG tablet     atorvastatin (LIPITOR) 40 MG tablet     BREO ELLIPTA 200-25 MCG/INH Inhaler     cetirizine (ZYRTEC) 10 MG tablet     fluticasone (FLONASE) 50 MCG/ACT nasal spray     hydrochlorothiazide (HYDRODIURIL) 12.5 MG tablet     montelukast (SINGULAIR) 10 MG tablet     multivitamin (ONE-DAILY) tablet     augmented betamethasone dipropionate (DIPROLENE-AF) 0.05 % external cream      doxycycline hyclate (VIBRAMYCIN) 100 MG capsule     fluticasone-salmeterol (ADVAIR) 500-50 MCG/DOSE inhaler     No current facility-administered medications for this visit.      Allergies   Allergen Reactions     Cat Hair Extract Itching     itchy tearful eyes     Adhesive Tape Rash     Iodine Rash     Past Medical History:   Diagnosis Date     Asthma      Claustrophobia      CPAP (continuous positive airway pressure) dependence      Dyslipidemia      [place] (hard of hearing)      Hypercholesteremia      Hypertension      Iron deficiency      Mediastinal adenopathy      Obesity      BEULAH (obstructive sleep apnea)      Prostate cancer (H)      Pulmonary hypertension (H)      Sarcoidosis        Past Surgical History:   Procedure Laterality Date     APPENDECTOMY       BIOPSY MASS NECK Left 7/15/2020    Procedure: Left trapezius muscle biopsy;  Surgeon: Ade iVlla MD;  Location: [facility] OR     C TOTAL HIP ARTHROPLASTY Bilateral      C TOTAL KNEE ARTHROPLASTY Bilateral      CV RIGHT HEART CATH N/A 12/11/2019    Procedure: CV RIGHT HEART CATH;  Surgeon: Torrey Hirsch MD;  Location: [facility]     DAVINCI PROSTATECTOMY, LYMPHADENECTOMY N/A 5/23/2019    Procedure: ROBOTIC ASSISTED LAPAROSCOPIC RADICAL PROSTATECTOMY WITH BILATERAL PELVIC LYMPH NODE DISSECTION;  Surgeon: Matteo Coughlin MD;  Location: [facility] OR     ENDOBRONCHIAL ULTRASOUND FLEXIBLE N/A 3/29/2019    Procedure: Flexible Bronchoscopy, Endobronchial Ultrasound;  Surgeon: Curtis Leger MD;  Location: [facility] OR     VASECTOMY         Social History     Socioeconomic History     Marital status:      Spouse name: Not on file     Number of children: Not on file     Years of education: Not on file     Highest education level: Not on file   Occupational History     Not on file   Social Needs     Financial resource strain: Not on file     Food insecurity     Worry: Not on file     Inability: Not on file     Transportation needs      Medical: Not on file     Non-medical: Not on file   Tobacco Use     Smoking status: Never Smoker     Smokeless tobacco: Never Used   Substance and Sexual Activity     Alcohol use: Yes     Comment: twice a week     Drug use: No     Sexual activity: Yes     Partners: Female   Lifestyle     Physical activity     Days per week: Not on file     Minutes per session: Not on file     Stress: Not on file   Relationships     Social connections     Talks on phone: Not on file     Gets together: Not on file     Attends [employer] service: Not on file     Active member of club or organization: Not on file     Attends meetings of clubs or organizations: Not on file     Relationship status: Not on file     Intimate partner violence     Fear of current or ex partner: Not on file     Emotionally abused: Not on file     Physically abused: Not on file     Forced sexual activity: Not on file   Other Topics Concern     Parent/sibling w/ CABG, MI or angioplasty before 65F 55M? Not Asked   Social History Narrative     Not on file       ROS Pulmonary  A complete ROS was otherwise negative except as noted in the HPI.  BP (!) 150/90   Pulse 104   Resp 18   Wt 137.9 kg (304 lb)   SpO2 93%   BMI 41.23 kg/m    Exam:   GENERAL APPEARANCE: Alert, and in no apparent distress.  EYES: PERRL, EOMI  HENT: Nasal mucosa with no edema and no hyperemia.   MOUTH: Oral mucosa is moist, without any lesions, no tonsillar enlargement, no oropharyngeal exudate.  NECK: supple, no masses, no thyromegaly.  LYMPHATICS: No significant axillary, cervical, or supraclavicular nodes.  RESP: normal percussion, good air flow throughout.  No crackles. No rhonchi. No wheezes.  CV: Normal S1, S2, regular rhythm, normal rate. No murmur.  No rub. No gallop. No LE edema.   MS: extremities normal. No clubbing. No cyanosis.  SKIN: no rash on limited exam  NEURO: Mentation intact, speech normal, normal strength and tone, normal gait and stance    Results:    PFTs done today  were reviewed by me with the patient. 6-minute walk test was also done.  A 6-minute walk distance was below the lower limit of normal.  Pre-walk O2 saturation was 99% and the lowest O2 saturation during the walk was 89%.  FVC is 1.94 L (40%), FEV1 1.57 L (43%) and the ratio is 81.  Total lung capacity is 4.48 L (59%) and the diffusion is 18.59 (65%).  My impression is that he has moderate restriction with moderate decrease in diffusion capacity.  In comparison to prior spirometry on 2/5/2020 there is a 250 cc decrease in FVC and 240 cc decrease in total lung capacity.  Unable to do FeNO as equipment is not working today.  MIP map also ordered and pending.    Trapezius biopsy 7/15/2020 DIAGNOSIS: Normal skeletal muscle COMMENT: There was no evidence on this biopsy of any myopathic process. The mild tendency towards fiber type grouping noted in a few regions may be suggestive of mild denervation and reinnervation of muscle. However, this finding was felt to be too subtle to be considered diagnostic.     Lab studies today show a normal electrolyte panel.  Calcium is 8.9.  CBC with differential is normal.  Hemoglobin is 16.2.  Platelet count is 162. No eosinophilia.     IgE levels pending    Sputum culture and Gram stain are pending.    CT chest: 1. No acute airspace disease. 2. Unchanged mediastinal and hilar lymphadenopathy, sequela of  Sarcoidosis. 3. Unchanged scattered solid pulmonary nodules. No new enlarging or suspicious pulmonary nodule.    CT Sinus:  Impression:   No evidence of sinusitis.      Assessment and plan:  64-year-old male with history of hypertension, hyperlipidemia, obesity, history of prostate cancer, abnormal chest imaging with TBNA (3/29/2019) demonstrating granulomatous Inflammation (station 7 and 12 L lymph nodes) with BAL demonstrating 102 white cells and 11% lymphocytes.  The CD4 CD8 ratio was 2.29.  Cardiac MRI with no LGE to suggest cardiac sarcoidosis but abnormal EF of 50%.  RV  dilatation of unclear etiology but no pulmonary hypertension based on right heart cath with mean PAP 17 mm Hg (12/11/2019) .  Concern for truncal/diaphragmatic weakness with trapezius biopsy in June 2020 without any convincing evidence of myopathy.  1.  Pulmonary: Restrictive PFTs likely related to neuromuscular weakness.  Will get MIP/MEP.  Extensive work-up thus far nondiagnostic.  Wondering if airway sarcoid versus diaphragmatic involvement by sarcoidosis will explain restrictive physiology.  His CT scan today again has minimal findings to explain a total lung capacity of 59% of predicted.  2.  Extrapulmonary sarcoidosis: Extensive cardiac evaluation.  He has EF of 50% on CMR and decreased RVEF but no pulmonary hypertension.  Right bundle branch block on EKG.  Both CMR and cardiac PET without any evidence of cardiac sarcoidosis.  Ambulatory EKG monitoring with nonsustained ventricular tachycardia.  Concern for microvascular coronary disease with a plan to obtain CMR with stress.  This was planned for June and somehow has not happened.  We will review his case with cardiology during the multidisciplinary discussion meeting.  Also extensive neurological evlaution  but no convincing evidence of myopathy.  Metabolic labs within normal limits today.  3. Cough with clear sputum: Unclear if this is related to allergic inflammation.  His sinus CT is normal.  He has no gastroesophageal reflux and is no improvement with empiric PPI.  We will check sputum Gram stain and culture today. For some reason his pulmonary symptoms responds to moderate dose prednisone with antibiotics.  Wondering if this could be a reflection of airway sarcoid.  He does not have any obstruction on his PFTs.  Possibilities include direct visualization by bronchoscopy  We will add mycobacterial cultures to the sputum  4.  Exertional hypoxia: Possible exercise induced pulmonary hypertension vs diastolic dysfunction.   Addendum: The patient did not get  FeNO as the equipment was not functioning properly.  Sputum cultures with mixed gram-negative and positive jalil greater than 25 WBCs and less than 10 epithelial cells.  He had moderate growth of normal jalil.  IgE levels elevated at 257.  Discussed the above with the patient.  Still waiting for FeNO, MIP and MAP.  He briefly stopped Zyrtec and had worsening symptoms including itching and watering of the eyes.  Plan to proceed with Zyrtec, Singulair.  He may benefit from a 10-day course of Unasyn.  Wondering if he could have aspiration events.  We will also add mycobacterial cultures to the sputum cultures already available to evaluate for atypical mycobacterial disease.  This note consists of symbols derived from keyboarding, dictation and/or voice recognition software. As a result, there may be errors in the script that have gone undetected. Please consider this when interpreting information found in this chart      Addendum: FeNO elevated. MIP and MEP not significantly changed. Proceed with prior plan to consult allergy     Jamie Martin MD

## 2020-10-14 NOTE — NURSING NOTE
Chief Complaint   Patient presents with     Interstitial Lung Disease (ILD)     Sarcoids follow up      Medications reviewed and updated.  Vitals taken  Hansa Bustamante CMA

## 2020-10-15 DIAGNOSIS — J84.9 ILD (INTERSTITIAL LUNG DISEASE) (H): ICD-10-CM

## 2020-10-15 LAB
DLCOUNC-%PRED-PRE: 65 %
DLCOUNC-PRE: 18.59 ML/MIN/MMHG
DLCOUNC-PRED: 28.44 ML/MIN/MMHG
ERV-%PRED-PRE: 27 %
ERV-PRE: 0.17 L
ERV-PRED: 0.63 L
EXPTIME-PRE: 7.87 SEC
FEF2575-%PRED-PRE: 47 %
FEF2575-PRE: 1.35 L/SEC
FEF2575-PRED: 2.84 L/SEC
FEFMAX-%PRED-PRE: 54 %
FEFMAX-PRE: 5.09 L/SEC
FEFMAX-PRED: 9.28 L/SEC
FEV1-%PRED-PRE: 43 %
FEV1-PRE: 1.57 L
FEV1FEV6-PRE: 80 %
FEV1FEV6-PRED: 78 %
FEV1FVC-PRE: 81 %
FEV1FVC-PRED: 76 %
FEV1SVC-PRE: 72 %
FEV1SVC-PRED: 69 %
FIFMAX-PRE: 3.92 L/SEC
FRCPLETH-%PRED-PRE: 65 %
FRCPLETH-PRE: 2.48 L
FRCPLETH-PRED: 3.77 L
FVC-%PRED-PRE: 40 %
FVC-PRE: 1.94 L
FVC-PRED: 4.76 L
IC-%PRED-PRE: 43 %
IC-PRE: 1.99 L
IC-PRED: 4.6 L
RVPLETH-%PRED-PRE: 89 %
RVPLETH-PRE: 2.31 L
RVPLETH-PRED: 2.6 L
TLCPLETH-%PRED-PRE: 59 %
TLCPLETH-PRE: 4.48 L
TLCPLETH-PRED: 7.53 L
VA-%PRED-PRE: 54 %
VA-PRE: 3.77 L
VC-%PRED-PRE: 41 %
VC-PRE: 2.17 L
VC-PRED: 5.23 L

## 2020-10-16 DIAGNOSIS — D86.9 SARCOIDOSIS: Primary | ICD-10-CM

## 2020-10-16 LAB
BACTERIA SPEC CULT: NORMAL
BACTERIA SPEC CULT: NORMAL
GRAM STN SPEC: NORMAL
IGE SERPL-ACNC: 257 KIU/L (ref 0–114)
SPECIMEN SOURCE: NORMAL
SPECIMEN SOURCE: NORMAL

## 2020-10-16 PROCEDURE — 87206 SMEAR FLUORESCENT/ACID STAI: CPT | Mod: 90 | Performed by: INTERNAL MEDICINE

## 2020-10-16 PROCEDURE — 99000 SPECIMEN HANDLING OFFICE-LAB: CPT | Performed by: INTERNAL MEDICINE

## 2020-10-16 PROCEDURE — 87116 MYCOBACTERIA CULTURE: CPT | Performed by: INTERNAL MEDICINE

## 2020-10-17 ENCOUNTER — MYC MEDICAL ADVICE (OUTPATIENT)
Dept: PULMONOLOGY | Facility: CLINIC | Age: 65
End: 2020-10-17

## 2020-10-19 DIAGNOSIS — D86.9 SARCOIDOSIS: ICD-10-CM

## 2020-10-19 PROCEDURE — 87070 CULTURE OTHR SPECIMN AEROBIC: CPT | Performed by: INTERNAL MEDICINE

## 2020-10-19 PROCEDURE — 87205 SMEAR GRAM STAIN: CPT | Performed by: INTERNAL MEDICINE

## 2020-10-19 NOTE — PROGRESS NOTES
Called patient to inform him that the sputum culture could not be don, too old for testing. He will come here or to [facility] to  container. JR

## 2020-10-19 NOTE — TELEPHONE ENCOUNTER
Discussed elevated result of IGE with patient and that it is non-specific marker for allergies.   Also informed patient that new orders were placed for sputum sample what was incorrectly collected by patient; patient updated by lab with this information and patient states he intends on going to lab to  sterile containers to produce sputum sample.

## 2020-10-20 DIAGNOSIS — D86.9 SARCOIDOSIS: ICD-10-CM

## 2020-10-20 LAB
GRAM STN SPEC: ABNORMAL
Lab: ABNORMAL
SPECIMEN SOURCE: ABNORMAL

## 2020-10-21 LAB
ACID FAST STN SPEC QL: NORMAL
ACID FAST STN SPEC QL: NORMAL
SPECIMEN SOURCE: NORMAL

## 2020-10-22 LAB
BACTERIA SPEC CULT: NORMAL
SPECIMEN SOURCE: NORMAL

## 2020-11-05 ENCOUNTER — PATIENT OUTREACH (OUTPATIENT)
Dept: PULMONOLOGY | Facility: CLINIC | Age: 65
End: 2020-11-05

## 2020-11-05 DIAGNOSIS — T78.40XA ALLERGY: Primary | ICD-10-CM

## 2020-11-05 DIAGNOSIS — D86.0 PULMONARY SARCOIDOSIS (H): Primary | ICD-10-CM

## 2020-11-05 LAB — PULMONARY FUNCTION TEST-FENO: 31 PPB (ref 0–40)

## 2020-11-05 PROCEDURE — 95012 NITRIC OXIDE EXP GAS DETER: CPT

## 2020-11-05 NOTE — PROGRESS NOTES
Dr Martin reviewed PFT's MIP/MEP are similar to last time, FENO is a little high and so was IGG, which goes more with allergy as the cause. Recommending seeing an allergist if not already set up. Informed patient. He did restart his Zrytec but not the singular. He is interested in seeing allergist. Referral place.

## 2020-11-07 NOTE — TELEPHONE ENCOUNTER
FUTURE VISIT INFORMATION      FUTURE VISIT INFORMATION:    Date: 11.19.20    Time: 8:00    Location: [facility] [place]  REFERRAL INFORMATION:    Referring provider:  Dr. Jamie Martin    Referring providers clinic:  [facility]mon[facility]    Reason for visit/diagnosis  Elevated Igg, per pulm    RECORDS REQUESTED FROM:       Clinic name Comments Records Status Imaging Status   [facility] Pulmonology 10.14.20  Dr. Mario Junior Pacs   [facility] On[facility] 9.1.20  Dr. Radha Trimble Epic N/A

## 2020-11-10 ENCOUNTER — OFFICE VISIT (OUTPATIENT)
Dept: INTERNAL MEDICINE | Facility: CLINIC | Age: 65
End: 2020-11-10
Payer: MEDICARE

## 2020-11-10 VITALS
HEIGHT: 71 IN | BODY MASS INDEX: 42.42 KG/M2 | TEMPERATURE: 97.5 F | SYSTOLIC BLOOD PRESSURE: 141 MMHG | WEIGHT: 303 LBS | HEART RATE: 94 BPM | OXYGEN SATURATION: 94 % | RESPIRATION RATE: 20 BRPM | DIASTOLIC BLOOD PRESSURE: 86 MMHG

## 2020-11-10 DIAGNOSIS — I27.20 PULMONARY HYPERTENSION (H): ICD-10-CM

## 2020-11-10 DIAGNOSIS — B35.1 FUNGAL NAIL INFECTION: ICD-10-CM

## 2020-11-10 DIAGNOSIS — E78.00 HYPERCHOLESTEREMIA: ICD-10-CM

## 2020-11-10 DIAGNOSIS — Z00.00 PREVENTATIVE HEALTH CARE: Primary | ICD-10-CM

## 2020-11-10 DIAGNOSIS — E66.01 MORBID OBESITY (H): ICD-10-CM

## 2020-11-10 DIAGNOSIS — C61 PROSTATE CANCER (H): ICD-10-CM

## 2020-11-10 DIAGNOSIS — L84 CORN OR CALLUS: ICD-10-CM

## 2020-11-10 DIAGNOSIS — I10 ESSENTIAL HYPERTENSION: ICD-10-CM

## 2020-11-10 LAB
CHOLEST SERPL-MCNC: 104 MG/DL
HDLC SERPL-MCNC: 39 MG/DL
LDLC SERPL CALC-MCNC: 45 MG/DL
MEP-PRE: 122 CMH2O
MIP-PRE: -57 CMH2O
NONHDLC SERPL-MCNC: 65 MG/DL
TRIGL SERPL-MCNC: 100 MG/DL
TSH SERPL DL<=0.005 MIU/L-ACNC: 2.69 MU/L (ref 0.4–4)

## 2020-11-10 PROCEDURE — G0402 INITIAL PREVENTIVE EXAM: HCPCS | Performed by: INTERNAL MEDICINE

## 2020-11-10 PROCEDURE — 80061 LIPID PANEL: CPT | Performed by: INTERNAL MEDICINE

## 2020-11-10 PROCEDURE — 84443 ASSAY THYROID STIM HORMONE: CPT | Performed by: INTERNAL MEDICINE

## 2020-11-10 PROCEDURE — 36415 COLL VENOUS BLD VENIPUNCTURE: CPT | Performed by: INTERNAL MEDICINE

## 2020-11-10 RX ORDER — ATORVASTATIN CALCIUM 40 MG/1
TABLET, FILM COATED ORAL
Qty: 90 TABLET | Refills: 3 | Status: SHIPPED | OUTPATIENT
Start: 2020-11-10 | End: 2022-01-07

## 2020-11-10 RX ORDER — HYDROCHLOROTHIAZIDE 12.5 MG/1
TABLET ORAL
Qty: 90 TABLET | Refills: 3 | Status: SHIPPED | OUTPATIENT
Start: 2020-11-10 | End: 2021-12-07

## 2020-11-10 ASSESSMENT — ENCOUNTER SYMPTOMS
PARESTHESIAS: 0
WEAKNESS: 0
PALPITATIONS: 0
FEVER: 0
SHORTNESS OF BREATH: 1
EYE PAIN: 0
FREQUENCY: 0
NAUSEA: 0
HEADACHES: 0
COUGH: 1
JOINT SWELLING: 0
DYSURIA: 0
HEMATOCHEZIA: 0
CONSTIPATION: 0
HEARTBURN: 0
DIARRHEA: 0
DIZZINESS: 0
NERVOUS/ANXIOUS: 0
CHILLS: 0
SORE THROAT: 0
ABDOMINAL PAIN: 0
ARTHRALGIAS: 0
MYALGIAS: 0
HEMATURIA: 0

## 2020-11-10 ASSESSMENT — ACTIVITIES OF DAILY LIVING (ADL): CURRENT_FUNCTION: NO ASSISTANCE NEEDED

## 2020-11-10 ASSESSMENT — MIFFLIN-ST. JEOR: SCORE: 2173.59

## 2020-11-10 NOTE — PROGRESS NOTES
"SUBJECTIVE:   Derek Contreras is a 64 year old male who presents for Preventive Visit.    Fasting. Foot problems.    Patient has been advised of split billing requirements and indicates understanding: Yes   Are you in the first 12 months of your Medicare coverage?  Yes,  Visual Acuity:  Right Eye: 20/30   Left Eye: 20/40  Both Eyes: 20/25 Without his glasses.    Healthy Habits:     In general, how would you rate your overall health?  Good    Frequency of exercise:  2-3 days/week    Duration of exercise:  30-45 minutes    Do you usually eat at least 4 servings of fruit and vegetables a day, include whole grains    & fiber and avoid regularly eating high fat or \"junk\" foods?  No    Taking medications regularly:  Yes    Medication side effects:  None    Ability to successfully perform activities of daily living:  No assistance needed    Home Safety:  No safety concerns identified    Hearing Impairment:  Need to ask people to speak up or repeat themselves    In the past 6 months, have you been bothered by leaking of urine?  No    In general, how would you rate your overall mental or emotional health?  Excellent      PHQ-2 Total Score: 0    Additional concerns today:  No    Do you feel safe in your environment? Yes    Have you ever done Advance Care Planning? (For example, a Health Directive, POLST, or a discussion with a medical provider or your loved ones about your wishes): Yes, advance care planning is on file.      Fall risk  Fallen 2 or more times in the past year?: No  Any fall with injury in the past year?: No    Cognitive Screening   1) Repeat 3 items (Leader, Season, Table)    2) Clock draw: NORMAL  3) 3 item recall: Recalls 3 objects  Results: 3 items recalled: COGNITIVE IMPAIRMENT LESS LIKELY    Mini-CogTM Copyright KRISTYN Stockton. Licensed by the author for use in [facility]; reprinted with permission (aliyah@.[facility]). All rights reserved.      Do you have sleep apnea, excessive snoring or daytime " drowsiness?: yes, uses c-pap    Reviewed and updated as needed this visit by clinical staff   Allergies  Meds              Reviewed and updated as needed this visit by Provider                Social History     Tobacco Use     Smoking status: Never Smoker     Smokeless tobacco: Never Used   Substance Use Topics     Alcohol use: Yes     Comment: twice a week     If you drink alcohol do you typically have >3 drinks per day or >7 drinks per week? No    Alcohol Use 11/10/2020   Prescreen: >3 drinks/day or >7 drinks/week? No     Current providers sharing in care for this patient include:   Patient Care Team:  Carlyn Payne MD as PCP - General (Internal Medicine)  Carlyn Payne MD as Assigned PCP  Honey Jackson, RN as Specialty Care Coordinator (Cardiology)  Camryn Garcia, RN as Specialty Care Coordinator (Cardiology)  Diana Dillard, RN as Specialty Care Coordinator (Cardiology)  Sherry Velazquez, PIOTR as Specialty Care Coordinator (Cardiology)  Anuel Figueroa MD as MD (Clinical Cardiac Electrophysiology)  Radha Trimble MD as MD (Hematology & Oncology)  Glynn Omer MD as Referring Physician (Neurology)  Matteo Coughlin MD as Assigned Surgical Provider  Frank Barron MD as Assigned Sleep Provider  Glynn Omer MD as Assigned Neuroscience Provider  Radha Trimble MD as Assigned Cancer Care Provider  Anuel Figueroa MD as Assigned Heart and Vascular Provider    The following health maintenance items are reviewed in Epic and correct as of today:  Health Maintenance   Topic Date Due     HF ACTION PLAN  1955     ASTHMA ACTION PLAN  1955     ZOSTER IMMUNIZATION (2 of 3) 02/21/2017     MEDICARE ANNUAL WELLNESS VISIT  12/03/2019     ASTHMA CONTROL TEST  04/30/2020     LIPID  12/11/2020     BMP  04/14/2021     DTAP/TDAP/TD IMMUNIZATION (2 - Td) 04/18/2021     COLORECTAL CANCER SCREENING  05/07/2021     FALL RISK  ASSESSMENT  07/24/2021     ALT  08/25/2021     CBC  10/14/2021     Pneumococcal Vaccine: 65+ Years (2 of 2 - PPSV23) 06/19/2023     ADVANCE CARE PLANNING  05/30/2024     TSH W/FREE T4 REFLEX  Completed     HEPATITIS C SCREENING  Completed     HIV SCREENING  Completed     PHQ-2  Completed     INFLUENZA VACCINE  Completed     AORTIC ANEURYSM SCREENING (SYSTEM ASSIGNED)  Completed     Pneumococcal Vaccine: Pediatrics (0 to 5 Years) and At-Risk Patients (6 to 64 Years)  Aged Out     IPV IMMUNIZATION  Aged Out     MENINGITIS IMMUNIZATION  Aged Out     HEPATITIS B IMMUNIZATION  Aged Out     BP Readings from Last 3 Encounters:   11/10/20 (!) 141/86   10/14/20 (!) 150/90   09/01/20 (!) 152/82    Wt Readings from Last 3 Encounters:   11/10/20 137.4 kg (303 lb)   10/14/20 137.9 kg (304 lb)   07/24/20 136.1 kg (300 lb)                      Review of Systems   Constitutional: Negative for chills and fever.   HENT: Negative for congestion, ear pain, hearing loss and sore throat.    Eyes: Negative for pain and visual disturbance.   Respiratory: Positive for cough and shortness of breath.    Cardiovascular: Negative for chest pain, palpitations and peripheral edema.   Gastrointestinal: Negative for abdominal pain, constipation, diarrhea, heartburn, hematochezia and nausea.   Genitourinary: Negative for discharge, dysuria, frequency, genital sores, hematuria, impotence and urgency.   Musculoskeletal: Negative for arthralgias, joint swelling and myalgias.   Skin: Negative for rash.   Neurological: Negative for dizziness, weakness, headaches and paresthesias.   Psychiatric/Behavioral: Negative for mood changes. The patient is not nervous/anxious.      He has chronic fungal nail changes wants to know what he can do for it. Has been treated multiple times with failure each time. He also has what he thinks is a wart on the bottom of his foot.     OBJECTIVE:   There were no vitals taken for this visit. Estimated body mass index is 41.23  kg/m  as calculated from the following:    Height as of 7/24/20: 1.829 m (6').    Weight as of 10/14/20: 137.9 kg (304 lb).  Physical Exam  GENERAL: healthy, alert and no distress  EYES: Eyes grossly normal to inspection, PERRL and conjunctivae and sclerae normal  NECK: no adenopathy, no asymmetry, masses, or scars and thyroid normal to palpation  RESP: lungs clear to auscultation - no rales, rhonchi or wheezes  CV: regular rate and rhythm, normal S1 S2, no S3 or S4, no murmur, click or rub, no peripheral edema and peripheral pulses strong  ABDOMEN: soft, nontender, no hepatosplenomegaly, no masses and bowel sounds normal  MS: fungal nail changes both feet first 3 toes. Bottom of right foot under 4th toe are 2 corns on the ball of the foot.   SKIN: no suspicious lesions or rashes  NEURO: Normal strength and tone, mentation intact and speech normal  PSYCH: mentation appears normal, affect normal/bright    EKG: EKG was not done today as he had one in June and has had extensive cardiac work up this year and is followed by Cardiology   ASSESSMENT / PLAN:   1. Preventative health care       2. Essential hypertension  under good control Continue current medications.   - hydrochlorothiazide (HYDRODIURIL) 12.5 MG tablet; TAKE 1 TABLET(12.5 MG) BY MOUTH EVERY MORNING  Dispense: 90 tablet; Refill: 3  - Lipid Profile (Chol, Trig, HDL, LDL calc)  - TSH with free T4 reflex    3. Hypercholesteremia  Due for fasting lipid panel   - atorvastatin (LIPITOR) 40 MG tablet; TAKE 1 TABLET(40 MG) BY MOUTH EVERY MORNING  Dispense: 90 tablet; Refill: 3    4. Morbid obesity (H)  Continue to encourage wt loss    5. Prostate cancer (H)  Followed by Urology     6. Pulmonary hypertension (H)  Followed by pulmonary     7. Fungal nail infection  recommended no treatment    8. Corn or callus  Usual Conservative treatment recommended.       Patient has been advised of split billing requirements and indicates understanding: Yes  COUNSELING:  Reviewed  preventive health counseling, as reflected in patient instructions       Regular exercise       Healthy diet/nutrition    Estimated body mass index is 41.23 kg/m  as calculated from the following:    Height as of 7/24/20: 1.829 m (6').    Weight as of 10/14/20: 137.9 kg (304 lb).    Weight management plan: Discussed healthy diet and exercise guidelines    He reports that he has never smoked. He has never used smokeless tobacco.      Appropriate preventive services were discussed with this patient, including applicable screening as appropriate for cardiovascular disease, diabetes, osteopenia/osteoporosis, and glaucoma.  As appropriate for age/gender, discussed screening for colorectal cancer, prostate cancer, breast cancer, and cervical cancer. Checklist reviewing preventive services available has been given to the patient.    Reviewed patients plan of care and provided an AVS. The Basic Care Plan (routine screening as documented in Health Maintenance) for Derek meets the Care Plan requirement. This Care Plan has been established and reviewed with the Patient.    Counseling Resources:  ATP IV Guidelines  Pooled Cohorts Equation Calculator  Breast Cancer Risk Calculator  Breast Cancer: Medication to Reduce Risk  FRAX Risk Assessment  ICSI Preventive Guidelines  Dietary Guidelines for Americans, 2010  [employer]'s MyPlate  ASA Prophylaxis  Lung CA Screening    Carlyn Payne MD  [facility]    Identified Health Risks:

## 2020-11-11 ASSESSMENT — ASTHMA QUESTIONNAIRES: ACT_TOTALSCORE: 22

## 2020-11-17 ENCOUNTER — TELEPHONE (OUTPATIENT)
Dept: ALLERGY | Facility: CLINIC | Age: 65
End: 2020-11-17

## 2020-11-17 DIAGNOSIS — C61 PROSTATE CANCER (H): Primary | ICD-10-CM

## 2020-11-17 DIAGNOSIS — C61 PROSTATE CANCER (H): ICD-10-CM

## 2020-11-17 LAB — PSA SERPL-MCNC: <0.01 UG/L (ref 0–4)

## 2020-11-17 PROCEDURE — 84153 ASSAY OF PSA TOTAL: CPT | Performed by: UROLOGY

## 2020-11-17 PROCEDURE — 36415 COLL VENOUS BLD VENIPUNCTURE: CPT | Performed by: UROLOGY

## 2020-11-18 NOTE — TELEPHONE ENCOUNTER
FUTURE VISIT INFORMATION      FUTURE VISIT INFORMATION:    Date: 12/3/2020    Time: 11am    [place] [facility] [place] INFORMATION:    Referring provider:      Referring providers clinic:   [facility]UNG [facility]ENCE    Reason for visit/diagnosis  Elevated Igg, per pulm    RECORDS REQUESTED FROM:       Clinic name Comments Records Status Imaging Status   Internal Epic Pulmonology  [facility]

## 2020-11-19 ENCOUNTER — PRE VISIT (OUTPATIENT)
Dept: ALLERGY | Facility: CLINIC | Age: 65
End: 2020-11-19

## 2020-11-25 ENCOUNTER — TELEPHONE (OUTPATIENT)
Dept: ALLERGY | Facility: CLINIC | Age: 65
End: 2020-11-25

## 2020-12-03 ENCOUNTER — PRE VISIT (OUTPATIENT)
Dept: ALLERGY | Facility: CLINIC | Age: 65
End: 2020-12-03

## 2020-12-03 ENCOUNTER — OFFICE VISIT (OUTPATIENT)
Dept: ALLERGY | Facility: CLINIC | Age: 65
End: 2020-12-03
Attending: INTERNAL MEDICINE
Payer: MEDICARE

## 2020-12-03 VITALS — SYSTOLIC BLOOD PRESSURE: 145 MMHG | DIASTOLIC BLOOD PRESSURE: 88 MMHG | OXYGEN SATURATION: 95 % | HEART RATE: 90 BPM

## 2020-12-03 DIAGNOSIS — J45.40 MODERATE PERSISTENT ASTHMA WITHOUT COMPLICATION: Primary | ICD-10-CM

## 2020-12-03 DIAGNOSIS — H10.13 ALLERGIC CONJUNCTIVITIS OF BOTH EYES: ICD-10-CM

## 2020-12-03 DIAGNOSIS — B35.6 TINEA CRURIS: ICD-10-CM

## 2020-12-03 PROCEDURE — 99204 OFFICE O/P NEW MOD 45 MIN: CPT | Mod: 25 | Performed by: ALLERGY & IMMUNOLOGY

## 2020-12-03 PROCEDURE — 95004 PERQ TESTS W/ALRGNC XTRCS: CPT | Performed by: ALLERGY & IMMUNOLOGY

## 2020-12-03 RX ORDER — CLOTRIMAZOLE 1 %
CREAM (GRAM) TOPICAL 2 TIMES DAILY
Qty: 60 G | Refills: 0 | Status: SHIPPED | OUTPATIENT
Start: 2020-12-03 | End: 2020-12-17

## 2020-12-03 ASSESSMENT — ENCOUNTER SYMPTOMS
COLOR CHANGE: 0
HEMATURIA: 0
DYSURIA: 0
ABDOMINAL PAIN: 0
COUGH: 1
SORE THROAT: 0
CHILLS: 0
BACK PAIN: 0
EYE PAIN: 0
VOMITING: 0
SHORTNESS OF BREATH: 1
SEIZURES: 0
ARTHRALGIAS: 0
PALPITATIONS: 0
FEVER: 0

## 2020-12-03 ASSESSMENT — PAIN SCALES - GENERAL: PAINLEVEL: NO PAIN (0)

## 2020-12-03 NOTE — NURSING NOTE
Allergy Rooming Note    Derek Contreras's goals for this visit include:   Chief Complaint   Patient presents with     Allergy Consult     Derek is here for an allergy consult relatin to possible allergy induced asthma     Meredith Singh LPN

## 2020-12-03 NOTE — LETTER
12/3/2020         RE: Derek Contreras  [id number] [place] [address]        Dear Colleague,    Thank you for referring your patient, Derek Contreras, to the [facility] [place]. Please see a copy of my visit note below.    SUBJECTIVE:                                                                   Derek Contreras is a 65-year-old male who presents today to our Allergy Clinic at [facility] [place]; he is being seen in consultation at Dr. Martin's request for allergy evaluation.     The patient states he was diagnosed with asthma and sarcoidosis within the last 10 years. Asthma is a newer diagnosis.    He uses Breo Ellipta 200/25 mcg 1 puff once daily and albuterol for asthma.  Derek also takes montelukast 10 mg by mouth once daily.  He uses albuterol once a month.   He denies having wheezing or chest tightness. He develops shortness of breath with exertion, that self resolves within 1 minute. No night awakenings due to chest symptoms.  4 years ago, he had an episode of lip swelling. He saw an allergist. Lip swelling was determined to be without a determined cause, and he was started on cetirizine 10 mg by mouth once daily.     He has a history of itchy eyes perennially.  Cetirizine seems to be helpful.  After stopping it, his eyes started itching again and he also developed an itch in the groin area.    He doesn't use any eye drops because cetirizine is helpful.   He doesn't use any nasal sprays. The patient denies sneezing, postnasal drip, rhinorrhea, or postnasal drip.       Patient Active Problem List   Diagnosis     Morbid obesity (H)     Pulmonary sarcoidosis (H)     Hypertension     S/P hip replacement, bilateral     S/P TKR (total knee replacement), bilateral     S/P appendectomy     Bilateral hearing loss, unspecified hearing loss type     Hypercholesteremia     Moderate asthma without complication     Prostate cancer (H)     Pulmonary  hypertension (H)     SOB (shortness of breath)     Dyslipidemia     Iron deficiency     Need for hepatitis B screening test     Myopathy       Past Medical History:   Diagnosis Date     Asthma      Claustrophobia      CPAP (continuous positive airway pressure) dependence      Dyslipidemia      [place] (hard of hearing)      Hypercholesteremia      Hypertension      Iron deficiency      Mediastinal adenopathy      Obesity      BEULAH (obstructive sleep apnea)      Prostate cancer (H)      Pulmonary hypertension (H)      Sarcoidosis       Problem (# of Occurrences) Relation (Name,Age of Onset)    Alzheimer Disease (1) Mother    Heart Disease (1) Father       Negative family history of: Glaucoma, Macular Degeneration, Diabetes, Thyroid Disease        Past Surgical History:   Procedure Laterality Date     APPENDECTOMY       BIOPSY MASS NECK Left 7/15/2020    Procedure: Left trapezius muscle biopsy;  Surgeon: Ade Villa MD;  Location: [facility] OR     C TOTAL HIP ARTHROPLASTY Bilateral      C TOTAL KNEE ARTHROPLASTY Bilateral      CV RIGHT HEART CATH N/A 12/11/2019    Procedure: CV RIGHT HEART CATH;  Surgeon: Torrey Hirsch MD;  Location: [facility]     DAVINCI PROSTATECTOMY, LYMPHADENECTOMY N/A 5/23/2019    Procedure: ROBOTIC ASSISTED LAPAROSCOPIC RADICAL PROSTATECTOMY WITH BILATERAL PELVIC LYMPH NODE DISSECTION;  Surgeon: Matteo Coughlin MD;  Location: [facility] OR     ENDOBRONCHIAL ULTRASOUND FLEXIBLE N/A 3/29/2019    Procedure: Flexible Bronchoscopy, Endobronchial Ultrasound;  Surgeon: Curtis Leger MD;  Location: [facility] OR     VASECTOMY       Social History     Socioeconomic History     Marital status:      Spouse name: None     Number of children: None     Years of education: None     Highest education level: None   Occupational History     None   Social Needs     Financial resource strain: None     Food insecurity     Worry: None     Inability: None     Transportation needs      Medical: None     Non-medical: None   Tobacco Use     Smoking status: Never Smoker     Smokeless tobacco: Never Used   Substance and Sexual Activity     Alcohol use: Yes     Comment: twice a week     Drug use: No     Sexual activity: Yes     Partners: Female   Lifestyle     Physical activity     Days per week: None     Minutes per session: None     Stress: None   Relationships     Social connections     Talks on phone: None     Gets together: None     Attends Mandaeism service: None     Active member of club or organization: None     Attends meetings of clubs or organizations: None     Relationship status: None     Intimate partner violence     Fear of current or ex partner: None     Emotionally abused: None     Physically abused: None     Forced sexual activity: None   Other Topics Concern     Parent/sibling w/ CABG, MI or angioplasty before 65F 55M? Not Asked   Social History Narrative    12/03/20         ENVIRONMENTAL HISTORY: [place] The home is heated with a gas furnace. They does have central air conditioning. The patient's bedroom is furnished with Indoor plants and carpeting in bedroom.  Pets inside the house include 0 pets. There is no history of cockroach or mice infestation. There is/are 0 smokers in the house.  The house does not have a damp basement.            Review of Systems   Constitutional: Negative for chills and fever.   HENT: Negative for ear pain and sore throat.    Eyes: Negative for pain and visual disturbance.   Respiratory: Positive for cough (Relating to asthma ) and shortness of breath (Relating to asthma ).    Cardiovascular: Negative for chest pain and palpitations.   Gastrointestinal: Negative for abdominal pain and vomiting.   Genitourinary: Negative for dysuria and hematuria.   Musculoskeletal: Negative for arthralgias and back pain.   Skin: Negative for color change and rash.   Neurological: Negative for seizures and syncope.   All  other systems reviewed and are negative.          Current Outpatient Medications:      albuterol (PROAIR HFA/PROVENTIL HFA/VENTOLIN HFA) 108 (90 Base) MCG/ACT inhaler, Inhale 1-2 puffs into the lungs every 4 hours as needed for shortness of breath / dyspnea, Disp: 1 Inhaler, Rfl: 4     aspirin (ASA) 81 MG tablet, Take 81 mg by mouth every morning , Disp: 90 tablet, Rfl: 3     atorvastatin (LIPITOR) 40 MG tablet, TAKE 1 TABLET(40 MG) BY MOUTH EVERY MORNING, Disp: 90 tablet, Rfl: 3     augmented betamethasone dipropionate (DIPROLENE-AF) 0.05 % external cream, Apply topically 2 times daily, Disp: 100 g, Rfl: 1     BREO ELLIPTA 200-25 MCG/INH Inhaler, Inhale 1 puff into the lungs daily, Disp: 1 Inhaler, Rfl: 11     cetirizine (ZYRTEC) 10 MG tablet, Take 10 mg by mouth every morning , Disp: 90 tablet, Rfl: 3     clotrimazole (LOTRIMIN) 1 % external cream, Apply topically 2 times daily for 14 days, Disp: 60 g, Rfl: 0     fluticasone (FLONASE) 50 MCG/ACT nasal spray, Spray 1-2 sprays into both nostrils daily (Patient taking differently: Spray 1-2 sprays into both nostrils as needed ), Disp: 16 g, Rfl: 0     hydrochlorothiazide (HYDRODIURIL) 12.5 MG tablet, TAKE 1 TABLET(12.5 MG) BY MOUTH EVERY MORNING, Disp: 90 tablet, Rfl: 3     montelukast (SINGULAIR) 10 MG tablet, Take 1 tablet (10 mg) by mouth At Bedtime, Disp: 90 tablet, Rfl: 3     multivitamin (ONE-DAILY) tablet, Take 1 tablet by mouth every morning , Disp: 90 tablet, Rfl: 3     fluticasone-vilanterol (BREO ELLIPTA) 200-25 MCG/INH inhaler, Inhale 1 puff into the lungs daily (Patient not taking: Reported on 12/3/2020), Disp: 1 Inhaler, Rfl: 11  Immunization History   Administered Date(s) Administered     HepB, Unspecified 01/12/2000, 03/07/2000, 07/19/2000     Influenza (H1N1) 10/15/2018     Influenza, Quad, High Dose, Pf, 65yr + 09/02/2020     Pneumo Conj 13-V (2010&after) 04/01/2018     Pneumococcal 23 valent 06/19/2018     TDAP Vaccine (Adacel) 04/18/2011      Zoster vaccine, live 12/27/2016     Allergies   Allergen Reactions     Cat Hair Extract Itching     itchy tearful eyes     Adhesive Tape Rash     Iodine Rash     OBJECTIVE:                                                                 BP (!) 145/88   Pulse 90   SpO2 95%         Physical Exam  Vitals signs and nursing note reviewed.   Constitutional:       General: He is not in acute distress.     Appearance: He is not diaphoretic.   HENT:      Head: Normocephalic and atraumatic.      Right Ear: Tympanic membrane, ear canal and external ear normal.      Left Ear: Tympanic membrane, ear canal and external ear normal.      Nose: No mucosal edema or rhinorrhea.      Right Turbinates: Not enlarged, swollen or pale.      Left Turbinates: Not enlarged, swollen or pale.      Mouth/Throat:      Lips: Pink.      Mouth: Mucous membranes are moist.      Pharynx: Oropharynx is clear. No pharyngeal swelling, oropharyngeal exudate or posterior oropharyngeal erythema.   Eyes:      General:         Right eye: No discharge.         Left eye: No discharge.      Conjunctiva/sclera: Conjunctivae normal.   Neck:      Musculoskeletal: Normal range of motion.   Cardiovascular:      Rate and Rhythm: Normal rate and regular rhythm.      Heart sounds: Normal heart sounds. No murmur.   Pulmonary:      Effort: Pulmonary effort is normal. No respiratory distress.      Breath sounds: Normal breath sounds and air entry. No stridor, decreased air movement or transmitted upper airway sounds. No decreased breath sounds, wheezing, rhonchi or rales.   Genitourinary:     Comments: Redness in the left inguinal area  Musculoskeletal: Normal range of motion.   Lymphadenopathy:      Cervical: No cervical adenopathy.   Skin:     General: Skin is warm.      Capillary Refill: Capillary refill takes less than 2 seconds.   Neurological:      Mental Status: He is alert and oriented to person, place, and time.   Psychiatric:         Mood and Affect: Mood  normal.         Behavior: Behavior normal.               WORKUP:     ENVIRONMENTAL PERCUTANEOUS SKIN TESTING: ADULT  [facility] 12/3/2020   Consent Y   Ordering Physician    Interpreting Physician    Testing Technician HS   Location Arm   Positive Control: Histatrol*ALK 1 mg/ml 5/8   Negative Control: 50% Glycerin 0/0   Cat Hair*ALK (10,000 BAU/ml) 9/15   AP Dog Hair/Dander (1:100 w/v) 5/10   Dust Mite p. 30,000 AU/ml 7/12   Dust Mite f. (30,000 AU/ml) 10/17   Kade (W/F in millimeters) 0/0   Jelani Grass (100,000 BAU/mL) 0/0   Red Bullard (W/F in millimeters) 0/0   Maple/Franklin (W/F in millimeters) 0/0   Hackberry (W/F in millimeters) 0/0   Kingman (W/F in millimeters) 0/0   [place]itage *ALK (W/F in millimeters) 0/0   American Elm (W/F in millimeters) 0/0   [place] (W/F in millimeters) 0/0   Black Alta (W/F in millimeters) 0/0   Birch Mix (W/F in millimeters) 0/0   [place] (W/F in millimeters) 0/0   Oak (W/F in millimeters) 0/0   Cocklebur (W/F in millimeters) 0/0   Roberta (W/F in millimeters) 0/0   White Haroldo (W/F in millimeters) 0/0   Careless (W/F in millimeters) 0/0   Nettle (W/F in millimeters) 0/0   English Plantain (W/F in millimeters) 4/7   Kochia (W/F in millimeters) 5/7   Lamb's Quarter (W/F in millimeters) 0/0   Marshelder (W/F in millimeters) 0/0   Ragweed Mix* ALK (W/F in millimeters) 8/13   Russian Thistle (W/F in millimeters) 0/0   Sagebrush/Mugwort (W/F in millimeters) 0/0   Sheep Sorrel (W/F in millimeters) 0/0   Feather Mix* ALK (W/F in millimeters) 0/0   Penicillium Mix (1:10 w/v) 0/0   Curvularia spicifera (1:10 w/v) 0/0   Epicoccum (1:10 w/v) 0/0   Aspergillus fumigatus (1:10 w/v): 0/0   Alternaria tenius (1:10 w/v) 0/0   H. Cladosporium (1:10 w/v) 0/0   Phoma herbarum (1:10 w/v) 0/0        My interpretation:SPT for aeroallergens performed today (December 3, 2020) showed sensitivity to cat, dog, dust mites, and weed pollen.  The rest was negative with appropriate  responses to positive and negative controls.    ASSESSMENT/PLAN:    Moderate persistent asthma without complication  The patient states that he is asthma is well controlled with Breo Ellipta 200/25 mcg 1 puff once daily, montelukast 10 mg by mouth once daily, and albuterol inhaler as needed.  -Continue as is.  He is currently followed by Pulmonology.  Depending on symptom control, we could discuss about Biologics.  The patient should qualify for omalizumab and dupilumab.    - ALLERGY SKIN TESTS,ALLERGENS    Allergic conjunctivitis of both eyes    Avoidance measures discussed and information provided.  -Historically, cetirizine has been helpful.  I suggest restarting it.  Sensitivity to inhalant allergens also explains modestly elevated total serum IgE level.  He is not a candidate for allergen immunotherapy considering his pulmonary function test.    - ALLERGY SKIN TESTS,ALLERGENS    Tinea cruris    - clotrimazole (LOTRIMIN) 1 % external cream  Dispense: 60 g; Refill: 0       Return if symptoms worsen or fail to improve.    Thank you for allowing us to participate in the care of this patient. Please feel free to contact us if there are any questions or concerns about the patient.    Disclaimer: This note consists of symbols derived from keyboarding, dictation and/or voice recognition software. As a result, there may be errors in the script that have gone undetected. Please consider this when interpreting information found in this chart.    Aris Delcid MD, FAAAAI, FACAAI  Allergy, Asthma and Immunology    [facility]        Again, thank you for allowing me to participate in the care of your patient.        Sincerely,        Aris Delcid MD

## 2020-12-03 NOTE — PATIENT INSTRUCTIONS
Continue with avoidance measures.    Start clotrimazole twice daily x 14 days.     Restart cetirizine 10 mg by mouth once daily.

## 2020-12-03 NOTE — PROGRESS NOTES
SUBJECTIVE:                                                                   Derek Contreras is a 65-year-old male who presents today to our Allergy Clinic at [facility]; he is being seen in consultation at Dr. Martin's request for allergy evaluation.     The patient states he was diagnosed with asthma and sarcoidosis within the last 10 years. Asthma is a newer diagnosis.    He uses Breo Ellipta 200/25 mcg 1 puff once daily and albuterol for asthma.  Derek also takes montelukast 10 mg by mouth once daily.  He uses albuterol once a month.   He denies having wheezing or chest tightness. He develops shortness of breath with exertion, that self resolves within 1 minute. No night awakenings due to chest symptoms.  4 years ago, he had an episode of lip swelling. He saw an allergist. Lip swelling was determined to be without a determined cause, and he was started on cetirizine 10 mg by mouth once daily.     He has a history of itchy eyes perennially.  Cetirizine seems to be helpful.  After stopping it, his eyes started itching again and he also developed an itch in the groin area.    He doesn't use any eye drops because cetirizine is helpful.   He doesn't use any nasal sprays. The patient denies sneezing, postnasal drip, rhinorrhea, or postnasal drip.       Patient Active Problem List   Diagnosis     Morbid obesity (H)     Pulmonary sarcoidosis (H)     Hypertension     S/P hip replacement, bilateral     S/P TKR (total knee replacement), bilateral     S/P appendectomy     Bilateral hearing loss, unspecified hearing loss type     Hypercholesteremia     Moderate asthma without complication     Prostate cancer (H)     Pulmonary hypertension (H)     SOB (shortness of breath)     Dyslipidemia     Iron deficiency     Need for hepatitis B screening test     Myopathy       Past Medical History:   Diagnosis Date     Asthma      Claustrophobia      CPAP (continuous positive airway pressure) dependence       Dyslipidemia      [place] (hard of hearing)      Hypercholesteremia      Hypertension      Iron deficiency      Mediastinal adenopathy      Obesity      BEULAH (obstructive sleep apnea)      Prostate cancer (H)      Pulmonary hypertension (H)      Sarcoidosis       Problem (# of Occurrences) Relation (Name,Age of Onset)    Alzheimer Disease (1) Mother    Heart Disease (1) Father       Negative family history of: Glaucoma, Macular Degeneration, Diabetes, Thyroid Disease        Past Surgical History:   Procedure Laterality Date     APPENDECTOMY       BIOPSY MASS NECK Left 7/15/2020    Procedure: Left trapezius muscle biopsy;  Surgeon: Ade Villa MD;  [facility]     C TOTAL HIP ARTHROPLASTY Bilateral      C TOTAL KNEE ARTHROPLASTY Bilateral      CV RIGHT HEART CATH N/A 12/11/2019    Procedure: CV RIGHT HEART CATH;  Surgeon: Torrey Hirsch MD;  Location: [facility]     DAVINCI PROSTATECTOMY, LYMPHADENECTOMY N/A 5/23/2019    Procedure: ROBOTIC ASSISTED LAPAROSCOPIC RADICAL PROSTATECTOMY WITH BILATERAL PELVIC LYMPH NODE DISSECTION;  Surgeon: Matteo Coughlin MD;  Location: [facility] OR     ENDOBRONCHIAL ULTRASOUND FLEXIBLE N/A 3/29/2019    Procedure: Flexible Bronchoscopy, Endobronchial Ultrasound;  Surgeon: Curtis Leger MD;  Location: [facility] OR     VASECTOMY       Social History     Socioeconomic History     Marital status:      Spouse name: None     Number of children: None     Years of education: None     Highest education level: None   Occupational History     None   Social Needs     Financial resource strain: None     Food insecurity     Worry: None     Inability: None     Transportation needs     Medical: None     Non-medical: None   Tobacco Use     Smoking status: Never Smoker     Smokeless tobacco: Never Used   Substance and Sexual Activity     Alcohol use: Yes     Comment: twice a week     Drug use: No     Sexual activity: Yes     Partners: Female   Lifestyle     Physical  activity     Days per week: None     Minutes per session: None     Stress: None   Relationships     Social connections     Talks on phone: None     Gets together: None     Attends Yazidism service: None     Active member of club or organization: None     Attends meetings of clubs or organizations: None     Relationship status: None     Intimate partner violence     Fear of current or ex partner: None     Emotionally abused: None     Physically abused: None     Forced sexual activity: None   Other Topics Concern     Parent/sibling w/ CABG, MI or angioplasty before 65F 55M? Not Asked   Social History Narrative    12/03/20         ENVIRONMENTAL HISTORY: [place] The home is heated with a gas furnace. They does have central air conditioning. The patient's bedroom is furnished with Indoor plants and carpeting in bedroom.  Pets inside the house include 0 pets. There is no history of cockroach or mice infestation. There is/are 0 smokers in the house.  The house does not have a damp basement.            Review of Systems   Constitutional: Negative for chills and fever.   HENT: Negative for ear pain and sore throat.    Eyes: Negative for pain and visual disturbance.   Respiratory: Positive for cough (Relating to asthma ) and shortness of breath (Relating to asthma ).    Cardiovascular: Negative for chest pain and palpitations.   Gastrointestinal: Negative for abdominal pain and vomiting.   Genitourinary: Negative for dysuria and hematuria.   Musculoskeletal: Negative for arthralgias and back pain.   Skin: Negative for color change and rash.   Neurological: Negative for seizures and syncope.   All other systems reviewed and are negative.          Current Outpatient Medications:      albuterol (PROAIR HFA/PROVENTIL HFA/VENTOLIN HFA) 108 (90 Base) MCG/ACT inhaler, Inhale 1-2 puffs into the lungs every 4 hours as needed for shortness of breath / dyspnea, Disp: 1 Inhaler, Rfl: 4      aspirin (ASA) 81 MG tablet, Take 81 mg by mouth every morning , Disp: 90 tablet, Rfl: 3     atorvastatin (LIPITOR) 40 MG tablet, TAKE 1 TABLET(40 MG) BY MOUTH EVERY MORNING, Disp: 90 tablet, Rfl: 3     augmented betamethasone dipropionate (DIPROLENE-AF) 0.05 % external cream, Apply topically 2 times daily, Disp: 100 g, Rfl: 1     BREO ELLIPTA 200-25 MCG/INH Inhaler, Inhale 1 puff into the lungs daily, Disp: 1 Inhaler, Rfl: 11     cetirizine (ZYRTEC) 10 MG tablet, Take 10 mg by mouth every morning , Disp: 90 tablet, Rfl: 3     clotrimazole (LOTRIMIN) 1 % external cream, Apply topically 2 times daily for 14 days, Disp: 60 g, Rfl: 0     fluticasone (FLONASE) 50 MCG/ACT nasal spray, Spray 1-2 sprays into both nostrils daily (Patient taking differently: Spray 1-2 sprays into both nostrils as needed ), Disp: 16 g, Rfl: 0     hydrochlorothiazide (HYDRODIURIL) 12.5 MG tablet, TAKE 1 TABLET(12.5 MG) BY MOUTH EVERY MORNING, Disp: 90 tablet, Rfl: 3     montelukast (SINGULAIR) 10 MG tablet, Take 1 tablet (10 mg) by mouth At Bedtime, Disp: 90 tablet, Rfl: 3     multivitamin (ONE-DAILY) tablet, Take 1 tablet by mouth every morning , Disp: 90 tablet, Rfl: 3     fluticasone-vilanterol (BREO ELLIPTA) 200-25 MCG/INH inhaler, Inhale 1 puff into the lungs daily (Patient not taking: Reported on 12/3/2020), Disp: 1 Inhaler, Rfl: 11  Immunization History   Administered Date(s) Administered     HepB, Unspecified 01/12/2000, 03/07/2000, 07/19/2000     Influenza (H1N1) 10/15/2018     Influenza, Quad, High Dose, Pf, 65yr + 09/02/2020     Pneumo Conj 13-V (2010&after) 04/01/2018     Pneumococcal 23 valent 06/19/2018     TDAP Vaccine (Adacel) 04/18/2011     Zoster vaccine, live 12/27/2016     Allergies   Allergen Reactions     Cat Hair Extract Itching     itchy tearful eyes     Adhesive Tape Rash     Iodine Rash     OBJECTIVE:                                                                 BP (!) 145/88   Pulse 90   SpO2 95%         Physical  Exam  Vitals signs and nursing note reviewed.   Constitutional:       General: He is not in acute distress.     Appearance: He is not diaphoretic.   HENT:      Head: Normocephalic and atraumatic.      Right Ear: Tympanic membrane, ear canal and external ear normal.      Left Ear: Tympanic membrane, ear canal and external ear normal.      Nose: No mucosal edema or rhinorrhea.      Right Turbinates: Not enlarged, swollen or pale.      Left Turbinates: Not enlarged, swollen or pale.      Mouth/Throat:      Lips: Pink.      Mouth: Mucous membranes are moist.      Pharynx: Oropharynx is clear. No pharyngeal swelling, oropharyngeal exudate or posterior oropharyngeal erythema.   Eyes:      General:         Right eye: No discharge.         Left eye: No discharge.      Conjunctiva/sclera: Conjunctivae normal.   Neck:      Musculoskeletal: Normal range of motion.   Cardiovascular:      Rate and Rhythm: Normal rate and regular rhythm.      Heart sounds: Normal heart sounds. No murmur.   Pulmonary:      Effort: Pulmonary effort is normal. No respiratory distress.      Breath sounds: Normal breath sounds and air entry. No stridor, decreased air movement or transmitted upper airway sounds. No decreased breath sounds, wheezing, rhonchi or rales.   Genitourinary:     Comments: Redness in the left inguinal area  Musculoskeletal: Normal range of motion.   Lymphadenopathy:      Cervical: No cervical adenopathy.   Skin:     General: Skin is warm.      Capillary Refill: Capillary refill takes less than 2 seconds.   Neurological:      Mental Status: He is alert and oriented to person, place, and time.   Psychiatric:         Mood and Affect: Mood normal.         Behavior: Behavior normal.               WORKUP:     ENVIRONMENTAL PERCUTANEOUS SKIN TESTING: ADULT  [place] [employer] 12/3/2020   Consent Y   Ordering Physician    Interpreting Physician    Testing Technician HS   Location Arm   Positive Control: Histatrol*ALK 1 mg/ml  5/8   Negative Control: 50% Glycerin 0/0   Cat Hair*ALK (10,000 BAU/ml) 9/15   AP Dog Hair/Dander (1:100 w/v) 5/10   Dust Mite p. 30,000 AU/ml 7/12   Dust Mite f. (30,000 AU/ml) 10/17   Kade (W/F in millimeters) 0/0   Jelani Grass (100,000 BAU/mL) 0/0   Red [place] (W/F in millimeters) 0/0   Maple/[place] (W/F in millimeters) 0/0   [place]berry (W/F in millimeters) 0/0   [place] (W/F in millimeters) 0/0   [place] *ALK (W/F in millimeters) 0/0   American Elm (W/F in millimeters) 0/0   [place] (W/F in millimeters) 0/0   Black [place] (W/F in millimeters) 0/0   Birch Mix (W/F in millimeters) 0/0   [place] (W/F in millimeters) 0/0   Oak (W/F in millimeters) 0/0   C[place]le[place] (W/F in millimeters) 0/0   [place] (W/F in millimeters) 0/0   White [place] (W/F in millimeters) 0/0   Careless (W/F in millimeters) 0/0   Nettle (W/F in millimeters) 0/0   English Plantain (W/F in millimeters) 4/7   Kochia (W/F in millimeters) 5/7   Lamb's Quarter (W/F in millimeters) 0/0   Marshelder (W/F in millimeters) 0/0   Ragweed Mix* ALK (W/F in millimeters) 8/13   Russian Thistle (W/F in millimeters) 0/0   Sagebrush/Mugwort (W/F in millimeters) 0/0   Sheep Sorrel (W/F in millimeters) 0/0   Feather Mix* ALK (W/F in millimeters) 0/0   Penicillium Mix (1:10 w/v) 0/0   Curvularia spicifera (1:10 w/v) 0/0   Epicoccum (1:10 w/v) 0/0   Aspergillus fumigatus (1:10 w/v): 0/0   Alternaria tenius (1:10 w/v) 0/0   H. Cladosporium (1:10 w/v) 0/0   Phoma herbarum (1:10 w/v) 0/0        My interpretation:SPT for aeroallergens performed today (December 3, 2020) showed sensitivity to cat, dog, dust mites, and weed pollen.  The rest was negative with appropriate responses to positive and negative controls.    ASSESSMENT/PLAN:    Moderate persistent asthma without complication  The patient states that he is asthma is well controlled with Breo Ellipta 200/25 mcg 1 puff once daily, montelukast 10 mg by mouth once daily, and albuterol inhaler as  needed.  -Continue as is.  He is currently followed by Pulmonology.  Depending on symptom control, we could discuss about Biologics.  The patient should qualify for omalizumab and dupilumab.    - ALLERGY SKIN TESTS,ALLERGENS    Allergic conjunctivitis of both eyes    Avoidance measures discussed and information provided.  -Historically, cetirizine has been helpful.  I suggest restarting it.  Sensitivity to inhalant allergens also explains modestly elevated total serum IgE level.  He is not a candidate for allergen immunotherapy considering his pulmonary function test.    - ALLERGY SKIN TESTS,ALLERGENS    Tinea cruris    - clotrimazole (LOTRIMIN) 1 % external cream  Dispense: 60 g; Refill: 0       Return if symptoms worsen or fail to improve.    Thank you for allowing us to participate in the care of this patient. Please feel free to contact us if there are any questions or concerns about the patient.    Disclaimer: This note consists of symbols derived from keyboarding, dictation and/or voice recognition software. As a result, there may be errors in the script that have gone undetected. Please consider this when interpreting information found in this chart.    Aris Delcid MD, FAAAAI, FACAAI  Allergy, Asthma and Immunology    [facility] [place]

## 2020-12-15 LAB
MYCOBACTERIUM SPEC CULT: NORMAL
MYCOBACTERIUM SPEC CULT: NORMAL
SPECIMEN SOURCE: NORMAL

## 2020-12-16 ENCOUNTER — OFFICE VISIT (OUTPATIENT)
Dept: UROLOGY | Facility: CLINIC | Age: 65
End: 2020-12-16
Payer: MEDICARE

## 2020-12-16 VITALS
HEART RATE: 90 BPM | SYSTOLIC BLOOD PRESSURE: 144 MMHG | DIASTOLIC BLOOD PRESSURE: 80 MMHG | BODY MASS INDEX: 41.17 KG/M2 | WEIGHT: 304 LBS | HEIGHT: 72 IN

## 2020-12-16 DIAGNOSIS — Z85.46 PERSONAL HISTORY OF MALIGNANT NEOPLASM OF PROSTATE: Primary | ICD-10-CM

## 2020-12-16 PROCEDURE — 99213 OFFICE O/P EST LOW 20 MIN: CPT | Performed by: UROLOGY

## 2020-12-16 ASSESSMENT — MIFFLIN-ST. JEOR: SCORE: 2196.93

## 2020-12-16 ASSESSMENT — PAIN SCALES - GENERAL: PAINLEVEL: NO PAIN (0)

## 2020-12-16 NOTE — PROGRESS NOTES
Office Visit Note  [facility]  [phone]    UROLOGIC DIAGNOSES:   pT3a [place] 4+3 equals 7 prostate cancer    CURRENT INTERVENTIONS:   Robotic prostatectomy May 2019    HISTORY:   Derek returns to clinic today for prostate cancer follow-up.  His PSA remains undetectable.  He has no urinary leakage and does not use pads.      PAST MEDICAL HISTORY:   Past Medical History:   Diagnosis Date     Asthma      Claustrophobia      CPAP (continuous positive airway pressure) dependence      Dyslipidemia      [place] (hard of hearing)      Hypercholesteremia      Hypertension      Iron deficiency      Mediastinal adenopathy      Obesity      BEULAH (obstructive sleep apnea)      Prostate cancer (H)      Pulmonary hypertension (H)      Sarcoidosis        PAST SURGICAL HISTORY:   Past Surgical History:   Procedure Laterality Date     APPENDECTOMY       BIOPSY MASS NECK Left 7/15/2020    Procedure: Left trapezius muscle biopsy;  Surgeon: Ade Villa MD;  [facility]: [facility] OR     C TOTAL HIP ARTHROPLASTY Bilateral      C TOTAL KNEE ARTHROPLASTY Bilateral      CV RIGHT HEART CATH N/A 12/11/2019    Procedure: CV RIGHT HEART CATH;  Surgeon: Torrey Hirsch MD;  [facility]: [facility]     DAVINCI PROSTATECTOMY, LYMPHADENECTOMY N/A 5/23/2019    Procedure: ROBOTIC ASSISTED LAPAROSCOPIC RADICAL PROSTATECTOMY WITH BILATERAL PELVIC LYMPH NODE DISSECTION;  Surgeon: Matteo Coughlin MD;  Location: [facility] OR     ENDOBRONCHIAL ULTRASOUND FLEXIBLE N/A 3/29/2019    Procedure: Flexible Bronchoscopy, Endobronchial Ultrasound;  Surgeon: Curtis Leger MD;  Location: [facility] OR     VASECTOMY         FAMILY HISTORY:   Family History   Problem Relation Age of Onset     Alzheimer Disease Mother      Heart Disease Father      Glaucoma No family hx of      Macular Degeneration No family hx of      Diabetes No family hx of      Thyroid Disease No family hx of        SOCIAL HISTORY:   Social History     Socioeconomic  History     Marital status:      Spouse name: None     Number of children: None     Years of education: None     Highest education level: None   Occupational History     None   Social Needs     Financial resource strain: None     Food insecurity     Worry: None     Inability: None     Transportation needs     Medical: None     Non-medical: None   Tobacco Use     Smoking status: Never Smoker     Smokeless tobacco: Never Used   Substance and Sexual Activity     Alcohol use: Yes     Comment: twice a week     Drug use: No     Sexual activity: Yes     Partners: Female   Lifestyle     Physical activity     Days per week: None     Minutes per session: None     Stress: None   Relationships     Social connections     Talks on phone: None     Gets together: None     Attends Catholic service: None     Active member of club or organization: None     Attends meetings of clubs or organizations: None     Relationship status: None     Intimate partner violence     Fear of current or ex partner: None     Emotionally abused: None     Physically abused: None     Forced sexual activity: None   Other Topics Concern     Parent/sibling w/ CABG, MI or angioplasty before 65F 55M? Not Asked   Social History Narrative    12/03/20         ENVIRONMENTAL HISTORY: [place] The home is heated with a gas furnace. They does have central air conditioning. The patient's bedroom is furnished with Indoor plants and carpeting in bedroom.  Pets inside the house include 0 pets. There is no history of cockroach or mice infestation. There is/are 0 smokers in the house.  The house does not have a damp basement.        Review Of Systems:  Skin: No rash, pruritis, or skin pigmentation  Eyes: No changes in vision  Ears/Nose/Throat: No changes in hearing, no nosebleeds  Respiratory: No shortness of breath, dyspnea on exertion, cough, or hemoptysis  Cardiovascular: No chest pain or  palpitations  Gastrointestinal: No diarrhea or constipation. No abdominal pain. No hematochezia  Genitourinary: see HPI  Musculoskeletal: No pain or swelling of joints, normal range of motion  Neurologic: No weakness or tremors  Psychiatric: No recent changes in memory or mood  Hematologic/Lymphatic/Immunologic: No easy bruising or enlarged lymph nodes  Endocrine: No weight gain or loss      PHYSICAL EXAM:    BP (!) 144/80   Pulse 90   Ht 1.829 m (6')   Wt 137.9 kg (304 lb)   BMI 41.23 kg/m      Constitutional: Well developed. Conversant and in no acute distress  Eyes: Anicteric sclera, conjunctiva clear, normal extraocular movements  ENT: Normocephalic and atraumatic,   Skin: Warm and dry. No rashes or lesions  Cardiac: No peripheral edema  Back/Flank: Not done  CNS/PNS: Normal musculature and movements, moves all extremities normally  Respiratory: Normal non-labored breathing  Abdomen: Soft nontender and nondistended  Peripheral Vascular: No peripheral edema  Mental Status/Psych: Alert and Oriented x 3. Normal mood and affect    Penis: Not done  Scrotal Skin: Not done  Testicles: Not done  Epididymis: Not done  Digital Rectal Exam:     Cystoscopy: Not done    Imaging: None    Urinalysis: UA RESULTS:  Recent Labs   Lab Test 05/07/19  0748   COLOR Yellow   APPEARANCE Clear   URINEGLC Negative   URINEBILI Negative   URINEKETONE Negative   SG 1.015   UBLD Negative   URINEPH 7.0   PROTEIN Negative   UROBILINOGEN 0.2   NITRITE Negative   LEUKEST Negative   RBCU O - 2   WBCU 0 - 5       PSA: Undetectable    Post Void Residual:     Other labs: None today      IMPRESSION:  Doing well, PSA undetectable    PLAN:  He is doing very well from a prostate cancer standpoint.  We discussed the surveillance plan.  He will have another PSA checked in 6 months.  After that, if PSA remains undetectable he can likely move on to annual surveillance.      Matteo Coughlin M.D.

## 2020-12-16 NOTE — LETTER
12/16/2020       RE: Derek Contreras  [address] [place] [address]     Dear Colleague,    Thank you for referring your patient, Derek Contreras, to the [facility] UROLOGY CLINIC [place] at [facility]. Please see a copy of my visit note below.    Office Visit Note  [facility]  [phone]    UROLOGIC DIAGNOSES:   pT3a Wilkinson 4+3 equals 7 prostate cancer    CURRENT INTERVENTIONS:   Robotic prostatectomy May 2019    HISTORY:   Derek returns to clinic today for prostate cancer follow-up.  His PSA remains undetectable.  He has no urinary leakage and does not use pads.      PAST MEDICAL HISTORY:   Past Medical History:   Diagnosis Date     Asthma      Claustrophobia      CPAP (continuous positive airway pressure) dependence      Dyslipidemia      [place] (hard of hearing)      Hypercholesteremia      Hypertension      Iron deficiency      Mediastinal adenopathy      Obesity      BEULAH (obstructive sleep apnea)      Prostate cancer (H)      Pulmonary hypertension (H)      Sarcoidosis        PAST SURGICAL HISTORY:   Past Surgical History:   Procedure Laterality Date     APPENDECTOMY       BIOPSY MASS NECK Left 7/15/2020    Procedure: Left trapezius muscle biopsy;  Surgeon: Ade Villa MD;  [facility]     C TOTAL HIP ARTHROPLASTY Bilateral      C TOTAL KNEE ARTHROPLASTY Bilateral      CV RIGHT HEART CATH N/A 12/11/2019    Procedure: CV RIGHT HEART CATH;  Surgeon: Torrey Hircsh MD;  [facility] HEART [facility]ARD[facility]AC [facility] LAB     DAVINCI PROSTATECTOMY, LYMPHADENECTOMY N/A 5/23/2019    Procedure: ROBOTIC ASSISTED LAPAROSCOPIC RADICAL PROSTATECTOMY WITH BILATERAL PELVIC LYMPH NODE DISSECTION;  Surgeon: Matteo Coughlin MD;  Location: [facility] OR     ENDOBRONCHIAL ULTRASOUND FLEXIBLE N/A 3/29/2019    Procedure: Flexible Bronchoscopy, Endobronchial Ultrasound;  Surgeon: Curtis Leger MD;  [facility] OR     VASECTOMY          FAMILY HISTORY:   Family History   Problem Relation Age of Onset     Alzheimer Disease Mother      Heart Disease Father      Glaucoma No family hx of      Macular Degeneration No family hx of      Diabetes No family hx of      Thyroid Disease No family hx of        SOCIAL HISTORY:   Social History     Socioeconomic History     Marital status:      Spouse name: None     Number of children: None     Years of education: None     Highest education level: None   Occupational History     None   Social Needs     Financial resource strain: None     Food insecurity     Worry: None     Inability: None     Transportation needs     Medical: None     Non-medical: None   Tobacco Use     Smoking status: Never Smoker     Smokeless tobacco: Never Used   Substance and Sexual Activity     Alcohol use: Yes     Comment: twice a week     Drug use: No     Sexual activity: Yes     Partners: Female   Lifestyle     Physical activity     Days per week: None     Minutes per session: None     Stress: None   Relationships     Social connections     Talks on phone: None     Gets together: None     Attends Yazidi service: None     Active member of club or organization: None     Attends meetings of clubs or organizations: None     Relationship status: None     Intimate partner violence     Fear of current or ex partner: None     Emotionally abused: None     Physically abused: None     Forced sexual activity: None   Other Topics Concern     Parent/sibling w/ CABG, MI or angioplasty before 65F 55M? Not Asked   Social History Narrative    12/03/20         ENVIRONMENTAL HISTORY: [place] The home is heated with a gas furnace. They does have central air conditioning. The patient's bedroom is furnished with Indoor plants and carpeting in bedroom.  Pets inside the house include 0 pets. There is no history of cockroach or mice infestation. There is/are 0 smokers in the house.  The house does not  have a damp basement.        Review Of Systems:  Skin: No rash, pruritis, or skin pigmentation  Eyes: No changes in vision  Ears/Nose/Throat: No changes in hearing, no nosebleeds  Respiratory: No shortness of breath, dyspnea on exertion, cough, or hemoptysis  Cardiovascular: No chest pain or palpitations  Gastrointestinal: No diarrhea or constipation. No abdominal pain. No hematochezia  Genitourinary: see HPI  Musculoskeletal: No pain or swelling of joints, normal range of motion  Neurologic: No weakness or tremors  Psychiatric: No recent changes in memory or mood  Hematologic/Lymphatic/Immunologic: No easy bruising or enlarged lymph nodes  Endocrine: No weight gain or loss      PHYSICAL EXAM:    BP (!) 144/80   Pulse 90   Ht 1.829 m (6')   Wt 137.9 kg (304 lb)   BMI 41.23 kg/m      Constitutional: Well developed. Conversant and in no acute distress  Eyes: Anicteric sclera, conjunctiva clear, normal extraocular movements  ENT: Normocephalic and atraumatic,   Skin: Warm and dry. No rashes or lesions  Cardiac: No peripheral edema  Back/Flank: Not done  CNS/PNS: Normal musculature and movements, moves all extremities normally  Respiratory: Normal non-labored breathing  Abdomen: Soft nontender and nondistended  Peripheral Vascular: No peripheral edema  Mental Status/Psych: Alert and Oriented x 3. Normal mood and affect    Penis: Not done  Scrotal Skin: Not done  Testicles: Not done  Epididymis: Not done  Digital Rectal Exam:     Cystoscopy: Not done    Imaging: None    Urinalysis: UA RESULTS:  Recent Labs   Lab Test 05/07/19  0748   COLOR Yellow   APPEARANCE Clear   URINEGLC Negative   URINEBILI Negative   URINEKETONE Negative   SG 1.015   UBLD Negative   URINEPH 7.0   PROTEIN Negative   UROBILINOGEN 0.2   NITRITE Negative   LEUKEST Negative   RBCU O - 2   WBCU 0 - 5       PSA: Undetectable    Post Void Residual:     Other labs: None today      IMPRESSION:  Doing well, PSA undetectable    PLAN:  He is doing very  well from a prostate cancer standpoint.  We discussed the surveillance plan.  He will have another PSA checked in 6 months.  After that, if PSA remains undetectable he can likely move on to annual surveillance.      Matteo Coughlin M.D.

## 2021-01-04 DIAGNOSIS — K21.9 GERD (GASTROESOPHAGEAL REFLUX DISEASE): Primary | ICD-10-CM

## 2021-01-04 RX ORDER — OMEPRAZOLE 40 MG/1
40 CAPSULE, DELAYED RELEASE ORAL DAILY
Qty: 30 CAPSULE | Refills: 11 | Status: SHIPPED | OUTPATIENT
Start: 2021-01-04 | End: 2021-01-14

## 2021-01-20 ENCOUNTER — MYC MEDICAL ADVICE (OUTPATIENT)
Dept: INTERNAL MEDICINE | Facility: CLINIC | Age: 66
End: 2021-01-20

## 2021-01-20 ENCOUNTER — VIRTUAL VISIT (OUTPATIENT)
Dept: PULMONOLOGY | Facility: CLINIC | Age: 66
End: 2021-01-20
Attending: INTERNAL MEDICINE
Payer: MEDICARE

## 2021-01-20 DIAGNOSIS — L60.9 NAIL DISORDER: Primary | ICD-10-CM

## 2021-01-20 DIAGNOSIS — J45.20 INTERMITTENT ASTHMA WITHOUT COMPLICATION, UNSPECIFIED ASTHMA SEVERITY: Primary | ICD-10-CM

## 2021-01-20 PROCEDURE — 99214 OFFICE O/P EST MOD 30 MIN: CPT | Mod: 95 | Performed by: INTERNAL MEDICINE

## 2021-01-20 RX ORDER — TIOTROPIUM BROMIDE 18 UG/1
18 CAPSULE ORAL; RESPIRATORY (INHALATION) DAILY
Qty: 30 CAPSULE | Refills: 3 | Status: SHIPPED | OUTPATIENT
Start: 2021-01-20 | End: 2021-05-12

## 2021-01-20 NOTE — PROGRESS NOTES
Reason for Visit  Derek Contreras is a 65 year old year old male being evaluated via video visit for sarcoidosis.  Pulmonary HPI    The patient was evaluated by Jamie Martin MD     Mr. Contreras was last seen in the pulmonary clinic on 10/14/2020.  He has a history of sarcoidosis with clear lungs but restrictive PFTs.  He also has reported frequent pulmonary symptoms including cough which sputum production.  He was on Zyrtec.  A FeNO was done at the last clinic visit which was abnormal.  IgE levels were also high.  On stopping Zyrtec he had generalized itching.    He saw provider in the allergy clinic and was found to be allergic to cat hair, dog hair and dander, dust mites, ragweed and a number of other agents.  Due to his restrictive PFTs he is not a candidate for immunotherapy.  The plan is to continue current regimen which includes montelukast, Zyrtec, ICS/LABA.  More targeted therapy with omalizumab versus dupilumab may be considered depending on symptom control..    Today Derek reports reasonable control of symptoms.  He has cough most days of the week multiple times a day.  He does not wake up with cough.  Does not have wheezing at night.  He denies any other new symptoms.  He also has cleared sputum production intermittently.    Current Outpatient Medications   Medication     albuterol (PROAIR HFA/PROVENTIL HFA/VENTOLIN HFA) 108 (90 Base) MCG/ACT inhaler     aspirin (ASA) 81 MG tablet     atorvastatin (LIPITOR) 40 MG tablet     BREO ELLIPTA 200-25 MCG/INH Inhaler     cetirizine (ZYRTEC) 10 MG tablet     fluticasone (FLONASE) 50 MCG/ACT nasal spray     fluticasone-vilanterol (BREO ELLIPTA) 200-25 MCG/INH inhaler     hydrochlorothiazide (HYDRODIURIL) 12.5 MG tablet     montelukast (SINGULAIR) 10 MG tablet     multivitamin (ONE-DAILY) tablet     omeprazole (PRILOSEC) 40 MG DR capsule     augmented betamethasone dipropionate (DIPROLENE-AF) 0.05 % external cream     No current facility-administered  medications for this visit.      Allergies   Allergen Reactions     Cat Hair Extract Itching     itchy tearful eyes     Adhesive Tape Rash     Iodine Rash     Past Medical History:   Diagnosis Date     Asthma      Claustrophobia      CPAP (continuous positive airway pressure) dependence      Dyslipidemia      [place] (hard of hearing)      Hypercholesteremia      Hypertension      Iron deficiency      Mediastinal adenopathy      Obesity      BEULAH (obstructive sleep apnea)      Prostate cancer (H)      Pulmonary hypertension (H)      Sarcoidosis        Past Surgical History:   Procedure Laterality Date     APPENDECTOMY       BIOPSY MASS NECK Left 7/15/2020    Procedure: Left trapezius muscle biopsy;  Surgeon: Ade Villa MD;  [facility]     C TOTAL HIP ARTHROPLASTY Bilateral      C TOTAL KNEE ARTHROPLASTY Bilateral      CV RIGHT HEART CATH N/A 12/11/2019    Procedure: CV RIGHT HEART CATH;  Surgeon: Torrey Hirsch MD;  [facility]     DAVINCI PROSTATECTOMY, LYMPHADENECTOMY N/A 5/23/2019    Procedure: ROBOTIC ASSISTED LAPAROSCOPIC RADICAL PROSTATECTOMY WITH BILATERAL PELVIC LYMPH NODE DISSECTION;  Surgeon: Matteo Coughlin MD;  [facility] OR     ENDOBRONCHIAL ULTRASOUND FLEXIBLE N/A 3/29/2019    Procedure: Flexible Bronchoscopy, Endobronchial Ultrasound;  Surgeon: Curtis Leger MD;  [facility]     VASECTOMY         Social History     Socioeconomic History     Marital status:      Spouse name: Not on file     Number of children: Not on file     Years of education: Not on file     Highest education level: Not on file   Occupational History     Not on file   Social Needs     Financial resource strain: Not on file     Food insecurity     Worry: Not on file     Inability: Not on file     Transportation needs     Medical: Not on file     Non-medical: Not on file   Tobacco Use     Smoking status: Never Smoker     Smokeless tobacco: Never Used   Substance and  Sexual Activity     Alcohol use: Yes     Comment: twice a week     Drug use: No     Sexual activity: Yes     Partners: Female   Lifestyle     Physical activity     Days per week: Not on file     Minutes per session: Not on file     Stress: Not on file   Relationships     Social connections     Talks on phone: Not on file     Gets together: Not on file     Attends Baptism service: Not on file     Active member of club or organization: Not on file     Attends meetings of clubs or organizations: Not on file     Relationship status: Not on file     Intimate partner violence     Fear of current or ex partner: Not on file     Emotionally abused: Not on file     Physically abused: Not on file     Forced sexual activity: Not on file   Other Topics Concern     Parent/sibling w/ CABG, MI or angioplasty before 65F 55M? Not Asked   Social History Narrative    12/03/20         ENVIRONMENTAL HISTORY: [place] The home is heated with a gas furnace. They does have central air conditioning. The patient's bedroom is furnished with Indoor plants and carpeting in bedroom.  Pets inside the house include 0 pets. There is no history of cockroach or mice infestation. There is/are 0 smokers in the house.  The house does not have a damp basement.        ROS Pulmonary  A complete ROS was otherwise negative except as noted in the HPI.  There were no vitals taken for this visit.  Exam:     Exam is limited as this is video visit.  Mr. Contreras appears comfortable without use of oxygen with no respiratory distress.    Results:  No results found for this or any previous visit (from the past 168 hour(s)).    Assessment and plan: 64-year-old male with history of hypertension, hyperlipidemia, obesity, history of prostate cancer, abnormal chest imaging with TBNA (3/29/2019) demonstrating granulomatous Inflammation (station 7 and 12 L lymph nodes) with BAL demonstrating 102 white cells and 11% lymphocytes.   The CD4 CD8 ratio was 2.29.  Cardiac MRI with no LGE to suggest cardiac sarcoidosis but abnormal EF of 50%.  RV dilatation of unclear etiology but no pulmonary hypertension based on right heart cath with mean PAP 17 mm Hg (12/11/2019) .  Concern for truncal/diaphragmatic weakness with trapezius biopsy in June 2020 without any convincing evidence of myopathy.  Also found to have multiple allergies with moderate persistent asthma.  1.  Asthma: He has persistent symptoms including cough on a daily basis.  He is not a candidate for immunotherapy based on abnormal PFTs.  He is currently on montelukast, Zyrtec, ICS/LABA.  We will add Spiriva to see if there is any symptomatic benefit with Spiriva.  If there is no improvement in symptoms LAMA can be stopped.  2.  Pulmonary sarcoidosis: Has restricted PFTs likely related to neuromuscular weakness.  This has been worked up extensively without histological evidence of  myopathy.  Pseudoobstruction is also a possibility in setting of asthma.  No concern for active pulmonary sarcoidosis.  We will continue to monitor.  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  There was no LGE.  The ejection fraction was 50%.  Unclear etiology of low EF but not related to sarcoidosis.  Chemistries obtained in October were in normal range.  No evidence of abnormal LFT.  CBC within normal range.  Extensive neurological examination with no myopathy but neuromuscular weakness.  Could be related to sarcoidosis.  Will need eye exams.  4.  Had questions about Covid vaccine.  Currently there is a limited supply with evolving plan for administration of the vaccine.  Care coordinators will reach out o Mr. Contreras.    Follow-up in 3 to 4 months  Derek is a 65 year old who is being evaluated via a billable video visit.      How would you like to obtain your AVS? Krikle  If the video visit is dropped, the invitation should be resent by: Other e-mail: [employer]  Will anyone else  be joining your video visit? No      Video Start Time: 8:30am  Video-Visit Details    Type of service:  Video Visit    Video End Time: 8.54  Originating Location (pt. Location): Home    Distant Location (provider location):  [facility] LUNG SCIENCE [facility]     Platform used for Video Visit: [employer]

## 2021-01-20 NOTE — TELEPHONE ENCOUNTER
Please see patient's mychart message below.    Asking for a referral to podiatry for toenail fungus.    Last office visit 11-10-20    Please advise, thanks.

## 2021-01-20 NOTE — LETTER
1/20/2021         RE: Derek Contreras  [address] [place] [address]        Dear Colleague,    Thank you for referring your patient, Derek Contreras, to the [facility] FOR LUNG SCIENCE AND [facility] CLINIC [place]. Please see a copy of my visit note below.    Reason for Visit  Derek Contreras is a 65 year old year old male being evaluated via video visit for sarcoidosis.  Pulmonary HPI    The patient was evaluated by Jamie Martin MD     Mr. Contreras was last seen in the pulmonary clinic on 10/14/2020.  He has a history of sarcoidosis with clear lungs but restrictive PFTs.  He also has reported frequent pulmonary symptoms including cough which sputum production.  He was on Zyrtec.  A FeNO was done at the last clinic visit which was abnormal.  IgE levels were also high.  On stopping Zyrtec he had generalized itching.    He saw provider in the allergy clinic and was found to be allergic to cat hair, dog hair and dander, dust mites, ragweed and a number of other agents.  Due to his restrictive PFTs he is not a candidate for immunotherapy.  The plan is to continue current regimen which includes montelukast, Zyrtec, ICS/LABA.  More targeted therapy with omalizumab versus dupilumab may be considered depending on symptom control..    Today Derek reports reasonable control of symptoms.  He has cough most days of the week multiple times a day.  He does not wake up with cough.  Does not have wheezing at night.  He denies any other new symptoms.  He also has cleared sputum production intermittently.    Current Outpatient Medications   Medication     albuterol (PROAIR HFA/PROVENTIL HFA/VENTOLIN HFA) 108 (90 Base) MCG/ACT inhaler     aspirin (ASA) 81 MG tablet     atorvastatin (LIPITOR) 40 MG tablet     BREO ELLIPTA 200-25 MCG/INH Inhaler     cetirizine (ZYRTEC) 10 MG tablet     fluticasone (FLONASE) 50 MCG/ACT nasal spray     fluticasone-vilanterol (BREO ELLIPTA) 200-25 MCG/INH  inhaler     hydrochlorothiazide (HYDRODIURIL) 12.5 MG tablet     montelukast (SINGULAIR) 10 MG tablet     multivitamin (ONE-DAILY) tablet     omeprazole (PRILOSEC) 40 MG DR capsule     augmented betamethasone dipropionate (DIPROLENE-AF) 0.05 % external cream     No current facility-administered medications for this visit.      Allergies   Allergen Reactions     Cat Hair Extract Itching     itchy tearful eyes     Adhesive Tape Rash     Iodine Rash     Past Medical History:   Diagnosis Date     Asthma      Claustrophobia      CPAP (continuous positive airway pressure) dependence      Dyslipidemia      [place] (hard of hearing)      Hypercholesteremia      Hypertension      Iron deficiency      Mediastinal adenopathy      Obesity      BEULAH (obstructive sleep apnea)      Prostate cancer (H)      Pulmonary hypertension (H)      Sarcoidosis        Past Surgical History:   Procedure Laterality Date     APPENDECTOMY       BIOPSY MASS NECK Left 7/15/2020    Procedure: Left trapezius muscle biopsy;  Surgeon: Ade Villa MD;  [facility]     C TOTAL HIP ARTHROPLASTY Bilateral      C TOTAL KNEE ARTHROPLASTY Bilateral      CV RIGHT HEART CATH N/A 12/11/2019    Procedure: CV RIGHT HEART CATH;  Surgeon: Torrey Hirsch MD;  Location:  HEART CARDIAC CATH LAB     DAVINCI PROSTATECTOMY, LYMPHADENECTOMY N/A 5/23/2019    Procedure: ROBOTIC ASSISTED LAPAROSCOPIC RADICAL PROSTATECTOMY WITH BILATERAL PELVIC LYMPH NODE DISSECTION;  Surgeon: Matteo Coughlin MD;  Location:  OR     ENDOBRONCHIAL ULTRASOUND FLEXIBLE N/A 3/29/2019    Procedure: Flexible Bronchoscopy, Endobronchial Ultrasound;  Surgeon: Curtis Leger MD;  [facility] OR     VASECTOMY         Social History     Socioeconomic History     Marital status:      Spouse name: Not on file     Number of children: Not on file     Years of education: Not on file     Highest education level: Not on file   Occupational History     Not on file    Social Needs     Financial resource strain: Not on file     Food insecurity     Worry: Not on file     Inability: Not on file     Transportation needs     Medical: Not on file     Non-medical: Not on file   Tobacco Use     Smoking status: Never Smoker     Smokeless tobacco: Never Used   Substance and Sexual Activity     Alcohol use: Yes     Comment: twice a week     Drug use: No     Sexual activity: Yes     Partners: Female   Lifestyle     Physical activity     Days per week: Not on file     Minutes per session: Not on file     Stress: Not on file   Relationships     Social connections     Talks on phone: Not on file     Gets together: Not on file     Attends Pentecostalism service: Not on file     Active member of club or organization: Not on file     Attends meetings of clubs or organizations: Not on file     Relationship status: Not on file     Intimate partner violence     Fear of current or ex partner: Not on file     Emotionally abused: Not on file     Physically abused: Not on file     Forced sexual activity: Not on file   Other Topics Concern     Parent/sibling w/ CABG, MI or angioplasty before 65F 55M? Not Asked   Social History Narrative    12/03/20         ENVIRONMENTAL HISTORY: [place] The home is heated with a gas furnace. They does have central air conditioning. The patient's bedroom is furnished with Indoor plants and carpeting in bedroom.  Pets inside the house include 0 pets. There is no history of cockroach or mice infestation. There is/are 0 smokers in the house.  The house does not have a damp basement.        ROS Pulmonary  A complete ROS was otherwise negative except as noted in the HPI.  There were no vitals taken for this visit.  Exam:     Exam is limited as this is video visit.  Mr. Contreras appears comfortable without use of oxygen with no respiratory distress.    Results:  No results found for this or any previous visit (from the past 168  hour(s)).    Assessment and plan: 64-year-old male with history of hypertension, hyperlipidemia, obesity, history of prostate cancer, abnormal chest imaging with TBNA (3/29/2019) demonstrating granulomatous Inflammation (station 7 and 12 L lymph nodes) with BAL demonstrating 102 white cells and 11% lymphocytes.  The CD4 CD8 ratio was 2.29.  Cardiac MRI with no LGE to suggest cardiac sarcoidosis but abnormal EF of 50%.  RV dilatation of unclear etiology but no pulmonary hypertension based on right heart cath with mean PAP 17 mm Hg (12/11/2019) .  Concern for truncal/diaphragmatic weakness with trapezius biopsy in June 2020 without any convincing evidence of myopathy.  Also found to have multiple allergies with moderate persistent asthma.  1.  Asthma: He has persistent symptoms including cough on a daily basis.  He is not a candidate for immunotherapy based on abnormal PFTs.  He is currently on montelukast, Zyrtec, ICS/LABA.  We will add Spiriva to see if there is any symptomatic benefit with Spiriva.  If there is no improvement in symptoms LAMA can be stopped.  2.  Pulmonary sarcoidosis: Has restricted PFTs likely related to neuromuscular weakness.  This has been worked up extensively without histological evidence of  myopathy.  Pseudoobstruction is also a possibility in setting of asthma.  No concern for active pulmonary sarcoidosis.  We will continue to monitor.  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  There was no LGE.  The ejection fraction was 50%.  Unclear etiology of low EF but not related to sarcoidosis.  Chemistries obtained in October were in normal range.  No evidence of abnormal LFT.  CBC within normal range.  Extensive neurological examination with no myopathy but neuromuscular weakness.  Could be related to sarcoidosis.  Will need eye exams.  4.  Had questions about Covid vaccine.  Currently there is a limited supply with evolving plan for administration of the vaccine.   Care coordinators will reach out o Mr. Contreras.    Follow-up in 3 to 4 months  Derek is a 65 year old who is being evaluated via a billable video visit.      How would you like to obtain your AVS? MyChart  If the video visit is dropped, the invitation should be resent by: Other e-mail: [employer]  Will anyone else be joining your video visit? No      Video Start Time: 8:30am  Video-Visit Details    Type of service:  Video Visit    Video End Time: 8.54  Originating Location (pt. Location): Home    Distant Location (provider location):  [facility] FOR LUNG SCIENCE [facility]     Platform used for Video Visit: [employer]raWell        Again, thank you for allowing me to participate in the care of your patient.        Sincerely,        Jamie Martin MD

## 2021-01-29 ENCOUNTER — MYC MEDICAL ADVICE (OUTPATIENT)
Dept: PULMONOLOGY | Facility: CLINIC | Age: 66
End: 2021-01-29

## 2021-01-29 DIAGNOSIS — J98.4 RESTRICTIVE LUNG DISEASE: ICD-10-CM

## 2021-01-29 DIAGNOSIS — D86.9 SARCOIDOSIS: Primary | ICD-10-CM

## 2021-01-29 RX ORDER — AZITHROMYCIN 250 MG/1
TABLET, FILM COATED ORAL
Qty: 6 TABLET | Refills: 0 | Status: SHIPPED | OUTPATIENT
Start: 2021-01-29 | End: 2021-02-03

## 2021-02-17 ENCOUNTER — TELEPHONE (OUTPATIENT)
Dept: PULMONOLOGY | Facility: CLINIC | Age: 66
End: 2021-02-17

## 2021-02-17 NOTE — TELEPHONE ENCOUNTER
Prior Authorization Retail Medication Request    Medication/Dose: Spiriva Handihaler 18 mcg capsules  ICD code (if different than what is on RX):    Previously Tried and Failed:    Rationale:      Insurance Name:    Insurance ID:        Pharmacy Information (if different than what is on RX)  Name:  [facility]   Phone:  [phone]

## 2021-02-20 NOTE — TELEPHONE ENCOUNTER
Central Prior Authorization Team   Phone: [phone]      PA Initiation    Medication: Spiriva Handihaler 18 mcg capsules  Insurance Company: Medicare Blue - Phone [phone] Fax [phone]  Pharmacy Filling the Rx: [id number] [place], [place] - [address] [address]  Filling Pharmacy Phone: [phone]  Filling Pharmacy Fax:    Start Date: 2/20/2021

## 2021-02-21 ENCOUNTER — MYC MEDICAL ADVICE (OUTPATIENT)
Dept: SLEEP MEDICINE | Facility: CLINIC | Age: 66
End: 2021-02-21

## 2021-02-22 NOTE — TELEPHONE ENCOUNTER
PRIOR AUTHORIZATION DENIED    Medication: Spiriva Handihaler 18 mcg capsules- DENIED    Denial Date: 2/22/2021    Denial Rational:           Appeal Information:

## 2021-02-23 ENCOUNTER — MYC MEDICAL ADVICE (OUTPATIENT)
Dept: INTERNAL MEDICINE | Facility: CLINIC | Age: 66
End: 2021-02-23

## 2021-02-23 DIAGNOSIS — D47.2 MGUS (MONOCLONAL GAMMOPATHY OF UNKNOWN SIGNIFICANCE): ICD-10-CM

## 2021-02-23 LAB
ALBUMIN SERPL-MCNC: 3.3 G/DL (ref 3.4–5)
ALP SERPL-CCNC: 87 U/L (ref 40–150)
ALT SERPL W P-5'-P-CCNC: 32 U/L (ref 0–70)
ANION GAP SERPL CALCULATED.3IONS-SCNC: 6 MMOL/L (ref 3–14)
AST SERPL W P-5'-P-CCNC: 22 U/L (ref 0–45)
BASOPHILS # BLD AUTO: 0 10E9/L (ref 0–0.2)
BASOPHILS NFR BLD AUTO: 0.5 %
BILIRUB SERPL-MCNC: 0.6 MG/DL (ref 0.2–1.3)
BUN SERPL-MCNC: 12 MG/DL (ref 7–30)
CALCIUM SERPL-MCNC: 9.4 MG/DL (ref 8.5–10.1)
CHLORIDE SERPL-SCNC: 106 MMOL/L (ref 94–109)
CO2 SERPL-SCNC: 28 MMOL/L (ref 20–32)
CREAT SERPL-MCNC: 1.01 MG/DL (ref 0.66–1.25)
DIFFERENTIAL METHOD BLD: ABNORMAL
EOSINOPHIL # BLD AUTO: 0.2 10E9/L (ref 0–0.7)
EOSINOPHIL NFR BLD AUTO: 3.1 %
ERYTHROCYTE [DISTWIDTH] IN BLOOD BY AUTOMATED COUNT: 14.6 % (ref 10–15)
GFR SERPL CREATININE-BSD FRML MDRD: 77 ML/MIN/{1.73_M2}
GLUCOSE SERPL-MCNC: 107 MG/DL (ref 70–99)
HCT VFR BLD AUTO: 48.6 % (ref 40–53)
HGB BLD-MCNC: 15.6 G/DL (ref 13.3–17.7)
LYMPHOCYTES # BLD AUTO: 1.5 10E9/L (ref 0.8–5.3)
LYMPHOCYTES NFR BLD AUTO: 19 %
MCH RBC QN AUTO: 28.5 PG (ref 26.5–33)
MCHC RBC AUTO-ENTMCNC: 32.1 G/DL (ref 31.5–36.5)
MCV RBC AUTO: 89 FL (ref 78–100)
MONOCYTES # BLD AUTO: 0.8 10E9/L (ref 0–1.3)
MONOCYTES NFR BLD AUTO: 10.2 %
NEUTROPHILS # BLD AUTO: 5.2 10E9/L (ref 1.6–8.3)
NEUTROPHILS NFR BLD AUTO: 67.2 %
PLATELET # BLD AUTO: 149 10E9/L (ref 150–450)
POTASSIUM SERPL-SCNC: 3.6 MMOL/L (ref 3.4–5.3)
PROT SERPL-MCNC: 7.7 G/DL (ref 6.8–8.8)
RBC # BLD AUTO: 5.48 10E12/L (ref 4.4–5.9)
SODIUM SERPL-SCNC: 140 MMOL/L (ref 133–144)
WBC # BLD AUTO: 7.7 10E9/L (ref 4–11)

## 2021-02-23 PROCEDURE — 99N1036 PR STATISTIC TOTAL PROTEIN: Performed by: INTERNAL MEDICINE

## 2021-02-23 PROCEDURE — 84165 PROTEIN E-PHORESIS SERUM: CPT | Performed by: PATHOLOGY

## 2021-02-23 PROCEDURE — 80053 COMPREHEN METABOLIC PANEL: CPT | Performed by: INTERNAL MEDICINE

## 2021-02-23 PROCEDURE — 83883 ASSAY NEPHELOMETRY NOT SPEC: CPT | Performed by: INTERNAL MEDICINE

## 2021-02-23 PROCEDURE — 82784 ASSAY IGA/IGD/IGG/IGM EACH: CPT | Performed by: INTERNAL MEDICINE

## 2021-02-23 PROCEDURE — 85025 COMPLETE CBC W/AUTO DIFF WBC: CPT | Performed by: INTERNAL MEDICINE

## 2021-02-23 PROCEDURE — 36415 COLL VENOUS BLD VENIPUNCTURE: CPT | Performed by: INTERNAL MEDICINE

## 2021-02-24 LAB
ALBUMIN SERPL ELPH-MCNC: 3.4 G/DL (ref 3.7–5.1)
ALPHA1 GLOB SERPL ELPH-MCNC: 0.4 G/DL (ref 0.2–0.4)
ALPHA2 GLOB SERPL ELPH-MCNC: 0.9 G/DL (ref 0.5–0.9)
B-GLOBULIN SERPL ELPH-MCNC: 1.4 G/DL (ref 0.6–1)
GAMMA GLOB SERPL ELPH-MCNC: 1.2 G/DL (ref 0.7–1.6)
IGA SERPL-MCNC: 855 MG/DL (ref 84–499)
IGG SERPL-MCNC: 1136 MG/DL (ref 610–1616)
IGM SERPL-MCNC: 67 MG/DL (ref 35–242)
KAPPA LC UR-MCNC: 11.9 MG/DL (ref 0.33–1.94)
KAPPA LC/LAMBDA SER: 4.28 {RATIO} (ref 0.26–1.65)
LAMBDA LC SERPL-MCNC: 2.78 MG/DL (ref 0.57–2.63)
M PROTEIN SERPL ELPH-MCNC: 0.5 G/DL
PROT PATTERN SERPL ELPH-IMP: ABNORMAL

## 2021-03-02 ENCOUNTER — VIRTUAL VISIT (OUTPATIENT)
Dept: ONCOLOGY | Facility: CLINIC | Age: 66
End: 2021-03-02
Attending: INTERNAL MEDICINE
Payer: MEDICARE

## 2021-03-02 DIAGNOSIS — D47.2 MGUS (MONOCLONAL GAMMOPATHY OF UNKNOWN SIGNIFICANCE): Primary | ICD-10-CM

## 2021-03-02 PROCEDURE — 99213 OFFICE O/P EST LOW 20 MIN: CPT | Mod: 95 | Performed by: INTERNAL MEDICINE

## 2021-03-02 PROCEDURE — 999N001193 HC VIDEO/TELEPHONE VISIT; NO CHARGE

## 2021-03-02 NOTE — PROGRESS NOTES
Derek is a 65 year old who is being evaluated via a billable video visit.      How would you like to obtain your AVS? Kaurharzoë  If the video visit is dropped, the invitation should be resent by: Other e-mail: Sindhu  Will anyone else be joining your video visit? Olimpia Ngo, ROSIO on 3/2/2021 at 12:18 PM      Video Start Time: 12:49 PM     Video-Visit Details    Type of service:  Video Visit    Video End Time:12:53 PM    Originating Location (pt. Location): Home    Distant Location (provider location):  [facility]     Platform used for Video Visit: Well       [facility]    Hematology/Oncology Established Patient Note      Today's Date: 03/02/21    Reason for Follow-up: MGUS      HISTORY OF PRESENT ILLNESS: Derek Contreras is a 65 year old male with PMHx of sleep apnea, sarcoidosis, COPD, prostate cancer s/p prostatectomy, history of hip and knee replacement, HTN who presents with MGUS. An VLAD was checked by his neurologist, which showed an elevated IgA level.      He says that he feels fantastic currently.      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  Kappa light chain is elevated to 10.73, with kappa/lambda ratio of 4.47.  His hemoglobin, calcium, and renal function are normal.          INTERIM HISTORY: Derek says that he is doing well.  He notes that he had his first COVID vaccine yesterday.  He feels great.          REVIEW OF SYSTEMS:   14 point ROS was reviewed and is negative other than as noted above in HPI.       HOME MEDICATIONS:  Current Outpatient Medications   Medication Sig Dispense Refill     albuterol (PROAIR HFA/PROVENTIL HFA/VENTOLIN HFA) 108 (90 Base) MCG/ACT inhaler Inhale 1-2 puffs into the lungs every 4 hours as needed for shortness of breath / dyspnea 1 Inhaler 4     aspirin (ASA) 81 MG tablet Take 81 mg by mouth every morning  90 tablet 3     atorvastatin (LIPITOR) 40  MG tablet TAKE 1 TABLET(40 MG) BY MOUTH EVERY MORNING 90 tablet 3     BREO ELLIPTA 200-25 MCG/INH Inhaler Inhale 1 puff into the lungs daily 1 Inhaler 11     cetirizine (ZYRTEC) 10 MG tablet Take 10 mg by mouth every morning  90 tablet 3     fluticasone (FLONASE) 50 MCG/ACT nasal spray Spray 1-2 sprays into both nostrils daily (Patient taking differently: Spray 1-2 sprays into both nostrils as needed ) 16 g 0     fluticasone-vilanterol (BREO ELLIPTA) 200-25 MCG/INH inhaler Inhale 1 puff into the lungs daily 1 Inhaler 11     hydrochlorothiazide (HYDRODIURIL) 12.5 MG tablet TAKE 1 TABLET(12.5 MG) BY MOUTH EVERY MORNING 90 tablet 3     montelukast (SINGULAIR) 10 MG tablet Take 1 tablet (10 mg) by mouth At Bedtime 90 tablet 3     multivitamin (ONE-DAILY) tablet Take 1 tablet by mouth every morning  90 tablet 3     omeprazole (PRILOSEC) 40 MG DR capsule Take 1 capsule (40 mg) by mouth daily 30 capsule 11     tiotropium (SPIRIVA) 18 MCG inhaled capsule Inhale 1 capsule (18 mcg) into the lungs daily 30 capsule 3     augmented betamethasone dipropionate (DIPROLENE-AF) 0.05 % external cream Apply topically 2 times daily (Patient not taking: Reported on 12/16/2020) 100 g 1         ALLERGIES:  Allergies   Allergen Reactions     Cat Hair Extract Itching     itchy tearful eyes     Adhesive Tape Rash     Iodine Rash         PAST MEDICAL HISTORY:  Past Medical History:   Diagnosis Date     Asthma      Claustrophobia      CPAP (continuous positive airway pressure) dependence      Dyslipidemia      [place] (hard of hearing)      Hypercholesteremia      Hypertension      Iron deficiency      Mediastinal adenopathy      Obesity      BEULAH (obstructive sleep apnea)      Prostate cancer (H)      Pulmonary hypertension (H)      Sarcoidosis          PAST SURGICAL HISTORY:  Past Surgical History:   Procedure Laterality Date     APPENDECTOMY       BIOPSY MASS NECK Left 7/15/2020    Procedure: Left trapezius muscle biopsy;  Surgeon: Ade Villa  MD Aldo;  [facility]     C TOTAL HIP ARTHROPLASTY Bilateral      C TOTAL KNEE ARTHROPLASTY Bilateral      CV RIGHT HEART CATH MEASUREMENTS RECORDED N/A 12/11/2019    Procedure: CV RIGHT HEART CATH;  Surgeon: Torrey Hirsch MD;  [facility]     DAVINCI PROSTATECTOMY, LYMPHADENECTOMY N/A 5/23/2019    Procedure: ROBOTIC ASSISTED LAPAROSCOPIC RADICAL PROSTATECTOMY WITH BILATERAL PELVIC LYMPH NODE DISSECTION;  Surgeon: Matteo Coughlin MD;  Location: [facility]     ENDOBRONCHIAL ULTRASOUND FLEXIBLE N/A 3/29/2019    Procedure: Flexible Bronchoscopy, Endobronchial Ultrasound;  Surgeon: Curtis Leger MD;  Location: [facility] OR     VASECTOMY           SOCIAL HISTORY:  Social History     Socioeconomic History     Marital status:      Spouse name: Not on file     Number of children: Not on file     Years of education: Not on file     Highest education level: Not on file   Occupational History     Not on file   Social Needs     Financial resource strain: Not on file     Food insecurity     Worry: Not on file     Inability: Not on file     Transportation needs     Medical: Not on file     Non-medical: Not on file   Tobacco Use     Smoking status: Never Smoker     Smokeless tobacco: Never Used   Substance and Sexual Activity     Alcohol use: Yes     Comment: twice a week     Drug use: No     Sexual activity: Yes     Partners: Female   Lifestyle     Physical activity     Days per week: Not on file     Minutes per session: Not on file     Stress: Not on file   Relationships     Social connections     Talks on phone: Not on file     Gets together: Not on file     Attends Yarsanism service: Not on file     Active member of club or organization: Not on file     Attends meetings of clubs or organizations: Not on file     Relationship status: Not on file     Intimate partner violence     Fear of current or ex partner: Not on file     Emotionally abused: Not on file     Physically abused:  Not on file     Forced sexual activity: Not on file   Other Topics Concern     Parent/sibling w/ CABG, MI or angioplasty before 65F 55M? Not Asked   Social History Narrative    20         ENVIRONMENTAL HISTORY: [place] The home is heated with a gas furnace. They does have central air conditioning. The patient's bedroom is furnished with Indoor plants and carpeting in bedroom.  Pets inside the house include 0 pets. There is no history of cockroach or mice infestation. There is/are 0 smokers in the house.  The house does not have a damp basement.          FAMILY HISTORY:  Family History   Problem Relation Age of Onset     Alzheimer Disease Mother      Heart Disease Father      Glaucoma No family hx of      Macular Degeneration No family hx of      Diabetes No family hx of      Thyroid Disease No family hx of          PHYSICAL EXAM:  ECO  GENERAL/CONSTITUTIONAL: No acute distress. Healthy, alert.  EYES: No scleral icterus.  No redness or discharge.    RESPIRATORY: No audible wheeze, cough, or visible cyanosis.  No visible retractions or increased work of breathing.  Able to speak fully in complete sentences.  NEUROLOGIC: Alert, oriented, answers questions appropriately. No tremor. Mentation intact and speech normal  INTEGUMENTARY: No jaundice.    PSYCHIATRIC:  Mentation appears normal, affect normal/bright, judgement and insight intact, normal speech and appearance well-groomed.    The rest of a comprehensive physical exam is deferred due to public health emergency video visit restrictions.          LABS:  CBC RESULTS:   Recent Labs   Lab Test 21   WBC 7.7   RBC 5.48   HGB 15.6   HCT 48.6   MCV 89   MCH 28.5   MCHC 32.1   RDW 14.6   *     Recent Labs   Lab Test 21  0727 10/14/20  1109    139   POTASSIUM 3.6 3.6   CHLORIDE 106 107   CO2 28 27   ANIONGAP 6 5   * 100*   BUN 12 12   CR 1.01 0.91   ANTONIO 9.4 8.9     Lab Results    Component Value Date    AST 22 02/23/2021     Lab Results   Component Value Date    ALT 32 02/23/2021     No results found for: BILICONJ   Lab Results   Component Value Date    BILITOTAL 0.6 02/23/2021     Lab Results   Component Value Date    ALBUMIN 3.3 02/23/2021     Lab Results   Component Value Date    PROTTOTAL 7.7 02/23/2021      Lab Results   Component Value Date    ALKPHOS 87 02/23/2021         SPEP:  Monoclonal peak:  5/27/20: 0.5 g/dL IgA kappa  9/1/20: 0.4 g/dL  2/23/21: 0.5 g/dL    Component      Latest Ref Rng & Units 2/23/2021   IGG      610 - 1,616 mg/dL 1,136   IGA      84 - 499 mg/dL 855 (H)   IGM      35 - 242 mg/dL 67   [place] Free Lt Chain      0.33 - 1.94 mg/dL 11.90 (H)   Lambda Free Lt Chain      0.57 - 2.63 mg/dL 2.78 (H)   Kappa Lambda Ratio      0.26 - 1.65 4.28 (H)         IMAGING:  Skeletal survey 6/9/20:  No lytic or destructive lesions or evidence of compression  fracture.      ASSESSMENT/PLAN:  Derek Contreras is a 65 year old male with:    1) MGUS: Labs reviewed.  M-spike stable at 0.5 g/dL.  Serum free light chains are overall stable.  Skeletal survey showed no evidence of lytic lesions.  Renal function, calcium, hemoglobin are normal.  We will monitor this for now.  -RTC in 6 months with labs: CBC, CMP, SPEP, serum free light chains.  If remain stable at the next visit, can likely space out follow-up's to annually.    2) History of prostate cancer: s/p prostatectomy  -followed by urology    3) COPD, sarcoidosis:  -followed by pulmonology    4) Thrombocytopenia: has been mildly low off and on.  Platelet count 149K on recent labs.  -monitor CBC for now        Radha Trimble MD  Hematology/Oncology  [facility]    Total time spent on day of visit, including review of tests, obtaining/reviewing separately obtained history, ordering medications/tests/procedures, communicating with PCP/consultants, and documenting in electronic medical record: 20  minutes

## 2021-03-02 NOTE — LETTER
3/2/2021         RE: Derek Contreras  [address] [place] [address]        Dear Colleague,    Thank you for referring your patient, Derek Contreras, to the [facility]. Please see a copy of my visit note below.    Derek is a 65 year old who is being evaluated via a billable video visit.      How would you like to obtain your AVS? MyChart  If the video visit is dropped, the invitation should be resent by: Other e-mail: [email or web address]  Will anyone else be joining your video visit? Olimpia Ngo CMA on 3/2/2021 at 12:18 PM      Video Start Time: 12:49 PM     Video-Visit Details    Type of service:  Video Visit    Video End Time:12:53 PM    Originating Location (pt. Location): Home    Distant Location (provider location):  [facility]     Platform used for Video Visit: Texert       [facility]    Hematology/Oncology Established Patient Note      Today's Date: 03/02/21    Reason for Follow-up: MGUS      HISTORY OF PRESENT ILLNESS: Derek Contreras is a 65 year old male with PMHx of sleep apnea, sarcoidosis, COPD, prostate cancer s/p prostatectomy, history of hip and knee replacement, HTN who presents with MGUS. An VLAD was checked by his neurologist, which showed an elevated IgA level.      He says that he feels fantastic currently.      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  Kappa light chain is elevated to 10.73, with kappa/lambda ratio of 4.47.  His hemoglobin, calcium, and renal function are normal.          INTERIM HISTORY: Derek says that he is doing well.  He notes that he had his first COVID vaccine yesterday.  He feels great.          REVIEW OF SYSTEMS:   14 point ROS was reviewed and is negative other than as noted above in HPI.       HOME MEDICATIONS:  Current Outpatient Medications   Medication Sig Dispense Refill     albuterol  (PROAIR HFA/PROVENTIL HFA/VENTOLIN HFA) 108 (90 Base) MCG/ACT inhaler Inhale 1-2 puffs into the lungs every 4 hours as needed for shortness of breath / dyspnea 1 Inhaler 4     aspirin (ASA) 81 MG tablet Take 81 mg by mouth every morning  90 tablet 3     atorvastatin (LIPITOR) 40 MG tablet TAKE 1 TABLET(40 MG) BY MOUTH EVERY MORNING 90 tablet 3     BREO ELLIPTA 200-25 MCG/INH Inhaler Inhale 1 puff into the lungs daily 1 Inhaler 11     cetirizine (ZYRTEC) 10 MG tablet Take 10 mg by mouth every morning  90 tablet 3     fluticasone (FLONASE) 50 MCG/ACT nasal spray Spray 1-2 sprays into both nostrils daily (Patient taking differently: Spray 1-2 sprays into both nostrils as needed ) 16 g 0     fluticasone-vilanterol (BREO ELLIPTA) 200-25 MCG/INH inhaler Inhale 1 puff into the lungs daily 1 Inhaler 11     hydrochlorothiazide (HYDRODIURIL) 12.5 MG tablet TAKE 1 TABLET(12.5 MG) BY MOUTH EVERY MORNING 90 tablet 3     montelukast (SINGULAIR) 10 MG tablet Take 1 tablet (10 mg) by mouth At Bedtime 90 tablet 3     multivitamin (ONE-DAILY) tablet Take 1 tablet by mouth every morning  90 tablet 3     omeprazole (PRILOSEC) 40 MG DR capsule Take 1 capsule (40 mg) by mouth daily 30 capsule 11     tiotropium (SPIRIVA) 18 MCG inhaled capsule Inhale 1 capsule (18 mcg) into the lungs daily 30 capsule 3     augmented betamethasone dipropionate (DIPROLENE-AF) 0.05 % external cream Apply topically 2 times daily (Patient not taking: Reported on 12/16/2020) 100 g 1         ALLERGIES:  Allergies   Allergen Reactions     Cat Hair Extract Itching     itchy tearful eyes     Adhesive Tape Rash     Iodine Rash         PAST MEDICAL HISTORY:  Past Medical History:   Diagnosis Date     Asthma      Claustrophobia      CPAP (continuous positive airway pressure) dependence      Dyslipidemia      [place] (hard of hearing)      Hypercholesteremia      Hypertension      Iron deficiency      Mediastinal adenopathy      Obesity      BEULAH (obstructive sleep apnea)       Prostate cancer (H)      Pulmonary hypertension (H)      Sarcoidosis          PAST SURGICAL HISTORY:  Past Surgical History:   Procedure Laterality Date     APPENDECTOMY       BIOPSY MASS NECK Left 7/15/2020    Procedure: Left trapezius muscle biopsy;  Surgeon: Ade Villa MD;  [facility]     C TOTAL HIP ARTHROPLASTY Bilateral      C TOTAL KNEE ARTHROPLASTY Bilateral      CV RIGHT HEART CATH MEASUREMENTS RECORDED N/A 12/11/2019    Procedure: CV RIGHT HEART CATH;  Surgeon: Torrey Hirsch MD;  [facility]     DAVINCI PROSTATECTOMY, LYMPHADENECTOMY N/A 5/23/2019    Procedure: ROBOTIC ASSISTED LAPAROSCOPIC RADICAL PROSTATECTOMY WITH BILATERAL PELVIC LYMPH NODE DISSECTION;  Surgeon: Matteo Coughlin MD;  Location: [facility] OR     ENDOBRONCHIAL ULTRASOUND FLEXIBLE N/A 3/29/2019    Procedure: Flexible Bronchoscopy, Endobronchial Ultrasound;  Surgeon: Curtis Leger MD;  Location: [facility] OR     VASECTOMY           SOCIAL HISTORY:  Social History     Socioeconomic History     Marital status:      Spouse name: Not on file     Number of children: Not on file     Years of education: Not on file     Highest education level: Not on file   Occupational History     Not on file   Social Needs     Financial resource strain: Not on file     Food insecurity     Worry: Not on file     Inability: Not on file     Transportation needs     Medical: Not on file     Non-medical: Not on file   Tobacco Use     Smoking status: Never Smoker     Smokeless tobacco: Never Used   Substance and Sexual Activity     Alcohol use: Yes     Comment: twice a week     Drug use: No     Sexual activity: Yes     Partners: Female   Lifestyle     Physical activity     Days per week: Not on file     Minutes per session: Not on file     Stress: Not on file   Relationships     Social connections     Talks on phone: Not on file     Gets together: Not on file     Attends Ep[employer]opalian service: Not on file      Active member of club or organization: Not on file     Attends meetings of clubs or organizations: Not on file     Relationship status: Not on file     Intimate partner violence     Fear of current or ex partner: Not on file     Emotionally abused: Not on file     Physically abused: Not on file     Forced sexual activity: Not on file   Other Topics Concern     Parent/sibling w/ CABG, MI or angioplasty before 65F 55M? Not Asked   Social History Narrative    20         ENVIRONMENTAL HISTORY: [place] The home is heated with a gas furnace. They does have central air conditioning. The patient's bedroom is furnished with Indoor plants and carpeting in bedroom.  Pets inside the house include 0 pets. There is no history of cockroach or mice infestation. There is/are 0 smokers in the house.  The house does not have a damp basement.          FAMILY HISTORY:  Family History   Problem Relation Age of Onset     Alzheimer Disease Mother      Heart Disease Father      Glaucoma No family hx of      Macular Degeneration No family hx of      Diabetes No family hx of      Thyroid Disease No family hx of          PHYSICAL EXAM:  ECO  GENERAL/CONSTITUTIONAL: No acute distress. Healthy, alert.  EYES: No scleral icterus.  No redness or discharge.    RESPIRATORY: No audible wheeze, cough, or visible cyanosis.  No visible retractions or increased work of breathing.  Able to speak fully in complete sentences.  NEUROLOGIC: Alert, oriented, answers questions appropriately. No tremor. Mentation intact and speech normal  INTEGUMENTARY: No jaundice.    PSYCHIATRIC:  Mentation appears normal, affect normal/bright, judgement and insight intact, normal speech and appearance well-groomed.    The rest of a comprehensive physical exam is deferred due to public health emergency video visit restrictions.          LABS:  CBC RESULTS:   Recent Labs   Lab Test 21  0727   WBC 7.7   RBC 5.48   HGB  15.6   HCT 48.6   MCV 89   MCH 28.5   MCHC 32.1   RDW 14.6   *     Recent Labs   Lab Test 02/23/21  0727 10/14/20  1109    139   POTASSIUM 3.6 3.6   CHLORIDE 106 107   CO2 28 27   ANIONGAP 6 5   * 100*   BUN 12 12   CR 1.01 0.91   ANTONIO 9.4 8.9     Lab Results   Component Value Date    AST 22 02/23/2021     Lab Results   Component Value Date    ALT 32 02/23/2021     No results found for: BILICONJ   Lab Results   Component Value Date    BILITOTAL 0.6 02/23/2021     Lab Results   Component Value Date    ALBUMIN 3.3 02/23/2021     Lab Results   Component Value Date    PROTTOTAL 7.7 02/23/2021      Lab Results   Component Value Date    ALKPHOS 87 02/23/2021         SPEP:  Monoclonal peak:  5/27/20: 0.5 g/dL IgA kappa  9/1/20: 0.4 g/dL  2/23/21: 0.5 g/dL    Component      Latest Ref Rng & Units 2/23/2021   IGG      610 - 1,616 mg/dL 1,136   IGA      84 - 499 mg/dL 855 (H)   IGM      35 - 242 mg/dL 67   [place] Free Lt Chain      0.33 - 1.94 mg/dL 11.90 (H)   Lambda Free Lt Chain      0.57 - 2.63 mg/dL 2.78 (H)   Kappa Lambda Ratio      0.26 - 1.65 4.28 (H)         IMAGING:  Skeletal survey 6/9/20:  No lytic or destructive lesions or evidence of compression  fracture.      ASSESSMENT/PLAN:  Derek Contreras is a 65 year old male with:    1) MGUS: Labs reviewed.  M-spike stable at 0.5 g/dL.  Serum free light chains are overall stable.  Skeletal survey showed no evidence of lytic lesions.  Renal function, calcium, hemoglobin are normal.  We will monitor this for now.  -RTC in 6 months with labs: CBC, CMP, SPEP, serum free light chains.  If remain stable at the next visit, can likely space out follow-up's to annually.    2) History of prostate cancer: s/p prostatectomy  -followed by urology    3) COPD, sarcoidosis:  -followed by pulmonology    4) Thrombocytopenia: has been mildly low off and on.  Platelet count 149K on recent labs.  -monitor CBC for now        Radha Trimble,  MD  Hematology/Oncology  [facility]    Total time spent on day of visit, including review of tests, obtaining/reviewing separately obtained history, ordering medications/tests/procedures, communicating with PCP/consultants, and documenting in electronic medical record: 20 minutes        Again, thank you for allowing me to participate in the care of your patient.        Sincerely,        Radha Trimble MD

## 2021-03-02 NOTE — LETTER
3/2/2021         RE: Derek Contreras  [id number] [place] [address]        Dear Colleague,    Thank you for referring your patient, Derek Contreras, to the [facility]. Please see a copy of my visit note below.    Derek is a 65 year old who is being evaluated via a billable video visit.      How would you like to obtain your AVS? MyChart  If the video visit is dropped, the invitation should be resent by: Other e-mail: [email or web address]  Will anyone else be joining your video visit? Olimpia Ngo CMA on 3/2/2021 at 12:18 PM      Video Start Time: 12:49 PM     Video-Visit Details    Type of service:  Video Visit    Video End Time:12:53 PM    Originating Location (pt. Location): Home    Distant Location (provider location):  [facility]     Platform used for Video Visit: DocRun       [facility]    Hematology/Oncology Established Patient Note      Today's Date: 03/02/21    Reason for Follow-up: MGUS      HISTORY OF PRESENT ILLNESS: Derek Contreras is a 65 year old male with PMHx of sleep apnea, sarcoidosis, COPD, prostate cancer s/p prostatectomy, history of hip and knee replacement, HTN who presents with MGUS. An VLAD was checked by his neurologist, which showed an elevated IgA level.      He says that he feels fantastic currently.      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  Kappa light chain is elevated to 10.73, with kappa/lambda ratio of 4.47.  His hemoglobin, calcium, and renal function are normal.          INTERIM HISTORY: Derek says that he is doing well.  He notes that he had his first COVID vaccine yesterday.  He feels great.          REVIEW OF SYSTEMS:   14 point ROS was reviewed and is negative other than as noted above in HPI.       HOME MEDICATIONS:  Current Outpatient Medications   Medication Sig Dispense Refill     albuterol  (PROAIR HFA/PROVENTIL HFA/VENTOLIN HFA) 108 (90 Base) MCG/ACT inhaler Inhale 1-2 puffs into the lungs every 4 hours as needed for shortness of breath / dyspnea 1 Inhaler 4     aspirin (ASA) 81 MG tablet Take 81 mg by mouth every morning  90 tablet 3     atorvastatin (LIPITOR) 40 MG tablet TAKE 1 TABLET(40 MG) BY MOUTH EVERY MORNING 90 tablet 3     BREO ELLIPTA 200-25 MCG/INH Inhaler Inhale 1 puff into the lungs daily 1 Inhaler 11     cetirizine (ZYRTEC) 10 MG tablet Take 10 mg by mouth every morning  90 tablet 3     fluticasone (FLONASE) 50 MCG/ACT nasal spray Spray 1-2 sprays into both nostrils daily (Patient taking differently: Spray 1-2 sprays into both nostrils as needed ) 16 g 0     fluticasone-vilanterol (BREO ELLIPTA) 200-25 MCG/INH inhaler Inhale 1 puff into the lungs daily 1 Inhaler 11     hydrochlorothiazide (HYDRODIURIL) 12.5 MG tablet TAKE 1 TABLET(12.5 MG) BY MOUTH EVERY MORNING 90 tablet 3     montelukast (SINGULAIR) 10 MG tablet Take 1 tablet (10 mg) by mouth At Bedtime 90 tablet 3     multivitamin (ONE-DAILY) tablet Take 1 tablet by mouth every morning  90 tablet 3     omeprazole (PRILOSEC) 40 MG DR capsule Take 1 capsule (40 mg) by mouth daily 30 capsule 11     tiotropium (SPIRIVA) 18 MCG inhaled capsule Inhale 1 capsule (18 mcg) into the lungs daily 30 capsule 3     augmented betamethasone dipropionate (DIPROLENE-AF) 0.05 % external cream Apply topically 2 times daily (Patient not taking: Reported on 12/16/2020) 100 g 1         ALLERGIES:  Allergies   Allergen Reactions     Cat Hair Extract Itching     itchy tearful eyes     Adhesive Tape Rash     Iodine Rash         PAST MEDICAL HISTORY:  Past Medical History:   Diagnosis Date     Asthma      Claustrophobia      CPAP (continuous positive airway pressure) dependence      Dyslipidemia      [place] (hard of hearing)      Hypercholesteremia      Hypertension      Iron deficiency      Mediastinal adenopathy      Obesity      BEULAH (obstructive sleep apnea)       Prostate cancer (H)      Pulmonary hypertension (H)      Sarcoidosis          PAST SURGICAL HISTORY:  Past Surgical History:   Procedure Laterality Date     APPENDECTOMY       BIOPSY MASS NECK Left 7/15/2020    Procedure: Left trapezius muscle biopsy;  Surgeon: Ade Villa MD;  [facility]     C TOTAL HIP ARTHROPLASTY Bilateral      C TOTAL KNEE ARTHROPLASTY Bilateral      CV RIGHT HEART CATH MEASUREMENTS RECORDED N/A 12/11/2019    Procedure: CV RIGHT HEART CATH;  Surgeon: Torrey Hirsch MD;  [facility]     DAVINCI PROSTATECTOMY, LYMPHADENECTOMY N/A 5/23/2019    Procedure: ROBOTIC ASSISTED LAPAROSCOPIC RADICAL PROSTATECTOMY WITH BILATERAL PELVIC LYMPH NODE DISSECTION;  Surgeon: Matteo Coughlin MD;  Location: [facility] OR     ENDOBRONCHIAL ULTRASOUND FLEXIBLE N/A 3/29/2019    Procedure: Flexible Bronchoscopy, Endobronchial Ultrasound;  Surgeon: Curtis Leger MD;  Location: [facility] OR     VASECTOMY           SOCIAL HISTORY:  Social History     Socioeconomic History     Marital status:      Spouse name: Not on file     Number of children: Not on file     Years of education: Not on file     Highest education level: Not on file   Occupational History     Not on file   Social Needs     Financial resource strain: Not on file     Food insecurity     Worry: Not on file     Inability: Not on file     Transportation needs     Medical: Not on file     Non-medical: Not on file   Tobacco Use     Smoking status: Never Smoker     Smokeless tobacco: Never Used   Substance and Sexual Activity     Alcohol use: Yes     Comment: twice a week     Drug use: No     Sexual activity: Yes     Partners: Female   Lifestyle     Physical activity     Days per week: Not on file     Minutes per session: Not on file     Stress: Not on file   Relationships     Social connections     Talks on phone: Not on file     Gets together: Not on file     Attends Jehovah's witness service: Not on file      Active member of club or organization: Not on file     Attends meetings of clubs or organizations: Not on file     Relationship status: Not on file     Intimate partner violence     Fear of current or ex partner: Not on file     Emotionally abused: Not on file     Physically abused: Not on file     Forced sexual activity: Not on file   Other Topics Concern     Parent/sibling w/ CABG, MI or angioplasty before 65F 55M? Not Asked   Social History Narrative    20         ENVIRONMENTAL HISTORY: [place] The home is heated with a gas furnace. They does have central air conditioning. The patient's bedroom is furnished with Indoor plants and carpeting in bedroom.  Pets inside the house include 0 pets. There is no history of cockroach or mice infestation. There is/are 0 smokers in the house.  The house does not have a damp basement.          FAMILY HISTORY:  Family History   Problem Relation Age of Onset     Alzheimer Disease Mother      Heart Disease Father      Glaucoma No family hx of      Macular Degeneration No family hx of      Diabetes No family hx of      Thyroid Disease No family hx of          PHYSICAL EXAM:  ECO  GENERAL/CONSTITUTIONAL: No acute distress. Healthy, alert.  EYES: No scleral icterus.  No redness or discharge.    RESPIRATORY: No audible wheeze, cough, or visible cyanosis.  No visible retractions or increased work of breathing.  Able to speak fully in complete sentences.  NEUROLOGIC: Alert, oriented, answers questions appropriately. No tremor. Mentation intact and speech normal  INTEGUMENTARY: No jaundice.    PSYCHIATRIC:  Mentation appears normal, affect normal/bright, judgement and insight intact, normal speech and appearance well-groomed.    The rest of a comprehensive physical exam is deferred due to public health emergency video visit restrictions.          LABS:  CBC RESULTS:   Recent Labs   Lab Test 21  0727   WBC 7.7   RBC 5.48   HGB  15.6   HCT 48.6   MCV 89   MCH 28.5   MCHC 32.1   RDW 14.6   *     Recent Labs   Lab Test 02/23/21  0727 10/14/20  1109    139   POTASSIUM 3.6 3.6   CHLORIDE 106 107   CO2 28 27   ANIONGAP 6 5   * 100*   BUN 12 12   CR 1.01 0.91   ANTONIO 9.4 8.9     Lab Results   Component Value Date    AST 22 02/23/2021     Lab Results   Component Value Date    ALT 32 02/23/2021     No results found for: BILICONJ   Lab Results   Component Value Date    BILITOTAL 0.6 02/23/2021     Lab Results   Component Value Date    ALBUMIN 3.3 02/23/2021     Lab Results   Component Value Date    PROTTOTAL 7.7 02/23/2021      Lab Results   Component Value Date    ALKPHOS 87 02/23/2021         SPEP:  Monoclonal peak:  5/27/20: 0.5 g/dL IgA kappa  9/1/20: 0.4 g/dL  2/23/21: 0.5 g/dL    Component      Latest Ref Rng & Units 2/23/2021   IGG      610 - 1,616 mg/dL 1,136   IGA      84 - 499 mg/dL 855 (H)   IGM      35 - 242 mg/dL 67   Cobre Free Lt Chain      0.33 - 1.94 mg/dL 11.90 (H)   Lambda Free Lt Chain      0.57 - 2.63 mg/dL 2.78 (H)   Kappa Lambda Ratio      0.26 - 1.65 4.28 (H)         IMAGING:  Skeletal survey 6/9/20:  No lytic or destructive lesions or evidence of compression  fracture.      ASSESSMENT/PLAN:  Derek Contreras is a 65 year old male with:    1) MGUS: Labs reviewed.  M-spike stable at 0.5 g/dL.  Serum free light chains are overall stable.  Skeletal survey showed no evidence of lytic lesions.  Renal function, calcium, hemoglobin are normal.  We will monitor this for now.  -RTC in 6 months with labs: CBC, CMP, SPEP, serum free light chains.  If remain stable at the next visit, can likely space out follow-up's to annually.    2) History of prostate cancer: s/p prostatectomy  -followed by urology    3) COPD, sarcoidosis:  -followed by pulmonology    4) Thrombocytopenia: has been mildly low off and on.  Platelet count 149K on recent labs.  -monitor CBC for now        Radha Trimble,  MD  Hematology/Oncology  [facility]    Total time spent on day of visit, including review of tests, obtaining/reviewing separately obtained history, ordering medications/tests/procedures, communicating with PCP/consultants, and documenting in electronic medical record: 20 minutes        Again, thank you for allowing me to participate in the care of your patient.        Sincerely,        Radha Trimble MD

## 2021-03-05 NOTE — PATIENT INSTRUCTIONS
Per chart review, appt request order has been deferred until 6/2/21 by SR Christiane Mckinney RN on 3/5/2021 at 12:56 PM

## 2021-03-15 VITALS — BODY MASS INDEX: 39.28 KG/M2 | HEIGHT: 72 IN | WEIGHT: 290 LBS

## 2021-03-15 ASSESSMENT — MIFFLIN-ST. JEOR: SCORE: 2133.56

## 2021-03-15 NOTE — PROGRESS NOTES
Derek is a 65 year old who is being evaluated via a billable video visit.      How would you like to obtain your AVS? [email or web address]  If the video visit is dropped, the invitation should be resent by: Text to cell phone: [phone]  Will anyone else be joining your video visit? No      Video-Visit Details    Type of service:  Video Visit  Video Start Time: 9:16AM  Video End Time:9:27AM    Originating Location (pt. Location): Home    Distant Location (provider location):  [facility]     Platform used for Video Visit: [facility]      Sleep clinic follow up visit note:    Date on this visit: 3/16/2021    Primary Physician: Carlyn Payne     Chief complaint: f/u BEULAH    Derek Contreras 65 year old with h/o obstructive sleep apnea (previously diagnosed in 2011), sarcoidosis, COPD responsive to inhaled treatments, hip replacement, knee replacement, prostate cancer s/p prostatectomy, and hypertension.    He underwent a diagnostic polysomnogram on January 6, 2020 that showed mild obstructive sleep apnea predominant during REM sleep with evidence of sleep associated hypoxemia. He underwent repeat polysomnography with CPAP  titration on 1/12/20 and it was observed at the CPAP at 8 cm water during REM sleep in lateral position disordered breathing events was effective in controlling the sleep-related breathing disorder.  He was set up at [facility] on January 27, 2020. Patient received a Resmed AirSense 10 Auto with ressures were set at 8-11 cm H2O. Patient s ramp is 5 cm H2O for Auto and FLEX/EPR is 2.  Patient received a Resmed Airfit N30I  Nasal mask size Medium, heated tubing and heated humidifier. He  presents for virtual visit today for follow up of  BEULAH.    He is currently being treated with auto titrating CPAP with pressure settings 8 to 13 cm of water.  He reports using the CPAP regularly during sleep.  There are no reports of snoring or awakenings due to gasping for air or choking with  the CPAP use.    Pressure settings feel comfortable.    Sleep quality is better with the device.  Bed time:11PM. Wake time 6/630AM  Reports feeling rested upon awakening  Denies Fatigue/EDS  ESS: 3/24  Denies drowsiness while driving    Previous sleep study results:  Initial PSG results (1/6/20)    Obstructive sleep apnea was demonstrated (AHI 5; REM AHI 25), with respiratory events essentially isolated to REM sleep.  The study may be limited due to the absence of supine sleep.      Oxyhemoglobin desaturations were prominent during REM, and the patient did not always recover baseline saturations before onset of the next airway obstruction.  Nocturnal hypoxemia was present; there were 23 minutes with SpO2 less than or equal to 88%.      Hypoventilation was not observed.      An irregularly irregular heart rhythm was observed intermittently throughout the recording.     Repeat polysomnography(1/12/20)    CPAP at 8 cmH2O was effective at resolving obstructive sleep apnea and improving oxyhemoglobin saturations to the normal range. REM supine sleep was not captured.   Hypoxemia resolved at a pressure of 8 cmH2O.  Supplemental oxygen was not indicated during this study.    An irregular heart rhythm was observed intermittently throughout the study.    ResMed    Auto-PAP 8.0 - 13.0 cmH2O 30 day usage data:    93% of days with > 4 hours of use. 0/30 days with no use.   Average use 337 minutes per day.   95%ile Leak 29.57 L/min.   CPAP 95% pressure 10.6 cm.   AHI 1.23 events per hour.     Allergies:    Allergies   Allergen Reactions     Cat Hair Extract Itching     itchy tearful eyes     Adhesive Tape Rash     Iodine Rash       Medications:    Current Outpatient Medications   Medication Sig Dispense Refill     albuterol (PROAIR HFA/PROVENTIL HFA/VENTOLIN HFA) 108 (90 Base) MCG/ACT inhaler Inhale 1-2 puffs into the lungs every 4 hours as needed for shortness of breath / dyspnea 1 Inhaler 4     aspirin (ASA) 81 MG tablet Take  81 mg by mouth every morning  90 tablet 3     atorvastatin (LIPITOR) 40 MG tablet TAKE 1 TABLET(40 MG) BY MOUTH EVERY MORNING 90 tablet 3     augmented betamethasone dipropionate (DIPROLENE-AF) 0.05 % external cream Apply topically 2 times daily 100 g 1     BREO ELLIPTA 200-25 MCG/INH Inhaler Inhale 1 puff into the lungs daily 1 Inhaler 11     cetirizine (ZYRTEC) 10 MG tablet Take 10 mg by mouth every morning  90 tablet 3     fluticasone (FLONASE) 50 MCG/ACT nasal spray Spray 1-2 sprays into both nostrils daily (Patient taking differently: Spray 1-2 sprays into both nostrils as needed ) 16 g 0     fluticasone-vilanterol (BREO ELLIPTA) 200-25 MCG/INH inhaler Inhale 1 puff into the lungs daily 1 Inhaler 11     hydrochlorothiazide (HYDRODIURIL) 12.5 MG tablet TAKE 1 TABLET(12.5 MG) BY MOUTH EVERY MORNING 90 tablet 3     montelukast (SINGULAIR) 10 MG tablet Take 1 tablet (10 mg) by mouth At Bedtime 90 tablet 3     multivitamin (ONE-DAILY) tablet Take 1 tablet by mouth every morning  90 tablet 3     omeprazole (PRILOSEC) 40 MG DR capsule Take 1 capsule (40 mg) by mouth daily 30 capsule 11     tiotropium (SPIRIVA) 18 MCG inhaled capsule Inhale 1 capsule (18 mcg) into the lungs daily 30 capsule 3     umeclidinium (INCRUSE ELLIPTA) 62.5 MCG/INH inhaler Inhale 1 puff into the lungs daily 30 each 11       Problem List:  Patient Active Problem List    Diagnosis Date Noted     Myopathy 06/24/2020     Priority: Medium     Added automatically from request for surgery [id number]       Pulmonary hypertension (H) 11/11/2019     Priority: Medium     Added automatically from request for surgery [id number]       SOB (shortness of breath) 11/11/2019     Priority: Medium     Added automatically from request for surgery [id number]       Dyslipidemia 11/11/2019     Priority: Medium     Added automatically from request for surgery [id number]       Iron deficiency 11/11/2019     Priority: Medium     Added automatically from request for surgery [id number]        Need for hepatitis B screening test 11/11/2019     Priority: Medium     Added automatically from request for surgery [id number]       Moderate asthma without complication 05/07/2019     Priority: Medium     Prostate cancer (H) 02/21/2019     Priority: Medium     Surgery in May.  Complete prior to 10/14/19 [facility] apt.       S/P hip replacement, bilateral 02/05/2019     Priority: Medium     S/P TKR (total knee replacement), bilateral 02/05/2019     Priority: Medium     S/P appendectomy 02/05/2019     Priority: Medium     Bilateral hearing loss, unspecified hearing loss type 02/05/2019     Priority: Medium     Hypercholesteremia 02/05/2019     Priority: Medium     Morbid obesity (H) 03/10/2017     Priority: Medium     Pulmonary sarcoidosis (H) 08/24/2011     Priority: Medium     Hypertension 04/25/2011     Priority: Medium        Past Medical/Surgical History:  Past Medical History:   Diagnosis Date     Asthma      Claustrophobia      CPAP (continuous positive airway pressure) dependence      Dyslipidemia      [place] (hard of hearing)      Hypercholesteremia      Hypertension      Iron deficiency      Mediastinal adenopathy      Obesity      BEULAH (obstructive sleep apnea)      Prostate cancer (H)      Pulmonary hypertension (H)      Sarcoidosis      Past Surgical History:   Procedure Laterality Date     APPENDECTOMY       BIOPSY MASS NECK Left 7/15/2020    Procedure: Left trapezius muscle biopsy;  Surgeon: Ade Villa MD;  Location:  OR     C TOTAL HIP ARTHROPLASTY Bilateral      C TOTAL KNEE ARTHROPLASTY Bilateral      CV RIGHT HEART CATH MEASUREMENTS RECORDED N/A 12/11/2019    Procedure: CV RIGHT HEART CATH;  Surgeon: Torrey Hirsch MD;  Location:  HEART CARDIAC CATH LAB     DAVINCI PROSTATECTOMY, LYMPHADENECTOMY N/A 5/23/2019    Procedure: ROBOTIC ASSISTED LAPAROSCOPIC RADICAL PROSTATECTOMY WITH BILATERAL PELVIC LYMPH NODE DISSECTION;  Surgeon: Matteo Coughlin MD;  Location: [facility]      ENDOBRONCHIAL ULTRASOUND FLEXIBLE N/A 3/29/2019    Procedure: Flexible Bronchoscopy, Endobronchial Ultrasound;  Surgeon: Curtis Leger MD;  Location: [facility] OR     VASECTOMY         Social History:  Social History     Socioeconomic History     Marital status:      Spouse name: Not on file     Number of children: Not on file     Years of education: Not on file     Highest education level: Not on file   Occupational History     Not on file   Social Needs     Financial resource strain: Not on file     Food insecurity     Worry: Not on file     Inability: Not on file     Transportation needs     Medical: Not on file     Non-medical: Not on file   Tobacco Use     Smoking status: Never Smoker     Smokeless tobacco: Never Used   Substance and Sexual Activity     Alcohol use: Yes     Comment: twice a week     Drug use: No     Sexual activity: Yes     Partners: Female   Lifestyle     Physical activity     Days per week: Not on file     Minutes per session: Not on file     Stress: Not on file   Relationships     Social connections     Talks on phone: Not on file     Gets together: Not on file     Attends Rastafari service: Not on file     Active member of club or organization: Not on file     Attends meetings of clubs or organizations: Not on file     Relationship status: Not on file     Intimate partner violence     Fear of current or ex partner: Not on file     Emotionally abused: Not on file     Physically abused: Not on file     Forced sexual activity: Not on file   Other Topics Concern     Parent/sibling w/ CABG, MI or angioplasty before 65F 55M? Not Asked   Social History Narrative    12/03/20         ENVIRONMENTAL HISTORY: [place] The home is heated with a gas furnace. They does have central air conditioning. The patient's bedroom is furnished with Indoor plants and carpeting in bedroom.  Pets inside the house include 0 pets. There is no history of cockroach or  "mice infestation. There is/are 0 smokers in the house.  The house does not have a damp basement.        Family History:  Family History   Problem Relation Age of Onset     Alzheimer Disease Mother      Heart Disease Father      Glaucoma No family hx of      Macular Degeneration No family hx of      Diabetes No family hx of      Thyroid Disease No family hx of          Physical Examination:  Vitals: Ht 1.829 m (6' 0.01\")   Wt 131.5 kg (290 lb)   BMI 39.32 kg/m    BMI= Body mass index is 39.32 kg/m .     [place] Total Score 3/15/2021   Total score - [place] 3     General: No apparent distress, appropriately groomed  Head: Normocephalic, atraumatic  Chest: No cough, no audible wheezing, able to talk in full sentences  Psych: coherent speech, normal rate and volume, able to articulate logical thoughts, able   to abstract reason, no tangential thoughts, no hallucinations   or delusions  His  affect is normal  Neuro:  Mental status: Alert and  Oriented X 3  Speech: normal       Impression/Plan:  Obstructive sleep apnea: Pt reports adequate compliance with PAP therapy and reports positive benefits with PAP use.   BEULAH is adequately controlled with Auto CPAP at the current settings per compliance DL.   Prescription provided for renewal of PAP supplies.  Recommended him to continue using the CPAP regularly during sleep and instructed to get the supplies for the PAP replaced regularly.    Patient instructed to remember to bring PAP with him/her if hospitalized and if anticipating procedure that requires sedation/surgery to be sure to discuss with the provider/surgeon that he  has sleep apnea and uses PAP therapy.    Obesity: We discussed weight management with healthy diet, and exercise.    Patient was strongly advised to avoid driving, operating any heavy machinery or other hazardous situations while drowsy or sleepy.  Patient was counseled on the importance of driving while alert, to pull over if drowsy, or nap before " "getting into the vehicle if sleepy.      Plan is to follow up in 12 months or sooner if any concerns.    CC: No ref. provider found    The above note was dictated using voice recognition software. Although reviewed after completion, some word and grammatical error may remain . Please contact the author for any clarifications.    \"I spent a total of 20 minutes  with Derek Contreras during today's  Video visit., most of this time was spent counseling the patient and  coordinating care regarding  BEULAH, CPAP therapy ,  weight management , going over PAP download, chart review  including documentation and further activities as noted above.\"      Frank Barron MD  [facility]   Floor 1, [address]   [address] [place], [place] [address]. [place] [address]   Appointments: [phone]               "

## 2021-03-15 NOTE — PATIENT INSTRUCTIONS
Your BMI is Body mass index is 39.32 kg/m .  Weight management is a personal decision.  If you are interested in exploring weight loss strategies, the following discussion covers the approaches that may be successful. Body mass index (BMI) is one way to tell whether you are at a healthy weight, overweight, or obese. It measures your weight in relation to your height.  A BMI of 18.5 to 24.9 is in the healthy range. A person with a BMI of 25 to 29.9 is considered overweight, and someone with a BMI of 30 or greater is considered obese. More than two-thirds of American adults are considered overweight or obese.  Being overweight or obese increases the risk for further weight gain. Excess weight may lead to heart disease and diabetes.  Creating and following plans for healthy eating and physical activity may help you improve your health.  Weight control is part of healthy lifestyle and includes exercise, emotional health, and healthy eating habits. Careful eating habits lifelong are the mainstay of weight control. Though there are significant health benefits from weight loss, long-term weight loss with diet alone may be very difficult to achieve- studies show long-term success with dietary management in less than 10% of people. Attaining a healthy weight may be especially difficult to achieve in those with severe obesity. In some cases, medications, devices and surgical management might be considered.  What can you do?  If you are overweight or obese and are interested in methods for weight loss, you should discuss this with your provider.     Consider reducing daily calorie intake by 500 calories.     Keep a food journal.     Avoiding skipping meals, consider cutting portions instead.    Diet combined with exercise helps maintain muscle while optimizing fat loss. Strength training is particularly important for building and maintaining muscle mass. Exercise helps reduce stress, increase energy, and improves fitness.  Increasing exercise without diet control, however, may not burn enough calories to loose weight.       Start walking three days a week 10-20 minutes at a time    Work towards walking thirty minutes five days a week     Eventually, increase the speed of your walking for 1-2 minutes at time    In addition, we recommend that you review healthy lifestyles and methods for weight loss available through the National Institutes of Health patient information sites:  http://win.niddk.nih.gov/publications/index.htm    And look into health and wellness programs that may be available through your health insurance provider, employer, local community center, or amy club.    Weight management plan: Patient was referred to their PCP to discuss a diet and exercise plan.

## 2021-03-16 ENCOUNTER — VIRTUAL VISIT (OUTPATIENT)
Dept: SLEEP MEDICINE | Facility: CLINIC | Age: 66
End: 2021-03-16
Payer: MEDICARE

## 2021-03-16 DIAGNOSIS — G47.33 OSA ON CPAP: Primary | ICD-10-CM

## 2021-03-16 PROCEDURE — 99213 OFFICE O/P EST LOW 20 MIN: CPT | Mod: 95 | Performed by: INTERNAL MEDICINE

## 2021-03-17 DIAGNOSIS — G47.33 OBSTRUCTIVE SLEEP APNEA (ADULT) (PEDIATRIC): Primary | ICD-10-CM

## 2021-03-22 ENCOUNTER — MYC MEDICAL ADVICE (OUTPATIENT)
Dept: INTERNAL MEDICINE | Facility: CLINIC | Age: 66
End: 2021-03-22

## 2021-05-12 ENCOUNTER — OFFICE VISIT (OUTPATIENT)
Dept: PULMONOLOGY | Facility: CLINIC | Age: 66
End: 2021-05-12
Payer: MEDICARE

## 2021-05-12 VITALS
OXYGEN SATURATION: 94 % | HEIGHT: 72 IN | WEIGHT: 305 LBS | HEART RATE: 96 BPM | RESPIRATION RATE: 17 BRPM | BODY MASS INDEX: 41.31 KG/M2 | DIASTOLIC BLOOD PRESSURE: 88 MMHG | SYSTOLIC BLOOD PRESSURE: 159 MMHG

## 2021-05-12 DIAGNOSIS — D86.9 SARCOIDOSIS: ICD-10-CM

## 2021-05-12 DIAGNOSIS — E66.01 MORBID OBESITY (H): ICD-10-CM

## 2021-05-12 DIAGNOSIS — I27.20 PULMONARY HYPERTENSION (H): ICD-10-CM

## 2021-05-12 DIAGNOSIS — D86.9 SARCOIDOSIS: Primary | ICD-10-CM

## 2021-05-12 DIAGNOSIS — G70.9 NEUROMUSCULAR WEAKNESS (H): ICD-10-CM

## 2021-05-12 LAB
ALBUMIN SERPL-MCNC: 3.6 G/DL (ref 3.4–5)
ALP SERPL-CCNC: 101 U/L (ref 40–150)
ALT SERPL W P-5'-P-CCNC: 33 U/L (ref 0–70)
ANION GAP SERPL CALCULATED.3IONS-SCNC: 2 MMOL/L (ref 3–14)
AST SERPL W P-5'-P-CCNC: 26 U/L (ref 0–45)
BASOPHILS # BLD AUTO: 0 10E9/L (ref 0–0.2)
BASOPHILS NFR BLD AUTO: 0.5 %
BILIRUB DIRECT SERPL-MCNC: 0.3 MG/DL (ref 0–0.2)
BILIRUB SERPL-MCNC: 0.8 MG/DL (ref 0.2–1.3)
BUN SERPL-MCNC: 14 MG/DL (ref 7–30)
CALCIUM SERPL-MCNC: 9 MG/DL (ref 8.5–10.1)
CHLORIDE SERPL-SCNC: 106 MMOL/L (ref 94–109)
CO2 SERPL-SCNC: 29 MMOL/L (ref 20–32)
CREAT SERPL-MCNC: 0.96 MG/DL (ref 0.66–1.25)
DIFFERENTIAL METHOD BLD: NORMAL
EOSINOPHIL # BLD AUTO: 0.2 10E9/L (ref 0–0.7)
EOSINOPHIL NFR BLD AUTO: 2.2 %
ERYTHROCYTE [DISTWIDTH] IN BLOOD BY AUTOMATED COUNT: 13.7 % (ref 10–15)
GFR SERPL CREATININE-BSD FRML MDRD: 82 ML/MIN/{1.73_M2}
GLUCOSE SERPL-MCNC: 110 MG/DL (ref 70–99)
HCT VFR BLD AUTO: 49 % (ref 40–53)
HGB BLD-MCNC: 15.8 G/DL (ref 13.3–17.7)
IMM GRANULOCYTES # BLD: 0 10E9/L (ref 0–0.4)
IMM GRANULOCYTES NFR BLD: 0.4 %
LYMPHOCYTES # BLD AUTO: 1.3 10E9/L (ref 0.8–5.3)
LYMPHOCYTES NFR BLD AUTO: 17 %
MCH RBC QN AUTO: 28.2 PG (ref 26.5–33)
MCHC RBC AUTO-ENTMCNC: 32.2 G/DL (ref 31.5–36.5)
MCV RBC AUTO: 87 FL (ref 78–100)
MONOCYTES # BLD AUTO: 0.8 10E9/L (ref 0–1.3)
MONOCYTES NFR BLD AUTO: 10.3 %
NEUTROPHILS # BLD AUTO: 5.3 10E9/L (ref 1.6–8.3)
NEUTROPHILS NFR BLD AUTO: 69.6 %
NRBC # BLD AUTO: 0 10*3/UL
NRBC BLD AUTO-RTO: 0 /100
PLATELET # BLD AUTO: 168 10E9/L (ref 150–450)
POTASSIUM SERPL-SCNC: 3.3 MMOL/L (ref 3.4–5.3)
PROT SERPL-MCNC: 7.6 G/DL (ref 6.8–8.8)
RBC # BLD AUTO: 5.61 10E12/L (ref 4.4–5.9)
SODIUM SERPL-SCNC: 138 MMOL/L (ref 133–144)
WBC # BLD AUTO: 7.7 10E9/L (ref 4–11)

## 2021-05-12 PROCEDURE — 85025 COMPLETE CBC W/AUTO DIFF WBC: CPT | Performed by: PATHOLOGY

## 2021-05-12 PROCEDURE — 94726 PLETHYSMOGRAPHY LUNG VOLUMES: CPT | Performed by: INTERNAL MEDICINE

## 2021-05-12 PROCEDURE — 99214 OFFICE O/P EST MOD 30 MIN: CPT | Mod: 25 | Performed by: INTERNAL MEDICINE

## 2021-05-12 PROCEDURE — 36415 COLL VENOUS BLD VENIPUNCTURE: CPT | Performed by: PATHOLOGY

## 2021-05-12 PROCEDURE — 94375 RESPIRATORY FLOW VOLUME LOOP: CPT | Performed by: INTERNAL MEDICINE

## 2021-05-12 PROCEDURE — 99000 SPECIMEN HANDLING OFFICE-LAB: CPT | Performed by: PATHOLOGY

## 2021-05-12 PROCEDURE — G0463 HOSPITAL OUTPT CLINIC VISIT: HCPCS | Mod: 25

## 2021-05-12 PROCEDURE — 80076 HEPATIC FUNCTION PANEL: CPT | Performed by: PATHOLOGY

## 2021-05-12 PROCEDURE — 82164 ANGIOTENSIN I ENZYME TEST: CPT | Mod: 90 | Performed by: PATHOLOGY

## 2021-05-12 PROCEDURE — 94729 DIFFUSING CAPACITY: CPT | Performed by: INTERNAL MEDICINE

## 2021-05-12 PROCEDURE — 80048 BASIC METABOLIC PNL TOTAL CA: CPT | Performed by: PATHOLOGY

## 2021-05-12 ASSESSMENT — PAIN SCALES - GENERAL: PAINLEVEL: NO PAIN (0)

## 2021-05-12 ASSESSMENT — MIFFLIN-ST. JEOR: SCORE: 2201.47

## 2021-05-12 NOTE — NURSING NOTE
Chief Complaint   Patient presents with     RECHECK     Return interstitial Lung Sarcoids     Medications reviewed and vital signs taken.   Lavelle Childs, CMA

## 2021-05-12 NOTE — LETTER
5/12/2021         RE: Derek Contreras  [id number] [place] [address]        Dear Colleague,    Thank you for referring your patient, Derek Contreras, to the [facility] FOR LUNG SCIENCE AND [facility] [place]. Please see a copy of my visit note below.    Reason for Visit  Derek Contreras is a 65 year old year old male who is being seen for Sarcoidosis  Pulmonary HPI    The patient was seen and examined by Jamie Martin MD     Mr. Contreras is seen for follow-up of his sarcoidosis.  He is currently not on any systemic anti-inflammatory therapy.  His last clinic visit was in January 2021 at which time he reported good control of his pulmonary symptoms.  He had seen allergy and was found to be allergic to number of environmental agents.  He is currently on Zyrtec and inhaled BD / corticosteroids for possible reactive airways disease    He reports no new events.  His symptoms are generally well controlled.  He does have cough for which he uses Mucinex DM with control.  He had one episode where he had worsening chest tightness and cough for which he received a short burst of corticosteroids and antibiotics.    He is not participating in any exercise program, but reports being active as he is currently in the middle of a move and he is lifting boxes and other heavy objects.    Current Outpatient Medications   Medication     albuterol (PROAIR HFA/PROVENTIL HFA/VENTOLIN HFA) 108 (90 Base) MCG/ACT inhaler     aspirin (ASA) 81 MG tablet     atorvastatin (LIPITOR) 40 MG tablet     augmented betamethasone dipropionate (DIPROLENE-AF) 0.05 % external cream     BREO ELLIPTA 200-25 MCG/INH Inhaler     cetirizine (ZYRTEC) 10 MG tablet     fluticasone (FLONASE) 50 MCG/ACT nasal spray     fluticasone-vilanterol (BREO ELLIPTA) 200-25 MCG/INH inhaler     hydrochlorothiazide (HYDRODIURIL) 12.5 MG tablet     montelukast (SINGULAIR) 10 MG tablet     multivitamin (ONE-DAILY) tablet      omeprazole (PRILOSEC) 40 MG DR capsule     tiotropium (SPIRIVA) 18 MCG inhaled capsule     umeclidinium (INCRUSE ELLIPTA) 62.5 MCG/INH inhaler     No current facility-administered medications for this visit.      Allergies   Allergen Reactions     Cat Hair Extract Itching     itchy tearful eyes     Adhesive Tape Rash     Iodine Rash     Past Medical History:   Diagnosis Date     Asthma      Claustrophobia      CPAP (continuous positive airway pressure) dependence      Dyslipidemia      [place] (hard of hearing)      Hypercholesteremia      Hypertension      Iron deficiency      Mediastinal adenopathy      Obesity      BEULAH (obstructive sleep apnea)      Prostate cancer (H)      Pulmonary hypertension (H)      Sarcoidosis        Past Surgical History:   Procedure Laterality Date     APPENDECTOMY       BIOPSY MASS NECK Left 7/15/2020    Procedure: Left trapezius muscle biopsy;  Surgeon: Ade Villa MD;  [facility]: [facility]     C TOTAL HIP ARTHROPLASTY Bilateral      C TOTAL KNEE ARTHROPLASTY Bilateral      CV RIGHT HEART CATH MEASUREMENTS RECORDED N/A 12/11/2019    Procedure: CV RIGHT HEART CATH;  Surgeon: Torrey Hirsch MD;  Location:  HEART CARDIAC CATH LAB     DAVINCI PROSTATECTOMY, LYMPHADENECTOMY N/A 5/23/2019    Procedure: ROBOTIC ASSISTED LAPAROSCOPIC RADICAL PROSTATECTOMY WITH BILATERAL PELVIC LYMPH NODE DISSECTION;  Surgeon: Matteo Coughlin MD;  Location: [facility] OR     ENDOBRONCHIAL ULTRASOUND FLEXIBLE N/A 3/29/2019    Procedure: Flexible Bronchoscopy, Endobronchial Ultrasound;  Surgeon: Curtis Leger MD;  Location: [facility] OR     VASECTOMY         Social History     Socioeconomic History     Marital status:      Spouse name: Not on file     Number of children: Not on file     Years of education: Not on file     Highest education level: Not on file   Occupational History     Not on file   Social Needs     Financial resource strain: Not on file     Food insecurity     Worry: Not on file      Inability: Not on file     Transportation needs     Medical: Not on file     Non-medical: Not on file   Tobacco Use     Smoking status: Never Smoker     Smokeless tobacco: Never Used   Substance and Sexual Activity     Alcohol use: Yes     Comment: twice a week     Drug use: No     Sexual activity: Yes     Partners: Female   Lifestyle     Physical activity     Days per week: Not on file     Minutes per session: Not on file     Stress: Not on file   Relationships     Social connections     Talks on phone: Not on file     Gets together: Not on file     Attends Gnosticism service: Not on file     Active member of club or organization: Not on file     Attends meetings of clubs or organizations: Not on file     Relationship status: Not on file     Intimate partner violence     Fear of current or ex partner: Not on file     Emotionally abused: Not on file     Physically abused: Not on file     Forced sexual activity: Not on file   Other Topics Concern     Parent/sibling w/ CABG, MI or angioplasty before 65F 55M? Not Asked   Social History Narrative    12/03/20         ENVIRONMENTAL HISTORY: [place] The home is heated with a gas furnace. They does have central air conditioning. The patient's bedroom is furnished with Indoor plants and carpeting in bedroom.  Pets inside the house include 0 pets. There is no history of cockroach or mice infestation. There is/are 0 smokers in the house.  The house does not have a damp basement.        ROS Pulmonary  A complete ROS was otherwise negative except as noted in the HPI.  BP (!) 159/88   Pulse 96   Resp 17   Ht 1.829 m (6')   Wt 138.3 kg (305 lb)   SpO2 94%   BMI 41.37 kg/m    Exam:   GENERAL APPEARANCE: Alert, and in no apparent distress.  EYES: PERRL, EOMI  NECK: supple, no masses, no thyromegaly.  LYMPHATICS: No significant axillary, cervical, or supraclavicular nodes.  RESP: normal percussion, good air flow throughout.  No  crackles. No rhonchi. No wheezes.  CV: Normal S1, S2, regular rhythm, normal rate. No murmur.  No rub. No gallop. No LE edema.   MS: extremities normal. No clubbing. No cyanosis.  SKIN: no rash on limited exam  NEURO: Mentation intact, speech normal, normal strength and tone, normal gait and stance    Results:PFTs done today were reviewed by me with the patient.  FVC 1.95 L (41%), FEV1 1.35 (37%) and the ratio is 69.  RV 2.48 (94%) and total lung capacity is 4.60 61%).  The DLCO not corrected for hemoglobin is 72% of predicted.  My interpretation is that he has mixed obstructive and restrictive ventilatory defect.  In comparison to prior spirometry and lung volumes no significant change noted.    Assessment and plan: 64-year-old male with history of hypertension, hyperlipidemia, obesity, history of prostate cancer, abnormal chest imaging with TBNA (3/29/2019) demonstrating granulomatous Inflammation (station 7 and 12 L lymph nodes) and  BAL demonstrating 102 white cells and 11% lymphocytes.  The CD4 CD8 ratio was 2.29.  Cardiac MRI with no LGE and cPET with no myocardiac uptake ((2/2020)  to suggest cardiac sarcoidosis but abnormal EF of 50%. RV dilatation of unclear etiology but no pulmonary hypertension based on right heart cath with mean PAP 17 mm Hg (12/11/2019) .  Concern for truncal/diaphragmatic weakness but trapezius biopsy in June 2020 without any convincing evidence   1.  Asthma: Improved symptoms with current regimen of Incruse Ellipta and Breo.  Also on montelukast and Zyrtec.  Takes.  Mucinex DM.  He will follow with allergy periodically to ensure appropriate interventions for his asthma.   2.  Pulmonary sarcoidosis: Stable PFTs.  No specific intervention is needed currently.  3.  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   His EKG has right bundle branch block and will clarify appropriate monitoring for him after discussion with cardiology.  Last eye visit in October 2019 with a plan to follow-up in 2 years.  He will schedule this now.  He could have truncal/diaphragmatic myopathy related to sarcoidosis.  Not on any treatment currently.  Mean PA pressure on RHC 17 mmHg.  Etiology of multiple hypertension unclear but could be related to obesity hypoventilation.  SAP which is also a possibility.  Not on any specific intervention.We will check asthma labs today.  4.  Sleep disordered breathing on auto titrating CPAP which she will continue.  5.  Obesity:-Encouraged him to get into a daily program and attempt weight loss.  6.  Covid vaccination series received in March.  Follow up six months.  Labs reviewed. K is 3.3 on diuretics.. WIll need to follow up with PCP. [place] 2. Continue to monitor.      This note consists of symbols derived from keyboarding, dictation and/or voice recognition software. As a result, there may be errors in the script that have gone undetected. Please consider this when interpreting information found in this chart    Again, thank you for allowing me to participate in the care of your patient.        Sincerely,        Jamie Martin MD

## 2021-05-12 NOTE — PROGRESS NOTES
Reason for Visit  Derek Contreras is a 65 year old year old male who is being seen for Sarcoidosis  Pulmonary HPI    The patient was seen and examined by Jamie Martin MD     Mr. Contreras is seen for follow-up of his sarcoidosis.  He is currently not on any systemic anti-inflammatory therapy.  His last clinic visit was in January 2021 at which time he reported good control of his pulmonary symptoms.  He had seen allergy and was found to be allergic to number of environmental agents.  He is currently on Zyrtec and inhaled BD / corticosteroids for possible reactive airways disease    He reports no new events.  His symptoms are generally well controlled.  He does have cough for which he uses Mucinex DM with control.  He had one episode where he had worsening chest tightness and cough for which he received a short burst of corticosteroids and antibiotics.    He is not participating in any exercise program, but reports being active as he is currently in the middle of a move and he is lifting boxes and other heavy objects.    Current Outpatient Medications   Medication     albuterol (PROAIR HFA/PROVENTIL HFA/VENTOLIN HFA) 108 (90 Base) MCG/ACT inhaler     aspirin (ASA) 81 MG tablet     atorvastatin (LIPITOR) 40 MG tablet     augmented betamethasone dipropionate (DIPROLENE-AF) 0.05 % external cream     BREO ELLIPTA 200-25 MCG/INH Inhaler     cetirizine (ZYRTEC) 10 MG tablet     fluticasone (FLONASE) 50 MCG/ACT nasal spray     fluticasone-vilanterol (BREO ELLIPTA) 200-25 MCG/INH inhaler     hydrochlorothiazide (HYDRODIURIL) 12.5 MG tablet     montelukast (SINGULAIR) 10 MG tablet     multivitamin (ONE-DAILY) tablet     omeprazole (PRILOSEC) 40 MG DR capsule     tiotropium (SPIRIVA) 18 MCG inhaled capsule     umeclidinium (INCRUSE ELLIPTA) 62.5 MCG/INH inhaler     No current facility-administered medications for this visit.      Allergies   Allergen Reactions     Cat Hair Extract Itching     itchy tearful eyes      Adhesive Tape Rash     Iodine Rash     Past Medical History:   Diagnosis Date     Asthma      Claustrophobia      CPAP (continuous positive airway pressure) dependence      Dyslipidemia      [place] (hard of hearing)      Hypercholesteremia      Hypertension      Iron deficiency      Mediastinal adenopathy      Obesity      BEULAH (obstructive sleep apnea)      Prostate cancer (H)      Pulmonary hypertension (H)      Sarcoidosis        Past Surgical History:   Procedure Laterality Date     APPENDECTOMY       BIOPSY MASS NECK Left 7/15/2020    Procedure: Left trapezius muscle biopsy;  Surgeon: Ade Villa MD;  [facility]: [facility]     C TOTAL HIP ARTHROPLASTY Bilateral      C TOTAL KNEE ARTHROPLASTY Bilateral      CV RIGHT HEART CATH MEASUREMENTS RECORDED N/A 12/11/2019    Procedure: CV RIGHT HEART CATH;  Surgeon: Torrey Hirsch MD;  Location: [facility] HEART CARDIAC CATH LAB     DAVINCI PROSTATECTOMY, LYMPHADENECTOMY N/A 5/23/2019    Procedure: ROBOTIC ASSISTED LAPAROSCOPIC RADICAL PROSTATECTOMY WITH BILATERAL PELVIC LYMPH NODE DISSECTION;  Surgeon: Matteo Coughlin MD;  Location: [facility] OR     ENDOBRONCHIAL ULTRASOUND FLEXIBLE N/A 3/29/2019    Procedure: Flexible Bronchoscopy, Endobronchial Ultrasound;  Surgeon: Curtis Leger MD;  Location: [facility] OR     VASECTOMY         Social History     Socioeconomic History     Marital status:      Spouse name: Not on file     Number of children: Not on file     Years of education: Not on file     Highest education level: Not on file   Occupational History     Not on file   Social Needs     Financial resource strain: Not on file     Food insecurity     Worry: Not on file     Inability: Not on file     Transportation needs     Medical: Not on file     Non-medical: Not on file   Tobacco Use     Smoking status: Never Smoker     Smokeless tobacco: Never Used   Substance and Sexual Activity     Alcohol use: Yes     Comment: twice a week     Drug use: No     Sexual  activity: Yes     Partners: Female   Lifestyle     Physical activity     Days per week: Not on file     Minutes per session: Not on file     Stress: Not on file   Relationships     Social connections     Talks on phone: Not on file     Gets together: Not on file     Attends Buddhist service: Not on file     Active member of club or organization: Not on file     Attends meetings of clubs or organizations: Not on file     Relationship status: Not on file     Intimate partner violence     Fear of current or ex partner: Not on file     Emotionally abused: Not on file     Physically abused: Not on file     Forced sexual activity: Not on file   Other Topics Concern     Parent/sibling w/ CABG, MI or angioplasty before 65F 55M? Not Asked   Social History Narrative    12/03/20         ENVIRONMENTAL HISTORY: [place] The home is heated with a gas furnace. They does have central air conditioning. The patient's bedroom is furnished with Indoor plants and carpeting in bedroom.  Pets inside the house include 0 pets. There is no history of cockroach or mice infestation. There is/are 0 smokers in the house.  The house does not have a damp basement.        ROS Pulmonary  A complete ROS was otherwise negative except as noted in the HPI.  BP (!) 159/88   Pulse 96   Resp 17   Ht 1.829 m (6')   Wt 138.3 kg (305 lb)   SpO2 94%   BMI 41.37 kg/m    Exam:   GENERAL APPEARANCE: Alert, and in no apparent distress.  EYES: PERRL, EOMI  NECK: supple, no masses, no thyromegaly.  LYMPHATICS: No significant axillary, cervical, or supraclavicular nodes.  RESP: normal percussion, good air flow throughout.  No crackles. No rhonchi. No wheezes.  CV: Normal S1, S2, regular rhythm, normal rate. No murmur.  No rub. No gallop. No LE edema.   MS: extremities normal. No clubbing. No cyanosis.  SKIN: no rash on limited exam  NEURO: Mentation intact, speech normal, normal strength and tone, normal gait and  stance    Results:PFTs done today were reviewed by me with the patient.  FVC 1.95 L (41%), FEV1 1.35 (37%) and the ratio is 69.  RV 2.48 (94%) and total lung capacity is 4.60 61%).  The DLCO not corrected for hemoglobin is 72% of predicted.  My interpretation is that he has mixed obstructive and restrictive ventilatory defect.  In comparison to prior spirometry and lung volumes no significant change noted.    Assessment and plan: 64-year-old male with history of hypertension, hyperlipidemia, obesity, history of prostate cancer, abnormal chest imaging with TBNA (3/29/2019) demonstrating granulomatous Inflammation (station 7 and 12 L lymph nodes) and  BAL demonstrating 102 white cells and 11% lymphocytes.  The CD4 CD8 ratio was 2.29.  Cardiac MRI with no LGE and cPET with no myocardiac uptake ((2/2020)  to suggest cardiac sarcoidosis but abnormal EF of 50%. RV dilatation of unclear etiology but no pulmonary hypertension based on right heart cath with mean PAP 17 mm Hg (12/11/2019) .  Concern for truncal/diaphragmatic weakness but trapezius biopsy in June 2020 without any convincing evidence   1.  Asthma: Improved symptoms with current regimen of Incruse Ellipta and Breo.  Also on montelukast and Zyrtec.  Takes.  Mucinex DM.  He will follow with allergy periodically to ensure appropriate interventions for his asthma.   2.  Pulmonary sarcoidosis: Stable PFTs.  No specific intervention is needed currently.  3.  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  His EKG has right bundle branch block and will clarify appropriate monitoring for him after discussion with cardiology.  Last eye visit in October 2019 with a plan to follow-up in 2 years.  He will schedule this now.  He could have truncal/diaphragmatic myopathy related to sarcoidosis.  Not  on any treatment currently.  Mean PA pressure on RHC 17 mmHg.  Etiology of multiple hypertension unclear but could be related to obesity hypoventilation.  SAP which is also a possibility.  Not on any specific intervention.We will check asthma labs today.  4.  Sleep disordered breathing on auto titrating CPAP which she will continue.  5.  Obesity:-Encouraged him to get into a daily program and attempt weight loss.  6.  Covid vaccination series received in March.  Follow up six months.  Labs reviewed. K is 3.3 on diuretics.. WIll need to follow up with PCP. [place] 2. Continue to monitor.      This note consists of symbols derived from keyboarding, dictation and/or voice recognition software. As a result, there may be errors in the script that have gone undetected. Please consider this when interpreting information found in this chart

## 2021-05-13 LAB
DLCOUNC-%PRED-PRE: 72 %
DLCOUNC-PRE: 20.58 ML/MIN/MMHG
DLCOUNC-PRED: 28.26 ML/MIN/MMHG
ERV-%PRED-PRE: 21 %
ERV-PRE: 0.13 L
ERV-PRED: 0.61 L
EXPTIME-PRE: 8 SEC
FEF2575-%PRED-PRE: 25 %
FEF2575-PRE: 0.72 L/SEC
FEF2575-PRED: 2.78 L/SEC
FEFMAX-%PRED-PRE: 58 %
FEFMAX-PRE: 5.38 L/SEC
FEFMAX-PRED: 9.19 L/SEC
FEV1-%PRED-PRE: 37 %
FEV1-PRE: 1.35 L
FEV1FEV6-PRE: 68 %
FEV1FEV6-PRED: 78 %
FEV1FVC-PRE: 69 %
FEV1FVC-PRED: 76 %
FEV1SVC-PRE: 64 %
FEV1SVC-PRED: 69 %
FIFMAX-PRE: 3.6 L/SEC
FRCPLETH-%PRED-PRE: 68 %
FRCPLETH-PRE: 2.61 L
FRCPLETH-PRED: 3.78 L
FVC-%PRED-PRE: 41 %
FVC-PRE: 1.95 L
FVC-PRED: 4.72 L
IC-%PRED-PRE: 43 %
IC-PRE: 1.99 L
IC-PRED: 4.6 L
RVPLETH-%PRED-PRE: 94 %
RVPLETH-PRE: 2.48 L
RVPLETH-PRED: 2.62 L
TLCPLETH-%PRED-PRE: 61 %
TLCPLETH-PRE: 4.6 L
TLCPLETH-PRED: 7.53 L
VA-%PRED-PRE: 53 %
VA-PRE: 3.68 L
VC-%PRED-PRE: 40 %
VC-PRE: 2.12 L
VC-PRED: 5.2 L

## 2021-05-14 LAB
ACE SERPL-CCNC: 45 U/L (ref 9–67)
LAB SCANNED RESULT: NORMAL

## 2021-07-24 ENCOUNTER — MYC MEDICAL ADVICE (OUTPATIENT)
Dept: INTERNAL MEDICINE | Facility: CLINIC | Age: 66
End: 2021-07-24

## 2021-07-24 DIAGNOSIS — D86.9 SARCOIDOSIS: ICD-10-CM

## 2021-07-24 DIAGNOSIS — J45.909 MODERATE ASTHMA WITHOUT COMPLICATION, UNSPECIFIED WHETHER PERSISTENT: ICD-10-CM

## 2021-07-26 RX ORDER — MONTELUKAST SODIUM 10 MG/1
10 TABLET ORAL AT BEDTIME
Qty: 90 TABLET | Refills: 3 | Status: CANCELLED | OUTPATIENT
Start: 2021-07-26

## 2021-07-26 RX ORDER — MONTELUKAST SODIUM 10 MG/1
10 TABLET ORAL AT BEDTIME
Qty: 90 TABLET | Refills: 3 | Status: SHIPPED | OUTPATIENT
Start: 2021-07-26 | End: 2022-08-04

## 2021-07-26 NOTE — TELEPHONE ENCOUNTER
Routing refill request to provider for review/approval because:  ACT due  Previously ordered by Dr. Martin, patient asks if Dr. Payne can take over prescription.    ACT Total Scores 11/10/2020   ACT TOTAL SCORE (Goal Greater than or Equal to 20) 22   In the past 12 months, how many times did you visit the emergency room for your asthma without being admitted to the hospital? 0   In the past 12 months, how many times were you hospitalized overnight because of your asthma? 0

## 2021-08-11 ENCOUNTER — LAB (OUTPATIENT)
Dept: LAB | Facility: CLINIC | Age: 66
End: 2021-08-11
Attending: INTERNAL MEDICINE
Payer: MEDICARE

## 2021-08-11 DIAGNOSIS — D47.2 MGUS (MONOCLONAL GAMMOPATHY OF UNKNOWN SIGNIFICANCE): ICD-10-CM

## 2021-08-11 LAB
BASOPHILS # BLD AUTO: 0 10E3/UL (ref 0–0.2)
BASOPHILS NFR BLD AUTO: 0 %
EOSINOPHIL # BLD AUTO: 0.2 10E3/UL (ref 0–0.7)
EOSINOPHIL NFR BLD AUTO: 3 %
ERYTHROCYTE [DISTWIDTH] IN BLOOD BY AUTOMATED COUNT: 14.9 % (ref 10–15)
HCT VFR BLD AUTO: 48.3 % (ref 40–53)
HGB BLD-MCNC: 15.3 G/DL (ref 13.3–17.7)
LYMPHOCYTES # BLD AUTO: 1.4 10E3/UL (ref 0.8–5.3)
LYMPHOCYTES NFR BLD AUTO: 18 %
MCH RBC QN AUTO: 28.3 PG (ref 26.5–33)
MCHC RBC AUTO-ENTMCNC: 31.7 G/DL (ref 31.5–36.5)
MCV RBC AUTO: 89 FL (ref 78–100)
MONOCYTES # BLD AUTO: 0.8 10E3/UL (ref 0–1.3)
MONOCYTES NFR BLD AUTO: 10 %
NEUTROPHILS # BLD AUTO: 5.5 10E3/UL (ref 1.6–8.3)
NEUTROPHILS NFR BLD AUTO: 69 %
PLATELET # BLD AUTO: 155 10E3/UL (ref 150–450)
RBC # BLD AUTO: 5.4 10E6/UL (ref 4.4–5.9)
WBC # BLD AUTO: 7.9 10E3/UL (ref 4–11)

## 2021-08-11 PROCEDURE — 84155 ASSAY OF PROTEIN SERUM: CPT

## 2021-08-11 PROCEDURE — 36415 COLL VENOUS BLD VENIPUNCTURE: CPT

## 2021-08-11 PROCEDURE — 83883 ASSAY NEPHELOMETRY NOT SPEC: CPT

## 2021-08-11 PROCEDURE — 85025 COMPLETE CBC W/AUTO DIFF WBC: CPT

## 2021-08-11 PROCEDURE — 80053 COMPREHEN METABOLIC PANEL: CPT

## 2021-08-11 PROCEDURE — 84165 PROTEIN E-PHORESIS SERUM: CPT | Performed by: PATHOLOGY

## 2021-08-12 ENCOUNTER — LAB (OUTPATIENT)
Dept: LAB | Facility: CLINIC | Age: 66
End: 2021-08-12
Payer: MEDICARE

## 2021-08-12 DIAGNOSIS — R97.20 ELEVATED PROSTATE SPECIFIC ANTIGEN (PSA): ICD-10-CM

## 2021-08-12 DIAGNOSIS — R97.20 ELEVATED PROSTATE SPECIFIC ANTIGEN (PSA): Primary | ICD-10-CM

## 2021-08-12 LAB
ALBUMIN SERPL ELPH-MCNC: 3.4 G/DL (ref 3.7–5.1)
ALBUMIN SERPL-MCNC: 3.5 G/DL (ref 3.4–5)
ALP SERPL-CCNC: 95 U/L (ref 40–150)
ALPHA1 GLOB SERPL ELPH-MCNC: 0.3 G/DL (ref 0.2–0.4)
ALPHA2 GLOB SERPL ELPH-MCNC: 0.8 G/DL (ref 0.5–0.9)
ALT SERPL W P-5'-P-CCNC: 34 U/L (ref 0–70)
ANION GAP SERPL CALCULATED.3IONS-SCNC: 7 MMOL/L (ref 3–14)
AST SERPL W P-5'-P-CCNC: 26 U/L (ref 0–45)
B-GLOBULIN SERPL ELPH-MCNC: 1.3 G/DL (ref 0.6–1)
BILIRUB SERPL-MCNC: 0.4 MG/DL (ref 0.2–1.3)
BUN SERPL-MCNC: 15 MG/DL (ref 7–30)
CALCIUM SERPL-MCNC: 9.2 MG/DL (ref 8.5–10.1)
CHLORIDE BLD-SCNC: 108 MMOL/L (ref 94–109)
CO2 SERPL-SCNC: 25 MMOL/L (ref 20–32)
CREAT SERPL-MCNC: 1.05 MG/DL (ref 0.66–1.25)
GAMMA GLOB SERPL ELPH-MCNC: 1.3 G/DL (ref 0.7–1.6)
GFR SERPL CREATININE-BSD FRML MDRD: 74 ML/MIN/1.73M2
GLUCOSE BLD-MCNC: 119 MG/DL (ref 70–99)
KAPPA LC FREE SER-MCNC: 11.57 MG/DL (ref 0.33–1.94)
KAPPA LC FREE/LAMBDA FREE SER NEPH: 4.22 {RATIO} (ref 0.26–1.65)
LAMBDA LC FREE SERPL-MCNC: 2.74 MG/DL (ref 0.57–2.63)
M PROTEIN SERPL ELPH-MCNC: 0.4 G/DL
POTASSIUM BLD-SCNC: 3.5 MMOL/L (ref 3.4–5.3)
PROT PATTERN SERPL ELPH-IMP: ABNORMAL
PROT SERPL-MCNC: 7.5 G/DL (ref 6.8–8.8)
SODIUM SERPL-SCNC: 140 MMOL/L (ref 133–144)
TOTAL PROTEIN SERUM FOR ELP: 7.1 G/DL (ref 6.8–8.8)

## 2021-08-12 PROCEDURE — 84153 ASSAY OF PSA TOTAL: CPT

## 2021-08-12 PROCEDURE — 36415 COLL VENOUS BLD VENIPUNCTURE: CPT

## 2021-08-13 LAB — PSA SERPL-MCNC: <0.01 UG/L (ref 0–4)

## 2021-08-19 ENCOUNTER — VIRTUAL VISIT (OUTPATIENT)
Dept: UROLOGY | Facility: CLINIC | Age: 66
End: 2021-08-19
Payer: MEDICARE

## 2021-08-19 VITALS — HEIGHT: 72 IN | WEIGHT: 300 LBS | BODY MASS INDEX: 40.63 KG/M2

## 2021-08-19 DIAGNOSIS — Z85.46 PERSONAL HISTORY OF MALIGNANT NEOPLASM OF PROSTATE: Primary | ICD-10-CM

## 2021-08-19 PROCEDURE — 99213 OFFICE O/P EST LOW 20 MIN: CPT | Mod: 95 | Performed by: UROLOGY

## 2021-08-19 ASSESSMENT — MIFFLIN-ST. JEOR: SCORE: 2178.79

## 2021-08-19 ASSESSMENT — PAIN SCALES - GENERAL: PAINLEVEL: NO PAIN (0)

## 2021-08-19 NOTE — LETTER
8/19/2021       RE: Derek Contreras  [address] [place] [address]     Dear Colleague,    Thank you for referring your patient, Derek Contreras, to the [facility] at [facility]. Please see a copy of my visit note below.    *pt will meet you in mychart*    Derek is a 65 year old who is being evaluated via a billable video visit.      How would you like to obtain your AVS? MyChart  If the video visit is dropped, the invitation should be resent by: Text to cell phone: [phone]  Will anyone else be joining your video visit? No         Office Visit Note  [facility]  [phone]    UROLOGIC DIAGNOSES:   pT3a [place] 4+3 equal 7 prostate cancer    CURRENT INTERVENTIONS:   Robotic prostatectomy 2019    HISTORY:   Derek is set up for virtual visit today for prostate cancer follow-up. He continues to feel well with no leakage. His PSA remains undetectable.      PAST MEDICAL HISTORY:   Past Medical History:   Diagnosis Date     Asthma      Claustrophobia      CPAP (continuous positive airway pressure) dependence      Dyslipidemia      [place] (hard of hearing)      Hypercholesteremia      Hypertension      Iron deficiency      Mediastinal adenopathy      Obesity      BEULAH (obstructive sleep apnea)      Prostate cancer (H)      Pulmonary hypertension (H)      Sarcoidosis        PAST SURGICAL HISTORY:   Past Surgical History:   Procedure Laterality Date     APPENDECTOMY       BIOPSY MASS NECK Left 7/15/2020    Procedure: Left trapezius muscle biopsy;  Surgeon: Ade Villa MD;  Location:  OR     C TOTAL HIP ARTHROPLASTY Bilateral      C TOTAL KNEE ARTHROPLASTY Bilateral      CV RIGHT HEART CATH MEASUREMENTS RECORDED N/A 12/11/2019    Procedure: CV RIGHT HEART CATH;  Surgeon: Torrey Hirsch MD;  Location:  HEART CARDIAC CATH LAB     DAVINCI PROSTATECTOMY, LYMPHADENECTOMY N/A 5/23/2019    Procedure:  ROBOTIC ASSISTED LAPAROSCOPIC RADICAL PROSTATECTOMY WITH BILATERAL PELVIC LYMPH NODE DISSECTION;  Surgeon: Matteo Coughlin MD;  Location: [facility] OR     ENDOBRONCHIAL ULTRASOUND FLEXIBLE N/A 3/29/2019    Procedure: Flexible Bronchoscopy, Endobronchial Ultrasound;  Surgeon: Crutis Leger MD;  Location: [facility] OR     VASECTOMY         FAMILY HISTORY:   Family History   Problem Relation Age of Onset     Alzheimer Disease Mother      Heart Disease Father      Glaucoma No family hx of      Macular Degeneration No family hx of      Diabetes No family hx of      Thyroid Disease No family hx of        SOCIAL HISTORY:   Social History     Tobacco Use     Smoking status: Never Smoker     Smokeless tobacco: Never Used   Substance Use Topics     Alcohol use: Yes     Comment: twice a week       REVIEW OF SYSTEMS:  Skin: No rash, pruritis, or skin pigmentation  Eyes: No changes in vision  Ears/Nose/Throat: No changes in hearing, no nosebleeds  Respiratory: No shortness of breath, dyspnea on exertion, cough, or hemoptysis  Cardiovascular: No chest pain or palpitations  Gastrointestinal: No diarrhea or constipation. No abdominal pain. No hematochezia  Genitourinary: see HPI  Musculoskeletal: No pain or swelling of joints, normal range of motion  Neurologic: No weakness or tremors  Psychiatric: No recent changes in memory or mood  Hematologic/Lymphatic/Immunologic: No easy bruising or enlarged lymph nodes  Endocrine: No weight gain or loss      PHYSICAL EXAM:    General: Alert and oriented to time, place, and self. In NAD   HEENT: Head AT/NC, EOMI, CN Grossly intact   Lungs: no respiratory distress, or pursed lip breathing   Heart: No obvious jugular venous distension present   Musculoskeltal: Normal movements. Normal appearing musculature  Skin: no suspicious lesions or rashes   Neuro: Alert, oriented, speech and mentation normal; moving all 4 extremities equally.   Psych: affect and mood normal    Imaging:  None    Urinalysis: UA RESULTS:  Recent Labs   Lab Test 05/07/19  0748   COLOR Yellow   APPEARANCE Clear   URINEGLC Negative   URINEBILI Negative   URINEKETONE Negative   SG 1.015   UBLD Negative   URINEPH 7.0   PROTEIN Negative   UROBILINOGEN 0.2   NITRITE Negative   LEUKEST Negative   RBCU O - 2   WBCU 0 - 5       PSA: Undetectable    Post Void Residual:     Other labs: None today      IMPRESSION:  Doing well, PSA undetectable    PLAN:  He continues to do well and has an undetectable PSA now more than 2 years after his radical prostatectomy. I counseled him that he can move on to annual surveillance. He asked if he could do this with his primary care provider. I told him that is perfectly fine to have his PSA checked annually with his primary care provider, but he should come back and see me if the PSA ever becomes detectable again. He voiced understanding and he will follow-up with me as needed in the future. We will send him a reminder next year just to make certain he gets his PSA checked.      Matteo Coughlin M.D.              Video Start Time: 10:02 AM  Video-Visit Details    Type of service:  Video Visit    Video End Time:10:04 AM    Originating Location (pt. Location): Home    Distant Location (provider location):  [facility] [place]     Platform used for Video Visit: [employer]

## 2021-08-19 NOTE — PROGRESS NOTES
*pt will meet you in [place]*    Derek is a 65 year old who is being evaluated via a billable video visit.      How would you like to obtain your AVS? [employer]  If the video visit is dropped, the invitation should be resent by: Text to cell phone: [phone]  Will anyone else be joining your video visit? No         Office Visit Note  [facility]  [phone]    UROLOGIC DIAGNOSES:   pT3a [place] 4+3 equal 7 prostate cancer    CURRENT INTERVENTIONS:   Robotic prostatectomy 2019    HISTORY:   Derek is set up for virtual visit today for prostate cancer follow-up. He continues to feel well with no leakage. His PSA remains undetectable.      PAST MEDICAL HISTORY:   Past Medical History:   Diagnosis Date     Asthma      Claustrophobia      CPAP (continuous positive airway pressure) dependence      Dyslipidemia      [place] (hard of hearing)      Hypercholesteremia      Hypertension      Iron deficiency      Mediastinal adenopathy      Obesity      BEULAH (obstructive sleep apnea)      Prostate cancer (H)      Pulmonary hypertension (H)      Sarcoidosis        PAST SURGICAL HISTORY:   Past Surgical History:   Procedure Laterality Date     APPENDECTOMY       BIOPSY MASS NECK Left 7/15/2020    Procedure: Left trapezius muscle biopsy;  Surgeon: Ade Villa MD;  Location:  OR     C TOTAL HIP ARTHROPLASTY Bilateral      C TOTAL KNEE ARTHROPLASTY Bilateral      CV RIGHT HEART CATH MEASUREMENTS RECORDED N/A 12/11/2019    Procedure: CV RIGHT HEART CATH;  Surgeon: Torrey Hirsch MD;  Location:  HEART CARDIAC CATH LAB     DAVINCI PROSTATECTOMY, LYMPHADENECTOMY N/A 5/23/2019    Procedure: ROBOTIC ASSISTED LAPAROSCOPIC RADICAL PROSTATECTOMY WITH BILATERAL PELVIC LYMPH NODE DISSECTION;  Surgeon: Matteo Coughlin MD;  Location:  OR     ENDOBRONCHIAL ULTRASOUND FLEXIBLE N/A 3/29/2019    Procedure: Flexible Bronchoscopy, Endobronchial Ultrasound;  Surgeon: Curtis Leger MD;  Location:  OR      VASECTOMY         FAMILY HISTORY:   Family History   Problem Relation Age of Onset     Alzheimer Disease Mother      Heart Disease Father      Glaucoma No family hx of      Macular Degeneration No family hx of      Diabetes No family hx of      Thyroid Disease No family hx of        SOCIAL HISTORY:   Social History     Tobacco Use     Smoking status: Never Smoker     Smokeless tobacco: Never Used   Substance Use Topics     Alcohol use: Yes     Comment: twice a week       REVIEW OF SYSTEMS:  Skin: No rash, pruritis, or skin pigmentation  Eyes: No changes in vision  Ears/Nose/Throat: No changes in hearing, no nosebleeds  Respiratory: No shortness of breath, dyspnea on exertion, cough, or hemoptysis  Cardiovascular: No chest pain or palpitations  Gastrointestinal: No diarrhea or constipation. No abdominal pain. No hematochezia  Genitourinary: see HPI  Musculoskeletal: No pain or swelling of joints, normal range of motion  Neurologic: No weakness or tremors  Psychiatric: No recent changes in memory or mood  Hematologic/Lymphatic/Immunologic: No easy bruising or enlarged lymph nodes  Endocrine: No weight gain or loss      PHYSICAL EXAM:    General: Alert and oriented to time, place, and self. In NAD   HEENT: Head AT/NC, EOMI, CN Grossly intact   Lungs: no respiratory distress, or pursed lip breathing   Heart: No obvious jugular venous distension present   Musculoskeltal: Normal movements. Normal appearing musculature  Skin: no suspicious lesions or rashes   Neuro: Alert, oriented, speech and mentation normal; moving all 4 extremities equally.   Psych: affect and mood normal    Imaging: None    Urinalysis: UA RESULTS:  Recent Labs   Lab Test 05/07/19  0748   COLOR Yellow   APPEARANCE Clear   URINEGLC Negative   URINEBILI Negative   URINEKETONE Negative   SG 1.015   UBLD Negative   URINEPH 7.0   PROTEIN Negative   UROBILINOGEN 0.2   NITRITE Negative   LEUKEST Negative   RBCU O - 2   WBCU 0 - 5       PSA:  Undetectable    Post Void Residual:     Other labs: None today      IMPRESSION:  Doing well, PSA undetectable    PLAN:  He continues to do well and has an undetectable PSA now more than 2 years after his radical prostatectomy. I counseled him that he can move on to annual surveillance. He asked if he could do this with his primary care provider. I told him that is perfectly fine to have his PSA checked annually with his primary care provider, but he should come back and see me if the PSA ever becomes detectable again. He voiced understanding and he will follow-up with me as needed in the future. We will send him a reminder next year just to make certain he gets his PSA checked.      Matteo Coughlin M.D.              Video Start Time: 10:02 AM  Video-Visit Details    Type of service:  Video Visit    Video End Time:10:04 AM    Originating Location (pt. Location): Home    Distant Location (provider location):  [facility] [place]     Platform used for Video Visit: [employer]

## 2021-09-01 ENCOUNTER — MYC MEDICAL ADVICE (OUTPATIENT)
Dept: INTERNAL MEDICINE | Facility: CLINIC | Age: 66
End: 2021-09-01

## 2021-09-02 ENCOUNTER — VIRTUAL VISIT (OUTPATIENT)
Dept: ONCOLOGY | Facility: CLINIC | Age: 66
End: 2021-09-02
Attending: INTERNAL MEDICINE
Payer: MEDICARE

## 2021-09-02 DIAGNOSIS — D47.2 MGUS (MONOCLONAL GAMMOPATHY OF UNKNOWN SIGNIFICANCE): Primary | ICD-10-CM

## 2021-09-02 PROCEDURE — 99212 OFFICE O/P EST SF 10 MIN: CPT | Mod: 95 | Performed by: INTERNAL MEDICINE

## 2021-09-02 NOTE — PROGRESS NOTES
Derek is a 65 year old who is being evaluated via a billable video visit.      How would you like to obtain your AVS? MyChart  If the video visit is dropped, the invitation should be resent by: Text to cell phone: [phone]  Will anyone else be joining your video visit? Olimpia Tomlin CMA      Video Start Time: 2:32 PM    Video-Visit Details    Type of service:  Video Visit    Video End Time: 2:36 PM    Originating Location (pt. Location): Home    Distant Location (provider location):  [facility]     Platform used for Video Visit: [employer]           [facility]    Hematology/Oncology Established Patient Note      Today's Date: 09/02/21    Reason for Follow-up: MGUS      HISTORY OF PRESENT ILLNESS: Derek Contreras is a 65 year old male with PMHx of sleep apnea, sarcoidosis, COPD, prostate cancer s/p prostatectomy, history of hip and knee replacement, HTN who presents with MGUS. An VLAD was checked by his neurologist, which showed an elevated IgA level.      He says that he feels fantastic currently.      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  Kappa light chain is elevated to 10.73, with kappa/lambda ratio of 4.47.  His hemoglobin, calcium, and renal function are normal.          INTERIM HISTORY: Derek says that he is feeling very well.  He recently moved from [place] to [place], so he is closer to the [facility] now, and he really likes the new location.            REVIEW OF SYSTEMS:   14 point ROS was reviewed and is negative other than as noted above in HPI.       HOME MEDICATIONS:  Current Outpatient Medications   Medication Sig Dispense Refill     albuterol (PROAIR HFA/PROVENTIL HFA/VENTOLIN HFA) 108 (90 Base) MCG/ACT inhaler Inhale 1-2 puffs into the lungs every 4 hours as needed for shortness of breath / dyspnea 1 Inhaler 4     aspirin (ASA) 81 MG tablet Take 81 mg by mouth every morning   90 tablet 3     atorvastatin (LIPITOR) 40 MG tablet TAKE 1 TABLET(40 MG) BY MOUTH EVERY MORNING 90 tablet 3     BREO ELLIPTA 200-25 MCG/INH Inhaler Inhale 1 puff into the lungs daily 1 Inhaler 11     cetirizine (ZYRTEC) 10 MG tablet Take 10 mg by mouth every morning  90 tablet 3     fluticasone (FLONASE) 50 MCG/ACT nasal spray Spray 1-2 sprays into both nostrils daily 16 g 0     hydrochlorothiazide (HYDRODIURIL) 12.5 MG tablet TAKE 1 TABLET(12.5 MG) BY MOUTH EVERY MORNING 90 tablet 3     montelukast (SINGULAIR) 10 MG tablet Take 1 tablet (10 mg) by mouth At Bedtime 90 tablet 3     multivitamin (ONE-DAILY) tablet Take 1 tablet by mouth every morning  90 tablet 3     omeprazole (PRILOSEC) 40 MG DR capsule Take 1 capsule (40 mg) by mouth daily 30 capsule 11     umeclidinium (INCRUSE ELLIPTA) 62.5 MCG/INH inhaler Inhale 1 puff into the lungs daily 30 each 11     azithromycin (ZITHROMAX) 250 MG tablet Take 2 tablets (500 mg) the first day, then take 1 tablet (250 mg) by mouth daily for a total of 5 days. (Patient not taking: Reported on 8/19/2021) 6 tablet 0         ALLERGIES:  Allergies   Allergen Reactions     Cat Hair Extract Itching     itchy tearful eyes     Adhesive Tape Rash     Iodine Rash         PAST MEDICAL HISTORY:  Past Medical History:   Diagnosis Date     Asthma      Claustrophobia      CPAP (continuous positive airway pressure) dependence      Dyslipidemia      [place] (hard of hearing)      Hypercholesteremia      Hypertension      Iron deficiency      Mediastinal adenopathy      Obesity      BEULAH (obstructive sleep apnea)      Prostate cancer (H)      Pulmonary hypertension (H)      Sarcoidosis          PAST SURGICAL HISTORY:  Past Surgical History:   Procedure Laterality Date     APPENDECTOMY       BIOPSY MASS NECK Left 7/15/2020    Procedure: Left trapezius muscle biopsy;  Surgeon: Ade Villa MD;  [facility] OR     C TOTAL HIP ARTHROPLASTY Bilateral      C TOTAL KNEE ARTHROPLASTY Bilateral       CV RIGHT HEART CATH MEASUREMENTS RECORDED N/A 12/11/2019    Procedure: CV RIGHT HEART CATH;  Surgeon: Torrey Hirsch MD;  Location: [facility] HEART CARDIAC CATH LAB     DAVINCI PROSTATECTOMY, LYMPHADENECTOMY N/A 5/23/2019    Procedure: ROBOTIC ASSISTED LAPAROSCOPIC RADICAL PROSTATECTOMY WITH BILATERAL PELVIC LYMPH NODE DISSECTION;  Surgeon: Matteo Coughlin MD;  Location: [facility] OR     ENDOBRONCHIAL ULTRASOUND FLEXIBLE N/A 3/29/2019    Procedure: Flexible Bronchoscopy, Endobronchial Ultrasound;  Surgeon: Curtis Leger MD;  Location: [facility] OR     VASECTOMY           SOCIAL HISTORY:  Social History     Socioeconomic History     Marital status:      Spouse name: Not on file     Number of children: Not on file     Years of education: Not on file     Highest education level: Not on file   Occupational History     Not on file   Tobacco Use     Smoking status: Never Smoker     Smokeless tobacco: Never Used   Substance and Sexual Activity     Alcohol use: Yes     Comment: twice a week     Drug use: No     Sexual activity: Yes     Partners: Female   Other Topics Concern     Parent/sibling w/ CABG, MI or angioplasty before 65F 55M? Not Asked   Social History Narrative    12/03/20         ENVIRONMENTAL HISTORY: [place] The home is heated with a gas furnace. They does have central air conditioning. The patient's bedroom is furnished with Indoor plants and carpeting in bedroom.  Pets inside the house include 0 pets. There is no history of cockroach or mice infestation. There is/are 0 smokers in the house.  The house does not have a damp basement.      Social Determinants of Health     Financial Resource Strain:      Difficulty of Paying Living Expenses:    Food Insecurity:      Worried About Running Out of Food in the Last Year:      Ran Out of Food in the Last Year:    Transportation Needs:      Lack of Transportation (Medical):      Lack of Transportation  (Non-Medical):    Physical Activity:      Days of Exercise per Week:      Minutes of Exercise per Session:    Stress:      Feeling of Stress :    Social Connections:      Frequency of Communication with Friends and Family:      Frequency of Social Gatherings with Friends and Family:      Attends Yarsani Services:      Active Member of Clubs or Organizations:      Attends Club or Organization Meetings:      Marital Status:    Intimate Partner Violence: Not At Risk     Fear of Current or Ex-Partner: No     Emotionally Abused: No     Physically Abused: No     Sexually Abused: No         FAMILY HISTORY:  Family History   Problem Relation Age of Onset     Alzheimer Disease Mother      Heart Disease Father      Glaucoma No family hx of      Macular Degeneration No family hx of      Diabetes No family hx of      Thyroid Disease No family hx of          PHYSICAL EXAM:  ECO  GENERAL/CONSTITUTIONAL: No acute distress. Healthy, alert.  RESPIRATORY: No audible wheeze, cough, or visible cyanosis.  No visible retractions or increased work of breathing.  Able to speak fully in complete sentences.  NEUROLOGIC: Alert, oriented, answers questions appropriately. No tremor. Mentation intact and speech normal  INTEGUMENTARY: No jaundice.    PSYCHIATRIC:  Mentation appears normal, affect normal/bright, judgement and insight intact, normal speech and appearance well-groomed.    The rest of a comprehensive physical exam is deferred due to public health emergency video visit restrictions.          LABS:  CBC RESULTS: Recent Labs   Lab Test 21  1550   WBC 7.9   RBC 5.40   HGB 15.3   HCT 48.3   MCV 89   MCH 28.3   MCHC 31.7   RDW 14.9        Recent Labs   Lab Test 21  1550 21  0808    138   POTASSIUM 3.5 3.3*   CHLORIDE 108 106   CO2 25 29   ANIONGAP 7 2*   * 110*   BUN 15 14   CR 1.05 0.96   ANTONIO 9.2 9.0     Lab Results   Component Value Date    AST 26 2021    AST 26 2021     Lab Results    Component Value Date    ALT 34 08/11/2021    ALT 33 05/12/2021     No results found for: BILICONJ   Lab Results   Component Value Date    BILITOTAL 0.4 08/11/2021    BILITOTAL 0.8 05/12/2021     Lab Results   Component Value Date    ALBUMIN 3.5 08/11/2021    ALBUMIN 3.6 05/12/2021     Lab Results   Component Value Date    PROTTOTAL 7.5 08/11/2021    PROTTOTAL 7.6 05/12/2021      Lab Results   Component Value Date    ALKPHOS 95 08/11/2021    ALKPHOS 101 05/12/2021         SPEP:  Monoclonal peak:  5/27/20: 0.5 g/dL IgA kappa  9/1/20: 0.4 g/dL  2/23/21: 0.5 g/dL  8/11/21: 0.4 g/dL      Component      Latest Ref Rng & Units 8/11/2021   [place] Free Light Chains      0.33 - 1.94 mg/dL 11.57 (H)   [place] FREE LT CHAINS      0.57 - 2.63 mg/dL 2.74 (H)   KAPPA/LAMBDA RATIO      0.26 - 1.65 4.22 (H)         IMAGING:  Skeletal survey 6/9/20:  No lytic or destructive lesions or evidence of compression  fracture.      ASSESSMENT/PLAN:  Derek Contreras is a 65 year old male with:    1) MGUS: Labs reviewed.  M-spike stable at 0.4-0.5 g/dL.  Serum free light chains are overall stable.  Skeletal survey showed no evidence of lytic lesions.  Renal function, calcium, hemoglobin are normal.  We will monitor this for now.  -RTC in 1 year with labs: CBC, CMP, SPEP, serum free light chains.      2) History of prostate cancer: s/p prostatectomy  -followed by urology    3) COPD, sarcoidosis:  -followed by pulmonology    4) Thrombocytopenia: has been mildly low off and on.  Platelet count normal at 155K on recent labs.  -monitor CBC for now        Radha Trimble MD  Hematology/Oncology  [facility]    Total time spent on day of visit, including review of tests, obtaining/reviewing separately obtained history, ordering medications/tests/procedures, communicating with PCP/consultants, and documenting in electronic medical record: 10 minutes

## 2021-09-02 NOTE — LETTER
9/2/2021         RE: Derek Contreras  [id number] [facility] [address]        Dear Colleague,    Thank you for referring your patient, Derek Contreras, to the [facility]. Please see a copy of my visit note below.    Derek is a 65 year old who is being evaluated via a billable video visit.      How would you like to obtain your AVS? MyChart  If the video visit is dropped, the invitation should be resent by: Text to cell phone: [phone]  Will anyone else be joining your video visit? Olimpia Tomlin CMA      Video Start Time: 2:32 PM    Video-Visit Details    Type of service:  Video Visit    Video End Time: 2:36 PM    Originating Location (pt. Location): Home    Distant Location (provider location):  [facility]     Platform used for Video Visit: [employer]           [facility]    Hematology/Oncology Established Patient Note      Today's Date: 09/02/21    Reason for Follow-up: MGUS      HISTORY OF PRESENT ILLNESS: Derek Contreras is a 65 year old male with PMHx of sleep apnea, sarcoidosis, COPD, prostate cancer s/p prostatectomy, history of hip and knee replacement, HTN who presents with MGUS. An VLAD was checked by his neurologist, which showed an elevated IgA level.      He says that he feels fantastic currently.      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  Kappa light chain is elevated to 10.73, with kappa/lambda ratio of 4.47.  His hemoglobin, calcium, and renal function are normal.          INTERIM HISTORY: Derek says that he is feeling very well.  He recently moved from [place] to [place], so he is closer to the [facility] now, and he really likes the new location.            REVIEW OF SYSTEMS:   14 point ROS was reviewed and is negative other than as noted above in HPI.       HOME MEDICATIONS:  Current Outpatient Medications   Medication  Sig Dispense Refill     albuterol (PROAIR HFA/PROVENTIL HFA/VENTOLIN HFA) 108 (90 Base) MCG/ACT inhaler Inhale 1-2 puffs into the lungs every 4 hours as needed for shortness of breath / dyspnea 1 Inhaler 4     aspirin (ASA) 81 MG tablet Take 81 mg by mouth every morning  90 tablet 3     atorvastatin (LIPITOR) 40 MG tablet TAKE 1 TABLET(40 MG) BY MOUTH EVERY MORNING 90 tablet 3     BREO ELLIPTA 200-25 MCG/INH Inhaler Inhale 1 puff into the lungs daily 1 Inhaler 11     cetirizine (ZYRTEC) 10 MG tablet Take 10 mg by mouth every morning  90 tablet 3     fluticasone (FLONASE) 50 MCG/ACT nasal spray Spray 1-2 sprays into both nostrils daily 16 g 0     hydrochlorothiazide (HYDRODIURIL) 12.5 MG tablet TAKE 1 TABLET(12.5 MG) BY MOUTH EVERY MORNING 90 tablet 3     montelukast (SINGULAIR) 10 MG tablet Take 1 tablet (10 mg) by mouth At Bedtime 90 tablet 3     multivitamin (ONE-DAILY) tablet Take 1 tablet by mouth every morning  90 tablet 3     omeprazole (PRILOSEC) 40 MG DR capsule Take 1 capsule (40 mg) by mouth daily 30 capsule 11     umeclidinium (INCRUSE ELLIPTA) 62.5 MCG/INH inhaler Inhale 1 puff into the lungs daily 30 each 11     azithromycin (ZITHROMAX) 250 MG tablet Take 2 tablets (500 mg) the first day, then take 1 tablet (250 mg) by mouth daily for a total of 5 days. (Patient not taking: Reported on 8/19/2021) 6 tablet 0         ALLERGIES:  Allergies   Allergen Reactions     Cat Hair Extract Itching     itchy tearful eyes     Adhesive Tape Rash     Iodine Rash         PAST MEDICAL HISTORY:  Past Medical History:   Diagnosis Date     Asthma      Claustrophobia      CPAP (continuous positive airway pressure) dependence      Dyslipidemia      [place] (hard of hearing)      Hypercholesteremia      Hypertension      Iron deficiency      Mediastinal adenopathy      Obesity      BEULAH (obstructive sleep apnea)      Prostate cancer (H)      Pulmonary hypertension (H)      Sarcoidosis          PAST SURGICAL HISTORY:  Past Surgical  History:   Procedure Laterality Date     APPENDECTOMY       BIOPSY MASS NECK Left 7/15/2020    Procedure: Left trapezius muscle biopsy;  Surgeon: Ade Villa MD;  [facility]     C TOTAL HIP ARTHROPLASTY Bilateral      C TOTAL KNEE ARTHROPLASTY Bilateral      CV RIGHT HEART CATH MEASUREMENTS RECORDED N/A 12/11/2019    Procedure: CV RIGHT HEART CATH;  Surgeon: Torrey Hirsch MD;  [facility]     DAVINCI PROSTATECTOMY, LYMPHADENECTOMY N/A 5/23/2019    Procedure: ROBOTIC ASSISTED LAPAROSCOPIC RADICAL PROSTATECTOMY WITH BILATERAL PELVIC LYMPH NODE DISSECTION;  Surgeon: Matteo Coughlin MD;  Location: [facility] OR     ENDOBRONCHIAL ULTRASOUND FLEXIBLE N/A 3/29/2019    Procedure: Flexible Bronchoscopy, Endobronchial Ultrasound;  Surgeon: Curtis Leger MD;  [facility]     VASECTOMY           SOCIAL HISTORY:  Social History     Socioeconomic History     Marital status:      Spouse name: Not on file     Number of children: Not on file     Years of education: Not on file     Highest education level: Not on file   Occupational History     Not on file   Tobacco Use     Smoking status: Never Smoker     Smokeless tobacco: Never Used   Substance and Sexual Activity     Alcohol use: Yes     Comment: twice a week     Drug use: No     Sexual activity: Yes     Partners: Female   Other Topics Concern     Parent/sibling w/ CABG, MI or angioplasty before 65F 55M? Not Asked   Social History Narrative    12/03/20         ENVIRONMENTAL HISTORY: [place] The home is heated with a gas furnace. They does have central air conditioning. The patient's bedroom is furnished with Indoor plants and carpeting in bedroom.  Pets inside the house include 0 pets. There is no history of cockroach or mice infestation. There is/are 0 smokers in the house.  The house does not have a damp basement.      Social Determinants of Health     Financial  Resource Strain:      Difficulty of Paying Living Expenses:    Food Insecurity:      Worried About Running Out of Food in the Last Year:      Ran Out of Food in the Last Year:    Transportation Needs:      Lack of Transportation (Medical):      Lack of Transportation (Non-Medical):    Physical Activity:      Days of Exercise per Week:      Minutes of Exercise per Session:    Stress:      Feeling of Stress :    Social Connections:      Frequency of Communication with Friends and Family:      Frequency of Social Gatherings with Friends and Family:      Attends Jew Services:      Active Member of Clubs or Organizations:      Attends Club or Organization Meetings:      Marital Status:    Intimate Partner Violence: Not At Risk     Fear of Current or Ex-Partner: No     Emotionally Abused: No     Physically Abused: No     Sexually Abused: No         FAMILY HISTORY:  Family History   Problem Relation Age of Onset     Alzheimer Disease Mother      Heart Disease Father      Glaucoma No family hx of      Macular Degeneration No family hx of      Diabetes No family hx of      Thyroid Disease No family hx of          PHYSICAL EXAM:  ECO  GENERAL/CONSTITUTIONAL: No acute distress. Healthy, alert.  RESPIRATORY: No audible wheeze, cough, or visible cyanosis.  No visible retractions or increased work of breathing.  Able to speak fully in complete sentences.  NEUROLOGIC: Alert, oriented, answers questions appropriately. No tremor. Mentation intact and speech normal  INTEGUMENTARY: No jaundice.    PSYCHIATRIC:  Mentation appears normal, affect normal/bright, judgement and insight intact, normal speech and appearance well-groomed.    The rest of a comprehensive physical exam is deferred due to public health emergency video visit restrictions.          LABS:  CBC RESULTS: Recent Labs   Lab Test 21  1550   WBC 7.9   RBC 5.40   HGB 15.3   HCT 48.3   MCV 89   MCH 28.3   MCHC 31.7   RDW 14.9        Recent Labs   Lab  Test 08/11/21  1550 05/12/21  0808    138   POTASSIUM 3.5 3.3*   CHLORIDE 108 106   CO2 25 29   ANIONGAP 7 2*   * 110*   BUN 15 14   CR 1.05 0.96   ANTONIO 9.2 9.0     Lab Results   Component Value Date    AST 26 08/11/2021    AST 26 05/12/2021     Lab Results   Component Value Date    ALT 34 08/11/2021    ALT 33 05/12/2021     No results found for: BILICONJ   Lab Results   Component Value Date    BILITOTAL 0.4 08/11/2021    BILITOTAL 0.8 05/12/2021     Lab Results   Component Value Date    ALBUMIN 3.5 08/11/2021    ALBUMIN 3.6 05/12/2021     Lab Results   Component Value Date    PROTTOTAL 7.5 08/11/2021    PROTTOTAL 7.6 05/12/2021      Lab Results   Component Value Date    ALKPHOS 95 08/11/2021    ALKPHOS 101 05/12/2021         SPEP:  Monoclonal peak:  5/27/20: 0.5 g/dL IgA kappa  9/1/20: 0.4 g/dL  2/23/21: 0.5 g/dL  8/11/21: 0.4 g/dL      Component      Latest Ref Rng & Units 8/11/2021   [place] Free Light Chains      0.33 - 1.94 mg/dL 11.57 (H)   LAMBDA FREE LT CHAINS      0.57 - 2.63 mg/dL 2.74 (H)   KAPPA/LAMBDA RATIO      0.26 - 1.65 4.22 (H)         IMAGING:  Skeletal survey 6/9/20:  No lytic or destructive lesions or evidence of compression  fracture.      ASSESSMENT/PLAN:  Derek Contreras is a 65 year old male with:    1) MGUS: Labs reviewed.  M-spike stable at 0.4-0.5 g/dL.  Serum free light chains are overall stable.  Skeletal survey showed no evidence of lytic lesions.  Renal function, calcium, hemoglobin are normal.  We will monitor this for now.  -RTC in 1 year with labs: CBC, CMP, SPEP, serum free light chains.      2) History of prostate cancer: s/p prostatectomy  -followed by urology    3) COPD, sarcoidosis:  -followed by pulmonology    4) Thrombocytopenia: has been mildly low off and on.  Platelet count normal at 155K on recent labs.  -monitor CBC for now        Radha Trimble MD  Hematology/Oncology  [facility]    Total time spent on day of visit, including  review of tests, obtaining/reviewing separately obtained history, ordering medications/tests/procedures, communicating with PCP/consultants, and documenting in electronic medical record: 10 minutes        Again, thank you for allowing me to participate in the care of your patient.        Sincerely,        Radha Trimble MD

## 2021-09-02 NOTE — LETTER
9/2/2021         RE: Derek Contreras  [id number] [facility] [address]        Dear Colleague,    Thank you for referring your patient, Derek Contreras, to the [facility]. Please see a copy of my visit note below.    Derek is a 65 year old who is being evaluated via a billable video visit.      How would you like to obtain your AVS? MyChart  If the video visit is dropped, the invitation should be resent by: Text to cell phone: [phone]  Will anyone else be joining your video visit? Olimpia Tomlin CMA      Video Start Time: 2:32 PM    Video-Visit Details    Type of service:  Video Visit    Video End Time: 2:36 PM    Originating Location (pt. Location): Home    Distant Location (provider location):  [facility]     Platform used for Video Visit: [employer]           [facility]    Hematology/Oncology Established Patient Note      Today's Date: 09/02/21    Reason for Follow-up: MGUS      HISTORY OF PRESENT ILLNESS: Derek Contreras is a 65 year old male with PMHx of sleep apnea, sarcoidosis, COPD, prostate cancer s/p prostatectomy, history of hip and knee replacement, HTN who presents with MGUS. An VLAD was checked by his neurologist, which showed an elevated IgA level.      He says that he feels fantastic currently.      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  Kappa light chain is elevated to 10.73, with kappa/lambda ratio of 4.47.  His hemoglobin, calcium, and renal function are normal.          INTERIM HISTORY: Derek says that he is feeling very well.  He recently moved from [place] to [place], so he is closer to the [facility] now, and he really likes the new location.            REVIEW OF SYSTEMS:   14 point ROS was reviewed and is negative other than as noted above in HPI.       HOME MEDICATIONS:  Current Outpatient Medications   Medication  Sig Dispense Refill     albuterol (PROAIR HFA/PROVENTIL HFA/VENTOLIN HFA) 108 (90 Base) MCG/ACT inhaler Inhale 1-2 puffs into the lungs every 4 hours as needed for shortness of breath / dyspnea 1 Inhaler 4     aspirin (ASA) 81 MG tablet Take 81 mg by mouth every morning  90 tablet 3     atorvastatin (LIPITOR) 40 MG tablet TAKE 1 TABLET(40 MG) BY MOUTH EVERY MORNING 90 tablet 3     BREO ELLIPTA 200-25 MCG/INH Inhaler Inhale 1 puff into the lungs daily 1 Inhaler 11     cetirizine (ZYRTEC) 10 MG tablet Take 10 mg by mouth every morning  90 tablet 3     fluticasone (FLONASE) 50 MCG/ACT nasal spray Spray 1-2 sprays into both nostrils daily 16 g 0     hydrochlorothiazide (HYDRODIURIL) 12.5 MG tablet TAKE 1 TABLET(12.5 MG) BY MOUTH EVERY MORNING 90 tablet 3     montelukast (SINGULAIR) 10 MG tablet Take 1 tablet (10 mg) by mouth At Bedtime 90 tablet 3     multivitamin (ONE-DAILY) tablet Take 1 tablet by mouth every morning  90 tablet 3     omeprazole (PRILOSEC) 40 MG DR capsule Take 1 capsule (40 mg) by mouth daily 30 capsule 11     umeclidinium (INCRUSE ELLIPTA) 62.5 MCG/INH inhaler Inhale 1 puff into the lungs daily 30 each 11     azithromycin (ZITHROMAX) 250 MG tablet Take 2 tablets (500 mg) the first day, then take 1 tablet (250 mg) by mouth daily for a total of 5 days. (Patient not taking: Reported on 8/19/2021) 6 tablet 0         ALLERGIES:  Allergies   Allergen Reactions     Cat Hair Extract Itching     itchy tearful eyes     Adhesive Tape Rash     Iodine Rash         PAST MEDICAL HISTORY:  Past Medical History:   Diagnosis Date     Asthma      Claustrophobia      CPAP (continuous positive airway pressure) dependence      Dyslipidemia      [place] (hard of hearing)      Hypercholesteremia      Hypertension      Iron deficiency      Mediastinal adenopathy      Obesity      BEULAH (obstructive sleep apnea)      Prostate cancer (H)      Pulmonary hypertension (H)      Sarcoidosis          PAST SURGICAL HISTORY:  Past Surgical  History:   Procedure Laterality Date     APPENDECTOMY       BIOPSY MASS NECK Left 7/15/2020    Procedure: Left trapezius muscle biopsy;  Surgeon: Ade Villa MD;  [facility]     C TOTAL HIP ARTHROPLASTY Bilateral      C TOTAL KNEE ARTHROPLASTY Bilateral      CV RIGHT HEART CATH MEASUREMENTS RECORDED N/A 12/11/2019    Procedure: CV RIGHT HEART CATH;  Surgeon: Torrey Hisrch MD;  [facility]     DAVINCI PROSTATECTOMY, LYMPHADENECTOMY N/A 5/23/2019    Procedure: ROBOTIC ASSISTED LAPAROSCOPIC RADICAL PROSTATECTOMY WITH BILATERAL PELVIC LYMPH NODE DISSECTION;  Surgeon: Matteo Coughlin MD;  Location: [facility] OR     ENDOBRONCHIAL ULTRASOUND FLEXIBLE N/A 3/29/2019    Procedure: Flexible Bronchoscopy, Endobronchial Ultrasound;  Surgeon: Curtis Leger MD;  Location: [facility] OR     VASECTOMY           SOCIAL HISTORY:  Social History     Socioeconomic History     Marital status:      Spouse name: Not on file     Number of children: Not on file     Years of education: Not on file     Highest education level: Not on file   Occupational History     Not on file   Tobacco Use     Smoking status: Never Smoker     Smokeless tobacco: Never Used   Substance and Sexual Activity     Alcohol use: Yes     Comment: twice a week     Drug use: No     Sexual activity: Yes     Partners: Female   Other Topics Concern     Parent/sibling w/ CABG, MI or angioplasty before 65F 55M? Not Asked   Social History Narrative    12/03/20         ENVIRONMENTAL HISTORY: [place] The home is heated with a gas furnace. They does have central air conditioning. The patient's bedroom is furnished with Indoor plants and carpeting in bedroom.  Pets inside the house include 0 pets. There is no history of cockroach or mice infestation. There is/are 0 smokers in the house.  The house does not have a damp basement.      Social Determinants of Health     Financial  Resource Strain:      Difficulty of Paying Living Expenses:    Food Insecurity:      Worried About Running Out of Food in the Last Year:      Ran Out of Food in the Last Year:    Transportation Needs:      Lack of Transportation (Medical):      Lack of Transportation (Non-Medical):    Physical Activity:      Days of Exercise per Week:      Minutes of Exercise per Session:    Stress:      Feeling of Stress :    Social Connections:      Frequency of Communication with Friends and Family:      Frequency of Social Gatherings with Friends and Family:      Attends Holiness Services:      Active Member of Clubs or Organizations:      Attends Club or Organization Meetings:      Marital Status:    Intimate Partner Violence: Not At Risk     Fear of Current or Ex-Partner: No     Emotionally Abused: No     Physically Abused: No     Sexually Abused: No         FAMILY HISTORY:  Family History   Problem Relation Age of Onset     Alzheimer Disease Mother      Heart Disease Father      Glaucoma No family hx of      Macular Degeneration No family hx of      Diabetes No family hx of      Thyroid Disease No family hx of          PHYSICAL EXAM:  ECO  GENERAL/CONSTITUTIONAL: No acute distress. Healthy, alert.  RESPIRATORY: No audible wheeze, cough, or visible cyanosis.  No visible retractions or increased work of breathing.  Able to speak fully in complete sentences.  NEUROLOGIC: Alert, oriented, answers questions appropriately. No tremor. Mentation intact and speech normal  INTEGUMENTARY: No jaundice.    PSYCHIATRIC:  Mentation appears normal, affect normal/bright, judgement and insight intact, normal speech and appearance well-groomed.    The rest of a comprehensive physical exam is deferred due to public health emergency video visit restrictions.          LABS:  CBC RESULTS: Recent Labs   Lab Test 21  1550   WBC 7.9   RBC 5.40   HGB 15.3   HCT 48.3   MCV 89   MCH 28.3   MCHC 31.7   RDW 14.9        Recent Labs   Lab  Test 08/11/21  1550 05/12/21  0808    138   POTASSIUM 3.5 3.3*   CHLORIDE 108 106   CO2 25 29   ANIONGAP 7 2*   * 110*   BUN 15 14   CR 1.05 0.96   ANTONIO 9.2 9.0     Lab Results   Component Value Date    AST 26 08/11/2021    AST 26 05/12/2021     Lab Results   Component Value Date    ALT 34 08/11/2021    ALT 33 05/12/2021     No results found for: BILICONJ   Lab Results   Component Value Date    BILITOTAL 0.4 08/11/2021    BILITOTAL 0.8 05/12/2021     Lab Results   Component Value Date    ALBUMIN 3.5 08/11/2021    ALBUMIN 3.6 05/12/2021     Lab Results   Component Value Date    PROTTOTAL 7.5 08/11/2021    PROTTOTAL 7.6 05/12/2021      Lab Results   Component Value Date    ALKPHOS 95 08/11/2021    ALKPHOS 101 05/12/2021         SPEP:  Monoclonal peak:  5/27/20: 0.5 g/dL IgA kappa  9/1/20: 0.4 g/dL  2/23/21: 0.5 g/dL  8/11/21: 0.4 g/dL      Component      Latest Ref Rng & Units 8/11/2021   [place] Free Light Chains      0.33 - 1.94 mg/dL 11.57 (H)   LAMBDA FREE LT CHAINS      0.57 - 2.63 mg/dL 2.74 (H)   KAPPA/LAMBDA RATIO      0.26 - 1.65 4.22 (H)         IMAGING:  Skeletal survey 6/9/20:  No lytic or destructive lesions or evidence of compression  fracture.      ASSESSMENT/PLAN:  Derek Contreras is a 65 year old male with:    1) MGUS: Labs reviewed.  M-spike stable at 0.4-0.5 g/dL.  Serum free light chains are overall stable.  Skeletal survey showed no evidence of lytic lesions.  Renal function, calcium, hemoglobin are normal.  We will monitor this for now.  -RTC in 1 year with labs: CBC, CMP, SPEP, serum free light chains.      2) History of prostate cancer: s/p prostatectomy  -followed by urology    3) COPD, sarcoidosis:  -followed by pulmonology    4) Thrombocytopenia: has been mildly low off and on.  Platelet count normal at 155K on recent labs.  -monitor CBC for now        Radha Trimble MD  Hematology/Oncology  [facility]    Total time spent on day of visit, including  review of tests, obtaining/reviewing separately obtained history, ordering medications/tests/procedures, communicating with PCP/consultants, and documenting in electronic medical record: 10 minutes        Again, thank you for allowing me to participate in the care of your patient.        Sincerely,        Radha Trimble MD

## 2021-09-10 NOTE — PATIENT INSTRUCTIONS
RTC with Dr. Trimble in 1 year with labs a week prior, can be in person or video per patient's preference.  (In 's work que.  Deferred to 3/3/22)    PIOTR Solis, BSN

## 2021-09-12 ENCOUNTER — MYC MEDICAL ADVICE (OUTPATIENT)
Dept: PULMONOLOGY | Facility: CLINIC | Age: 66
End: 2021-09-12

## 2021-09-12 DIAGNOSIS — J06.9 URI (UPPER RESPIRATORY INFECTION): Primary | ICD-10-CM

## 2021-09-13 RX ORDER — LEVOFLOXACIN 250 MG/1
750 TABLET, FILM COATED ORAL DAILY
Qty: 21 TABLET | Refills: 0 | Status: SHIPPED | OUTPATIENT
Start: 2021-09-13 | End: 2021-09-20

## 2021-09-13 RX ORDER — PREDNISONE 20 MG/1
40 TABLET ORAL DAILY
Qty: 14 TABLET | Refills: 0 | Status: SHIPPED | OUTPATIENT
Start: 2021-09-13 | End: 2021-09-20

## 2021-10-10 ENCOUNTER — HEALTH MAINTENANCE LETTER (OUTPATIENT)
Age: 66
End: 2021-10-10

## 2021-10-19 DIAGNOSIS — J84.9 ILD (INTERSTITIAL LUNG DISEASE) (H): ICD-10-CM

## 2021-11-10 ENCOUNTER — ANCILLARY PROCEDURE (OUTPATIENT)
Dept: CARDIOLOGY | Facility: CLINIC | Age: 66
End: 2021-11-10
Attending: INTERNAL MEDICINE
Payer: MEDICARE

## 2021-11-10 ENCOUNTER — OFFICE VISIT (OUTPATIENT)
Dept: PULMONOLOGY | Facility: CLINIC | Age: 66
End: 2021-11-10
Attending: INTERNAL MEDICINE
Payer: MEDICARE

## 2021-11-10 VITALS — OXYGEN SATURATION: 95 % | DIASTOLIC BLOOD PRESSURE: 82 MMHG | SYSTOLIC BLOOD PRESSURE: 150 MMHG | HEART RATE: 77 BPM

## 2021-11-10 DIAGNOSIS — D86.9 SARCOIDOSIS: Primary | ICD-10-CM

## 2021-11-10 DIAGNOSIS — D86.9 SARCOIDOSIS: ICD-10-CM

## 2021-11-10 LAB
ATRIAL RATE - MUSE: 92 BPM
DIASTOLIC BLOOD PRESSURE - MUSE: NORMAL MMHG
DLCOUNC-%PRED-PRE: 72 %
DLCOUNC-PRE: 20.39 ML/MIN/MMHG
DLCOUNC-PRED: 28.26 ML/MIN/MMHG
ERV-%PRED-PRE: 54 %
ERV-PRE: 0.37 L
ERV-PRED: 0.67 L
EXPTIME-PRE: 7.02 SEC
FEF2575-%PRED-PRE: 38 %
FEF2575-PRE: 1.07 L/SEC
FEF2575-PRED: 2.78 L/SEC
FEFMAX-%PRED-PRE: 64 %
FEFMAX-PRE: 5.97 L/SEC
FEFMAX-PRED: 9.19 L/SEC
FEV1-%PRED-PRE: 41 %
FEV1-PRE: 1.48 L
FEV1FEV6-PRE: 75 %
FEV1FEV6-PRED: 78 %
FEV1FVC-PRE: 74 %
FEV1FVC-PRED: 76 %
FEV1SVC-PRE: 67 %
FEV1SVC-PRED: 69 %
FIFMAX-PRE: 3.33 L/SEC
FRCPLETH-%PRED-PRE: 65 %
FRCPLETH-PRE: 2.48 L
FRCPLETH-PRED: 3.78 L
FVC-%PRED-PRE: 42 %
FVC-PRE: 2 L
FVC-PRED: 4.72 L
IC-%PRED-PRE: 40 %
IC-PRE: 1.83 L
IC-PRED: 4.53 L
INTERPRETATION ECG - MUSE: NORMAL
P AXIS - MUSE: 46 DEGREES
PR INTERVAL - MUSE: 176 MS
QRS DURATION - MUSE: 156 MS
QT - MUSE: 420 MS
QTC - MUSE: 519 MS
R AXIS - MUSE: -50 DEGREES
RVPLETH-%PRED-PRE: 80 %
RVPLETH-PRE: 2.11 L
RVPLETH-PRED: 2.62 L
SYSTOLIC BLOOD PRESSURE - MUSE: NORMAL MMHG
T AXIS - MUSE: 8 DEGREES
TLCPLETH-%PRED-PRE: 57 %
TLCPLETH-PRE: 4.31 L
TLCPLETH-PRED: 7.53 L
VA-%PRED-PRE: 56 %
VA-PRE: 3.92 L
VC-%PRED-PRE: 42 %
VC-PRE: 2.2 L
VC-PRED: 5.2 L
VENTRICULAR RATE- MUSE: 92 BPM

## 2021-11-10 PROCEDURE — 94375 RESPIRATORY FLOW VOLUME LOOP: CPT | Performed by: INTERNAL MEDICINE

## 2021-11-10 PROCEDURE — G0463 HOSPITAL OUTPT CLINIC VISIT: HCPCS | Mod: 25

## 2021-11-10 PROCEDURE — 99214 OFFICE O/P EST MOD 30 MIN: CPT | Mod: 25 | Performed by: INTERNAL MEDICINE

## 2021-11-10 PROCEDURE — 93248 EXT ECG>7D<15D REV&INTERPJ: CPT | Performed by: INTERNAL MEDICINE

## 2021-11-10 PROCEDURE — 93005 ELECTROCARDIOGRAM TRACING: CPT | Mod: XU

## 2021-11-10 PROCEDURE — 93246 EXT ECG>7D<15D RECORDING: CPT

## 2021-11-10 PROCEDURE — 94729 DIFFUSING CAPACITY: CPT | Performed by: INTERNAL MEDICINE

## 2021-11-10 PROCEDURE — 94726 PLETHYSMOGRAPHY LUNG VOLUMES: CPT | Performed by: INTERNAL MEDICINE

## 2021-11-10 NOTE — PROGRESS NOTES
Per Dr. Ayala Mario, patient to have 14 day Zio patch monitor placed.  Diagnosis: Sarcoidosis D86.9  Monitor placed: Yes  Patient Instructed: Yes  Patient verbalized understanding: Yes  Holter # [id number]    Placed by Arnulfo Hernandez

## 2021-11-10 NOTE — NURSING NOTE
Chief Complaint   Patient presents with     Interstitial Lung Disease (ILD)     follow up     Vitals were taken and medications were reconciled.     JACKIE Peñaloza

## 2021-11-10 NOTE — LETTER
11/10/2021         RE: Derek Contreras  [id number] [facility] [address]        Dear Colleague,    Thank you for referring your patient, Derek Contreras, to the [facility] [place]. Please see a copy of my visit note below.    Reason for Visit  Derek Contreras is a 65 year old year old male who is being seen for No chief complaint on file.  Pulmonary HPI    The patient was seen and examined by Jamie Martin MD     Mr. Contreras comes in for followup.  He was last seen in the Pulmonary Clinic in 05/2021 when he reported good control of pulmonary symptoms with inhaled medications, Singulair and Zyrtec.  He was also on Mucinex.  His PFTs were stable at that time.    Today he reports no new symptoms.  His allergies and reactive airways disease symptoms are still well controlled.  He denies any cough or sputum production.  He recently moved into a new house and was doing chores in the house without any shortness of breath.  No orthopnea, PND.    He is using dietary discretion for weight loss with decrease in weight from 305 to  293 pounds..       Current Outpatient Medications   Medication     albuterol (PROAIR HFA/PROVENTIL HFA/VENTOLIN HFA) 108 (90 Base) MCG/ACT inhaler     aspirin (ASA) 81 MG tablet     atorvastatin (LIPITOR) 40 MG tablet     azithromycin (ZITHROMAX) 250 MG tablet     BREO ELLIPTA 200-25 MCG/INH Inhaler     cetirizine (ZYRTEC) 10 MG tablet     fluticasone (FLONASE) 50 MCG/ACT nasal spray     hydrochlorothiazide (HYDRODIURIL) 12.5 MG tablet     montelukast (SINGULAIR) 10 MG tablet     multivitamin (ONE-DAILY) tablet     omeprazole (PRILOSEC) 40 MG DR capsule     umeclidinium (INCRUSE ELLIPTA) 62.5 MCG/INH inhaler     azithromycin (ZITHROMAX) 250 MG tablet     No current facility-administered medications for this visit.     Allergies   Allergen Reactions     Cat Hair Extract Itching     itchy tearful eyes     Adhesive Tape  Rash     Iodine Rash     Past Medical History:   Diagnosis Date     Asthma      Claustrophobia      CPAP (continuous positive airway pressure) dependence      Dyslipidemia      [place] (hard of hearing)      Hypercholesteremia      Hypertension      Iron deficiency      Mediastinal adenopathy      Obesity      BEULAH (obstructive sleep apnea)      Prostate cancer (H)      Pulmonary hypertension (H)      Sarcoidosis        Past Surgical History:   Procedure Laterality Date     APPENDECTOMY       BIOPSY MASS NECK Left 7/15/2020    Procedure: Left trapezius muscle biopsy;  Surgeon: Ade Villa MD;  [facility]     C TOTAL HIP ARTHROPLASTY Bilateral      C TOTAL KNEE ARTHROPLASTY Bilateral      CV RIGHT HEART CATH MEASUREMENTS RECORDED N/A 12/11/2019    Procedure: CV RIGHT HEART CATH;  Surgeon: Torrey Hirsch MD;  Location: [facility]     DAVINCI PROSTATECTOMY, LYMPHADENECTOMY N/A 5/23/2019    Procedure: ROBOTIC ASSISTED LAPAROSCOPIC RADICAL PROSTATECTOMY WITH BILATERAL PELVIC LYMPH NODE DISSECTION;  Surgeon: Matteo Coughlin MD;  Location: [facility] OR     ENDOBRONCHIAL ULTRASOUND FLEXIBLE N/A 3/29/2019    Procedure: Flexible Bronchoscopy, Endobronchial Ultrasound;  Surgeon: Curtis Leger MD;  Location: [facility] OR     VASECTOMY         Social History     Socioeconomic History     Marital status:      Spouse name: Not on file     Number of children: Not on file     Years of education: Not on file     Highest education level: Not on file   Occupational History     Not on file   Tobacco Use     Smoking status: Never Smoker     Smokeless tobacco: Never Used   Substance and Sexual Activity     Alcohol use: Yes     Comment: twice a week     Drug use: No     Sexual activity: Yes     Partners: Female   Other Topics Concern     Parent/sibling w/ CABG, MI or angioplasty before 65F 55M? Not Asked   Social History Narrative    12/03/20         ENVIRONMENTAL HISTORY: [place] The home is heated with a gas furnace. They does have central air conditioning. The patient's bedroom is furnished with Indoor plants and carpeting in bedroom.  Pets inside the house include 0 pets. There is no history of cockroach or mice infestation. There is/are 0 smokers in the house.  The house does not have a damp basement.      Social Determinants of Health     Financial Resource Strain: Not on file   Food Insecurity: Not on file   Transportation Needs: Not on file   Physical Activity: Not on file   Stress: Not on file   Social Connections: Not on file   Intimate Partner Violence: Not At Risk     Fear of Current or Ex-Partner: No     Emotionally Abused: No     Physically Abused: No     Sexually Abused: No   Housing Stability: Not on file       ROS Pulmonary  A complete ROS was otherwise negative except as noted in the HPI.  BP (!) 150/82   Pulse 77   SpO2 95%   Exam:   GENERAL APPEARANCE: Alert, and in no apparent distress.  EYES: PERRL, EOMI  NECK: supple, no masses, no thyromegaly.  LYMPHATICS: No significant axillary, cervical, or supraclavicular nodes.  RESP: normal percussion, good air flow throughout.  No crackles. No rhonchi. No wheezes.  CV: Normal S1, S2, regular rhythm, normal rate. No murmur.  No rub. No gallop. No LE edema.   MS: extremities normal. No clubbing. No cyanosis.  SKIN: no rash on limited exam  NEURO: Mentation intact, speech normal, normal strength and tone, normal gait and stance    Results:  PFTs done today were reviewed by me with the patient.  FVC 2.0 liters (42%), FEV1 1.48 liters (41%) and the ratio is 41.  Total lung capacity 4.31 (57%).  The diffusion capacity is 20.39 (72%).     My interpretation is that he has moderate restriction with mildly decreased diffusion capacity.  In comparison to prior spirometry, no significant change in FVC.  Total lung capacity is 209 cc lower than in 05/2021.    Assessment and plan:  64-year-old male with history  of hypertension, hyperlipidemia, obesity, history of prostate cancer, abnormal chest imaging with TBNA (3/29/2019) demonstrating granulomatous Inflammation (station 7 and 12 L lymph nodes) and  BAL demonstrating 102 white cells and 11% lymphocytes.  The CD4 CD8 ratio was 2.29.  Cardiac MRI with no LGE and cPET with no myocardiac uptake ((2/2020)  to suggest cardiac sarcoidosis but abnormal EF of 50%. RV dilatation of unclear etiology but no pulmonary hypertension based on right heart cath with mean PAP 17 mm Hg (12/11/2019) .  Concern for truncal/diaphragmatic weakness but trapezius biopsy in June 2020 without any convincing evidence   1.  Pulmonary.  His reactive airways disease symptoms are well controlled at this time.  He will continue the Incruse Ellipta, Breo, Singulair and Zyrtec.  He is not needing Mucinex at this point.  In case he has exacerbation of his symptoms, a trial of azithromycin should be considered.  A steroid burst if Z-Eleazar will not be of therapeutic benefit.  2.  Pulmonary sarcoidosis.  Stable PFTs, though the total lung capacity appears to be a little lower.  We will bring him back in 3 months to repeat the PFTs.  3.  Extrapulmonary sarcoidosis. EKG today with frequent PVCs and bifascicular block.  His last CMR was in 2019.  I reviewed EKG with Cardiology.  We will place a Zio Patch and obtain a cardiac MRI.  He had extensive workup for diaphragmatic involvement of sarcoidosis. Trapezius biopsies were also negative.  Had a right heart cath with mean PA pressure of 17 mmHg.  Last eye exam in 2019 with no ocular symptoms.  4.  He will follow-up with his primary care physician for management of blood pressure.    Follow up in 3 months with PFTs.  He will also see Dr. Figueroa in [facility] for abnormal rhythm.    Addendum: Labs reviewed.  Electrolyte panel in normal range.  Albumin is 3.2.  Calcium 9.2 CBC in normal range.  ACE level is 44 and in normal range.  This note consists of symbols  derived from keyboarding, dictation and/or voice recognition software. As a result, there may be errors in the script that have gone undetected. Please consider this when interpreting information found in this chart    Again, thank you for allowing me to participate in the care of your patient.        Sincerely,        Jamie Martin MD

## 2021-11-10 NOTE — PROGRESS NOTES
Reason for Visit  Derek Contreras is a 65 year old year old male who is being seen for No chief complaint on file.  Pulmonary HPI    The patient was seen and examined by Jamie Martin MD     Mr. Contreras comes in for followup.  He was last seen in the Pulmonary Clinic in 05/2021 when he reported good control of pulmonary symptoms with inhaled medications, Singulair and Zyrtec.  He was also on Mucinex.  His PFTs were stable at that time.    Today he reports no new symptoms.  His allergies and reactive airways disease symptoms are still well controlled.  He denies any cough or sputum production.  He recently moved into a new house and was doing chores in the house without any shortness of breath.  No orthopnea, PND.    He is using dietary discretion for weight loss with decrease in weight from 305 to  293 pounds..       Current Outpatient Medications   Medication     albuterol (PROAIR HFA/PROVENTIL HFA/VENTOLIN HFA) 108 (90 Base) MCG/ACT inhaler     aspirin (ASA) 81 MG tablet     atorvastatin (LIPITOR) 40 MG tablet     azithromycin (ZITHROMAX) 250 MG tablet     BREO ELLIPTA 200-25 MCG/INH Inhaler     cetirizine (ZYRTEC) 10 MG tablet     fluticasone (FLONASE) 50 MCG/ACT nasal spray     hydrochlorothiazide (HYDRODIURIL) 12.5 MG tablet     montelukast (SINGULAIR) 10 MG tablet     multivitamin (ONE-DAILY) tablet     omeprazole (PRILOSEC) 40 MG DR capsule     umeclidinium (INCRUSE ELLIPTA) 62.5 MCG/INH inhaler     azithromycin (ZITHROMAX) 250 MG tablet     No current facility-administered medications for this visit.     Allergies   Allergen Reactions     Cat Hair Extract Itching     itchy tearful eyes     Adhesive Tape Rash     Iodine Rash     Past Medical History:   Diagnosis Date     Asthma      Claustrophobia      CPAP (continuous positive airway pressure) dependence      Dyslipidemia      [place] (hard of hearing)      Hypercholesteremia      Hypertension      Iron deficiency      Mediastinal adenopathy       Obesity      BEULAH (obstructive sleep apnea)      Prostate cancer (H)      Pulmonary hypertension (H)      Sarcoidosis        Past Surgical History:   Procedure Laterality Date     APPENDECTOMY       BIOPSY MASS NECK Left 7/15/2020    Procedure: Left trapezius muscle biopsy;  Surgeon: Ade Villa MD;  [facility]     C TOTAL HIP ARTHROPLASTY Bilateral      C TOTAL KNEE ARTHROPLASTY Bilateral      CV RIGHT HEART CATH MEASUREMENTS RECORDED N/A 12/11/2019    Procedure: CV RIGHT HEART CATH;  Surgeon: Torrey Hirsch MD;  [facility]: [facility]     DAVINCI PROSTATECTOMY, LYMPHADENECTOMY N/A 5/23/2019    Procedure: ROBOTIC ASSISTED LAPAROSCOPIC RADICAL PROSTATECTOMY WITH BILATERAL PELVIC LYMPH NODE DISSECTION;  Surgeon: Matteo Coughlin MD;  Location: [facility] OR     ENDOBRONCHIAL ULTRASOUND FLEXIBLE N/A 3/29/2019    Procedure: Flexible Bronchoscopy, Endobronchial Ultrasound;  Surgeon: Curtis Leger MD;  Location: [facility] OR     VASECTOMY         Social History     Socioeconomic History     Marital status:      Spouse name: Not on file     Number of children: Not on file     Years of education: Not on file     Highest education level: Not on file   Occupational History     Not on file   Tobacco Use     Smoking status: Never Smoker     Smokeless tobacco: Never Used   Substance and Sexual Activity     Alcohol use: Yes     Comment: twice a week     Drug use: No     Sexual activity: Yes     Partners: Female   Other Topics Concern     Parent/sibling w/ CABG, MI or angioplasty before 65F 55M? Not Asked   Social History Narrative    12/03/20         ENVIRONMENTAL HISTORY: [place] The home is heated with a gas furnace. They does have central air conditioning. The patient's bedroom is furnished with Indoor plants and carpeting in bedroom.  Pets inside the house include 0 pets. There is no history of cockroach or mice infestation. There is/are 0  smokers in the house.  The house does not have a damp basement.      Social Determinants of Health     Financial Resource Strain: Not on file   Food Insecurity: Not on file   Transportation Needs: Not on file   Physical Activity: Not on file   Stress: Not on file   Social Connections: Not on file   Intimate Partner Violence: Not At Risk     Fear of Current or Ex-Partner: No     Emotionally Abused: No     Physically Abused: No     Sexually Abused: No   Housing Stability: Not on file       ROS Pulmonary  A complete ROS was otherwise negative except as noted in the HPI.  BP (!) 150/82   Pulse 77   SpO2 95%   Exam:   GENERAL APPEARANCE: Alert, and in no apparent distress.  EYES: PERRL, EOMI  NECK: supple, no masses, no thyromegaly.  LYMPHATICS: No significant axillary, cervical, or supraclavicular nodes.  RESP: normal percussion, good air flow throughout.  No crackles. No rhonchi. No wheezes.  CV: Normal S1, S2, regular rhythm, normal rate. No murmur.  No rub. No gallop. No LE edema.   MS: extremities normal. No clubbing. No cyanosis.  SKIN: no rash on limited exam  NEURO: Mentation intact, speech normal, normal strength and tone, normal gait and stance    Results:  PFTs done today were reviewed by me with the patient.  FVC 2.0 liters (42%), FEV1 1.48 liters (41%) and the ratio is 41.  Total lung capacity 4.31 (57%).  The diffusion capacity is 20.39 (72%).     My interpretation is that he has moderate restriction with mildly decreased diffusion capacity.  In comparison to prior spirometry, no significant change in FVC.  Total lung capacity is 209 cc lower than in 05/2021.    Assessment and plan:  64-year-old male with history of hypertension, hyperlipidemia, obesity, history of prostate cancer, abnormal chest imaging with TBNA (3/29/2019) demonstrating granulomatous Inflammation (station 7 and 12 L lymph nodes) and  BAL demonstrating 102 white cells and 11% lymphocytes.  The CD4 CD8 ratio was 2.29.  Cardiac MRI with  no LGE and cPET with no myocardiac uptake ((2/2020)  to suggest cardiac sarcoidosis but abnormal EF of 50%. RV dilatation of unclear etiology but no pulmonary hypertension based on right heart cath with mean PAP 17 mm Hg (12/11/2019) .  Concern for truncal/diaphragmatic weakness but trapezius biopsy in June 2020 without any convincing evidence   1.  Pulmonary.  His reactive airways disease symptoms are well controlled at this time.  He will continue the Incruse Ellipta, Breo, Singulair and Zyrtec.  He is not needing Mucinex at this point.  In case he has exacerbation of his symptoms, a trial of azithromycin should be considered.  A steroid burst if Z-Eleazar will not be of therapeutic benefit.  2.  Pulmonary sarcoidosis.  Stable PFTs, though the total lung capacity appears to be a little lower.  We will bring him back in 3 months to repeat the PFTs.  3.  Extrapulmonary sarcoidosis. EKG today with frequent PVCs and bifascicular block.  His last CMR was in 2019.  I reviewed EKG with Cardiology.  We will place a Zio Patch and obtain a cardiac MRI.  He had extensive workup for diaphragmatic involvement of sarcoidosis. Trapezius biopsies were also negative.  Had a right heart cath with mean PA pressure of 17 mmHg.  Last eye exam in 2019 with no ocular symptoms.  4.  He will follow-up with his primary care physician for management of blood pressure.    Follow up in 3 months with PFTs.  He will also see Dr. Figueroa in [facility] for abnormal rhythm.    Addendum: Labs reviewed.  Electrolyte panel in normal range.  Albumin is 3.2.  Calcium 9.2 CBC in normal range.  ACE level is 44 and in normal range.        This note consists of symbols derived from keyboarding, dictation and/or voice recognition software. As a result, there may be errors in the script that have gone undetected. Please consider this when interpreting information found in this chart

## 2021-11-11 ENCOUNTER — LAB (OUTPATIENT)
Dept: LAB | Facility: CLINIC | Age: 66
End: 2021-11-11
Payer: MEDICARE

## 2021-11-11 ENCOUNTER — DOCUMENTATION ONLY (OUTPATIENT)
Dept: LAB | Facility: CLINIC | Age: 66
End: 2021-11-11

## 2021-11-11 DIAGNOSIS — D86.9 SARCOIDOSIS: ICD-10-CM

## 2021-11-11 LAB
BASOPHILS # BLD AUTO: 0 10E3/UL (ref 0–0.2)
BASOPHILS NFR BLD AUTO: 1 %
EOSINOPHIL # BLD AUTO: 0.3 10E3/UL (ref 0–0.7)
EOSINOPHIL NFR BLD AUTO: 3 %
ERYTHROCYTE [DISTWIDTH] IN BLOOD BY AUTOMATED COUNT: 14.7 % (ref 10–15)
HCT VFR BLD AUTO: 49.3 % (ref 40–53)
HGB BLD-MCNC: 15.9 G/DL (ref 13.3–17.7)
LYMPHOCYTES # BLD AUTO: 1.5 10E3/UL (ref 0.8–5.3)
LYMPHOCYTES NFR BLD AUTO: 19 %
MCH RBC QN AUTO: 28.4 PG (ref 26.5–33)
MCHC RBC AUTO-ENTMCNC: 32.3 G/DL (ref 31.5–36.5)
MCV RBC AUTO: 88 FL (ref 78–100)
MONOCYTES # BLD AUTO: 0.8 10E3/UL (ref 0–1.3)
MONOCYTES NFR BLD AUTO: 10 %
NEUTROPHILS # BLD AUTO: 5.5 10E3/UL (ref 1.6–8.3)
NEUTROPHILS NFR BLD AUTO: 68 %
PLATELET # BLD AUTO: 151 10E3/UL (ref 150–450)
RBC # BLD AUTO: 5.59 10E6/UL (ref 4.4–5.9)
WBC # BLD AUTO: 8.1 10E3/UL (ref 4–11)

## 2021-11-11 PROCEDURE — 82248 BILIRUBIN DIRECT: CPT

## 2021-11-11 PROCEDURE — 85025 COMPLETE CBC W/AUTO DIFF WBC: CPT

## 2021-11-11 PROCEDURE — 36415 COLL VENOUS BLD VENIPUNCTURE: CPT

## 2021-11-11 PROCEDURE — 80053 COMPREHEN METABOLIC PANEL: CPT

## 2021-11-11 PROCEDURE — 82164 ANGIOTENSIN I ENZYME TEST: CPT | Mod: 90

## 2021-11-11 PROCEDURE — 99000 SPECIMEN HANDLING OFFICE-LAB: CPT

## 2021-11-12 LAB
ACE SERPL-CCNC: 44 U/L
ALBUMIN SERPL-MCNC: 3.2 G/DL (ref 3.4–5)
ALP SERPL-CCNC: 89 U/L (ref 40–150)
ALT SERPL W P-5'-P-CCNC: 33 U/L (ref 0–70)
ANION GAP SERPL CALCULATED.3IONS-SCNC: 5 MMOL/L (ref 3–14)
AST SERPL W P-5'-P-CCNC: 28 U/L (ref 0–45)
BILIRUB DIRECT SERPL-MCNC: 0.2 MG/DL (ref 0–0.2)
BILIRUB SERPL-MCNC: 0.6 MG/DL (ref 0.2–1.3)
BUN SERPL-MCNC: 12 MG/DL (ref 7–30)
CALCIUM SERPL-MCNC: 9.2 MG/DL (ref 8.5–10.1)
CHLORIDE BLD-SCNC: 105 MMOL/L (ref 94–109)
CO2 SERPL-SCNC: 29 MMOL/L (ref 20–32)
CREAT SERPL-MCNC: 0.99 MG/DL (ref 0.66–1.25)
GFR SERPL CREATININE-BSD FRML MDRD: 79 ML/MIN/1.73M2
GLUCOSE BLD-MCNC: 90 MG/DL (ref 70–99)
POTASSIUM BLD-SCNC: 3.8 MMOL/L (ref 3.4–5.3)
PROT SERPL-MCNC: 7.7 G/DL (ref 6.8–8.8)
SODIUM SERPL-SCNC: 139 MMOL/L (ref 133–144)

## 2021-11-30 ENCOUNTER — OFFICE VISIT (OUTPATIENT)
Dept: URGENT CARE | Facility: URGENT CARE | Age: 66
End: 2021-11-30
Payer: MEDICARE

## 2021-11-30 ENCOUNTER — MYC MEDICAL ADVICE (OUTPATIENT)
Dept: INTERNAL MEDICINE | Facility: CLINIC | Age: 66
End: 2021-11-30
Payer: COMMERCIAL

## 2021-11-30 VITALS
RESPIRATION RATE: 28 BRPM | TEMPERATURE: 98.5 F | SYSTOLIC BLOOD PRESSURE: 166 MMHG | HEART RATE: 91 BPM | OXYGEN SATURATION: 95 % | DIASTOLIC BLOOD PRESSURE: 71 MMHG

## 2021-11-30 DIAGNOSIS — M79.672 LEFT FOOT PAIN: Primary | ICD-10-CM

## 2021-11-30 PROCEDURE — 99213 OFFICE O/P EST LOW 20 MIN: CPT | Performed by: PHYSICIAN ASSISTANT

## 2021-11-30 RX ORDER — INDOMETHACIN 50 MG/1
CAPSULE ORAL
Qty: 24 CAPSULE | Refills: 0 | Status: ON HOLD | OUTPATIENT
Start: 2021-11-30 | End: 2022-01-19

## 2021-11-30 NOTE — PATIENT INSTRUCTIONS
1. Take medication as prescribed according to bottle directions.  2. Ice the inflamed joint on and off for 15 minutes at a time.  3. Follow up if no improvement after 7 days.     What Is Gout?  Gout is a disease that affects the joints. Left untreated, it can lead to painful foot and joint deformities and even kidney problems. But, by treating gout early, you can relieve pain and help prevent future problems. Gout can usually be treated with medication and proper diet. In severe cases, surgery may be needed.  What causes gout?  Gout is caused by an excess of uric acid (a waste product made by the body). Uric acid is excreted by the kidneys. If the uric acid level in your blood rises too high, the uric acid may form crystals that collect in the joints, bringing on a gout attack. If you have many gout attacks, crystals may form large deposits called tophi. Tophi can damage joints and cause deformity.  Who is at risk for gout?  Men are more likely to have gout than women. But women can also be affected, mostly after menopause. Some health problems, such as obesity and high cholesterol, make gout more likely. And some medications, such as diuretics ( water pills ), can trigger a gout attack. People who drink a lot of alcohol are at high risk for gout. Certain foods can also trigger a gout attack.  Substances that may trigger a gout attack  To help prevent a gout attack, avoid these foods:    Alcohol (particularly beer, but also red wine and spirits)    Certain meats (red meat, processed meat, turkey)    Organ meats (kidney, liver, sweetbread)    Shellfish (lobster, crab, shrimp, scallop, mussel)    Certain fish (anchovy, sardine, herring, mackerel)   Treatment    Lifestyle changes, including weight loss, exercise, and quitting tobacco use    Reducing consumption of the food groups above as well as high fructose corn syrup, found in many foods including sodas and energy drinks    Changing non-essential medications that  "may contribute to gout (such as thiazide diuretics--\"water pills\")    Medications to reduce the amount of uric acid in the blood, such as allopurinol or others    Medications to treat acute gout attacks, including NSAIDs (nonsteroidal anti-inflammatory medicines), steroids, and colchicine    "

## 2021-11-30 NOTE — PROGRESS NOTES
Assessment/Plan:    No acute injury or bony tenderness, Xray not indicated. No warmth/erythema or fever to suggest infectious etiology. Symptoms began in middle of night and are not related to weight bearing; suspect early gout. Rx indomethacin and ice. Advised f/u with PCP to check uric acid levels and confirm diagnosis once symptoms have resolved.    See patient instructions below.    At the end of the encounter, I discussed results, diagnosis, medications. Discussed red flags for immediate return to clinic/ER, as well as indications for follow up if no improvement. Patient understood and agreed to plan. Patient was stable for discharge.      ICD-10-CM    1. Left foot pain  M79.672 indomethacin (INDOCIN) 50 MG capsule         Return in about 3 days (around 12/3/2021) for Follow up w/ primary care provider if not better.    JOO Contreras, PAJohnathanMunicipal Hospital and Granite [facility]  -----------------------------------------------------------------------------------------------------------------------------------------------------    HPI:  Derek Contreras is a 65 year old male who presents for evaluation of L foot pain onset 0230 this AM. Pain is an intermittent stabbing sensation localized to 1st MTP. He has no injury/trauma. Pain is not associated with weight bearing/movement. He took ibuprofen and Tylenol without relief. Patient reports no fever/chills, numbness/tingling, warmth, erythema, swelling, or any other symptoms.     Past Medical History:   Diagnosis Date     Asthma      Claustrophobia      CPAP (continuous positive airway pressure) dependence      Dyslipidemia      [place] (hard of hearing)      Hypercholesteremia      Hypertension      Iron deficiency      Mediastinal adenopathy      Obesity      BEULAH (obstructive sleep apnea)      Prostate cancer (H)      Pulmonary hypertension (H)      Sarcoidosis        Vitals:    11/30/21 1401   BP: (!) 166/71   Pulse: 91   Resp: 28   Temp: 98.5  F  (36.9  C)   TempSrc: Oral   SpO2: 95%       Physical Exam  Vitals and nursing note reviewed.   Pulmonary:      Effort: Pulmonary effort is normal.   Musculoskeletal:      Left ankle: Normal.      Left foot: Normal range of motion and normal capillary refill. No swelling or tenderness. Normal pulse.        Feet:    Neurological:      Mental Status: He is alert.         Labs/Imaging:  No results found for this or any previous visit (from the past 24 hour(s)).      Patient Instructions   1. Take medication as prescribed according to bottle directions.  2. Ice the inflamed joint on and off for 15 minutes at a time.  3. Follow up if no improvement after 7 days.     What Is Gout?  Gout is a disease that affects the joints. Left untreated, it can lead to painful foot and joint deformities and even kidney problems. But, by treating gout early, you can relieve pain and help prevent future problems. Gout can usually be treated with medication and proper diet. In severe cases, surgery may be needed.  What causes gout?  Gout is caused by an excess of uric acid (a waste product made by the body). Uric acid is excreted by the kidneys. If the uric acid level in your blood rises too high, the uric acid may form crystals that collect in the joints, bringing on a gout attack. If you have many gout attacks, crystals may form large deposits called tophi. Tophi can damage joints and cause deformity.  Who is at risk for gout?  Men are more likely to have gout than women. But women can also be affected, mostly after menopause. Some health problems, such as obesity and high cholesterol, make gout more likely. And some medications, such as diuretics ( water pills ), can trigger a gout attack. People who drink a lot of alcohol are at high risk for gout. Certain foods can also trigger a gout attack.  Substances that may trigger a gout attack  To help prevent a gout attack, avoid these foods:    Alcohol (particularly beer, but also red wine  "and spirits)    Certain meats (red meat, processed meat, turkey)    Organ meats (kidney, liver, sweetbread)    Shellfish (lobster, crab, shrimp, scallop, mussel)    Certain fish (anchovy, sardine, herring, mackerel)   Treatment    Lifestyle changes, including weight loss, exercise, and quitting tobacco use    Reducing consumption of the food groups above as well as high fructose corn syrup, found in many foods including sodas and energy drinks    Changing non-essential medications that may contribute to gout (such as thiazide diuretics--\"water pills\")    Medications to reduce the amount of uric acid in the blood, such as allopurinol or others    Medications to treat acute gout attacks, including NSAIDs (nonsteroidal anti-inflammatory medicines), steroids, and colchicine          "

## 2021-12-08 ENCOUNTER — PATIENT OUTREACH (OUTPATIENT)
Dept: PULMONOLOGY | Facility: CLINIC | Age: 66
End: 2021-12-08
Payer: COMMERCIAL

## 2021-12-08 NOTE — PROGRESS NOTES
Dr Martin reviewed Zio with Dr Figueroa, show multiple NSVT again, plan to get another cardiac MRI and has follow up with Dr Figueroa in 1 month.  Informed patient and his wife of results and plan.

## 2021-12-22 ENCOUNTER — HOSPITAL ENCOUNTER (OUTPATIENT)
Dept: MRI IMAGING | Facility: CLINIC | Age: 66
Discharge: HOME | End: 2021-12-22
Attending: INTERNAL MEDICINE | Admitting: INTERNAL MEDICINE
Payer: MEDICARE

## 2021-12-22 DIAGNOSIS — D86.9 SARCOIDOSIS: ICD-10-CM

## 2021-12-22 PROCEDURE — A9585 GADOBUTROL INJECTION: HCPCS | Performed by: INTERNAL MEDICINE

## 2021-12-22 PROCEDURE — 75561 CARDIAC MRI FOR MORPH W/DYE: CPT

## 2021-12-22 PROCEDURE — 255N000002 HC RX 255 OP 636: Performed by: INTERNAL MEDICINE

## 2021-12-22 PROCEDURE — 75561 CARDIAC MRI FOR MORPH W/DYE: CPT | Mod: 26 | Performed by: INTERNAL MEDICINE

## 2021-12-22 RX ORDER — GADOBUTROL 604.72 MG/ML
7.5 INJECTION INTRAVENOUS ONCE
Status: COMPLETED | OUTPATIENT
Start: 2021-12-22 | End: 2021-12-22

## 2021-12-22 RX ADMIN — GADOBUTROL 7.5 ML: 604.72 INJECTION INTRAVENOUS at 11:34

## 2021-12-22 RX ADMIN — GADOBUTROL 7.5 ML: 604.72 INJECTION INTRAVENOUS at 11:33

## 2021-12-24 ENCOUNTER — TELEPHONE (OUTPATIENT)
Dept: PULMONOLOGY | Facility: CLINIC | Age: 66
End: 2021-12-24
Payer: COMMERCIAL

## 2021-12-24 NOTE — TELEPHONE ENCOUNTER
----- Message from Cheri Maradiaga RN sent at 12/23/2021 10:10 AM CST -----  Regarding: RE: Got MRI ??  ROSALIE Ayala CONCLUSIONS: No evidence of cardiac sarcoid. Moderate cardiomyopathy, LVEF 41% and RVEF 34%, with no significant fibrosis. Compared to prior CMR on 8/2019 RV function is worse with no other change. Recommend re-assessment for pulmonary hypertension with TTE or RHC.     With the above report, it seems his CMR in 8/2019 had RVEF of 43% (now 34%); he saw Torrey once around this time and since has followed with [place] (he has an appt with Dr. Figueroa on Jan 5, 2022).     Do you want Mr. Contreras to follow up in [facility] with Torrey again regarding the rec for a TTE/RHC?    Thank you for your reply all,     Cheri  ----- Message -----  From: Sonu Guerin  Sent: 12/22/2021   3:45 PM CST  To: [facility] Nurses-  Subject: Got MRI ??                                       Also came for a cardiac MRI today - review when you can ...Thanks !!

## 2021-12-24 NOTE — TELEPHONE ENCOUNTER
Discussed cMRI result report with Dr. Martin who recommends patient make return visit with Dr. Hirsch to discuss recommended imaging/procedure mentioned in cMRI report. Dr. Martin has also added patient to Sarcoid MDD meeting list to discuss at next Cardiac Sarcoid conference.     Patient updated that cMRI without concern for cardiac sarcoid and to follow up with Dr. Hirsch about RVEF worsening; patient amenable to this recommendation and will call for appointment early next week.     Cheri Maradiaga, RN, BSN  ILD Nurse Care Coordinator  (P) [phone]

## 2021-12-25 DIAGNOSIS — K21.9 GERD (GASTROESOPHAGEAL REFLUX DISEASE): ICD-10-CM

## 2021-12-27 RX ORDER — OMEPRAZOLE 40 MG/1
CAPSULE, DELAYED RELEASE ORAL
Qty: 30 CAPSULE | Refills: 11 | Status: SHIPPED | OUTPATIENT
Start: 2021-12-27 | End: 2023-01-16

## 2022-01-01 ASSESSMENT — ENCOUNTER SYMPTOMS
SNORES LOUDLY: 0
SHORTNESS OF BREATH: 1
HEMOPTYSIS: 0
SPUTUM PRODUCTION: 1
WHEEZING: 0
COUGH DISTURBING SLEEP: 0
DYSPNEA ON EXERTION: 0
POSTURAL DYSPNEA: 0
COUGH: 1

## 2022-01-05 ENCOUNTER — MYC MEDICAL ADVICE (OUTPATIENT)
Dept: PULMONOLOGY | Facility: CLINIC | Age: 67
End: 2022-01-05
Payer: COMMERCIAL

## 2022-01-05 ENCOUNTER — OFFICE VISIT (OUTPATIENT)
Dept: CARDIOLOGY | Facility: CLINIC | Age: 67
End: 2022-01-05
Attending: INTERNAL MEDICINE
Payer: MEDICARE

## 2022-01-05 VITALS
OXYGEN SATURATION: 96 % | HEIGHT: 70 IN | BODY MASS INDEX: 41.67 KG/M2 | SYSTOLIC BLOOD PRESSURE: 154 MMHG | WEIGHT: 291.1 LBS | HEART RATE: 99 BPM | DIASTOLIC BLOOD PRESSURE: 86 MMHG

## 2022-01-05 DIAGNOSIS — D86.9 SARCOIDOSIS: ICD-10-CM

## 2022-01-05 DIAGNOSIS — E78.00 HYPERCHOLESTEREMIA: ICD-10-CM

## 2022-01-05 DIAGNOSIS — I49.3 PVC'S (PREMATURE VENTRICULAR CONTRACTIONS): ICD-10-CM

## 2022-01-05 DIAGNOSIS — R05.9 COUGH: ICD-10-CM

## 2022-01-05 DIAGNOSIS — D86.9 SARCOIDOSIS: Primary | ICD-10-CM

## 2022-01-05 DIAGNOSIS — I49.3 PVC'S (PREMATURE VENTRICULAR CONTRACTIONS): Primary | ICD-10-CM

## 2022-01-05 PROCEDURE — 99214 OFFICE O/P EST MOD 30 MIN: CPT | Mod: GC | Performed by: INTERNAL MEDICINE

## 2022-01-05 PROCEDURE — G0463 HOSPITAL OUTPT CLINIC VISIT: HCPCS | Mod: 25

## 2022-01-05 PROCEDURE — 93005 ELECTROCARDIOGRAM TRACING: CPT

## 2022-01-05 RX ORDER — METOPROLOL SUCCINATE 100 MG/1
TABLET, EXTENDED RELEASE ORAL
Qty: 90 TABLET | Refills: 1 | Status: SHIPPED | OUTPATIENT
Start: 2022-01-05 | End: 2022-01-14

## 2022-01-05 RX ORDER — AZITHROMYCIN 250 MG/1
TABLET, FILM COATED ORAL
Qty: 6 TABLET | Refills: 0 | Status: SHIPPED | OUTPATIENT
Start: 2022-01-05 | End: 2022-01-10

## 2022-01-05 ASSESSMENT — PAIN SCALES - GENERAL: PAINLEVEL: NO PAIN (0)

## 2022-01-05 ASSESSMENT — MIFFLIN-ST. JEOR: SCORE: 2102.92

## 2022-01-05 NOTE — LETTER
1/5/2022      RE: Derek Contreras  [id number] [facility] [address]       Dear Colleague,    Thank you for the opportunity to participate in the care of your patient, Derek Contreras, at the [facility] [place] at [facility]. Please see a copy of my visit note below.    Electrophysiology Clinic Note  HPI:   Mr. Contreras is a 65 yo M who has a PMH pulmonary sarcoidosis, HTN, HLD, Obesity, h/o prostate CA, RV dilation & borderline low LVEF who has been evaluated in the pulmonary hypertension clinic by Dr. Ferrell and found not to have a dx of pulmonary hypertension. 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  His cardiac workup thus far has been as follows: he had a RHC 12/11/19 which showed RA 3, RV 33/5 PA 30/12 (17) and PCWP 5. CO 6.22, CI 2.49. PVR 1.82. Shunt series with Qp/Qs 1.0. He had a cardiac stress MRI 3/11/20 which showed no evidence of LGE. His RV was noted to be moderately enlarged but mildly reduced function with RVEF of 43%. Severely enlarged RA. No ischemia noted on stress imaging. He had a cardiac PET 2/2020 which showed no FDG active cardiac sarcoid. He did have decreased perfusion in the inferoseptal wall which may be related to microvascular disease. He was also noted to have decreased myocardial blood flow in the RCA distribution. Mr. Contreras also had a event monitor which showed frequent NSVT (fastest 19 beats @ 255bpm) and 9x SVT runs. He presents today to the EP clinic for evaluation of his arrhythmia.    He denies an symptoms including palpitations, chest discomfort, lightheadedness, dizziness, orthopnea, PND, abd distention, n/v/d, early satiety, unintentional weight loss. He does report chronic KIRBY related to his pulmonary sarcoidosis that has been stable. More recently he reports he has had a dry cough that  has slightly worsened over the last few days, he has reached out to his pulmonologist in regards to this.    Function:  Started walking 20-30min , walking 1/2-3/4 of a mile. He has some SOB with walking. He's doing this once daily. He started doing this as part of his New years resolution. He wasn't very active prior to that.    Social:  Retired, was a mental health therapist.  Never smoker. No h/o alcohol. Never used any illicit drugs.    Family:  Dad - Dad had a 4x CABG. He has a valve replaced surgically. He had 1-2 MIs. HTN.  Mom - Afib.   2 kids, 1 boy and girl, both healthy w/o medical problems.    In terms of medical management, his current medications are:  hydrochlorothiazide 12.5 daily, Atorva 40, Asa 81. His pulmonary medications are: breo ellipta, montelukast, umeclidinium, and albuterol inh, fluticasone nasal spray. Omeprazole 40 daily. Indomethacin 50 TID taper.     PAST MEDICAL HISTORY:  Past Medical History:   Diagnosis Date     Asthma      Claustrophobia      CPAP (continuous positive airway pressure) dependence      Dyslipidemia      [place] (hard of hearing)      Hypercholesteremia      Hypertension      Iron deficiency      Mediastinal adenopathy      Obesity      BEULAH (obstructive sleep apnea)      Prostate cancer (H)      Pulmonary hypertension (H)      Sarcoidosis      CURRENT MEDICATIONS:  Current Outpatient Medications   Medication Sig Dispense Refill     albuterol (PROAIR HFA/PROVENTIL HFA/VENTOLIN HFA) 108 (90 Base) MCG/ACT inhaler Inhale 1-2 puffs into the lungs every 4 hours as needed for shortness of breath / dyspnea 1 Inhaler 4     aspirin (ASA) 81 MG tablet Take 81 mg by mouth every morning  90 tablet 3     atorvastatin (LIPITOR) 40 MG tablet TAKE 1 TABLET(40 MG) BY MOUTH EVERY MORNING 90 tablet 3     BREO ELLIPTA 200-25 MCG/INH Inhaler INHALE 1 PUFF BY MOUTH ONE TIME DAILY  60 each 11     cetirizine (ZYRTEC) 10 MG tablet Take 10 mg by mouth every morning  90 tablet 3     fluticasone  (FLONASE) 50 MCG/ACT nasal spray Spray 1-2 sprays into both nostrils daily 16 g 0     hydrochlorothiazide (HYDRODIURIL) 12.5 MG tablet TAKE ONE TABLET BY MOUTH EVERY MORNING 90 tablet 0     metoprolol succinate ER (TOPROL-XL) 100 MG 24 hr tablet Take 50mg daily for 2 weeks, then take 100mg daily. 90 tablet 1     montelukast (SINGULAIR) 10 MG tablet Take 1 tablet (10 mg) by mouth At Bedtime 90 tablet 3     multivitamin (ONE-DAILY) tablet Take 1 tablet by mouth every morning  90 tablet 3     omeprazole (PRILOSEC) 40 MG DR capsule TAKE 1 CAPSULE BY MOUTH ONE TIME DAILY 30 capsule 11     umeclidinium (INCRUSE ELLIPTA) 62.5 MCG/INH inhaler Inhale 1 puff into the lungs daily 30 each 11     azithromycin (ZITHROMAX) 250 MG tablet Take 2 tablets (500 mg) by mouth daily for 1 day, THEN 1 tablet (250 mg) daily for 4 days. 6 tablet 0     indomethacin (INDOCIN) 50 MG capsule Take 1 capsule PO tid with meals for 7 days. Take 1 capsule PO bid with meals on day 8. Take 1 capsule PO once with meals on day 9. (Patient not taking: Reported on 1/5/2022) 24 capsule 0     PAST SURGICAL HISTORY:  Past Surgical History:   Procedure Laterality Date     APPENDECTOMY       BIOPSY MASS NECK Left 7/15/2020    Procedure: Left trapezius muscle biopsy;  Surgeon: Ade Villa MD;  Location: [facility] OR     CV RIGHT HEART CATH MEASUREMENTS RECORDED N/A 12/11/2019    Procedure: CV RIGHT HEART CATH;  Surgeon: Torrey Hirsch MD;  Location:  HEART CARDIAC CATH LAB     DAVINCI PROSTATECTOMY, LYMPHADENECTOMY N/A 5/23/2019    Procedure: ROBOTIC ASSISTED LAPAROSCOPIC RADICAL PROSTATECTOMY WITH BILATERAL PELVIC LYMPH NODE DISSECTION;  Surgeon: Matteo Coughlin MD;  Location:  OR     ENDOBRONCHIAL ULTRASOUND FLEXIBLE N/A 3/29/2019    Procedure: Flexible Bronchoscopy, Endobronchial Ultrasound;  Surgeon: Curtis Leger MD;  Location:  OR     VASECTOMY       ZZC TOTAL HIP ARTHROPLASTY Bilateral      ZZC TOTAL KNEE ARTHROPLASTY  "Bilateral      ALLERGIES:  Allergies   Allergen Reactions     Cat Hair Extract Itching     itchy tearful eyes     Adhesive Tape Rash     Iodine Rash     FAMILY HISTORY:  Family History   Problem Relation Age of Onset     Alzheimer Disease Mother      Heart Disease Father      Glaucoma No family hx of      Macular Degeneration No family hx of      Diabetes No family hx of      Thyroid Disease No family hx of      SOCIAL HISTORY:  Social History     Tobacco Use     Smoking status: Never Smoker     Smokeless tobacco: Never Used   Substance Use Topics     Alcohol use: Yes     Comment: twice a week     Drug use: No     ROS:   A comprehensive 10 point review of systems negative other than as mentioned in HPI.  Exam:  BP (!) 154/86 (BP Location: Right arm, Patient Position: Supine, Cuff Size: Adult Large)   Pulse 99   Ht 1.78 m (5' 10.08\")   Wt 132 kg (291 lb 1.6 oz)   SpO2 96%   BMI 41.67 kg/m    GENERAL APPEARANCE: alert and no distress  HEENT: MMM. PERRLA.  NECK: supple.   RESPIRATORY: lungs clear to auscultation - no rales, rhonchi or wheezes, no use of accessory muscles, no retractions, respirations are unlabored, normal respiratory rate  CARDIOVASCULAR: regular rhythm, S1/S2, no murmur. No rub. JVP 7  ABDOMEN: soft, NT, ND, +BS.  EXTREMITIES: peripheral pulses normal, trace to minimal edema bilaterally at the ankles  NEURO: alert and oriented to person/place/time, normal speech, gait and affect  SKIN: no ecchymoses, no rashes  PSYCH: normal affect, cooperative    Labs:  Reviewed.     Testing/Procedures:  PULMONARY FUNCTION TESTS:   PFT Latest Ref Rng & Units 11/10/2021   FVC L 2.00   FEV1 L 1.48   FVC% % 42   FEV1% % 41     Event monitor 1/16/2020: No patient triggers.      Zio monitor 11/10/21        Cardiac MRI 12/22/21  1. The LV is normal in cavity size with mild concentric LVH. The global systolic function is moderately  reduced. The LVEF is 41%. There is abnormal septal motion likely related to RBBB and " PVCs.     2. The RV is normal in cavity size. The global systolic function is moderately reduced. The RVEF is 34%.      3. Both atria are severely enlarged.     4. There is no significant valvular disease.      5. Late gadolinium enhancement imaging shows no MI or infiltrative disease. There is enhancement in the  septum at the RV insertion sites. This is a non-specific pattern that can be seen in pulmonary  hypertension.     6. There is no pericardial effusion or thickening.     7. There is no intracardiac thrombus.     8. The main PA is mildly dilated measuring 3.1 cm.      CONCLUSIONS: No evidence of cardiac sarcoid. Moderate cardiomyopathy, LVEF 41% and RVEF 34%, with no  significant fibrosis. Compared to prior CMR on 8/2019 RV function is worse with no other change. Recommend  re-assessment for pulmonary hypertension with TTE or RHC.     Stress Cardiac MRI 8/29/2019  1. The LV is normal in cavity size and wall thickness. The global systolic function is normal. The LVEF is  50%. There is systolic flattening of the interventricular septum and global dyssynchrony due to PVCs.     2. The RV is moderately enlarged based on the visual examination. The global systolic function is mildly  reduced. The RVEF is 43%.      3. The left atrium is mildly enlarged and the right atrium is severely enlarged.     4. There is at least mild mitral regurgitation and trace aortic regurgitation.     5. Late gadolinium enhancement imaging shows no MI, fibrosis or infiltrative disease.      6. Regadenoson stress perfusion imaging shows no ischemia.     7. There is no pericardial effusion or thickening.     8. There is no intracardiac thrombus.     CONCLUSIONS: Non-ischemic cardiomyopathy, likely due to pulmonary hypertension and dyssynchrony from PVCs.  There is mildly reduced biventricular function.  There is no evidence of myocardial perfusion defects or myocardial fibrosis.     Cardiac PET SCANS  2/19/2020  Impression:  1. No FDG  active cardiac sarcoid.  2. Decreased perfusion in the inferoseptal wall, findings may be  related to sequela of previous cardiac sarcoid with microvascular  injury.  3. Enlarged left ventricle with borderline low ejection fraction.  4. Decreased myocardial blood flow in the RCA distribution.  5. Findings of a dilated cardiomyopathy a concern for possible  ischemia/myocardial injury of the septum, consider CTA or coronary  angiography for further evaluation of this region.  6. Chest and upper abdominal hypermetabolic lymphadenopathy which is  indicative of active systemic sarcoid.     Assessment and Plan:   Mr. Contreras is a 67 yo M who has a PMH pulmonary sarcoidosis, HTN, HLD, Obesity, h/o prostate CA, RV dilation & borderline low LVEF who has been evaluated in the pulmonary hypertension clinic by Dr. Ferrell and found not to have a dx of pulmonary hypertension. 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  His cardiac workup thus far has been as follows: he had a RHC 12/11/19 which showed RA 3, RV 33/5 PA 30/12 (17) and PCWP 5. CO 6.22, CI 2.49. PVR 1.82. Shunt series with Qp/Qs 1.0. He had a cardiac stress MRI 3/11/20 which showed no evidence of LGE. His RV was noted to be moderately enlarged but mildly reduced function with RVEF of 43%. Severely enlarged RA. No ischemia noted on stress imaging. He had a cardiac PET 2/2020 which showed no FDG active cardiac sarcoid. He did have decreased perfusion in the inferoseptal wall which may be related to microvascular disease. He was also noted to have decreased myocardial blood flow in the RCA distribution. He had a repeat cardiac MRI 12/22/21 which showed worsened LV function to 41% and RV function decreased as well to RVEF 34% (moderately reduced). RV size continued to be normal. The patient also had a new finding of non-specific LGE at the insertion sites of the RV at  the septum. Mr. Contreras also had a event monitor which showed frequent NSVT (fastest 19 beats @ 255bpm) and 9x SVT runs. He presents today to the EP clinic for evaluation of his arrhythmia.    #PVCs - 19% [place]  #Non-sustained Polymorphic VT - PVC induced  The morphology of the patient's PVC is indicative of a RV septal origin.  He has known history of mildly reduced function that has now progressed to biventricular function worsening.  Additionally there is new LGE at the RV insertion site (new compared to prior MRI) which is a nonspecific finding.  The etiology of patient's biventricular dysfunction is not clear at this time.  Differential includes possible active phase sarcoidosis that has now progressed to the heart (and possibly has not had enough time to develop scar), this would be quite unusual/atypical.  Additionally the patient may also have a nonischemic myocardial process that is leading to progressively worsening function. Etiology may also be related to dyssynchrony from frequent PVCs that have also increased in burden.  PVC burden worsening could also be a reflection of his myocardial dysfunction. No significant signs to suggest that his is due to ischemia with a negative stress MRI a few years ago, it would be highly unusual for coronary disease to progress this quickly without any significant ischemic symptoms.    The finding of non-sustained polymorphic VT is concerning. We will trial the patient on a beta blocker therapy to attempt to reduce the PVC burden.  Antiarrhythmic therapies have significant toxicities associated with them and would prefer to avoid their use at this time.  The option of a PVC ablation was also discussed with the patient.  The success rates, and risks of benefits compared to medical therapy were discussed.  Risks associated with the procedure including bleeding, infection, hemopericardium, CVA, need for permanent pacemaker placement with conduction system injury were  discussed with the patient.    Recommendations:  - Start metop xl 50mg daily, increase in 2 weeks if tolerating well to 100mg daily.  - Will discuss with the sarcoid group regarding possible PET to evaluate for active sarcoid. We are actively considering ablation for management as well.  - F/u in 6 weeks, repeat zio patch 2 weeks prior to appointment    The patient states understanding and is agreeable with plan.   This patient was seen and evaluated with Dr. Figueroa. The above note reflects our joint assessment and plan.     Mohsan Chaudhry MD  Cardiology Fellow PGY 5    EP STAFF NOTE  Patient seen and examined and management plan personally reviewed with the patient. I agree with the note above by the CV/EP fellow.  Anuel Figueroa MD [facility]  Cardiology - Electrophysiology      CC  JETT PIERCE

## 2022-01-05 NOTE — TELEPHONE ENCOUNTER
Contacted patient in regards to MyChart message.  Patient states that he has had cough for 3 weeks and which has recently turned into a productive cough with brownish sputum.  He denies fevers or worsening shortness of breath.  States that in the past he has been given a z-devora for similar symptoms with good results.  Message sent to Dr. Martin.

## 2022-01-05 NOTE — NURSING NOTE
Chief Complaint   Patient presents with     New Patient     new arrythmia- consult for sarcoidosis     Vitals were taken, medications reconciled, and EKG was performed.    Denny Nolasco, CORINA  10:08 AM

## 2022-01-05 NOTE — PROGRESS NOTES
Electrophysiology Clinic Note  HPI:   Mr. Contreras is a 67 yo M who has a PMH pulmonary sarcoidosis, HTN, HLD, Obesity, h/o prostate CA, RV dilation & borderline low LVEF who has been evaluated in the pulmonary hypertension clinic by Dr. Ferrell and found not to have a dx of pulmonary hypertension. 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  His cardiac workup thus far has been as follows: he had a RHC 12/11/19 which showed RA 3, RV 33/5 PA 30/12 (17) and PCWP 5. CO 6.22, CI 2.49. PVR 1.82. Shunt series with Qp/Qs 1.0. He had a cardiac stress MRI 3/11/20 which showed no evidence of LGE. His RV was noted to be moderately enlarged but mildly reduced function with RVEF of 43%. Severely enlarged RA. No ischemia noted on stress imaging. He had a cardiac PET 2/2020 which showed no FDG active cardiac sarcoid. He did have decreased perfusion in the inferoseptal wall which may be related to microvascular disease. He was also noted to have decreased myocardial blood flow in the RCA distribution. Mr. Contreras also had a event monitor which showed frequent NSVT (fastest 19 beats @ 255bpm) and 9x SVT runs. He presents today to the EP clinic for evaluation of his arrhythmia.    He denies an symptoms including palpitations, chest discomfort, lightheadedness, dizziness, orthopnea, PND, abd distention, n/v/d, early satiety, unintentional weight loss. He does report chronic KIRBY related to his pulmonary sarcoidosis that has been stable. More recently he reports he has had a dry cough that has slightly worsened over the last few days, he has reached out to his pulmonologist in regards to this.    Function:  Started walking 20-30min , walking 1/2-3/4 of a mile. He has some SOB with walking. He's doing this once daily. He started doing this as part of his New years resolution. He wasn't very active prior to that.    Social:  Retired, was a  mental health therapist.  Never smoker. No h/o alcohol. Never used any illicit drugs.    Family:  Dad - Dad had a 4x CABG. He has a valve replaced surgically. He had 1-2 MIs. HTN.  Mom - Afib.   2 kids, 1 boy and girl, both healthy w/o medical problems.    In terms of medical management, his current medications are:  hydrochlorothiazide 12.5 daily, Atorva 40, Asa 81. His pulmonary medications are: breo ellipta, montelukast, umeclidinium, and albuterol inh, fluticasone nasal spray. Omeprazole 40 daily. Indomethacin 50 TID taper.     PAST MEDICAL HISTORY:  Past Medical History:   Diagnosis Date     Asthma      Claustrophobia      CPAP (continuous positive airway pressure) dependence      Dyslipidemia      Elim IRA (hard of hearing)      Hypercholesteremia      Hypertension      Iron deficiency      Mediastinal adenopathy      Obesity      BEULAH (obstructive sleep apnea)      Prostate cancer (H)      Pulmonary hypertension (H)      Sarcoidosis      CURRENT MEDICATIONS:  Current Outpatient Medications   Medication Sig Dispense Refill     albuterol (PROAIR HFA/PROVENTIL HFA/VENTOLIN HFA) 108 (90 Base) MCG/ACT inhaler Inhale 1-2 puffs into the lungs every 4 hours as needed for shortness of breath / dyspnea 1 Inhaler 4     aspirin (ASA) 81 MG tablet Take 81 mg by mouth every morning  90 tablet 3     atorvastatin (LIPITOR) 40 MG tablet TAKE 1 TABLET(40 MG) BY MOUTH EVERY MORNING 90 tablet 3     BREO ELLIPTA 200-25 MCG/INH Inhaler INHALE 1 PUFF BY MOUTH ONE TIME DAILY  60 each 11     cetirizine (ZYRTEC) 10 MG tablet Take 10 mg by mouth every morning  90 tablet 3     fluticasone (FLONASE) 50 MCG/ACT nasal spray Spray 1-2 sprays into both nostrils daily 16 g 0     hydrochlorothiazide (HYDRODIURIL) 12.5 MG tablet TAKE ONE TABLET BY MOUTH EVERY MORNING 90 tablet 0     metoprolol succinate ER (TOPROL-XL) 100 MG 24 hr tablet Take 50mg daily for 2 weeks, then take 100mg daily. 90 tablet 1     montelukast (SINGULAIR) 10 MG tablet Take 1  tablet (10 mg) by mouth At Bedtime 90 tablet 3     multivitamin (ONE-DAILY) tablet Take 1 tablet by mouth every morning  90 tablet 3     omeprazole (PRILOSEC) 40 MG DR capsule TAKE 1 CAPSULE BY MOUTH ONE TIME DAILY 30 capsule 11     umeclidinium (INCRUSE ELLIPTA) 62.5 MCG/INH inhaler Inhale 1 puff into the lungs daily 30 each 11     azithromycin (ZITHROMAX) 250 MG tablet Take 2 tablets (500 mg) by mouth daily for 1 day, THEN 1 tablet (250 mg) daily for 4 days. 6 tablet 0     indomethacin (INDOCIN) 50 MG capsule Take 1 capsule PO tid with meals for 7 days. Take 1 capsule PO bid with meals on day 8. Take 1 capsule PO once with meals on day 9. (Patient not taking: Reported on 1/5/2022) 24 capsule 0     PAST SURGICAL HISTORY:  Past Surgical History:   Procedure Laterality Date     APPENDECTOMY       BIOPSY MASS NECK Left 7/15/2020    Procedure: Left trapezius muscle biopsy;  Surgeon: Ade Villa MD;  Location: [facility] OR     CV RIGHT HEART CATH MEASUREMENTS RECORDED N/A 12/11/2019    Procedure: CV RIGHT HEART CATH;  Surgeon: Torrey Hirsch MD;  Location:  HEART CARDIAC CATH LAB     DAVINCI PROSTATECTOMY, LYMPHADENECTOMY N/A 5/23/2019    Procedure: ROBOTIC ASSISTED LAPAROSCOPIC RADICAL PROSTATECTOMY WITH BILATERAL PELVIC LYMPH NODE DISSECTION;  Surgeon: Matteo Coughlin MD;  Location: [facility] OR     ENDOBRONCHIAL ULTRASOUND FLEXIBLE N/A 3/29/2019    Procedure: Flexible Bronchoscopy, Endobronchial Ultrasound;  Surgeon: Curtis Leger MD;  Location: [facility] OR     VASECTOMY       ZZC TOTAL HIP ARTHROPLASTY Bilateral      ZZC TOTAL KNEE ARTHROPLASTY Bilateral      ALLERGIES:  Allergies   Allergen Reactions     Cat Hair Extract Itching     itchy tearful eyes     Adhesive Tape Rash     Iodine Rash     FAMILY HISTORY:  Family History   Problem Relation Age of Onset     Alzheimer Disease Mother      Heart Disease Father      Glaucoma No family hx of      Macular Degeneration No family hx of      Diabetes No  "family hx of      Thyroid Disease No family hx of      SOCIAL HISTORY:  Social History     Tobacco Use     Smoking status: Never Smoker     Smokeless tobacco: Never Used   Substance Use Topics     Alcohol use: Yes     Comment: twice a week     Drug use: No     ROS:   A comprehensive 10 point review of systems negative other than as mentioned in HPI.  Exam:  BP (!) 154/86 (BP Location: Right arm, Patient Position: Supine, Cuff Size: Adult Large)   Pulse 99   Ht 1.78 m (5' 10.08\")   Wt 132 kg (291 lb 1.6 oz)   SpO2 96%   BMI 41.67 kg/m    GENERAL APPEARANCE: alert and no distress  HEENT: MMM. PERRLA.  NECK: supple.   RESPIRATORY: lungs clear to auscultation - no rales, rhonchi or wheezes, no use of accessory muscles, no retractions, respirations are unlabored, normal respiratory rate  CARDIOVASCULAR: regular rhythm, S1/S2, no murmur. No rub. JVP 7  ABDOMEN: soft, NT, ND, +BS.  EXTREMITIES: peripheral pulses normal, trace to minimal edema bilaterally at the ankles  NEURO: alert and oriented to person/place/time, normal speech, gait and affect  SKIN: no ecchymoses, no rashes  PSYCH: normal affect, cooperative    Labs:  Reviewed.     Testing/Procedures:  PULMONARY FUNCTION TESTS:   PFT Latest Ref Rng & Units 11/10/2021   FVC L 2.00   FEV1 L 1.48   FVC% % 42   FEV1% % 41     Event monitor 1/16/2020: No patient triggers.      Zio monitor 11/10/21        Cardiac MRI 12/22/21  1. The LV is normal in cavity size with mild concentric LVH. The global systolic function is moderately  reduced. The LVEF is 41%. There is abnormal septal motion likely related to RBBB and PVCs.     2. The RV is normal in cavity size. The global systolic function is moderately reduced. The RVEF is 34%.      3. Both atria are severely enlarged.     4. There is no significant valvular disease.      5. Late gadolinium enhancement imaging shows no MI or infiltrative disease. There is enhancement in the  septum at the RV insertion sites. This is a " non-specific pattern that can be seen in pulmonary  hypertension.     6. There is no pericardial effusion or thickening.     7. There is no intracardiac thrombus.     8. The main PA is mildly dilated measuring 3.1 cm.      CONCLUSIONS: No evidence of cardiac sarcoid. Moderate cardiomyopathy, LVEF 41% and RVEF 34%, with no  significant fibrosis. Compared to prior CMR on 8/2019 RV function is worse with no other change. Recommend  re-assessment for pulmonary hypertension with TTE or RHC.     Stress Cardiac MRI 8/29/2019  1. The LV is normal in cavity size and wall thickness. The global systolic function is normal. The LVEF is  50%. There is systolic flattening of the interventricular septum and global dyssynchrony due to PVCs.     2. The RV is moderately enlarged based on the visual examination. The global systolic function is mildly  reduced. The RVEF is 43%.      3. The left atrium is mildly enlarged and the right atrium is severely enlarged.     4. There is at least mild mitral regurgitation and trace aortic regurgitation.     5. Late gadolinium enhancement imaging shows no MI, fibrosis or infiltrative disease.      6. Regadenoson stress perfusion imaging shows no ischemia.     7. There is no pericardial effusion or thickening.     8. There is no intracardiac thrombus.     CONCLUSIONS: Non-ischemic cardiomyopathy, likely due to pulmonary hypertension and dyssynchrony from PVCs.  There is mildly reduced biventricular function.  There is no evidence of myocardial perfusion defects or myocardial fibrosis.     Cardiac PET SCANS  2/19/2020  Impression:  1. No FDG active cardiac sarcoid.  2. Decreased perfusion in the inferoseptal wall, findings may be  related to sequela of previous cardiac sarcoid with microvascular  injury.  3. Enlarged left ventricle with borderline low ejection fraction.  4. Decreased myocardial blood flow in the RCA distribution.  5. Findings of a dilated cardiomyopathy a concern for  possible  ischemia/myocardial injury of the septum, consider CTA or coronary  angiography for further evaluation of this region.  6. Chest and upper abdominal hypermetabolic lymphadenopathy which is  indicative of active systemic sarcoid.     Assessment and Plan:   Mr. Contreras is a 65 yo M who has a PMH pulmonary sarcoidosis, HTN, HLD, Obesity, h/o prostate CA, RV dilation & borderline low LVEF who has been evaluated in the pulmonary hypertension clinic by Dr. Ferrell and found not to have a dx of pulmonary hypertension. 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  His cardiac workup thus far has been as follows: he had a RHC 12/11/19 which showed RA 3, RV 33/5 PA 30/12 (17) and PCWP 5. CO 6.22, CI 2.49. PVR 1.82. Shunt series with Qp/Qs 1.0. He had a cardiac stress MRI 3/11/20 which showed no evidence of LGE. His RV was noted to be moderately enlarged but mildly reduced function with RVEF of 43%. Severely enlarged RA. No ischemia noted on stress imaging. He had a cardiac PET 2/2020 which showed no FDG active cardiac sarcoid. He did have decreased perfusion in the inferoseptal wall which may be related to microvascular disease. He was also noted to have decreased myocardial blood flow in the RCA distribution. He had a repeat cardiac MRI 12/22/21 which showed worsened LV function to 41% and RV function decreased as well to RVEF 34% (moderately reduced). RV size continued to be normal. The patient also had a new finding of non-specific LGE at the insertion sites of the RV at the septum. Mr. Contreras also had a event monitor which showed frequent NSVT (fastest 19 beats @ 255bpm) and 9x SVT runs. He presents today to the EP clinic for evaluation of his arrhythmia.    #PVCs - 19% [place]  #Non-sustained Polymorphic VT - PVC induced  The morphology of the patient's PVC is indicative of a RV septal origin.  He has known  history of mildly reduced function that has now progressed to biventricular function worsening.  Additionally there is new LGE at the RV insertion site (new compared to prior MRI) which is a nonspecific finding.  The etiology of patient's biventricular dysfunction is not clear at this time.  Differential includes possible active phase sarcoidosis that has now progressed to the heart (and possibly has not had enough time to develop scar), this would be quite unusual/atypical.  Additionally the patient may also have a nonischemic myocardial process that is leading to progressively worsening function. Etiology may also be related to dyssynchrony from frequent PVCs that have also increased in burden.  PVC burden worsening could also be a reflection of his myocardial dysfunction. No significant signs to suggest that his is due to ischemia with a negative stress MRI a few years ago, it would be highly unusual for coronary disease to progress this quickly without any significant ischemic symptoms.    The finding of non-sustained polymorphic VT is concerning. We will trial the patient on a beta blocker therapy to attempt to reduce the PVC burden.  Antiarrhythmic therapies have significant toxicities associated with them and would prefer to avoid their use at this time.  The option of a PVC ablation was also discussed with the patient.  The success rates, and risks of benefits compared to medical therapy were discussed.  Risks associated with the procedure including bleeding, infection, hemopericardium, CVA, need for permanent pacemaker placement with conduction system injury were discussed with the patient.    Recommendations:  - Start metop xl 50mg daily, increase in 2 weeks if tolerating well to 100mg daily.  - Will discuss with the sarcoid group regarding possible PET to evaluate for active sarcoid. We are actively considering ablation for management as well.  - F/u in 6 weeks, repeat zio patch 2 weeks prior to  appointment    The patient states understanding and is agreeable with plan.   This patient was seen and evaluated with Dr. Figueroa. The above note reflects our joint assessment and plan.     Mohsan Chaudhry MD  Cardiology Fellow PGY 5    EP STAFF NOTE  Patient seen and examined and management plan personally reviewed with the patient. I agree with the note above by the CV/EP fellow.  Anuel Figueroa MD [facility]  Cardiology - Electrophysiology      CC  JETT PIERCE

## 2022-01-05 NOTE — PATIENT INSTRUCTIONS
Plan:     Start Toprol XL 50mg daily. In 2 weeks, increase to 100mg daily    Follow-up in 6 weeks with a 7 day ziopatch worn 3 weeks prior (mail out)      Your Care Team:  EP Cardiology   Telephone Number     Sherry Velazquez RN [phone]     For scheduling appts or procedures:    Pao Gamble   [phone]   For the Device Clinic (Pacemakers, ICDs, Loop Recorders)    During business hours: [phone]  After business hours:   [phone]- select option 4 and ask for job code [id number].       Cardiovascular Clinic:   [address] [place] [address]. [place] [address]      As always, Thank you for trusting us with your health care needs!

## 2022-01-06 ENCOUNTER — MYC MEDICAL ADVICE (OUTPATIENT)
Dept: CARDIOLOGY | Facility: CLINIC | Age: 67
End: 2022-01-06
Payer: COMMERCIAL

## 2022-01-06 LAB
ATRIAL RATE - MUSE: 100 BPM
DIASTOLIC BLOOD PRESSURE - MUSE: NORMAL MMHG
INTERPRETATION ECG - MUSE: NORMAL
P AXIS - MUSE: 31 DEGREES
PR INTERVAL - MUSE: 180 MS
QRS DURATION - MUSE: 154 MS
QT - MUSE: 382 MS
QTC - MUSE: 492 MS
R AXIS - MUSE: -57 DEGREES
SYSTOLIC BLOOD PRESSURE - MUSE: NORMAL MMHG
T AXIS - MUSE: 4 DEGREES
VENTRICULAR RATE- MUSE: 100 BPM

## 2022-01-07 RX ORDER — ATORVASTATIN CALCIUM 40 MG/1
TABLET, FILM COATED ORAL
Qty: 90 TABLET | Refills: 0 | Status: SHIPPED | OUTPATIENT
Start: 2022-01-07 | End: 2022-01-12

## 2022-01-07 NOTE — TELEPHONE ENCOUNTER
"metoprolol succinate ER (TOPROL-XL) 100 MG 24 hr tablet   Take 50mg daily for 2 weeks, then take 100mg daily.     Last Written Prescription Date:  1/5/22  Last Fill Quantity: 90,   # refills: 1  Last Office Visit : 1/5/22  Start metop xl 50mg daily, increase in 2 weeks if tolerating well to 100mg daily.  - Will discuss with the sarcoid group regarding possible PET to evaluate for active sarcoid. We are actively considering ablation for management as well.  - F/u in 6 weeks, repeat zio patch 2 weeks prior to appointment     Future Office visit:  1/17/22    Routing because:  BP > 140/90   01/05/22 (!) 154/86     Per last visit 1/5/22, \"We will trial the patient on a beta blocker therapy to attempt to reduce the PVC burden\". F/u in 6 weeks. (appt 2/23/22)      "

## 2022-01-07 NOTE — TELEPHONE ENCOUNTER
Medication is being filled for 1 time refill only due to:  Patient needs labs.    Pt has appt scheduled  Manisha ROBERTS RN, BSN

## 2022-01-12 ENCOUNTER — OFFICE VISIT (OUTPATIENT)
Dept: INTERNAL MEDICINE | Facility: CLINIC | Age: 67
End: 2022-01-12
Payer: MEDICARE

## 2022-01-12 VITALS
HEART RATE: 72 BPM | HEIGHT: 70 IN | DIASTOLIC BLOOD PRESSURE: 80 MMHG | RESPIRATION RATE: 18 BRPM | OXYGEN SATURATION: 99 % | SYSTOLIC BLOOD PRESSURE: 140 MMHG | BODY MASS INDEX: 41.67 KG/M2 | WEIGHT: 291.1 LBS | TEMPERATURE: 97.7 F

## 2022-01-12 DIAGNOSIS — Z12.5 ENCOUNTER FOR SCREENING FOR MALIGNANT NEOPLASM OF PROSTATE: ICD-10-CM

## 2022-01-12 DIAGNOSIS — C61 PROSTATE CANCER (H): ICD-10-CM

## 2022-01-12 DIAGNOSIS — I10 ESSENTIAL HYPERTENSION: ICD-10-CM

## 2022-01-12 DIAGNOSIS — D82.4 HYPERIMMUNOGLOBULIN E (IGE) SYNDROME (H): ICD-10-CM

## 2022-01-12 DIAGNOSIS — Z12.11 SPECIAL SCREENING FOR MALIGNANT NEOPLASMS, COLON: ICD-10-CM

## 2022-01-12 DIAGNOSIS — D69.6 THROMBOCYTOPENIA, UNSPECIFIED (H): ICD-10-CM

## 2022-01-12 DIAGNOSIS — D86.0 PULMONARY SARCOIDOSIS (H): ICD-10-CM

## 2022-01-12 DIAGNOSIS — Z00.00 PREVENTATIVE HEALTH CARE: Primary | ICD-10-CM

## 2022-01-12 DIAGNOSIS — E78.00 HYPERCHOLESTEREMIA: ICD-10-CM

## 2022-01-12 DIAGNOSIS — I47.29 PVT (PAROXYSMAL VENTRICULAR TACHYCARDIA) (H): ICD-10-CM

## 2022-01-12 DIAGNOSIS — E66.01 MORBID OBESITY (H): ICD-10-CM

## 2022-01-12 PROBLEM — I47.20 PVT (PAROXYSMAL VENTRICULAR TACHYCARDIA) (H): Status: ACTIVE | Noted: 2022-01-12

## 2022-01-12 PROBLEM — G70.9 NEUROMUSCULAR WEAKNESS (H): Status: RESOLVED | Noted: 2021-05-12 | Resolved: 2022-01-12

## 2022-01-12 LAB
CHOLEST SERPL-MCNC: 71 MG/DL
FASTING STATUS PATIENT QL REPORTED: YES
HDLC SERPL-MCNC: 45 MG/DL
LDLC SERPL CALC-MCNC: 12 MG/DL
NONHDLC SERPL-MCNC: 26 MG/DL
PSA SERPL-MCNC: <0.01 UG/L (ref 0–4)
TRIGL SERPL-MCNC: 68 MG/DL
TSH SERPL DL<=0.005 MIU/L-ACNC: 2.2 MU/L (ref 0.4–4)
URATE SERPL-MCNC: 6.3 MG/DL (ref 3.5–7.2)

## 2022-01-12 PROCEDURE — 80061 LIPID PANEL: CPT | Performed by: INTERNAL MEDICINE

## 2022-01-12 PROCEDURE — 84550 ASSAY OF BLOOD/URIC ACID: CPT | Performed by: INTERNAL MEDICINE

## 2022-01-12 PROCEDURE — 84443 ASSAY THYROID STIM HORMONE: CPT | Performed by: INTERNAL MEDICINE

## 2022-01-12 PROCEDURE — G0438 PPPS, INITIAL VISIT: HCPCS | Performed by: INTERNAL MEDICINE

## 2022-01-12 PROCEDURE — G0103 PSA SCREENING: HCPCS | Performed by: INTERNAL MEDICINE

## 2022-01-12 PROCEDURE — 36415 COLL VENOUS BLD VENIPUNCTURE: CPT | Performed by: INTERNAL MEDICINE

## 2022-01-12 RX ORDER — HYDROCHLOROTHIAZIDE 12.5 MG/1
TABLET ORAL
Qty: 90 TABLET | Refills: 3 | Status: SHIPPED | OUTPATIENT
Start: 2022-01-12 | End: 2023-02-22

## 2022-01-12 RX ORDER — ATORVASTATIN CALCIUM 40 MG/1
TABLET, FILM COATED ORAL
Qty: 90 TABLET | Refills: 3 | Status: SHIPPED | OUTPATIENT
Start: 2022-01-12 | End: 2023-01-04

## 2022-01-12 ASSESSMENT — MIFFLIN-ST. JEOR: SCORE: 2101.67

## 2022-01-12 ASSESSMENT — ENCOUNTER SYMPTOMS
MYALGIAS: 0
DIARRHEA: 0
PALPITATIONS: 0
HEMATURIA: 0
SHORTNESS OF BREATH: 0
WEAKNESS: 0
HEADACHES: 0
PARESTHESIAS: 0
HEMATOCHEZIA: 0
CHILLS: 0
DYSURIA: 0
COUGH: 0
ARTHRALGIAS: 0
FREQUENCY: 0
SORE THROAT: 0
NAUSEA: 0
DIZZINESS: 0
ABDOMINAL PAIN: 0
EYE PAIN: 0
NERVOUS/ANXIOUS: 0
CONSTIPATION: 0
HEARTBURN: 0
JOINT SWELLING: 0
FEVER: 0

## 2022-01-12 ASSESSMENT — ACTIVITIES OF DAILY LIVING (ADL): CURRENT_FUNCTION: NO ASSISTANCE NEEDED

## 2022-01-12 NOTE — PROGRESS NOTES
"SUBJECTIVE:   Derek Contreras is a 66 year old male who presents for Preventive Visit.    Fasting.    Patient has been advised of split billing requirements and indicates understanding: Yes   Are you in the first 12 months of your Medicare coverage?  No    Healthy Habits:     In general, how would you rate your overall health?  Good    Frequency of exercise:  4-5 days/week    Duration of exercise:  15-30 minutes    Do you usually eat at least 4 servings of fruit and vegetables a day, include whole grains    & fiber and avoid regularly eating high fat or \"junk\" foods?  No    Taking medications regularly:  Yes    Medication side effects:  None    Ability to successfully perform activities of daily living:  No assistance needed    Home Safety:  No safety concerns identified    Hearing Impairment:  Difficulty following a conversation in a noisy restaurant or crowded room    In the past 6 months, have you been bothered by leaking of urine?  No    In general, how would you rate your overall mental or emotional health?  Excellent      PHQ-2 Total Score: 0    Additional concerns today:  No    Do you feel safe in your environment? Yes    Have you ever done Advance Care Planning? (For example, a Health Directive, POLST, or a discussion with a medical provider or your loved ones about your wishes): Yes, advance care planning is on file.       Fall risk  Fallen 2 or more times in the past year?: No  Any fall with injury in the past year?: No    Cognitive Screening   1) Repeat 3 items (Leader, Season, Table)    2) Clock draw: NORMAL  3) 3 item recall: Recalls 3 objects  Results: 3 items recalled: COGNITIVE IMPAIRMENT LESS LIKELY    Mini-CogTM Sabina Stockton. Licensed by the author for use in [facility]; reprinted with permission ([email or web address]@.[email or web address]). All rights reserved.      Do you have sleep apnea, excessive snoring or daytime drowsiness?: yes, uses c-pap    Reviewed and updated as needed this visit by clinical " staff                Reviewed and updated as needed this visit by Provider               Social History     Tobacco Use     Smoking status: Never Smoker     Smokeless tobacco: Never Used   Substance Use Topics     Alcohol use: Yes     Comment: twice a week     If you drink alcohol do you typically have >3 drinks per day or >7 drinks per week? No    Alcohol Use 11/10/2020   Prescreen: >3 drinks/day or >7 drinks/week? No   Prescreen: >3 drinks/day or >7 drinks/week? -       Hyperlipidemia Follow-Up      Are you regularly taking any medication or supplement to lower your cholesterol?   Yes- atorvastatin    Are you having muscle aches or other side effects that you think could be caused by your cholesterol lowering medication?  No    Hypertension Follow-up      Do you check your blood pressure regularly outside of the clinic? Yes     Are you following a low salt diet? Yes    Are your blood pressures ever more than 140 on the top number (systolic) OR more   than 90 on the bottom number (diastolic), for example 140/90? No      Current providers sharing in care for this patient include:   Patient Care Team:  Carlyn Payne MD as PCP - General (Internal Medicine)  Honey Jackson, RN as Specialty Care Coordinator (Cardiology)  Camryn Garcia, RN as Specialty Care Coordinator (Cardiology)  Diana Dillard, RN as Specialty Care Coordinator (Cardiology)  Sherry Velazquez, RN as Specialty Care Coordinator (Cardiology)  Anuel Figueroa MD as MD (Clinical Cardiac Electrophysiology)  Radha Trimble MD as MD (Hematology & Oncology)  Glynn Omer MD as Referring Physician (Neurology)  Matteo Coughlin MD as Assigned Surgical Provider  Frank Barron MD as Assigned Sleep Provider  Radha Trimble MD as Assigned Cancer Care Provider  Aris Delcid MD as Assigned Allergy Provider  Jamie Martin MD as Assigned PCP  Matteo Coughlin MD as MD  "(Urology)    The following health maintenance items are reviewed in Epic and correct as of today:  Health Maintenance Due   Topic Date Due     HF ACTION PLAN  Never done     ASTHMA ACTION PLAN  Never done     ZOSTER IMMUNIZATION (2 of 3) 02/21/2017     DTAP/TDAP/TD IMMUNIZATION (2 - Td or Tdap) 04/18/2021     COLORECTAL CANCER SCREENING  05/07/2021     ASTHMA CONTROL TEST  05/10/2021     MEDICARE ANNUAL WELLNESS VISIT  11/10/2021     LIPID  11/10/2021     FALL RISK ASSESSMENT  11/10/2021     PHQ-2  01/01/2022     BP Readings from Last 3 Encounters:   01/12/22 (!) 140/80   01/05/22 (!) 154/86   11/30/21 (!) 166/71    Wt Readings from Last 3 Encounters:   01/12/22 132 kg (291 lb 1.6 oz)   01/05/22 132 kg (291 lb 1.6 oz)   08/19/21 136.1 kg (300 lb)                       Review of Systems   Constitutional: Negative for chills and fever.   HENT: Negative for congestion, ear pain, hearing loss and sore throat.    Eyes: Negative for pain and visual disturbance.   Respiratory: Negative for cough and shortness of breath.    Cardiovascular: Negative for chest pain, palpitations and peripheral edema.   Gastrointestinal: Negative for abdominal pain, constipation, diarrhea, heartburn, hematochezia and nausea.   Genitourinary: Negative for dysuria, frequency, genital sores, hematuria, impotence, penile discharge and urgency.   Musculoskeletal: Negative for arthralgias, joint swelling and myalgias.   Skin: Negative for rash.   Neurological: Negative for dizziness, weakness, headaches and paresthesias.   Psychiatric/Behavioral: Negative for mood changes. The patient is not nervous/anxious.        OBJECTIVE:   There were no vitals taken for this visit. Estimated body mass index is 41.67 kg/m  as calculated from the following:    Height as of 1/5/22: 1.78 m (5' 10.08\").    Weight as of 1/5/22: 132 kg (291 lb 1.6 oz).  Physical Exam  GENERAL: healthy, alert and no distress  EYES: Eyes grossly normal to inspection, PERRL and " "conjunctivae and sclerae normal  NECK: no adenopathy, no asymmetry, masses, or scars and thyroid normal to palpation  RESP: lungs clear to auscultation - no rales, rhonchi or wheezes  CV: regular rate and rhythm, normal S1 S2, no S3 or S4, no murmur, click or rub, no peripheral edema and peripheral pulses strong  ABDOMEN: soft, nontender, no hepatosplenomegaly, no masses and bowel sounds normal  MS: no gross musculoskeletal defects noted, no edema  SKIN: no suspicious lesions or rashes  NEURO: Normal strength and tone, mentation intact and speech normal  PSYCH: mentation appears normal, affect normal/bright      ASSESSMENT / PLAN:   (Z00.00) Preventative health care  (primary encounter diagnosis)  Comment:    Plan: Uric acid, PSA, screen, TSH with free T4         reflex, Lipid Profile (Chol, Trig, HDL, LDL         calc), COLOGUARD(EXACT SCIENCES)             (E78.00) Hypercholesteremia  Comment:    Plan: atorvastatin (LIPITOR) 40 MG tablet             (I10) Essential hypertension  Comment:    Plan: hydrochlorothiazide (HYDRODIURIL) 12.5 MG         tablet             (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)  Comment:    Plan:      (I47.2) PVT (paroxysmal ventricular tachycardia) (H)  Comment:    Plan:      (E66.01) Morbid obesity (H)  Comment:    Plan:      (D69.6) Thrombocytopenia, unspecified (H)  Comment:    Plan:      (C61) Prostate cancer (H)  Comment:    Plan:      (Z12.5) Encounter for screening for malignant neoplasm of prostate   Comment:    Plan: PSA, screen             (D86.0) Pulmonary sarcoidosis (H)  Comment:    Plan:      Patient has been advised of split billing requirements and indicates understanding: Yes  COUNSELING:  Reviewed preventive health counseling, as reflected in patient instructions       Regular exercise       Healthy diet/nutrition    Estimated body mass index is 41.67 kg/m  as calculated from the following:    Height as of 1/5/22: 1.78 m (5' 10.08\").    Weight as of 1/5/22: 132 " kg (291 lb 1.6 oz).    Weight management plan: Discussed healthy diet and exercise guidelines    He reports that he has never smoked. He has never used smokeless tobacco.      Appropriate preventive services were discussed with this patient, including applicable screening as appropriate for cardiovascular disease, diabetes, osteopenia/osteoporosis, and glaucoma.  As appropriate for age/gender, discussed screening for colorectal cancer, prostate cancer, breast cancer, and cervical cancer. Checklist reviewing preventive services available has been given to the patient.    Reviewed patients plan of care and provided an AVS. The Basic Care Plan (routine screening as documented in Health Maintenance) for Derek meets the Care Plan requirement. This Care Plan has been established and reviewed with the Patient.    Counseling Resources:  ATP IV Guidelines  Pooled Cohorts Equation Calculator  Breast Cancer Risk Calculator  Breast Cancer: Medication to Reduce Risk  FRAX Risk Assessment  ICSI Preventive Guidelines  Dietary Guidelines for Americans, 2010  USDA's MyPlate  ASA Prophylaxis  Lung CA Screening    Carlyn Payne MD  [facility]    Identified Health Risks:

## 2022-01-13 ASSESSMENT — ASTHMA QUESTIONNAIRES: ACT_TOTALSCORE: 25

## 2022-01-14 RX ORDER — METOPROLOL SUCCINATE 100 MG/1
100 TABLET, EXTENDED RELEASE ORAL DAILY
Qty: 90 TABLET | Refills: 3 | Status: SHIPPED | OUTPATIENT
Start: 2022-01-19 | End: 2022-04-06

## 2022-01-17 ENCOUNTER — LAB (OUTPATIENT)
Dept: LAB | Facility: CLINIC | Age: 67
End: 2022-01-17
Attending: INTERNAL MEDICINE

## 2022-01-17 ENCOUNTER — MYC MEDICAL ADVICE (OUTPATIENT)
Dept: INTERNAL MEDICINE | Facility: CLINIC | Age: 67
End: 2022-01-17

## 2022-01-17 ENCOUNTER — OFFICE VISIT (OUTPATIENT)
Dept: CARDIOLOGY | Facility: CLINIC | Age: 67
End: 2022-01-17
Attending: INTERNAL MEDICINE
Payer: MEDICARE

## 2022-01-17 VITALS
WEIGHT: 291.7 LBS | SYSTOLIC BLOOD PRESSURE: 161 MMHG | BODY MASS INDEX: 41.76 KG/M2 | DIASTOLIC BLOOD PRESSURE: 89 MMHG | OXYGEN SATURATION: 94 % | HEART RATE: 85 BPM | HEIGHT: 70 IN

## 2022-01-17 DIAGNOSIS — I49.3 PVC'S (PREMATURE VENTRICULAR CONTRACTIONS): ICD-10-CM

## 2022-01-17 DIAGNOSIS — I50.22 CHRONIC SYSTOLIC HEART FAILURE (H): ICD-10-CM

## 2022-01-17 DIAGNOSIS — I50.30 HEART FAILURE WITH PRESERVED EJECTION FRACTION, NYHA CLASS I (H): ICD-10-CM

## 2022-01-17 DIAGNOSIS — I27.20 PULMONARY HYPERTENSION (H): Primary | ICD-10-CM

## 2022-01-17 LAB — SARS-COV-2 RNA RESP QL NAA+PROBE: NEGATIVE

## 2022-01-17 PROCEDURE — 99215 OFFICE O/P EST HI 40 MIN: CPT | Mod: 25 | Performed by: INTERNAL MEDICINE

## 2022-01-17 PROCEDURE — G0463 HOSPITAL OUTPT CLINIC VISIT: HCPCS

## 2022-01-17 PROCEDURE — 93242 EXT ECG>48HR<7D RECORDING: CPT

## 2022-01-17 PROCEDURE — 93244 EXT ECG>48HR<7D REV&INTERPJ: CPT | Performed by: INTERNAL MEDICINE

## 2022-01-17 PROCEDURE — U0003 INFECTIOUS AGENT DETECTION BY NUCLEIC ACID (DNA OR RNA); SEVERE ACUTE RESPIRATORY SYNDROME CORONAVIRUS 2 (SARS-COV-2) (CORONAVIRUS DISEASE [COVID-19]), AMPLIFIED PROBE TECHNIQUE, MAKING USE OF HIGH THROUGHPUT TECHNOLOGIES AS DESCRIBED BY CMS-2020-01-R: HCPCS | Performed by: INTERNAL MEDICINE

## 2022-01-17 RX ORDER — SACUBITRIL AND VALSARTAN 49; 51 MG/1; MG/1
1 TABLET, FILM COATED ORAL 2 TIMES DAILY
Qty: 180 TABLET | Refills: 1 | Status: SHIPPED | OUTPATIENT
Start: 2022-01-17 | End: 2022-01-17

## 2022-01-17 RX ORDER — LIDOCAINE 40 MG/G
CREAM TOPICAL
Status: CANCELLED | OUTPATIENT
Start: 2022-01-17

## 2022-01-17 RX ORDER — SACUBITRIL AND VALSARTAN 49; 51 MG/1; MG/1
1 TABLET, FILM COATED ORAL 2 TIMES DAILY
Qty: 180 TABLET | Refills: 1 | Status: SHIPPED | OUTPATIENT
Start: 2022-01-17 | End: 2022-02-07

## 2022-01-17 ASSESSMENT — PAIN SCALES - GENERAL: PAINLEVEL: NO PAIN (0)

## 2022-01-17 ASSESSMENT — MIFFLIN-ST. JEOR: SCORE: 2105.64

## 2022-01-17 NOTE — NURSING NOTE
Chief Complaint   Patient presents with     Follow Up     Reason for visit: RTN HF: 66 year old male presents with history of sarcoid for further evaluation of PH as recommended by lung team     Vitals were taken and medications reconciled.    Moustapha Gatica EMT  7:53 AM

## 2022-01-17 NOTE — NURSING NOTE
Diet: Patient instructed regarding a heart failure healthy diet, including discussion of reduced fat and 2000 mg daily sodium restriction, daily weights, medication purpose and compliance, fluid restrictions and resources for patient and family to access for assistance with heart failure management.       Labs: Patient was given results of the laboratory testing obtained today and patient was instructed about when to return for the next laboratory testing. Repeat labs 2 weeks after starting entresto.    Med Reconcile: Reviewed and verified all current medications with the patient. The updated medication list was printed and given to the patient. START entresto 49-51mg BID.     Return Appointment: Patient given instructions regarding scheduling next clinic visit. RHC soon. Follow up with TT after RHC.     Patient stated he understood all health information given and agreed to call with further questions or concerns.     Rafaela Barnett RN

## 2022-01-17 NOTE — TELEPHONE ENCOUNTER
FYI--please see patient's mychart message below.    Saw cardiology today and will start on Entresto.

## 2022-01-17 NOTE — PATIENT INSTRUCTIONS
You were seen today in the Cardiovascular Clinic at the [facility].       Cardiology Providers you saw during your visit: Dr. Hirsch      Medication Changes:   1. Start entresto 49-51mg two times daily      Follow up Appointment Information:  1. Right heart catheterization  2. Labs in 2 weeks  3. Follow up with Dr. Hirsch after testing    Wednesday January 19th: check in at 11:30am:    Right Heart Catheterization  A Right Heart Cath is a test that measures how well your heart is pumping blood to your body and will assess the volume and pressures in your heart.   You are scheduled for a Right Heart [place]athe[place] [address]. You are to check in at the [facility] Waiting Area.   When you arrive you will be escorted back to the pre procedure area called 2A. Here they will obtain a short medical history. Please bring an updated list of your current medications.  A physician will come and talk with you about the procedure and obtain consent.  A nurse from the Cardiac Catheterization Lab will then escort you to the procedure area. You will receive a shot to numb the site where the catheter (tube) will enter your body.  This may be in the neck or groin - but likely your neck.  You will not receive sedation or anesthesia.   After the procedure you will recover for a short period and then be discharged.  Pre procedure instructions:   1.  Do not eat any solid food or milk products for 6 hours prior to the exam. You may drink clear liquids until 2 hours prior to the exam. Clear liquids include the following: water, Jell-O, clear broth, apple juice or any non-carbonated drink that you can see through (no pop!).   2. Do not drink alcohol or smoke 24 hours prior to test.  3. Hold these meds:  Do not take your oral diabetic medication or short acting insulin the day of the procedure.    Hold your warfarin, pradaxa/xarelto/eliquis 2  "days prior.   4. You may take your other morning medications as prescribed with a sip of water. You may hold your diuretic that morning.            [address] [facility] on [address]   [place] [id number]        Thank you for allowing us to be a part of your care here at the [facility]      If you have questions or concerns please contact us at:      Rafaela Barnett RN BSN   Cardiology Care Coordinator  [facility]   Questions and [phone]   First press #1 for the University and then press #4 to \"send a message to your care team\"     "

## 2022-01-17 NOTE — LETTER
1/17/2022      RE: Derek Contreras  [id number] [facility] [address]       Dear Colleague,    Thank you for the opportunity to participate in the care of your patient, Derek Contreras, at the [facility] HEART [facility] [place] at [facility]. Please see a copy of my visit note below.    [facility]  Advanced HF & Pulmonary Hypertension Clinic  Service Date:  1/17/22    Dear Doctors Mario and Elmer:       We had the pleasure of seeing Mr. Derek Contreras in our Pulmonary Hypertension Clinic at the [facility].  Although you are familiar with his history, please allow me to summarize it for the purpose of our records.      Mr. Contreras is a very pleasant, 63-year-old male who carries a diagnosis of sarcoidosis, which was diagnosed approximately 7 years ago.  He was healthy up until 56 when he started to notice exertional shortness of breath.  CT scan of the chest showed nodular opacities concerning for sarcoidosis.  He was evaluated at the [facility], and he was given a diagnosis of sarcoidosis.  He did not have a biopsy at that time.     He was seen by us initially in Nov 2019 after having worsening exertional SOB and being referred to Dr. Jamie Martin pulmonology for sarcoidosis.  He had a mediastinal lymph node biopsy, which showed noncaseating granuloma.     As a part of his sarcoidosis workup, he had a cardiac MRI that showed a dilated right ventricle with moderately reduced right ventricular systolic function with an estimated ejection fraction of 43%.  He also had flattening of the intraventricular septum concerning for pressure and volume overload.  His EKG also showed a right bundle branch block.  His echo was a technically difficult study and could not well visualize the right ventricle.  Given his flattening of the intraventricular septum and dilated right ventricle, he was referred to us for  further evaluation to exclude pulmonary hypertension.     After he saw us he underwent RHC which showed RAP 3, PA 30/12 (17), and WP 5, essentially showing normal pulmonary pressures and filling pressures.  V/Q scan did not suggest CTEPH and serologic workup was also unremarkable.  He had a 1 week Holter Jan 2020 which showed multiple runs of NSVT the longest which was 19 beats, pt was asymptomatic. He then had a cardiac PET which showed no FDG uptake in the heart to suggest active sarcoid, did mention reduced blood flow in the RCA territory (the stress MRI done previously did not show inf wall WMA).  He saw Dr. Figueroa EP March 2020 and regarding the NSVT, he did not have active sarcoid or high risk features no indication for ICD.  He did note PVC burden (7%).     On f/u with Dr. Martin Nov 2021 ECG had frequent PVCs and bifascicular block.  He had repeat Zio patch placed which showed no significant AV blocks but multiple runs of NSVT longest 6 beats and a 20% PVC burden.  He then had a repeat cardiac MRI with contrast in December 2021 which showed normal LV size mild concentric LVH with global moderate LV dysfunction LVEF of 41% abnormal septal motion, normal RV size with moderately reduced function EF of 34%, severe biatrial enlargement, no significant valvular disease, no significant LGE concerning for infarct or infiltrative disease just RV insertion point enhancement, no pericardial disease, mildly dilated main PA 3.1 cm.  The drop in LV function is new, with mildly worse RV function.  He saw Dr. Figueroa in follow-up on 1/5, who felt his new cardiomyopathy could be PVC induced but not entirely clear.  For the NSVT and PVCs he was started on beta blocker, Toprol 50mg daily with plan for repeat zio patch in 1 month.  Also considering reevaluating (PET) for sarcoid. He was referred back to us for reevaluation of his pulmonary hypertension (mildly dilated PA 3.1cm, RV insertion point enhancement).     He overall  reports he is doing well he feels with no change in his cardiac/respiratory symptoms.  He does 25min on the treadmill going 2.5mph without chest pain, SOB, or needing to stop.  He can walk up a flight of stairs from his basement, he does get winded which is not new but again doesn't bother to stop. He denies palpitations, syncope, presyncope, weight gain. Has lost 14lbs since September on purpose.  His BP at home 130s/70s. BEULAH on CPAP.    Cardiac meds:  Aspirin 81mg daily   lipitor 40mg daily  hydrochlorothiazide 12.5mg daily  toprol 100mg daily      PAST MEDICAL HISTORY:  Past Medical History:   Diagnosis Date     Asthma      Claustrophobia      CPAP (continuous positive airway pressure) dependence      Dyslipidemia      [place])      Hypercholesteremia      Hypertension      Iron deficiency      Mediastinal adenopathy      Obesity      BEULAH (obstructive sleep apnea)      Prostate cancer (H)      Pulmonary hypertension (H)      Sarcoidosis      CURRENT MEDICATIONS:  Current Outpatient Medications   Medication Sig     albuterol (PROAIR HFA/PROVENTIL HFA/VENTOLIN HFA) 108 (90 Base) MCG/ACT inhaler Inhale 1-2 puffs into the lungs every 4 hours as needed for shortness of breath / dyspnea     aspirin (ASA) 81 MG tablet Take 81 mg by mouth every morning      atorvastatin (LIPITOR) 40 MG tablet TAKE 1 TABLET(40 MG) BY MOUTH EVERY MORNING     BREO ELLIPTA 200-25 MCG/INH Inhaler INHALE 1 PUFF BY MOUTH ONE TIME DAILY      cetirizine (ZYRTEC) 10 MG tablet Take 10 mg by mouth every morning      hydrochlorothiazide (HYDRODIURIL) 12.5 MG tablet TAKE ONE TABLET BY MOUTH EVERY MORNING     metoprolol succinate ER (TOPROL-XL) 100 MG 24 hr tablet Take 1 tablet (100 mg) by mouth daily     montelukast (SINGULAIR) 10 MG tablet Take 1 tablet (10 mg) by mouth At Bedtime     multivitamin (ONE-DAILY) tablet Take 1 tablet by mouth every morning      omeprazole (PRILOSEC) 40 MG DR capsule TAKE 1 CAPSULE BY MOUTH ONE TIME DAILY      "sacubitril-valsartan (ENTRESTO) 49-51 MG per tablet Take 1 tablet by mouth 2 times daily     umeclidinium (INCRUSE ELLIPTA) 62.5 MCG/INH inhaler Inhale 1 puff into the lungs daily     No current facility-administered medications for this visit.       ROS:   10 point ROS negative except as discussed in above HPI.    EXAM:  BP (!) 161/89 (BP Location: Right arm, Patient Position: Chair, Cuff Size: Adult Large)   Pulse 85   Ht 1.78 m (5' 10.08\")   Wt 132.3 kg (291 lb 11.2 oz)   SpO2 94%   BMI 41.76 kg/m    General: appears comfortable, alert and articulate  Head: normocephalic, atraumatic  Eyes: anicteric sclera, EOMI  Neck: no adenopathy  Orophyarynx: moist mucosa, no lesions, dentition intact  Heart: regular, normal S1/S2, no murmur, gallop, rub, estimated JVP 8cn  Lungs: clear to auscultation bilaterally, no rales or wheezing  Abdomen: soft, non-tender, bowel sounds present, no hepatosplenomegaly  Extremities: no clubbing, cyanosis or edema  Neurological: normal speech and affect, no gross motor deficits    Labs:  CBC RESULTS:   Recent Labs   Lab Test 01/19/22  1408 01/19/22  1359 01/19/22  1128   WBC  --   --  8.6   RBC  --   --  5.80   HGB 16.3   < > 16.6   HCT  --   --  52.4   MCV  --   --  90   MCH  --   --  28.6   MCHC  --   --  31.7   RDW  --   --  13.8   PLT  --   --  163    < > = values in this interval not displayed.     Recent Labs   Lab Test 01/19/22  1128 11/11/21  0913    139   POTASSIUM 4.1 3.8   CHLORIDE 106 105   CO2 29 29   ANIONGAP 4 5   * 90   BUN 12 12   CR 0.95 0.99   ANTONIO 8.9 9.2     Liver Function Studies -   Recent Labs   Lab Test 11/11/21  0913   PROTTOTAL 7.7   ALBUMIN 3.2*   BILITOTAL 0.6   ALKPHOS 89   AST 28   ALT 33     No results found for: NTBNPI  Lab Results   Component Value Date    NTBNP 21 12/11/2019     Echocardiogram:    EKG 1/5/22  Sinus rhythm with bifascicular block, PVCs    Cardiac MRI Dec 2021  No evidence of cardiac sarcoid. Moderate cardiomyopathy, LVEF " 41% and RVEF 34%, with no  significant fibrosis. Compared to prior CMR on 8/2019 RV function is worse with no other change. Recommend  re-assessment for pulmonary hypertension with TTE or RHC.     Zio patch 2 week Nov 2021: multiple runs of NSVT longest 6 beats, rare episodes SVT, 20% PVC burden    Cardiac PET 2/2020  1. No FDG active cardiac sarcoid.  2. Decreased perfusion in the inferoseptal wall, findings may be  related to sequela of previous cardiac sarcoid with microvascular  injury.  3. Enlarged left ventricle with borderline low ejection fraction.  4. Decreased myocardial blood flow in the RCA distribution.  5. Findings of a dilated cardiomyopathy a concern for possible  ischemia/myocardial injury of the septum, consider CTA or coronary  angiography for further evaluation of this region.  6. Chest and upper abdominal hypermetabolic lymphadenopathy which is  indicative of active systemic sarcoid.    1 week zio 1/2020  Multiple short runs NSVT, 7% PVC burden    RHC 12/2019  RA3  RV 33/5  PA 30/12 (17)  mWP 5  TD CO/CI 6.2/2.5  [place] CO 6.6/2.6  PVR 1.8 ROJAS    TTE: 8/2019  Normal LV size, LVEF 55-60%, RV not well visualized.  Mild LAE. Intact IAS. No significant valve abnormalities.  No significant TR. No evidence of diastolic dysfunction.    CMRI: 8/2019  Normal LV size and function estimated LVEF 50%. Systolic flattening of IVS. RV moderately enlarged visually but not based on volumes.  RV mod reduced function estimate RVEF 43%. Severely enlarged RA, mildly enlarged LA. Trace MR. Delayed LGE no fibrosis, infarct, or infiltrative disease.  Stress perfusion showed no ischemia. No pericardial effusion or thickening.    6MWT 10/2019 319m no desat.    PFTs:   FVC 45%, FEV1 47%, FEV1/FVC 79%, DLCO 65% predicted.    HRCT 7/2019:  Mediastinal and perihilar lymphadenoapthy and numerous solitary pulmonary nodules, no parenchymal lung disease.    Assessment and Plan:     Derek Contreras is a 66 year old male with  pulmonary sarcoid, HTN, BEULAH, known mild RV dysfunction, and recent unremarkable evaluation which concluded he did not have pulmonary hypertension who presents in follow up with concerns for pulmonary hypertension on imaging and new biventricular dysfunction/cardiomyopathy.    1. Pulmonary Hypertension, suspected  With regards first to the pulmonary hypertension, he does have mildly worsening RV dysfunction (but also now biventricular dysfunction, discussed below), but does not otherwise have any suggestions of pulmonary hypertension.  Clinically, he does not have worsening symptoms nor is he in right heart failure.  He is compliant with CPAP.  We can repeat RHC to confirm our suspicion that he does not have significantly worsened pulmonary hypertension.       2. New biventricular dysfunction/cardiomyopathy  ACC AHA Stage B NYHA Class II    His recent cardiac MRI now demonstrates biventricular dysfunction (new LV systolic dysfunction) with mildly worsened RV dysfunction from prior.  Other findings including arrhythmias- high PVC burden 20% and multiple runs of NSVT on two ziopatch monitors without evidence of high grade block.  The repeat MRI did not identify any LGE to suggest sarcoidosis.  However, we could consider a repeat cardiac PET scan to evaluate for active inflammation to suggest sarcoidosis.  The differential for his cardiomyopathy also includes 1- ischemia/CAD (MRI from 2019 stress negative, decreased perfusion in the inferoseptal wall suggestive of RCA territory, but does not feel to fit his degree of cardiomyopathy). 2-PVC induced, for which he has been started on Toprol with followup Holter pending, 3- an arrhythmogenic cardiomyopathy.  The likelihood of having two rare issues (arryhtmogenic cardiomyopathy and sarcoidosis) seems less likely but not entirely impossible.      We will discuss with Dr. Panchal and sarcoid team the utility of repeat PET, ischemic eval with angiogram, and consideration of  endomyocardial biopsy (although as sarcoid is patchy the yield may be low and not entirely conclusive that no sarcoid is present).      On exam today he appear largely euvolemic with class II symptoms, clinically does not appear to be in heart failure.  With regards to GDMT for his systolic heart failure, he is on Toprol, we will start him on mid dose Entresto today given that he has BP room, and we can repeat labs in 2 weeks to follow K and Cr.  Otherwise he can follow up with us after RHC and continue to follow with Dr. Figueroa.     HFrEF GDMT & Regimen:   ACEi/ARB: no entresto  Role for Entresto: starting 49-51mg bid today repeat labs in 2 weeks  BB: on Toprol 100mg daily  Aldosterone antagonist: consider at next visit  SGLT-2i: consider at next visit  SCD prophylaxis: no LVEF 74%  CRT?: not indicated    NSAID use: None, counseled/reminded  Smoking/Etoh/Drugs Counseling: yes not smoking/drinking  Diet/Fluid Restriction Counseling: Done/reminded  Cardiac Rehab: not indicated  Need for advanced therapies/workup: not indicated        Plan:    -for LV systolic dysfunction, start mid dose Entresto  -to reevaluate for pulmonary hypertension although suspicion is low, will repeat RHC  -follow up with Dr. Hirsch after testing        Patient was seen and examined with Dr. Hirsch.    Mary Ellen Ascencio MD.    I examined the patient and agree with the assessment and plan of Dr. Carnes    Total time today was 45 minutes reviewing notes, imaging, labs, patient visit, orders and documentation         Torrey Hirsch MD   [facility]  Heart Failure, Transplant, and Mechanical Circulatory Support Cardiology   Cardiovascular Division  [facility]   [phone]

## 2022-01-17 NOTE — PROGRESS NOTES
Per Dr. Figueroa, patient to have 7-day Zio monitor placed.  Diagnosis: PVCs  Monitor placed: Yes  Patient Instructed: Yes  Patient verbalized understanding: Yes  Holter # [id number]    Monitor was placed by CORINA Wharton.  8:05 AM

## 2022-01-17 NOTE — PROGRESS NOTES
[facility]  Advanced HF & Pulmonary Hypertension Clinic  Service Date:  1/17/22    Dear Doctors Mario and Elmer:       We had the pleasure of seeing Mr. Derek Contreras in our Pulmonary Hypertension Clinic at the [facility].  Although you are familiar with his history, please allow me to summarize it for the purpose of our records.      Mr. Contreras is a very pleasant, 63-year-old male who carries a diagnosis of sarcoidosis, which was diagnosed approximately 7 years ago.  He was healthy up until 56 when he started to notice exertional shortness of breath.  CT scan of the chest showed nodular opacities concerning for sarcoidosis.  He was evaluated at the [facility], and he was given a diagnosis of sarcoidosis.  He did not have a biopsy at that time.     He was seen by us initially in Nov 2019 after having worsening exertional SOB and being referred to Dr. Jamie Martin pulmonology for sarcoidosis.  He had a mediastinal lymph node biopsy, which showed noncaseating granuloma.     As a part of his sarcoidosis workup, he had a cardiac MRI that showed a dilated right ventricle with moderately reduced right ventricular systolic function with an estimated ejection fraction of 43%.  He also had flattening of the intraventricular septum concerning for pressure and volume overload.  His EKG also showed a right bundle branch block.  His echo was a technically difficult study and could not well visualize the right ventricle.  Given his flattening of the intraventricular septum and dilated right ventricle, he was referred to us for further evaluation to exclude pulmonary hypertension.     After he saw us he underwent RHC which showed RAP 3, PA 30/12 (17), and WP 5, essentially showing normal pulmonary pressures and filling pressures.  V/Q scan did not suggest CTEPH and serologic workup was also unremarkable.  He had a 1 week Holter Jan 2020 which showed multiple runs of NSVT the  longest which was 19 beats, pt was asymptomatic. He then had a cardiac PET which showed no FDG uptake in the heart to suggest active sarcoid, did mention reduced blood flow in the RCA territory (the stress MRI done previously did not show inf wall WMA).  He saw Dr. Figueroa EP March 2020 and regarding the NSVT, he did not have active sarcoid or high risk features no indication for ICD.  He did note PVC burden (7%).     On f/u with Dr. Martin Nov 2021 ECG had frequent PVCs and bifascicular block.  He had repeat Zio patch placed which showed no significant AV blocks but multiple runs of NSVT longest 6 beats and a 20% PVC burden.  He then had a repeat cardiac MRI with contrast in December 2021 which showed normal LV size mild concentric LVH with global moderate LV dysfunction LVEF of 41% abnormal septal motion, normal RV size with moderately reduced function EF of 34%, severe biatrial enlargement, no significant valvular disease, no significant LGE concerning for infarct or infiltrative disease just RV insertion point enhancement, no pericardial disease, mildly dilated main PA 3.1 cm.  The drop in LV function is new, with mildly worse RV function.  He saw Dr. Figueroa in follow-up on 1/5, who felt his new cardiomyopathy could be PVC induced but not entirely clear.  For the NSVT and PVCs he was started on beta blocker, Toprol 50mg daily with plan for repeat zio patch in 1 month.  Also considering reevaluating (PET) for sarcoid. He was referred back to us for reevaluation of his pulmonary hypertension (mildly dilated PA 3.1cm, RV insertion point enhancement).     He overall reports he is doing well he feels with no change in his cardiac/respiratory symptoms.  He does 25min on the treadmill going 2.5mph without chest pain, SOB, or needing to stop.  He can walk up a flight of stairs from his basement, he does get winded which is not new but again doesn't bother to stop. He denies palpitations, syncope, presyncope, weight  david Has lost 14lbs since September on purpose.  His BP at home 130s/70s. BEULAH on CPAP.    Cardiac meds:  Aspirin 81mg daily   lipitor 40mg daily  hydrochlorothiazide 12.5mg daily  toprol 100mg daily      PAST MEDICAL HISTORY:  Past Medical History:   Diagnosis Date     Asthma      Claustrophobia      CPAP (continuous positive airway pressure) dependence      Dyslipidemia      [place] (hard of hearing)      Hypercholesteremia      Hypertension      Iron deficiency      Mediastinal adenopathy      Obesity      BEULAH (obstructive sleep apnea)      Prostate cancer (H)      Pulmonary hypertension (H)      Sarcoidosis      CURRENT MEDICATIONS:  Current Outpatient Medications   Medication Sig     albuterol (PROAIR HFA/PROVENTIL HFA/VENTOLIN HFA) 108 (90 Base) MCG/ACT inhaler Inhale 1-2 puffs into the lungs every 4 hours as needed for shortness of breath / dyspnea     aspirin (ASA) 81 MG tablet Take 81 mg by mouth every morning      atorvastatin (LIPITOR) 40 MG tablet TAKE 1 TABLET(40 MG) BY MOUTH EVERY MORNING     BREO ELLIPTA 200-25 MCG/INH Inhaler INHALE 1 PUFF BY MOUTH ONE TIME DAILY      cetirizine (ZYRTEC) 10 MG tablet Take 10 mg by mouth every morning      hydrochlorothiazide (HYDRODIURIL) 12.5 MG tablet TAKE ONE TABLET BY MOUTH EVERY MORNING     metoprolol succinate ER (TOPROL-XL) 100 MG 24 hr tablet Take 1 tablet (100 mg) by mouth daily     montelukast (SINGULAIR) 10 MG tablet Take 1 tablet (10 mg) by mouth At Bedtime     multivitamin (ONE-DAILY) tablet Take 1 tablet by mouth every morning      omeprazole (PRILOSEC) 40 MG DR capsule TAKE 1 CAPSULE BY MOUTH ONE TIME DAILY     sacubitril-valsartan (ENTRESTO) 49-51 MG per tablet Take 1 tablet by mouth 2 times daily     umeclidinium (INCRUSE ELLIPTA) 62.5 MCG/INH inhaler Inhale 1 puff into the lungs daily     No current facility-administered medications for this visit.       ROS:   10 point ROS negative except as discussed in above HPI.    EXAM:  BP (!) 161/89 (BP Location:  "Right arm, Patient Position: Chair, Cuff Size: Adult Large)   Pulse 85   Ht 1.78 m (5' 10.08\")   Wt 132.3 kg (291 lb 11.2 oz)   SpO2 94%   BMI 41.76 kg/m    General: appears comfortable, alert and articulate  Head: normocephalic, atraumatic  Eyes: anicteric sclera, EOMI  Neck: no adenopathy  Orophyarynx: moist mucosa, no lesions, dentition intact  Heart: regular, normal S1/S2, no murmur, gallop, rub, estimated JVP 8cn  Lungs: clear to auscultation bilaterally, no rales or wheezing  Abdomen: soft, non-tender, bowel sounds present, no hepatosplenomegaly  Extremities: no clubbing, cyanosis or edema  Neurological: normal speech and affect, no gross motor deficits    Labs:  CBC RESULTS:   Recent Labs   Lab Test 01/19/22  1408 01/19/22  1359 01/19/22  1128   WBC  --   --  8.6   RBC  --   --  5.80   HGB 16.3   < > 16.6   HCT  --   --  52.4   MCV  --   --  90   MCH  --   --  28.6   MCHC  --   --  31.7   RDW  --   --  13.8   PLT  --   --  163    < > = values in this interval not displayed.     Recent Labs   Lab Test 01/19/22  1128 11/11/21  0913    139   POTASSIUM 4.1 3.8   CHLORIDE 106 105   CO2 29 29   ANIONGAP 4 5   * 90   BUN 12 12   CR 0.95 0.99   ANTONIO 8.9 9.2     Liver Function Studies -   Recent Labs   Lab Test 11/11/21  0913   PROTTOTAL 7.7   ALBUMIN 3.2*   BILITOTAL 0.6   ALKPHOS 89   AST 28   ALT 33     No results found for: NTBNPI  Lab Results   Component Value Date    NTBNP 21 12/11/2019     Echocardiogram:    EKG 1/5/22  Sinus rhythm with bifascicular block, PVCs    Cardiac MRI Dec 2021  No evidence of cardiac sarcoid. Moderate cardiomyopathy, LVEF 41% and RVEF 34%, with no  significant fibrosis. Compared to prior CMR on 8/2019 RV function is worse with no other change. Recommend  re-assessment for pulmonary hypertension with TTE or RHC.     Zio patch 2 week Nov 2021: multiple runs of NSVT longest 6 beats, rare episodes SVT, 20% PVC burden    Cardiac PET 2/2020  1. No FDG active cardiac " sarcoid.  2. Decreased perfusion in the inferoseptal wall, findings may be  related to sequela of previous cardiac sarcoid with microvascular  injury.  3. Enlarged left ventricle with borderline low ejection fraction.  4. Decreased myocardial blood flow in the RCA distribution.  5. Findings of a dilated cardiomyopathy a concern for possible  ischemia/myocardial injury of the septum, consider CTA or coronary  angiography for further evaluation of this region.  6. Chest and upper abdominal hypermetabolic lymphadenopathy which is  indicative of active systemic sarcoid.    1 week zio 1/2020  Multiple short runs NSVT, 7% PVC burden    RHC 12/2019  RA3  RV 33/5  PA 30/12 (17)  mWP 5  TD CO/CI 6.2/2.5  Lucas CO 6.6/2.6  PVR 1.8 ROJAS    TTE: 8/2019  Normal LV size, LVEF 55-60%, RV not well visualized.  Mild LAE. Intact IAS. No significant valve abnormalities.  No significant TR. No evidence of diastolic dysfunction.    CMRI: 8/2019  Normal LV size and function estimated LVEF 50%. Systolic flattening of IVS. RV moderately enlarged visually but not based on volumes.  RV mod reduced function estimate RVEF 43%. Severely enlarged RA, mildly enlarged LA. Trace MR. Delayed LGE no fibrosis, infarct, or infiltrative disease.  Stress perfusion showed no ischemia. No pericardial effusion or thickening.    6MWT 10/2019 319m no desat.    PFTs:   FVC 45%, FEV1 47%, FEV1/FVC 79%, DLCO 65% predicted.    HRCT 7/2019:  Mediastinal and perihilar lymphadenoapthy and numerous solitary pulmonary nodules, no parenchymal lung disease.    Assessment and Plan:     Derek Contreras is a 66 year old male with pulmonary sarcoid, HTN, BEULAH, known mild RV dysfunction, and recent unremarkable evaluation which concluded he did not have pulmonary hypertension who presents in follow up with concerns for pulmonary hypertension on imaging and new biventricular dysfunction/cardiomyopathy.    1. Pulmonary Hypertension, suspected  With regards first to the  pulmonary hypertension, he does have mildly worsening RV dysfunction (but also now biventricular dysfunction, discussed below), but does not otherwise have any suggestions of pulmonary hypertension.  Clinically, he does not have worsening symptoms nor is he in right heart failure.  He is compliant with CPAP.  We can repeat RHC to confirm our suspicion that he does not have significantly worsened pulmonary hypertension.       2. New biventricular dysfunction/cardiomyopathy  ACC AHA Stage B NYHA Class II    His recent cardiac MRI now demonstrates biventricular dysfunction (new LV systolic dysfunction) with mildly worsened RV dysfunction from prior.  Other findings including arrhythmias- high PVC burden 20% and multiple runs of NSVT on two ziopatch monitors without evidence of high grade block.  The repeat MRI did not identify any LGE to suggest sarcoidosis.  However, we could consider a repeat cardiac PET scan to evaluate for active inflammation to suggest sarcoidosis.  The differential for his cardiomyopathy also includes 1- ischemia/CAD (MRI from 2019 stress negative, decreased perfusion in the inferoseptal wall suggestive of RCA territory, but does not feel to fit his degree of cardiomyopathy). 2-PVC induced, for which he has been started on Toprol with followup Holter pending, 3- an arrhythmogenic cardiomyopathy.  The likelihood of having two rare issues (arryhtmogenic cardiomyopathy and sarcoidosis) seems less likely but not entirely impossible.      We will discuss with Dr. Panchal and sarcoid team the utility of repeat PET, ischemic eval with angiogram, and consideration of endomyocardial biopsy (although as sarcoid is patchy the yield may be low and not entirely conclusive that no sarcoid is present).      On exam today he appear largely euvolemic with class II symptoms, clinically does not appear to be in heart failure.  With regards to GDMT for his systolic heart failure, he is on Toprol, we will start him  on mid dose Entresto today given that he has BP room, and we can repeat labs in 2 weeks to follow K and Cr.  Otherwise he can follow up with us after RHC and continue to follow with Dr. Figueroa.     HFrEF GDMT & Regimen:   ACEi/ARB: no entresto  Role for Entresto: starting 49-51mg bid today repeat labs in 2 weeks  BB: on Toprol 100mg daily  Aldosterone antagonist: consider at next visit  SGLT-2i: consider at next visit  SCD prophylaxis: no LVEF 74%  CRT?: not indicated    NSAID use: None, counseled/reminded  Smoking/Etoh/Drugs Counseling: yes not smoking/drinking  Diet/Fluid Restriction Counseling: Done/reminded  Cardiac Rehab: not indicated  Need for advanced therapies/workup: not indicated        Plan:    -for LV systolic dysfunction, start mid dose Entresto  -to reevaluate for pulmonary hypertension although suspicion is low, will repeat RHC  -follow up with Dr. Hirsch after testing        Patient was seen and examined with Dr. Hirsch.    Mary Ellen Ascencio MD.    I examined the patient and agree with the assessment and plan of Dr. Carnes    Total time today was 45 minutes reviewing notes, imaging, labs, patient visit, orders and documentation         Torrey Hirsch MD   [facility]  Heart Failure, Transplant, and Mechanical Circulatory Support Cardiology   Cardiovascular Division  [facility]   [phone]

## 2022-01-18 ENCOUNTER — TELEPHONE (OUTPATIENT)
Dept: CARDIOLOGY | Facility: CLINIC | Age: 67
End: 2022-01-18
Payer: MEDICARE

## 2022-01-19 ENCOUNTER — APPOINTMENT (OUTPATIENT)
Dept: MEDSURG UNIT | Facility: CLINIC | Age: 67
End: 2022-01-19
Payer: MEDICARE

## 2022-01-19 ENCOUNTER — HOSPITAL ENCOUNTER (OUTPATIENT)
Facility: CLINIC | Age: 67
Discharge: HOME | End: 2022-01-19
Attending: INTERNAL MEDICINE | Admitting: INTERNAL MEDICINE
Payer: MEDICARE

## 2022-01-19 ENCOUNTER — APPOINTMENT (OUTPATIENT)
Dept: LAB | Facility: CLINIC | Age: 67
End: 2022-01-19
Attending: INTERNAL MEDICINE
Payer: MEDICARE

## 2022-01-19 VITALS
DIASTOLIC BLOOD PRESSURE: 80 MMHG | HEIGHT: 70 IN | HEART RATE: 65 BPM | SYSTOLIC BLOOD PRESSURE: 141 MMHG | RESPIRATION RATE: 16 BRPM | WEIGHT: 291 LBS | BODY MASS INDEX: 41.66 KG/M2 | TEMPERATURE: 97.9 F | OXYGEN SATURATION: 96 %

## 2022-01-19 DIAGNOSIS — I27.20 PULMONARY HYPERTENSION (H): ICD-10-CM

## 2022-01-19 LAB
ANION GAP SERPL CALCULATED.3IONS-SCNC: 4 MMOL/L (ref 3–14)
BASOPHILS # BLD AUTO: 0.1 10E3/UL (ref 0–0.2)
BASOPHILS NFR BLD AUTO: 1 %
BUN SERPL-MCNC: 12 MG/DL (ref 7–30)
CALCIUM SERPL-MCNC: 8.9 MG/DL (ref 8.5–10.1)
CHLORIDE BLD-SCNC: 106 MMOL/L (ref 94–109)
CO2 SERPL-SCNC: 29 MMOL/L (ref 20–32)
CREAT SERPL-MCNC: 0.95 MG/DL (ref 0.66–1.25)
EOSINOPHIL # BLD AUTO: 0.1 10E3/UL (ref 0–0.7)
EOSINOPHIL NFR BLD AUTO: 2 %
ERYTHROCYTE [DISTWIDTH] IN BLOOD BY AUTOMATED COUNT: 13.8 % (ref 10–15)
GFR SERPL CREATININE-BSD FRML MDRD: 88 ML/MIN/1.73M2
GLUCOSE BLD-MCNC: 106 MG/DL (ref 70–99)
HCT VFR BLD AUTO: 52.4 % (ref 40–53)
HGB BLD-MCNC: 16.1 G/DL (ref 13.3–17.7)
HGB BLD-MCNC: 16.2 G/DL (ref 13.3–17.7)
HGB BLD-MCNC: 16.3 G/DL (ref 13.3–17.7)
HGB BLD-MCNC: 16.5 G/DL (ref 13.3–17.7)
HGB BLD-MCNC: 16.6 G/DL (ref 13.3–17.7)
IMM GRANULOCYTES # BLD: 0 10E3/UL
IMM GRANULOCYTES NFR BLD: 1 %
LYMPHOCYTES # BLD AUTO: 1.5 10E3/UL (ref 0.8–5.3)
LYMPHOCYTES NFR BLD AUTO: 17 %
MCH RBC QN AUTO: 28.6 PG (ref 26.5–33)
MCHC RBC AUTO-ENTMCNC: 31.7 G/DL (ref 31.5–36.5)
MCV RBC AUTO: 90 FL (ref 78–100)
MONOCYTES # BLD AUTO: 0.8 10E3/UL (ref 0–1.3)
MONOCYTES NFR BLD AUTO: 9 %
NEUTROPHILS # BLD AUTO: 6.1 10E3/UL (ref 1.6–8.3)
NEUTROPHILS NFR BLD AUTO: 70 %
NRBC # BLD AUTO: 0 10E3/UL
NRBC BLD AUTO-RTO: 0 /100
OXYHGB MFR BLDV: 74 % (ref 92–100)
OXYHGB MFR BLDV: 76 % (ref 92–100)
PLATELET # BLD AUTO: 163 10E3/UL (ref 150–450)
POTASSIUM BLD-SCNC: 4.1 MMOL/L (ref 3.4–5.3)
RBC # BLD AUTO: 5.8 10E6/UL (ref 4.4–5.9)
SODIUM SERPL-SCNC: 139 MMOL/L (ref 133–144)
WBC # BLD AUTO: 8.6 10E3/UL (ref 4–11)

## 2022-01-19 PROCEDURE — C1894 INTRO/SHEATH, NON-LASER: HCPCS | Performed by: INTERNAL MEDICINE

## 2022-01-19 PROCEDURE — 82810 BLOOD GASES O2 SAT ONLY: CPT

## 2022-01-19 PROCEDURE — 999N000132 HC STATISTIC PP CARE STAGE 1

## 2022-01-19 PROCEDURE — 272N000002 HC OR SUPPLY OTHER OPNP: Performed by: INTERNAL MEDICINE

## 2022-01-19 PROCEDURE — 272N000001 HC OR GENERAL SUPPLY STERILE: Performed by: INTERNAL MEDICINE

## 2022-01-19 PROCEDURE — 85018 HEMOGLOBIN: CPT | Mod: 91

## 2022-01-19 PROCEDURE — 82374 ASSAY BLOOD CARBON DIOXIDE: CPT | Performed by: INTERNAL MEDICINE

## 2022-01-19 PROCEDURE — 93451 RIGHT HEART CATH: CPT | Performed by: INTERNAL MEDICINE

## 2022-01-19 PROCEDURE — 36415 COLL VENOUS BLD VENIPUNCTURE: CPT | Performed by: INTERNAL MEDICINE

## 2022-01-19 PROCEDURE — 250N000009 HC RX 250: Performed by: INTERNAL MEDICINE

## 2022-01-19 PROCEDURE — 999N000142 HC STATISTIC PROCEDURE PREP ONLY

## 2022-01-19 PROCEDURE — 85025 COMPLETE CBC W/AUTO DIFF WBC: CPT | Performed by: INTERNAL MEDICINE

## 2022-01-19 RX ORDER — LIDOCAINE 40 MG/G
CREAM TOPICAL
Status: COMPLETED | OUTPATIENT
Start: 2022-01-19 | End: 2022-01-19

## 2022-01-19 RX ADMIN — LIDOCAINE: 40 CREAM TOPICAL at 11:59

## 2022-01-19 ASSESSMENT — MIFFLIN-ST. JEOR: SCORE: 2101.22

## 2022-01-19 NOTE — Clinical Note
dry, intact, no bleeding and no hematoma. 7 Fr sheath removed from RIJ. Manual pressure applied, hemostasis achieved, band aid applied

## 2022-01-19 NOTE — PRE-PROCEDURE
GENERAL PRE-PROCEDURE:   Procedure:  Right heart catheterization  Date/Time:  1/19/2022 12:11 PM    Written consent obtained?: Yes    Risks and benefits: Risks, benefits and alternatives were discussed    DC Plan: Appropriate discharge home plan in place for patients who are going home after procedure   Consent given by:  Patient  Patient states understanding of procedure being performed: Yes    Patient's understanding of procedure matches consent: Yes    Procedure consent matches procedure scheduled: Yes    Appropriately NPO:  Yes  Labs reviewed.     Macy Sanches PA-C  [facility]

## 2022-01-19 NOTE — PROGRESS NOTES
Pt arrived for RHC. Vitally stable, denies pain. Lidocaine applied to RIJ site and covered with IV 3000 bandage as there is a adhesive tape allergy listed. H&P updated. Consent signed. Labs WNL. Spouse Araceli at the bedside.

## 2022-01-19 NOTE — PROGRESS NOTES
Pt arrived back from RHC procedure. Vitally stable on RA, slightly HTN. Denies pain. Primapore to RIJ site, negative for a hematoma. Site C/D/I. Pt declining food and liquid as they are going to stop and eat on the way home. Up ambulating w/o difficulty. Dr. Ferrell in room upon pt's arrival back to  to discuss results of procedure with patient and wife. Discharge instructions reviewed and all questions answered. Pt and wife walking to parking as pt did not receive sedation.

## 2022-01-19 NOTE — DISCHARGE INSTRUCTIONS
[facility]                        Interventional Cardiology  Discharge Instructions   Post Right Heart Cath      AFTER YOU GO HOME:    DO drink plenty of fluids    DO resume your regular diet and medications unless otherwise instructed by your Primary Physician    Do Not scrub the procedure site vigorously    No lotion or powder to the puncture site for 3 days    CALL YOUR PRIMARY PHYSICIAN IF: You may resume all normal activity.  Monitor neck site for bleeding, swelling, or voice changes. If you notice bleeding or swelling immediately apply pressure to the site and call number below to speak with Cardiology Fellow.  If you experience any changes in your breathing you should call your doctor immediately or come to the closest Emergency Department.  Do not drive yourself.    ADDITIONAL INSTRUCTIONS: Medications: You are to resume all home medications including anticoagulation therapy unless otherwise advised by your primary cardiologist or nurse coordinator.    Follow Up: Per your primary cardiology team    If you have any questions or concerns regarding your procedure site please call [phone] at anytime and ask for Cardiology Fellow on call.  They are available 24 hours a day.  You may also contact the Cardiology Clinic after hours number at [phone].                                                       Telephone Numbers [phone] Monday-Friday 8:00 am to 4:30 pm    [phone] After 4:30 pm Monday-Friday, Weekends & Holidays  Ask for Interventional Cardiologist on call. Someone is on call 24 hours/day   [facility] toll free number [phone] Monday-Friday 8:00 am to 4:30 pm   [facility] Emergency Dept [phone]

## 2022-01-27 ENCOUNTER — TELEPHONE (OUTPATIENT)
Dept: INTERNAL MEDICINE | Facility: CLINIC | Age: 67
End: 2022-01-27
Payer: MEDICARE

## 2022-01-27 DIAGNOSIS — Z12.11 SPECIAL SCREENING FOR MALIGNANT NEOPLASMS, COLON: Primary | ICD-10-CM

## 2022-01-27 NOTE — TELEPHONE ENCOUNTER
[employer] is calling and said we sent an order with Z00.00 and [facility] is not a [facility]. Z12.11 or Z12.12 will work if these are appropriate.

## 2022-01-27 NOTE — TELEPHONE ENCOUNTER
Spoke with [employer]eba [employer].  Provided her with the new diagnosis code.  States it went thru and the cologuard test kit is being shipped.  Will arrive at patient's home in 3-7 business days.

## 2022-01-28 ENCOUNTER — TEAM CONFERENCE (OUTPATIENT)
Dept: PULMONOLOGY | Facility: CLINIC | Age: 67
End: 2022-01-28
Payer: MEDICARE

## 2022-01-28 DIAGNOSIS — D86.9 SARCOIDOSIS: Primary | ICD-10-CM

## 2022-01-28 DIAGNOSIS — I25.5 ISCHEMIC CARDIOMYOPATHY: ICD-10-CM

## 2022-01-28 NOTE — NURSING NOTE
Sarcoid Multidisciplinary Conference      Patient name: Derek Contreras    Physician: Jamie Martin MD (pulm)    Question for multidisciplinary group:  Confirm diagnosis    Radiology interpretation: Cardiac MRI: Septal flattening, RV looks a little enlarged; raises suspiciion for pulm htn. Would suspect PH, not cardiac sarcoid. J Carlos Hughes MD    Other testing reviewed: Ziopatch (abnormal); EKG with frequent PVCs    Plan: Evaluate further with ischemic work up; complete CTA        Patient (and spouse) updated; patient agreeable to pursuing CTA. Orders placed; message sent to [facility] Clinic Coordinator to facilitate scheduling.     Cheri Maradiaga, RN, BSN  ILD Nurse Care Coordinator  (P) [phone]

## 2022-02-01 ENCOUNTER — PATIENT OUTREACH (OUTPATIENT)
Dept: CARDIOLOGY | Facility: CLINIC | Age: 67
End: 2022-02-01
Payer: MEDICARE

## 2022-02-01 DIAGNOSIS — Z91.041 CONTRAST MEDIA ALLERGY: Primary | ICD-10-CM

## 2022-02-01 RX ORDER — PREDNISONE 20 MG/1
20 TABLET ORAL 2 TIMES DAILY
Qty: 3 TABLET | Refills: 0 | Status: SHIPPED | OUTPATIENT
Start: 2022-02-01 | End: 2022-02-03

## 2022-02-01 RX ORDER — DIPHENHYDRAMINE HCL 25 MG
25 TABLET ORAL EVERY 8 HOURS PRN
Qty: 1 TABLET | Refills: 0 | COMMUNITY
Start: 2022-02-01 | End: 2023-05-08

## 2022-02-01 NOTE — TELEPHONE ENCOUNTER
Date: 2/1/2022    Time of Call: 5:06 PM     Diagnosis:  Heart failure     [ VORB ] Ordering provider: Dr GILSON Torres    Order: predinsone 20 mg x3 doses, bendadyl 25 mg day of     Order received by: Tequila Braun RN       Follow-up/additional notes: Derek updated and educated on medications

## 2022-02-02 ENCOUNTER — LAB (OUTPATIENT)
Dept: LAB | Facility: CLINIC | Age: 67
End: 2022-02-02
Payer: MEDICARE

## 2022-02-02 DIAGNOSIS — I50.30 HEART FAILURE WITH PRESERVED EJECTION FRACTION, NYHA CLASS I (H): ICD-10-CM

## 2022-02-02 PROCEDURE — 80048 BASIC METABOLIC PNL TOTAL CA: CPT

## 2022-02-02 PROCEDURE — 36415 COLL VENOUS BLD VENIPUNCTURE: CPT

## 2022-02-03 LAB
ANION GAP SERPL CALCULATED.3IONS-SCNC: 2 MMOL/L (ref 3–14)
BUN SERPL-MCNC: 14 MG/DL (ref 7–30)
CALCIUM SERPL-MCNC: 8.9 MG/DL (ref 8.5–10.1)
CHLORIDE BLD-SCNC: 105 MMOL/L (ref 94–109)
CO2 SERPL-SCNC: 32 MMOL/L (ref 20–32)
CREAT SERPL-MCNC: 1.07 MG/DL (ref 0.66–1.25)
GFR SERPL CREATININE-BSD FRML MDRD: 76 ML/MIN/1.73M2
GLUCOSE BLD-MCNC: 100 MG/DL (ref 70–99)
POTASSIUM BLD-SCNC: 4.2 MMOL/L (ref 3.4–5.3)
SODIUM SERPL-SCNC: 139 MMOL/L (ref 133–144)

## 2022-02-07 LAB — COLOGUARD-ABSTRACT: NEGATIVE

## 2022-02-10 DIAGNOSIS — I50.30 HEART FAILURE WITH PRESERVED EJECTION FRACTION, NYHA CLASS I (H): Primary | ICD-10-CM

## 2022-02-14 ENCOUNTER — OFFICE VISIT (OUTPATIENT)
Dept: CARDIOLOGY | Facility: CLINIC | Age: 67
End: 2022-02-14
Attending: INTERNAL MEDICINE
Payer: MEDICARE

## 2022-02-14 ENCOUNTER — LAB (OUTPATIENT)
Dept: LAB | Facility: CLINIC | Age: 67
End: 2022-02-14
Attending: INTERNAL MEDICINE
Payer: MEDICARE

## 2022-02-14 VITALS
DIASTOLIC BLOOD PRESSURE: 76 MMHG | SYSTOLIC BLOOD PRESSURE: 151 MMHG | WEIGHT: 291 LBS | HEART RATE: 47 BPM | OXYGEN SATURATION: 95 % | BODY MASS INDEX: 41.75 KG/M2

## 2022-02-14 DIAGNOSIS — R00.1 BRADYCARDIA: Primary | ICD-10-CM

## 2022-02-14 DIAGNOSIS — E55.9 VITAMIN D DEFICIENCY, UNSPECIFIED: ICD-10-CM

## 2022-02-14 DIAGNOSIS — I50.22 CHRONIC SYSTOLIC HEART FAILURE (H): ICD-10-CM

## 2022-02-14 DIAGNOSIS — I50.30 HEART FAILURE WITH PRESERVED EJECTION FRACTION, NYHA CLASS I (H): ICD-10-CM

## 2022-02-14 DIAGNOSIS — D86.9 SARCOIDOSIS: ICD-10-CM

## 2022-02-14 LAB
ANION GAP SERPL CALCULATED.3IONS-SCNC: 7 MMOL/L (ref 3–14)
BUN SERPL-MCNC: 14 MG/DL (ref 7–30)
CALCIUM SERPL-MCNC: 9.1 MG/DL (ref 8.5–10.1)
CHLORIDE BLD-SCNC: 107 MMOL/L (ref 94–109)
CO2 SERPL-SCNC: 28 MMOL/L (ref 20–32)
CREAT SERPL-MCNC: 0.96 MG/DL (ref 0.66–1.25)
ERYTHROCYTE [DISTWIDTH] IN BLOOD BY AUTOMATED COUNT: 14.1 % (ref 10–15)
GFR SERPL CREATININE-BSD FRML MDRD: 87 ML/MIN/1.73M2
GLUCOSE BLD-MCNC: 94 MG/DL (ref 70–99)
HCT VFR BLD AUTO: 50.8 % (ref 40–53)
HGB BLD-MCNC: 16.3 G/DL (ref 13.3–17.7)
MCH RBC QN AUTO: 28.5 PG (ref 26.5–33)
MCHC RBC AUTO-ENTMCNC: 32.1 G/DL (ref 31.5–36.5)
MCV RBC AUTO: 89 FL (ref 78–100)
NT-PROBNP SERPL-MCNC: 89 PG/ML (ref 0–125)
PLATELET # BLD AUTO: 160 10E3/UL (ref 150–450)
POTASSIUM BLD-SCNC: 3.5 MMOL/L (ref 3.4–5.3)
RBC # BLD AUTO: 5.72 10E6/UL (ref 4.4–5.9)
SODIUM SERPL-SCNC: 142 MMOL/L (ref 133–144)
WBC # BLD AUTO: 8.9 10E3/UL (ref 4–11)

## 2022-02-14 PROCEDURE — 82248 BILIRUBIN DIRECT: CPT | Performed by: PATHOLOGY

## 2022-02-14 PROCEDURE — 80053 COMPREHEN METABOLIC PANEL: CPT | Performed by: PATHOLOGY

## 2022-02-14 PROCEDURE — 85027 COMPLETE CBC AUTOMATED: CPT | Performed by: PATHOLOGY

## 2022-02-14 PROCEDURE — G0463 HOSPITAL OUTPT CLINIC VISIT: HCPCS

## 2022-02-14 PROCEDURE — 99215 OFFICE O/P EST HI 40 MIN: CPT | Performed by: INTERNAL MEDICINE

## 2022-02-14 PROCEDURE — 83880 ASSAY OF NATRIURETIC PEPTIDE: CPT | Performed by: PATHOLOGY

## 2022-02-14 PROCEDURE — 82306 VITAMIN D 25 HYDROXY: CPT | Performed by: INTERNAL MEDICINE

## 2022-02-14 PROCEDURE — 36415 COLL VENOUS BLD VENIPUNCTURE: CPT | Performed by: PATHOLOGY

## 2022-02-14 RX ORDER — SPIRONOLACTONE 25 MG/1
25 TABLET ORAL DAILY
Qty: 90 TABLET | Refills: 1 | Status: SHIPPED | OUTPATIENT
Start: 2022-02-14 | End: 2022-07-29

## 2022-02-14 ASSESSMENT — PAIN SCALES - GENERAL: PAINLEVEL: NO PAIN (0)

## 2022-02-14 NOTE — LETTER
2/14/2022      RE: Derek Contreras  [address] [facility] [id number]       [facility]  Advanced HF & Pulmonary Hypertension Clinic  Service Date:  1/17/22    Dear Doctors Mario and Elmer:       We had the pleasure of seeing Mr. Derek Contreras in our Pulmonary Hypertension Clinic at the [facility].    As you know, he is a very pleasant 66-year-old male with past medical history significant for pulmonary sarcoidosis.  He was recently noted to have mildly reduced left ventricular systolic dysfunction.  His work-up was negative for cardiac sarcoidosis including an MRI as well as PET scan.      He was referred to us for possible pulmonary hypertension and RV dysfunction.  He had a repeat right heart catheterization in light of this which showed  an RA pressure of 6, RV of 40/10, PA of 35/21 with a mean of 27, pulmonary capillary wedge pressure of 10, PA saturation of 75.5, measured Lucas cardiac output of 360 mL/min, measured Lucas cardiac output of 7.5 L/min with an index of 3.1 L/min meter square.  His thermodilution cardiac output was 8.2 L/min with an index of 3.3 L/min/m .  His shunt run did not show significant step up concerning for intracardiac shunt.  His calculated PVR was 2.3 Wood units.    His MRI in fact showed biventricular mild systolic dysfunction.  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  Since his cardiac output was preserved and PVR was within normal limits we did not initiate any pulmonary vasodilator therapy especially given his coexisting parenchymal lung disease as well as LV systolic dysfunction.    His systemic blood pressure is significantly elevated.  Given his reduced LV systolic function and uncontrolled hypertension, we started him on Entresto.  He is tolerating the full dose well.  He states that his blood pressure is significantly better.  Previously, it  will be in the 160/90 range.  I was consistently in the 130 and 70 range.  He continues to have limitation with his exertional shortness of breath.  He has no lower extremity swelling or abdominal distention.  No lightheadedness dizziness or syncope.  No exertional chest pain or chest pressure.  No recent hospitalizations or ER visits.      PAST MEDICAL HISTORY:  Past Medical History:   Diagnosis Date     Asthma      Claustrophobia      CPAP (continuous positive airway pressure) dependence      Dyslipidemia      [place] (hard of hearing)      Hypercholesteremia      Hypertension      Iron deficiency      Mediastinal adenopathy      Obesity      BEULAH (obstructive sleep apnea)      Prostate cancer (H)      Pulmonary hypertension (H)      Sarcoidosis      CURRENT MEDICATIONS:  Current Outpatient Medications   Medication Sig     albuterol (PROAIR HFA/PROVENTIL HFA/VENTOLIN HFA) 108 (90 Base) MCG/ACT inhaler Inhale 1-2 puffs into the lungs every 4 hours as needed for shortness of breath / dyspnea     aspirin (ASA) 81 MG tablet Take 81 mg by mouth every morning      atorvastatin (LIPITOR) 40 MG tablet TAKE 1 TABLET(40 MG) BY MOUTH EVERY MORNING     BREO ELLIPTA 200-25 MCG/INH Inhaler INHALE 1 PUFF BY MOUTH ONE TIME DAILY      cetirizine (ZYRTEC) 10 MG tablet Take 10 mg by mouth every morning      diphenhydrAMINE (BENADRYL) 25 MG tablet Take 1 tablet (25 mg) by mouth every 8 hours as needed for itching or allergies     hydrochlorothiazide (HYDRODIURIL) 12.5 MG tablet TAKE ONE TABLET BY MOUTH EVERY MORNING     metoprolol succinate ER (TOPROL-XL) 100 MG 24 hr tablet Take 1 tablet (100 mg) by mouth daily     montelukast (SINGULAIR) 10 MG tablet Take 1 tablet (10 mg) by mouth At Bedtime     multivitamin (ONE-DAILY) tablet Take 1 tablet by mouth every morning      omeprazole (PRILOSEC) 40 MG DR capsule TAKE 1 CAPSULE BY MOUTH ONE TIME DAILY     sacubitril-valsartan (ENTRESTO)  MG per tablet Take 1 tablet by mouth 2 times daily      spironolactone (ALDACTONE) 25 MG tablet Take 1 tablet (25 mg) by mouth daily     umeclidinium (INCRUSE ELLIPTA) 62.5 MCG/INH inhaler Inhale 1 puff into the lungs daily     No current facility-administered medications for this visit.       ROS:   10 point ROS negative except as discussed in above HPI.    EXAM:  BP (!) 151/76 (BP Location: Right arm, Patient Position: Chair, Cuff Size: Adult Large)   Pulse (!) 47   Wt 132 kg (291 lb)   SpO2 95%   BMI 41.75 kg/m    General: appears comfortable, alert and articulate  Head: normocephalic, atraumatic  Eyes: anicteric sclera, EOMI  Neck: no adenopathy  Orophyarynx: moist mucosa, no lesions, dentition intact  Heart: regular, normal S1/S2, no murmur, gallop, rub, estimated JVP 6 8cn  Lungs: clear to auscultation bilaterally, no rales or wheezing  Abdomen: soft, non-tender, bowel sounds present, no hepatosplenomegaly  Extremities: no clubbing, cyanosis or edema  Neurological: normal speech and affect, no gross motor deficits    Labs:  CBC RESULTS: Recent Labs   Lab Test 02/14/22  1504   WBC 8.9   RBC 5.72   HGB 16.3   HCT 50.8   MCV 89   MCH 28.5   MCHC 32.1   RDW 14.1        Recent Labs   Lab Test 02/14/22  1504 02/02/22  1025    139   POTASSIUM 3.5 4.2   CHLORIDE 107 105   CO2 28 32   ANIONGAP 7 2*   GLC 94 100*   BUN 14 14   CR 0.96 1.07   ANTONIO 9.1 8.9     Liver Function Studies - Recent Labs   Lab Test 02/14/22  1504   PROTTOTAL 7.4   ALBUMIN 3.5   BILITOTAL 0.3   ALKPHOS 93   AST 32   ALT 38         No results found for: NTBNPI  Lab Results   Component Value Date    NTBNP 89 02/14/2022    NTBNP 21 12/11/2019       6MWT (02/2022)  He walked for 259 meters.  He desaturated to 88% on room air.      EKG 2/2022  Sinus rhythm with bifascicular block, PVCs    Cardiac MRI Dec 2021  No evidence of cardiac sarcoid. Moderate cardiomyopathy, LVEF 41% and RVEF 34%, with no  significant fibrosis. Compared to prior CMR on 8/2019 RV function is worse with no other  change. Recommend  re-assessment for pulmonary hypertension with TTE or RHC.     Zio patch 2 week Nov 2021: multiple runs of NSVT longest 6 beats, rare episodes SVT, 20% PVC burden    Cardiac PET 2/2020  1. No FDG active cardiac sarcoid.  2. Decreased perfusion in the inferoseptal wall, findings may be  related to sequela of previous cardiac sarcoid with microvascular  injury.  3. Enlarged left ventricle with borderline low ejection fraction.  4. Decreased myocardial blood flow in the RCA distribution.  5. Findings of a dilated cardiomyopathy a concern for possible  ischemia/myocardial injury of the septum, consider CTA or coronary  angiography for further evaluation of this region.  6. Chest and upper abdominal hypermetabolic lymphadenopathy which is  indicative of active systemic sarcoid.    PFTs (02/2022:   FVC 44%, FEV1 46%, FEV1/FVC 79%, DLCO 75% predicted.    HRCT 10/2020:  Mediastinal and perihilar lymphadenoapthy and numerous solitary pulmonary nodules, no parenchymal lung disease.    Assessment and Plan:     In summary, Mr. Contreras is a 66-year-old gentleman with pulmonary sarcoidosis, systemic hypertension, mild biventricular systolic dysfunction, and pulmonary hypertension who returns today for follow-up.      1. Pulmonary Hypertension    He has mildly elevated PA pressure but his PVR is upper limits of normal.  This is likely due to combination of his high cardiac output as well as pulmonary sarcoidosis.  Since his PVR is up on the upper limits of normal, cardiac output is preserved, and right-sided filling pressures are normal we will defer pulmonary vasodilator therapy at this time point.  Given his coexisting lung disease (he has significantly reduced lung volume but parenchymal abnormality on CT scan), he is at risk of VQ mismatch with systemic pulmonary vasodilator therapy.  He could potentially benefit from inhaled treprostinil if his pulmonary hypertension and RV dysfunction better get  worse.    2. New biventricular dysfunction/cardiomyopathy    He is being followed in the multidisciplinary sarcoidosis clinic.  His MRI as well as PET scan has shown no evidence of cardiac sarcoidosis.  He is currently scheduled to get a coronary angiogram which I agree is important.  I suspect that his biventricular systolic dysfunction is likely due to uncontrolled systemic hypertension.  His blood pressure is now adequately controlled on full dose Entresto.  He is on metoprolol  mg daily.  I took the liberty and started him on spironolactone 25 mg daily.  This will help in controlling his systolic blood pressure as follows his low LV systolic function.  If he tolerates his spironolactone well we will start him on Farxiga.    3.  Frequent PVCs    He is being followed by Dr. Figueroa.     I have recommended him to return to see me in 3 months after he is on full guideline directed medical therapy.  We will repeat his cardiac MRI and reassess his left and right ventricular systolic function.  He continues to have reduced right ventricular systolic dysfunction despite optimizing his left ventricular systolic function, will consider starting him on inhaled treprostinil as a next strategy.    It was a pleasure seeing Mr. eDrek Contreras in our pulmonary hypertension and heart failure clinic.  We thank you for involving us in an his care.  Please do not hesitate to call us in the interim of any further questions.      Total time today was 44 minutes reviewing notes, imaging, labs, patient visit, orders and documentation     Sincerely,    Torrey Hirsch MD   Center for Pulmonary Hypertension  Heart Failure, Transplant, and Mechanical Circulatory Support Cardiology   Cardiovascular Division  [facility]   [phone]          Torrey Hirsch MD

## 2022-02-14 NOTE — LETTER
2/14/2022      RE: Derek Contreras  [address] [facility] [address]       Dear Colleague,    Thank you for the opportunity to participate in the care of your patient, Derek Contreras, at the [facility] HEART CLINIC [place] at [facility]. Please see a copy of my visit note below.    [facility]  Advanced HF & Pulmonary Hypertension Clinic  Service Date:  1/17/22    Dear Doctors Mario and Elmer:       We had the pleasure of seeing Mr. Derek Contreras in our Pulmonary Hypertension Clinic at the [facility].    As you know, he is a very pleasant 66-year-old male with past medical history significant for pulmonary sarcoidosis.  He was recently noted to have mildly reduced left ventricular systolic dysfunction.  His work-up was negative for cardiac sarcoidosis including an MRI as well as PET scan.      He was referred to us for possible pulmonary hypertension and RV dysfunction.  He had a repeat right heart catheterization in light of this which showed  an RA pressure of 6, RV of 40/10, PA of 35/21 with a mean of 27, pulmonary capillary wedge pressure of 10, PA saturation of 75.5, measured Lucas cardiac output of 360 mL/min, measured Lucas cardiac output of 7.5 L/min with an index of 3.1 L/min meter square.  His thermodilution cardiac output was 8.2 L/min with an index of 3.3 L/min/m .  His shunt run did not show significant step up concerning for intracardiac shunt.  His calculated PVR was 2.3 Wood units.    His MRI in fact showed biventricular mild systolic dysfunction.  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  Since his cardiac output was preserved and PVR was within normal limits we did not initiate any pulmonary vasodilator therapy especially given his coexisting parenchymal lung disease as well as LV systolic dysfunction.    His  systemic blood pressure is significantly elevated.  Given his reduced LV systolic function and uncontrolled hypertension, we started him on Entresto.  He is tolerating the full dose well.  He states that his blood pressure is significantly better.  Previously, it will be in the 160/90 range.  I was consistently in the 130 and 70 range.  He continues to have limitation with his exertional shortness of breath.  He has no lower extremity swelling or abdominal distention.  No lightheadedness dizziness or syncope.  No exertional chest pain or chest pressure.  No recent hospitalizations or ER visits.      PAST MEDICAL HISTORY:  Past Medical History:   Diagnosis Date     Asthma      Claustrophobia      CPAP (continuous positive airway pressure) dependence      Dyslipidemia      [place] (hard of hearing)      Hypercholesteremia      Hypertension      Iron deficiency      Mediastinal adenopathy      Obesity      BEULAH (obstructive sleep apnea)      Prostate cancer (H)      Pulmonary hypertension (H)      Sarcoidosis      CURRENT MEDICATIONS:  Current Outpatient Medications   Medication Sig     albuterol (PROAIR HFA/PROVENTIL HFA/VENTOLIN HFA) 108 (90 Base) MCG/ACT inhaler Inhale 1-2 puffs into the lungs every 4 hours as needed for shortness of breath / dyspnea     aspirin (ASA) 81 MG tablet Take 81 mg by mouth every morning      atorvastatin (LIPITOR) 40 MG tablet TAKE 1 TABLET(40 MG) BY MOUTH EVERY MORNING     BREO ELLIPTA 200-25 MCG/INH Inhaler INHALE 1 PUFF BY MOUTH ONE TIME DAILY      cetirizine (ZYRTEC) 10 MG tablet Take 10 mg by mouth every morning      diphenhydrAMINE (BENADRYL) 25 MG tablet Take 1 tablet (25 mg) by mouth every 8 hours as needed for itching or allergies     hydrochlorothiazide (HYDRODIURIL) 12.5 MG tablet TAKE ONE TABLET BY MOUTH EVERY MORNING     metoprolol succinate ER (TOPROL-XL) 100 MG 24 hr tablet Take 1 tablet (100 mg) by mouth daily     montelukast (SINGULAIR) 10 MG tablet Take 1 tablet (10 mg) by  mouth At Bedtime     multivitamin (ONE-DAILY) tablet Take 1 tablet by mouth every morning      omeprazole (PRILOSEC) 40 MG DR capsule TAKE 1 CAPSULE BY MOUTH ONE TIME DAILY     sacubitril-valsartan (ENTRESTO)  MG per tablet Take 1 tablet by mouth 2 times daily     spironolactone (ALDACTONE) 25 MG tablet Take 1 tablet (25 mg) by mouth daily     umeclidinium (INCRUSE ELLIPTA) 62.5 MCG/INH inhaler Inhale 1 puff into the lungs daily     No current facility-administered medications for this visit.       ROS:   10 point ROS negative except as discussed in above HPI.    EXAM:  BP (!) 151/76 (BP Location: Right arm, Patient Position: Chair, Cuff Size: Adult Large)   Pulse (!) 47   Wt 132 kg (291 lb)   SpO2 95%   BMI 41.75 kg/m    General: appears comfortable, alert and articulate  Head: normocephalic, atraumatic  Eyes: anicteric sclera, EOMI  Neck: no adenopathy  Orophyarynx: moist mucosa, no lesions, dentition intact  Heart: regular, normal S1/S2, no murmur, gallop, rub, estimated JVP 6 8cn  Lungs: clear to auscultation bilaterally, no rales or wheezing  Abdomen: soft, non-tender, bowel sounds present, no hepatosplenomegaly  Extremities: no clubbing, cyanosis or edema  Neurological: normal speech and affect, no gross motor deficits    Labs:  CBC RESULTS: Recent Labs   Lab Test 02/14/22  1504   WBC 8.9   RBC 5.72   HGB 16.3   HCT 50.8   MCV 89   MCH 28.5   MCHC 32.1   RDW 14.1        Recent Labs   Lab Test 02/14/22  1504 02/02/22  1025    139   POTASSIUM 3.5 4.2   CHLORIDE 107 105   CO2 28 32   ANIONGAP 7 2*   GLC 94 100*   BUN 14 14   CR 0.96 1.07   ANTONIO 9.1 8.9     Liver Function Studies - Recent Labs   Lab Test 02/14/22  1504   PROTTOTAL 7.4   ALBUMIN 3.5   BILITOTAL 0.3   ALKPHOS 93   AST 32   ALT 38         No results found for: NTBNPI  Lab Results   Component Value Date    NTBNP 89 02/14/2022    NTBNP 21 12/11/2019       6MWT (02/2022)  He walked for 259 meters.  He desaturated to 88% on room  air.      EKG 2/2022  Sinus rhythm with bifascicular block, PVCs    Cardiac MRI Dec 2021  No evidence of cardiac sarcoid. Moderate cardiomyopathy, LVEF 41% and RVEF 34%, with no  significant fibrosis. Compared to prior CMR on 8/2019 RV function is worse with no other change. Recommend  re-assessment for pulmonary hypertension with TTE or RHC.     Zio patch 2 week Nov 2021: multiple runs of NSVT longest 6 beats, rare episodes SVT, 20% PVC burden    Cardiac PET 2/2020  1. No FDG active cardiac sarcoid.  2. Decreased perfusion in the inferoseptal wall, findings may be  related to sequela of previous cardiac sarcoid with microvascular  injury.  3. Enlarged left ventricle with borderline low ejection fraction.  4. Decreased myocardial blood flow in the RCA distribution.  5. Findings of a dilated cardiomyopathy a concern for possible  ischemia/myocardial injury of the septum, consider CTA or coronary  angiography for further evaluation of this region.  6. Chest and upper abdominal hypermetabolic lymphadenopathy which is  indicative of active systemic sarcoid.    PFTs (02/2022:   FVC 44%, FEV1 46%, FEV1/FVC 79%, DLCO 75% predicted.    HRCT 10/2020:  Mediastinal and perihilar lymphadenoapthy and numerous solitary pulmonary nodules, no parenchymal lung disease.    Assessment and Plan:     In summary, Mr. Contreras is a 66-year-old gentleman with pulmonary sarcoidosis, systemic hypertension, mild biventricular systolic dysfunction, and pulmonary hypertension who returns today for follow-up.      1. Pulmonary Hypertension    He has mildly elevated PA pressure but his PVR is upper limits of normal.  This is likely due to combination of his high cardiac output as well as pulmonary sarcoidosis.  Since his PVR is up on the upper limits of normal, cardiac output is preserved, and right-sided filling pressures are normal we will defer pulmonary vasodilator therapy at this time point.  Given his coexisting lung disease (he has  significantly reduced lung volume but parenchymal abnormality on CT scan), he is at risk of VQ mismatch with systemic pulmonary vasodilator therapy.  He could potentially benefit from inhaled treprostinil if his pulmonary hypertension and RV dysfunction better get worse.    2. New biventricular dysfunction/cardiomyopathy    He is being followed in the multidisciplinary sarcoidosis clinic.  His MRI as well as PET scan has shown no evidence of cardiac sarcoidosis.  He is currently scheduled to get a coronary angiogram which I agree is important.  I suspect that his biventricular systolic dysfunction is likely due to uncontrolled systemic hypertension.  His blood pressure is now adequately controlled on full dose Entresto.  He is on metoprolol  mg daily.  I took the liberty and started him on spironolactone 25 mg daily.  This will help in controlling his systolic blood pressure as follows his low LV systolic function.  If he tolerates his spironolactone well we will start him on Farxiga.    3.  Frequent PVCs    He is being followed by Dr. Figueroa.     I have recommended him to return to see me in 3 months after he is on full guideline directed medical therapy.  We will repeat his cardiac MRI and reassess his left and right ventricular systolic function.  He continues to have reduced right ventricular systolic dysfunction despite optimizing his left ventricular systolic function, will consider starting him on inhaled treprostinil as a next strategy.    It was a pleasure seeing Mr. Derek Contreras in our pulmonary hypertension and heart failure clinic.  We thank you for involving us in an his care.  Please do not hesitate to call us in the interim of any further questions.      Total time today was 44 minutes reviewing notes, imaging, labs, patient visit, orders and documentation       Torrey Hirsch MD   Center for Pulmonary Hypertension  Heart Failure, Transplant, and Mechanical Circulatory Support Cardiology    Cardiovascular Division  [facility]   [phone]

## 2022-02-14 NOTE — PATIENT INSTRUCTIONS
"You were seen today in the Cardiovascular Clinic at the [facility].       Cardiology Providers you saw during your visit: Dr. Hirsch      Medication Changes:   1. Start spironolactone 25mg once daily      Follow up Appointment Information:  1. Repeat labs in 1 week  2. Follow up with Dr. Hirsch and cardiac MRI in May 2022        [address] [facility] on [address] [place] [address]        Thank you for allowing us to be a part of your care here at the [facility]      If you have questions or concerns please contact us at:      Rafaela Barnett RN BSN   Cardiology Care Coordinator  [facility]   Questions and [phone]   press #1 to \"send a message to your care team\"     "

## 2022-02-14 NOTE — NURSING NOTE
Chief Complaint   Patient presents with     Follow Up     February 14, 2022: Reason for visit: RTN HF: 66 year old male presents with history of sarcoid and HFpEF for follow up with labs prior     Vitals were taken and medications reconciled.    Cristhian Lou, EMT  3:26 PM

## 2022-02-14 NOTE — NURSING NOTE
Diet: Patient instructed regarding a heart failure healthy diet, including discussion of reduced fat and 2000 mg daily sodium restriction, daily weights, medication purpose and compliance, fluid restrictions and resources for patient and family to access for assistance with heart failure management.       Labs: Patient was given results of the laboratory testing obtained today and patient was instructed about when to return for the next laboratory testing. Repeat labs 1 week after starting spironolactone.    Med Reconcile: Reviewed and verified all current medications with the patient. The updated medication list was printed and given to the patient. START spironolactone 25mg once daily. Check labs 1 week after starting. If labs stable and pt feels well, plan to start farxiga 10mg once daily at this time.    Return Appointment: Patient given instructions regarding scheduling next clinic visit. Follow up with DR. rossi in 3 months with labs and cmRI prior.  * EKG today for bradycardia    Patient stated he understood all health information given and agreed to call with further questions or concerns.     Rafaela Barnett RN

## 2022-02-16 ENCOUNTER — OFFICE VISIT (OUTPATIENT)
Dept: PULMONOLOGY | Facility: CLINIC | Age: 67
End: 2022-02-16
Attending: INTERNAL MEDICINE
Payer: MEDICARE

## 2022-02-16 VITALS
DIASTOLIC BLOOD PRESSURE: 78 MMHG | SYSTOLIC BLOOD PRESSURE: 128 MMHG | OXYGEN SATURATION: 94 % | HEIGHT: 72 IN | BODY MASS INDEX: 39.28 KG/M2 | WEIGHT: 290 LBS | HEART RATE: 63 BPM

## 2022-02-16 DIAGNOSIS — D86.9 SARCOIDOSIS: Primary | ICD-10-CM

## 2022-02-16 DIAGNOSIS — E55.9 VITAMIN D DEFICIENCY, UNSPECIFIED: ICD-10-CM

## 2022-02-16 DIAGNOSIS — D86.9 SARCOIDOSIS: ICD-10-CM

## 2022-02-16 DIAGNOSIS — R00.1 BRADYCARDIA: ICD-10-CM

## 2022-02-16 LAB
6 MIN WALK (FT): 850 FT
6 MIN WALK (M): 259 M
ALBUMIN SERPL-MCNC: 3.5 G/DL (ref 3.4–5)
ALP SERPL-CCNC: 93 U/L (ref 40–150)
ALT SERPL W P-5'-P-CCNC: 38 U/L (ref 0–70)
AST SERPL W P-5'-P-CCNC: 32 U/L (ref 0–45)
ATRIAL RATE - MUSE: 90 BPM
BILIRUB DIRECT SERPL-MCNC: 0.2 MG/DL (ref 0–0.2)
BILIRUB SERPL-MCNC: 0.3 MG/DL (ref 0.2–1.3)
DEPRECATED CALCIDIOL+CALCIFEROL SERPL-MC: 28 UG/L (ref 20–75)
DIASTOLIC BLOOD PRESSURE - MUSE: NORMAL MMHG
DLCOCOR-%PRED-PRE: 72 %
DLCOCOR-PRE: 20.27 ML/MIN/MMHG
DLCOUNC-%PRED-PRE: 75 %
DLCOUNC-PRE: 21.18 ML/MIN/MMHG
DLCOUNC-PRED: 28.08 ML/MIN/MMHG
ERV-%PRED-PRE: 37 %
ERV-PRE: 0.25 L
ERV-PRED: 0.67 L
EXPTIME-PRE: 5.64 SEC
FEF2575-%PRED-PRE: 50 %
FEF2575-PRE: 1.39 L/SEC
FEF2575-PRED: 2.73 L/SEC
FEFMAX-%PRED-PRE: 63 %
FEFMAX-PRE: 5.75 L/SEC
FEFMAX-PRED: 9.1 L/SEC
FEV1-%PRED-PRE: 46 %
FEV1-PRE: 1.66 L
FEV1FEV6-PRE: 79 %
FEV1FEV6-PRED: 78 %
FEV1FVC-PRE: 79 %
FEV1FVC-PRED: 76 %
FEV1SVC-PRE: 72 %
FEV1SVC-PRED: 68 %
FIFMAX-PRE: 4.35 L/SEC
FRCPLETH-%PRED-PRE: 71 %
FRCPLETH-PRE: 2.71 L
FRCPLETH-PRED: 3.79 L
FVC-%PRED-PRE: 44 %
FVC-PRE: 2.09 L
FVC-PRED: 4.67 L
IC-%PRED-PRE: 45 %
IC-PRE: 2.06 L
IC-PRED: 4.51 L
INTERPRETATION ECG - MUSE: NORMAL
P AXIS - MUSE: 84 DEGREES
PR INTERVAL - MUSE: NORMAL MS
PROT SERPL-MCNC: 7.4 G/DL (ref 6.8–8.8)
QRS DURATION - MUSE: 150 MS
QT - MUSE: 396 MS
QTC - MUSE: 484 MS
R AXIS - MUSE: -57 DEGREES
RVPLETH-%PRED-PRE: 93 %
RVPLETH-PRE: 2.46 L
RVPLETH-PRED: 2.64 L
SYSTOLIC BLOOD PRESSURE - MUSE: NORMAL MMHG
T AXIS - MUSE: 51 DEGREES
TLCPLETH-%PRED-PRE: 63 %
TLCPLETH-PRE: 4.77 L
TLCPLETH-PRED: 7.53 L
VA-%PRED-PRE: 56 %
VA-PRE: 3.87 L
VC-%PRED-PRE: 44 %
VC-PRE: 2.31 L
VC-PRED: 5.18 L
VENTRICULAR RATE- MUSE: 90 BPM

## 2022-02-16 PROCEDURE — G0463 HOSPITAL OUTPT CLINIC VISIT: HCPCS | Mod: 25

## 2022-02-16 PROCEDURE — 94729 DIFFUSING CAPACITY: CPT | Performed by: INTERNAL MEDICINE

## 2022-02-16 PROCEDURE — 99213 OFFICE O/P EST LOW 20 MIN: CPT | Mod: 25 | Performed by: INTERNAL MEDICINE

## 2022-02-16 PROCEDURE — 94726 PLETHYSMOGRAPHY LUNG VOLUMES: CPT | Performed by: INTERNAL MEDICINE

## 2022-02-16 PROCEDURE — 94375 RESPIRATORY FLOW VOLUME LOOP: CPT | Performed by: INTERNAL MEDICINE

## 2022-02-16 PROCEDURE — 94618 PULMONARY STRESS TESTING: CPT | Performed by: INTERNAL MEDICINE

## 2022-02-16 ASSESSMENT — PAIN SCALES - GENERAL: PAINLEVEL: NO PAIN (0)

## 2022-02-16 NOTE — PROGRESS NOTES
Pulmonary HPI     The patient was seen and examined by Jamie Martin MD and Rebecca Abdullahi MD      Mr. Contreras comes in for followup.  He was last seen in the Pulmonary Clinic in 11/2021.     Since last visit he continues to be at his baseline. He is not SOB on exertion, has been trying to walk on treadmill at 2.5 mph speed every other day for ~25 mins. No worsening upper airway symptoms, no worsening cough, no recent infections. Able to tolerate PFTs and 6MWT today. Using CPAP at night and has good overall quality of sleep.    Seen by Dr Hirsch 2 days ago after cardiac MRI showed no evidence of sarcoidosis, LVEF 41% and RVEF 34% and overall worsening RV function. No evidence of HF exacerbation at this time. CTA scheduled for 02/28 and appt with Dr Figueroa in 02/23.    Up to date on vaccinations. Has not seen ophthalmologist in over 2 years.        Current Outpatient Medications   Medication     albuterol (PROAIR HFA/PROVENTIL HFA/VENTOLIN HFA) 108 (90 Base) MCG/ACT inhaler     aspirin (ASA) 81 MG tablet     atorvastatin (LIPITOR) 40 MG tablet     BREO ELLIPTA 200-25 MCG/INH Inhaler     cetirizine (ZYRTEC) 10 MG tablet     diphenhydrAMINE (BENADRYL) 25 MG tablet     hydrochlorothiazide (HYDRODIURIL) 12.5 MG tablet     metoprolol succinate ER (TOPROL-XL) 100 MG 24 hr tablet     montelukast (SINGULAIR) 10 MG tablet     multivitamin (ONE-DAILY) tablet     omeprazole (PRILOSEC) 40 MG DR capsule     sacubitril-valsartan (ENTRESTO)  MG per tablet     spironolactone (ALDACTONE) 25 MG tablet     umeclidinium (INCRUSE ELLIPTA) 62.5 MCG/INH inhaler     No current facility-administered medications for this visit.                   Allergies   Allergen Reactions     Cat Hair Extract Itching       itchy tearful eyes     Adhesive Tape Rash     Iodine Rash      Past Medical History        Past Medical History:   Diagnosis Date     Asthma       Claustrophobia       CPAP (continuous positive airway pressure) dependence        Dyslipidemia       [place] (hard of hearing)       Hypercholesteremia       Hypertension       Iron deficiency       Mediastinal adenopathy       Obesity       BEULAH (obstructive sleep apnea)       Prostate cancer (H)       Pulmonary hypertension (H)       Sarcoidosis              Past Surgical History         Past Surgical History:   Procedure Laterality Date     APPENDECTOMY         BIOPSY MASS NECK Left 7/15/2020     Procedure: Left trapezius muscle biopsy;  Surgeon: Ade Villa MD;  Location: [facility]     C TOTAL HIP ARTHROPLASTY Bilateral       C TOTAL KNEE ARTHROPLASTY Bilateral       CV RIGHT HEART CATH MEASUREMENTS RECORDED N/A 12/11/2019     Procedure: CV RIGHT HEART CATH;  Surgeon: Torrey Hirsch MD;  Location: [facility]     DAVINCI PROSTATECTOMY, LYMPHADENECTOMY N/A 5/23/2019     Procedure: ROBOTIC ASSISTED LAPAROSCOPIC RADICAL PROSTATECTOMY WITH BILATERAL PELVIC LYMPH NODE DISSECTION;  Surgeon: Matteo Coughlin MD;  Location: [facility]     ENDOBRONCHIAL ULTRASOUND FLEXIBLE N/A 3/29/2019     Procedure: Flexible Bronchoscopy, Endobronchial Ultrasound;  Surgeon: Curtis Leger MD;  Location: [facility] OR     VASECTOMY                Social History   Social History      Socioeconomic History     Marital status:        Spouse name: Not on file     Number of children: Not on file     Years of education: Not on file     Highest education level: Not on file   Occupational History     Not on file   Tobacco Use     Smoking status: Never Smoker     Smokeless tobacco: Never Used   Substance and Sexual Activity     Alcohol use: Yes       Comment: twice a week     Drug use: No     Sexual activity: Yes       Partners: Female   Other Topics Concern     Parent/sibling w/ CABG, MI or angioplasty before 65F 55M? Not Asked   Social History Narrative     12/03/20            ENVIRONMENTAL HISTORY: [place] The home is heated with a gas furnace.  They does have central air conditioning. The patient's bedroom is furnished with Indoor plants and carpeting in bedroom.  Pets inside the house include 0 pets. There is no history of cockroach or mice infestation. There is/are 0 smokers in the house.  The house does not have a damp basement.       Social Determinants of Health          Financial Resource Strain: Not on file   Food Insecurity: Not on file   Transportation Needs: Not on file   Physical Activity: Not on file   Stress: Not on file   Social Connections: Not on file   Intimate Partner Violence: Not At Risk     Fear of Current or Ex-Partner: No     Emotionally Abused: No     Physically Abused: No     Sexually Abused: No   Housing Stability: Not on file            ROS Pulmonary  A complete ROS was otherwise negative except as noted in the HPI.  Ht 1.829 m (6')   Wt 131.5 kg (290 lb)   BMI 39.33 kg/m      Exam:   GENERAL APPEARANCE: Alert, and in no apparent distress.  EYES: PERRL, EOMI  RESP: CTAB. No crackles. No rhonchi. No wheezes.  CV: Normal S1, S2, regular rhythm, normal rate. No murmur.  No rub. No gallop. No LE edema.   MS: extremities normal. No clubbing. No cyanosis.  SKIN: no rash on limited exam  NEURO: Mentation intact, speech normal, normal strength and tone, normal gait and stance     Results:  PFTs done today were reviewed by Dr Martin.  FVC 2.09 L (44%), FEV1 1.66 L (46%) and the ratio is 46. Total lung capacity 4.77 (63%).  The diffusion capacity is 21.18 (75%).   Consistent with moderate restriction with mildly decreased diffusion capacity.  In comparison to prior spirometry, no significant change in FVC.  Total lung capacity is 460 cc higher than in 05/2021.     Assessment and plan:  64-year-old male with history of HTN, HLD, obesity, prostate cancer, abnormal chest imaging with TBNA (3/29/2019) demonstrating granulomatous Inflammation (station 7 and 12 L lymph nodes) and  BAL demonstrating 102 white cells and 11% lymphocytes.  The  CD4 CD8 ratio was 2.29.  Repeat cardiac MRI with no LGE and cPET with no myocardiac uptake to suggest cardiac sarcoidosis but abnormal EF of 41%. RV dilatation of unclear etiology but no pulmonary hypertension based on right heart cath with mean PAP 27 mm Hg (1/22). Concern for truncal/diaphragmatic weakness but trapezius biopsy in June 2020 without any convincing evidence.  1.  Pulmonary. His reactive airways disease symptoms continue to be well controlled at this time. He will continue the Incruse Ellipta, Breo, Singulair and Zyrtec. In case he has exacerbation of his symptoms, a trial of azithromycin should be considered. A steroid burst if Z-Eleazar will not be of therapeutic benefit. Applauded patient on exercising and recommended further exercise maybe every day if able to tolerate it.  2.  Pulmonary sarcoidosis.  Stable PFTs, slightly improved TLC.  We will bring him back in 6 months to repeat the PFTs and 6MWT. Checking vitamin D, LFTs and soluble IL-2R today.  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 CTA and upcoming appt with Dr Figueroa in next couple weeks.  4.  He will follow-up with his primary care physician for management of blood pressure.     Follow up in 6 months with PFTs and 6MWT.  He will also see Dr. Figueroa in [facility] for abnormal rhythm.    Discussed with Dr Martin who agrees with all of the above.  Rebecca Abdullahi MD  PGY-2 internal medicine

## 2022-02-16 NOTE — NURSING NOTE
Chief Complaint   Patient presents with     Interstitial Lung Disease (ILD)     F/U sarcoids      Vitals were taken and medications were reconciled.     Keisha Hernandez RMA  10:57 AM

## 2022-02-16 NOTE — LETTER
2/16/2022         RE: Derek Contreras  [id number] [address] [address]        Dear Colleague,    Thank you for referring your patient, Derek Contreras, to the [facility] [place]. Please see a copy of my visit note below.    Pulmonary HPI     The patient was seen and examined by Jamie Martin MD and Rebecca Abdullahi MD      Mr. Contreras comes in for followup.  He was last seen in the Pulmonary Clinic in 11/2021.     Since last visit he continues to be at his baseline. He is not SOB on exertion, has been trying to walk on treadmill at 2.5 mph speed every other day for ~25 mins. No worsening upper airway symptoms, no worsening cough, no recent infections. Able to tolerate PFTs and 6MWT today. Using CPAP at night and has good overall quality of sleep.    Seen by Dr Hirsch 2 days ago after cardiac MRI showed no evidence of sarcoidosis, LVEF 41% and RVEF 34% and overall worsening RV function. No evidence of HF exacerbation at this time. CTA scheduled for 02/28 and appt with Dr Figueroa in 02/23.    Up to date on vaccinations. Has not seen ophthalmologist in over 2 years.        Current Outpatient Medications   Medication     albuterol (PROAIR HFA/PROVENTIL HFA/VENTOLIN HFA) 108 (90 Base) MCG/ACT inhaler     aspirin (ASA) 81 MG tablet     atorvastatin (LIPITOR) 40 MG tablet     BREO ELLIPTA 200-25 MCG/INH Inhaler     cetirizine (ZYRTEC) 10 MG tablet     diphenhydrAMINE (BENADRYL) 25 MG tablet     hydrochlorothiazide (HYDRODIURIL) 12.5 MG tablet     metoprolol succinate ER (TOPROL-XL) 100 MG 24 hr tablet     montelukast (SINGULAIR) 10 MG tablet     multivitamin (ONE-DAILY) tablet     omeprazole (PRILOSEC) 40 MG DR capsule     sacubitril-valsartan (ENTRESTO)  MG per tablet     spironolactone (ALDACTONE) 25 MG tablet     umeclidinium (INCRUSE ELLIPTA) 62.5 MCG/INH inhaler     No current facility-administered medications for this visit.                    Allergies   Allergen Reactions     Cat Hair Extract Itching       itchy tearful eyes     Adhesive Tape Rash     Iodine Rash      Past Medical History        Past Medical History:   Diagnosis Date     Asthma       Claustrophobia       CPAP (continuous positive airway pressure) dependence       Dyslipidemia       [place] (hard of hearing)       Hypercholesteremia       Hypertension       Iron deficiency       Mediastinal adenopathy       Obesity       BEULAH (obstructive sleep apnea)       Prostate cancer (H)       Pulmonary hypertension (H)       Sarcoidosis              Past Surgical History         Past Surgical History:   Procedure Laterality Date     APPENDECTOMY         BIOPSY MASS NECK Left 7/15/2020     Procedure: Left trapezius muscle biopsy;  Surgeon: Ade Villa MD;  [facility]: [facility]     C TOTAL HIP ARTHROPLASTY Bilateral       C TOTAL KNEE ARTHROPLASTY Bilateral       CV RIGHT HEART CATH MEASUREMENTS RECORDED N/A 12/11/2019     Procedure: CV RIGHT HEART CATH;  Surgeon: Torrey Hirsch MD;  [facility]     DAVINCI PROSTATECTOMY, LYMPHADENECTOMY N/A 5/23/2019     Procedure: ROBOTIC ASSISTED LAPAROSCOPIC RADICAL PROSTATECTOMY WITH BILATERAL PELVIC LYMPH NODE DISSECTION;  Surgeon: Matteo Coughlin MD;  Location: [facility]     ENDOBRONCHIAL ULTRASOUND FLEXIBLE N/A 3/29/2019     Procedure: Flexible Bronchoscopy, Endobronchial Ultrasound;  Surgeon: Curtis Leger MD;  Location: [facility]     VASECTOMY                Social History   Social History            Socioeconomic History     Marital status:        Spouse name: Not on file     Number of children: Not on file     Years of education: Not on file     Highest education level: Not on file   Occupational History     Not on file   Tobacco Use     Smoking status: Never Smoker     Smokeless tobacco: Never Used   Substance and Sexual Activity     Alcohol use: Yes       Comment: twice a week     Drug use: No      Sexual activity: Yes       Partners: Female   Other Topics Concern     Parent/sibling w/ CABG, MI or angioplasty before 65F 55M? Not Asked   Social History Narrative     12/03/20            ENVIRONMENTAL HISTORY: [place] The home is heated with a gas furnace. They does have central air conditioning. The patient's bedroom is furnished with Indoor plants and carpeting in bedroom.  Pets inside the house include 0 pets. There is no history of cockroach or mice infestation. There is/are 0 smokers in the house.  The house does not have a damp basement.       Social Determinants of Health          Financial Resource Strain: Not on file   Food Insecurity: Not on file   Transportation Needs: Not on file   Physical Activity: Not on file   Stress: Not on file   Social Connections: Not on file   Intimate Partner Violence: Not At Risk     Fear of Current or Ex-Partner: No     Emotionally Abused: No     Physically Abused: No     Sexually Abused: No   Housing Stability: Not on file            ROS Pulmonary  A complete ROS was otherwise negative except as noted in the HPI.  Ht 1.829 m (6')   Wt 131.5 kg (290 lb)   BMI 39.33 kg/m      Exam:   GENERAL APPEARANCE: Alert, and in no apparent distress.  EYES: PERRL, EOMI  RESP: CTAB. No crackles. No rhonchi. No wheezes.  CV: Normal S1, S2, regular rhythm, normal rate. No murmur.  No rub. No gallop. No LE edema.   MS: extremities normal. No clubbing. No cyanosis.  SKIN: no rash on limited exam  NEURO: Mentation intact, speech normal, normal strength and tone, normal gait and stance     Results:  PFTs done today were reviewed by Dr Martin.  FVC 2.09 L (44%), FEV1 1.66 L (46%) and the ratio is 46. Total lung capacity 4.77 (63%).  The diffusion capacity is 21.18 (75%).   Consistent with moderate restriction with mildly decreased diffusion capacity.  In comparison to prior spirometry, no significant change in FVC.  Total lung capacity is 460  cc higher than in 05/2021.     Assessment and plan:  64-year-old male with history of HTN, HLD, obesity, prostate cancer, abnormal chest imaging with TBNA (3/29/2019) demonstrating granulomatous Inflammation (station 7 and 12 L lymph nodes) and  BAL demonstrating 102 white cells and 11% lymphocytes.  The CD4 CD8 ratio was 2.29.  Repeat cardiac MRI with no LGE and cPET with no myocardiac uptake to suggest cardiac sarcoidosis but abnormal EF of 41%. RV dilatation of unclear etiology but no pulmonary hypertension based on right heart cath with mean PAP 27 mm Hg (1/22). Concern for truncal/diaphragmatic weakness but trapezius biopsy in June 2020 without any convincing evidence.  1.  Pulmonary. His reactive airways disease symptoms continue to be well controlled at this time. He will continue the Incruse Ellipta, Breo, Singulair and Zyrtec. In case he has exacerbation of his symptoms, a trial of azithromycin should be considered. A steroid burst if Z-Eleazar will not be of therapeutic benefit. Applauded patient on exercising and recommended further exercise maybe every day if able to tolerate it.  2.  Pulmonary sarcoidosis.  Stable PFTs, slightly improved TLC.  We will bring him back in 6 months to repeat the PFTs and 6MWT. Checking vitamin D, LFTs and soluble IL-2R today.  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 CTA and upcoming appt with Dr Figueroa in next couple weeks.  4.  He will follow-up with his primary care physician for management of blood pressure.     Follow up in 6 months with PFTs and 6MWT.  He will also see Dr. Figueroa in [facility] for abnormal rhythm.    Discussed with Dr Martin who agrees with all of the above.  Rebecca Abdullahi MD  PGY-2 internal medicine    Attestation signed by Jamie Martin MD at 2/18/2022  2:38 PM:  Attending statement:    The patient was seen and examined by me with house staff.  The case  was discussed at length.  Vitals and lab results from today were reviewed.  The note reflects our joint assessment and plan.    Jamie Martin MD  Pulmonary, Critical Care Medicine

## 2022-02-19 NOTE — PROGRESS NOTES
[facility]  Advanced HF & Pulmonary Hypertension Clinic  Service Date:  1/17/22    Dear Doctors Mario and Elmer:       We had the pleasure of seeing Mr. Derek Contreras in our Pulmonary Hypertension Clinic at the [facility].    As you know, he is a very pleasant 66-year-old male with past medical history significant for pulmonary sarcoidosis.  He was recently noted to have mildly reduced left ventricular systolic dysfunction.  His work-up was negative for cardiac sarcoidosis including an MRI as well as PET scan.      He was referred to us for possible pulmonary hypertension and RV dysfunction.  He had a repeat right heart catheterization in light of this which showed  an RA pressure of 6, RV of 40/10, PA of 35/21 with a mean of 27, pulmonary capillary wedge pressure of 10, PA saturation of 75.5, measured Lucas cardiac output of 360 mL/min, measured Lucas cardiac output of 7.5 L/min with an index of 3.1 L/min meter square.  His thermodilution cardiac output was 8.2 L/min with an index of 3.3 L/min/m .  His shunt run did not show significant step up concerning for intracardiac shunt.  His calculated PVR was 2.3 Wood units.    His MRI in fact showed biventricular mild systolic dysfunction.  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  Since his cardiac output was preserved and PVR was within normal limits we did not initiate any pulmonary vasodilator therapy especially given his coexisting parenchymal lung disease as well as LV systolic dysfunction.    His systemic blood pressure is significantly elevated.  Given his reduced LV systolic function and uncontrolled hypertension, we started him on Entresto.  He is tolerating the full dose well.  He states that his blood pressure is significantly better.  Previously, it will be in the 160/90 range.  I was consistently in the 130 and 70 range.  He continues to  have limitation with his exertional shortness of breath.  He has no lower extremity swelling or abdominal distention.  No lightheadedness dizziness or syncope.  No exertional chest pain or chest pressure.  No recent hospitalizations or ER visits.      PAST MEDICAL HISTORY:  Past Medical History:   Diagnosis Date     Asthma      Claustrophobia      CPAP (continuous positive airway pressure) dependence      Dyslipidemia      [place] (hard of hearing)      Hypercholesteremia      Hypertension      Iron deficiency      Mediastinal adenopathy      Obesity      BEULAH (obstructive sleep apnea)      Prostate cancer (H)      Pulmonary hypertension (H)      Sarcoidosis      CURRENT MEDICATIONS:  Current Outpatient Medications   Medication Sig     albuterol (PROAIR HFA/PROVENTIL HFA/VENTOLIN HFA) 108 (90 Base) MCG/ACT inhaler Inhale 1-2 puffs into the lungs every 4 hours as needed for shortness of breath / dyspnea     aspirin (ASA) 81 MG tablet Take 81 mg by mouth every morning      atorvastatin (LIPITOR) 40 MG tablet TAKE 1 TABLET(40 MG) BY MOUTH EVERY MORNING     BREO ELLIPTA 200-25 MCG/INH Inhaler INHALE 1 PUFF BY MOUTH ONE TIME DAILY      cetirizine (ZYRTEC) 10 MG tablet Take 10 mg by mouth every morning      diphenhydrAMINE (BENADRYL) 25 MG tablet Take 1 tablet (25 mg) by mouth every 8 hours as needed for itching or allergies     hydrochlorothiazide (HYDRODIURIL) 12.5 MG tablet TAKE ONE TABLET BY MOUTH EVERY MORNING     metoprolol succinate ER (TOPROL-XL) 100 MG 24 hr tablet Take 1 tablet (100 mg) by mouth daily     montelukast (SINGULAIR) 10 MG tablet Take 1 tablet (10 mg) by mouth At Bedtime     multivitamin (ONE-DAILY) tablet Take 1 tablet by mouth every morning      omeprazole (PRILOSEC) 40 MG DR capsule TAKE 1 CAPSULE BY MOUTH ONE TIME DAILY     sacubitril-valsartan (ENTRESTO)  MG per tablet Take 1 tablet by mouth 2 times daily     spironolactone (ALDACTONE) 25 MG tablet Take 1 tablet (25 mg) by mouth daily      umeclidinium (INCRUSE ELLIPTA) 62.5 MCG/INH inhaler Inhale 1 puff into the lungs daily     No current facility-administered medications for this visit.       ROS:   10 point ROS negative except as discussed in above HPI.    EXAM:  BP (!) 151/76 (BP Location: Right arm, Patient Position: Chair, Cuff Size: Adult Large)   Pulse (!) 47   Wt 132 kg (291 lb)   SpO2 95%   BMI 41.75 kg/m    General: appears comfortable, alert and articulate  Head: normocephalic, atraumatic  Eyes: anicteric sclera, EOMI  Neck: no adenopathy  Orophyarynx: moist mucosa, no lesions, dentition intact  Heart: regular, normal S1/S2, no murmur, gallop, rub, estimated JVP 6 8cn  Lungs: clear to auscultation bilaterally, no rales or wheezing  Abdomen: soft, non-tender, bowel sounds present, no hepatosplenomegaly  Extremities: no clubbing, cyanosis or edema  Neurological: normal speech and affect, no gross motor deficits    Labs:  CBC RESULTS: Recent Labs   Lab Test 02/14/22  1504   WBC 8.9   RBC 5.72   HGB 16.3   HCT 50.8   MCV 89   MCH 28.5   MCHC 32.1   RDW 14.1        Recent Labs   Lab Test 02/14/22  1504 02/02/22  1025    139   POTASSIUM 3.5 4.2   CHLORIDE 107 105   CO2 28 32   ANIONGAP 7 2*   GLC 94 100*   BUN 14 14   CR 0.96 1.07   ANTONIO 9.1 8.9     Liver Function Studies - Recent Labs   Lab Test 02/14/22  1504   PROTTOTAL 7.4   ALBUMIN 3.5   BILITOTAL 0.3   ALKPHOS 93   AST 32   ALT 38         No results found for: NTBNPI  Lab Results   Component Value Date    NTBNP 89 02/14/2022    NTBNP 21 12/11/2019       6MWT (02/2022)  He walked for 259 meters.  He desaturated to 88% on room air.      EKG 2/2022  Sinus rhythm with bifascicular block, PVCs    Cardiac MRI Dec 2021  No evidence of cardiac sarcoid. Moderate cardiomyopathy, LVEF 41% and RVEF 34%, with no  significant fibrosis. Compared to prior CMR on 8/2019 RV function is worse with no other change. Recommend  re-assessment for pulmonary hypertension with TTE or RHC.     Sharan  patch 2 week Nov 2021: multiple runs of NSVT longest 6 beats, rare episodes SVT, 20% PVC burden    Cardiac PET 2/2020  1. No FDG active cardiac sarcoid.  2. Decreased perfusion in the inferoseptal wall, findings may be  related to sequela of previous cardiac sarcoid with microvascular  injury.  3. Enlarged left ventricle with borderline low ejection fraction.  4. Decreased myocardial blood flow in the RCA distribution.  5. Findings of a dilated cardiomyopathy a concern for possible  ischemia/myocardial injury of the septum, consider CTA or coronary  angiography for further evaluation of this region.  6. Chest and upper abdominal hypermetabolic lymphadenopathy which is  indicative of active systemic sarcoid.    PFTs (02/2022:   FVC 44%, FEV1 46%, FEV1/FVC 79%, DLCO 75% predicted.    HRCT 10/2020:  Mediastinal and perihilar lymphadenoapthy and numerous solitary pulmonary nodules, no parenchymal lung disease.    Assessment and Plan:     In summary, Mr. Contreras is a 66-year-old gentleman with pulmonary sarcoidosis, systemic hypertension, mild biventricular systolic dysfunction, and pulmonary hypertension who returns today for follow-up.      1. Pulmonary Hypertension    He has mildly elevated PA pressure but his PVR is upper limits of normal.  This is likely due to combination of his high cardiac output as well as pulmonary sarcoidosis.  Since his PVR is up on the upper limits of normal, cardiac output is preserved, and right-sided filling pressures are normal we will defer pulmonary vasodilator therapy at this time point.  Given his coexisting lung disease (he has significantly reduced lung volume but parenchymal abnormality on CT scan), he is at risk of VQ mismatch with systemic pulmonary vasodilator therapy.  He could potentially benefit from inhaled treprostinil if his pulmonary hypertension and RV dysfunction better get worse.    2. New biventricular dysfunction/cardiomyopathy    He is being followed in the  multidisciplinary sarcoidosis clinic.  His MRI as well as PET scan has shown no evidence of cardiac sarcoidosis.  He is currently scheduled to get a coronary angiogram which I agree is important.  I suspect that his biventricular systolic dysfunction is likely due to uncontrolled systemic hypertension.  His blood pressure is now adequately controlled on full dose Entresto.  He is on metoprolol  mg daily.  I took the liberty and started him on spironolactone 25 mg daily.  This will help in controlling his systolic blood pressure as follows his low LV systolic function.  If he tolerates his spironolactone well we will start him on Farxiga.    3.  Frequent PVCs    He is being followed by Dr. Figueroa.     I have recommended him to return to see me in 3 months after he is on full guideline directed medical therapy.  We will repeat his cardiac MRI and reassess his left and right ventricular systolic function.  He continues to have reduced right ventricular systolic dysfunction despite optimizing his left ventricular systolic function, will consider starting him on inhaled treprostinil as a next strategy.    It was a pleasure seeing Mr. Derek Contreras in our pulmonary hypertension and heart failure clinic.  We thank you for involving us in an his care.  Please do not hesitate to call us in the interim of any further questions.      Total time today was 44 minutes reviewing notes, imaging, labs, patient visit, orders and documentation     Sincerely,    Torrey Hirsch MD   [facility]  Heart Failure, Transplant, and Mechanical Circulatory Support Cardiology   Cardiovascular Division  [facility]   [phone]

## 2022-02-24 ENCOUNTER — LAB (OUTPATIENT)
Dept: LAB | Facility: CLINIC | Age: 67
End: 2022-02-24
Payer: COMMERCIAL

## 2022-02-24 DIAGNOSIS — I50.22 CHRONIC SYSTOLIC HEART FAILURE (H): ICD-10-CM

## 2022-02-24 LAB
ANION GAP SERPL CALCULATED.3IONS-SCNC: 5 MMOL/L (ref 3–14)
BUN SERPL-MCNC: 19 MG/DL (ref 7–30)
CALCIUM SERPL-MCNC: 9.3 MG/DL (ref 8.5–10.1)
CHLORIDE BLD-SCNC: 104 MMOL/L (ref 94–109)
CO2 SERPL-SCNC: 30 MMOL/L (ref 20–32)
CREAT SERPL-MCNC: 1.09 MG/DL (ref 0.66–1.25)
GFR SERPL CREATININE-BSD FRML MDRD: 74 ML/MIN/1.73M2
GLUCOSE BLD-MCNC: 109 MG/DL (ref 70–99)
POTASSIUM BLD-SCNC: 4.4 MMOL/L (ref 3.4–5.3)
SODIUM SERPL-SCNC: 139 MMOL/L (ref 133–144)

## 2022-02-24 PROCEDURE — 36415 COLL VENOUS BLD VENIPUNCTURE: CPT

## 2022-02-24 PROCEDURE — 80048 BASIC METABOLIC PNL TOTAL CA: CPT

## 2022-02-28 ENCOUNTER — HOSPITAL ENCOUNTER (OUTPATIENT)
Dept: CT IMAGING | Facility: CLINIC | Age: 67
End: 2022-02-28
Attending: INTERNAL MEDICINE
Payer: MEDICARE

## 2022-02-28 VITALS — DIASTOLIC BLOOD PRESSURE: 61 MMHG | HEART RATE: 62 BPM | SYSTOLIC BLOOD PRESSURE: 119 MMHG

## 2022-02-28 DIAGNOSIS — I25.5 ISCHEMIC CARDIOMYOPATHY: ICD-10-CM

## 2022-02-28 PROCEDURE — 250N000009 HC RX 250: Performed by: INTERNAL MEDICINE

## 2022-02-28 PROCEDURE — 250N000011 HC RX IP 250 OP 636: Performed by: INTERNAL MEDICINE

## 2022-02-28 PROCEDURE — 75574 CT ANGIO HRT W/3D IMAGE: CPT | Mod: MG

## 2022-02-28 PROCEDURE — 250N000013 HC RX MED GY IP 250 OP 250 PS 637: Performed by: INTERNAL MEDICINE

## 2022-02-28 PROCEDURE — 75574 CT ANGIO HRT W/3D IMAGE: CPT | Mod: 26 | Performed by: INTERNAL MEDICINE

## 2022-02-28 PROCEDURE — G1004 CDSM NDSC: HCPCS | Mod: GC | Performed by: INTERNAL MEDICINE

## 2022-02-28 RX ORDER — IVABRADINE 5 MG/1
5-15 TABLET, FILM COATED ORAL
Status: COMPLETED | OUTPATIENT
Start: 2022-02-28 | End: 2022-02-28

## 2022-02-28 RX ORDER — ACYCLOVIR 200 MG/1
0-1 CAPSULE ORAL
Status: DISCONTINUED | OUTPATIENT
Start: 2022-02-28 | End: 2022-03-01 | Stop reason: HOSPADM

## 2022-02-28 RX ORDER — METHYLPREDNISOLONE SODIUM SUCCINATE 125 MG/2ML
125 INJECTION, POWDER, LYOPHILIZED, FOR SOLUTION INTRAMUSCULAR; INTRAVENOUS
Status: DISCONTINUED | OUTPATIENT
Start: 2022-02-28 | End: 2022-03-01 | Stop reason: HOSPADM

## 2022-02-28 RX ORDER — METOPROLOL TARTRATE 1 MG/ML
5-15 INJECTION, SOLUTION INTRAVENOUS
Status: DISCONTINUED | OUTPATIENT
Start: 2022-02-28 | End: 2022-03-01 | Stop reason: HOSPADM

## 2022-02-28 RX ORDER — IOPAMIDOL 755 MG/ML
120 INJECTION, SOLUTION INTRAVASCULAR ONCE
Status: COMPLETED | OUTPATIENT
Start: 2022-02-28 | End: 2022-02-28

## 2022-02-28 RX ORDER — DIPHENHYDRAMINE HYDROCHLORIDE 50 MG/ML
25-50 INJECTION INTRAMUSCULAR; INTRAVENOUS
Status: DISCONTINUED | OUTPATIENT
Start: 2022-02-28 | End: 2022-03-01 | Stop reason: HOSPADM

## 2022-02-28 RX ORDER — NITROGLYCERIN 0.4 MG/1
.4-.8 TABLET SUBLINGUAL
Status: DISCONTINUED | OUTPATIENT
Start: 2022-02-28 | End: 2022-03-01 | Stop reason: HOSPADM

## 2022-02-28 RX ORDER — ONDANSETRON 2 MG/ML
4 INJECTION INTRAMUSCULAR; INTRAVENOUS
Status: DISCONTINUED | OUTPATIENT
Start: 2022-02-28 | End: 2022-03-01 | Stop reason: HOSPADM

## 2022-02-28 RX ORDER — DIPHENHYDRAMINE HCL 25 MG
25 CAPSULE ORAL
Status: DISCONTINUED | OUTPATIENT
Start: 2022-02-28 | End: 2022-03-01 | Stop reason: HOSPADM

## 2022-02-28 RX ORDER — METOPROLOL TARTRATE 25 MG/1
25-100 TABLET, FILM COATED ORAL
Status: DISCONTINUED | OUTPATIENT
Start: 2022-02-28 | End: 2022-03-01 | Stop reason: HOSPADM

## 2022-02-28 RX ADMIN — METOPROLOL TARTRATE 10 MG: 5 INJECTION INTRAVENOUS at 09:34

## 2022-02-28 RX ADMIN — IOPAMIDOL 120 ML: 755 INJECTION, SOLUTION INTRAVENOUS at 09:26

## 2022-02-28 RX ADMIN — IVABRADINE 15 MG: 5 TABLET, FILM COATED ORAL at 08:30

## 2022-02-28 RX ADMIN — NITROGLYCERIN 0.8 MG: 0.4 TABLET SUBLINGUAL at 09:35

## 2022-02-28 NOTE — PROGRESS NOTES
Pt arrived for Coronary CT angiogram. Test, meds and side effects reviewed with pt.  Resting HR 73-75 bpm. Given 15 mg PO Ivabradine per verbal order. Administered 0.8 mg SL nitro and 10 mg IV metoprolol on CTA table per order. CTA completed.  Patient tolerated procedure well and denies symptoms of allergic reaction.  Post monitoring completed and VSS.  D/C instructions reviewed with pt whom verbalized understanding of need to increase PO fluids today. D/C to gold waiting room accompanied by staff.

## 2022-03-02 ENCOUNTER — TELEPHONE (OUTPATIENT)
Dept: CARDIOLOGY | Facility: CLINIC | Age: 67
End: 2022-03-02
Payer: MEDICARE

## 2022-03-02 NOTE — TELEPHONE ENCOUNTER
----- Message from Rafaela Barnett RN sent at 2/14/2022  4:26 PM CST -----  Regarding: call to schedule  Hi-  Derek will need to be scheduled for caridac MRI, labs and Dr. Hirsch in May 2022. Orders placed and patient aware that soemone will call to help coordinate. At this time, he would like to try and get them done same day..  Thanks  Shmuel

## 2022-03-14 ENCOUNTER — MYC MEDICAL ADVICE (OUTPATIENT)
Dept: PULMONOLOGY | Facility: CLINIC | Age: 67
End: 2022-03-14
Payer: MEDICARE

## 2022-03-14 DIAGNOSIS — J06.9 UPPER RESPIRATORY TRACT INFECTION, UNSPECIFIED TYPE: ICD-10-CM

## 2022-03-14 DIAGNOSIS — J06.9 VIRAL UPPER RESPIRATORY TRACT INFECTION: ICD-10-CM

## 2022-03-14 DIAGNOSIS — D86.9 SARCOIDOSIS: Primary | ICD-10-CM

## 2022-03-14 DIAGNOSIS — R05.9 COUGH: ICD-10-CM

## 2022-03-14 RX ORDER — AZITHROMYCIN 250 MG/1
TABLET, FILM COATED ORAL
Qty: 6 TABLET | Refills: 0 | Status: SHIPPED | OUTPATIENT
Start: 2022-03-14 | End: 2022-04-06

## 2022-03-14 NOTE — TELEPHONE ENCOUNTER
Discussed both MyChart messages with Dr. Martin; provider agrees to treat symptoms with zpak.     Cheri Maradiaga, RN, BSN  ILD Nurse Care Coordinator  (P) [phone]

## 2022-04-05 DIAGNOSIS — D86.0 PULMONARY SARCOIDOSIS (H): ICD-10-CM

## 2022-04-06 ENCOUNTER — OFFICE VISIT (OUTPATIENT)
Dept: CARDIOLOGY | Facility: CLINIC | Age: 67
End: 2022-04-06
Attending: INTERNAL MEDICINE
Payer: MEDICARE

## 2022-04-06 VITALS
OXYGEN SATURATION: 95 % | WEIGHT: 285.9 LBS | HEART RATE: 78 BPM | BODY MASS INDEX: 38.78 KG/M2 | SYSTOLIC BLOOD PRESSURE: 117 MMHG | DIASTOLIC BLOOD PRESSURE: 71 MMHG

## 2022-04-06 DIAGNOSIS — I49.3 PVC'S (PREMATURE VENTRICULAR CONTRACTIONS): ICD-10-CM

## 2022-04-06 DIAGNOSIS — D86.9 SARCOIDOSIS: ICD-10-CM

## 2022-04-06 PROCEDURE — G0463 HOSPITAL OUTPT CLINIC VISIT: HCPCS | Mod: 25

## 2022-04-06 PROCEDURE — 99214 OFFICE O/P EST MOD 30 MIN: CPT | Performed by: INTERNAL MEDICINE

## 2022-04-06 PROCEDURE — 93005 ELECTROCARDIOGRAM TRACING: CPT

## 2022-04-06 RX ORDER — METOPROLOL SUCCINATE 100 MG/1
150 TABLET, EXTENDED RELEASE ORAL DAILY
Qty: 135 TABLET | Refills: 3 | Status: SHIPPED | OUTPATIENT
Start: 2022-04-06 | End: 2023-04-04

## 2022-04-06 ASSESSMENT — PAIN SCALES - GENERAL: PAINLEVEL: NO PAIN (0)

## 2022-04-06 NOTE — NURSING NOTE
Medication Reconciliation:  Rooming staff reviewed and verified all current medications with patient. An updated med list was included in the AVS.    Medication Change:  Patient was educated regarding medication change: increase Toprol XL to 150mg daily . Discussed indication, administration, side effects, and when to report to MD or RN. Patient verbalized understanding and agreed to call with further questions or concerns.     Test Results Reviewed:  Ziopatch, coronary CTA, EKG results were reviewed by provider with patient today.     Return appointment:   Patient given instructions regarding scheduling next clinic visit. Patient verbalized understanding.       Sherry Vealzquez RN on 4/6/2022 at 9:08 AM

## 2022-04-06 NOTE — PATIENT INSTRUCTIONS
Plan:     Increase metoprolol to 150mg daily    Follow-up in 6 months with 14 day ziopatch 1 month prior      Your Care Team:  EP Cardiology   Telephone Number     Sherry Velazquez RN [phone]     For scheduling appts or procedures:    Pao Gamble   [phone]   For the Device Clinic (Pacemakers, ICDs, Loop Recorders)    During business hours: [phone]  After business hours:   [phone]- select option 4 and ask for job code [phone].       Cardiovascular Clinic:   [address] [place] [address]. [place] [address]      As always, Thank you for trusting us with your health care needs!

## 2022-04-06 NOTE — LETTER
4/6/2022      RE: Derek Contreras  [id number] [facility] [id number]       Dear Colleague,    Thank you for the opportunity to participate in the care of your patient, Derek Contreras, at the [facility] [place] [facility]. Please see a copy of my visit note below.    Electrophysiology Clinic Note  HPI:   Mr. Contreras is a 65 yo M who has a PMH pulmonary sarcoidosis, HTN, HLD, Obesity, h/o prostate CA, RV dilation & borderline low LVEF who has been evaluated in the pulmonary hypertension clinic by Dr. Ferrell and found not to have a dx of pulmonary hypertension. 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  His cardiac workup thus far has been as follows: he had a RHC 12/11/19 which showed RA 3, RV 33/5 PA 30/12 (17) and PCWP 5. CO 6.22, CI 2.49. PVR 1.82. Shunt series with Qp/Qs 1.0. He had a cardiac stress MRI 3/11/20 which showed no evidence of LGE. His RV was noted to be moderately enlarged but mildly reduced function with RVEF of 43%. Severely enlarged RA. No ischemia noted on stress imaging. He had a cardiac PET 2/2020 which showed no FDG active cardiac sarcoid. He did have decreased perfusion in the inferoseptal wall which may be related to microvascular disease. He was also noted to have decreased myocardial blood flow in the RCA distribution. Mr. Contreras also had a event monitor which showed frequent NSVT (fastest 19 beats @ 255bpm) and 9x SVT runs. He presents today to the EP clinic for evaluation of his arrhythmia.    EP visit Jan 2022: He denies an symptoms including palpitations, chest discomfort, lightheadedness, dizziness, orthopnea, PND, abd distention, n/v/d, early satiety, unintentional weight loss. He does report chronic KIRBY related to his pulmonary sarcoidosis that has been stable. More recently he reports he has  had a dry cough that has slightly worsened over the last few days, he has reached out to his pulmonologist in regards to this.During this visit, we started him on metoprolol XL 50, gradually increase to . We discussed PET with sarcoid team and we wanted to check instead an ischemic work-up.     He presents for follow-up. He denies any cardiac symptoms. He had a CT Angio on 1/31/2022 showing Severe calcific atherosclerosis causing mild stenosis without any high-grade lesions. He had a repeat Zio patch 1/17-1/24 shows 90 runs of NSVT compared to 432 in Oct, with decreasing ectopy.     PAST MEDICAL HISTORY:  Past Medical History:   Diagnosis Date     Asthma      Claustrophobia      CPAP (continuous positive airway pressure) dependence      Dyslipidemia      [place] (hard of hearing)      Hypercholesteremia      Hypertension      Iron deficiency      Mediastinal adenopathy      Obesity      BEULAH (obstructive sleep apnea)      Prostate cancer (H)      Pulmonary hypertension (H)      Sarcoidosis      CURRENT MEDICATIONS:  Current Outpatient Medications   Medication Sig Dispense Refill     albuterol (PROAIR HFA/PROVENTIL HFA/VENTOLIN HFA) 108 (90 Base) MCG/ACT inhaler Inhale 1-2 puffs into the lungs every 4 hours as needed for shortness of breath / dyspnea 1 Inhaler 4     aspirin (ASA) 81 MG tablet Take 81 mg by mouth every morning  90 tablet 3     atorvastatin (LIPITOR) 40 MG tablet TAKE 1 TABLET(40 MG) BY MOUTH EVERY MORNING 90 tablet 3     BREO ELLIPTA 200-25 MCG/INH Inhaler INHALE 1 PUFF BY MOUTH ONE TIME DAILY  60 each 11     cetirizine (ZYRTEC) 10 MG tablet Take 10 mg by mouth every morning  90 tablet 3     diphenhydrAMINE (BENADRYL) 25 MG tablet Take 1 tablet (25 mg) by mouth every 8 hours as needed for itching or allergies 1 tablet 0     hydrochlorothiazide (HYDRODIURIL) 12.5 MG tablet TAKE ONE TABLET BY MOUTH EVERY MORNING 90 tablet 3     metoprolol succinate ER (TOPROL-XL) 100 MG 24 hr tablet Take 1 tablet (100  mg) by mouth daily 90 tablet 3     montelukast (SINGULAIR) 10 MG tablet Take 1 tablet (10 mg) by mouth At Bedtime 90 tablet 3     multivitamin (ONE-DAILY) tablet Take 1 tablet by mouth every morning  90 tablet 3     omeprazole (PRILOSEC) 40 MG DR capsule TAKE 1 CAPSULE BY MOUTH ONE TIME DAILY 30 capsule 11     sacubitril-valsartan (ENTRESTO)  MG per tablet Take 1 tablet by mouth 2 times daily 180 tablet 1     spironolactone (ALDACTONE) 25 MG tablet Take 1 tablet (25 mg) by mouth daily 90 tablet 1     umeclidinium (INCRUSE ELLIPTA) 62.5 MCG/INH inhaler Inhale 1 puff into the lungs daily 30 each 11     azithromycin (ZITHROMAX) 250 MG tablet Take 2 tablets (500 mg) the first day, then take 1 tablet (250 mg) by mouth daily for a total of 5 days. (Patient not taking: Reported on 4/6/2022) 6 tablet 0     PAST SURGICAL HISTORY:  Past Surgical History:   Procedure Laterality Date     APPENDECTOMY       BIOPSY MASS NECK Left 7/15/2020    Procedure: Left trapezius muscle biopsy;  Surgeon: Ade Villa MD;  Location: [facility] OR     CV RIGHT HEART CATH MEASUREMENTS RECORDED N/A 12/11/2019    Procedure: CV RIGHT HEART CATH;  Surgeon: Torrey Hirsch MD;  Location:  HEART CARDIAC CATH LAB     CV RIGHT HEART CATH MEASUREMENTS RECORDED N/A 1/19/2022    Procedure: CV RIGHT HEART CATH;  Surgeon: Torrey Hirsch MD;  Location:  HEART CARDIAC CATH LAB     DAVINCI PROSTATECTOMY, LYMPHADENECTOMY N/A 5/23/2019    Procedure: ROBOTIC ASSISTED LAPAROSCOPIC RADICAL PROSTATECTOMY WITH BILATERAL PELVIC LYMPH NODE DISSECTION;  Surgeon: Matteo Coughlin MD;  Location:  OR     ENDOBRONCHIAL ULTRASOUND FLEXIBLE N/A 3/29/2019    Procedure: Flexible Bronchoscopy, Endobronchial Ultrasound;  Surgeon: Curtis Leger MD;  Location: [facility] OR     VASECTOMY       ZZC TOTAL HIP ARTHROPLASTY Bilateral      ZZC TOTAL KNEE ARTHROPLASTY Bilateral      ALLERGIES:  Allergies   Allergen Reactions     Cat Hair Extract Itching      itchy tearful eyes     Adhesive Tape Rash     Iodine Rash     FAMILY HISTORY:  Family History   Problem Relation Age of Onset     Alzheimer Disease Mother      Heart Disease Father      Glaucoma No family hx of      Macular Degeneration No family hx of      Diabetes No family hx of      Thyroid Disease No family hx of      SOCIAL HISTORY:  Social History     Tobacco Use     Smoking status: Never Smoker     Smokeless tobacco: Never Used   Vaping Use     Vaping Use: Never used   Substance Use Topics     Alcohol use: Yes     Comment: twice a week     Drug use: No     ROS:   A comprehensive 10 point review of systems negative other than as mentioned in HPI.  Exam:  /71 (BP Location: Right arm, Patient Position: Chair, Cuff Size: Adult Large)   Pulse 78   Wt 129.7 kg (285 lb 14.4 oz)   SpO2 95%   BMI 38.78 kg/m    GENERAL APPEARANCE: alert and no distress  HEENT: MMM. PERRLA.  NECK: supple.   RESPIRATORY: lungs clear to auscultation - no rales, rhonchi or wheezes, no use of accessory muscles, no retractions, respirations are unlabored, normal respiratory rate  CARDIOVASCULAR: regular rhythm, S1/S2, no murmur. No rub. JVP 7  ABDOMEN: soft, NT, ND, +BS.  EXTREMITIES: peripheral pulses normal, trace to minimal edema bilaterally at the ankles  NEURO: alert and oriented to person/place/time, normal speech, gait and affect  SKIN: no ecchymoses, no rashes  PSYCH: normal affect, cooperative    Labs:  Reviewed.     Testing/Procedures:  PULMONARY FUNCTION TESTS:   PFT Latest Ref Rng & Units 2/16/2022   FVC L 2.09   FEV1 L 1.66   FVC% % 44   FEV1% % 46     Event monitor 1/16/2020: No patient triggers.      Zio monitor 11/10/21        Cardiac MRI 12/22/21  1. The LV is normal in cavity size with mild concentric LVH. The global systolic function is moderately  reduced. The LVEF is 41%. There is abnormal septal motion likely related to RBBB and PVCs.     2. The RV is normal in cavity size. The global systolic function is  moderately reduced. The RVEF is 34%.      3. Both atria are severely enlarged.     4. There is no significant valvular disease.      5. Late gadolinium enhancement imaging shows no MI or infiltrative disease. There is enhancement in the  septum at the RV insertion sites. This is a non-specific pattern that can be seen in pulmonary  hypertension.     6. There is no pericardial effusion or thickening.     7. There is no intracardiac thrombus.     8. The main PA is mildly dilated measuring 3.1 cm.      CONCLUSIONS: No evidence of cardiac sarcoid. Moderate cardiomyopathy, LVEF 41% and RVEF 34%, with no  significant fibrosis. Compared to prior CMR on 8/2019 RV function is worse with no other change. Recommend  re-assessment for pulmonary hypertension with TTE or RHC.     Stress Cardiac MRI 8/29/2019  1. The LV is normal in cavity size and wall thickness. The global systolic function is normal. The LVEF is  50%. There is systolic flattening of the interventricular septum and global dyssynchrony due to PVCs.     2. The RV is moderately enlarged based on the visual examination. The global systolic function is mildly  reduced. The RVEF is 43%.      3. The left atrium is mildly enlarged and the right atrium is severely enlarged.     4. There is at least mild mitral regurgitation and trace aortic regurgitation.     5. Late gadolinium enhancement imaging shows no MI, fibrosis or infiltrative disease.      6. Regadenoson stress perfusion imaging shows no ischemia.     7. There is no pericardial effusion or thickening.     8. There is no intracardiac thrombus.     CONCLUSIONS: Non-ischemic cardiomyopathy, likely due to pulmonary hypertension and dyssynchrony from PVCs.  There is mildly reduced biventricular function.  There is no evidence of myocardial perfusion defects or myocardial fibrosis.     Cardiac PET SCANS  2/19/2020  Impression:  1. No FDG active cardiac sarcoid.  2. Decreased perfusion in the inferoseptal wall,  findings may be  related to sequela of previous cardiac sarcoid with microvascular  injury.  3. Enlarged left ventricle with borderline low ejection fraction.  4. Decreased myocardial blood flow in the RCA distribution.  5. Findings of a dilated cardiomyopathy a concern for possible  ischemia/myocardial injury of the septum, consider CTA or coronary  angiography for further evaluation of this region.  6. Chest and upper abdominal hypermetabolic lymphadenopathy which is  indicative of active systemic sarcoid.     Assessment and Plan:   Mr. Contreras is a 65 yo M who has a PMH pulmonary sarcoidosis, HTN, HLD, Obesity, h/o prostate CA, RV dilation & borderline low LVEF who has been evaluated in the pulmonary hypertension clinic by Dr. Ferrell and found not to have a dx of pulmonary hypertension. 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  His cardiac workup thus far has been as follows: he had a RHC 12/11/19 which showed RA 3, RV 33/5 PA 30/12 (17) and PCWP 5. CO 6.22, CI 2.49. PVR 1.82. Shunt series with Qp/Qs 1.0. He had a cardiac stress MRI 3/11/20 which showed no evidence of LGE. His RV was noted to be moderately enlarged but mildly reduced function with RVEF of 43%. Severely enlarged RA. No ischemia noted on stress imaging. He had a cardiac PET 2/2020 which showed no FDG active cardiac sarcoid. He did have decreased perfusion in the inferoseptal wall which may be related to microvascular disease. He was also noted to have decreased myocardial blood flow in the RCA distribution. He had a repeat cardiac MRI 12/22/21 which showed worsened LV function to 41% and RV function decreased as well to RVEF 34% (moderately reduced). RV size continued to be normal. The patient also had a new finding of non-specific LGE at the insertion sites of the RV at the septum. Mr. Contreras also had a event monitor which showed frequent NSVT  (fastest 19 beats @ 255bpm) and 9x SVT runs. He presents today to the EP clinic for evaluation of his arrhythmia.    #PVCs - 19% [place]  #Non-sustained Polymorphic VT - PVC induced  The morphology of the patient's PVC is indicative of a RV septal origin.  He has known history of mildly reduced function that has now progressed to biventricular function worsening.  Additionally there is new LGE at the RV insertion site (new compared to prior MRI) which is a nonspecific finding.  The etiology of patient's biventricular dysfunction is not clear at this time.  Differential includes possible active phase sarcoidosis that has now progressed to the heart (and possibly has not had enough time to develop scar), this would be quite unusual/atypical.  Additionally the patient may also have a nonischemic myocardial process that is leading to progressively worsening function. Etiology may also be related to dyssynchrony from frequent PVCs that have also increased in burden.  PVC burden worsening could also be a reflection of his myocardial dysfunction. No significant signs to suggest that his is due to ischemia with a negative stress MRI a few years ago, it would be highly unusual for coronary disease to progress this quickly without any significant ischemic symptoms.    HIs ectopy burden has decreased significantly since we started the BB. He has had no side effects and he continues to be asymptomatic. He has no significant CAD..    Recommendations:  - will increase metoprolol further to .  - Will defer ablation for the time being given favorable response and decreased likelihood of long term success given multiple morphologies without clear dominance.  - Follow-up in 6 months with repeat Zio.    EP STAFF NOTE  Patient seen and examined and management plan personally reviewed with the patient. I agree with the note above by the CV/EP fellow.  Anuel Figueroa MD [facility]  Cardiology - Electrophysiology    Total time spent on  patient visit, reviewing notes, imaging, labs, orders, and completing necessary documentation: 30 minutes.  >50% of visit spent on counseling patient and/or coordination of care.

## 2022-04-06 NOTE — PROGRESS NOTES
Electrophysiology Clinic Note  HPI:   Mr. Contreras is a 67 yo M who has a PMH pulmonary sarcoidosis, HTN, HLD, Obesity, h/o prostate CA, RV dilation & borderline low LVEF who has been evaluated in the pulmonary hypertension clinic by Dr. Ferrell and found not to have a dx of pulmonary hypertension. 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  His cardiac workup thus far has been as follows: he had a RHC 12/11/19 which showed RA 3, RV 33/5 PA 30/12 (17) and PCWP 5. CO 6.22, CI 2.49. PVR 1.82. Shunt series with Qp/Qs 1.0. He had a cardiac stress MRI 3/11/20 which showed no evidence of LGE. His RV was noted to be moderately enlarged but mildly reduced function with RVEF of 43%. Severely enlarged RA. No ischemia noted on stress imaging. He had a cardiac PET 2/2020 which showed no FDG active cardiac sarcoid. He did have decreased perfusion in the inferoseptal wall which may be related to microvascular disease. He was also noted to have decreased myocardial blood flow in the RCA distribution. Mr. Contrears also had a event monitor which showed frequent NSVT (fastest 19 beats @ 255bpm) and 9x SVT runs. He presents today to the EP clinic for evaluation of his arrhythmia.    EP visit Jan 2022: He denies an symptoms including palpitations, chest discomfort, lightheadedness, dizziness, orthopnea, PND, abd distention, n/v/d, early satiety, unintentional weight loss. He does report chronic KIRBY related to his pulmonary sarcoidosis that has been stable. More recently he reports he has had a dry cough that has slightly worsened over the last few days, he has reached out to his pulmonologist in regards to this.During this visit, we started him on metoprolol XL 50, gradually increase to . We discussed PET with sarcoid team and we wanted to check instead an ischemic work-up.     He presents for follow-up. He denies any cardiac  symptoms. He had a CT Angio on 1/31/2022 showing Severe calcific atherosclerosis causing mild stenosis without any high-grade lesions. He had a repeat Zio patch 1/17-1/24 shows 90 runs of NSVT compared to 432 in Oct, with decreasing ectopy.     PAST MEDICAL HISTORY:  Past Medical History:   Diagnosis Date     Asthma      Claustrophobia      CPAP (continuous positive airway pressure) dependence      Dyslipidemia      [place] (hard of hearing)      Hypercholesteremia      Hypertension      Iron deficiency      Mediastinal adenopathy      Obesity      BEULAH (obstructive sleep apnea)      Prostate cancer (H)      Pulmonary hypertension (H)      Sarcoidosis      CURRENT MEDICATIONS:  Current Outpatient Medications   Medication Sig Dispense Refill     albuterol (PROAIR HFA/PROVENTIL HFA/VENTOLIN HFA) 108 (90 Base) MCG/ACT inhaler Inhale 1-2 puffs into the lungs every 4 hours as needed for shortness of breath / dyspnea 1 Inhaler 4     aspirin (ASA) 81 MG tablet Take 81 mg by mouth every morning  90 tablet 3     atorvastatin (LIPITOR) 40 MG tablet TAKE 1 TABLET(40 MG) BY MOUTH EVERY MORNING 90 tablet 3     BREO ELLIPTA 200-25 MCG/INH Inhaler INHALE 1 PUFF BY MOUTH ONE TIME DAILY  60 each 11     cetirizine (ZYRTEC) 10 MG tablet Take 10 mg by mouth every morning  90 tablet 3     diphenhydrAMINE (BENADRYL) 25 MG tablet Take 1 tablet (25 mg) by mouth every 8 hours as needed for itching or allergies 1 tablet 0     hydrochlorothiazide (HYDRODIURIL) 12.5 MG tablet TAKE ONE TABLET BY MOUTH EVERY MORNING 90 tablet 3     metoprolol succinate ER (TOPROL-XL) 100 MG 24 hr tablet Take 1 tablet (100 mg) by mouth daily 90 tablet 3     montelukast (SINGULAIR) 10 MG tablet Take 1 tablet (10 mg) by mouth At Bedtime 90 tablet 3     multivitamin (ONE-DAILY) tablet Take 1 tablet by mouth every morning  90 tablet 3     omeprazole (PRILOSEC) 40 MG DR capsule TAKE 1 CAPSULE BY MOUTH ONE TIME DAILY 30 capsule 11     sacubitril-valsartan (ENTRESTO)   MG per tablet Take 1 tablet by mouth 2 times daily 180 tablet 1     spironolactone (ALDACTONE) 25 MG tablet Take 1 tablet (25 mg) by mouth daily 90 tablet 1     umeclidinium (INCRUSE ELLIPTA) 62.5 MCG/INH inhaler Inhale 1 puff into the lungs daily 30 each 11     azithromycin (ZITHROMAX) 250 MG tablet Take 2 tablets (500 mg) the first day, then take 1 tablet (250 mg) by mouth daily for a total of 5 days. (Patient not taking: Reported on 4/6/2022) 6 tablet 0     PAST SURGICAL HISTORY:  Past Surgical History:   Procedure Laterality Date     APPENDECTOMY       BIOPSY MASS NECK Left 7/15/2020    Procedure: Left trapezius muscle biopsy;  Surgeon: Ade Villa MD;  Location: [facility]     CV RIGHT HEART CATH MEASUREMENTS RECORDED N/A 12/11/2019    Procedure: CV RIGHT HEART CATH;  Surgeon: Torrey Hirsch MD;  [facility]     CV RIGHT HEART CATH MEASUREMENTS RECORDED N/A 1/19/2022    Procedure: CV RIGHT HEART CATH;  Surgeon: Torrey Hirsch MD;  Location:  [facility] CARD[facility]AC CATH LAB     DAVINCI PROSTATECTOMY, LYMPHADENECTOMY N/A 5/23/2019    Procedure: ROBOTIC ASSISTED LAPAROSCOPIC RADICAL PROSTATECTOMY WITH BILATERAL PELVIC LYMPH NODE DISSECTION;  Surgeon: Matteo Coughlin MD;  Location: [facility] OR     ENDOBRONCHIAL ULTRASOUND FLEXIBLE N/A 3/29/2019    Procedure: Flexible Bronchoscopy, Endobronchial Ultrasound;  Surgeon: Curtis Leger MD;  Location: [facility] OR     VASECTOMY       ZZC TOTAL HIP ARTHROPLASTY Bilateral      ZZC TOTAL KNEE ARTHROPLASTY Bilateral      ALLERGIES:  Allergies   Allergen Reactions     Cat Hair Extract Itching     itchy tearful eyes     Adhesive Tape Rash     Iodine Rash     FAMILY HISTORY:  Family History   Problem Relation Age of Onset     Alzheimer Disease Mother      Heart Disease Father      Glaucoma No family hx of      Macular Degeneration No family hx of      Diabetes No family hx of      Thyroid Disease No family hx of      SOCIAL HISTORY:  Social  History     Tobacco Use     Smoking status: Never Smoker     Smokeless tobacco: Never Used   Vaping Use     Vaping Use: Never used   Substance Use Topics     Alcohol use: Yes     Comment: twice a week     Drug use: No     ROS:   A comprehensive 10 point review of systems negative other than as mentioned in HPI.  Exam:  /71 (BP Location: Right arm, Patient Position: Chair, Cuff Size: Adult Large)   Pulse 78   Wt 129.7 kg (285 lb 14.4 oz)   SpO2 95%   BMI 38.78 kg/m    GENERAL APPEARANCE: alert and no distress  HEENT: MMM. PERRLA.  NECK: supple.   RESPIRATORY: lungs clear to auscultation - no rales, rhonchi or wheezes, no use of accessory muscles, no retractions, respirations are unlabored, normal respiratory rate  CARDIOVASCULAR: regular rhythm, S1/S2, no murmur. No rub. JVP 7  ABDOMEN: soft, NT, ND, +BS.  EXTREMITIES: peripheral pulses normal, trace to minimal edema bilaterally at the ankles  NEURO: alert and oriented to person/place/time, normal speech, gait and affect  SKIN: no ecchymoses, no rashes  PSYCH: normal affect, cooperative    Labs:  Reviewed.     Testing/Procedures:  PULMONARY FUNCTION TESTS:   PFT Latest Ref Rng & Units 2/16/2022   FVC L 2.09   FEV1 L 1.66   FVC% % 44   FEV1% % 46     Event monitor 1/16/2020: No patient triggers.      Zio monitor 11/10/21        Cardiac MRI 12/22/21  1. The LV is normal in cavity size with mild concentric LVH. The global systolic function is moderately  reduced. The LVEF is 41%. There is abnormal septal motion likely related to RBBB and PVCs.     2. The RV is normal in cavity size. The global systolic function is moderately reduced. The RVEF is 34%.      3. Both atria are severely enlarged.     4. There is no significant valvular disease.      5. Late gadolinium enhancement imaging shows no MI or infiltrative disease. There is enhancement in the  septum at the RV insertion sites. This is a non-specific pattern that can be seen in  pulmonary  hypertension.     6. There is no pericardial effusion or thickening.     7. There is no intracardiac thrombus.     8. The main PA is mildly dilated measuring 3.1 cm.      CONCLUSIONS: No evidence of cardiac sarcoid. Moderate cardiomyopathy, LVEF 41% and RVEF 34%, with no  significant fibrosis. Compared to prior CMR on 8/2019 RV function is worse with no other change. Recommend  re-assessment for pulmonary hypertension with TTE or RHC.     Stress Cardiac MRI 8/29/2019  1. The LV is normal in cavity size and wall thickness. The global systolic function is normal. The LVEF is  50%. There is systolic flattening of the interventricular septum and global dyssynchrony due to PVCs.     2. The RV is moderately enlarged based on the visual examination. The global systolic function is mildly  reduced. The RVEF is 43%.      3. The left atrium is mildly enlarged and the right atrium is severely enlarged.     4. There is at least mild mitral regurgitation and trace aortic regurgitation.     5. Late gadolinium enhancement imaging shows no MI, fibrosis or infiltrative disease.      6. Regadenoson stress perfusion imaging shows no ischemia.     7. There is no pericardial effusion or thickening.     8. There is no intracardiac thrombus.     CONCLUSIONS: Non-ischemic cardiomyopathy, likely due to pulmonary hypertension and dyssynchrony from PVCs.  There is mildly reduced biventricular function.  There is no evidence of myocardial perfusion defects or myocardial fibrosis.     Cardiac PET SCANS  2/19/2020  Impression:  1. No FDG active cardiac sarcoid.  2. Decreased perfusion in the inferoseptal wall, findings may be  related to sequela of previous cardiac sarcoid with microvascular  injury.  3. Enlarged left ventricle with borderline low ejection fraction.  4. Decreased myocardial blood flow in the RCA distribution.  5. Findings of a dilated cardiomyopathy a concern for possible  ischemia/myocardial injury of the septum,  consider CTA or coronary  angiography for further evaluation of this region.  6. Chest and upper abdominal hypermetabolic lymphadenopathy which is  indicative of active systemic sarcoid.     Assessment and Plan:   Mr. Contreras is a 65 yo M who has a PMH pulmonary sarcoidosis, HTN, HLD, Obesity, h/o prostate CA, RV dilation & borderline low LVEF who has been evaluated in the pulmonary hypertension clinic by Dr. Ferrell and found not to have a dx of pulmonary hypertension. 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  His cardiac workup thus far has been as follows: he had a RHC 12/11/19 which showed RA 3, RV 33/5 PA 30/12 (17) and PCWP 5. CO 6.22, CI 2.49. PVR 1.82. Shunt series with Qp/Qs 1.0. He had a cardiac stress MRI 3/11/20 which showed no evidence of LGE. His RV was noted to be moderately enlarged but mildly reduced function with RVEF of 43%. Severely enlarged RA. No ischemia noted on stress imaging. He had a cardiac PET 2/2020 which showed no FDG active cardiac sarcoid. He did have decreased perfusion in the inferoseptal wall which may be related to microvascular disease. He was also noted to have decreased myocardial blood flow in the RCA distribution. He had a repeat cardiac MRI 12/22/21 which showed worsened LV function to 41% and RV function decreased as well to RVEF 34% (moderately reduced). RV size continued to be normal. The patient also had a new finding of non-specific LGE at the insertion sites of the RV at the septum. Mr. Contreras also had a event monitor which showed frequent NSVT (fastest 19 beats @ 255bpm) and 9x SVT runs. He presents today to the EP clinic for evaluation of his arrhythmia.    #PVCs - 19% [place]  #Non-sustained Polymorphic VT - PVC induced  The morphology of the patient's PVC is indicative of a RV septal origin.  He has known history of mildly reduced function that has now  progressed to biventricular function worsening.  Additionally there is new LGE at the RV insertion site (new compared to prior MRI) which is a nonspecific finding.  The etiology of patient's biventricular dysfunction is not clear at this time.  Differential includes possible active phase sarcoidosis that has now progressed to the heart (and possibly has not had enough time to develop scar), this would be quite unusual/atypical.  Additionally the patient may also have a nonischemic myocardial process that is leading to progressively worsening function. Etiology may also be related to dyssynchrony from frequent PVCs that have also increased in burden.  PVC burden worsening could also be a reflection of his myocardial dysfunction. No significant signs to suggest that his is due to ischemia with a negative stress MRI a few years ago, it would be highly unusual for coronary disease to progress this quickly without any significant ischemic symptoms.    HIs ectopy burden has decreased significantly since we started the BB. He has had no side effects and he continues to be asymptomatic. He has no significant CAD..    Recommendations:  - will increase metoprolol further to .  - Will defer ablation for the time being given favorable response and decreased likelihood of long term success given multiple morphologies without clear dominance.  - Follow-up in 6 months with repeat Zio.    EP STAFF NOTE  Patient seen and examined and management plan personally reviewed with the patient. I agree with the note above by the CV/EP fellow.  Anuel Figueroa MD [facility]  Cardiology - Electrophysiology    Total time spent on patient visit, reviewing notes, imaging, labs, orders, and completing necessary documentation: 30 minutes.  >50% of visit spent on counseling patient and/or coordination of care.

## 2022-04-06 NOTE — NURSING NOTE
Chief Complaint   Patient presents with     Follow Up     3 mo follow-up PVCs, pulm sarcoid.      Vitals were taken, medications reconciled, and EKG was performed.    Cristhian Lou, EMT  8:03 AM

## 2022-04-11 LAB
ATRIAL RATE - MUSE: 81 BPM
DIASTOLIC BLOOD PRESSURE - MUSE: NORMAL MMHG
INTERPRETATION ECG - MUSE: NORMAL
P AXIS - MUSE: 47 DEGREES
PR INTERVAL - MUSE: 192 MS
QRS DURATION - MUSE: 148 MS
QT - MUSE: 412 MS
QTC - MUSE: 478 MS
R AXIS - MUSE: -55 DEGREES
SYSTOLIC BLOOD PRESSURE - MUSE: NORMAL MMHG
T AXIS - MUSE: 4 DEGREES
VENTRICULAR RATE- MUSE: 81 BPM

## 2022-05-02 ENCOUNTER — HOSPITAL ENCOUNTER (OUTPATIENT)
Dept: MRI IMAGING | Facility: CLINIC | Age: 67
Discharge: HOME | End: 2022-05-02
Attending: INTERNAL MEDICINE | Admitting: INTERNAL MEDICINE
Payer: MEDICARE

## 2022-05-02 DIAGNOSIS — I50.22 CHRONIC SYSTOLIC HEART FAILURE (H): ICD-10-CM

## 2022-05-02 PROCEDURE — 75561 CARDIAC MRI FOR MORPH W/DYE: CPT | Mod: 26 | Performed by: RADIOLOGY

## 2022-05-02 PROCEDURE — 255N000002 HC RX 255 OP 636: Performed by: INTERNAL MEDICINE

## 2022-05-02 PROCEDURE — G1004 CDSM NDSC: HCPCS

## 2022-05-02 PROCEDURE — G1004 CDSM NDSC: HCPCS | Performed by: RADIOLOGY

## 2022-05-02 PROCEDURE — 75561 CARDIAC MRI FOR MORPH W/DYE: CPT | Mod: ME

## 2022-05-02 PROCEDURE — A9585 GADOBUTROL INJECTION: HCPCS | Performed by: INTERNAL MEDICINE

## 2022-05-02 RX ORDER — GADOBUTROL 604.72 MG/ML
7.5 INJECTION INTRAVENOUS ONCE
Status: COMPLETED | OUTPATIENT
Start: 2022-05-02 | End: 2022-05-02

## 2022-05-02 RX ADMIN — GADOBUTROL 7.5 ML: 604.72 INJECTION INTRAVENOUS at 08:25

## 2022-05-04 DIAGNOSIS — R06.09 DOE (DYSPNEA ON EXERTION): ICD-10-CM

## 2022-05-04 DIAGNOSIS — I27.20 PULMONARY HYPERTENSION (H): Primary | ICD-10-CM

## 2022-05-09 ENCOUNTER — OFFICE VISIT (OUTPATIENT)
Dept: CARDIOLOGY | Facility: CLINIC | Age: 67
End: 2022-05-09
Attending: INTERNAL MEDICINE
Payer: MEDICARE

## 2022-05-09 ENCOUNTER — LAB (OUTPATIENT)
Dept: LAB | Facility: CLINIC | Age: 67
End: 2022-05-09
Attending: INTERNAL MEDICINE
Payer: MEDICARE

## 2022-05-09 VITALS
BODY MASS INDEX: 39.74 KG/M2 | OXYGEN SATURATION: 96 % | WEIGHT: 293 LBS | DIASTOLIC BLOOD PRESSURE: 71 MMHG | HEART RATE: 59 BPM | SYSTOLIC BLOOD PRESSURE: 110 MMHG

## 2022-05-09 DIAGNOSIS — I50.22 CHRONIC SYSTOLIC HEART FAILURE (H): ICD-10-CM

## 2022-05-09 DIAGNOSIS — I27.20 PULMONARY HYPERTENSION (H): ICD-10-CM

## 2022-05-09 DIAGNOSIS — R06.09 DOE (DYSPNEA ON EXERTION): ICD-10-CM

## 2022-05-09 DIAGNOSIS — D86.0 PULMONARY SARCOIDOSIS (H): Primary | ICD-10-CM

## 2022-05-09 LAB
ANION GAP SERPL CALCULATED.3IONS-SCNC: 9 MMOL/L (ref 3–14)
BUN SERPL-MCNC: 16 MG/DL (ref 7–30)
CALCIUM SERPL-MCNC: 8.6 MG/DL (ref 8.5–10.1)
CHLORIDE BLD-SCNC: 106 MMOL/L (ref 94–109)
CO2 SERPL-SCNC: 27 MMOL/L (ref 20–32)
CREAT SERPL-MCNC: 0.94 MG/DL (ref 0.66–1.25)
ERYTHROCYTE [DISTWIDTH] IN BLOOD BY AUTOMATED COUNT: 14 % (ref 10–15)
GFR SERPL CREATININE-BSD FRML MDRD: 89 ML/MIN/1.73M2
GLUCOSE BLD-MCNC: 110 MG/DL (ref 70–99)
HCT VFR BLD AUTO: 48.9 % (ref 40–53)
HGB BLD-MCNC: 16.2 G/DL (ref 13.3–17.7)
MCH RBC QN AUTO: 29.8 PG (ref 26.5–33)
MCHC RBC AUTO-ENTMCNC: 33.1 G/DL (ref 31.5–36.5)
MCV RBC AUTO: 90 FL (ref 78–100)
NT-PROBNP SERPL-MCNC: 42 PG/ML (ref 0–900)
PLATELET # BLD AUTO: 160 10E3/UL (ref 150–450)
POTASSIUM BLD-SCNC: 4.1 MMOL/L (ref 3.4–5.3)
RBC # BLD AUTO: 5.44 10E6/UL (ref 4.4–5.9)
SODIUM SERPL-SCNC: 142 MMOL/L (ref 133–144)
WBC # BLD AUTO: 7.4 10E3/UL (ref 4–11)

## 2022-05-09 PROCEDURE — 36415 COLL VENOUS BLD VENIPUNCTURE: CPT | Performed by: PATHOLOGY

## 2022-05-09 PROCEDURE — 99215 OFFICE O/P EST HI 40 MIN: CPT | Performed by: INTERNAL MEDICINE

## 2022-05-09 PROCEDURE — G0463 HOSPITAL OUTPT CLINIC VISIT: HCPCS

## 2022-05-09 PROCEDURE — 85027 COMPLETE CBC AUTOMATED: CPT | Performed by: PATHOLOGY

## 2022-05-09 PROCEDURE — 80048 BASIC METABOLIC PNL TOTAL CA: CPT | Performed by: PATHOLOGY

## 2022-05-09 PROCEDURE — 83880 ASSAY OF NATRIURETIC PEPTIDE: CPT | Performed by: PATHOLOGY

## 2022-05-09 RX ORDER — LIDOCAINE 40 MG/G
CREAM TOPICAL
Status: CANCELLED | OUTPATIENT
Start: 2022-05-09

## 2022-05-09 ASSESSMENT — PAIN SCALES - GENERAL: PAINLEVEL: NO PAIN (0)

## 2022-05-09 NOTE — NURSING NOTE
Patient educated to the following during visit and educated to contact RN or MD for any questions.     Plan: RHC and 6mwt with follow-up with [place] in 6MO    Patient demonstrated understanding of all health information given and agreed to call with further questions or concerns.    Patient  be scheduled in future    Lona Payton RN on 5/9/2022 at 9:22 AM

## 2022-05-09 NOTE — PATIENT INSTRUCTIONS
Medication Changes & Instructions:  No changes    Follow up Appointment Information:  6Mo follow-up with Torrey with Right heart catheterization and 6 minute walk test      *Mandatory COVID Testing: Complete a COVID test no sooner than 4 days prior to their procedure.    To schedule COVID testing Please call [phone]  If you want to complete this at an outside facility please call them to find out if they will have the results within the appropriate time frame and their fax number.  You will need to provide us with that information so we can send them the order.  They will need to fax the results to [phone]  If you are running into and issues please call us.       Right Heart Catheterization    A Right Heart Cath is a test that measures how well your heart is pumping blood to your body and will assess the volume and pressures in your heart.   Location: [facility], [address] [place] [address], [place] [address].   Check in: Gold Waiting Area. 1st floor entrance, directly down the castrejon.     A physician will come and talk with you about the procedure and obtain consent.  A nurse from the Cardiac Catheterization Lab will then escort you to the procedure area. You will receive a shot to numb the site where the catheter (tube) will enter your body.  This may be in the neck or groin - but likely your neck.  You will not receive sedation or anesthesia.   After the procedure you will recover for a short period and then be discharged.    Pre procedure instructions:     1.  Do not eat any solid food or milk products for 6 hours prior to the exam. You may drink clear liquids until 2 hours prior to the exam. Clear liquids include the following: water, Jell-O, clear broth, apple juice or any non-carbonated drink that you can see through (no soda).   2. Do not drink alcohol or smoke 24 hours prior to test.  3. Hold these meds:        Do not take your oral diabetic medication or short  acting insulin the day of the procedure.      4. You may take your other morning medications as prescribed with a sip of water. You may hold your diuretic that morning.   Check-In  Time Check-In Location Estimated Length Procedure   Name        [facility]  waiting room 60-90 minutes Right Heart Catheterization**     Procedure Preparations & Instructions     This is an invasive procedure that DOES require preparation:    Take your temperature in the morning prior to coming in.  If your temperature is 100 F please call [phone] (Opt[address] 1) and notify them.  If you do not have access to a thermometer at home, please come in for testing.  If you are running a temperature your procedure may be rescheduled.  Nothing to eat for 6 hours   You may have clear liquids up until the time of your procedure  You can take your morning medications (except diabetic and blood thinners) with sips of water  Please arrange for a ride to drop you off and pick you up in the instance you are unable to drive home, however you should be able to function as you normally would after the procedure                     [address] [facility]  [place] [address]      Thank you for allowing us to be a part of your care here at the [facility].      If you have questions or concerns please contact us at:    Nurse Coordinator: Lona Payton RN [phone]  Cardiovascular Clinic  [facility]   [phone] Press #1 to send a message to your care team  On Call Cardiologist for after hours or on weekends: [phone]  option #4     *All co-payments are due at the time of services, if financial concerns are keeping you from attending scheduled clinic visits please contact our financial counselors at [phone] for further assistance.   * Please note that you will NOT receive a reminder call regarding your scheduled testing, reminder calls are for provider  appointments only.  If you are scheduled for testing within the [facility] system you may receive a call regarding pre-registration for billing purposes only.**

## 2022-05-09 NOTE — PROGRESS NOTES
[facility]  Advanced HF & Pulmonary Hypertension Clinic  Service Date:  05/09/2022    Dear Doctors Mario and Elmer:       We had the pleasure of seeing Mr. Derek Contreras follow-up in our heart failure and pulm hypertension clinic.  As you know, he is a very pleasant 66-year-old male with past medical history significant for     1. Pulmonary sarcoidosis  2.  LV systolic dysfunction -nonischemic in etiology likely related to uncontrolled hypertension  3.  Pulmonary hypertension and RV dysfunction secondary to pulmonary sarcoidosis    He returns today for follow-up.  His blood pressures are under control.  He does not have shortness of breath at rest or with his usual activities.  He walks on the treadmill 1.25 miles 4 times a week with no limitations.  I would characterize him as functional class II.  He has not had any lower extremity swelling or abdominal distention.  No exertional presyncope or syncope.  No exertional chest pain or chest pressure.  He has not had any recent hospitalizations or ER visits.    He is on full dose valsartan sacubitril now.    He had a cardiac MRI last week that showed normalization of his left ventricular systolic function on medical therapy.  His right ventricular function has improved from 31% to 44%.        PAST MEDICAL HISTORY:  Past Medical History:   Diagnosis Date     Asthma      Claustrophobia      CPAP (continuous positive airway pressure) dependence      Dyslipidemia      [place] (hard of hearing)      Hypercholesteremia      Hypertension      Iron deficiency      Mediastinal adenopathy      Obesity      BEULAH (obstructive sleep apnea)      Prostate cancer (H)      Pulmonary hypertension (H)      Sarcoidosis      CURRENT MEDICATIONS:  Current Outpatient Medications   Medication Sig     albuterol (PROAIR HFA/PROVENTIL HFA/VENTOLIN HFA) 108 (90 Base) MCG/ACT inhaler Inhale 1-2 puffs into the lungs every 4 hours as needed for shortness of breath / dyspnea     aspirin  (ASA) 81 MG tablet Take 81 mg by mouth every morning      atorvastatin (LIPITOR) 40 MG tablet TAKE 1 TABLET(40 MG) BY MOUTH EVERY MORNING     BREO ELLIPTA 200-25 MCG/INH Inhaler INHALE 1 PUFF BY MOUTH ONE TIME DAILY      cetirizine (ZYRTEC) 10 MG tablet Take 10 mg by mouth every morning      diphenhydrAMINE (BENADRYL) 25 MG tablet Take 1 tablet (25 mg) by mouth every 8 hours as needed for itching or allergies     hydrochlorothiazide (HYDRODIURIL) 12.5 MG tablet TAKE ONE TABLET BY MOUTH EVERY MORNING     metoprolol succinate ER (TOPROL-XL) 100 MG 24 hr tablet Take 1.5 tablets (150 mg) by mouth daily     montelukast (SINGULAIR) 10 MG tablet Take 1 tablet (10 mg) by mouth At Bedtime     multivitamin (ONE-DAILY) tablet Take 1 tablet by mouth every morning      omeprazole (PRILOSEC) 40 MG DR capsule TAKE 1 CAPSULE BY MOUTH ONE TIME DAILY     sacubitril-valsartan (ENTRESTO)  MG per tablet Take 1 tablet by mouth 2 times daily     spironolactone (ALDACTONE) 25 MG tablet Take 1 tablet (25 mg) by mouth daily     umeclidinium (INCRUSE ELLIPTA) 62.5 MCG/INH inhaler Inhale 1 puff into the lungs daily     No current facility-administered medications for this visit.       ROS:   10 point ROS negative except as discussed in above HPI.    EXAM:  /71 (BP Location: Left arm, Patient Position: Sitting, Cuff Size: Adult Large)   Pulse 59   Wt 132.9 kg (293 lb)   SpO2 96%   BMI 39.74 kg/m    General: appears comfortable, alert and articulate  Head: normocephalic, atraumatic  Eyes: anicteric sclera, EOMI  Neck: no adenopathy  Orophyarynx: moist mucosa, no lesions, dentition intact  Heart: regular, normal S1/S2, no murmur, gallop, rub, no jugular venous distention  Lungs: clear to auscultation bilaterally, no rales or wheezing  Abdomen: soft, non-tender, bowel sounds present, no hepatosplenomegaly  Extremities: no clubbing, cyanosis or edema  Neurological: normal speech and affect, no gross motor deficits    Labs:  CBC  RESULTS: Recent Labs   Lab Test 05/09/22  0757   WBC 7.4   RBC 5.44   HGB 16.2   HCT 48.9   MCV 90   MCH 29.8   MCHC 33.1   RDW 14.0        Recent Labs   Lab Test 05/09/22  0757 02/24/22  0805    139   POTASSIUM 4.1 4.4   CHLORIDE 106 104   CO2 27 30   ANIONGAP 9 5   * 109*   BUN 16 19   CR 0.94 1.09   ANTONIO 8.6 9.3     No results found for: NTBNPI  Lab Results   Component Value Date    NTBNP 42 05/09/2022    NTBNP 21 12/11/2019       6MWT (02/2022)  He walked for 259 meters.  He desaturated to 88% on room air.      EKG 2/2022  Sinus rhythm with bifascicular block, PVCs    Cardiac MRI 05/02/2022  Comparison CMR: 12/22/2021     1. The LV is normal in cavity size and wall thickness. The global systolic function is normal. The LVEF is  58 %. There are no regional wall motion abnormalities.     2. The RV is mildly enlarged in cavity size. The global systolic function is mildly reduced. The RVEF is  44%.      3. Both atria are normal in size.     4. There is no significant valvular disease.      5. Late gadolinium enhancement imaging shows anterior septal and inferior septal LGE at the RV insertion  points similar to prior     6.  There is no pericardial effusion or thickening.      7.  There is no intracardiac thrombus.      CONCLUSIONS:  No evidence of active cardiac sarcoidosis. Improved biventricular function with LVEF 58% and  RVEF 44% (previously 41% and 34% respectively). Septal systolic paradoxical bowing still suggestive of RV  pressure overload, and LGE in the septal RV insertion points into the LV; together suggestive of pulmonary  hypertension.     Cardiac PET 2/2020  1. No FDG active cardiac sarcoid.  2. Decreased perfusion in the inferoseptal wall, findings may be  related to sequela of previous cardiac sarcoid with microvascular  injury.  3. Enlarged left ventricle with borderline low ejection fraction.  4. Decreased myocardial blood flow in the RCA distribution.  5. Findings of a dilated  cardiomyopathy a concern for possible  ischemia/myocardial injury of the septum, consider CTA or coronary  angiography for further evaluation of this region.  6. Chest and upper abdominal hypermetabolic lymphadenopathy which is  indicative of active systemic sarcoid.    PFTs (02/2022):   FVC 44%, FEV1 46%, FEV1/FVC 79%, DLCO 75% predicted.    HRCT 10/2020:  Mediastinal and perihilar lymphadenoapthy and numerous solitary pulmonary nodules, no parenchymal lung disease.    RHC (01/2022)  He had a repeat right heart catheterization in light of this which showed  an RA pressure of 6, RV of 40/10, PA of 35/21 with a mean of 27, pulmonary capillary wedge pressure of 10, PA saturation of 75.5, measured Lucas cardiac output of 360 mL/min, measured Lucas cardiac output of 7.5 L/min with an index of 3.1 L/min meter square.  His thermodilution cardiac output was 8.2 L/min with an index of 3.3 L/min/m .  His shunt run did not show significant step up concerning for intracardiac shunt.  His calculated PVR was 2.3 Wood units.    Assessment and Plan:     In summary, Mr. Contreras is a 66-year-old gentleman with pulmonary sarcoidosis, systemic hypertension, mild biventricular systolic dysfunction, and pulmonary hypertension who returns today for follow-up.      1. Pulmonary Hypertension    He has mildly elevated PA pressure but his PVR is upper limits of normal.  This is likely due to combination of his high cardiac output as well as pulmonary sarcoidosis.  Since his PVR is up on the upper limits of normal, cardiac output is preserved, and right-sided filling pressures are normal we will defer pulmonary vasodilator therapy at this time point.  He is not interested in adding additional medication if possible.  He does have mild RV dysfunction, but clinically he is not in right heart failure.    I have recommended him to have a repeat right heart catheterization and a 6-minute walk test in 6 months. Given his coexisting lung disease (he  has significantly reduced lung volume but parenchymal abnormality on CT scan), he is at risk of VQ mismatch with systemic pulmonary vasodilator therapy.  He could potentially benefit from inhaled treprostinil if his pulmonary hypertension and RV dysfunction better get worse.    2. New biventricular dysfunction/cardiomyopathy    His left ventricular systolic function has normalized on full dose valsartan/sacubitril, metoprolol  mg daily, and spironolactone 25 mg daily.  Will defer sodium glucose co transport receptor inhibitor for now.      3.  Frequent PVCs    He is being followed by Dr. Figueroa.  His PVC burden apparently has decreased on metoprolol XL.  As his ejection fraction has normalized and PVC burden has decreased after glucoses planning to hold off on biopsying him.  His noninvasive testing including cardiac MRI and PET has been negative for cardiac sarcoidosis.    4.  Pulmonary sarcoidosis    He is being followed by Dr. Romario Martin.  He is not desaturating significantly with exertion.  He has dropped his oxygen saturation briefly to 89%.  If he were to have exertional hypoxemia having supplemental oxygen would decrease the chance of progression of his pulm vascular disease.    He will return to see us in 6 months with labs, six 6-minute walk test, and repeat right heart catheterization.  He will call us in the interim of any further worsening symptoms.      Total time today was 44 minutes reviewing notes, imaging, labs, patient visit, orders and documentation     Sincerely,    Torrey Hirsch MD   [facility]  Heart Failure, Transplant, and Mechanical Circulatory Support Cardiology   Cardiovascular Division  [facility]   [phone]

## 2022-05-09 NOTE — LETTER
5/9/2022       RE: Derek Contreras  [id number] [address] [address]       Dear Colleague,    Thank you for the opportunity to participate in the care of your patient, Derek Contreras, at the [facility] [place] [facility]. Please see a copy of my visit note below.    [facility]  Advanced HF & Pulmonary Hypertension Clinic  Service Date:  05/09/2022    Dear Doctors Mario and Elmer:       We had the pleasure of seeing Mr. Derek Contreras follow-up in our heart failure and pulm hypertension clinic.  As you know, he is a very pleasant 66-year-old male with past medical history significant for     1. Pulmonary sarcoidosis  2.  LV systolic dysfunction -nonischemic in etiology likely related to uncontrolled hypertension  3.  Pulmonary hypertension and RV dysfunction secondary to pulmonary sarcoidosis    He returns today for follow-up.  His blood pressures are under control.  He does not have shortness of breath at rest or with his usual activities.  He walks on the treadmill 1.25 miles 4 times a week with no limitations.  I would characterize him as functional class II.  He has not had any lower extremity swelling or abdominal distention.  No exertional presyncope or syncope.  No exertional chest pain or chest pressure.  He has not had any recent hospitalizations or ER visits.    He is on full dose valsartan sacubitril now.    He had a cardiac MRI last week that showed normalization of his left ventricular systolic function on medical therapy.  His right ventricular function has improved from 31% to 44%.        PAST MEDICAL HISTORY:  Past Medical History:   Diagnosis Date     Asthma      Claustrophobia      CPAP (continuous positive airway pressure) dependence      Dyslipidemia      [place] (hard of hearing)      Hypercholesteremia      Hypertension      Iron deficiency      Mediastinal adenopathy      Obesity      BEULAH  (obstructive sleep apnea)      Prostate cancer (H)      Pulmonary hypertension (H)      Sarcoidosis      CURRENT MEDICATIONS:  Current Outpatient Medications   Medication Sig     albuterol (PROAIR HFA/PROVENTIL HFA/VENTOLIN HFA) 108 (90 Base) MCG/ACT inhaler Inhale 1-2 puffs into the lungs every 4 hours as needed for shortness of breath / dyspnea     aspirin (ASA) 81 MG tablet Take 81 mg by mouth every morning      atorvastatin (LIPITOR) 40 MG tablet TAKE 1 TABLET(40 MG) BY MOUTH EVERY MORNING     BREO ELLIPTA 200-25 MCG/INH Inhaler INHALE 1 PUFF BY MOUTH ONE TIME DAILY      cetirizine (ZYRTEC) 10 MG tablet Take 10 mg by mouth every morning      diphenhydrAMINE (BENADRYL) 25 MG tablet Take 1 tablet (25 mg) by mouth every 8 hours as needed for itching or allergies     hydrochlorothiazide (HYDRODIURIL) 12.5 MG tablet TAKE ONE TABLET BY MOUTH EVERY MORNING     metoprolol succinate ER (TOPROL-XL) 100 MG 24 hr tablet Take 1.5 tablets (150 mg) by mouth daily     montelukast (SINGULAIR) 10 MG tablet Take 1 tablet (10 mg) by mouth At Bedtime     multivitamin (ONE-DAILY) tablet Take 1 tablet by mouth every morning      omeprazole (PRILOSEC) 40 MG DR capsule TAKE 1 CAPSULE BY MOUTH ONE TIME DAILY     sacubitril-valsartan (ENTRESTO)  MG per tablet Take 1 tablet by mouth 2 times daily     spironolactone (ALDACTONE) 25 MG tablet Take 1 tablet (25 mg) by mouth daily     umeclidinium (INCRUSE ELLIPTA) 62.5 MCG/INH inhaler Inhale 1 puff into the lungs daily     No current facility-administered medications for this visit.       ROS:   10 point ROS negative except as discussed in above HPI.    EXAM:  /71 (BP Location: Left arm, Patient Position: Sitting, Cuff Size: Adult Large)   Pulse 59   Wt 132.9 kg (293 lb)   SpO2 96%   BMI 39.74 kg/m    General: appears comfortable, alert and articulate  Head: normocephalic, atraumatic  Eyes: anicteric sclera, EOMI  Neck: no adenopathy  Orophyarynx: moist mucosa, no lesions,  dentition intact  Heart: regular, normal S1/S2, no murmur, gallop, rub, no jugular venous distention  Lungs: clear to auscultation bilaterally, no rales or wheezing  Abdomen: soft, non-tender, bowel sounds present, no hepatosplenomegaly  Extremities: no clubbing, cyanosis or edema  Neurological: normal speech and affect, no gross motor deficits    Labs:  CBC RESULTS: Recent Labs   Lab Test 05/09/22  0757   WBC 7.4   RBC 5.44   HGB 16.2   HCT 48.9   MCV 90   MCH 29.8   MCHC 33.1   RDW 14.0        Recent Labs   Lab Test 05/09/22  0757 02/24/22  0805    139   POTASSIUM 4.1 4.4   CHLORIDE 106 104   CO2 27 30   ANIONGAP 9 5   * 109*   BUN 16 19   CR 0.94 1.09   ANTONIO 8.6 9.3     No results found for: NTBNPI  Lab Results   Component Value Date    NTBNP 42 05/09/2022    NTBNP 21 12/11/2019       6MWT (02/2022)  He walked for 259 meters.  He desaturated to 88% on room air.      EKG 2/2022  Sinus rhythm with bifascicular block, PVCs    Cardiac MRI 05/02/2022  Comparison CMR: 12/22/2021     1. The LV is normal in cavity size and wall thickness. The global systolic function is normal. The LVEF is  58 %. There are no regional wall motion abnormalities.     2. The RV is mildly enlarged in cavity size. The global systolic function is mildly reduced. The RVEF is  44%.      3. Both atria are normal in size.     4. There is no significant valvular disease.      5. Late gadolinium enhancement imaging shows anterior septal and inferior septal LGE at the RV insertion  points similar to prior     6.  There is no pericardial effusion or thickening.      7.  There is no intracardiac thrombus.      CONCLUSIONS:  No evidence of active cardiac sarcoidosis. Improved biventricular function with LVEF 58% and  RVEF 44% (previously 41% and 34% respectively). Septal systolic paradoxical bowing still suggestive of RV  pressure overload, and LGE in the septal RV insertion points into the LV; together suggestive of  pulmonary  hypertension.     Cardiac PET 2/2020  1. No FDG active cardiac sarcoid.  2. Decreased perfusion in the inferoseptal wall, findings may be  related to sequela of previous cardiac sarcoid with microvascular  injury.  3. Enlarged left ventricle with borderline low ejection fraction.  4. Decreased myocardial blood flow in the RCA distribution.  5. Findings of a dilated cardiomyopathy a concern for possible  ischemia/myocardial injury of the septum, consider CTA or coronary  angiography for further evaluation of this region.  6. Chest and upper abdominal hypermetabolic lymphadenopathy which is  indicative of active systemic sarcoid.    PFTs (02/2022):   FVC 44%, FEV1 46%, FEV1/FVC 79%, DLCO 75% predicted.    HRCT 10/2020:  Mediastinal and perihilar lymphadenoapthy and numerous solitary pulmonary nodules, no parenchymal lung disease.    RHC (01/2022)  He had a repeat right heart catheterization in light of this which showed  an RA pressure of 6, RV of 40/10, PA of 35/21 with a mean of 27, pulmonary capillary wedge pressure of 10, PA saturation of 75.5, measured Lucas cardiac output of 360 mL/min, measured Lucas cardiac output of 7.5 L/min with an index of 3.1 L/min meter square.  His thermodilution cardiac output was 8.2 L/min with an index of 3.3 L/min/m .  His shunt run did not show significant step up concerning for intracardiac shunt.  His calculated PVR was 2.3 Wood units.    Assessment and Plan:     In summary, Mr. Contreras is a 66-year-old gentleman with pulmonary sarcoidosis, systemic hypertension, mild biventricular systolic dysfunction, and pulmonary hypertension who returns today for follow-up.      1. Pulmonary Hypertension    He has mildly elevated PA pressure but his PVR is upper limits of normal.  This is likely due to combination of his high cardiac output as well as pulmonary sarcoidosis.  Since his PVR is up on the upper limits of normal, cardiac output is preserved, and right-sided filling  pressures are normal we will defer pulmonary vasodilator therapy at this time point.  He is not interested in adding additional medication if possible.  He does have mild RV dysfunction, but clinically he is not in right heart failure.    I have recommended him to have a repeat right heart catheterization and a 6-minute walk test in 6 months. Given his coexisting lung disease (he has significantly reduced lung volume but parenchymal abnormality on CT scan), he is at risk of VQ mismatch with systemic pulmonary vasodilator therapy.  He could potentially benefit from inhaled treprostinil if his pulmonary hypertension and RV dysfunction better get worse.    2. New biventricular dysfunction/cardiomyopathy    His left ventricular systolic function has normalized on full dose valsartan/sacubitril, metoprolol  mg daily, and spironolactone 25 mg daily.  Will defer sodium glucose co transport receptor inhibitor for now.      3.  Frequent PVCs    He is being followed by Dr. Figueroa.  His PVC burden apparently has decreased on metoprolol XL.  As his ejection fraction has normalized and PVC burden has decreased after glucoses planning to hold off on biopsying him.  His noninvasive testing including cardiac MRI and PET has been negative for cardiac sarcoidosis.    4.  Pulmonary sarcoidosis    He is being followed by Dr. Romario Martin.  He is not desaturating significantly with exertion.  He has dropped his oxygen saturation briefly to 89%.  If he were to have exertional hypoxemia having supplemental oxygen would decrease the chance of progression of his pulm vascular disease.    He will return to see us in 6 months with labs, six 6-minute walk test, and repeat right heart catheterization.  He will call us in the interim of any further worsening symptoms.      Total time today was 44 minutes reviewing notes, imaging, labs, patient visit, orders and documentation     Sincerely,    Torrey Hirsch MD   [facility]  Pulmonary Hypertension  Heart Failure, Transplant, and Mechanical Circulatory Support Cardiology   Cardiovascular Division  [facility]   [phone]

## 2022-05-09 NOTE — NURSING NOTE
Chief Complaint   Patient presents with     Follow Up     RTN HF: 66 year old male presents with history of sarcoid and HFpEF . Follow-up after MRI with labs prior   Vitals were taken and medications reconciled.    Bryan Cabrera, EMT  8:23 AM

## 2022-05-13 ENCOUNTER — MYC MEDICAL ADVICE (OUTPATIENT)
Dept: SLEEP MEDICINE | Facility: CLINIC | Age: 67
End: 2022-05-13
Payer: MEDICARE

## 2022-05-13 NOTE — TELEPHONE ENCOUNTER
LOV: 03/16/2021. Responded to patient message with contact information to schedule a sleep follow-up appointment.     Mildred Laws RN on 5/13/2022 at 9:14 AM

## 2022-07-28 DIAGNOSIS — D86.9 SARCOIDOSIS: ICD-10-CM

## 2022-07-28 DIAGNOSIS — I50.22 CHRONIC SYSTOLIC HEART FAILURE (H): ICD-10-CM

## 2022-07-28 DIAGNOSIS — J45.909 MODERATE ASTHMA WITHOUT COMPLICATION, UNSPECIFIED WHETHER PERSISTENT: ICD-10-CM

## 2022-07-28 RX ORDER — MONTELUKAST SODIUM 10 MG/1
10 TABLET ORAL AT BEDTIME
Qty: 90 TABLET | Refills: 3 | Status: CANCELLED | OUTPATIENT
Start: 2022-07-28

## 2022-07-29 RX ORDER — SPIRONOLACTONE 25 MG/1
25 TABLET ORAL DAILY
Qty: 90 TABLET | Refills: 1 | Status: SHIPPED | OUTPATIENT
Start: 2022-07-29 | End: 2022-11-14

## 2022-07-29 NOTE — TELEPHONE ENCOUNTER
Patient responded to [employer] | [employer] message that he has an appointment with the original prescribing provider for Montelukast on 8/8/22 and will discuss refill then. Request cancelled.    Maribell Samayoa RN  [facility]

## 2022-07-29 NOTE — TELEPHONE ENCOUNTER
spironolactone (ALDACTONE) 25 MG tablet  Last Written Prescription Date:   2/14/2022  Last Fill Quantity: 90,   # refills: 1  Last Office Visit :  5/9/2022  Future Office visit:   10/5/2022  90 Tabs, 1 refills sent to veronica Serrato RN  Central Triage Red Flags/Med Refills    Warnings Override History for spironolactone (ALDACTONE) 25 MG tablet [[id number]]    Overridden by Rafaela Barnett RN on Feb 14, 2022 4:03 PM   Drug-Drug   1. POTASSIUM-SPARING DIURETICS / ANGIOTENSIN II RECEPTOR ANTAGONISTS [Level: Major] [Reason: Will monitor drug levels/drug effects ]   Other Orders: sacubitril-valsartan (ENTRESTO)  MG per tablet

## 2022-07-29 NOTE — TELEPHONE ENCOUNTER
Routing refill request to provider for review/approval because:  Patient needs to be seen because:  Protocol requires appointment every 6 months for asthma.   ACT out of date, [employer] message sent to patient with ACT form attached to complete.     Please advise if office visit needed for refills.     Maribell Samayoa RN  [facility]

## 2022-08-01 ENCOUNTER — TELEPHONE (OUTPATIENT)
Dept: CARDIOLOGY | Facility: CLINIC | Age: 67
End: 2022-08-01

## 2022-08-01 ENCOUNTER — OFFICE VISIT (OUTPATIENT)
Dept: SLEEP MEDICINE | Facility: CLINIC | Age: 67
End: 2022-08-01
Payer: MEDICARE

## 2022-08-01 VITALS
BODY MASS INDEX: 37.38 KG/M2 | HEIGHT: 72 IN | RESPIRATION RATE: 18 BRPM | DIASTOLIC BLOOD PRESSURE: 74 MMHG | OXYGEN SATURATION: 95 % | SYSTOLIC BLOOD PRESSURE: 130 MMHG | WEIGHT: 276 LBS | HEART RATE: 75 BPM

## 2022-08-01 DIAGNOSIS — E66.9 OBESITY WITH SLEEP APNEA: ICD-10-CM

## 2022-08-01 DIAGNOSIS — G47.30 OBESITY WITH SLEEP APNEA: ICD-10-CM

## 2022-08-01 DIAGNOSIS — G47.33 OSA ON CPAP: Primary | ICD-10-CM

## 2022-08-01 PROCEDURE — 99213 OFFICE O/P EST LOW 20 MIN: CPT | Performed by: INTERNAL MEDICINE

## 2022-08-01 NOTE — PROGRESS NOTES
[facility]  Sleep clinic follow-up visit note        Date on this visit: 8/1/22     Primary Physician: Carlyn Payne      Chief complaint: f/u BEULAH     Derek Contreras 66 year old with h/o obstructive sleep apnea (previously diagnosed in 2011), sarcoidosis, COPD responsive to inhaled treatments, hip replacement, knee replacement, prostate cancer s/p prostatectomy, and hypertension.    He underwent a diagnostic polysomnogram on January 6, 2020 that showed mild obstructive sleep apnea predominant during REM sleep with evidence of sleep associated hypoxemia. He underwent repeat polysomnography with CPAP  titration on 1/12/20 and it was observed at the CPAP at 8 cm water during REM sleep in lateral position disordered breathing events was effective in controlling the sleep-related breathing disorder.  He was set up at [facility] on January 27, 2020. Patient received a Resmed AirSense 10 Auto with ressures were set at 8-11 cm H2O. Patient s ramp is 5 cm H2O for Auto and FLEX/EPR is 2.  Patient received a Resmed Airfit N30I  Nasal mask size Medium, heated tubing and heated humidifier. He  presents to the sleep clinic today for follow up of BEULAH and to review CPAP compliance measures.     He is currently being treated with auto titrating CPAP with pressure settings 8 to 13 cm of water.  He reports using the CPAP regularly during sleep.  He uses nasal mask and has not been replacing it as regularly and reports air leaks.  There are no reports of snoring or awakenings due to gasping for air or choking with the CPAP use.    Pressure settings feel comfortable.    Sleep quality is better with the device.  Bed time:11PM. Wake time 6/630AM  He reports feeling rested upon awakening most mornings  He denies Fatigue/EDS  ESS: 2/24  He denies drowsiness while driving     Previous sleep study results:  Initial PSG results (1/6/20)    Obstructive sleep apnea was demonstrated (AHI 5; REM AHI 25), with  respiratory events essentially isolated to REM sleep.  The study may be limited due to the absence of supine sleep.      Oxyhemoglobin desaturations were prominent during REM, and the patient did not always recover baseline saturations before onset of the next airway obstruction.  Nocturnal hypoxemia was present; there were 23 minutes with SpO2 less than or equal to 88%.      Hypoventilation was not observed.      An irregularly irregular heart rhythm was observed intermittently throughout the recording.     Repeat polysomnography(1/12/20)    CPAP at 8 cmH2O was effective at resolving obstructive sleep apnea and improving oxyhemoglobin saturations to the normal range. REM supine sleep was not captured.   Hypoxemia resolved at a pressure of 8 cmH2O.  Supplemental oxygen was not indicated during this study.    An irregular heart rhythm was observed intermittently throughout the study.       DOWNLOADABLE COMPLIANCE DATA:   ResMed    Auto-PAP 8.0 - 13.0 cmH2O 30 day usage data: From July 2, 2022 through July 31, 2022  60% of days with > 4 hours of use. 4/30 days with no use.   Average use 4 hours per day.   95%ile Leak 43.2 L/min.   CPAP 95% pressure 11 cm.   AHI 1.1 events per hour.      Past medical/surgical history, family history, social history, medications and allergies were reviewed.              Physical Examination:    /74   Pulse 75   Resp 18   Ht 1.829 m (6')   Wt 125.2 kg (276 lb)   SpO2 95%   BMI 37.43 kg/m    General: Pleasant. Cooperative. In no apparent distress.  Pulmonary: Able to speak in full sentences easily. No cough or wheeze.   Neurologic: Alert, oriented x3.  Psychiatric: Mood euthymic. Affect congruent with full range and intensity.             Assessment and Plan:      Obstructive sleep apnea: Pt reports compliance with PAP therapy, and was encouraged to improve his compliance further and he reports positive benefits with PAP use.   BEULAH is adequately controlled with Auto CPAP at  "the current settings per compliance DL.   Prescription provided for renewal of PAP supplies.  He was also provided prescription for travel auto CPAP device with pressure settings 8 to 13 cm of water.  Recommended him to continue using the CPAP regularly during sleep and to use it for the entire duration of sleep to derive maximum benefit and instructed to get the supplies for the PAP replaced regularly.    Air leaks will hopefully improve with mask replacement in a timely fashion.  Patient instructed to remember to bring PAP with him if hospitalized and if anticipating procedure that requires sedation/surgery to be sure to discuss with the provider/surgeon that he has sleep apnea and uses PAP therapy.     Obesity: We discussed weight management with healthy diet, and exercise.     Patient was strongly advised to avoid driving, operating any heavy machinery or other hazardous situations while drowsy or sleepy.  Patient was counseled on the importance of driving while alert, to pull over if drowsy, or nap before getting into the vehicle if sleepy.       Plan is to follow up in  1 year or sooner if any concerns.    The above note was dictated using voice recognition software. Although reviewed after completion, some word and grammatical error may remain . Please contact the author for any clarifications.     \"I spent a total of 20 minutes  face to face with  Derek Contreras during today's office visit. Most of this time was spent counseling the patient and  coordinating care regarding BEULAH, CPAP therapy, reviewing CPAP compliance download, renewal of PAP supplies, weight management , and chart review., including documentation and further activities as noted above.\"      Frank Barron MD  [facility]  [address] , [place] [address]       "

## 2022-08-02 DIAGNOSIS — D86.9 SARCOIDOSIS: ICD-10-CM

## 2022-08-02 DIAGNOSIS — J45.909 MODERATE ASTHMA WITHOUT COMPLICATION, UNSPECIFIED WHETHER PERSISTENT: ICD-10-CM

## 2022-08-04 RX ORDER — MONTELUKAST SODIUM 10 MG/1
10 TABLET ORAL AT BEDTIME
Qty: 90 TABLET | Refills: 0 | Status: SHIPPED | OUTPATIENT
Start: 2022-08-04 | End: 2023-08-01

## 2022-08-04 NOTE — TELEPHONE ENCOUNTER
Montelukast (Singulair) 10 mg tablet  Routing refill request to provider for review/approval because:  Patient needs to be seen because:  Protocol requests 6 month visits  ACT out of date per [facility] refill protocol.      LOV 01/2022

## 2022-08-08 ENCOUNTER — OFFICE VISIT (OUTPATIENT)
Dept: PULMONOLOGY | Facility: CLINIC | Age: 67
End: 2022-08-08
Attending: INTERNAL MEDICINE
Payer: MEDICARE

## 2022-08-08 VITALS
DIASTOLIC BLOOD PRESSURE: 66 MMHG | WEIGHT: 274 LBS | HEART RATE: 91 BPM | HEIGHT: 72 IN | BODY MASS INDEX: 37.11 KG/M2 | OXYGEN SATURATION: 94 % | SYSTOLIC BLOOD PRESSURE: 103 MMHG

## 2022-08-08 DIAGNOSIS — E55.9 VITAMIN D DEFICIENCY, UNSPECIFIED: ICD-10-CM

## 2022-08-08 DIAGNOSIS — I50.22 CHRONIC SYSTOLIC HEART FAILURE (H): ICD-10-CM

## 2022-08-08 DIAGNOSIS — D86.9 SARCOIDOSIS: Primary | ICD-10-CM

## 2022-08-08 DIAGNOSIS — J45.20 MILD INTERMITTENT ASTHMA WITHOUT COMPLICATION: ICD-10-CM

## 2022-08-08 DIAGNOSIS — E83.50 UNSPECIFIED DISORDER OF CALCIUM METABOLISM: ICD-10-CM

## 2022-08-08 DIAGNOSIS — D86.9 SARCOIDOSIS: ICD-10-CM

## 2022-08-08 LAB
6 MIN WALK (FT): 975 FT
6 MIN WALK (M): 297 M

## 2022-08-08 PROCEDURE — 99214 OFFICE O/P EST MOD 30 MIN: CPT | Performed by: INTERNAL MEDICINE

## 2022-08-08 PROCEDURE — G0463 HOSPITAL OUTPT CLINIC VISIT: HCPCS | Mod: 25

## 2022-08-08 PROCEDURE — 94729 DIFFUSING CAPACITY: CPT | Performed by: INTERNAL MEDICINE

## 2022-08-08 PROCEDURE — 94726 PLETHYSMOGRAPHY LUNG VOLUMES: CPT | Performed by: INTERNAL MEDICINE

## 2022-08-08 PROCEDURE — 94618 PULMONARY STRESS TESTING: CPT | Performed by: INTERNAL MEDICINE

## 2022-08-08 PROCEDURE — 94375 RESPIRATORY FLOW VOLUME LOOP: CPT | Performed by: INTERNAL MEDICINE

## 2022-08-08 RX ORDER — MONTELUKAST SODIUM 10 MG/1
10 TABLET ORAL EVERY EVENING
Qty: 90 TABLET | Refills: 3 | Status: SHIPPED | OUTPATIENT
Start: 2022-08-08 | End: 2023-05-08

## 2022-08-08 ASSESSMENT — PAIN SCALES - GENERAL: PAINLEVEL: NO PAIN (0)

## 2022-08-08 NOTE — LETTER
8/8/2022         RE: Derek Contreras  [id number] [address] [facility] [id number]        Dear Colleague,    Thank you for referring your patient, Derek Contreras, to the [facility] [place]. Please see a copy of my visit note below.    Reason for Visit  Derek Contreras is a 66 year old year old male who is being seen for Sarcoidosis  Pulmonary HPI    The patient was seen and examined by Jamie Martin MD     Mr. Contreras comes in for followup.  He was last seen in the Pulmonary Clinic in 02/2022, at which time he reported no new symptoms.  The plan was to continue monitoring while on Incruse Ellipta, Breo, Singulair and Zyrtec.    Today he comes in and reports he is doing quite well.  He is following dietary discretion and has lost 21 pounds in the last several months.  He is very excited about it.  He is also more active during the summer.  He reports no new pulmonary symptoms.    Current Outpatient Medications   Medication     albuterol (PROAIR HFA/PROVENTIL HFA/VENTOLIN HFA) 108 (90 Base) MCG/ACT inhaler     aspirin (ASA) 81 MG tablet     atorvastatin (LIPITOR) 40 MG tablet     azithromycin (ZITHROMAX) 250 MG tablet     BREO ELLIPTA 200-25 MCG/INH Inhaler     cetirizine (ZYRTEC) 10 MG tablet     diphenhydrAMINE (BENADRYL) 25 MG tablet     hydrochlorothiazide (HYDRODIURIL) 12.5 MG tablet     metoprolol succinate ER (TOPROL-XL) 100 MG 24 hr tablet     montelukast (SINGULAIR) 10 MG tablet     multivitamin (ONE-DAILY) tablet     omeprazole (PRILOSEC) 40 MG DR capsule     sacubitril-valsartan (ENTRESTO)  MG per tablet     spironolactone (ALDACTONE) 25 MG tablet     umeclidinium (INCRUSE ELLIPTA) 62.5 MCG/INH inhaler     No current facility-administered medications for this visit.     Allergies   Allergen Reactions     Cat Hair Extract Itching     itchy tearful eyes     Adhesive Tape Rash     Iodine Rash     Past Medical History:   Diagnosis  Date     Asthma      Claustrophobia      CPAP (continuous positive airway pressure) dependence      Dyslipidemia      [place] (hard of hearing)      Hypercholesteremia      Hypertension      Iron deficiency      Mediastinal adenopathy      Obesity      BEULAH (obstructive sleep apnea)      Prostate cancer (H)      Pulmonary hypertension (H)      Sarcoidosis        Past Surgical History:   Procedure Laterality Date     APPENDECTOMY       BIOPSY MASS NECK Left 7/15/2020    Procedure: Left trapezius muscle biopsy;  Surgeon: Ade Villa MD;  [facility]     CV RIGHT HEART CATH MEASUREMENTS RECORDED N/A 12/11/2019    Procedure: CV RIGHT HEART CATH;  Surgeon: Torrey Hirsch MD;  [facility]     CV RIGHT HEART CATH MEASUREMENTS RECORDED N/A 1/19/2022    Procedure: CV RIGHT HEART CATH;  Surgeon: Torrey Hirsch MD;  [facility]     DAVINCI PROSTATECTOMY, LYMPHADENECTOMY N/A 5/23/2019    Procedure: ROBOTIC ASSISTED LAPAROSCOPIC RADICAL PROSTATECTOMY WITH BILATERAL PELVIC LYMPH NODE DISSECTION;  Surgeon: Matteo Coughlin MD;  [facility]     ENDOBRONCHIAL ULTRASOUND FLEXIBLE N/A 3/29/2019    Procedure: Flexible Bronchoscopy, Endobronchial Ultrasound;  Surgeon: Curtis Leger MD;  [facility]     VASECTOMY       ZZC TOTAL HIP ARTHROPLASTY Bilateral      ZZC TOTAL KNEE ARTHROPLASTY Bilateral        Social History     Socioeconomic History     Marital status:      Spouse name: Not on file     Number of children: Not on file     Years of education: Not on file     Highest education level: Not on file   Occupational History     Not on file   Tobacco Use     Smoking status: Never Smoker     Smokeless tobacco: Never Used   Vaping Use     Vaping Use: Never used   Substance and Sexual Activity     Alcohol use: Yes     Comment: twice a week     Drug use: No     Sexual activity: Yes     Partners: Female   Other Topics Concern      Parent/sibling w/ CABG, MI or angioplasty before 65F 55M? Not Asked   Social History Narrative    12/03/20         ENVIRONMENTAL HISTORY: [place] The home is heated with a gas furnace. They does have central air conditioning. The patient's bedroom is furnished with Indoor plants and carpeting in bedroom.  Pets inside the house include 0 pets. There is no history of cockroach or mice infestation. There is/are 0 smokers in the house.  The house does not have a damp basement.      Social Determinants of Health     Financial Resource Strain: Not on file   Food Insecurity: Not on file   Transportation Needs: Not on file   Physical Activity: Not on file   Stress: Not on file   Social Connections: Not on file   Intimate Partner Violence: Not At Risk     Fear of Current or Ex-Partner: No     Emotionally Abused: No     Physically Abused: No     Sexually Abused: No   Housing Stability: Not on file       ROS Pulmonary    A complete ROS was otherwise negative except as noted in the HPI.  /66   Pulse 91   Ht 1.829 m (6')   Wt 124.3 kg (274 lb)   SpO2 94%   BMI 37.16 kg/m    Exam:   GENERAL APPEARANCE: Alert, and in no apparent distress.  EYES: PERRL, EOMI  HENT: Nasal mucosa with no edema and no hyperemia.   MOUTH: Oral mucosa is moist, without any lesions, no tonsillar enlargement, no oropharyngeal exudate.  NECK: supple, no masses, no thyromegaly.  LYMPHATICS: No significant axillary, cervical, or supraclavicular nodes.  RESP: normal percussion, good air flow throughout.  No crackles. No rhonchi. No wheezes.  CV: Normal S1, S2, regular rhythm, normal rate. No murmur.  No rub. No gallop. No LE edema.   MS: extremities normal. No clubbing. No cyanosis.  SKIN: no rash on limited exam  NEURO: Mentation intact, speech normal, normal strength and tone, normal gait and stance    Results: PFTs done today were reviewed by me with the patient.  He also had a 6 minute walk test  done, but the 6 minute walk distance was 975 feet, which is below the lower limit of normal.  O2 saturation at the beginning of the walk was 94% and at the end of the walk was 89%.  FVC is 2.35 L (50%), FEV1 is 1.75 L (49%), and the ratio is 74.  Total lung capacity is 4.99 (66%), and the DLCO not corrected for hemoglobin is 80% of predicted.  My interpretation is that he has mild to moderate restriction with normal diffusion capacity.  In comparison to prior PFTs, FVC is improved by 260 cc, FEV1 is improved by 90 cc, and the total lung capacity is improved by 220 cc.    Assessment and plan: 66 year-old male with history of HTN, HLD, obesity, prostate cancer, abnormal chest imaging with TBNA (3/29/2019) demonstrating granulomatous Inflammation (station 7 and 12 L lymph nodes) and  BAL demonstrating 102 white cells and 11% lymphocytes.  The CD4 CD8 ratio was 2.29.  Repeat cardiac MRI with no LGE and cPET with no myocardiac uptake to suggest cardiac sarcoidosis but abnormal EF of 41%. RV dilatation of unclear etiology but no pulmonary hypertension based on right heart cath with mean PAP 27 mm Hg (1/22). Concern fohs.r truncal/diaphragmatic weakness but trapezius biopsy in June 2020 without any convincing evidence.    1. Pulmonary: His reactive airways disease symptoms continue to be well controlled at this time. He will continue the  Breo, Singulair and Zyrtec but strop Incrue.If the sympomts worsen he can restart. Otherwise for exacerbations of his symptoms, a trial of azithromycin should be considered. A steroid burst if Z-Eleazar will not be of therapeutic benefit. Applauded patient on exercising and recommended further exercise maybe every day if able to tolerate it.  2.  Extrapulmonary:  On treatment for pulmonary hypertension and follows with Dr. Hirsch.  We will check metabolic labs today.   3. Obesity:  Dietary discretion and increased exercising with great results.  I will see him back in 6 months  Addendum:  Labs reviewed.  Electrolyte panel in normal range.  Calcium is 9.0.  Vitamin D levels are within range.WBC and differential are within normal range.  PTH is within normal range.  This note consists of symbols derived from keyboarding, dictation and/or voice recognition software. As a result, there may be errors in the script that have gone undetected. Please consider this when interpreting information found in this chart    Again, thank you for allowing me to participate in the care of your patient.        Sincerely,        Jamie Martin MD

## 2022-08-08 NOTE — PROGRESS NOTES
Reason for Visit  Derek Contreras is a 66 year old year old male who is being seen for Sarcoidosis  Pulmonary HPI    The patient was seen and examined by Jamie Martin MD     Mr. Contreras comes in for followup.  He was last seen in the Pulmonary Clinic in 02/2022, at which time he reported no new symptoms.  The plan was to continue monitoring while on Incruse Ellipta, Breo, Singulair and Zyrtec.    Today he comes in and reports he is doing quite well.  He is following dietary discretion and has lost 21 pounds in the last several months.  He is very excited about it.  He is also more active during the summer.  He reports no new pulmonary symptoms.    Current Outpatient Medications   Medication     albuterol (PROAIR HFA/PROVENTIL HFA/VENTOLIN HFA) 108 (90 Base) MCG/ACT inhaler     aspirin (ASA) 81 MG tablet     atorvastatin (LIPITOR) 40 MG tablet     azithromycin (ZITHROMAX) 250 MG tablet     BREO ELLIPTA 200-25 MCG/INH Inhaler     cetirizine (ZYRTEC) 10 MG tablet     diphenhydrAMINE (BENADRYL) 25 MG tablet     hydrochlorothiazide (HYDRODIURIL) 12.5 MG tablet     metoprolol succinate ER (TOPROL-XL) 100 MG 24 hr tablet     montelukast (SINGULAIR) 10 MG tablet     multivitamin (ONE-DAILY) tablet     omeprazole (PRILOSEC) 40 MG DR capsule     sacubitril-valsartan (ENTRESTO)  MG per tablet     spironolactone (ALDACTONE) 25 MG tablet     umeclidinium (INCRUSE ELLIPTA) 62.5 MCG/INH inhaler     No current facility-administered medications for this visit.     Allergies   Allergen Reactions     Cat Hair Extract Itching     itchy tearful eyes     Adhesive Tape Rash     Iodine Rash     Past Medical History:   Diagnosis Date     Asthma      Claustrophobia      CPAP (continuous positive airway pressure) dependence      Dyslipidemia      [place] (hard of hearing)      Hypercholesteremia      Hypertension      Iron deficiency      Mediastinal adenopathy      Obesity      BEULAH (obstructive sleep apnea)      Prostate cancer  (H)      Pulmonary hypertension (H)      Sarcoidosis        Past Surgical History:   Procedure Laterality Date     APPENDECTOMY       BIOPSY MASS NECK Left 7/15/2020    Procedure: Left trapezius muscle biopsy;  Surgeon: Ade Villa MD;  [facility]     CV RIGHT HEART CATH MEASUREMENTS RECORDED N/A 12/11/2019    Procedure: CV RIGHT HEART CATH;  Surgeon: Torrey Hirsch MD;  [facility]     CV RIGHT HEART CATH MEASUREMENTS RECORDED N/A 1/19/2022    Procedure: CV RIGHT HEART CATH;  Surgeon: Torrey Hirsch MD;  [facility]     DAVINCI PROSTATECTOMY, LYMPHADENECTOMY N/A 5/23/2019    Procedure: ROBOTIC ASSISTED LAPAROSCOPIC RADICAL PROSTATECTOMY WITH BILATERAL PELVIC LYMPH NODE DISSECTION;  Surgeon: Matteo Coughlin MD;  [facility]     ENDOBRONCHIAL ULTRASOUND FLEXIBLE N/A 3/29/2019    Procedure: Flexible Bronchoscopy, Endobronchial Ultrasound;  Surgeon: Curtis Leger MD;  [facility]     VASECTOMY       ZZC TOTAL HIP ARTHROPLASTY Bilateral      ZZC TOTAL KNEE ARTHROPLASTY Bilateral        Social History     Socioeconomic History     Marital status:      Spouse name: Not on file     Number of children: Not on file     Years of education: Not on file     Highest education level: Not on file   Occupational History     Not on file   Tobacco Use     Smoking status: Never Smoker     Smokeless tobacco: Never Used   Vaping Use     Vaping Use: Never used   Substance and Sexual Activity     Alcohol use: Yes     Comment: twice a week     Drug use: No     Sexual activity: Yes     Partners: Female   Other Topics Concern     Parent/sibling w/ CABG, MI or angioplasty before 65F 55M? Not Asked   Social History Narrative    12/03/20         ENVIRONMENTAL HISTORY: [place] The home is heated with a gas furnace. They does have central air conditioning. The patient's bedroom is furnished with  Indoor plants and carpeting in bedroom.  Pets inside the house include 0 pets. There is no history of cockroach or mice infestation. There is/are 0 smokers in the house.  The house does not have a damp basement.      Social Determinants of Health     Financial Resource Strain: Not on file   Food Insecurity: Not on file   Transportation Needs: Not on file   Physical Activity: Not on file   Stress: Not on file   Social Connections: Not on file   Intimate Partner Violence: Not At Risk     Fear of Current or Ex-Partner: No     Emotionally Abused: No     Physically Abused: No     Sexually Abused: No   Housing Stability: Not on file       ROS Pulmonary    A complete ROS was otherwise negative except as noted in the HPI.  /66   Pulse 91   Ht 1.829 m (6')   Wt 124.3 kg (274 lb)   SpO2 94%   BMI 37.16 kg/m    Exam:   GENERAL APPEARANCE: Alert, and in no apparent distress.  EYES: PERRL, EOMI  HENT: Nasal mucosa with no edema and no hyperemia.   MOUTH: Oral mucosa is moist, without any lesions, no tonsillar enlargement, no oropharyngeal exudate.  NECK: supple, no masses, no thyromegaly.  LYMPHATICS: No significant axillary, cervical, or supraclavicular nodes.  RESP: normal percussion, good air flow throughout.  No crackles. No rhonchi. No wheezes.  CV: Normal S1, S2, regular rhythm, normal rate. No murmur.  No rub. No gallop. No LE edema.   MS: extremities normal. No clubbing. No cyanosis.  SKIN: no rash on limited exam  NEURO: Mentation intact, speech normal, normal strength and tone, normal gait and stance    Results: PFTs done today were reviewed by me with the patient.  He also had a 6 minute walk test done, but the 6 minute walk distance was 975 feet, which is below the lower limit of normal.  O2 saturation at the beginning of the walk was 94% and at the end of the walk was 89%.  FVC is 2.35 L (50%), FEV1 is 1.75 L (49%), and the ratio is 74.  Total lung capacity is 4.99 (66%), and the DLCO not corrected for  hemoglobin is 80% of predicted.  My interpretation is that he has mild to moderate restriction with normal diffusion capacity.  In comparison to prior PFTs, FVC is improved by 260 cc, FEV1 is improved by 90 cc, and the total lung capacity is improved by 220 cc.    Assessment and plan: 66 year-old male with history of HTN, HLD, obesity, prostate cancer, abnormal chest imaging with TBNA (3/29/2019) demonstrating granulomatous Inflammation (station 7 and 12 L lymph nodes) and  BAL demonstrating 102 white cells and 11% lymphocytes.  The CD4 CD8 ratio was 2.29.  Repeat cardiac MRI with no LGE and cPET with no myocardiac uptake to suggest cardiac sarcoidosis but abnormal EF of 41%. RV dilatation of unclear etiology but no pulmonary hypertension based on right heart cath with mean PAP 27 mm Hg (1/22). Concern fohs.r truncal/diaphragmatic weakness but trapezius biopsy in June 2020 without any convincing evidence.    1. Pulmonary: His reactive airways disease symptoms continue to be well controlled at this time. He will continue the  Breo, Singulair and Zyrtec but strop Incrue.If the sympomts worsen he can restart. Otherwise for exacerbations of his symptoms, a trial of azithromycin should be considered. A steroid burst if Z-Eleazar will not be of therapeutic benefit. Applauded patient on exercising and recommended further exercise maybe every day if able to tolerate it.  2.  Extrapulmonary:  On treatment for pulmonary hypertension and follows with Dr. Hirsch.  We will check metabolic labs today.   3. Obesity:  Dietary discretion and increased exercising with great results.  I will see him back in 6 months  Addendum: Labs reviewed.  Electrolyte panel in normal range.  Calcium is 9.0.  Vitamin D levels are within range.WBC and differential are within normal range.  PTH is within normal range.  This note consists of symbols derived from keyboarding, dictation and/or voice recognition software. As a result, there may be errors  in the script that have gone undetected. Please consider this when interpreting information found in this chart

## 2022-08-08 NOTE — NURSING NOTE
Chief Complaint   Patient presents with     Interstitial Lung Disease (ILD)     Sarcoids ILD      Vitals were taken and medications were reconciled.     Keisha Hernandez RMA  2:54 PM

## 2022-08-09 LAB
DLCOUNC-%PRED-PRE: 80 %
DLCOUNC-PRE: 22.69 ML/MIN/MMHG
DLCOUNC-PRED: 28.08 ML/MIN/MMHG
ERV-%PRED-PRE: 86 %
ERV-PRE: 0.66 L
ERV-PRED: 0.76 L
EXPTIME-PRE: 7.8 SEC
FEF2575-%PRED-PRE: 46 %
FEF2575-PRE: 1.28 L/SEC
FEF2575-PRED: 2.73 L/SEC
FEFMAX-%PRED-PRE: 65 %
FEFMAX-PRE: 5.93 L/SEC
FEFMAX-PRED: 9.1 L/SEC
FEV1-%PRED-PRE: 49 %
FEV1-PRE: 1.75 L
FEV1FEV6-PRE: 75 %
FEV1FEV6-PRED: 78 %
FEV1FVC-PRE: 74 %
FEV1FVC-PRED: 76 %
FEV1SVC-PRE: 64 %
FEV1SVC-PRED: 68 %
FIFMAX-PRE: 3.47 L/SEC
FRCPLETH-%PRED-PRE: 76 %
FRCPLETH-PRE: 2.89 L
FRCPLETH-PRED: 3.79 L
FVC-%PRED-PRE: 50 %
FVC-PRE: 2.35 L
FVC-PRED: 4.67 L
IC-%PRED-PRE: 47 %
IC-PRE: 2.1 L
IC-PRED: 4.42 L
RVPLETH-%PRED-PRE: 84 %
RVPLETH-PRE: 2.24 L
RVPLETH-PRED: 2.64 L
TLCPLETH-%PRED-PRE: 66 %
TLCPLETH-PRE: 4.99 L
TLCPLETH-PRED: 7.53 L
VA-%PRED-PRE: 64 %
VA-PRE: 4.44 L
VC-%PRED-PRE: 53 %
VC-PRE: 2.75 L
VC-PRED: 5.18 L

## 2022-08-10 ENCOUNTER — LAB (OUTPATIENT)
Dept: LAB | Facility: CLINIC | Age: 67
End: 2022-08-10
Payer: MEDICARE

## 2022-08-10 DIAGNOSIS — E83.50 UNSPECIFIED DISORDER OF CALCIUM METABOLISM: ICD-10-CM

## 2022-08-10 DIAGNOSIS — D86.9 SARCOIDOSIS: ICD-10-CM

## 2022-08-10 DIAGNOSIS — E55.9 VITAMIN D DEFICIENCY, UNSPECIFIED: ICD-10-CM

## 2022-08-10 LAB
BASOPHILS # BLD AUTO: 0.1 10E3/UL (ref 0–0.2)
BASOPHILS NFR BLD AUTO: 1 %
EOSINOPHIL # BLD AUTO: 0.2 10E3/UL (ref 0–0.7)
EOSINOPHIL NFR BLD AUTO: 2 %
ERYTHROCYTE [DISTWIDTH] IN BLOOD BY AUTOMATED COUNT: 13.6 % (ref 10–15)
HCT VFR BLD AUTO: 47.1 % (ref 40–53)
HGB BLD-MCNC: 15.4 G/DL (ref 13.3–17.7)
LYMPHOCYTES # BLD AUTO: 1.8 10E3/UL (ref 0.8–5.3)
LYMPHOCYTES NFR BLD AUTO: 19 %
MCH RBC QN AUTO: 30.1 PG (ref 26.5–33)
MCHC RBC AUTO-ENTMCNC: 32.7 G/DL (ref 31.5–36.5)
MCV RBC AUTO: 92 FL (ref 78–100)
MONOCYTES # BLD AUTO: 1.1 10E3/UL (ref 0–1.3)
MONOCYTES NFR BLD AUTO: 12 %
NEUTROPHILS # BLD AUTO: 6.2 10E3/UL (ref 1.6–8.3)
NEUTROPHILS NFR BLD AUTO: 67 %
PLATELET # BLD AUTO: 180 10E3/UL (ref 150–450)
PTH-INTACT SERPL-MCNC: 24 PG/ML (ref 15–65)
RBC # BLD AUTO: 5.12 10E6/UL (ref 4.4–5.9)
WBC # BLD AUTO: 9.3 10E3/UL (ref 4–11)

## 2022-08-10 PROCEDURE — 82164 ANGIOTENSIN I ENZYME TEST: CPT | Mod: 90

## 2022-08-10 PROCEDURE — 83970 ASSAY OF PARATHORMONE: CPT

## 2022-08-10 PROCEDURE — 99000 SPECIMEN HANDLING OFFICE-LAB: CPT

## 2022-08-10 PROCEDURE — 83520 IMMUNOASSAY QUANT NOS NONAB: CPT

## 2022-08-10 PROCEDURE — 82652 VIT D 1 25-DIHYDROXY: CPT

## 2022-08-10 PROCEDURE — 36415 COLL VENOUS BLD VENIPUNCTURE: CPT

## 2022-08-10 PROCEDURE — 80053 COMPREHEN METABOLIC PANEL: CPT

## 2022-08-10 PROCEDURE — 85025 COMPLETE CBC W/AUTO DIFF WBC: CPT

## 2022-08-10 PROCEDURE — 82306 VITAMIN D 25 HYDROXY: CPT

## 2022-08-11 LAB
1,25(OH)2D SERPL-MCNC: 64 PG/ML (ref 19.9–79.3)
ACE SERPL-CCNC: 43 U/L
ALBUMIN SERPL-MCNC: 3.6 G/DL (ref 3.4–5)
ALP SERPL-CCNC: 81 U/L (ref 40–150)
ALT SERPL W P-5'-P-CCNC: 37 U/L (ref 0–70)
ANION GAP SERPL CALCULATED.3IONS-SCNC: 7 MMOL/L (ref 3–14)
AST SERPL W P-5'-P-CCNC: 31 U/L (ref 0–45)
BILIRUB SERPL-MCNC: 0.6 MG/DL (ref 0.2–1.3)
BUN SERPL-MCNC: 18 MG/DL (ref 7–30)
CALCIUM SERPL-MCNC: 9 MG/DL (ref 8.5–10.1)
CHLORIDE BLD-SCNC: 105 MMOL/L (ref 94–109)
CO2 SERPL-SCNC: 27 MMOL/L (ref 20–32)
CREAT SERPL-MCNC: 1.05 MG/DL (ref 0.66–1.25)
DEPRECATED CALCIDIOL+CALCIFEROL SERPL-MC: 35 UG/L (ref 20–75)
GFR SERPL CREATININE-BSD FRML MDRD: 78 ML/MIN/1.73M2
GLUCOSE BLD-MCNC: 91 MG/DL (ref 70–99)
POTASSIUM BLD-SCNC: 4.7 MMOL/L (ref 3.4–5.3)
PROT SERPL-MCNC: 7.6 G/DL (ref 6.8–8.8)
SODIUM SERPL-SCNC: 139 MMOL/L (ref 133–144)

## 2022-08-15 LAB — SCANNED LAB RESULT: NORMAL

## 2022-08-18 ENCOUNTER — TELEPHONE (OUTPATIENT)
Dept: PULMONOLOGY | Facility: CLINIC | Age: 67
End: 2022-08-18

## 2022-08-30 ENCOUNTER — LAB (OUTPATIENT)
Dept: ONCOLOGY | Facility: CLINIC | Age: 67
End: 2022-08-30
Attending: INTERNAL MEDICINE
Payer: MEDICARE

## 2022-08-30 DIAGNOSIS — D47.2 MGUS (MONOCLONAL GAMMOPATHY OF UNKNOWN SIGNIFICANCE): ICD-10-CM

## 2022-08-30 LAB
ALBUMIN SERPL BCG-MCNC: 3.7 G/DL (ref 3.5–5.2)
ALP SERPL-CCNC: 81 U/L (ref 40–129)
ALT SERPL W P-5'-P-CCNC: 32 U/L (ref 10–50)
ANION GAP SERPL CALCULATED.3IONS-SCNC: 9 MMOL/L (ref 7–15)
AST SERPL W P-5'-P-CCNC: 33 U/L (ref 10–50)
BASOPHILS # BLD AUTO: 0.1 10E3/UL (ref 0–0.2)
BASOPHILS NFR BLD AUTO: 1 %
BILIRUB SERPL-MCNC: 0.5 MG/DL
BUN SERPL-MCNC: 16.1 MG/DL (ref 8–23)
CALCIUM SERPL-MCNC: 9.4 MG/DL (ref 8.8–10.2)
CHLORIDE SERPL-SCNC: 102 MMOL/L (ref 98–107)
CREAT SERPL-MCNC: 1.1 MG/DL (ref 0.67–1.17)
DEPRECATED HCO3 PLAS-SCNC: 26 MMOL/L (ref 22–29)
EOSINOPHIL # BLD AUTO: 0.2 10E3/UL (ref 0–0.7)
EOSINOPHIL NFR BLD AUTO: 3 %
ERYTHROCYTE [DISTWIDTH] IN BLOOD BY AUTOMATED COUNT: 13 % (ref 10–15)
GFR SERPL CREATININE-BSD FRML MDRD: 74 ML/MIN/1.73M2
GLUCOSE SERPL-MCNC: 113 MG/DL (ref 70–99)
HCT VFR BLD AUTO: 47.9 % (ref 40–53)
HGB BLD-MCNC: 15.5 G/DL (ref 13.3–17.7)
IMM GRANULOCYTES # BLD: 0.1 10E3/UL
IMM GRANULOCYTES NFR BLD: 1 %
KAPPA LC FREE SER-MCNC: 13.64 MG/DL (ref 0.33–1.94)
KAPPA LC FREE/LAMBDA FREE SER NEPH: 5.23 {RATIO} (ref 0.26–1.65)
LAMBDA LC FREE SERPL-MCNC: 2.61 MG/DL (ref 0.57–2.63)
LYMPHOCYTES # BLD AUTO: 1.5 10E3/UL (ref 0.8–5.3)
LYMPHOCYTES NFR BLD AUTO: 20 %
MCH RBC QN AUTO: 29.6 PG (ref 26.5–33)
MCHC RBC AUTO-ENTMCNC: 32.4 G/DL (ref 31.5–36.5)
MCV RBC AUTO: 92 FL (ref 78–100)
MONOCYTES # BLD AUTO: 1 10E3/UL (ref 0–1.3)
MONOCYTES NFR BLD AUTO: 13 %
NEUTROPHILS # BLD AUTO: 5 10E3/UL (ref 1.6–8.3)
NEUTROPHILS NFR BLD AUTO: 62 %
NRBC # BLD AUTO: 0 10E3/UL
NRBC BLD AUTO-RTO: 0 /100
PLATELET # BLD AUTO: 186 10E3/UL (ref 150–450)
POTASSIUM SERPL-SCNC: 4.3 MMOL/L (ref 3.4–5.3)
PROT SERPL-MCNC: 7.2 G/DL (ref 6.4–8.3)
RBC # BLD AUTO: 5.23 10E6/UL (ref 4.4–5.9)
SODIUM SERPL-SCNC: 137 MMOL/L (ref 136–145)
TOTAL PROTEIN SERUM FOR ELP: 6.9 G/DL (ref 6.4–8.3)
WBC # BLD AUTO: 7.8 10E3/UL (ref 4–11)

## 2022-08-30 PROCEDURE — 80053 COMPREHEN METABOLIC PANEL: CPT | Performed by: INTERNAL MEDICINE

## 2022-08-30 PROCEDURE — 83521 IG LIGHT CHAINS FREE EACH: CPT | Performed by: INTERNAL MEDICINE

## 2022-08-30 PROCEDURE — 85004 AUTOMATED DIFF WBC COUNT: CPT | Performed by: INTERNAL MEDICINE

## 2022-08-30 PROCEDURE — 84165 PROTEIN E-PHORESIS SERUM: CPT | Mod: TC | Performed by: PATHOLOGY

## 2022-08-30 PROCEDURE — 36415 COLL VENOUS BLD VENIPUNCTURE: CPT

## 2022-08-30 PROCEDURE — 84155 ASSAY OF PROTEIN SERUM: CPT | Mod: 91 | Performed by: INTERNAL MEDICINE

## 2022-08-30 PROCEDURE — 82040 ASSAY OF SERUM ALBUMIN: CPT | Performed by: INTERNAL MEDICINE

## 2022-08-30 NOTE — PROGRESS NOTES
Medical Assistant Note:  Derek Contreras presents today for blood draw.    Patient seen by provider today: No.   present during visit today: Not Applicable.    Concerns: No Concerns.    Procedure:  Labs drawn    Post Assessment:  Labs drawn without difficulty: Yes.    Discharge Plan:  Departure Mode: Ambulatory.    Face to Face Time: 10 min.    Macy Ngo CMA

## 2022-08-31 DIAGNOSIS — I50.30 HEART FAILURE WITH PRESERVED EJECTION FRACTION, NYHA CLASS I (H): Primary | ICD-10-CM

## 2022-08-31 LAB
ALBUMIN SERPL ELPH-MCNC: 3.7 G/DL (ref 3.7–5.1)
ALPHA1 GLOB SERPL ELPH-MCNC: 0.3 G/DL (ref 0.2–0.4)
ALPHA2 GLOB SERPL ELPH-MCNC: 0.7 G/DL (ref 0.5–0.9)
B-GLOBULIN SERPL ELPH-MCNC: 1.1 G/DL (ref 0.6–1)
GAMMA GLOB SERPL ELPH-MCNC: 1 G/DL (ref 0.7–1.6)
M PROTEIN SERPL ELPH-MCNC: 0.4 G/DL
PROT PATTERN SERPL ELPH-IMP: ABNORMAL

## 2022-08-31 PROCEDURE — 84165 PROTEIN E-PHORESIS SERUM: CPT | Mod: 26 | Performed by: PATHOLOGY

## 2022-09-04 DIAGNOSIS — I50.30 HEART FAILURE WITH PRESERVED EJECTION FRACTION, NYHA CLASS I (H): ICD-10-CM

## 2022-09-06 DIAGNOSIS — I50.30 HEART FAILURE WITH PRESERVED EJECTION FRACTION, NYHA CLASS I (H): ICD-10-CM

## 2022-09-06 RX ORDER — SACUBITRIL AND VALSARTAN 97; 103 MG/1; MG/1
1 TABLET, FILM COATED ORAL 2 TIMES DAILY
Qty: 180 TABLET | Refills: 3 | Status: SHIPPED | OUTPATIENT
Start: 2022-09-06 | End: 2022-09-06

## 2022-09-06 RX ORDER — SACUBITRIL AND VALSARTAN 97; 103 MG/1; MG/1
1 TABLET, FILM COATED ORAL 2 TIMES DAILY
Qty: 180 TABLET | Refills: 3 | Status: SHIPPED | OUTPATIENT
Start: 2022-09-06 | End: 2022-11-14

## 2022-09-06 NOTE — PROGRESS NOTES
[facility]    Hematology/Oncology Established Patient Note      Today's Date: 09/09/22    Reason for Follow-up: MGUS      HISTORY OF PRESENT ILLNESS: Derek Contreras is a 66 year old male with PMHx of sleep apnea, sarcoidosis, COPD, prostate cancer s/p prostatectomy, history of hip and knee replacement, HTN who presents with MGUS. An VLAD was checked by his neurologist, which showed an elevated IgA level.      He says that he feels fantastic currently.      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  Kappa light chain is elevated to 10.73, with kappa/lambda ratio of 4.47.  His hemoglobin, calcium, and renal function are normal.          INTERIM HISTORY:   Patient continues to do well. His grandkids ( and second grade) have been visiting every Monday this summer. He is able to perform all ADLs without issue.      He continues to follow with pulmonology and cardiology for sarcoidosis.      REVIEW OF SYSTEMS:   14 point ROS was reviewed and is negative other than as noted above in HPI.       HOME MEDICATIONS:  Current Outpatient Medications   Medication Sig Dispense Refill     albuterol (PROAIR HFA/PROVENTIL HFA/VENTOLIN HFA) 108 (90 Base) MCG/ACT inhaler Inhale 1-2 puffs into the lungs every 4 hours as needed for shortness of breath / dyspnea 1 Inhaler 4     aspirin (ASA) 81 MG tablet Take 81 mg by mouth every morning  90 tablet 3     atorvastatin (LIPITOR) 40 MG tablet TAKE 1 TABLET(40 MG) BY MOUTH EVERY MORNING 90 tablet 3     azithromycin (ZITHROMAX) 250 MG tablet Take 2 tablets (500 mg) the first day, then take 1 tablet (250 mg) by mouth daily for a total of 5 days. 6 tablet 0     BREO ELLIPTA 200-25 MCG/INH Inhaler INHALE 1 PUFF BY MOUTH ONE TIME DAILY  60 each 11     cetirizine (ZYRTEC) 10 MG tablet Take 10 mg by mouth every morning  90 tablet 3     diphenhydrAMINE (BENADRYL) 25 MG tablet Take 1 tablet (25 mg) by mouth  every 8 hours as needed for itching or allergies 1 tablet 0     hydrochlorothiazide (HYDRODIURIL) 12.5 MG tablet TAKE ONE TABLET BY MOUTH EVERY MORNING 90 tablet 3     metoprolol succinate ER (TOPROL-XL) 100 MG 24 hr tablet Take 1.5 tablets (150 mg) by mouth daily 135 tablet 3     montelukast (SINGULAIR) 10 MG tablet Take 1 tablet (10 mg) by mouth every evening 90 tablet 3     montelukast (SINGULAIR) 10 MG tablet Take 1 tablet (10 mg) by mouth At Bedtime 90 tablet 0     multivitamin (ONE-DAILY) tablet Take 1 tablet by mouth every morning  90 tablet 3     omeprazole (PRILOSEC) 40 MG DR capsule TAKE 1 CAPSULE BY MOUTH ONE TIME DAILY 30 capsule 11     sacubitril-valsartan (ENTRESTO)  MG per tablet Take 1 tablet by mouth 2 times daily 180 tablet 3     spironolactone (ALDACTONE) 25 MG tablet Take 1 tablet (25 mg) by mouth daily 90 tablet 1     umeclidinium (INCRUSE ELLIPTA) 62.5 MCG/INH inhaler Inhale 1 puff into the lungs daily 30 each 11         ALLERGIES:  Allergies   Allergen Reactions     Cat Hair Extract Itching     itchy tearful eyes     Adhesive Tape Rash     Iodine Rash         PAST MEDICAL HISTORY:  Past Medical History:   Diagnosis Date     Asthma      Claustrophobia      CPAP (continuous positive airway pressure) dependence      Dyslipidemia      [place] (hard of hearing)      Hypercholesteremia      Hypertension      Iron deficiency      Mediastinal adenopathy      Obesity      BEULAH (obstructive sleep apnea)      Prostate cancer (H)      Pulmonary hypertension (H)      Sarcoidosis          PAST SURGICAL HISTORY:  Past Surgical History:   Procedure Laterality Date     APPENDECTOMY       BIOPSY MASS NECK Left 7/15/2020    Procedure: Left trapezius muscle biopsy;  Surgeon: Ade Villa MD;  [facility]     CV RIGHT HEART CATH MEASUREMENTS RECORDED N/A 12/11/2019    Procedure: CV RIGHT HEART CATH;  Surgeon: Torrey Hirsch MD;  Location:  HEART CARDIAC CATH LAB     CV RIGHT HEART CATH  MEASUREMENTS RECORDED N/A 1/19/2022    Procedure: CV RIGHT HEART CATH;  Surgeon: Torrey Hirsch MD;  Location: [facility]     DAVINCI PROSTATECTOMY, LYMPHADENECTOMY N/A 5/23/2019    Procedure: ROBOTIC ASSISTED LAPAROSCOPIC RADICAL PROSTATECTOMY WITH BILATERAL PELVIC LYMPH NODE DISSECTION;  Surgeon: Matteo Coughlin MD;  Location: [facility] OR     ENDOBRONCHIAL ULTRASOUND FLEXIBLE N/A 3/29/2019    Procedure: Flexible Bronchoscopy, Endobronchial Ultrasound;  Surgeon: Curtis Leger MD;  Location: [facility] OR     VASECTOMY       ZZC TOTAL HIP ARTHROPLASTY Bilateral      ZZC TOTAL KNEE ARTHROPLASTY Bilateral          SOCIAL HISTORY:  Social History     Socioeconomic History     Marital status:      Spouse name: Not on file     Number of children: Not on file     Years of education: Not on file     Highest education level: Not on file   Occupational History     Not on file   Tobacco Use     Smoking status: Never Smoker     Smokeless tobacco: Never Used   Vaping Use     Vaping Use: Never used   Substance and Sexual Activity     Alcohol use: Yes     Comment: twice a week     Drug use: No     Sexual activity: Yes     Partners: Female   Other Topics Concern     Parent/sibling w/ CABG, MI or angioplasty before 65F 55M? Not Asked   Social History Narrative    12/03/20         ENVIRONMENTAL HISTORY: [place] The home is heated with a gas furnace. They does have central air conditioning. The patient's bedroom is furnished with Indoor plants and carpeting in bedroom.  Pets inside the house include 0 pets. There is no history of cockroach or mice infestation. There is/are 0 smokers in the house.  The house does not have a damp basement.      Social Determinants of Health     Financial Resource Strain: Not on file   Food Insecurity: Not on file   Transportation Needs: Not on file   Physical Activity: Not on file   Stress: Not on file   Social Connections:  Not on file   Intimate Partner Violence: Not At Risk     Fear of Current or Ex-Partner: No     Emotionally Abused: No     Physically Abused: No     Sexually Abused: No   Housing Stability: Not on file         FAMILY HISTORY:  Family History   Problem Relation Age of Onset     Alzheimer Disease Mother      Heart Disease Father      Glaucoma No family hx of      Macular Degeneration No family hx of      Diabetes No family hx of      Thyroid Disease No family hx of          PHYSICAL EXAM:  ECO  GENERAL/CONSTITUTIONAL: No acute distress. Healthy, alert.  RESPIRATORY: No audible wheeze, cough, or visible cyanosis.  No visible retractions or increased work of breathing.  Able to speak fully in complete sentences.  NEUROLOGIC: Alert, oriented, answers questions appropriately. No tremor. Mentation intact and speech normal  INTEGUMENTARY: No jaundice.    PSYCHIATRIC:  Mentation appears normal, affect normal/bright, judgement and insight intact, normal speech and appearance well-groomed.    The rest of a comprehensive physical exam is deferred due to public [facility] emergency video visit restrictions.          LABS:   Latest Reference Range & Units 22 08:33   Sodium 136 - 145 mmol/L 137   Potassium 3.4 - 5.3 mmol/L 4.3   Chloride 98 - 107 mmol/L 102   Carbon Dioxide (CO2) 22 - 29 mmol/L 26   Urea Nitrogen 8.0 - 23.0 mg/dL 16.1   Creatinine 0.67 - 1.17 mg/dL 1.10   GFR Estimate >60 mL/min/1.73m2 74   Calcium 8.8 - 10.2 mg/dL 9.4   Anion Gap 7 - 15 mmol/L 9   Albumin 3.5 - 5.2 g/dL 3.7   Protein Total 6.4 - 8.3 g/dL 7.2   Alkaline Phosphatase 40 - 129 U/L 81   ALT 10 - 50 U/L 32   AST 10 - 50 U/L 33   Bilirubin Total <=1.2 mg/dL 0.5   Glucose 70 - 99 mg/dL 113 (H)   WBC 4.0 - 11.0 10e3/uL 7.8   Hemoglobin 13.3 - 17.7 g/dL 15.5   Hematocrit 40.0 - 53.0 % 47.9   Platelet Count 150 - 450 10e3/uL 186   RBC Count 4.40 - 5.90 10e6/uL 5.23   MCV 78 - 100 fL 92   MCH 26.5 - 33.0 pg 29.6   MCHC 31.5 - 36.5 g/dL 32.4   RDW 10.0 -  15.0 % 13.0   % Neutrophils % 62   % Lymphocytes % 20   % Monocytes % 13   % Eosinophils % 3   % Basophils % 1   Absolute Basophils 0.0 - 0.2 10e3/uL 0.1   Absolute Eosinophils 0.0 - 0.7 10e3/uL 0.2   Absolute Immature Granulocytes <=0.4 10e3/uL 0.1   Absolute Lymphocytes 0.8 - 5.3 10e3/uL 1.5   Absolute Monocytes 0.0 - 1.3 10e3/uL 1.0   % Immature Granulocytes % 1   Absolute Neutrophils 1.6 - 8.3 10e3/uL 5.0   Absolute NRBCs 10e3/uL 0.0   NRBCs per 100 WBC <1 /100 0     Serum M-protein (g/dL):  5/27/20: 0.5 g/dL IgA kappa  9/1/20: 0.4   2/23/21: 0.5   8/11/21: 0.4   8/30/22: 0.4     Free kappa light chains (mg/dL), lambda (mg/dL), kappa/lambda ratio:  5/27/20: 10.73, 2.40, 4.47  8/25/20: 7.81, 1.84, 4.24  2/23/21: 11.90, 2.78, 4.28  8/11/21: 11.57, 2.74, 4.22  8/30/22: 13.64, 2.61, 5.23      IMAGING:  Skeletal survey 6/9/20:  No lytic or destructive lesions or evidence of compression  fracture.      ASSESSMENT/PLAN:  Derek Contreras is a 65 year old male with:    1) MGUS: Labs reviewed.  M-spike stable at 0.4-0.5 g/dL.  -Serum free light chains are overall stable.  Skeletal survey showed no evidence of lytic lesions.  Renal function, calcium, hemoglobin are normal.   -We reviewed the patient's history and discussed the 1% risk per year of transformation to MM/amyloidosis and will need to continue to monitor.    2) History of prostate cancer: s/p prostatectomy  -Followed by urology.    3) COPD, sarcoidosis:  -Followed by pulmonology.    4) Thrombocytopenia: has been mildly low off and on.  Platelet count normal at 155K on recent labs.  -Monitor CBC for now.    5) RTC in 1 year with labs: CBC, CMP, SPIEP, serum free light chains, and UPIEP.        Malena Way DO  Hematology/Oncology  [facility]

## 2022-09-07 ENCOUNTER — ONCOLOGY VISIT (OUTPATIENT)
Dept: ONCOLOGY | Facility: CLINIC | Age: 67
End: 2022-09-07
Attending: INTERNAL MEDICINE
Payer: MEDICARE

## 2022-09-07 VITALS
TEMPERATURE: 97.8 F | HEIGHT: 72 IN | HEART RATE: 73 BPM | RESPIRATION RATE: 16 BRPM | SYSTOLIC BLOOD PRESSURE: 102 MMHG | WEIGHT: 269.9 LBS | OXYGEN SATURATION: 97 % | DIASTOLIC BLOOD PRESSURE: 57 MMHG | BODY MASS INDEX: 36.56 KG/M2

## 2022-09-07 DIAGNOSIS — D47.2 MGUS (MONOCLONAL GAMMOPATHY OF UNKNOWN SIGNIFICANCE): Primary | ICD-10-CM

## 2022-09-07 PROCEDURE — G0463 HOSPITAL OUTPT CLINIC VISIT: HCPCS

## 2022-09-07 PROCEDURE — 99214 OFFICE O/P EST MOD 30 MIN: CPT | Performed by: INTERNAL MEDICINE

## 2022-09-07 RX ORDER — SACUBITRIL AND VALSARTAN 97; 103 MG/1; MG/1
TABLET, FILM COATED ORAL
Qty: 180 TABLET | Refills: 1 | OUTPATIENT
Start: 2022-09-07

## 2022-09-07 ASSESSMENT — PAIN SCALES - GENERAL: PAINLEVEL: NO PAIN (0)

## 2022-09-07 NOTE — LETTER
9/7/2022         RE: eDrek Contreras  [address] [address] [facility] [address]        Dear Colleague,    Thank you for referring your patient, Derek Contreras, to the [facility]. Please see a copy of my visit note below.    [facility]    Hematology/Oncology Established Patient Note      Today's Date: 09/09/22    Reason for Follow-up: MGUS      HISTORY OF PRESENT ILLNESS: Derek Contreras is a 66 year old male with PMHx of sleep apnea, sarcoidosis, COPD, prostate cancer s/p prostatectomy, history of hip and knee replacement, HTN who presents with MGUS. An VLAD was checked by his neurologist, which showed an elevated IgA level.      He says that he feels fantastic currently.      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  Kappa light chain is elevated to 10.73, with kappa/lambda ratio of 4.47.  His hemoglobin, calcium, and renal function are normal.          INTERIM HISTORY:   Patient continues to do well. His grandkids ( and second grade) have been visiting every Monday this summer. He is able to perform all ADLs without issue.      He continues to follow with pulmonology and cardiology for sarcoidosis.      REVIEW OF SYSTEMS:   14 point ROS was reviewed and is negative other than as noted above in HPI.       HOME MEDICATIONS:  Current Outpatient Medications   Medication Sig Dispense Refill     albuterol (PROAIR HFA/PROVENTIL HFA/VENTOLIN HFA) 108 (90 Base) MCG/ACT inhaler Inhale 1-2 puffs into the lungs every 4 hours as needed for shortness of breath / dyspnea 1 Inhaler 4     aspirin (ASA) 81 MG tablet Take 81 mg by mouth every morning  90 tablet 3     atorvastatin (LIPITOR) 40 MG tablet TAKE 1 TABLET(40 MG) BY MOUTH EVERY MORNING 90 tablet 3     azithromycin (ZITHROMAX) 250 MG tablet Take 2 tablets (500 mg) the first day, then take 1 tablet (250 mg) by mouth daily for a total  of 5 days. 6 tablet 0     BREO ELLIPTA 200-25 MCG/INH Inhaler INHALE 1 PUFF BY MOUTH ONE TIME DAILY  60 each 11     cetirizine (ZYRTEC) 10 MG tablet Take 10 mg by mouth every morning  90 tablet 3     diphenhydrAMINE (BENADRYL) 25 MG tablet Take 1 tablet (25 mg) by mouth every 8 hours as needed for itching or allergies 1 tablet 0     hydrochlorothiazide (HYDRODIURIL) 12.5 MG tablet TAKE ONE TABLET BY MOUTH EVERY MORNING 90 tablet 3     metoprolol succinate ER (TOPROL-XL) 100 MG 24 hr tablet Take 1.5 tablets (150 mg) by mouth daily 135 tablet 3     montelukast (SINGULAIR) 10 MG tablet Take 1 tablet (10 mg) by mouth every evening 90 tablet 3     montelukast (SINGULAIR) 10 MG tablet Take 1 tablet (10 mg) by mouth At Bedtime 90 tablet 0     multivitamin (ONE-DAILY) tablet Take 1 tablet by mouth every morning  90 tablet 3     omeprazole (PRILOSEC) 40 MG DR capsule TAKE 1 CAPSULE BY MOUTH ONE TIME DAILY 30 capsule 11     sacubitril-valsartan (ENTRESTO)  MG per tablet Take 1 tablet by mouth 2 times daily 180 tablet 3     spironolactone (ALDACTONE) 25 MG tablet Take 1 tablet (25 mg) by mouth daily 90 tablet 1     umeclidinium (INCRUSE ELLIPTA) 62.5 MCG/INH inhaler Inhale 1 puff into the lungs daily 30 each 11         ALLERGIES:  Allergies   Allergen Reactions     Cat Hair Extract Itching     itchy tearful eyes     Adhesive Tape Rash     Iodine Rash         PAST MEDICAL HISTORY:  Past Medical History:   Diagnosis Date     Asthma      Claustrophobia      CPAP (continuous positive airway pressure) dependence      Dyslipidemia      [place] (hard of hearing)      Hypercholesteremia      Hypertension      Iron deficiency      Mediastinal adenopathy      Obesity      BEULAH (obstructive sleep apnea)      Prostate cancer (H)      Pulmonary hypertension (H)      Sarcoidosis          PAST SURGICAL HISTORY:  Past Surgical History:   Procedure Laterality Date     APPENDECTOMY       BIOPSY MASS NECK Left 7/15/2020    Procedure: Left  trapezius muscle biopsy;  Surgeon: Ade Villa MD;  [facility]     CV RIGHT HEART CATH MEASUREMENTS RECORDED N/A 12/11/2019    Procedure: CV RIGHT HEART CATH;  Surgeon: Torrey Hirsch MD;  [facility]: [facility] HEART [facility]     CV RIGHT HEART CATH MEASUREMENTS RECORDED N/A 1/19/2022    Procedure: CV RIGHT HEART CATH;  Surgeon: Torrey Hirsch MD;  [facility]     DAVINCI PROSTATECTOMY, LYMPHADENECTOMY N/A 5/23/2019    Procedure: ROBOTIC ASSISTED LAPAROSCOPIC RADICAL PROSTATECTOMY WITH BILATERAL PELVIC LYMPH NODE DISSECTION;  Surgeon: Matteo Coughlin MD;  Location: [facility] OR     ENDOBRONCHIAL ULTRASOUND FLEXIBLE N/A 3/29/2019    Procedure: Flexible Bronchoscopy, Endobronchial Ultrasound;  Surgeon: Curtis Leger MD;  Location: [facility] OR     VASECTOMY       ZZC TOTAL HIP ARTHROPLASTY Bilateral      ZZC TOTAL KNEE ARTHROPLASTY Bilateral          SOCIAL HISTORY:  Social History     Socioeconomic History     Marital status:      Spouse name: Not on file     Number of children: Not on file     Years of education: Not on file     Highest education level: Not on file   Occupational History     Not on file   Tobacco Use     Smoking status: Never Smoker     Smokeless tobacco: Never Used   Vaping Use     Vaping Use: Never used   Substance and Sexual Activity     Alcohol use: Yes     Comment: twice a week     Drug use: No     Sexual activity: Yes     Partners: Female   Other Topics Concern     Parent/sibling w/ CABG, MI or angioplasty before 65F 55M? Not Asked   Social History Narrative    12/03/20         ENVIRONMENTAL HISTORY: [place] The home is heated with a gas furnace. They does have central air conditioning. The patient's bedroom is furnished with Indoor plants and carpeting in bedroom.  Pets inside the house include 0 pets. There is no history of cockroach or mice infestation. There is/are 0 smokers in the  house.  The house does not have a damp basement.      Social Determinants of Health     Financial Resource Strain: Not on file   Food Insecurity: Not on file   Transportation Needs: Not on file   Physical Activity: Not on file   Stress: Not on file   Social Connections: Not on file   Intimate Partner Violence: Not At Risk     Fear of Current or Ex-Partner: No     Emotionally Abused: No     Physically Abused: No     Sexually Abused: No   Housing Stability: Not on file         FAMILY HISTORY:  Family History   Problem Relation Age of Onset     Alzheimer Disease Mother      Heart Disease Father      Glaucoma No family hx of      Macular Degeneration No family hx of      Diabetes No family hx of      Thyroid Disease No family hx of          PHYSICAL EXAM:  ECO  GENERAL/CONSTITUTIONAL: No acute distress. Healthy, alert.  RESPIRATORY: No audible wheeze, cough, or visible cyanosis.  No visible retractions or increased work of breathing.  Able to speak fully in complete sentences.  NEUROLOGIC: Alert, oriented, answers questions appropriately. No tremor. Mentation intact and speech normal  INTEGUMENTARY: No jaundice.    PSYCHIATRIC:  Mentation appears normal, affect normal/bright, judgement and insight intact, normal speech and appearance well-groomed.    The rest of a comprehensive physical exam is deferred due to public health emergency video visit restrictions.          LABS:   Latest Reference Range & Units 22 08:33   Sodium 136 - 145 mmol/L 137   Potassium 3.4 - 5.3 mmol/L 4.3   Chloride 98 - 107 mmol/L 102   Carbon Dioxide (CO2) 22 - 29 mmol/L 26   Urea Nitrogen 8.0 - 23.0 mg/dL 16.1   Creatinine 0.67 - 1.17 mg/dL 1.10   GFR Estimate >60 mL/min/1.73m2 74   Calcium 8.8 - 10.2 mg/dL 9.4   Anion Gap 7 - 15 mmol/L 9   Albumin 3.5 - 5.2 g/dL 3.7   Protein Total 6.4 - 8.3 g/dL 7.2   Alkaline Phosphatase 40 - 129 U/L 81   ALT 10 - 50 U/L 32   AST 10 - 50 U/L 33   Bilirubin Total <=1.2 mg/dL 0.5   Glucose 70 - 99  mg/dL 113 (H)   WBC 4.0 - 11.0 10e3/uL 7.8   Hemoglobin 13.3 - 17.7 g/dL 15.5   Hematocrit 40.0 - 53.0 % 47.9   Platelet Count 150 - 450 10e3/uL 186   RBC Count 4.40 - 5.90 10e6/uL 5.23   MCV 78 - 100 fL 92   MCH 26.5 - 33.0 pg 29.6   MCHC 31.5 - 36.5 g/dL 32.4   RDW 10.0 - 15.0 % 13.0   % Neutrophils % 62   % Lymphocytes % 20   % Monocytes % 13   % Eosinophils % 3   % Basophils % 1   Absolute Basophils 0.0 - 0.2 10e3/uL 0.1   Absolute Eosinophils 0.0 - 0.7 10e3/uL 0.2   Absolute Immature Granulocytes <=0.4 10e3/uL 0.1   Absolute Lymphocytes 0.8 - 5.3 10e3/uL 1.5   Absolute Monocytes 0.0 - 1.3 10e3/uL 1.0   % Immature Granulocytes % 1   Absolute Neutrophils 1.6 - 8.3 10e3/uL 5.0   Absolute NRBCs 10e3/uL 0.0   NRBCs per 100 WBC <1 /100 0     Serum M-protein (g/dL):  5/27/20: 0.5 g/dL IgA kappa  9/1/20: 0.4   2/23/21: 0.5   8/11/21: 0.4   8/30/22: 0.4     Free kappa light chains (mg/dL), lambda (mg/dL), kappa/lambda ratio:  5/27/20: 10.73, 2.40, 4.47  8/25/20: 7.81, 1.84, 4.24  2/23/21: 11.90, 2.78, 4.28  8/11/21: 11.57, 2.74, 4.22  8/30/22: 13.64, 2.61, 5.23      IMAGING:  Skeletal survey 6/9/20:  No lytic or destructive lesions or evidence of compression  fracture.      ASSESSMENT/PLAN:  Derek Contreras is a 65 year old male with:    1) MGUS: Labs reviewed.  M-spike stable at 0.4-0.5 g/dL.  -Serum free light chains are overall stable.  Skeletal survey showed no evidence of lytic lesions.  Renal function, calcium, hemoglobin are normal.   -We reviewed the patient's history and discussed the 1% risk per year of transformation to MM/amyloidosis and will need to continue to monitor.    2) History of prostate cancer: s/p prostatectomy  -Followed by urology.    3) COPD, sarcoidosis:  -Followed by pulmonology.    4) Thrombocytopenia: has been mildly low off and on.  Platelet count normal at 155K on recent labs.  -Monitor CBC for now.    5) RTC in 1 year with labs: CBC, CMP, SPIEP, serum free light chains, and  KEVIN.        Malena Way DO  Hematology/Oncology  [facility]        Again, thank you for allowing me to participate in the care of your patient.        Sincerely,        Malena Way DO

## 2022-09-07 NOTE — NURSING NOTE
Oncology Rooming Note    September 7, 2022 10:55 AM   Derek Contreras is a 66 year old male who presents for:    Chief Complaint   Patient presents with     Oncology Clinic Visit     Prostate cancer      Initial Vitals: Temp 97.8  F (36.6  C) (Tympanic)   Resp 16   Ht 1.829 m (6')   Wt 122.4 kg (269 lb 14.4 oz)   BMI 36.61 kg/m   Estimated body mass index is 36.61 kg/m  as calculated from the following:    Height as of this encounter: 1.829 m (6').    Weight as of this encounter: 122.4 kg (269 lb 14.4 oz). Body surface area is 2.49 meters squared.  No Pain (0) Comment: Data Unavailable   No LMP for male patient.  Allergies reviewed: Yes  Medications reviewed: Yes    Medications: Medication refills not needed today.  Pharmacy name entered into [employer]: [id number] - [place], [place] - [address] AT [address] [place] [id number] & [address] [facility]    Clinical concerns: f/u       Camryn Reid CMA

## 2022-09-18 ENCOUNTER — HEALTH MAINTENANCE LETTER (OUTPATIENT)
Age: 67
End: 2022-09-18

## 2022-10-05 ENCOUNTER — ANCILLARY PROCEDURE (OUTPATIENT)
Dept: CARDIOLOGY | Facility: CLINIC | Age: 67
End: 2022-10-05
Payer: MEDICARE

## 2022-10-05 DIAGNOSIS — I49.3 PVC'S (PREMATURE VENTRICULAR CONTRACTIONS): ICD-10-CM

## 2022-10-05 PROCEDURE — 93248 EXT ECG>7D<15D REV&INTERPJ: CPT | Performed by: INTERNAL MEDICINE

## 2022-10-05 PROCEDURE — 93246 EXT ECG>7D<15D RECORDING: CPT

## 2022-10-05 NOTE — PROGRESS NOTES
Per Dr. Figueroa, patient to have ZIO monitor placed.  Diagnosis: premature ventricular contractions   Monitor placed: Yes  Patient Instructed: Yes  Patient verbalized understanding: Yes  Holter # [id number]

## 2022-10-21 ENCOUNTER — MYC MEDICAL ADVICE (OUTPATIENT)
Dept: PULMONOLOGY | Facility: CLINIC | Age: 67
End: 2022-10-21

## 2022-10-21 DIAGNOSIS — D86.9 SARCOIDOSIS: Primary | ICD-10-CM

## 2022-10-21 RX ORDER — AZITHROMYCIN 250 MG/1
TABLET, FILM COATED ORAL
Qty: 6 TABLET | Refills: 0 | Status: SHIPPED | OUTPATIENT
Start: 2022-10-21 | End: 2022-10-26

## 2022-10-21 RX ORDER — PREDNISONE 20 MG/1
TABLET ORAL
Qty: 14 TABLET | Refills: 0 | Status: ON HOLD | OUTPATIENT
Start: 2022-10-21 | End: 2022-11-04

## 2022-10-25 ENCOUNTER — TELEPHONE (OUTPATIENT)
Dept: CARDIOLOGY | Facility: CLINIC | Age: 67
End: 2022-10-25

## 2022-10-25 NOTE — TELEPHONE ENCOUNTER
Called patient to discuss pre-procedure instructions. Patient plans to take a home covid test.  Patient denied further questions, verbalized understanding of instructions.

## 2022-10-31 ENCOUNTER — HOSPITAL ENCOUNTER (OUTPATIENT)
Facility: CLINIC | Age: 67
Discharge: HOME | End: 2022-11-04
Attending: EMERGENCY MEDICINE | Admitting: ORTHOPAEDIC SURGERY
Payer: MEDICARE

## 2022-10-31 ENCOUNTER — ANESTHESIA (OUTPATIENT)
Dept: SURGERY | Facility: CLINIC | Age: 67
End: 2022-10-31
Payer: MEDICARE

## 2022-10-31 ENCOUNTER — APPOINTMENT (OUTPATIENT)
Dept: GENERAL RADIOLOGY | Facility: CLINIC | Age: 67
End: 2022-10-31
Attending: ORTHOPAEDIC SURGERY
Payer: MEDICARE

## 2022-10-31 ENCOUNTER — ANESTHESIA EVENT (OUTPATIENT)
Dept: SURGERY | Facility: CLINIC | Age: 67
End: 2022-10-31
Payer: MEDICARE

## 2022-10-31 ENCOUNTER — APPOINTMENT (OUTPATIENT)
Dept: GENERAL RADIOLOGY | Facility: CLINIC | Age: 67
End: 2022-10-31
Attending: EMERGENCY MEDICINE
Payer: MEDICARE

## 2022-10-31 VITALS
HEART RATE: 105 BPM | OXYGEN SATURATION: 96 % | TEMPERATURE: 98.6 F | SYSTOLIC BLOOD PRESSURE: 107 MMHG | RESPIRATION RATE: 16 BRPM | DIASTOLIC BLOOD PRESSURE: 68 MMHG

## 2022-10-31 DIAGNOSIS — S73.005A DISLOCATION OF LEFT HIP, INITIAL ENCOUNTER (H): Primary | ICD-10-CM

## 2022-10-31 LAB
CREAT SERPL-MCNC: 1.07 MG/DL (ref 0.67–1.17)
FLUAV RNA SPEC QL NAA+PROBE: NEGATIVE
FLUBV RNA RESP QL NAA+PROBE: NEGATIVE
GFR SERPL CREATININE-BSD FRML MDRD: 76 ML/MIN/1.73M2
HGB BLD-MCNC: 15.9 G/DL (ref 13.3–17.7)
POTASSIUM SERPL-SCNC: 4.4 MMOL/L (ref 3.4–5.3)
RSV RNA SPEC NAA+PROBE: NEGATIVE
SARS-COV-2 RNA RESP QL NAA+PROBE: NEGATIVE

## 2022-10-31 PROCEDURE — 87637 SARSCOV2&INF A&B&RSV AMP PRB: CPT | Performed by: EMERGENCY MEDICINE

## 2022-10-31 PROCEDURE — 250N000011 HC RX IP 250 OP 636: Performed by: NURSE ANESTHETIST, CERTIFIED REGISTERED

## 2022-10-31 PROCEDURE — 710N000012 HC RECOVERY PHASE 2, PER MINUTE: Performed by: ORTHOPAEDIC SURGERY

## 2022-10-31 PROCEDURE — 999N000141 HC STATISTIC PRE-PROCEDURE NURSING ASSESSMENT: Performed by: ORTHOPAEDIC SURGERY

## 2022-10-31 PROCEDURE — 258N000003 HC RX IP 258 OP 636: Performed by: NURSE ANESTHETIST, CERTIFIED REGISTERED

## 2022-10-31 PROCEDURE — 370N000017 HC ANESTHESIA TECHNICAL FEE, PER MIN: Performed by: ORTHOPAEDIC SURGERY

## 2022-10-31 PROCEDURE — 250N000009 HC RX 250: Performed by: NURSE ANESTHETIST, CERTIFIED REGISTERED

## 2022-10-31 PROCEDURE — 99285 EMERGENCY DEPT VISIT HI MDM: CPT | Mod: 25

## 2022-10-31 PROCEDURE — 27265 TREAT HIP DISLOCATION: CPT | Mod: LT

## 2022-10-31 PROCEDURE — 84132 ASSAY OF SERUM POTASSIUM: CPT | Performed by: ANESTHESIOLOGY

## 2022-10-31 PROCEDURE — 82565 ASSAY OF CREATININE: CPT | Performed by: ANESTHESIOLOGY

## 2022-10-31 PROCEDURE — 250N000011 HC RX IP 250 OP 636: Performed by: EMERGENCY MEDICINE

## 2022-10-31 PROCEDURE — 96374 THER/PROPH/DIAG INJ IV PUSH: CPT

## 2022-10-31 PROCEDURE — 36415 COLL VENOUS BLD VENIPUNCTURE: CPT | Performed by: ANESTHESIOLOGY

## 2022-10-31 PROCEDURE — 999N000063 XR HIP PORTABLE LEFT 2-3 VIEWS

## 2022-10-31 PROCEDURE — 85018 HEMOGLOBIN: CPT | Performed by: ANESTHESIOLOGY

## 2022-10-31 PROCEDURE — 710N000009 HC RECOVERY PHASE 1, LEVEL 1, PER MIN: Performed by: ORTHOPAEDIC SURGERY

## 2022-10-31 PROCEDURE — 271N000001 HC OR GENERAL SUPPLY NON-STERILE: Performed by: ORTHOPAEDIC SURGERY

## 2022-10-31 PROCEDURE — 360N000082 HC SURGERY LEVEL 2 W/ FLUORO, PER MIN: Performed by: ORTHOPAEDIC SURGERY

## 2022-10-31 PROCEDURE — 96375 TX/PRO/DX INJ NEW DRUG ADDON: CPT

## 2022-10-31 PROCEDURE — 73502 X-RAY EXAM HIP UNI 2-3 VIEWS: CPT

## 2022-10-31 PROCEDURE — C9803 HOPD COVID-19 SPEC COLLECT: HCPCS

## 2022-10-31 RX ORDER — NALOXONE HYDROCHLORIDE 0.4 MG/ML
0.2 INJECTION, SOLUTION INTRAMUSCULAR; INTRAVENOUS; SUBCUTANEOUS
Status: DISCONTINUED | OUTPATIENT
Start: 2022-10-31 | End: 2022-11-04 | Stop reason: HOSPADM

## 2022-10-31 RX ORDER — LIDOCAINE HYDROCHLORIDE 10 MG/ML
INJECTION, SOLUTION INFILTRATION; PERINEURAL PRN
Status: DISCONTINUED | OUTPATIENT
Start: 2022-10-31 | End: 2022-10-31

## 2022-10-31 RX ORDER — TRAMADOL HYDROCHLORIDE 50 MG/1
50 TABLET ORAL EVERY 6 HOURS PRN
Qty: 10 TABLET | Refills: 0 | Status: SHIPPED | OUTPATIENT
Start: 2022-10-31 | End: 2022-11-03

## 2022-10-31 RX ORDER — FLUMAZENIL 0.1 MG/ML
0.2 INJECTION, SOLUTION INTRAVENOUS
Status: ACTIVE | OUTPATIENT
Start: 2022-10-31 | End: 2022-11-01

## 2022-10-31 RX ORDER — ACETAMINOPHEN 500 MG
1000 TABLET ORAL EVERY 4 HOURS PRN
COMMUNITY
Start: 2022-10-31 | End: 2023-10-28

## 2022-10-31 RX ORDER — DEXAMETHASONE SODIUM PHOSPHATE 4 MG/ML
INJECTION, SOLUTION INTRA-ARTICULAR; INTRALESIONAL; INTRAMUSCULAR; INTRAVENOUS; SOFT TISSUE PRN
Status: DISCONTINUED | OUTPATIENT
Start: 2022-10-31 | End: 2022-10-31

## 2022-10-31 RX ORDER — ACETAMINOPHEN 325 MG/1
650 TABLET ORAL
Status: DISCONTINUED | OUTPATIENT
Start: 2022-10-31 | End: 2022-11-04 | Stop reason: HOSPADM

## 2022-10-31 RX ORDER — OXYCODONE HYDROCHLORIDE 5 MG/1
5 TABLET ORAL
Status: DISCONTINUED | OUTPATIENT
Start: 2022-10-31 | End: 2022-11-04 | Stop reason: HOSPADM

## 2022-10-31 RX ORDER — ONDANSETRON 4 MG/1
4 TABLET, ORALLY DISINTEGRATING ORAL
Status: DISCONTINUED | OUTPATIENT
Start: 2022-10-31 | End: 2022-11-04 | Stop reason: HOSPADM

## 2022-10-31 RX ORDER — FENTANYL CITRATE 50 UG/ML
INJECTION, SOLUTION INTRAMUSCULAR; INTRAVENOUS PRN
Status: DISCONTINUED | OUTPATIENT
Start: 2022-10-31 | End: 2022-10-31

## 2022-10-31 RX ORDER — SODIUM CHLORIDE, SODIUM LACTATE, POTASSIUM CHLORIDE, CALCIUM CHLORIDE 600; 310; 30; 20 MG/100ML; MG/100ML; MG/100ML; MG/100ML
INJECTION, SOLUTION INTRAVENOUS CONTINUOUS
Status: DISCONTINUED | OUTPATIENT
Start: 2022-10-31 | End: 2022-10-31 | Stop reason: HOSPADM

## 2022-10-31 RX ORDER — PROPOFOL 10 MG/ML
INJECTION, EMULSION INTRAVENOUS PRN
Status: DISCONTINUED | OUTPATIENT
Start: 2022-10-31 | End: 2022-10-31

## 2022-10-31 RX ORDER — HYDROMORPHONE HYDROCHLORIDE 1 MG/ML
0.5 INJECTION, SOLUTION INTRAMUSCULAR; INTRAVENOUS; SUBCUTANEOUS ONCE
Status: COMPLETED | OUTPATIENT
Start: 2022-10-31 | End: 2022-10-31

## 2022-10-31 RX ORDER — NALOXONE HYDROCHLORIDE 0.4 MG/ML
0.4 INJECTION, SOLUTION INTRAMUSCULAR; INTRAVENOUS; SUBCUTANEOUS
Status: DISCONTINUED | OUTPATIENT
Start: 2022-10-31 | End: 2022-11-04 | Stop reason: HOSPADM

## 2022-10-31 RX ORDER — GLYCOPYRROLATE 0.2 MG/ML
INJECTION, SOLUTION INTRAMUSCULAR; INTRAVENOUS PRN
Status: DISCONTINUED | OUTPATIENT
Start: 2022-10-31 | End: 2022-10-31

## 2022-10-31 RX ORDER — SODIUM CHLORIDE, SODIUM LACTATE, POTASSIUM CHLORIDE, CALCIUM CHLORIDE 600; 310; 30; 20 MG/100ML; MG/100ML; MG/100ML; MG/100ML
INJECTION, SOLUTION INTRAVENOUS CONTINUOUS PRN
Status: DISCONTINUED | OUTPATIENT
Start: 2022-10-31 | End: 2022-10-31

## 2022-10-31 RX ORDER — ONDANSETRON 2 MG/ML
INJECTION INTRAMUSCULAR; INTRAVENOUS PRN
Status: DISCONTINUED | OUTPATIENT
Start: 2022-10-31 | End: 2022-10-31

## 2022-10-31 RX ORDER — PROPOFOL 10 MG/ML
0.1 INJECTION, EMULSION INTRAVENOUS
Status: DISCONTINUED | OUTPATIENT
Start: 2022-10-31 | End: 2022-11-04 | Stop reason: HOSPADM

## 2022-10-31 RX ADMIN — FENTANYL CITRATE 50 MCG: 50 INJECTION, SOLUTION INTRAMUSCULAR; INTRAVENOUS at 18:27

## 2022-10-31 RX ADMIN — ROCURONIUM BROMIDE 5 MG: 50 INJECTION, SOLUTION INTRAVENOUS at 18:22

## 2022-10-31 RX ADMIN — MIDAZOLAM 2 MG: 1 INJECTION INTRAMUSCULAR; INTRAVENOUS at 18:18

## 2022-10-31 RX ADMIN — ONDANSETRON HYDROCHLORIDE 4 MG: 2 INJECTION, SOLUTION INTRAVENOUS at 18:22

## 2022-10-31 RX ADMIN — HYDROMORPHONE HYDROCHLORIDE 0.5 MG: 1 INJECTION, SOLUTION INTRAMUSCULAR; INTRAVENOUS; SUBCUTANEOUS at 11:54

## 2022-10-31 RX ADMIN — PROPOFOL 190 MG: 10 INJECTION, EMULSION INTRAVENOUS at 15:20

## 2022-10-31 RX ADMIN — PROPOFOL 150 MG: 10 INJECTION, EMULSION INTRAVENOUS at 18:22

## 2022-10-31 RX ADMIN — GLYCOPYRROLATE 0.2 MCG: 0.2 INJECTION, SOLUTION INTRAMUSCULAR; INTRAVENOUS at 18:22

## 2022-10-31 RX ADMIN — DEXAMETHASONE SODIUM PHOSPHATE 4 MG: 4 INJECTION, SOLUTION INTRA-ARTICULAR; INTRALESIONAL; INTRAMUSCULAR; INTRAVENOUS; SOFT TISSUE at 18:22

## 2022-10-31 RX ADMIN — SODIUM CHLORIDE, POTASSIUM CHLORIDE, SODIUM LACTATE AND CALCIUM CHLORIDE: 600; 310; 30; 20 INJECTION, SOLUTION INTRAVENOUS at 17:53

## 2022-10-31 RX ADMIN — FENTANYL CITRATE 50 MCG: 50 INJECTION, SOLUTION INTRAMUSCULAR; INTRAVENOUS at 18:22

## 2022-10-31 RX ADMIN — LIDOCAINE HYDROCHLORIDE 30 MG: 10 INJECTION, SOLUTION INFILTRATION; PERINEURAL at 18:22

## 2022-10-31 ASSESSMENT — ENCOUNTER SYMPTOMS
COUGH: 0
CHILLS: 0
DIZZINESS: 0
EYE PAIN: 0
NAUSEA: 0
ABDOMINAL PAIN: 0
SORE THROAT: 0
PALPITATIONS: 0
BLOOD IN STOOL: 0
HEMATURIA: 0
NECK PAIN: 0
RHINORRHEA: 0
SHORTNESS OF BREATH: 0
NUMBNESS: 0
COLOR CHANGE: 0
FEVER: 0
DIARRHEA: 0
DYSURIA: 0
AGITATION: 0
ARTHRALGIAS: 1
HEADACHES: 0
CHEST TIGHTNESS: 0
VOMITING: 0

## 2022-10-31 ASSESSMENT — ACTIVITIES OF DAILY LIVING (ADL)
ADLS_ACUITY_SCORE: 36
ADLS_ACUITY_SCORE: 35
ADLS_ACUITY_SCORE: 36
ADLS_ACUITY_SCORE: 35
ADLS_ACUITY_SCORE: 36

## 2022-10-31 NOTE — CONSULTS
[facility]  Orthopaedics/Foot and Ankle Surgery Consultation         Antonio Ann MD    Derek Contreras MRN# [id number]   YOB: 1955 Age: 66 year old      Date of Admission:  10/31/2022  Date of Consult: 10/31/2022           Assessment and Plan:   66-year-old gentleman 10 years status post left total hip arthroplasty with a first-time left hip dislocation sustained while getting off the floor at his home earlier today.  The patient failed a closed reduction attempt in the emergency department under conscious sedation.  Formal closed reduction attempt with paralytic anesthetic in the operating room is recommended.  The patient will proceed to the operating room this evening for a closed reduction of his left total hip arthroplasty.  I would anticipate the patient may discharge home postoperatively with a knee immobilizer, with close follow-up scheduled with his primary hip surgeon over the next few weeks.              Primary Care Physician:   Carlyn Payne [phone]         Requesting Physician:      ED staff         Chief Complaint:   Left dislocated total hip arthroplasty, status post failed closed reduction attempt in the emergency department.    History is obtained from the patient and medical chart.         History of Present Illness:   Derek Contreras is a 66 year old male with fairly complicated past medical history, status post left total hip arthroplasty in 2012, who sustained an acute left hip dislocation when attempting to get up off the floor in his home earlier today.  The patient was unable to bear weight on the left leg and presented to the emergency department where radiographs demonstrated a left total hip dislocation.  The patient underwent a closed reduction attempt in the emergency department under conscious sedation without success.  Formal closed reduction in the operating room with a paralytic anesthetic was recommended given the failed  closed reduction attempt.  The patient denies any numbness, tingling, or weakness in the left lower extremity following the dislocation today.  The patient denies any previous issues with the left hip or previous dislocation of his left total hip arthroplasty.           Past Medical History:     Patient Active Problem List   Diagnosis     Morbid obesity (H)     Pulmonary sarcoidosis (H)     Hypertension     S/P hip replacement, bilateral     S/P TKR (total knee replacement), bilateral     S/P appendectomy     Bilateral hearing loss, unspecified hearing loss type     Hypercholesteremia     Moderate asthma without complication     Prostate cancer (H)     Pulmonary hypertension (H)     SOB (shortness of breath)     Dyslipidemia     Iron deficiency     Need for hepatitis B screening test     Myopathy     Hyperimmunoglobulin e (ige) syndrome (H)     PVT (paroxysmal ventricular tachycardia)     Thrombocytopenia, unspecified (H)      Past Medical History:   Diagnosis Date     Asthma      Claustrophobia      CPAP (continuous positive airway pressure) dependence      Dyslipidemia      [place] (hard of hearing)      Hypercholesteremia      Hypertension      Iron deficiency      Mediastinal adenopathy      Obesity      BEULAH (obstructive sleep apnea)      Prostate cancer (H)      Pulmonary hypertension (H)      Sarcoidosis              Past Surgical History:     Past Surgical History:   Procedure Laterality Date     APPENDECTOMY       BIOPSY MASS NECK Left 7/15/2020    Procedure: Left trapezius muscle biopsy;  Surgeon: Ade Villa MD;  [facility]     CV RIGHT HEART CATH MEASUREMENTS RECORDED N/A 12/11/2019    Procedure: CV RIGHT HEART CATH;  Surgeon: Torrey Hirsch MD;  [facility]     CV RIGHT HEART CATH MEASUREMENTS RECORDED N/A 1/19/2022    Procedure: CV RIGHT HEART CATH;  Surgeon: Torrey Hirsch MD;  [facility]     DAVINCI PROSTATECTOMY, LYMPHADENECTOMY  N/A 5/23/2019    Procedure: ROBOTIC ASSISTED LAPAROSCOPIC RADICAL PROSTATECTOMY WITH BILATERAL PELVIC LYMPH NODE DISSECTION;  Surgeon: Matteo Coughlin MD;  Location: [facility] OR     ENDOBRONCHIAL ULTRASOUND FLEXIBLE N/A 3/29/2019    Procedure: Flexible Bronchoscopy, Endobronchial Ultrasound;  Surgeon: Curtis Leger MD;  Location: [facility] OR     VASECTOMY       ZZC TOTAL HIP ARTHROPLASTY Bilateral      ZZC TOTAL KNEE ARTHROPLASTY Bilateral             Home Medications:     Prior to Admission medications    Medication Sig Last Dose Taking? Auth Provider Long Term End Date   acetaminophen (TYLENOL) 500 MG tablet Take 2 tablets (1,000 mg) by mouth every 4 hours as needed for mild pain  Yes Spenser Brown PA-C     traMADol (ULTRAM) 50 MG tablet Take 1 tablet (50 mg) by mouth every 6 hours as needed for severe pain  Yes Antonio Ann MD  11/3/22   albuterol (PROAIR HFA/PROVENTIL HFA/VENTOLIN HFA) 108 (90 Base) MCG/ACT inhaler Inhale 1-2 puffs into the lungs every 4 hours as needed for shortness of breath / dyspnea   Jamie Martin MD Yes    aspirin (ASA) 81 MG tablet Take 81 mg by mouth every morning    Carlyn Payne MD     atorvastatin (LIPITOR) 40 MG tablet TAKE 1 TABLET(40 MG) BY MOUTH EVERY MORNING   Carlyn Payne MD Yes    azithromycin (ZITHROMAX) 250 MG tablet Take 2 tablets (500 mg) the first day, then take 1 tablet (250 mg) by mouth daily for a total of 5 days.   Jamie Martin MD     BREO ELLIPTA 200-25 MCG/INH Inhaler INHALE 1 PUFF BY MOUTH ONE TIME DAILY    Jamie Martin MD     cetirizine (ZYRTEC) 10 MG tablet Take 10 mg by mouth every morning    Carlyn Payne MD     diphenhydrAMINE (BENADRYL) 25 MG tablet Take 1 tablet (25 mg) by mouth every 8 hours as needed for itching or allergies   Jonathan Torres MD     hydrochlorothiazide (HYDRODIURIL) 12.5 MG tablet TAKE ONE TABLET BY MOUTH EVERY MORNING   Carlyn Payne MD Yes    metoprolol succinate  ER (TOPROL-XL) 100 MG 24 hr tablet Take 1.5 tablets (150 mg) by mouth daily   Anuel Figueroa MD Yes    montelukast (SINGULAIR) 10 MG tablet Take 1 tablet (10 mg) by mouth every evening   Jamie Martin MD Yes    montelukast (SINGULAIR) 10 MG tablet Take 1 tablet (10 mg) by mouth At Bedtime   Carlyn Payne MD Yes    multivitamin (ONE-DAILY) tablet Take 1 tablet by mouth every morning    Carlyn Payne MD     omeprazole (PRILOSEC) 40 MG DR capsule TAKE 1 CAPSULE BY MOUTH ONE TIME DAILY   Jamie Martin MD     predniSONE (DELTASONE) 20 MG tablet Take 2 tablets (40 mg) daily for seven days.   Jamie Martin MD     sacubitril-valsartan (ENTRESTO)  MG per tablet Take 1 tablet by mouth 2 times daily   Torrey Hirsch MD Yes    spironolactone (ALDACTONE) 25 MG tablet Take 1 tablet (25 mg) by mouth daily   Torrey Hirsch MD Yes    umeclidinium (INCRUSE ELLIPTA) 62.5 MCG/INH inhaler Inhale 1 puff into the lungs daily   Jamie Martin MD              Current Medications:         flumazenil, naloxone, naloxone, naloxone, naloxone, propofol         Allergies:     Allergies   Allergen Reactions     Cat Hair Extract Itching     itchy tearful eyes     Adhesive Tape Rash     Iodine Rash            Social History:     Social History     Tobacco Use     Smoking status: Never     Smokeless tobacco: Never   Substance Use Topics     Alcohol use: Yes     Comment: twice a week             Family History:     Family History   Problem Relation Age of Onset     Alzheimer Disease Mother      Heart Disease Father      Glaucoma No family hx of      Macular Degeneration No family hx of      Diabetes No family hx of      Thyroid Disease No family hx of               Review of Systems:   The 10 point Review of Systems is negative other than noted in the HPI            Physical Exam:   Blood pressure 129/79, pulse 78, temperature 98.8  F (37.1  C), temperature source Oral, resp. rate 19, SpO2 100 %.  0  lbs 0 oz    Constitutional:   Awake, alert, cooperative, no apparent distress, and appears stated age.     Lungs:   No increased work of breathing, good air exchange.     Musculoskeletal:   The left leg is shortened and rotated compared to the contralateral extremity.  [place] is deferred in light of the patient's known dislocation.  The patient demonstrates full strength with resisted dorsiflexion and plantarflexion of the ankle.  Sensation is grossly intact in superficial peroneal, deep peroneal, and plantar nerve distributions.  Toes are warm and well-perfused with brisk capillary refill and a palpable dorsalis pedis pulse.              Data:   All new lab and imaging data was reviewed.  Radiographs of the left hip and pelvis obtained in the emergency department demonstrate a dislocated total hip arthroplasty without evidence of associated fracture.  Results for orders placed or performed during the hospital encounter of 10/31/22 (from the past 24 hour(s))   XR Pelvis w Hip Left 1 View    Narrative    PELVIS AND HIP LEFT ONE VIEW October 31, 2022 12:06 PM     INDICATION: Left-sided hip pain.     COMPARISON: None.      Impression    IMPRESSION:  1.  Posteriorly dislocated left total hip arthroplasty.  2.  No fracture.  3.  Right total hip arthroplasty, intact.  4.  Degenerative changes in the lower lumbar spine.    ISRAEL GUADARRAMA MD         SYSTEM ID:  [id number]   Hemoglobin   Result Value Ref Range    Hemoglobin 15.9 13.3 - 17.7 g/dL

## 2022-10-31 NOTE — ANESTHESIA PREPROCEDURE EVALUATION
Anesthesia Pre-Procedure Evaluation    Patient: Derek Contreras   MRN: [id number] : 1955        Procedure : Procedure(s):  CLOSED REDUCTION LEFT TOTAL HIP          Past Medical History:   Diagnosis Date     Asthma      Claustrophobia      CPAP (continuous positive airway pressure) dependence      Dyslipidemia      [place] (hard of hearing)      Hypercholesteremia      Hypertension      Iron deficiency      Mediastinal adenopathy      Obesity      BEULAH (obstructive sleep apnea)      Prostate cancer (H)      Pulmonary hypertension (H)      Sarcoidosis       Past Surgical History:   Procedure Laterality Date     APPENDECTOMY       BIOPSY MASS NECK Left 7/15/2020    Procedure: Left trapezius muscle biopsy;  Surgeon: Ade Villa MD;  [facility]: [facility]     CV RIGHT HEART CATH MEASUREMENTS RECORDED N/A 2019    Procedure: CV RIGHT HEART CATH;  Surgeon: Torrey Hirsch MD;  Location:  [facility] CARDIAC CATH LAB     CV RIGHT HEART CATH MEASUREMENTS RECORDED N/A 2022    Procedure: CV RIGHT HEART CATH;  Surgeon: Torrey Hirsch MD;  Location:  [facility] CARD[facility] CATH LAB     DAVINCI PROSTATECTOMY, LYMPHADENECTOMY N/A 2019    Procedure: ROBOTIC ASSISTED LAPAROSCOPIC RADICAL PROSTATECTOMY WITH BILATERAL PELVIC LYMPH NODE DISSECTION;  Surgeon: Matteo Coughlin MD;  Location:  OR     ENDOBRONCHIAL ULTRASOUND FLEXIBLE N/A 3/29/2019    Procedure: Flexible Bronchoscopy, Endobronchial Ultrasound;  Surgeon: Curtis Leger MD;  Location: [facility] OR     VASECTOMY       ZZC TOTAL HIP ARTHROPLASTY Bilateral      ZZC TOTAL KNEE ARTHROPLASTY Bilateral       Allergies   Allergen Reactions     Cat Hair Extract Itching     itchy tearful eyes     Adhesive Tape Rash     Iodine Rash      Social History     Tobacco Use     Smoking status: Never     Smokeless tobacco: Never   Substance Use Topics     Alcohol use: Yes     Comment: twice a week      Wt Readings from Last 1 Encounters:   22 122.4 kg  (269 lb 14.4 oz)        Anesthesia Evaluation   Pt has had prior anesthetic. Type: General and Regional.    History of anesthetic complications       ROS/MED HX  ENT/Pulmonary: Comment: Pt also suffers with pulmonary sarcoidosis    (+) sleep apnea, moderate, uses CPAP, Mild Persistent, asthma Treatment: Inhaler prn, Inhaler daily and Inhaled steroids,      Neurologic:  - neg neurologic ROS     Cardiovascular:     (+) Dyslipidemia hypertension-----pulmonary hypertension, Previous cardiac testing   Echo: Date: Results:    Stress Test: Date: Results:    ECG Reviewed: Date: 4/22 Results:  Sinus rhythm   Right bundle branch block   Left anterior fascicular block   Bifascicular block   Abnormal ECG   Cath: Date: 1/22 Results:  ? Right sided filling pressures are normal.  ? Mild elevated pulmonary hypertension.  ? Hyperdynamic cardiac output level.  ? Shunt run normal no evidence of intracardiac shunting.  ? Hemodynamic data has been modified in Epic per physician review.         METS/Exercise Tolerance:     Hematologic:  - neg hematologic  ROS     Musculoskeletal: Comment: L SILVERIO dislocation many years after placement of prosthesis  (+) arthritis,     GI/Hepatic:  - neg GI/hepatic ROS     Renal/Genitourinary:  - neg Renal ROS     Endo: Comment: Class 2 obesity    (+) Obesity,     Psychiatric/Substance Use:  - neg psychiatric ROS     Infectious Disease:  - neg infectious disease ROS     Malignancy:  - neg malignancy ROS     Other:  - neg other ROS          Physical Exam    Airway        Mallampati: II   TM distance: > 3 FB   Neck ROM: full   Mouth opening: > 3 cm    Respiratory Devices and Support         Dental  no notable dental history         Cardiovascular   cardiovascular exam normal       Rhythm and rate: regular and normal     Pulmonary   pulmonary exam normal        breath sounds clear to auscultation       Other findings: Lab Test        08/30/22     08/10/22     05/09/22     05/27/20     12/11/19                        0833          1041          0757          0831          1254          WBC          7.8          9.3          7.4            < >        8.3           HGB          15.5         15.4         16.2           < >        17.0          MCV          92           92           90             < >        90            PLT          186          180          160            < >        168           INR           --           --           --           --          1.17*          < > = values in this interval not displayed.                  Lab Test        08/30/22     08/10/22     05/09/22                       0833          1041          0757          NA           137          139          142           POTASSIUM    4.3          4.7          4.1           CHLORIDE     102          105          106           CO2          26           27           27            BUN          16.1         18           16            CR           1.10         1.05         0.94          ANIONGAP     9            7            9             ANTONIO          9.4          9.0          8.6           GLC          113*         91           110*            OUTSIDE LABS:  CBC:   Lab Results   Component Value Date    WBC 7.8 08/30/2022    WBC 9.3 08/10/2022    HGB 15.5 08/30/2022    HGB 15.4 08/10/2022    HCT 47.9 08/30/2022    HCT 47.1 08/10/2022     08/30/2022     08/10/2022     BMP:   Lab Results   Component Value Date     08/30/2022     08/10/2022    POTASSIUM 4.3 08/30/2022    POTASSIUM 4.7 08/10/2022    CHLORIDE 102 08/30/2022    CHLORIDE 105 08/10/2022    CO2 26 08/30/2022    CO2 27 08/10/2022    BUN 16.1 08/30/2022    BUN 18 08/10/2022    CR 1.10 08/30/2022    CR 1.05 08/10/2022     (H) 08/30/2022    GLC 91 08/10/2022     COAGS:   Lab Results   Component Value Date    INR 1.17 (H) 12/11/2019     POC:   Lab Results   Component Value Date     (H) 03/29/2019     HEPATIC:   Lab Results   Component Value Date    ALBUMIN  3.7 08/30/2022    PROTTOTAL 7.2 08/30/2022    ALT 32 08/30/2022    AST 33 08/30/2022    ALKPHOS 81 08/30/2022    BILITOTAL 0.5 08/30/2022     OTHER:   Lab Results   Component Value Date    A1C 5.6 05/19/2020    ANTONIO 9.4 08/30/2022    PHOS 3.9 05/24/2019    MAG 2.5 (H) 05/23/2019    TSH 2.20 01/12/2022    CRP 3.4 12/11/2019       Anesthesia Plan    ASA Status:  3      Anesthesia Type: General.     - Airway: ETT   Induction: Intravenous.   Maintenance: Balanced.        Consents    Anesthesia Plan(s) and associated risks, benefits, and realistic alternatives discussed. Questions answered and patient/representative(s) expressed understanding.    - Discussed: Risks, Benefits and Alternatives for BOTH SEDATION and the PROCEDURE were discussed     - Discussed with:       - Extended Intubation/Ventilatory Support Discussed: No.      - Patient is DNR/DNI Status: No    Use of blood products discussed: No .     Postoperative Care    Pain management: IV analgesics, Oral pain medications, Multi-modal analgesia.   PONV prophylaxis: Ondansetron (or other 5HT-3), Dexamethasone or Solumedrol     Comments:                Dimitry Celis MD

## 2022-10-31 NOTE — ANESTHESIA CARE TRANSFER NOTE
Patient: Derek Contreras    Procedure: Procedure(s):  CLOSED REDUCTION LEFT TOTAL HIP       Diagnosis: Dislocation of left hip, initial encounter (H) [S73.005A]  Diagnosis Additional Information: No value filed.    Anesthesia Type:   General     Note:    Oropharynx: oropharynx clear of all foreign objects  Level of Consciousness: awake  Oxygen Supplementation: room air    Independent Airway: airway patency satisfactory and stable  Dentition: dentition unchanged  Vital Signs Stable: post-procedure vital signs reviewed and stable  Report to RN Given: handoff report given  Patient transferred to: PACU    Handoff Report: Identifed the Patient, Identified the Reponsible Provider, Reviewed the pertinent medical history, Discussed the surgical course, Reviewed Intra-OP anesthesia mangement and issues during anesthesia, Set expectations for post-procedure period and Allowed opportunity for questions and acknowledgement of understanding      Vitals:  Vitals Value Taken Time   /50 10/31/22 1844   Temp     Pulse 118 10/31/22 1850   Resp 25 10/31/22 1850   SpO2 99 % 10/31/22 1850   Vitals shown include unvalidated device data.    Electronically Signed By: DENY Jorge CRNA  October 31, 2022  6:51 PM

## 2022-10-31 NOTE — ED TRIAGE NOTES
"Pt presents from home via ems with a potential L hip dislocation. Pt had both hips and knees replaced. L hip replaced 2012. Today was getting up off floor and felt that it \"popped out.\" Cardiac hx, R heart cath scheduled this week. 50 mcg fentanyl given en route. Pt denies CP.     Triage Assessment     Row Name 10/31/22 1056       Triage Assessment (Adult)    Airway WDL WDL       Respiratory WDL    Respiratory WDL WDL       Skin Circulation/Temperature WDL    Skin Circulation/Temperature WDL WDL       Cardiac WDL    Cardiac WDL WDL       Peripheral/Neurovascular WDL    Peripheral Neurovascular WDL WDL       Cognitive/Neuro/Behavioral WDL    Cognitive/Neuro/Behavioral WDL WDL              "

## 2022-10-31 NOTE — ED PROVIDER NOTES
"  History   Chief Complaint:  Hip Pain       HPI   Derek Contreras is a 66 year old male s/p left SILVERIO in 2012 with history of HTN, pulmonary HTN, atrial fibrillation, and hyperlipidemia who presents with left hip pain beginning this morning. Patient says he was on the kitchen floor cleaning when he tried to get up and slipped, hearing a \"pop\" at this time. He did not hit his head or lose consciousness when he slipped. No dizziness, vision changes, headache, nausea, or vomiting after the fall. Patient does not currently endorse any numbness or paresthesias. He has been unable to bend his knee or stand since the fall due to pain. He rates his pain as a 5/10 in severity. Patient has not dislocated his left hip in the past. History of bilateral knee and hip replacements. He takes a daily low-dose aspirin but is otherwise not anticoagulated. Patient has not previously had any adverse reactions to anaesthesia. He reports that he drinks alcohol once a week and has only one glass at a time. No history of heavy alcohol use. He arrives to the ED via EMS and was given 50 mcg fentanyl en route.     Review of Systems   Constitutional: Negative for chills and fever.   HENT: Negative for congestion, rhinorrhea and sore throat.    Eyes: Negative for pain and visual disturbance.   Respiratory: Negative for cough, chest tightness and shortness of breath.    Cardiovascular: Negative for chest pain and palpitations.   Gastrointestinal: Negative for abdominal pain, blood in stool, diarrhea, nausea and vomiting.   Genitourinary: Negative for dysuria and hematuria.   Musculoskeletal: Positive for arthralgias (left hip). Negative for neck pain.   Skin: Negative for color change and pallor.   Neurological: Negative for dizziness, syncope, numbness and headaches.        (-) paresthesias    Psychiatric/Behavioral: Negative for agitation and behavioral problems.   All other systems reviewed and are negative.    Allergies:  Adhesive " Tape  Iodine    Medications:  Pro-air  Aspirin 81 mg   Lipitor  Zithromax  Breo Ellipta  Zyrtec  Benadryl  Hydrodiuril  Singulair  Toprol  Prilosec  Deltasone  Entresto  Aldactone    Past Medical History:     Morbid obesity  Pulmonary sarcoidosis  HTN  Bilateral hearing loss  Hyperlipidemia  Moderate asthma  Prostate cancer  Pulmonary HTN  Thrombocytopenia  PVT  Hyperimmunoglobulin syndrome  Iron deficiency    Mediastinal adenopathy  Atrial fibrillation  Wears dentures     Past Surgical History:    Appendectomy  Left trapezius muscle biopsy  Right heart catheterization  DaVinci prostatectomy, lymphadenectomy   Flexible bronchoscopy  Vasectomy  SILVERIO, bilateral  TKA, bilateral     Family History:    Mother: Alzheimer's disease  Father: Heart disease    Social History:  The patient presents to the ED alone via EMS  [place]  Occasional alcohol use  PCP: Carlyn Payne MD, Internal Medicine     Physical Exam     Patient Vitals for the past 24 hrs:   BP Temp Temp src Pulse Resp SpO2   10/31/22 1710 -- -- -- 78 19 100 %   10/31/22 1700 129/79 -- -- 79 21 98 %   10/31/22 1531 91/59 98.8  F (37.1  C) Oral 85 21 98 %   10/31/22 1530 -- -- -- 85 22 99 %   10/31/22 1529 91/59 -- -- -- -- --   10/31/22 1515 (!) 105/99 -- -- -- -- --   10/31/22 1515 -- -- -- -- 19 100 %   10/31/22 1514 -- -- -- 91 -- --   10/31/22 1500 139/72 -- -- -- -- --   10/31/22 1459 -- -- -- 86 26 100 %   10/31/22 1451 132/68 98.8  F (37.1  C) Oral 84 18 100 %   10/31/22 1103 104/69 98.1  F (36.7  C) Oral 69 18 96 %       Physical Exam  Constitutional:       Appearance: Normal appearance.      General: Not in acute distress.  HENT:      Head: Normocephalic and atraumatic.   Eyes:      Extraocular Movements: Extraocular movements intact.      Conjunctiva/sclera: Conjunctivae normal.   Cardiovascular:      Rate and Rhythm: Normal rate and regular rhythm.   Pulmonary:      Effort: Pulmonary effort is normal. No respiratory distress.   Abdominal:      General:  Abdomen is flat. There is no distension.   Musculoskeletal:         General: No swelling or deformity.      Cervical back: Normal range of motion. No rigidity.      Left lower extremity: Left leg internally rotated and shortened.  Tenderness to left hip.  2+ left dorsalis pedis pulse.  Sensation intact in all aspect of foot and leg.  Skin:     Coloration: Skin is not jaundiced or pale.   Neurological:      General: No focal deficit present.      Mental Status: Alert and oriented to person, place, and time.   Psychiatric:         Mood and Affect: Mood normal.         Behavior: Behavior normal.     Emergency Department Course     Imaging:  XR Pelvis w Hip Left 1 View   Final Result   IMPRESSION:   1.  Posteriorly dislocated left total hip arthroplasty.   2.  No fracture.   3.  Right total hip arthroplasty, intact.   4.  Degenerative changes in the lower lumbar spine.      ISRAEL GUADARRAMA MD            SYSTEM ID:  [email or web address]      XR Hip Port Left 1 View    (Results Pending)     Report per radiology      Procedures:   [facility]    Procedure: Sedation for Hip Reduction    Date/Time: 10/31/2022 3:10 PM  Performed by: Horacio Marr DO  Authorized by: Horacio Marr DO     Risks, benefits and alternatives discussed.    ED EVALUATION:      Assessment Time: 10/31/2022 11:00 AM      I have performed an Emergency Department Evaluation including taking a history and physical examination, this evaluation will be documented in the electronic medical record for this ED encounter.     Indication: Hip dislocation    ASA Class: Class 2- mild systemic disease, no acute problems, no functional limitations    Mallampati: Grade 1- soft palate, uvula, tonsillar pillars, and posterior pharyngeal wall visible    NPO Status: appropriately NPO for procedure (0700 last PO, 0900 drank soda)    UNIVERSAL PROTOCOL   Site Marked: Yes  Prior Images Obtained and Reviewed:  Yes  Required items: Required blood products, implants,  devices and special equipment available    Patient identity confirmed:  Verbally with patient and arm band  Patient was reevaluated immediately before administering moderate or deep sedation or anesthesia  Confirmation Checklist:  Patient's identity using two indicators, relevant allergies, procedure was appropriate and matched the consent or emergent situation and correct equipment/implants were available  Time out: Immediately prior to the procedure a time out was called    Universal Protocol: the Joint Commission Universal Protocol was followed    Preparation: Patient was prepped and draped in usual sterile fashion      SEDATION  Patient Sedated: Yes    Sedation Type:  Deep  Sedation:  Propofol (190 mg)  Vital signs: Vital signs monitored during sedation      PROCEDURE  Describe Procedure: Sedation was maintained until the procedure was complete. The patient was monitored by staff until recovered.  Patient Tolerance:  Patient tolerated the procedure well with no immediate complications  Length of time physician/provider present for 1:1 monitoring during sedation: 20M [facility]    -Dislocation - Lower Extremity    Date/Time: 10/31/2022 3:10 PM  Performed by: Horacio Marr DO  Authorized by: Horacio Marr DO     Risks, benefits and alternatives discussed.      LOCATION     Location:  Hip    Hip injury location:  L hip    Hip dislocation type: posterior      Prosthesis: yes      PRE PROCEDURE DETAILS:     Distal perfusion: normal      Range of motion: reduced      ANESTHESIA (see MAR for exact dosages)      Anesthesia method:  None    PROCEDURE DETAILS      Manipulation performed: yes      Hip reduction method:  Direct traction, traction and counter traction, Allis maneuver, external rotation and [place] technique    Reduction successful: no      Reduction confirmed with imaging: no      POST PROCEDURE DETAILS      Neurological function: normal      Distal perfusion: normal      Range of motion:  unchanged        PROCEDURE    Patient Tolerance:  Patient tolerated the procedure well with no immediate complications    Emergency Department Course:   Reviewed:  I reviewed nursing notes, vitals, past medical history and Care Everywhere    Assessments/Consults:  ED Course as of 10/31/22 1758   Mon Oct 31, 2022   1100 I obtained history and examined the patient as noted above.   1220 XR Pelvis w Hip Left 1 View  Dislocated left hip   1252 XR Pelvis w Hip Left 1 View  1.  Posteriorly dislocated left total hip arthroplasty.   1253 Rechecked patient and obtained a more thorough history. Most recent PO was 0700 this morning. He did drink some soda around 0900 today.   1302 XR Pelvis w Hip Left 1 View  1.  Posteriorly dislocated left total hip arthroplasty.  2.  No fracture.  3.  Right total hip arthroplasty, intact.  4.  Degenerative changes in the lower lumbar spine.   1510 Sedation procedure begun (190 mg propofol)   1515 Began hip reduction procedure    1526 Procedure aborted at this time. After attempting to relocate the hip several times with the help of second ED physician, relocation was unsuccessful and ortho will be paged to discuss plan going forward.   1532 [facility] called back. Patti IBRAHIM will staff with Dr. Ann for final disposition.   1535 Patient at baseline.  Patient and wife updated.   1622 Notified by [facility]A Armstrong: Dr. Ann will take patient to OR this evening. Disposition to OR.   1625 Updated patient and his wife regarding plan. Patient resting comfortably in bed in no acute distress.     Interventions:  1154 Dilaudid  0.5 mg  IV  1520 190 mg  Diprivan  IV    Disposition:  The patient was transferred to the OR under the care of Dr. Ann.    Impression & Plan     Medical Decision Makin-year-old male as described above presents to the emergency department with left hip pain and difficulty with moving left leg after he felt a pop while on the ground cleaning.  Patient has history of left  hip total arthroplasty.  No other injuries.  2+ left lower extremity dorsalis pedis pulse.  Sensation intact.  Able to move left foot and mildly bend left knee.  However, patient's range of motion severely limited in left hip and left knee due to pain in the left hip.  Given history of left hip total arthroplasty and patient feeling a pop and left lower extremity internally rotated and shortened, suspicious for left hip dislocation.  Patient has been unable to ambulate.  Will order plain film imaging of left hip for evaluation for hip joint dislocation versus fracture.  Consult orthopedics.  Patient likely will require sedation and reduction in the ER.  Discussed care plan with patient who voiced understanding and agreement with plan.  Answered all questions.  Additional work-up and orders as listed in chart.     Please refer to ED course above for details on the patient's treatment course and any changes or updates in care plan beyond my initial evaluation and MDM.      Diagnosis:    ICD-10-CM    1. Dislocation of left hip, initial encounter (H)  S73.005A Case Request: CLOSED REDUCTION LEFT TOTAL HIP     Case Request: CLOSED REDUCTION LEFT TOTAL HIP     acetaminophen (TYLENOL) 500 MG tablet     traMADol (ULTRAM) 50 MG tablet          Scribe Disclosure:  Dotty GAVIRIA, am serving as a scribe at 11:00 AM on 10/31/2022 to document services personally performed by Horacio Marr DO based on my observations and the provider's statements to me.            Horacio Marr DO  10/31/22 6241

## 2022-10-31 NOTE — OP NOTE
PREOPERATIVE DIAGNOSIS: Left dislocated total hip arthroplasty.    POSTOPERATIVE DIAGNOSIS: Left dislocated total hip arthroplasty.    PROCEDURE(S): Closed reduction left total hip arthroplasty.    ATTENDING SURGEON: Antonio Ann MD.    ASSISTANT SURGEON: None.    ANESTHESIA: General.    EBL: None.    COMPLICATIONS: None apparent.    INDICATIONS: Derek is a pleasant 66 year-old gentleman who presented to the emergency department after sustaining an acute left total hip arthroplasty dislocation.  The patient was getting up from the floor in his home earlier today when his hip dislocated and he was unable to bear weight on the left leg.  The patient presented to the emergency department and underwent a failed closed reduction attempt of the left total hip arthroplasty.  Given the persistent dislocation, formal closed reduction of the hip in the operating room with a paralytic anesthetic was recommended.  After full discussion of the benefits and risks of surgery, the patient provided informed consent to proceed.    The patient was identified in the pre-operative holding area on the date of surgery.  The operative site was marked with indelible marker and the patient was brought back to the operating room.  Anesthesia was induced without complication.  A pre-operative timeout was performed identifying the correct patient, procedure, operative site, and equipment necessary for the procedure.    While maintaining countertraction on the pelvis, traction was applied to the left hip with gentle rotation, and a palpable clunk and obvious reduction of the total hip arthroplasty was noted.  The patient was placed through gentle range of motion of the left hip without any crepitus or recurrent instability.  A flatplate was obtained in the operating room confirming concentric reduction of the total hip arthroplasty.  A knee immobilizer was applied.  The patient was brought to the PACU in stable condition for further  postoperative care.    Postoperatively, the patient will be allowed to bear weight as tolerated on the left lower extremity with a knee immobilizer in place to limit excessive flexion of the hip.  The patient may utilize over-the-counter medication and ice as needed for pain relief, and a small prescription for tramadol was provided.  The patient will follow-up over the next few weeks with his hip arthroplasty surgeon.

## 2022-10-31 NOTE — ANESTHESIA PROCEDURE NOTES
Airway       Patient location during procedure: OR  Staff -        CRNA: Srikanth Alfaro APRN CRNA       Performed By: CRNA and anesthesiologist  Consent for Airway        Urgency: elective  Indications and Patient Condition       Indications for airway management: lucina-procedural       Induction type:RSI       Mask difficulty assessment: 1 - vent by mask    Final Airway Details       Final airway type: endotracheal airway       Successful airway: ETT - single  Endotracheal Airway Details        ETT size (mm): 8.0       Cuffed: yes       Successful intubation technique: video laryngoscopy       VL Blade Size: Glidescope 4       Grade View of Cords: 1       Adjucts: stylet       Position: Right       Measured from: lips       Secured at (cm): 24    Post intubation assessment        Placement verified by: capnometry, equal breath sounds and chest rise        Number of attempts at approach: 1       Number of other approaches attempted: 0       Secured with: plastic tape       Ease of procedure: easy       Dentition: Intact and Unchanged

## 2022-10-31 NOTE — SEDATION DOCUMENTATION
Assisted with reduction of L prosthetic hip. Pt was given 190 mg of Propofol IV. With 2 MDs reduction was unsuccessful. Ortho consulted. PT is A&O, VSS, ABC intact

## 2022-11-01 ASSESSMENT — ACTIVITIES OF DAILY LIVING (ADL)
ADLS_ACUITY_SCORE: 36

## 2022-11-01 NOTE — PROGRESS NOTES
Unable to find printed prescription for tramadol. Called Dr Ann. Pt ok to call office in am if he needs a prescription. Currently pt does not have any pain.

## 2022-11-01 NOTE — ANESTHESIA POSTPROCEDURE EVALUATION
Patient: Derek Contreras    Procedure: Procedure(s):  CLOSED REDUCTION LEFT TOTAL HIP       Anesthesia Type:  General    Note:     Postop Pain Control: Uneventful            Sign Out: Well controlled pain   PONV: No   Neuro/Psych: Uneventful            Sign Out: Acceptable/Baseline neuro status   Airway/Respiratory: Uneventful            Sign Out: Acceptable/Baseline resp. status   CV/Hemodynamics: Uneventful            Sign Out: Acceptable CV status; No obvious hypovolemia; No obvious fluid overload   Other NRE: NONE   DID A NON-ROUTINE EVENT OCCUR? No           Last vitals:  Vitals Value Taken Time   /86 10/31/22 1930   Temp 98.2  F (36.8  C) 10/31/22 1930   Pulse 100 10/31/22 1940   Resp 9 10/31/22 1940   SpO2 95 % 10/31/22 1940   Vitals shown include unvalidated device data.    Electronically Signed By: David Obrien DO  October 31, 2022  11:35 PM

## 2022-11-01 NOTE — PROGRESS NOTES
Pt did not want to attempt to void but was instructed to void within 8 hours of surgery. Pt states he lives close by and will visit ER if necessary.

## 2022-11-01 NOTE — DISCHARGE INSTRUCTIONS
GENERAL ANESTHESIA OR SEDATION ADULT DISCHARGE INSTRUCTIONS   SPECIAL PRECAUTIONS FOR 24 HOURS AFTER SURGERY    IT IS NOT UNUSUAL TO FEEL LIGHT-HEADED OR FAINT, UP TO 24 HOURS AFTER SURGERY OR WHILE TAKING PAIN MEDICATION.  IF YOU HAVE THESE SYMPTOMS; SIT FOR A FEW MINUTES BEFORE STANDING AND HAVE SOMEONE ASSIST YOU WHEN YOU GET UP TO WALK OR USE THE BATHROOM.    YOU SHOULD REST AND RELAX FOR THE NEXT 24 HOURS AND YOU MUST MAKE ARRANGEMENTS TO HAVE SOMEONE STAY WITH YOU FOR AT LEAST 24 HOURS AFTER YOUR DISCHARGE.  AVOID HAZARDOUS AND STRENUOUS ACTIVITIES.  DO NOT MAKE IMPORTANT DECISIONS FOR 24 HOURS.    DO NOT DRIVE ANY VEHICLE OR OPERATE MECHANICAL EQUIPMENT FOR 24 HOURS FOLLOWING THE END OF YOUR SURGERY.  EVEN THOUGH YOU MAY FEEL NORMAL, YOUR REACTIONS MAY BE AFFECTED BY THE MEDICATION YOU HAVE RECEIVED.    DO NOT DRINK ALCOHOLIC BEVERAGES FOR 24 HOURS FOLLOWING YOUR SURGERY.    DRINK CLEAR LIQUIDS (APPLE JUICE, GINGER ALE, 7-UP, BROTH, ETC.).  PROGRESS TO YOUR REGULAR DIET AS YOU FEEL ABLE.    YOU MAY HAVE A DRY MOUTH, A SORE THROAT, MUSCLES ACHES OR TROUBLE SLEEPING.  THESE SHOULD GO AWAY AFTER 24 HOURS.    CALL YOUR DOCTOR FOR ANY OF THE FOLLOWING:  SIGNS OF INFECTION (FEVER, GROWING TENDERNESS AT THE SURGERY SITE, A LARGE AMOUNT OF DRAINAGE OR BLEEDING, SEVERE PAIN, FOUL-SMELLING DRAINAGE, REDNESS OR SWELLING.    IT HAS BEEN OVER 8 TO 10 HOURS SINCE SURGERY AND YOU ARE STILL NOT ABLE TO URINATE (PASS WATER).     Dr. Ann's Office phone number is [phone].  Please call in the morning if you find yourself in need of a prescription.

## 2022-11-02 ASSESSMENT — ACTIVITIES OF DAILY LIVING (ADL)
ADLS_ACUITY_SCORE: 36

## 2022-11-03 ENCOUNTER — TELEPHONE (OUTPATIENT)
Dept: CARDIOLOGY | Facility: CLINIC | Age: 67
End: 2022-11-03

## 2022-11-03 ASSESSMENT — ACTIVITIES OF DAILY LIVING (ADL)
ADLS_ACUITY_SCORE: 36

## 2022-11-03 NOTE — TELEPHONE ENCOUNTER
Left voicemail for patient to complete Travel Screen for Cardiac Cath Lab appointment on 11/4/22 and inform patient of updated Visitor Policy. 10/31/22 COVID test negative.   Daniella Hand RN

## 2022-11-04 ENCOUNTER — APPOINTMENT (OUTPATIENT)
Dept: LAB | Facility: CLINIC | Age: 67
End: 2022-11-04
Payer: MEDICARE

## 2022-11-04 ENCOUNTER — APPOINTMENT (OUTPATIENT)
Dept: MEDSURG UNIT | Facility: CLINIC | Age: 67
End: 2022-11-04
Payer: MEDICARE

## 2022-11-04 ENCOUNTER — HOSPITAL ENCOUNTER (OUTPATIENT)
Facility: CLINIC | Age: 67
Discharge: HOME | End: 2022-11-04
Attending: INTERNAL MEDICINE | Admitting: INTERNAL MEDICINE
Payer: MEDICARE

## 2022-11-04 VITALS
OXYGEN SATURATION: 96 % | WEIGHT: 264.5 LBS | HEART RATE: 74 BPM | SYSTOLIC BLOOD PRESSURE: 105 MMHG | TEMPERATURE: 97.5 F | HEIGHT: 70 IN | BODY MASS INDEX: 37.87 KG/M2 | RESPIRATION RATE: 18 BRPM | DIASTOLIC BLOOD PRESSURE: 66 MMHG

## 2022-11-04 DIAGNOSIS — I50.22 CHRONIC SYSTOLIC HEART FAILURE (H): ICD-10-CM

## 2022-11-04 DIAGNOSIS — D86.0 PULMONARY SARCOIDOSIS (H): ICD-10-CM

## 2022-11-04 LAB
ALBUMIN SERPL BCG-MCNC: 3.7 G/DL (ref 3.5–5.2)
ALP SERPL-CCNC: 78 U/L (ref 40–129)
ALT SERPL W P-5'-P-CCNC: 30 U/L (ref 10–50)
ANION GAP SERPL CALCULATED.3IONS-SCNC: 11 MMOL/L (ref 7–15)
AST SERPL W P-5'-P-CCNC: 36 U/L (ref 10–50)
BASOPHILS # BLD AUTO: 0.1 10E3/UL (ref 0–0.2)
BASOPHILS NFR BLD AUTO: 0 %
BILIRUB SERPL-MCNC: 0.9 MG/DL
BUN SERPL-MCNC: 22.2 MG/DL (ref 8–23)
CALCIUM SERPL-MCNC: 9.5 MG/DL (ref 8.8–10.2)
CHLORIDE SERPL-SCNC: 98 MMOL/L (ref 98–107)
CREAT SERPL-MCNC: 1.17 MG/DL (ref 0.67–1.17)
DEPRECATED HCO3 PLAS-SCNC: 25 MMOL/L (ref 22–29)
EOSINOPHIL # BLD AUTO: 0.1 10E3/UL (ref 0–0.7)
EOSINOPHIL NFR BLD AUTO: 1 %
ERYTHROCYTE [DISTWIDTH] IN BLOOD BY AUTOMATED COUNT: 13.3 % (ref 10–15)
GFR SERPL CREATININE-BSD FRML MDRD: 68 ML/MIN/1.73M2
GLUCOSE SERPL-MCNC: 90 MG/DL (ref 70–99)
HCT VFR BLD AUTO: 48.3 % (ref 40–53)
HGB BLD-MCNC: 15 G/DL (ref 13.3–17.7)
HGB BLD-MCNC: 15.6 G/DL (ref 13.3–17.7)
IMM GRANULOCYTES # BLD: 0.1 10E3/UL
IMM GRANULOCYTES NFR BLD: 1 %
LYMPHOCYTES # BLD AUTO: 1.7 10E3/UL (ref 0.8–5.3)
LYMPHOCYTES NFR BLD AUTO: 14 %
MCH RBC QN AUTO: 30.2 PG (ref 26.5–33)
MCHC RBC AUTO-ENTMCNC: 32.3 G/DL (ref 31.5–36.5)
MCV RBC AUTO: 93 FL (ref 78–100)
MONOCYTES # BLD AUTO: 1.1 10E3/UL (ref 0–1.3)
MONOCYTES NFR BLD AUTO: 9 %
NEUTROPHILS # BLD AUTO: 8.9 10E3/UL (ref 1.6–8.3)
NEUTROPHILS NFR BLD AUTO: 75 %
NRBC # BLD AUTO: 0 10E3/UL
NRBC BLD AUTO-RTO: 0 /100
NT-PROBNP SERPL-MCNC: 68 PG/ML (ref 0–900)
OXYHGB MFR BLDV: 72 % (ref 92–100)
PLATELET # BLD AUTO: 186 10E3/UL (ref 150–450)
POTASSIUM SERPL-SCNC: 4.4 MMOL/L (ref 3.4–5.3)
PROT SERPL-MCNC: 7.1 G/DL (ref 6.4–8.3)
RBC # BLD AUTO: 5.17 10E6/UL (ref 4.4–5.9)
SODIUM SERPL-SCNC: 134 MMOL/L (ref 136–145)
WBC # BLD AUTO: 11.9 10E3/UL (ref 4–11)

## 2022-11-04 PROCEDURE — 250N000009 HC RX 250: Performed by: INTERNAL MEDICINE

## 2022-11-04 PROCEDURE — 36415 COLL VENOUS BLD VENIPUNCTURE: CPT | Performed by: INTERNAL MEDICINE

## 2022-11-04 PROCEDURE — 93451 RIGHT HEART CATH: CPT | Performed by: INTERNAL MEDICINE

## 2022-11-04 PROCEDURE — 85018 HEMOGLOBIN: CPT

## 2022-11-04 PROCEDURE — 85025 COMPLETE CBC W/AUTO DIFF WBC: CPT | Performed by: INTERNAL MEDICINE

## 2022-11-04 PROCEDURE — 83880 ASSAY OF NATRIURETIC PEPTIDE: CPT | Performed by: INTERNAL MEDICINE

## 2022-11-04 PROCEDURE — 82810 BLOOD GASES O2 SAT ONLY: CPT

## 2022-11-04 PROCEDURE — 93451 RIGHT HEART CATH: CPT | Mod: 26 | Performed by: INTERNAL MEDICINE

## 2022-11-04 PROCEDURE — 272N000001 HC OR GENERAL SUPPLY STERILE: Performed by: INTERNAL MEDICINE

## 2022-11-04 PROCEDURE — C1894 INTRO/SHEATH, NON-LASER: HCPCS | Performed by: INTERNAL MEDICINE

## 2022-11-04 PROCEDURE — 999N000132 HC STATISTIC PP CARE STAGE 1

## 2022-11-04 PROCEDURE — 999N000142 HC STATISTIC PROCEDURE PREP ONLY

## 2022-11-04 PROCEDURE — 80053 COMPREHEN METABOLIC PANEL: CPT | Performed by: INTERNAL MEDICINE

## 2022-11-04 RX ORDER — LIDOCAINE 40 MG/G
CREAM TOPICAL
Status: COMPLETED | OUTPATIENT
Start: 2022-11-04 | End: 2022-11-04

## 2022-11-04 RX ADMIN — LIDOCAINE: 40 CREAM TOPICAL at 11:31

## 2022-11-04 ASSESSMENT — ACTIVITIES OF DAILY LIVING (ADL)
ADLS_ACUITY_SCORE: 36
ADLS_ACUITY_SCORE: 35
ADLS_ACUITY_SCORE: 36
ADLS_ACUITY_SCORE: 36
ADLS_ACUITY_SCORE: 33

## 2022-11-04 NOTE — PRE-PROCEDURE
GENERAL PRE-PROCEDURE:   Procedure:  Right heart catheterization  Date/Time:  11/4/2022 11:39 AM    Written consent obtained?: Yes    Risks and benefits: Risks, benefits and alternatives were discussed    Consent given by:  Patient  Patient states understanding of procedure being performed: Yes    Patient's understanding of procedure matches consent: Yes    Procedure consent matches procedure scheduled: Yes    Expected level of sedation:  Moderate  Appropriately NPO:  Yes  ASA Class:  2  Mallampati  :  Grade 2- soft palate, base of uvula, tonsillar pillars, and portion of posterior pharyngeal wall visible  Lungs:  Lungs clear with good breath sounds bilaterally  Heart:  Normal heart sounds and rate  History & Physical reviewed:  History and physical reviewed and no updates needed  Statement of review:  I have reviewed the lab findings, diagnostic data, medications, and the plan for sedation

## 2022-11-04 NOTE — PROGRESS NOTES
Discharge instructions reviewed with pt, pt verbalizes understanding. Awaiting Dr Hirsch to speak with pt.     1240 Dr Hirsch spoke with pt, pt ok to discharge.  Pt declined wheelchair and ambulated off unit with steady gait, with family member.

## 2022-11-04 NOTE — PROGRESS NOTES
Pt arrived on 2A, room # 13 for RHC. Denies pain. Labs in process. Awaiting consent to be signed. Wife Araceli ([phone]) at bedside.

## 2022-11-04 NOTE — DISCHARGE INSTRUCTIONS
[facility]                        Interventional Cardiology  Discharge Instructions   Post Right Heart Cath      AFTER YOU GO HOME:  DO drink plenty of fluids  DO resume your regular diet and medications unless otherwise instructed by your Primary Physician  Do Not scrub the procedure site vigorously  No lotion or powder to the puncture site for 3 days    CALL YOUR PRIMARY PHYSICIAN IF: You may resume all normal activity.  Monitor neck site for bleeding, swelling, or voice changes. If you notice bleeding or swelling immediately apply pressure to the site and call number below to speak with Cardiology Fellow.  If you experience any changes in your breathing you should call your doctor immediately or come to the closest Emergency Department.  Do not drive yourself.    ADDITIONAL INSTRUCTIONS: Medications: You are to resume all home medications including anticoagulation therapy unless otherwise advised by your primary cardiologist or nurse coordinator.    Follow Up: Per your primary cardiology team    If you have any questions or concerns regarding your procedure site please call [phone] at anytime and ask for Cardiology Fellow on call.  They are available 24 hours a day.  You may also contact the Cardiology Clinic after hours number at [phone].                                                       Telephone Numbers [phone] Monday-Friday 8:00 am to 4:30 pm    [phone] After 4:30 pm Monday-Friday, Weekends & Holidays  Ask for Interventional Cardiologist on call. Someone is on call 24 hours/day   [facility] toll free number [phone] Monday-Friday 8:00 am to 4:30 pm   [facility] Emergency Dept [phone]

## 2022-11-04 NOTE — Clinical Note
2a PIOTR [place] no arthralgia/no arthritis/no joint swelling/no myalgia/no muscle cramps/no muscle weakness/no stiffness/no neck pain/no arm pain L/no leg pain L/no leg pain R

## 2022-11-04 NOTE — Clinical Note
Potential access sites were evaluated for patency using ultrasound.   The right jugular vein was selected. Access was obtained under with Sonosite and Fluoroscopic guidance using a micropuncture 21 gauge needle with direct visualization of needle entry.

## 2022-11-09 ENCOUNTER — VIRTUAL VISIT (OUTPATIENT)
Dept: CARDIOLOGY | Facility: CLINIC | Age: 67
End: 2022-11-09
Attending: INTERNAL MEDICINE
Payer: MEDICARE

## 2022-11-09 DIAGNOSIS — I49.3 PVC'S (PREMATURE VENTRICULAR CONTRACTIONS): ICD-10-CM

## 2022-11-09 PROCEDURE — 99215 OFFICE O/P EST HI 40 MIN: CPT | Mod: 95 | Performed by: INTERNAL MEDICINE

## 2022-11-09 NOTE — LETTER
"11/9/2022      RE: Derek Contreras  [address] [facility] [address]       Derek is a 66 year old who is being evaluated via a billable video visit.      Pt states in [place] for visit.     How would you like to obtain your AVS? MyChart  If the video visit is dropped, the invitation should be resent by: Text to cell phone: [phone]  Will anyone else be joining your video visit?   pts wife    Krishna Finney, VF/CMA    {If patient encounters technical issues they should call [phone] :[id number]}      Video-Visit Details    Video Start Time: {video visit start/end time for provider to select:807376}    Type of service:  Video Visit    Video End Time:{video visit start/end time for provider to select:249226}    Originating Location (pt. Location): {video visit patient location:959178::\"Home\"}    {PROVIDER LOCATION On-site should be selected for visits conducted from your clinic location or adjoining [facility] hospital, academic office, or other nearby [facility] building. Off-site should be selected for all other provider locations, including home:499390}    Distant Location (provider location):  {virtual location provider:501374}    Platform used for Video Visit: {Virtual Visit Platforms:124440::\"[employer]"}        ELECTROPHYSIOLOGY VIRTUAL CLINIC VISIT  Mr. Derek Contreras is a 66 year old male who is being evaluated via a billable video visit.      The patient has been notified of following:     \"This video visit will be conducted via a call between you and your physician/provider. We have found that certain health care needs can be provided without the need for an in-person physical exam.  This service lets us provide the care you need with a video conversation.  If a prescription is necessary we can send it directly to your pharmacy.  If lab work is needed we can place an order for that and you can then stop by our lab to have the test done at a later time.    If during the course of the call the " "physician/provider feels a video visit is not appropriate, you will not be charged for this service.\"     Physician/provider has received verbal consent for a Video Visit from the patient? Yes      Assessment/Recommendations   Assessment/Plan:    ***  #PVCs - 19% [place]  #Non-sustained Polymorphic VT - PVC induced  The morphology of the patient's PVC is indicative of a RV septal origin.  He has known history of mildly reduced function that has now progressed to biventricular function worsening.  Additionally there is new LGE at the RV insertion site (new compared to prior MRI) which is a nonspecific finding.  The etiology of patient's biventricular dysfunction is not clear at this time.  Differential includes possible active phase sarcoidosis that has now progressed to the heart (and possibly has not had enough time to develop scar), this would be quite unusual/atypical.  Additionally the patient may also have a nonischemic myocardial process that is leading to progressively worsening function. Etiology may also be related to dyssynchrony from frequent PVCs that have also increased in burden.  PVC burden worsening could also be a reflection of his myocardial dysfunction. No significant signs to suggest that his is due to ischemia with a negative stress MRI a few years ago, it would be highly unusual for coronary disease to progress this quickly without any significant ischemic symptoms.  Recommendations:  - will increase metoprolol further to .  - Will defer ablation for the time being given favorable response and decreased likelihood of long term success given multiple morphologies without clear dominance.  - Follow-up in 6 months with repeat Zio.    Follow up ***      PVC burden improving and shorter and less complex runs of NSVT, all asymptomatic, CMR negative  F/u one year with EP NP       History of Present Illness/Subjective    Mr. Derek Contreras is a 66 year old male who comes in today for EP " follow-up of PVCS/NSVT.    Mr. Contreras is a 67 yo M who has a PMH pulmonary sarcoidosis, HTN, HLD, Obesity, h/o prostate CA, RV dilation & borderline low LVEF who has been evaluated in the pulmonary hypertension clinic by Dr. Ferrell and found not to have a dx of pulmonary hypertension. 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  His cardiac workup thus far has been as follows: he had a RHC 12/11/19 which showed RA 3, RV 33/5 PA 30/12 (17) and PCWP 5. CO 6.22, CI 2.49. PVR 1.82. Shunt series with Qp/Qs 1.0. He had a cardiac stress MRI 3/11/20 which showed no evidence of LGE. His RV was noted to be moderately enlarged but mildly reduced function with RVEF of 43%. Severely enlarged RA. No ischemia noted on stress imaging. He had a cardiac PET 2/2020 which showed no FDG active cardiac sarcoid. He did have decreased perfusion in the inferoseptal wall which may be related to microvascular disease. He was also noted to have decreased myocardial blood flow in the RCA distribution. Mr. Contreras also had a event monitor which showed frequent NSVT (fastest 19 beats @ 255bpm) and 9x SVT runs. He presents today to the EP clinic for evaluation of his arrhythmia.     EP visit Jan 2022: He denies an symptoms including palpitations, chest discomfort, lightheadedness, dizziness, orthopnea, PND, abd distention, n/v/d, early satiety, unintentional weight loss. He does report chronic KIRBY related to his pulmonary sarcoidosis that has been stable. More recently he reports he has had a dry cough that has slightly worsened over the last few days, he has reached out to his pulmonologist in regards to this.During this visit, we started him on metoprolol XL 50, gradually increase to . We discussed PET with sarcoid team and we wanted to check instead an ischemic work-up.      EP Visit 4/6/22: He presents for follow-up. He denies any  cardiac symptoms. He had a CT Angio on 1/31/2022 showing Severe calcific atherosclerosis causing mild stenosis without any high-grade lesions. He had a repeat Zio patch 1/17-1/24 shows 90 runs of NSVT compared to 432 in Oct, with decreasing ectopy.     He presents today for follow up. He reports feeling ***. He denies chest discomfort, palpitations, abdominal fullness/bloating or peripheral edema, shortness of breath, paroxysmal nocturnal dyspnea, orthopnea, lightheadedness, dizziness, pre-syncope, or syncope. A zio patch monitor from 10/5/22-10/9/22 showed 101 NSVT up to 5 beats, 6.8% PVCs. A CMR 5/2/22 showed no evidence of active cardiac sarcoid, LVEF 58%, RVEF 44%, LGE in septal RV insertion points into LV. Presenting 12 lead ECG shows *** Vent Rate *** bpm, MA *** ms, QRS *** ms, QTc *** ms. Current cardiac medications include: Toprol XL, Hydrochlorothiazide, Spironolactone, Lipitor, and ASA .     Feeling very well, no syncope or presyncope, no palpitations, good stamina  Having RHC and following up with Dr Nelson soon.    I have reviewed and updated the patient's Past Medical History, Social History, Family History and Medication List.     Cardiographics (Personally Reviewed) :   Cardiac MRI 5/2/22:  1. The LV is normal in cavity size and wall thickness. The global systolic function is normal. The LVEF is  58 %. There are no regional wall motion abnormalities.     2. The RV is mildly enlarged in cavity size. The global systolic function is mildly reduced. The RVEF is  44%.      3. Both atria are normal in size.     4. There is no significant valvular disease.      5. Late gadolinium enhancement imaging shows anterior septal and inferior septal LGE at the RV insertion  points similar to prior     6.  There is no pericardial effusion or thickening.      7.  There is no intracardiac thrombus.      CONCLUSIONS:  No evidence of active cardiac sarcoidosis. Improved biventricular function with LVEF 58% and  RVEF  44% (previously 41% and 34% respectively). Septal systolic paradoxical bowing still suggestive of RV  pressure overload, and LGE in the septal RV insertion points into the LV; together suggestive of pulmonary  hypertension.     Cardiac MRI 12/22/21  1. The LV is normal in cavity size with mild concentric LVH. The global systolic function is moderately  reduced. The LVEF is 41%. There is abnormal septal motion likely related to RBBB and PVCs.     2. The RV is normal in cavity size. The global systolic function is moderately reduced. The RVEF is 34%.      3. Both atria are severely enlarged.     4. There is no significant valvular disease.      5. Late gadolinium enhancement imaging shows no MI or infiltrative disease. There is enhancement in the  septum at the RV insertion sites. This is a non-specific pattern that can be seen in pulmonary  hypertension.     6. There is no pericardial effusion or thickening.     7. There is no intracardiac thrombus.     8. The main PA is mildly dilated measuring 3.1 cm.      CONCLUSIONS: No evidence of cardiac sarcoid. Moderate cardiomyopathy, LVEF 41% and RVEF 34%, with no  significant fibrosis. Compared to prior CMR on 8/2019 RV function is worse with no other change. Recommend  re-assessment for pulmonary hypertension with TTE or RHC.      Stress Cardiac MRI 8/29/2019  1. The LV is normal in cavity size and wall thickness. The global systolic function is normal. The LVEF is  50%. There is systolic flattening of the interventricular septum and global dyssynchrony due to PVCs.     2. The RV is moderately enlarged based on the visual examination. The global systolic function is mildly  reduced. The RVEF is 43%.      3. The left atrium is mildly enlarged and the right atrium is severely enlarged.     4. There is at least mild mitral regurgitation and trace aortic regurgitation.     5. Late gadolinium enhancement imaging shows no MI, fibrosis or infiltrative disease.      6.  Regadenoson stress perfusion imaging shows no ischemia.     7. There is no pericardial effusion or thickening.     8. There is no intracardiac thrombus.     CONCLUSIONS: Non-ischemic cardiomyopathy, likely due to pulmonary hypertension and dyssynchrony from PVCs.  There is mildly reduced biventricular function.  There is no evidence of myocardial perfusion defects or myocardial fibrosis.      Cardiac PET SCANS  2/19/2020  Impression:  1. No FDG active cardiac sarcoid.  2. Decreased perfusion in the inferoseptal wall, findings may be  related to sequela of previous cardiac sarcoid with microvascular  injury.  3. Enlarged left ventricle with borderline low ejection fraction.  4. Decreased myocardial blood flow in the RCA distribution.  5. Findings of a dilated cardiomyopathy a concern for possible  ischemia/myocardial injury of the septum, consider CTA or coronary  angiography for further evaluation of this region.  6. Chest and upper abdominal hypermetabolic lymphadenopathy which is  indicative of active systemic sarcoid.     Physical Examination   There were no vitals taken for this visit.  Wt Readings from Last 3 Encounters:   11/04/22 120 kg (264 lb 8 oz)   09/07/22 122.4 kg (269 lb 14.4 oz)   08/08/22 124.3 kg (274 lb)     The rest of a comprehensive physical examination is deferred due to public health emergency video visit restrictions.  CONSITUTIONAL: no acute distress  HEENT: no icterus, no redness or discharge, neck supple  CV: no visible edema of visualized extremities. No JVD.   RESPIRATORY: respirations nonlabored, no cough  NEURO: AA&Ox3, speech fluent/appropriate, motor grossly nonfocal  PSYCH: cooperative, affect appropriate  DERM: no rashes on visualized face/neck/upper extremities         Medications  Allergies   Current Outpatient Medications   Medication Sig Dispense Refill     acetaminophen (TYLENOL) 500 MG tablet Take 2 tablets (1,000 mg) by mouth every 4 hours as needed for mild pain        albuterol (PROAIR HFA/PROVENTIL HFA/VENTOLIN HFA) 108 (90 Base) MCG/ACT inhaler Inhale 1-2 puffs into the lungs every 4 hours as needed for shortness of breath / dyspnea 1 Inhaler 4     aspirin (ASA) 81 MG tablet Take 81 mg by mouth every morning  90 tablet 3     atorvastatin (LIPITOR) 40 MG tablet TAKE 1 TABLET(40 MG) BY MOUTH EVERY MORNING 90 tablet 3     BREO ELLIPTA 200-25 MCG/INH Inhaler INHALE 1 PUFF BY MOUTH ONE TIME DAILY  60 each 11     cetirizine (ZYRTEC) 10 MG tablet Take 10 mg by mouth every morning  90 tablet 3     diphenhydrAMINE (BENADRYL) 25 MG tablet Take 1 tablet (25 mg) by mouth every 8 hours as needed for itching or allergies 1 tablet 0     hydrochlorothiazide (HYDRODIURIL) 12.5 MG tablet TAKE ONE TABLET BY MOUTH EVERY MORNING 90 tablet 3     metoprolol succinate ER (TOPROL-XL) 100 MG 24 hr tablet Take 1.5 tablets (150 mg) by mouth daily 135 tablet 3     montelukast (SINGULAIR) 10 MG tablet Take 1 tablet (10 mg) by mouth every evening 90 tablet 3     montelukast (SINGULAIR) 10 MG tablet Take 1 tablet (10 mg) by mouth At Bedtime 90 tablet 0     multivitamin (ONE-DAILY) tablet Take 1 tablet by mouth every morning  90 tablet 3     omeprazole (PRILOSEC) 40 MG DR capsule TAKE 1 CAPSULE BY MOUTH ONE TIME DAILY 30 capsule 11     sacubitril-valsartan (ENTRESTO)  MG per tablet Take 1 tablet by mouth 2 times daily 180 tablet 3     spironolactone (ALDACTONE) 25 MG tablet Take 1 tablet (25 mg) by mouth daily 90 tablet 1     umeclidinium (INCRUSE ELLIPTA) 62.5 MCG/INH inhaler Inhale 1 puff into the lungs daily 30 each 11    Allergies   Allergen Reactions     Cat Hair Extract Itching     itchy tearful eyes     Adhesive Tape Rash     Iodine Rash         Lab Results (Personally Reviewed)    Chemistry/lipid CBC Cardiac Enzymes/BNP/TSH/INR   Lab Results   Component Value Date    BUN 22.2 11/04/2022     (L) 11/04/2022    CO2 25 11/04/2022     Creatinine   Date Value Ref Range Status   11/04/2022 1.17  0.67 - 1.17 mg/dL Final   05/12/2021 0.96 0.66 - 1.25 mg/dL Final       Lab Results   Component Value Date    CHOL 71 01/12/2022    HDL 45 01/12/2022    LDL 12 01/12/2022      Lab Results   Component Value Date    WBC 11.9 (H) 11/04/2022    HGB 15.0 11/04/2022    HCT 48.3 11/04/2022    MCV 93 11/04/2022     11/04/2022    Lab Results   Component Value Date    TSH 2.20 01/12/2022    INR 1.17 (H) 12/11/2019          I have reviewed the note as documented above.  This accurately captures the substance of my virtual visit with the patient. The patient states understanding and is agreeable with the plan.         Total time spent on patient visit, reviewing notes, imaging, labs, orders, and completing necessary documentation: {MINUTES:034160} minutes.         Anuel Figueroa MD

## 2022-11-09 NOTE — LETTER
"11/9/2022       RE: Derek Contreras  [address] [facility] [address]     Dear Colleague,    Thank you for referring your patient, Derek Contreras, to the [facility] at [facility]. Please see a copy of my visit note below.    Derek is a 66 year old who is being evaluated via a billable video visit.      Pt states in [place] for visit.     How would you like to obtain your AVS? MyChart  If the video visit is dropped, the invitation should be resent by: Text to cell phone: [phone]  Will anyone else be joining your video visit?   pts wife    Krishna Finney, VF/CMA    {If patient encounters technical issues they should call [phone] :[id number]}      Video-Visit Details    Video Start Time: {video visit start/end time for provider to select:627744}    Type of service:  Video Visit    Video End Time:{video visit start/end time for provider to select:869466}    Originating Location (pt. Location): {video visit patient location:725056::\"Home\"}    {PROVIDER LOCATION On-site should be selected for visits conducted from your clinic location or adjoining [facility] hospital, academic office, or other nearby [facility] building. Off-site should be selected for all other provider locations, including home:[id number]}    Distant Location (provider location):  {virtual location provider:399388}    Platform used for Video Visit: {Virtual Visit Platforms:537291::\"[employer]\"}        ELECTROPHYSIOLOGY VIRTUAL CLINIC VISIT  Mr. Derek Contreras is a 66 year old male who is being evaluated via a billable video visit.      The patient has been notified of following:     \"This video visit will be conducted via a call between you and your physician/provider. We have found that certain health care needs can be provided without the need for an in-person physical exam.  This service lets us provide the care you need with a video conversation.  If a prescription is " "necessary we can send it directly to your pharmacy.  If lab work is needed we can place an order for that and you can then stop by our lab to have the test done at a later time.    If during the course of the call the physician/provider feels a video visit is not appropriate, you will not be charged for this service.\"     Physician/provider has received verbal consent for a Video Visit from the patient? Yes      Assessment/Recommendations   Assessment/Plan:    ***  #PVCs - 19% Robert  #Non-sustained Polymorphic VT - PVC induced  The morphology of the patient's PVC is indicative of a RV septal origin.  He has known history of mildly reduced function that has now progressed to biventricular function worsening.  Additionally there is new LGE at the RV insertion site (new compared to prior MRI) which is a nonspecific finding.  The etiology of patient's biventricular dysfunction is not clear at this time.  Differential includes possible active phase sarcoidosis that has now progressed to the heart (and possibly has not had enough time to develop scar), this would be quite unusual/atypical.  Additionally the patient may also have a nonischemic myocardial process that is leading to progressively worsening function. Etiology may also be related to dyssynchrony from frequent PVCs that have also increased in burden.  PVC burden worsening could also be a reflection of his myocardial dysfunction. No significant signs to suggest that his is due to ischemia with a negative stress MRI a few years ago, it would be highly unusual for coronary disease to progress this quickly without any significant ischemic symptoms.  Recommendations:  - will increase metoprolol further to .  - Will defer ablation for the time being given favorable response and decreased likelihood of long term success given multiple morphologies without clear dominance.  - Follow-up in 6 months with repeat Zio.    Follow up ***      PVC burden improving and " shorter and less complex runs of NSVT, all asymptomatic, CMR negative  F/u one year with EP NP       History of Present Illness/Subjective    Mr. Derek Contreras is a 66 year old male who comes in today for EP follow-up of PVCS/NSVT.    Mr. Contreras is a 65 yo M who has a PMH pulmonary sarcoidosis, HTN, HLD, Obesity, h/o prostate CA, RV dilation & borderline low LVEF who has been evaluated in the pulmonary hypertension clinic by Dr. Ferrell and found not to have a dx of pulmonary hypertension. 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  His cardiac workup thus far has been as follows: he had a RHC 12/11/19 which showed RA 3, RV 33/5 PA 30/12 (17) and PCWP 5. CO 6.22, CI 2.49. PVR 1.82. Shunt series with Qp/Qs 1.0. He had a cardiac stress MRI 3/11/20 which showed no evidence of LGE. His RV was noted to be moderately enlarged but mildly reduced function with RVEF of 43%. Severely enlarged RA. No ischemia noted on stress imaging. He had a cardiac PET 2/2020 which showed no FDG active cardiac sarcoid. He did have decreased perfusion in the inferoseptal wall which may be related to microvascular disease. He was also noted to have decreased myocardial blood flow in the RCA distribution. Mr. Contreras also had a event monitor which showed frequent NSVT (fastest 19 beats @ 255bpm) and 9x SVT runs. He presents today to the EP clinic for evaluation of his arrhythmia.     EP visit Jan 2022: He denies an symptoms including palpitations, chest discomfort, lightheadedness, dizziness, orthopnea, PND, abd distention, n/v/d, early satiety, unintentional weight loss. He does report chronic KIRBY related to his pulmonary sarcoidosis that has been stable. More recently he reports he has had a dry cough that has slightly worsened over the last few days, he has reached out to his pulmonologist in regards to this.During this visit, we  started him on metoprolol XL 50, gradually increase to . We discussed PET with sarcoid team and we wanted to check instead an ischemic work-up.      EP Visit 4/6/22: He presents for follow-up. He denies any cardiac symptoms. He had a CT Angio on 1/31/2022 showing Severe calcific atherosclerosis causing mild stenosis without any high-grade lesions. He had a repeat Zio patch 1/17-1/24 shows 90 runs of NSVT compared to 432 in Oct, with decreasing ectopy.     He presents today for follow up. He reports feeling ***. He denies chest discomfort, palpitations, abdominal fullness/bloating or peripheral edema, shortness of breath, paroxysmal nocturnal dyspnea, orthopnea, lightheadedness, dizziness, pre-syncope, or syncope. A zio patch monitor from 10/5/22-10/9/22 showed 101 NSVT up to 5 beats, 6.8% PVCs. A CMR 5/2/22 showed no evidence of active cardiac sarcoid, LVEF 58%, RVEF 44%, LGE in septal RV insertion points into LV. Presenting 12 lead ECG shows *** Vent Rate *** bpm, RI *** ms, QRS *** ms, QTc *** ms. Current cardiac medications include: Toprol XL, Hydrochlorothiazide, Spironolactone, Lipitor, and ASA .     Feeling very well, no syncope or presyncope, no palpitations, good stamina  Having RHC and following up with Dr Nelson soon.    I have reviewed and updated the patient's Past Medical History, Social History, Family History and Medication List.     Cardiographics (Personally Reviewed) :   Cardiac MRI 5/2/22:  1. The LV is normal in cavity size and wall thickness. The global systolic function is normal. The LVEF is  58 %. There are no regional wall motion abnormalities.     2. The RV is mildly enlarged in cavity size. The global systolic function is mildly reduced. The RVEF is  44%.      3. Both atria are normal in size.     4. There is no significant valvular disease.      5. Late gadolinium enhancement imaging shows anterior septal and inferior septal LGE at the RV insertion  points similar to prior     6.   There is no pericardial effusion or thickening.      7.  There is no intracardiac thrombus.      CONCLUSIONS:  No evidence of active cardiac sarcoidosis. Improved biventricular function with LVEF 58% and  RVEF 44% (previously 41% and 34% respectively). Septal systolic paradoxical bowing still suggestive of RV  pressure overload, and LGE in the septal RV insertion points into the LV; together suggestive of pulmonary  hypertension.     Cardiac MRI 12/22/21  1. The LV is normal in cavity size with mild concentric LVH. The global systolic function is moderately  reduced. The LVEF is 41%. There is abnormal septal motion likely related to RBBB and PVCs.     2. The RV is normal in cavity size. The global systolic function is moderately reduced. The RVEF is 34%.      3. Both atria are severely enlarged.     4. There is no significant valvular disease.      5. Late gadolinium enhancement imaging shows no MI or infiltrative disease. There is enhancement in the  septum at the RV insertion sites. This is a non-specific pattern that can be seen in pulmonary  hypertension.     6. There is no pericardial effusion or thickening.     7. There is no intracardiac thrombus.     8. The main PA is mildly dilated measuring 3.1 cm.      CONCLUSIONS: No evidence of cardiac sarcoid. Moderate cardiomyopathy, LVEF 41% and RVEF 34%, with no  significant fibrosis. Compared to prior CMR on 8/2019 RV function is worse with no other change. Recommend  re-assessment for pulmonary hypertension with TTE or RHC.      Stress Cardiac MRI 8/29/2019  1. The LV is normal in cavity size and wall thickness. The global systolic function is normal. The LVEF is  50%. There is systolic flattening of the interventricular septum and global dyssynchrony due to PVCs.     2. The RV is moderately enlarged based on the visual examination. The global systolic function is mildly  reduced. The RVEF is 43%.      3. The left atrium is mildly enlarged and the right atrium is  severely enlarged.     4. There is at least mild mitral regurgitation and trace aortic regurgitation.     5. Late gadolinium enhancement imaging shows no MI, fibrosis or infiltrative disease.      6. Regadenoson stress perfusion imaging shows no ischemia.     7. There is no pericardial effusion or thickening.     8. There is no intracardiac thrombus.     CONCLUSIONS: Non-ischemic cardiomyopathy, likely due to pulmonary hypertension and dyssynchrony from PVCs.  There is mildly reduced biventricular function.  There is no evidence of myocardial perfusion defects or myocardial fibrosis.      Cardiac PET SCANS  2/19/2020  Impression:  1. No FDG active cardiac sarcoid.  2. Decreased perfusion in the inferoseptal wall, findings may be  related to sequela of previous cardiac sarcoid with microvascular  injury.  3. Enlarged left ventricle with borderline low ejection fraction.  4. Decreased myocardial blood flow in the RCA distribution.  5. Findings of a dilated cardiomyopathy a concern for possible  ischemia/myocardial injury of the septum, consider CTA or coronary  angiography for further evaluation of this region.  6. Chest and upper abdominal hypermetabolic lymphadenopathy which is  indicative of active systemic sarcoid.     Physical Examination   There were no vitals taken for this visit.  Wt Readings from Last 3 Encounters:   11/04/22 120 kg (264 lb 8 oz)   09/07/22 122.4 kg (269 lb 14.4 oz)   08/08/22 124.3 kg (274 lb)     The rest of a comprehensive physical examination is deferred due to public health emergency video visit restrictions.  CONSITUTIONAL: no acute distress  HEENT: no icterus, no redness or discharge, neck supple  CV: no visible edema of visualized extremities. No JVD.   RESPIRATORY: respirations nonlabored, no cough  NEURO: AA&Ox3, speech fluent/appropriate, motor grossly nonfocal  PSYCH: cooperative, affect appropriate  DERM: no rashes on visualized face/neck/upper extremities         Medications   Allergies   Current Outpatient Medications   Medication Sig Dispense Refill     acetaminophen (TYLENOL) 500 MG tablet Take 2 tablets (1,000 mg) by mouth every 4 hours as needed for mild pain       albuterol (PROAIR HFA/PROVENTIL HFA/VENTOLIN HFA) 108 (90 Base) MCG/ACT inhaler Inhale 1-2 puffs into the lungs every 4 hours as needed for shortness of breath / dyspnea 1 Inhaler 4     aspirin (ASA) 81 MG tablet Take 81 mg by mouth every morning  90 tablet 3     atorvastatin (LIPITOR) 40 MG tablet TAKE 1 TABLET(40 MG) BY MOUTH EVERY MORNING 90 tablet 3     BREO ELLIPTA 200-25 MCG/INH Inhaler INHALE 1 PUFF BY MOUTH ONE TIME DAILY  60 each 11     cetirizine (ZYRTEC) 10 MG tablet Take 10 mg by mouth every morning  90 tablet 3     diphenhydrAMINE (BENADRYL) 25 MG tablet Take 1 tablet (25 mg) by mouth every 8 hours as needed for itching or allergies 1 tablet 0     hydrochlorothiazide (HYDRODIURIL) 12.5 MG tablet TAKE ONE TABLET BY MOUTH EVERY MORNING 90 tablet 3     metoprolol succinate ER (TOPROL-XL) 100 MG 24 hr tablet Take 1.5 tablets (150 mg) by mouth daily 135 tablet 3     montelukast (SINGULAIR) 10 MG tablet Take 1 tablet (10 mg) by mouth every evening 90 tablet 3     montelukast (SINGULAIR) 10 MG tablet Take 1 tablet (10 mg) by mouth At Bedtime 90 tablet 0     multivitamin (ONE-DAILY) tablet Take 1 tablet by mouth every morning  90 tablet 3     omeprazole (PRILOSEC) 40 MG DR capsule TAKE 1 CAPSULE BY MOUTH ONE TIME DAILY 30 capsule 11     sacubitril-valsartan (ENTRESTO)  MG per tablet Take 1 tablet by mouth 2 times daily 180 tablet 3     spironolactone (ALDACTONE) 25 MG tablet Take 1 tablet (25 mg) by mouth daily 90 tablet 1     umeclidinium (INCRUSE ELLIPTA) 62.5 MCG/INH inhaler Inhale 1 puff into the lungs daily 30 each 11    Allergies   Allergen Reactions     Cat Hair Extract Itching     itchy tearful eyes     Adhesive Tape Rash     Iodine Rash         Lab Results (Personally Reviewed)    Chemistry/lipid CBC  "Cardiac Enzymes/BNP/TSH/INR   Lab Results   Component Value Date    BUN 22.2 11/04/2022     (L) 11/04/2022    CO2 25 11/04/2022     Creatinine   Date Value Ref Range Status   11/04/2022 1.17 0.67 - 1.17 mg/dL Final   05/12/2021 0.96 0.66 - 1.25 mg/dL Final       Lab Results   Component Value Date    CHOL 71 01/12/2022    HDL 45 01/12/2022    LDL 12 01/12/2022      Lab Results   Component Value Date    WBC 11.9 (H) 11/04/2022    HGB 15.0 11/04/2022    HCT 48.3 11/04/2022    MCV 93 11/04/2022     11/04/2022    Lab Results   Component Value Date    TSH 2.20 01/12/2022    INR 1.17 (H) 12/11/2019            Video-Visit Details    Type of service:  Video Visit    Video Start Time: {video visit start/end time for provider to select:677257}    Video End Time:{video visit start/end time for provider to select:648302}    Originating Location (pt. Location): {video visit patient location:266657::\"Home\"}    Distant Location (provider location):  [facility]     Platform used for Video Visit: {Virtual Visit Platforms:308189::\"Well\"}    I have reviewed the note as documented above.  This accurately captures the substance of my virtual visit with the patient. The patient states understanding and is agreeable with the plan.   Anuel Figueroa MD [facility]  Cardiology - Electrophysiology      Total time spent on patient visit, reviewing notes, imaging, labs, orders, and completing necessary documentation: {MINUTES:681897} minutes.                     Again, thank you for allowing me to participate in the care of your patient.      Sincerely,    Anuel Figueroa MD    "

## 2022-11-09 NOTE — PATIENT INSTRUCTIONS
Plan:   Follow-up in 1 year with Gisele Chadwick NP       Your Care Team:  EP Cardiology   Telephone Number     Sherry Velazquez RN [phone]    After business hours: [phone], ask for cardiologist on-call   Non-procedure scheduling:    Marilyn KEE   [phone]   Procedure scheduling:    Pao Gamble   [phone]   Device Clinic (Pacemakers, ICDs, Loop Recorders)    During business hours: [phone]  After business hours:   [phone]- select option 4 and ask for job code [id number].       Cardiovascular Clinic:   [address] [place] [address]. [place] [address]      As always, Thank you for trusting us with your health care needs!

## 2022-11-09 NOTE — NURSING NOTE
Chief Complaint   Patient presents with     Follow Up     Pt states Vitals were taken Friday at procedure     Patient declined individual allergy and medication review by support staff because patient denies any changes since echeck-in completion and states all information entered during echeck-in remains accurate.    Krishna Finney, DOMINIC/CMA

## 2022-11-09 NOTE — PROGRESS NOTES
"Derek is a 66 year old who is being evaluated via a billable video visit.      Pt states in [place] for visit.     How would you like to obtain your AVS? MyChart  If the video visit is dropped, the invitation should be resent by: Text to cell phone: [phone]  Will anyone else be joining your video visit?   pts wife    Krishna Finney, VF/CMA          ELECTROPHYSIOLOGY VIRTUAL CLINIC VISIT  Mr. Derek Contreras is a 66 year old male who is being evaluated via a billable video visit.      The patient has been notified of following:     \"This video visit will be conducted via a call between you and your physician/provider. We have found that certain health care needs can be provided without the need for an in-person physical exam.  This service lets us provide the care you need with a video conversation.  If a prescription is necessary we can send it directly to your pharmacy.  If lab work is needed we can place an order for that and you can then stop by our lab to have the test done at a later time.    If during the course of the call the physician/provider feels a video visit is not appropriate, you will not be charged for this service.\"     Physician/provider has received verbal consent for a Video Visit from the patient? Yes      Assessment/Recommendations   Assessment/Plan:    Mr. Contreras is a 67 yo M who has a PMH pulmonary sarcoidosis, HTN, HLD, Obesity, h/o prostate CA, RV dilation & borderline low LVEF who has been evaluated in the pulmonary hypertension clinic by Dr. Ferrell and found not to have a dx of pulmonary hypertension. 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    #PVCs - 19% [place]  #Non-sustained Polymorphic VT - PVC induced  The morphology of the patient's PVC is indicative of a RV septal origin.  He has known history of mildly reduced function that progressed to biventricular function worsening.  Additionally there is new LGE at the " RV insertion site (new compared to prior MRI) which is a nonspecific finding.  The etiology of patient's biventricular dysfunction is not clear at this time.  Differential includes possible active phase sarcoidosis that has now progressed to the heart (and possibly has not had enough time to develop scar), this would be quite unusual/atypical.  Additionally the patient may also have a nonischemic myocardial process that is leading to progressively worsening function. Etiology may also be related to dyssynchrony from frequent PVCs that have also increased in burden.  PVC burden worsening could also be a reflection of his myocardial dysfunction. No significant signs to suggest that his is due to ischemia with a negative stress MRI and a recent CT angio. A more recnet MRI showed no evidence of active cardiac sarcoidosis and improed LV and RV functions with medical therapy. SO far, we do not have evidence of cardiac sarcoidosis. HIs PVC burden is improving and shorter and less complex runs of NSVT, all asymptomatic.    F/u one year with EP NP       History of Present Illness/Subjective    Mr. Derek Contreras is a 66 year old male who comes in today for EP follow-up of PVCS/NSVT.    Mr. Contreras is a 67 yo M who has a PMH pulmonary sarcoidosis, HTN, HLD, Obesity, h/o prostate CA, RV dilation & borderline low LVEF who has been evaluated in the pulmonary hypertension clinic by Dr. Ferrell and found not to have a dx of pulmonary hypertension. 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  His cardiac workup thus far has been as follows: he had a RHC 12/11/19 which showed RA 3, RV 33/5 PA 30/12 (17) and PCWP 5. CO 6.22, CI 2.49. PVR 1.82. Shunt series with Qp/Qs 1.0. He had a cardiac stress MRI 3/11/20 which showed no evidence of LGE. His RV was noted to be moderately enlarged but mildly reduced function with RVEF of 43%.  Severely enlarged RA. No ischemia noted on stress imaging. He had a cardiac PET 2/2020 which showed no FDG active cardiac sarcoid. He did have decreased perfusion in the inferoseptal wall which may be related to microvascular disease. He was also noted to have decreased myocardial blood flow in the RCA distribution. Mr. Contreras also had a event monitor which showed frequent NSVT (fastest 19 beats @ 255bpm) and 9x SVT runs. He presents today to the EP clinic for evaluation of his arrhythmia.     EP visit Jan 2022: He denies an symptoms including palpitations, chest discomfort, lightheadedness, dizziness, orthopnea, PND, abd distention, n/v/d, early satiety, unintentional weight loss. He does report chronic KIRBY related to his pulmonary sarcoidosis that has been stable. More recently he reports he has had a dry cough that has slightly worsened over the last few days, he has reached out to his pulmonologist in regards to this.During this visit, we started him on metoprolol XL 50, gradually increase to . We discussed PET with sarcoid team and we wanted to check instead an ischemic work-up.      EP Visit 4/6/22: He presents for follow-up. He denies any cardiac symptoms. He had a CT Angio on 1/31/2022 showing Severe calcific atherosclerosis causing mild stenosis without any high-grade lesions. He had a repeat Zio patch 1/17-1/24 shows 90 runs of NSVT compared to 432 in Oct, with decreasing ectopy.     He presents today for follow up. He reports feeling very well, no syncope or presyncope, no palpitations, good stamina  Having RHC and following up with Dr Nelson soon. A zio patch monitor from 10/5/22-10/9/22 showed 101 NSVT up to 5 beats, 6.8% PVCs. A CMR 5/2/22 showed no evidence of active cardiac sarcoid, LVEF 58%, RVEF 44%, LGE in septal RV insertion points into LV. Current cardiac medications include: Toprol XL, Hydrochlorothiazide, Spironolactone, Lipitor, and ASA .     I have reviewed and updated the  patient's Past Medical History, Social History, Family History and Medication List.     Cardiographics (Personally Reviewed) :   Cardiac MRI 5/2/22:  1. The LV is normal in cavity size and wall thickness. The global systolic function is normal. The LVEF is 58 %. There are no regional wall motion abnormalities.  2. The RV is mildly enlarged in cavity size. The global systolic function is mildly reduced. The RVEF is 44%.   3. Both atria are normal in size.  4. There is no significant valvular disease.   5. Late gadolinium enhancement imaging shows anterior septal and inferior septal LGE at the RV insertion points similar to prior  6.  There is no pericardial effusion or thickening.   7.  There is no intracardiac thrombus.   CONCLUSIONS:  No evidence of active cardiac sarcoidosis. Improved biventricular function with LVEF 58% and RVEF 44% (previously 41% and 34% respectively). Septal systolic paradoxical bowing still suggestive of RV pressure overload, and LGE in the septal RV insertion points into the LV; together suggestive of pulmonary hypertension.     Cardiac MRI 12/22/21  1. The LV is normal in cavity size with mild concentric LVH. The global systolic function is moderately reduced. The LVEF is 41%. There is abnormal septal motion likely related to RBBB and PVCs.  2. The RV is normal in cavity size. The global systolic function is moderately reduced. The RVEF is 34%.   3. Both atria are severely enlarged.  4. There is no significant valvular disease.   5. Late gadolinium enhancement imaging shows no MI or infiltrative disease. There is enhancement in the septum at the RV insertion sites. This is a non-specific pattern that can be seen in pulmonary hypertension.  6. There is no pericardial effusion or thickening.  7. There is no intracardiac thrombus.  8. The main PA is mildly dilated measuring 3.1 cm.   CONCLUSIONS: No evidence of cardiac sarcoid. Moderate cardiomyopathy, LVEF 41% and RVEF 34%, with no  significant fibrosis. Compared to prior CMR on 8/2019 RV function is worse with no other change. Recommend re-assessment for pulmonary hypertension with TTE or RHC.      Stress Cardiac MRI 8/29/2019  1. The LV is normal in cavity size and wall thickness. The global systolic function is normal. The LVEF is 50%. There is systolic flattening of the interventricular septum and global dyssynchrony due to PVCs.  2. The RV is moderately enlarged based on the visual examination. The global systolic function is mildly reduced. The RVEF is 43%.   3. The left atrium is mildly enlarged and the right atrium is severely enlarged.  4. There is at least mild mitral regurgitation and trace aortic regurgitation.  5. Late gadolinium enhancement imaging shows no MI, fibrosis or infiltrative disease.   6. Regadenoson stress perfusion imaging shows no ischemia.  7. There is no pericardial effusion or thickening.  8. There is no intracardiac thrombus.  CONCLUSIONS: Non-ischemic cardiomyopathy, likely due to pulmonary hypertension and dyssynchrony from PVCs.  There is mildly reduced biventricular function.  There is no evidence of myocardial perfusion defects or myocardial fibrosis.      Cardiac PET SCANS  2/19/2020  Impression:  1. No FDG active cardiac sarcoid.  2. Decreased perfusion in the inferoseptal wall, findings may be related to sequela of previous cardiac sarcoid with microvascular injury.  3. Enlarged left ventricle with borderline low ejection fraction.  4. Decreased myocardial blood flow in the RCA distribution.  5. Findings of a dilated cardiomyopathy a concern for possible ischemia/myocardial injury of the septum, consider CTA or coronary  angiography for further evaluation of this region.  6. Chest and upper abdominal hypermetabolic lymphadenopathy which is indicative of active systemic sarcoid.     Physical Examination   There were no vitals taken for this visit.  Wt Readings from Last 3 Encounters:   11/04/22 120 kg (264  lb 8 oz)   09/07/22 122.4 kg (269 lb 14.4 oz)   08/08/22 124.3 kg (274 lb)     The rest of a comprehensive physical examination is deferred due to public health emergency video visit restrictions.  CONSITUTIONAL: no acute distress  HEENT: no icterus, no redness or discharge, neck supple  CV: no visible edema of visualized extremities. No JVD.   RESPIRATORY: respirations nonlabored, no cough  NEURO: AA&Ox3, speech fluent/appropriate, motor grossly nonfocal  PSYCH: cooperative, affect appropriate  DERM: no rashes on visualized face/neck/upper extremities         Medications  Allergies   Current Outpatient Medications   Medication Sig Dispense Refill     acetaminophen (TYLENOL) 500 MG tablet Take 2 tablets (1,000 mg) by mouth every 4 hours as needed for mild pain       albuterol (PROAIR HFA/PROVENTIL HFA/VENTOLIN HFA) 108 (90 Base) MCG/ACT inhaler Inhale 1-2 puffs into the lungs every 4 hours as needed for shortness of breath / dyspnea 1 Inhaler 4     aspirin (ASA) 81 MG tablet Take 81 mg by mouth every morning  90 tablet 3     atorvastatin (LIPITOR) 40 MG tablet TAKE 1 TABLET(40 MG) BY MOUTH EVERY MORNING 90 tablet 3     BREO ELLIPTA 200-25 MCG/INH Inhaler INHALE 1 PUFF BY MOUTH ONE TIME DAILY  60 each 11     cetirizine (ZYRTEC) 10 MG tablet Take 10 mg by mouth every morning  90 tablet 3     diphenhydrAMINE (BENADRYL) 25 MG tablet Take 1 tablet (25 mg) by mouth every 8 hours as needed for itching or allergies 1 tablet 0     hydrochlorothiazide (HYDRODIURIL) 12.5 MG tablet TAKE ONE TABLET BY MOUTH EVERY MORNING 90 tablet 3     metoprolol succinate ER (TOPROL-XL) 100 MG 24 hr tablet Take 1.5 tablets (150 mg) by mouth daily 135 tablet 3     montelukast (SINGULAIR) 10 MG tablet Take 1 tablet (10 mg) by mouth every evening 90 tablet 3     montelukast (SINGULAIR) 10 MG tablet Take 1 tablet (10 mg) by mouth At Bedtime 90 tablet 0     multivitamin (ONE-DAILY) tablet Take 1 tablet by mouth every morning  90 tablet 3      omeprazole (PRILOSEC) 40 MG DR capsule TAKE 1 CAPSULE BY MOUTH ONE TIME DAILY 30 capsule 11     sacubitril-valsartan (ENTRESTO)  MG per tablet Take 1 tablet by mouth 2 times daily 180 tablet 3     spironolactone (ALDACTONE) 25 MG tablet Take 1 tablet (25 mg) by mouth daily 90 tablet 1     umeclidinium (INCRUSE ELLIPTA) 62.5 MCG/INH inhaler Inhale 1 puff into the lungs daily 30 each 11    Allergies   Allergen Reactions     Cat Hair Extract Itching     itchy tearful eyes     Adhesive Tape Rash     Iodine Rash         Lab Results (Personally Reviewed)    Chemistry/lipid CBC Cardiac Enzymes/BNP/TSH/INR   Lab Results   Component Value Date    BUN 22.2 11/04/2022     (L) 11/04/2022    CO2 25 11/04/2022     Creatinine   Date Value Ref Range Status   11/04/2022 1.17 0.67 - 1.17 mg/dL Final   05/12/2021 0.96 0.66 - 1.25 mg/dL Final       Lab Results   Component Value Date    CHOL 71 01/12/2022    HDL 45 01/12/2022    LDL 12 01/12/2022      Lab Results   Component Value Date    WBC 11.9 (H) 11/04/2022    HGB 15.0 11/04/2022    HCT 48.3 11/04/2022    MCV 93 11/04/2022     11/04/2022    Lab Results   Component Value Date    TSH 2.20 01/12/2022    INR 1.17 (H) 12/11/2019            Video-Visit Details    Type of service:  Video Visit    Video Start Time: 9:00    Video End Time:9:30    Originating Location (pt. Location): Home    Distant Location (provider location):  [facility]     Platform used for Video Visit: [employer]    I have reviewed the note as documented above.  This accurately captures the substance of my virtual visit with the patient. The patient states understanding and is agreeable with the plan.   Anuel Figueroa MD [facility]  Cardiology - Electrophysiology      Total time spent on patient visit, reviewing notes, imaging, labs, orders, and completing necessary documentation: 45 minutes.

## 2022-11-09 NOTE — LETTER
"11/9/2022      RE: Derek Contreras  [address] [facility] [address]       Dear Colleague,    Thank you for the opportunity to participate in the care of your patient, Derek Contreras, at the [facility] [place] [facility]. Please see a copy of my visit note below.    Derek is a 66 year old who is being evaluated via a billable video visit.      Pt states in [place] for visit.     How would you like to obtain your AVS? MyChart  If the video visit is dropped, the invitation should be resent by: Text to cell phone: [phone]  Will anyone else be joining your video visit?   pts wife    Krishna Tranlos, /[facility]    {If patient encounters technical issues they should call [phone] :[id number]}      Video-Visit Details    Video Start Time: {video visit start/end time for provider to select:219861}    Type of service:  Video Visit    Video End Time:{video visit start/end time for provider to select:135227}    Originating Location (pt. Location): {video visit patient location:435123::\"Home\"}    {PROVIDER LOCATION On-site should be selected for visits conducted from your clinic location or adjoining [facility] hospital, academic office, or other nearby [facility] building. Off-site should be selected for all other provider locations, including home:[id number]}    Distant Location (provider location):  {virtual location provider:794027}    Platform used for Video Visit: {Virtual Visit Platforms:211303::\"Numerate\"}        ELECTROPHYSIOLOGY VIRTUAL CLINIC VISIT  Mr. Derek Contreras is a 66 year old male who is being evaluated via a billable video visit.      The patient has been notified of following:     \"This video visit will be conducted via a call between you and your physician/provider. We have found that certain health care needs can be provided without the need for an in-person physical exam.  This service lets us provide the care you need with a video " "conversation.  If a prescription is necessary we can send it directly to your pharmacy.  If lab work is needed we can place an order for that and you can then stop by our lab to have the test done at a later time.    If during the course of the call the physician/provider feels a video visit is not appropriate, you will not be charged for this service.\"     Physician/provider has received verbal consent for a Video Visit from the patient? 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  He has known history of mildly reduced function that has now progressed to biventricular function worsening.  Additionally there is new LGE at the RV insertion site (new compared to prior MRI) which is a nonspecific finding.  The etiology of patient's biventricular dysfunction is not clear at this time.  Differential includes possible active phase sarcoidosis that has now progressed to the heart (and possibly has not had enough time to develop scar), this would be quite unusual/atypical.  Additionally the patient may also have a nonischemic myocardial process that is leading to progressively worsening function. Etiology may also be related to dyssynchrony from frequent PVCs that have also increased in burden.  PVC burden worsening could also be a reflection of his myocardial dysfunction. No significant signs to suggest that his is due to ischemia with a negative stress MRI a few years ago, it would be highly unusual for coronary disease to progress this quickly without any significant ischemic symptoms.  Recommendations:  - will increase metoprolol further to .  - Will defer ablation for the time being given favorable response and decreased likelihood of long term success given multiple morphologies without clear dominance.  - Follow-up in 6 months with repeat Zio.    Follow up " ***      PVC burden improving and shorter and less complex runs of NSVT, all asymptomatic, CMR negative  F/u one year with EP NP       History of Present Illness/Subjective    Mr. Derek Contreras is a 66 year old male who comes in today for EP follow-up of PVCS/NSVT.    Mr. Contreras is a 67 yo M who has a PMH pulmonary sarcoidosis, HTN, HLD, Obesity, h/o prostate CA, RV dilation & borderline low LVEF who has been evaluated in the pulmonary hypertension clinic by Dr. Ferrell and found not to have a dx of pulmonary hypertension. 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  His cardiac workup thus far has been as follows: he had a RHC 12/11/19 which showed RA 3, RV 33/5 PA 30/12 (17) and PCWP 5. CO 6.22, CI 2.49. PVR 1.82. Shunt series with Qp/Qs 1.0. He had a cardiac stress MRI 3/11/20 which showed no evidence of LGE. His RV was noted to be moderately enlarged but mildly reduced function with RVEF of 43%. Severely enlarged RA. No ischemia noted on stress imaging. He had a cardiac PET 2/2020 which showed no FDG active cardiac sarcoid. He did have decreased perfusion in the inferoseptal wall which may be related to microvascular disease. He was also noted to have decreased myocardial blood flow in the RCA distribution. Mr. Contreras also had a event monitor which showed frequent NSVT (fastest 19 beats @ 255bpm) and 9x SVT runs. He presents today to the EP clinic for evaluation of his arrhythmia.     EP visit Jan 2022: He denies an symptoms including palpitations, chest discomfort, lightheadedness, dizziness, orthopnea, PND, abd distention, n/v/d, early satiety, unintentional weight loss. He does report chronic KIRBY related to his pulmonary sarcoidosis that has been stable. More recently he reports he has had a dry cough that has slightly worsened over the last few days, he has reached out to his pulmonologist in regards  to this.During this visit, we started him on metoprolol XL 50, gradually increase to . We discussed PET with sarcoid team and we wanted to check instead an ischemic work-up.      EP Visit 4/6/22: He presents for follow-up. He denies any cardiac symptoms. He had a CT Angio on 1/31/2022 showing Severe calcific atherosclerosis causing mild stenosis without any high-grade lesions. He had a repeat Zio patch 1/17-1/24 shows 90 runs of NSVT compared to 432 in Oct, with decreasing ectopy.     He presents today for follow up. He reports feeling ***. He denies chest discomfort, palpitations, abdominal fullness/bloating or peripheral edema, shortness of breath, paroxysmal nocturnal dyspnea, orthopnea, lightheadedness, dizziness, pre-syncope, or syncope. A zio patch monitor from 10/5/22-10/9/22 showed 101 NSVT up to 5 beats, 6.8% PVCs. A CMR 5/2/22 showed no evidence of active cardiac sarcoid, LVEF 58%, RVEF 44%, LGE in septal RV insertion points into LV. Presenting 12 lead ECG shows *** Vent Rate *** bpm, HI *** ms, QRS *** ms, QTc *** ms. Current cardiac medications include: Toprol XL, Hydrochlorothiazide, Spironolactone, Lipitor, and ASA .     Feeling very well, no syncope or presyncope, no palpitations, good stamina  Having RHC and following up with Dr Nelson soon.    I have reviewed and updated the patient's Past Medical History, Social History, Family History and Medication List.     Cardiographics (Personally Reviewed) :   Cardiac MRI 5/2/22:  1. The LV is normal in cavity size and wall thickness. The global systolic function is normal. The LVEF is  58 %. There are no regional wall motion abnormalities.     2. The RV is mildly enlarged in cavity size. The global systolic function is mildly reduced. The RVEF is  44%.      3. Both atria are normal in size.     4. There is no significant valvular disease.      5. Late gadolinium enhancement imaging shows anterior septal and inferior septal LGE at the RV  insertion  points similar to prior     6.  There is no pericardial effusion or thickening.      7.  There is no intracardiac thrombus.      CONCLUSIONS:  No evidence of active cardiac sarcoidosis. Improved biventricular function with LVEF 58% and  RVEF 44% (previously 41% and 34% respectively). Septal systolic paradoxical bowing still suggestive of RV  pressure overload, and LGE in the septal RV insertion points into the LV; together suggestive of pulmonary  hypertension.     Cardiac MRI 12/22/21  1. The LV is normal in cavity size with mild concentric LVH. The global systolic function is moderately  reduced. The LVEF is 41%. There is abnormal septal motion likely related to RBBB and PVCs.     2. The RV is normal in cavity size. The global systolic function is moderately reduced. The RVEF is 34%.      3. Both atria are severely enlarged.     4. There is no significant valvular disease.      5. Late gadolinium enhancement imaging shows no MI or infiltrative disease. There is enhancement in the  septum at the RV insertion sites. This is a non-specific pattern that can be seen in pulmonary  hypertension.     6. There is no pericardial effusion or thickening.     7. There is no intracardiac thrombus.     8. The main PA is mildly dilated measuring 3.1 cm.      CONCLUSIONS: No evidence of cardiac sarcoid. Moderate cardiomyopathy, LVEF 41% and RVEF 34%, with no  significant fibrosis. Compared to prior CMR on 8/2019 RV function is worse with no other change. Recommend  re-assessment for pulmonary hypertension with TTE or RHC.      Stress Cardiac MRI 8/29/2019  1. The LV is normal in cavity size and wall thickness. The global systolic function is normal. The LVEF is  50%. There is systolic flattening of the interventricular septum and global dyssynchrony due to PVCs.     2. The RV is moderately enlarged based on the visual examination. The global systolic function is mildly  reduced. The RVEF is 43%.      3. The left atrium  is mildly enlarged and the right atrium is severely enlarged.     4. There is at least mild mitral regurgitation and trace aortic regurgitation.     5. Late gadolinium enhancement imaging shows no MI, fibrosis or infiltrative disease.      6. Regadenoson stress perfusion imaging shows no ischemia.     7. There is no pericardial effusion or thickening.     8. There is no intracardiac thrombus.     CONCLUSIONS: Non-ischemic cardiomyopathy, likely due to pulmonary hypertension and dyssynchrony from PVCs.  There is mildly reduced biventricular function.  There is no evidence of myocardial perfusion defects or myocardial fibrosis.      Cardiac PET SCANS  2/19/2020  Impression:  1. No FDG active cardiac sarcoid.  2. Decreased perfusion in the inferoseptal wall, findings may be  related to sequela of previous cardiac sarcoid with microvascular  injury.  3. Enlarged left ventricle with borderline low ejection fraction.  4. Decreased myocardial blood flow in the RCA distribution.  5. Findings of a dilated cardiomyopathy a concern for possible  ischemia/myocardial injury of the septum, consider CTA or coronary  angiography for further evaluation of this region.  6. Chest and upper abdominal hypermetabolic lymphadenopathy which is  indicative of active systemic sarcoid.     Physical Examination   There were no vitals taken for this visit.  Wt Readings from Last 3 Encounters:   11/04/22 120 kg (264 lb 8 oz)   09/07/22 122.4 kg (269 lb 14.4 oz)   08/08/22 124.3 kg (274 lb)     The rest of a comprehensive physical examination is deferred due to public [facility] emergency video visit restrictions.  CONSITUTIONAL: no acute distress  HEENT: no icterus, no redness or discharge, neck supple  CV: no visible edema of visualized extremities. No JVD.   RESPIRATORY: respirations nonlabored, no cough  NEURO: AA&Ox3, speech fluent/appropriate, motor grossly nonfocal  PSYCH: cooperative, affect appropriate  DERM: no rashes on visualized  face/neck/upper extremities         Medications  Allergies   Current Outpatient Medications   Medication Sig Dispense Refill     acetaminophen (TYLENOL) 500 MG tablet Take 2 tablets (1,000 mg) by mouth every 4 hours as needed for mild pain       albuterol (PROAIR HFA/PROVENTIL HFA/VENTOLIN HFA) 108 (90 Base) MCG/ACT inhaler Inhale 1-2 puffs into the lungs every 4 hours as needed for shortness of breath / dyspnea 1 Inhaler 4     aspirin (ASA) 81 MG tablet Take 81 mg by mouth every morning  90 tablet 3     atorvastatin (LIPITOR) 40 MG tablet TAKE 1 TABLET(40 MG) BY MOUTH EVERY MORNING 90 tablet 3     BREO ELLIPTA 200-25 MCG/INH Inhaler INHALE 1 PUFF BY MOUTH ONE TIME DAILY  60 each 11     cetirizine (ZYRTEC) 10 MG tablet Take 10 mg by mouth every morning  90 tablet 3     diphenhydrAMINE (BENADRYL) 25 MG tablet Take 1 tablet (25 mg) by mouth every 8 hours as needed for itching or allergies 1 tablet 0     hydrochlorothiazide (HYDRODIURIL) 12.5 MG tablet TAKE ONE TABLET BY MOUTH EVERY MORNING 90 tablet 3     metoprolol succinate ER (TOPROL-XL) 100 MG 24 hr tablet Take 1.5 tablets (150 mg) by mouth daily 135 tablet 3     montelukast (SINGULAIR) 10 MG tablet Take 1 tablet (10 mg) by mouth every evening 90 tablet 3     montelukast (SINGULAIR) 10 MG tablet Take 1 tablet (10 mg) by mouth At Bedtime 90 tablet 0     multivitamin (ONE-DAILY) tablet Take 1 tablet by mouth every morning  90 tablet 3     omeprazole (PRILOSEC) 40 MG DR capsule TAKE 1 CAPSULE BY MOUTH ONE TIME DAILY 30 capsule 11     sacubitril-valsartan (ENTRESTO)  MG per tablet Take 1 tablet by mouth 2 times daily 180 tablet 3     spironolactone (ALDACTONE) 25 MG tablet Take 1 tablet (25 mg) by mouth daily 90 tablet 1     umeclidinium (INCRUSE ELLIPTA) 62.5 MCG/INH inhaler Inhale 1 puff into the lungs daily 30 each 11    Allergies   Allergen Reactions     Cat Hair Extract Itching     itchy tearful eyes     Adhesive Tape Rash     Iodine Rash         Lab  "Results (Personally Reviewed)    Chemistry/lipid CBC Cardiac Enzymes/BNP/TSH/INR   Lab Results   Component Value Date    BUN 22.2 11/04/2022     (L) 11/04/2022    CO2 25 11/04/2022     Creatinine   Date Value Ref Range Status   11/04/2022 1.17 0.67 - 1.17 mg/dL Final   05/12/2021 0.96 0.66 - 1.25 mg/dL Final       Lab Results   Component Value Date    CHOL 71 01/12/2022    HDL 45 01/12/2022    LDL 12 01/12/2022      Lab Results   Component Value Date    WBC 11.9 (H) 11/04/2022    HGB 15.0 11/04/2022    HCT 48.3 11/04/2022    MCV 93 11/04/2022     11/04/2022    Lab Results   Component Value Date    TSH 2.20 01/12/2022    INR 1.17 (H) 12/11/2019            Video-Visit Details    Type of service:  Video Visit    Video Start Time: {video visit start/end time for provider to select:251387}    Video End Time:{video visit start/end time for provider to select:484259}    Originating Location (pt. Location): {video visit patient location:265463::\"Home\"}    Distant Location (provider location):  [facility]     Platform used for Video Visit: {Virtual Visit Platforms:990744::\"Acceptd\"}    I have reviewed the note as documented above.  This accurately captures the substance of my virtual visit with the patient. The patient states understanding and is agreeable with the plan.   Anuel Figueroa MD [facility]  Cardiology - Electrophysiology      Total time spent on patient visit, reviewing notes, imaging, labs, orders, and completing necessary documentation: {MINUTES:792416} minutes.                            Please do not hesitate to contact me if you have any questions/concerns.     Sincerely,     Anuel Figueroa MD    "

## 2022-11-11 DIAGNOSIS — J84.9 ILD (INTERSTITIAL LUNG DISEASE) (H): ICD-10-CM

## 2022-11-11 RX ORDER — FLUTICASONE FUROATE AND VILANTEROL 200; 25 UG/1; UG/1
POWDER RESPIRATORY (INHALATION)
Qty: 60 EACH | Refills: 11 | Status: SHIPPED | OUTPATIENT
Start: 2022-11-11 | End: 2023-12-05

## 2022-11-14 ENCOUNTER — OFFICE VISIT (OUTPATIENT)
Dept: CARDIOLOGY | Facility: CLINIC | Age: 67
End: 2022-11-14
Attending: INTERNAL MEDICINE
Payer: MEDICARE

## 2022-11-14 VITALS
WEIGHT: 269 LBS | DIASTOLIC BLOOD PRESSURE: 67 MMHG | BODY MASS INDEX: 37.66 KG/M2 | SYSTOLIC BLOOD PRESSURE: 106 MMHG | OXYGEN SATURATION: 95 % | HEART RATE: 88 BPM | HEIGHT: 71 IN

## 2022-11-14 DIAGNOSIS — R06.09 DOE (DYSPNEA ON EXERTION): Primary | ICD-10-CM

## 2022-11-14 DIAGNOSIS — I50.30 HEART FAILURE WITH PRESERVED EJECTION FRACTION, NYHA CLASS I (H): ICD-10-CM

## 2022-11-14 DIAGNOSIS — D86.9 SARCOIDOSIS: ICD-10-CM

## 2022-11-14 DIAGNOSIS — I50.22 CHRONIC SYSTOLIC HEART FAILURE (H): ICD-10-CM

## 2022-11-14 LAB
6 MIN WALK (FT): 990 FT
6 MIN WALK (M): 302 M
FIO2-PRE: 21 %

## 2022-11-14 PROCEDURE — 99215 OFFICE O/P EST HI 40 MIN: CPT | Performed by: INTERNAL MEDICINE

## 2022-11-14 PROCEDURE — 94618 PULMONARY STRESS TESTING: CPT | Performed by: INTERNAL MEDICINE

## 2022-11-14 PROCEDURE — G0463 HOSPITAL OUTPT CLINIC VISIT: HCPCS

## 2022-11-14 RX ORDER — SACUBITRIL AND VALSARTAN 97; 103 MG/1; MG/1
1 TABLET, FILM COATED ORAL 2 TIMES DAILY
Qty: 180 TABLET | Refills: 3 | Status: SHIPPED | OUTPATIENT
Start: 2022-11-14 | End: 2023-12-15

## 2022-11-14 RX ORDER — SPIRONOLACTONE 25 MG/1
25 TABLET ORAL DAILY
Qty: 90 TABLET | Refills: 3 | Status: SHIPPED | OUTPATIENT
Start: 2022-11-14 | End: 2024-02-04

## 2022-11-14 ASSESSMENT — PAIN SCALES - GENERAL: PAINLEVEL: NO PAIN (0)

## 2022-11-14 NOTE — PATIENT INSTRUCTIONS
Medication Changes & Instructions:  We will start you on Jardiance 10mg daily- I will call you to update the cost      Follow up Appointment Information:  6MO with labs prior with Dr. Hirsch      [address] [place] [address]  [place] [address]      Thank you for allowing us to be a part of your care here at the [facility].      If you have questions or concerns please contact us at:    Nurse Coordinator: Lona Payton RN [phone]  Cardiovascular Clinic  [facility]   [phone] Press #1 to send a message to your care team  On Call Cardiologist for after hours or on weekends: [phone]  option #4     *All co-payments are due at the time of services, if financial concerns are keeping you from attending scheduled clinic visits please contact our financial counselors at [phone] for further assistance.   * Please note that you will NOT receive a reminder call regarding your scheduled testing, reminder calls are for provider appointments only.  If you are scheduled for testing within the [facility] system you may receive a call regarding pre-registration for billing purposes only.**

## 2022-11-14 NOTE — PROGRESS NOTES
[facility]  Advanced HF & Pulmonary Hypertension Clinic  Service Date:  05/09/2022    Dear Doctors Mario and Elmer:       We had the pleasure of seeing Mr. Derek Contreras follow-up in our heart failure and pulm hypertension clinic.  As you know, he is a very pleasant 66-year-old male with past medical history significant for     1. Pulmonary sarcoidosis  2.  LV systolic dysfunction -nonischemic in etiology likely related to uncontrolled hypertension  3.  Pulmonary hypertension and RV dysfunction secondary to pulmonary sarcoidosis    He returns today for follow-up.  His blood pressures are under control.  He does not have shortness of breath at rest or with his usual activities.   I would characterize him as functional class II.  He has not had any lower extremity swelling or abdominal distention.  No exertional presyncope or syncope.  No exertional chest pain or chest pressure.  He has not had any recent hospitalizations but was in the ER recently for a hip dislocation.  He is compliant with his medication.        PAST MEDICAL HISTORY:  Past Medical History:   Diagnosis Date     Asthma      Claustrophobia      CPAP (continuous positive airway pressure) dependence      Dyslipidemia      [place] (hard of hearing)      Hypercholesteremia      Hypertension      Iron deficiency      Mediastinal adenopathy      Obesity      BEULAH (obstructive sleep apnea)      Prostate cancer (H)      Pulmonary hypertension (H)      Sarcoidosis      CURRENT MEDICATIONS:  Current Outpatient Medications   Medication Sig     acetaminophen (TYLENOL) 500 MG tablet Take 2 tablets (1,000 mg) by mouth every 4 hours as needed for mild pain     albuterol (PROAIR HFA/PROVENTIL HFA/VENTOLIN HFA) 108 (90 Base) MCG/ACT inhaler Inhale 1-2 puffs into the lungs every 4 hours as needed for shortness of breath / dyspnea     aspirin (ASA) 81 MG tablet Take 81 mg by mouth every morning      atorvastatin (LIPITOR) 40 MG tablet TAKE 1 TABLET(40 MG) BY  MOUTH EVERY MORNING     cetirizine (ZYRTEC) 10 MG tablet Take 10 mg by mouth every morning      diphenhydrAMINE (BENADRYL) 25 MG tablet Take 1 tablet (25 mg) by mouth every 8 hours as needed for itching or allergies     fluticasone-vilanterol (BREO ELLIPTA) 200-25 MCG/ACT inhaler INHALE 1 PUFF BY MOUTH ONE TIME DAILY Strength: 200-25 MCG/INH     hydrochlorothiazide (HYDRODIURIL) 12.5 MG tablet TAKE ONE TABLET BY MOUTH EVERY MORNING     metoprolol succinate ER (TOPROL-XL) 100 MG 24 hr tablet Take 1.5 tablets (150 mg) by mouth daily     montelukast (SINGULAIR) 10 MG tablet Take 1 tablet (10 mg) by mouth every evening     montelukast (SINGULAIR) 10 MG tablet Take 1 tablet (10 mg) by mouth At Bedtime     multivitamin (ONE-DAILY) tablet Take 1 tablet by mouth every morning      omeprazole (PRILOSEC) 40 MG DR capsule TAKE 1 CAPSULE BY MOUTH ONE TIME DAILY     sacubitril-valsartan (ENTRESTO)  MG per tablet Take 1 tablet by mouth 2 times daily     spironolactone (ALDACTONE) 25 MG tablet Take 1 tablet (25 mg) by mouth daily     umeclidinium (INCRUSE ELLIPTA) 62.5 MCG/INH inhaler Inhale 1 puff into the lungs daily     No current facility-administered medications for this visit.       ROS:   10 point ROS negative except as discussed in above HPI.    EXAM:  There were no vitals taken for this visit.  General: appears comfortable, alert and articulate  Head: normocephalic, atraumatic  Eyes: anicteric sclera, EOMI  Neck: no adenopathy  Orophyarynx: moist mucosa, no lesions, dentition intact  Heart: regular, normal S1/S2, no murmur, gallop, rub, no jugular venous distention  Lungs: clear to auscultation bilaterally, no rales or wheezing  Abdomen: soft, non-tender, bowel sounds present, no hepatosplenomegaly  Extremities: no clubbing, cyanosis or edema  Neurological: normal speech and affect, no gross motor deficits    Labs:    CBC RESULTS: Recent Labs   Lab Test 11/04/22  1208 11/04/22  1042   WBC  --  11.9*   RBC  --  5.17    HGB 15.0 15.6   HCT  --  48.3   MCV  --  93   MCH  --  30.2   MCHC  --  32.3   RDW  --  13.3   PLT  --  186     Recent Labs   Lab Test 11/04/22  1042 10/31/22  1723 08/30/22  0833   *  --  137   POTASSIUM 4.4 4.4 4.3   CHLORIDE 98  --  102   CO2 25  --  26   ANIONGAP 11  --  9   GLC 90  --  113*   BUN 22.2  --  16.1   CR 1.17 1.07 1.10   ANTONIO 9.5  --  9.4     Liver Function Studies - Recent Labs   Lab Test 11/04/22  1042   PROTTOTAL 7.1   ALBUMIN 3.7   BILITOTAL 0.9   ALKPHOS 78   AST 36   ALT 30       Lab Results   Component Value Date    NTBNPI 68 11/04/2022     Lab Results   Component Value Date    NTBNP 42 05/09/2022    NTBNP 21 12/11/2019       6MWT (02/2022)  He walked for 304 meters.  He desaturated to 90% on room air.      EKG 2/2022  Sinus rhythm with bifascicular block, PVCs    Cardiac MRI 05/02/2022  Comparison CMR: 12/22/2021     1. The LV is normal in cavity size and wall thickness. The global systolic function is normal. The LVEF is  58 %. There are no regional wall motion abnormalities.     2. The RV is mildly enlarged in cavity size. The global systolic function is mildly reduced. The RVEF is  44%.      3. Both atria are normal in size.     4. There is no significant valvular disease.      5. Late gadolinium enhancement imaging shows anterior septal and inferior septal LGE at the RV insertion  points similar to prior     6.  There is no pericardial effusion or thickening.      7.  There is no intracardiac thrombus.      CONCLUSIONS:  No evidence of active cardiac sarcoidosis. Improved biventricular function with LVEF 58% and  RVEF 44% (previously 41% and 34% respectively). Septal systolic paradoxical bowing still suggestive of RV  pressure overload, and LGE in the septal RV insertion points into the LV; together suggestive of pulmonary  hypertension.     Cardiac PET 2/2020  1. No FDG active cardiac sarcoid.  2. Decreased perfusion in the inferoseptal wall, findings may be  related to sequela  of previous cardiac sarcoid with microvascular  injury.  3. Enlarged left ventricle with borderline low ejection fraction.  4. Decreased myocardial blood flow in the RCA distribution.  5. Findings of a dilated cardiomyopathy a concern for possible  ischemia/myocardial injury of the septum, consider CTA or coronary  angiography for further evaluation of this region.  6. Chest and upper abdominal hypermetabolic lymphadenopathy which is  indicative of active systemic sarcoid.    PFTs (02/2022):   FVC 44%, FEV1 46%, FEV1/FVC 79%, DLCO 75% predicted.    HRCT 10/2020:  Mediastinal and perihilar lymphadenoapthy and numerous solitary pulmonary nodules, no parenchymal lung disease.    RHC (11/2022)  RA 2/5/2 mmHg  RV 24/3 mmHg  PA 24/10/16 mmHg  CWP 3 mmHg  CO (Lucas) 4.74 L/min  CI (Lucas) 2.02 L/min/m2  CO (TD) 7.3 L/min  CI (TD) 3.11 L/min/m2    NIBP 107/56/75 mmHg  PVR 2.74 ROJAS    Assessment and Plan:     In summary, Mr. Contreras is a 66-year-old gentleman with pulmonary sarcoidosis, systemic hypertension, mild biventricular systolic dysfunction, and pulmonary hypertension who returns today for follow-up.      1. Pulmonary Hypertension    He has mildly elevated PA pressure but his PVR is upper limits of normal.  This is likely due to combination of his high cardiac output as well as pulmonary sarcoidosis.  Since his PVR is up on the upper limits of normal, cardiac output is preserved, and right-sided filling pressures are normal we will defer pulmonary vasodilator therapy at this time point.  He is not interested in adding additional medication if possible.  He does have mild RV dysfunction, but clinically he is not in right heart failure.    Given his coexisting lung disease (he has significantly reduced lung volume but parenchymal abnormality on CT scan), he is at risk of VQ mismatch with systemic pulmonary vasodilator therapy.  He could potentially benefit from inhaled treprostinil if his pulmonary hypertension and RV  dysfunction better get worse.    2. New biventricular dysfunction/cardiomyopathy    His left ventricular systolic function has normalized on full dose valsartan/sacubitril, metoprolol  mg daily, and spironolactone 25 mg daily.  We will start him on sodium glucose cord transport receptor inhibitor given the recent data even in patients with heart failure with recovered ejection fraction.  We will repeat a chemistry in a month after starting him on SGL 2 inhibitors.    3.  Frequent PVCs    He is being followed by Dr. Figueroa.  His PVC burden apparently has decreased on metoprolol XL.  As his ejection fraction has normalized and PVC burden has decreased after glucoses planning to hold off on biopsying him.  His noninvasive testing including cardiac MRI and PET has been negative for cardiac sarcoidosis.    4.  Pulmonary sarcoidosis    He is being followed by Dr. Romario Martin.  He is not desaturating significantly with exertion.  He has dropped his oxygen saturation briefly to 89%.  If he were to have exertional hypoxemia having supplemental oxygen would decrease the chance of progression of his pulm vascular disease.    He will return to see us in 6 months with labs, six 6-minute walk test, and repeat right heart catheterization.  He will call us in the interim of any further worsening symptoms.      Total time today was 48 minutes reviewing notes, imaging, labs, patient visit, orders and documentation     Sincerely,    Torrey Hirsch MD   [facility]  Heart Failure, Transplant, and Mechanical Circulatory Support Cardiology   Cardiovascular Division  [facility]   [phone]

## 2022-11-14 NOTE — LETTER
11/14/2022      RE: Derek Contreras  [id number] [facility] [id number]       Dear Colleague,    Thank you for the opportunity to participate in the care of your patient, Derek Contreras, at the [facility] [place] at [facility]. Please see a copy of my visit note below.    [facility]  Advanced HF & Pulmonary Hypertension Clinic  Service Date:  05/09/2022    Dear Doctors Mario and Elmer:       We had the pleasure of seeing Mr. Derek Contreras follow-up in our heart failure and pulm hypertension clinic.  As you know, he is a very pleasant 66-year-old male with past medical history significant for     1. Pulmonary sarcoidosis  2.  LV systolic dysfunction -nonischemic in etiology likely related to uncontrolled hypertension  3.  Pulmonary hypertension and RV dysfunction secondary to pulmonary sarcoidosis    He returns today for follow-up.  His blood pressures are under control.  He does not have shortness of breath at rest or with his usual activities.   I would characterize him as functional class II.  He has not had any lower extremity swelling or abdominal distention.  No exertional presyncope or syncope.  No exertional chest pain or chest pressure.  He has not had any recent hospitalizations but was in the ER recently for a hip dislocation.  He is compliant with his medication.        PAST MEDICAL HISTORY:  Past Medical History:   Diagnosis Date     Asthma      Claustrophobia      CPAP (continuous positive airway pressure) dependence      Dyslipidemia      [place] (hard of hearing)      Hypercholesteremia      Hypertension      Iron deficiency      Mediastinal adenopathy      Obesity      BEULAH (obstructive sleep apnea)      Prostate cancer (H)      Pulmonary hypertension (H)      Sarcoidosis      CURRENT MEDICATIONS:  Current Outpatient Medications   Medication Sig     acetaminophen (TYLENOL) 500 MG tablet Take 2 tablets  (1,000 mg) by mouth every 4 hours as needed for mild pain     albuterol (PROAIR HFA/PROVENTIL HFA/VENTOLIN HFA) 108 (90 Base) MCG/ACT inhaler Inhale 1-2 puffs into the lungs every 4 hours as needed for shortness of breath / dyspnea     aspirin (ASA) 81 MG tablet Take 81 mg by mouth every morning      atorvastatin (LIPITOR) 40 MG tablet TAKE 1 TABLET(40 MG) BY MOUTH EVERY MORNING     cetirizine (ZYRTEC) 10 MG tablet Take 10 mg by mouth every morning      diphenhydrAMINE (BENADRYL) 25 MG tablet Take 1 tablet (25 mg) by mouth every 8 hours as needed for itching or allergies     fluticasone-vilanterol (BREO ELLIPTA) 200-25 MCG/ACT inhaler INHALE 1 PUFF BY MOUTH ONE TIME DAILY Strength: 200-25 MCG/INH     hydrochlorothiazide (HYDRODIURIL) 12.5 MG tablet TAKE ONE TABLET BY MOUTH EVERY MORNING     metoprolol succinate ER (TOPROL-XL) 100 MG 24 hr tablet Take 1.5 tablets (150 mg) by mouth daily     montelukast (SINGULAIR) 10 MG tablet Take 1 tablet (10 mg) by mouth every evening     montelukast (SINGULAIR) 10 MG tablet Take 1 tablet (10 mg) by mouth At Bedtime     multivitamin (ONE-DAILY) tablet Take 1 tablet by mouth every morning      omeprazole (PRILOSEC) 40 MG DR capsule TAKE 1 CAPSULE BY MOUTH ONE TIME DAILY     sacubitril-valsartan (ENTRESTO)  MG per tablet Take 1 tablet by mouth 2 times daily     spironolactone (ALDACTONE) 25 MG tablet Take 1 tablet (25 mg) by mouth daily     umeclidinium (INCRUSE ELLIPTA) 62.5 MCG/INH inhaler Inhale 1 puff into the lungs daily     No current facility-administered medications for this visit.       ROS:   10 point ROS negative except as discussed in above HPI.    EXAM:  There were no vitals taken for this visit.  General: appears comfortable, alert and articulate  Head: normocephalic, atraumatic  Eyes: anicteric sclera, EOMI  Neck: no adenopathy  Orophyarynx: moist mucosa, no lesions, dentition intact  Heart: regular, normal S1/S2, no murmur, gallop, rub, no jugular venous  distention  Lungs: clear to auscultation bilaterally, no rales or wheezing  Abdomen: soft, non-tender, bowel sounds present, no hepatosplenomegaly  Extremities: no clubbing, cyanosis or edema  Neurological: normal speech and affect, no gross motor deficits    Labs:    CBC RESULTS: Recent Labs   Lab Test 11/04/22  1208 11/04/22  1042   WBC  --  11.9*   RBC  --  5.17   HGB 15.0 15.6   HCT  --  48.3   MCV  --  93   MCH  --  30.2   MCHC  --  32.3   RDW  --  13.3   PLT  --  186     Recent Labs   Lab Test 11/04/22  1042 10/31/22  1723 08/30/22  0833   *  --  137   POTASSIUM 4.4 4.4 4.3   CHLORIDE 98  --  102   CO2 25  --  26   ANIONGAP 11  --  9   GLC 90  --  113*   BUN 22.2  --  16.1   CR 1.17 1.07 1.10   ANTONIO 9.5  --  9.4     Liver Function Studies - Recent Labs   Lab Test 11/04/22  1042   PROTTOTAL 7.1   ALBUMIN 3.7   BILITOTAL 0.9   ALKPHOS 78   AST 36   ALT 30       Lab Results   Component Value Date    NTBNPI 68 11/04/2022     Lab Results   Component Value Date    NTBNP 42 05/09/2022    NTBNP 21 12/11/2019       6MWT (02/2022)  He walked for 304 meters.  He desaturated to 90% on room air.      EKG 2/2022  Sinus rhythm with bifascicular block, PVCs    Cardiac MRI 05/02/2022  Comparison CMR: 12/22/2021     1. The LV is normal in cavity size and wall thickness. The global systolic function is normal. The LVEF is  58 %. There are no regional wall motion abnormalities.     2. The RV is mildly enlarged in cavity size. The global systolic function is mildly reduced. The RVEF is  44%.      3. Both atria are normal in size.     4. There is no significant valvular disease.      5. Late gadolinium enhancement imaging shows anterior septal and inferior septal LGE at the RV insertion  points similar to prior     6.  There is no pericardial effusion or thickening.      7.  There is no intracardiac thrombus.      CONCLUSIONS:  No evidence of active cardiac sarcoidosis. Improved biventricular function with LVEF 58% and  RVEF  44% (previously 41% and 34% respectively). Septal systolic paradoxical bowing still suggestive of RV  pressure overload, and LGE in the septal RV insertion points into the LV; together suggestive of pulmonary  hypertension.     Cardiac PET 2/2020  1. No FDG active cardiac sarcoid.  2. Decreased perfusion in the inferoseptal wall, findings may be  related to sequela of previous cardiac sarcoid with microvascular  injury.  3. Enlarged left ventricle with borderline low ejection fraction.  4. Decreased myocardial blood flow in the RCA distribution.  5. Findings of a dilated cardiomyopathy a concern for possible  ischemia/myocardial injury of the septum, consider CTA or coronary  angiography for further evaluation of this region.  6. Chest and upper abdominal hypermetabolic lymphadenopathy which is  indicative of active systemic sarcoid.    PFTs (02/2022):   FVC 44%, FEV1 46%, FEV1/FVC 79%, DLCO 75% predicted.    HRCT 10/2020:  Mediastinal and perihilar lymphadenoapthy and numerous solitary pulmonary nodules, no parenchymal lung disease.    RHC (11/2022)  RA 2/5/2 mmHg  RV 24/3 mmHg  PA 24/10/16 mmHg  CWP 3 mmHg  CO (Lucas) 4.74 L/min  CI (Lucas) 2.02 L/min/m2  CO (TD) 7.3 L/min  CI (TD) 3.11 L/min/m2    NIBP 107/56/75 mmHg  PVR 2.74 ROJAS    Assessment and Plan:     In summary, Mr. Contreras is a 66-year-old gentleman with pulmonary sarcoidosis, systemic hypertension, mild biventricular systolic dysfunction, and pulmonary hypertension who returns today for follow-up.      1. Pulmonary Hypertension    He has mildly elevated PA pressure but his PVR is upper limits of normal.  This is likely due to combination of his high cardiac output as well as pulmonary sarcoidosis.  Since his PVR is up on the upper limits of normal, cardiac output is preserved, and right-sided filling pressures are normal we will defer pulmonary vasodilator therapy at this time point.  He is not interested in adding additional medication if possible.  He  does have mild RV dysfunction, but clinically he is not in right heart failure.    Given his coexisting lung disease (he has significantly reduced lung volume but parenchymal abnormality on CT scan), he is at risk of VQ mismatch with systemic pulmonary vasodilator therapy.  He could potentially benefit from inhaled treprostinil if his pulmonary hypertension and RV dysfunction better get worse.    2. New biventricular dysfunction/cardiomyopathy    His left ventricular systolic function has normalized on full dose valsartan/sacubitril, metoprolol  mg daily, and spironolactone 25 mg daily.  We will start him on sodium glucose cord transport receptor inhibitor given the recent data even in patients with heart failure with recovered ejection fraction.  We will repeat a chemistry in a month after starting him on SGL 2 inhibitors.    3.  Frequent PVCs    He is being followed by Dr. Figueroa.  His PVC burden apparently has decreased on metoprolol XL.  As his ejection fraction has normalized and PVC burden has decreased after glucoses planning to hold off on biopsying him.  His noninvasive testing including cardiac MRI and PET has been negative for cardiac sarcoidosis.    4.  Pulmonary sarcoidosis    He is being followed by Dr. Romario Martin.  He is not desaturating significantly with exertion.  He has dropped his oxygen saturation briefly to 89%.  If he were to have exertional hypoxemia having supplemental oxygen would decrease the chance of progression of his pulm vascular disease.    He will return to see us in 6 months with labs, six 6-minute walk test, and repeat right heart catheterization.  He will call us in the interim of any further worsening symptoms.      Total time today was 48 minutes reviewing notes, imaging, labs, patient visit, orders and documentation     Sincerely,    Torrey Hirsch MD   [facility]  Heart Failure, Transplant, and Mechanical Circulatory Support Cardiology    Cardiovascular Division  [facility]   [phone]

## 2022-11-14 NOTE — NURSING NOTE
Chief Complaint   Patient presents with     Follow Up     Return heart failure             Vitals were taken.  Kevyn Mcguire, EMT   8:51 AM

## 2022-11-14 NOTE — NURSING NOTE
Patient educated to the following during visit and educated to contact RN or MD for any questions.     Med Reconcile: Reviewed and verified all current medications with the patient. The updated medication list was printed and given to the patient.  Labs: Patient was given results of the laboratory testing obtained today. Patient demonstrated understanding of this information and agreed to call with further questions or concerns.   Diet: Patient instructed regarding a heart healthy diet, including discussion of reduced fat and sodium intake. Patient demonstrated understanding of this information and agreed to call with further questions or concerns.     Plan:   Start on Jardiance pending cost  6MO follow-up with labs prior      Patient stated he understood all health information given and agreed to call with further questions or concerns.      Patient to be scheduled in future. Unable to schedule dt following appointments    Lona Payton RN

## 2022-11-16 ENCOUNTER — OFFICE VISIT (OUTPATIENT)
Dept: ORTHOPEDICS | Facility: CLINIC | Age: 67
End: 2022-11-16
Payer: MEDICARE

## 2022-11-16 VITALS
WEIGHT: 269 LBS | BODY MASS INDEX: 38.51 KG/M2 | SYSTOLIC BLOOD PRESSURE: 104 MMHG | DIASTOLIC BLOOD PRESSURE: 72 MMHG | HEIGHT: 70 IN

## 2022-11-16 DIAGNOSIS — Z98.890 S/P CLOSED REDUCTION OF DISLOCATED TOTAL HIP PROSTHESIS: Primary | ICD-10-CM

## 2022-11-16 PROCEDURE — 99203 OFFICE O/P NEW LOW 30 MIN: CPT | Performed by: ORTHOPAEDIC SURGERY

## 2022-11-16 NOTE — LETTER
11/16/2022         RE: Derek Contreras  [id number] [facility] [address]        Dear Colleague,    Thank you for referring your patient, Derek Contreras, to the [facility] [place]. Please see a copy of my visit note below.    HISTORY OF PRESENT ILLNESS:    Derek Contreras is a 66 year old male who is seen in ED follow-up for s/p left hip dislocation. Dislocation occurred on 10/31/22. Patient reports the dislocation occurred when getting up off the kitchen floor. He reports he was stocking foot and slipped.  This is the first time he has experienced a dislocation to the hip.    Present symptoms: patient reports no pain, soreness, or complication s/p left SILVERIO closed reduction. He has no concerns at this time.   Treatments tried to this point: closed reduction  Orthopedic PMH: left total hip arthroplasty,  Right total hip arthroplasty, right total knee arthroplasty, left total knee arthroplasty (all performed at [facility] ~10 years ago)    Past Medical History:   Diagnosis Date     Asthma      Claustrophobia      CPAP (continuous positive airway pressure) dependence      Dyslipidemia      [place] (hard of hearing)      Hypercholesteremia      Hypertension      Iron deficiency      Mediastinal adenopathy      Obesity      BEULAH (obstructive sleep apnea)      Prostate cancer (H)      Pulmonary hypertension (H)      Sarcoidosis        Past Surgical History:   Procedure Laterality Date     APPENDECTOMY       BIOPSY MASS NECK Left 7/15/2020    Procedure: Left trapezius muscle biopsy;  Surgeon: Ade Villa MD;  [facility]: [facility]     CLOSED REDUCTION HIP Left 10/31/2022    Procedure: Closed reduction left total hip arthroplasty;  Surgeon: Antonio Ann MD;  Location: [facility] OR     CV RIGHT HEART CATH MEASUREMENTS RECORDED N/A 12/11/2019    Procedure: CV RIGHT HEART CATH;  Surgeon: Torrey Hirsch MD;  Location:  HEART CARDIAC CATH LAB     CV RIGHT HEART  CATH MEASUREMENTS RECORDED N/A 1/19/2022    Procedure: CV RIGHT HEART CATH;  Surgeon: Torrey Hirsch MD;  Location:  HEART CARDIAC CATH LAB     CV RIGHT HEART CATH MEASUREMENTS RECORDED N/A 11/4/2022    Procedure: Heart Cath Right Heart Cath;  Surgeon: Torery Hirsch MD;  Location:  HEART CARDIAC CATH LAB     DAVINCI PROSTATECTOMY, LYMPHADENECTOMY N/A 5/23/2019    Procedure: ROBOTIC ASSISTED LAPAROSCOPIC RADICAL PROSTATECTOMY WITH BILATERAL PELVIC LYMPH NODE DISSECTION;  Surgeon: Matteo Coughlin MD;  Location:  OR     ENDOBRONCHIAL ULTRASOUND FLEXIBLE N/A 3/29/2019    Procedure: Flexible Bronchoscopy, Endobronchial Ultrasound;  Surgeon: Curtis Leger MD;  Location: [facility] OR     VASECTOMY       ZZC TOTAL HIP ARTHROPLASTY Bilateral      ZZC TOTAL KNEE ARTHROPLASTY Bilateral        Family History   Problem Relation Age of Onset     Alzheimer Disease Mother      Heart Disease Father      Glaucoma No family hx of      Macular Degeneration No family hx of      Diabetes No family hx of      Thyroid Disease No family hx of        Social History     Socioeconomic History     Marital status:      Spouse name: Not on file     Number of children: Not on file     Years of education: Not on file     Highest education level: Not on file   Occupational History     Not on file   Tobacco Use     Smoking status: Never     Smokeless tobacco: Never   Vaping Use     Vaping Use: Never used   Substance and Sexual Activity     Alcohol use: Yes     Comment: once a week     Drug use: No     Sexual activity: Yes     Partners: Female   Other Topics Concern     Parent/sibling w/ CABG, MI or angioplasty before 65F 55M? Not Asked   Social History Narrative    12/03/20         ENVIRONMENTAL HISTORY: [place] The home is heated with a gas furnace. They does have central air conditioning. The patient's bedroom is furnished with Indoor plants and carpeting in  bedroom.  Pets inside the house include 0 pets. There is no history of cockroach or mice infestation. There is/are 0 smokers in the house.  The house does not have a damp basement.      Social Determinants of Health     Financial Resource Strain: Not on file   Food Insecurity: Not on file   Transportation Needs: Not on file   Physical Activity: Not on file   Stress: Not on file   Social Connections: Not on file   Intimate Partner Violence: Not At Risk     Fear of Current or Ex-Partner: No     Emotionally Abused: No     Physically Abused: No     Sexually Abused: No   Housing Stability: Not on file       Current Outpatient Medications   Medication Sig Dispense Refill     acetaminophen (TYLENOL) 500 MG tablet Take 2 tablets (1,000 mg) by mouth every 4 hours as needed for mild pain       albuterol (PROAIR HFA/PROVENTIL HFA/VENTOLIN HFA) 108 (90 Base) MCG/ACT inhaler Inhale 1-2 puffs into the lungs every 4 hours as needed for shortness of breath / dyspnea 1 Inhaler 4     aspirin (ASA) 81 MG tablet Take 81 mg by mouth every morning  90 tablet 3     atorvastatin (LIPITOR) 40 MG tablet TAKE 1 TABLET(40 MG) BY MOUTH EVERY MORNING 90 tablet 3     cetirizine (ZYRTEC) 10 MG tablet Take 10 mg by mouth every morning  90 tablet 3     diphenhydrAMINE (BENADRYL) 25 MG tablet Take 1 tablet (25 mg) by mouth every 8 hours as needed for itching or allergies 1 tablet 0     empagliflozin (JARDIANCE) 10 MG TABS tablet Take 1 tablet (10 mg) by mouth daily 30 tablet 3     fluticasone-vilanterol (BREO ELLIPTA) 200-25 MCG/ACT inhaler INHALE 1 PUFF BY MOUTH ONE TIME DAILY Strength: 200-25 MCG/INH 60 each 11     hydrochlorothiazide (HYDRODIURIL) 12.5 MG tablet TAKE ONE TABLET BY MOUTH EVERY MORNING 90 tablet 3     metoprolol succinate ER (TOPROL-XL) 100 MG 24 hr tablet Take 1.5 tablets (150 mg) by mouth daily 135 tablet 3     montelukast (SINGULAIR) 10 MG tablet Take 1 tablet (10 mg) by mouth every evening 90 tablet 3     montelukast (SINGULAIR)  "10 MG tablet Take 1 tablet (10 mg) by mouth At Bedtime 90 tablet 0     multivitamin (ONE-DAILY) tablet Take 1 tablet by mouth every morning  90 tablet 3     omeprazole (PRILOSEC) 40 MG DR capsule TAKE 1 CAPSULE BY MOUTH ONE TIME DAILY 30 capsule 11     sacubitril-valsartan (ENTRESTO)  MG per tablet Take 1 tablet by mouth 2 times daily 180 tablet 3     spironolactone (ALDACTONE) 25 MG tablet Take 1 tablet (25 mg) by mouth daily 90 tablet 3     umeclidinium (INCRUSE ELLIPTA) 62.5 MCG/INH inhaler Inhale 1 puff into the lungs daily 30 each 11       Allergies   Allergen Reactions     Cat Hair Extract Itching     itchy tearful eyes     Adhesive Tape Rash     Iodine Rash       REVIEW OF SYSTEMS:  CONSTITUTIONAL:  NEGATIVE for fever, chills, change in weight  INTEGUMENTARY/SKIN:  NEGATIVE for worrisome rashes, moles or lesions  EYES:  NEGATIVE for vision changes or irritation  ENT/MOUTH:  NEGATIVE for ear, mouth and throat problems  RESP: SOB  BREAST:  NEGATIVE for masses, tenderness or discharge  CV:  Hypertension, Irregular heartbeats   GI:  NEGATIVE for nausea, abdominal pain, heartburn, or change in bowel habits  :  Negative   MUSCULOSKELETAL:  See HPI above  NEURO:  NEGATIVE for weakness, dizziness or paresthesias  ENDOCRINE:  NEGATIVE for temperature intolerance, skin/hair changes  HEME/ALLERGY/IMMUNE:  NEGATIVE for bleeding problems  PSYCHIATRIC:  NEGATIVE for changes in mood or affect      PHYSICAL EXAM:  /72   Ht 1.788 m (5' 10.38\")   Wt 122 kg (269 lb)   BMI 38.18 kg/m    Body mass index is 38.18 kg/m .   GENERAL APPEARANCE: healthy, alert and no distress   HEENT: No apparent thyroid megaly. Clear sclera with normal ocular movement  RESPIRATORY: No labored breathing  SKIN: no suspicious lesions or rashes  NEURO: Normal strength and tone, mentation intact and speech normal  VASCULAR: Good pulses, and capillary refill   LYMPH: no lymphadenopathy   PSYCH:  mentation appears normal and affect " normal/bright    MUSCULOSKELETAL:  Not in acute distress  No difficulty getting up from sitting  He walks with a slight shuffling gait pattern  No specific limping noted     Range of motion is well-maintained and pain-free  Hip abduction and flexion strength is within normal limits  No peripheral swelling or ecchymosis    Both knees with incisions for total knee arthroplasty from about the same time as the hip replacement    Circulation is intact  Sensations intact     ASSESSMENT:  Left hip dislocation, first time in 10 years  Status post total hip arthroplasty from 10 years ago    PLAN:  Since this is an unexpected event related to slipping no further intervention was felt to be necessary at this point.  However if this happens over and over especially related to day-to-day activities, additional surgical intervention may need to be considered in the future.    For now, avoiding extreme range of motion is recommended.  No bracing or other specific physical therapy was felt to be necessary.  X-rays from the emergency room were visualized.      All the questions were answered.  Follow-up as needed.    Imaging Interpretation:     Recent Results (from the past 744 hour(s))   XR Pelvis w Hip Left 1 View    Narrative    PELVIS AND HIP LEFT ONE VIEW October 31, 2022 12:06 PM     INDICATION: Left-sided hip pain.     COMPARISON: None.      Impression    IMPRESSION:  1.  Posteriorly dislocated left total hip arthroplasty.  2.  No fracture.  3.  Right total hip arthroplasty, intact.  4.  Degenerative changes in the lower lumbar spine.    ISRAEL GUADARRAMA MD         SYSTEM ID:  [id number]   XR Hip Port Left G/E 2 Views    Narrative    XR HIP PORTABLE LEFT 2-3 VIEWS   10/31/2022 6:46 PM     HISTORY: intra op, s/p closed reduction  COMPARISON: 10/31/2022       Impression    IMPRESSION: The previously seen left hip arthroplasty has been  reduced. No fracture is seen.    ANGELA SCHMITT MD         SYSTEM ID:  [id number]         Asa  MD Cheryl  Department of Orthopedic Surgery        Disclaimer: This note consists of symbols derived from keyboarding, dictation and/or voice recognition software. As a result, there may be errors in the script that have gone undetected. Please consider this when interpreting information found in this chart.        Again, thank you for allowing me to participate in the care of your patient.        Sincerely,        Miguelito Luna MD

## 2022-11-16 NOTE — PROGRESS NOTES
HISTORY OF PRESENT ILLNESS:    Derek Contreras is a 66 year old male who is seen in ED follow-up for s/p left hip dislocation. Dislocation occurred on 10/31/22. Patient reports the dislocation occurred when getting up off the kitchen floor. He reports he was stocking foot and slipped.  This is the first time he has experienced a dislocation to the hip.    Present symptoms: patient reports no pain, soreness, or complication s/p left SILVERIO closed reduction. He has no concerns at this time.   Treatments tried to this point: closed reduction  Orthopedic PMH: left total hip arthroplasty,  Right total hip arthroplasty, right total knee arthroplasty, left total knee arthroplasty (all performed at [facility] ~10 years ago)    Past Medical History:   Diagnosis Date     Asthma      Claustrophobia      CPAP (continuous positive airway pressure) dependence      Dyslipidemia      [place] (hard of hearing)      Hypercholesteremia      Hypertension      Iron deficiency      Mediastinal adenopathy      Obesity      BEULAH (obstructive sleep apnea)      Prostate cancer (H)      Pulmonary hypertension (H)      Sarcoidosis        Past Surgical History:   Procedure Laterality Date     APPENDECTOMY       BIOPSY MASS NECK Left 7/15/2020    Procedure: Left trapezius muscle biopsy;  Surgeon: Ade Villa MD;  [facility]     CLOSED REDUCTION HIP Left 10/31/2022    Procedure: Closed reduction left total hip arthroplasty;  Surgeon: Antonio Ann MD;  Location: [facility] OR     CV RIGHT HEART CATH MEASUREMENTS RECORDED N/A 12/11/2019    Procedure: CV RIGHT HEART CATH;  Surgeon: Torrey Hirsch MD;  Location:  HEART CARDIAC CATH LAB     CV RIGHT HEART CATH MEASUREMENTS RECORDED N/A 1/19/2022    Procedure: CV RIGHT HEART CATH;  Surgeon: Torrey Hirsch MD;  Location:  HEART CARDIAC CATH LAB     CV RIGHT HEART CATH MEASUREMENTS RECORDED N/A 11/4/2022    Procedure: Heart Cath Right Heart Cath;  Surgeon: Torrey  MD Torrey;  Location:  HEART CARDIAC CATH LAB     DAVINCI PROSTATECTOMY, LYMPHADENECTOMY N/A 5/23/2019    Procedure: ROBOTIC ASSISTED LAPAROSCOPIC RADICAL PROSTATECTOMY WITH BILATERAL PELVIC LYMPH NODE DISSECTION;  Surgeon: Matteo Coughlin MD;  Location: [facility] OR     ENDOBRONCHIAL ULTRASOUND FLEXIBLE N/A 3/29/2019    Procedure: Flexible Bronchoscopy, Endobronchial Ultrasound;  Surgeon: Curtis Leger MD;  Location: [facility] OR     VASECTOMY       ZZC TOTAL HIP ARTHROPLASTY Bilateral      ZZC TOTAL KNEE ARTHROPLASTY Bilateral        Family History   Problem Relation Age of Onset     Alzheimer Disease Mother      Heart Disease Father      Glaucoma No family hx of      Macular Degeneration No family hx of      Diabetes No family hx of      Thyroid Disease No family hx of        Social History     Socioeconomic History     Marital status:      Spouse name: Not on file     Number of children: Not on file     Years of education: Not on file     Highest education level: Not on file   Occupational History     Not on file   Tobacco Use     Smoking status: Never     Smokeless tobacco: Never   Vaping Use     Vaping Use: Never used   Substance and Sexual Activity     Alcohol use: Yes     Comment: once a week     Drug use: No     Sexual activity: Yes     Partners: Female   Other Topics Concern     Parent/sibling w/ CABG, MI or angioplasty before 65F 55M? Not Asked   Social History Narrative    12/03/20         ENVIRONMENTAL HISTORY: [place] The home is heated with a gas furnace. They does have central air conditioning. The patient's bedroom is furnished with Indoor plants and carpeting in bedroom.  Pets inside the house include 0 pets. There is no history of cockroach or mice infestation. There is/are 0 smokers in the house.  The house does not have a damp basement.      Social Determinants of Health     Financial Resource Strain: Not on file   Food Insecurity:  Not on file   Transportation Needs: Not on file   Physical Activity: Not on file   Stress: Not on file   Social Connections: Not on file   Intimate Partner Violence: Not At Risk     Fear of Current or Ex-Partner: No     Emotionally Abused: No     Physically Abused: No     Sexually Abused: No   Housing Stability: Not on file       Current Outpatient Medications   Medication Sig Dispense Refill     acetaminophen (TYLENOL) 500 MG tablet Take 2 tablets (1,000 mg) by mouth every 4 hours as needed for mild pain       albuterol (PROAIR HFA/PROVENTIL HFA/VENTOLIN HFA) 108 (90 Base) MCG/ACT inhaler Inhale 1-2 puffs into the lungs every 4 hours as needed for shortness of breath / dyspnea 1 Inhaler 4     aspirin (ASA) 81 MG tablet Take 81 mg by mouth every morning  90 tablet 3     atorvastatin (LIPITOR) 40 MG tablet TAKE 1 TABLET(40 MG) BY MOUTH EVERY MORNING 90 tablet 3     cetirizine (ZYRTEC) 10 MG tablet Take 10 mg by mouth every morning  90 tablet 3     diphenhydrAMINE (BENADRYL) 25 MG tablet Take 1 tablet (25 mg) by mouth every 8 hours as needed for itching or allergies 1 tablet 0     empagliflozin (JARDIANCE) 10 MG TABS tablet Take 1 tablet (10 mg) by mouth daily 30 tablet 3     fluticasone-vilanterol (BREO ELLIPTA) 200-25 MCG/ACT inhaler INHALE 1 PUFF BY MOUTH ONE TIME DAILY Strength: 200-25 MCG/INH 60 each 11     hydrochlorothiazide (HYDRODIURIL) 12.5 MG tablet TAKE ONE TABLET BY MOUTH EVERY MORNING 90 tablet 3     metoprolol succinate ER (TOPROL-XL) 100 MG 24 hr tablet Take 1.5 tablets (150 mg) by mouth daily 135 tablet 3     montelukast (SINGULAIR) 10 MG tablet Take 1 tablet (10 mg) by mouth every evening 90 tablet 3     montelukast (SINGULAIR) 10 MG tablet Take 1 tablet (10 mg) by mouth At Bedtime 90 tablet 0     multivitamin (ONE-DAILY) tablet Take 1 tablet by mouth every morning  90 tablet 3     omeprazole (PRILOSEC) 40 MG DR capsule TAKE 1 CAPSULE BY MOUTH ONE TIME DAILY 30 capsule 11     sacubitril-valsartan  "(ENTRESTO)  MG per tablet Take 1 tablet by mouth 2 times daily 180 tablet 3     spironolactone (ALDACTONE) 25 MG tablet Take 1 tablet (25 mg) by mouth daily 90 tablet 3     umeclidinium (INCRUSE ELLIPTA) 62.5 MCG/INH inhaler Inhale 1 puff into the lungs daily 30 each 11       Allergies   Allergen Reactions     Cat Hair Extract Itching     itchy tearful eyes     Adhesive Tape Rash     Iodine Rash       REVIEW OF SYSTEMS:  CONSTITUTIONAL:  NEGATIVE for fever, chills, change in weight  INTEGUMENTARY/SKIN:  NEGATIVE for worrisome rashes, moles or lesions  EYES:  NEGATIVE for vision changes or irritation  ENT/MOUTH:  NEGATIVE for ear, mouth and throat problems  RESP: SOB  BREAST:  NEGATIVE for masses, tenderness or discharge  CV:  Hypertension, Irregular heartbeats   GI:  NEGATIVE for nausea, abdominal pain, heartburn, or change in bowel habits  :  Negative   MUSCULOSKELETAL:  See HPI above  NEURO:  NEGATIVE for weakness, dizziness or paresthesias  ENDOCRINE:  NEGATIVE for temperature intolerance, skin/hair changes  HEME/ALLERGY/IMMUNE:  NEGATIVE for bleeding problems  PSYCHIATRIC:  NEGATIVE for changes in mood or affect      PHYSICAL EXAM:  /72   Ht 1.788 m (5' 10.38\")   Wt 122 kg (269 lb)   BMI 38.18 kg/m    Body mass index is 38.18 kg/m .   GENERAL APPEARANCE: healthy, alert and no distress   HEENT: No apparent thyroid megaly. Clear sclera with normal ocular movement  RESPIRATORY: No labored breathing  SKIN: no suspicious lesions or rashes  NEURO: Normal strength and tone, mentation intact and speech normal  VASCULAR: Good pulses, and capillary refill   LYMPH: no lymphadenopathy   PSYCH:  mentation appears normal and affect normal/bright    MUSCULOSKELETAL:  Not in acute distress  No difficulty getting up from sitting  He walks with a slight shuffling gait pattern  No specific limping noted     Range of motion is well-maintained and pain-free  Hip abduction and flexion strength is within normal " limits  No peripheral swelling or ecchymosis    Both knees with incisions for total knee arthroplasty from about the same time as the hip replacement    Circulation is intact  Sensations intact     ASSESSMENT:  Left hip dislocation, first time in 10 years  Status post total hip arthroplasty from 10 years ago    PLAN:  Since this is an unexpected event related to slipping no further intervention was felt to be necessary at this point.  However if this happens over and over especially related to day-to-day activities, additional surgical intervention may need to be considered in the future.    For now, avoiding extreme range of motion is recommended.  No bracing or other specific physical therapy was felt to be necessary.  X-rays from the emergency room were visualized.      All the questions were answered.  Follow-up as needed.    Imaging Interpretation:     Recent Results (from the past 744 hour(s))   XR Pelvis w Hip Left 1 View    Narrative    PELVIS AND HIP LEFT ONE VIEW October 31, 2022 12:06 PM     INDICATION: Left-sided hip pain.     COMPARISON: None.      Impression    IMPRESSION:  1.  Posteriorly dislocated left total hip arthroplasty.  2.  No fracture.  3.  Right total hip arthroplasty, intact.  4.  Degenerative changes in the lower lumbar spine.    ISRAEL GUADARRAMA MD         SYSTEM ID:  [id number]   XR Hip Port Left G/E 2 Views    Narrative    XR HIP PORTABLE LEFT 2-3 VIEWS   10/31/2022 6:46 PM     HISTORY: intra op, s/p closed reduction  COMPARISON: 10/31/2022       Impression    IMPRESSION: The previously seen left hip arthroplasty has been  reduced. No fracture is seen.    ANGELA SCHMITT MD         SYSTEM ID:  [id number]         Miguelito Luna MD  Department of Orthopedic Surgery        Disclaimer: This note consists of symbols derived from keyboarding, dictation and/or voice recognition software. As a result, there may be errors in the script that have gone undetected. Please consider this when interpreting  information found in this chart.

## 2022-11-20 ENCOUNTER — MYC MEDICAL ADVICE (OUTPATIENT)
Dept: CARDIOLOGY | Facility: CLINIC | Age: 67
End: 2022-11-20

## 2022-11-20 DIAGNOSIS — R06.09 DOE (DYSPNEA ON EXERTION): ICD-10-CM

## 2022-11-20 DIAGNOSIS — I50.22 CHRONIC SYSTOLIC HEART FAILURE (H): ICD-10-CM

## 2022-11-25 ENCOUNTER — VIRTUAL VISIT (OUTPATIENT)
Dept: FAMILY MEDICINE | Facility: CLINIC | Age: 67
End: 2022-11-25
Payer: MEDICARE

## 2022-11-25 DIAGNOSIS — I10 HYPERTENSION, UNSPECIFIED TYPE: ICD-10-CM

## 2022-11-25 DIAGNOSIS — U07.1 INFECTION DUE TO 2019 NOVEL CORONAVIRUS: Primary | ICD-10-CM

## 2022-11-25 DIAGNOSIS — J45.909 MODERATE ASTHMA WITHOUT COMPLICATION, UNSPECIFIED WHETHER PERSISTENT: ICD-10-CM

## 2022-11-25 DIAGNOSIS — E66.01 MORBID OBESITY (H): ICD-10-CM

## 2022-11-25 DIAGNOSIS — E78.00 HYPERCHOLESTEREMIA: ICD-10-CM

## 2022-11-25 DIAGNOSIS — R05.1 ACUTE COUGH: ICD-10-CM

## 2022-11-25 DIAGNOSIS — Z79.899 ENCOUNTER FOR DRUG THERAPY: ICD-10-CM

## 2022-11-25 DIAGNOSIS — D86.0 PULMONARY SARCOIDOSIS (H): ICD-10-CM

## 2022-11-25 DIAGNOSIS — I27.20 PULMONARY HYPERTENSION (H): ICD-10-CM

## 2022-11-25 PROCEDURE — 99215 OFFICE O/P EST HI 40 MIN: CPT | Mod: CS | Performed by: INTERNAL MEDICINE

## 2022-11-25 RX ORDER — BENZONATATE 100 MG/1
100 CAPSULE ORAL 3 TIMES DAILY PRN
Qty: 30 CAPSULE | Refills: 0 | Status: SHIPPED | OUTPATIENT
Start: 2022-11-25 | End: 2023-05-08

## 2022-11-25 NOTE — PROGRESS NOTES
Derek is a 66 year old who is being evaluated via a billable video visit.      How would you like to obtain your AVS? MyChart  If the video visit is dropped, the invitation should be resent by: Text to cell phone: [phone]  Will anyone else be joining your video visit? Yes: will be using the same computer.. How would they like to receive their invitation? Text to cell phone: [phone]          Assessment & Plan   Problem List Items Addressed This Visit        Respiratory    Pulmonary sarcoidosis (H)    Moderate asthma without complication       Digestive    Morbid obesity (H)       Endocrine    Hypercholesteremia       Circulatory    Hypertension    Pulmonary hypertension (H)   Other Visit Diagnoses     Infection due to 2019 novel coronavirus    -  Primary    start on Paxlovid Tx, discussed Drug-drug interaction, and se    Acute cough        Relevant Medications    benzonatate (TESSALON) 100 MG capsule    Encounter for drug therapy        MONITORING FOR DRUG TOXICITY BEING STARTED ON PAXLOVID      Discussed starting on Paxlovid.  Patient in agreement with plan.  Discussed side effects and drug drug interaction with Paxlovid, hold atorvastatin for 8 days, will forward to MTM for further advice.  Being treated by Mucinex DM ,advised will send benzonatate as well, continue watching for any increased or worsening cough , shortness of breath, difficulty breathing, pleuritic chest pain or if any such occur to seek immediate medical attention.  He reports his pulse ox is good.  He is on inhalers as has history of asthma as well as CHF being treated with Entresto and beta-blocker.  Advised to monitor blood pressure while on treatment.  Patient does not look sick or toxic looking or in any distress during the visit.  Patient is up-to-date with his COVID vaccines.advised to contact his cardiologist given his underlying cardiac condition and infection with COVID and being started on paxlovid  Multiple cardiovascular risk  "factors, discussed with MTM who concurred with plan and monitor for risk of hypotenson         BMI:   Estimated body mass index is 38.18 kg/m  as calculated from the following:    Height as of 11/16/22: 1.788 m (5' 10.38\").    Weight as of 11/16/22: 122 kg (269 lb).       See Patient Instructions    Return in 3 days (on 11/28/2022), or if symptoms worsen or fail to improve, for As needed and if symptoms worsen, other, COVID infection.    Min Wu MD  [facility]  Total time spent was 40 minutes review of records, including muyltipel specialty notes, tests and imaging done and labs, and and virtual visit medications review and coordinating care with MTM.    Flavio Reed is a 66 year old presenting for the following health issues:  Covid Concern (Tested positive on 11/24/2022.)      HPI       COVID-19 Symptom Review  How many days ago did these symptoms start? 11/23/2022    Are any of the following symptoms significant for you?    New or worsening difficulty breathing? No    Worsening cough? Yes, I am coughing up mucus.    Fever or chills? No    Headache: No    Sore throat: No    Chest pain: No    Diarrhea: No    Body aches? No    What treatments has patient tried? Guaifenesin (mucinex) and Acetaminophen   Does patient live in a nursing home, group home, or shelter? No  Does patient have a way to get food/medications during quarantined? Yes, I have a friend or family member who can help me.          Wednesday tested positive,     Wednesday cam down with symptoms    Though had a cold,     Positive on Thursday for COVID      Sx's achiness, nose congestion, tired    No breathing issues,     No fever    UTD ON VACCINE, Booster in October this year    Is treated for heart failure    Reports O2 is good, no SOB    Using Inhaler for Asthma, no sx's currently of asthma exacerbation    Using mucinex DM for cough    Review of Systems   Constitutional, HEENT, cardiovascular, pulmonary, gi and gu " systems are negative, except as otherwise noted.      Objective           Vitals:  No vitals were obtained today due to virtual visit.    Physical Exam   GENERAL: Healthy, alert and no distress  EYES: Eyes grossly normal to inspection.  No discharge or erythema, or obvious scleral/conjunctival abnormalities.  RESP: No audible wheeze, cough, or visible cyanosis.  No visible retractions or increased work of breathing.    SKIN: Visible skin clear. No significant rash, abnormal pigmentation or lesions.  NEURO: Cranial nerves grossly intact.  Mentation and speech appropriate for age.  PSYCH: Mentation appears normal, affect normal/bright, judgement and insight intact, normal speech and appearance well-groomed.    Orders Only on 11/14/2022   Component Date Value Ref Range Status     6 min walk (FT) 11/14/2022 990  1,383 ft Final     6 Min Walk (M) 11/14/2022 302  421 m Final     FIO2-Pre 11/14/2022 21.00  % Final               Video-Visit Details    Video Start Time: 9:43 AM    Type of service:  Video Visit    Video End Time:9:58 AM    Originating Location (pt. Location): Home        Distant Location (provider location):  On-site    Platform used for Video Visit: WalkHub

## 2022-12-04 ENCOUNTER — TELEPHONE (OUTPATIENT)
Dept: FAMILY MEDICINE | Facility: CLINIC | Age: 67
End: 2022-12-04

## 2022-12-05 ENCOUNTER — LAB (OUTPATIENT)
Dept: LAB | Facility: CLINIC | Age: 67
End: 2022-12-05
Payer: MEDICARE

## 2022-12-05 DIAGNOSIS — I50.22 CHRONIC SYSTOLIC HEART FAILURE (H): ICD-10-CM

## 2022-12-05 LAB
ALBUMIN SERPL BCG-MCNC: 3.9 G/DL (ref 3.5–5.2)
ALP SERPL-CCNC: 97 U/L (ref 40–129)
ALT SERPL W P-5'-P-CCNC: 57 U/L (ref 10–50)
ANION GAP SERPL CALCULATED.3IONS-SCNC: 12 MMOL/L (ref 7–15)
AST SERPL W P-5'-P-CCNC: 41 U/L (ref 10–50)
BILIRUB SERPL-MCNC: 0.4 MG/DL
BUN SERPL-MCNC: 32.5 MG/DL (ref 8–23)
CALCIUM SERPL-MCNC: 9.1 MG/DL (ref 8.8–10.2)
CHLORIDE SERPL-SCNC: 101 MMOL/L (ref 98–107)
CREAT SERPL-MCNC: 1.3 MG/DL (ref 0.67–1.17)
DEPRECATED HCO3 PLAS-SCNC: 25 MMOL/L (ref 22–29)
ERYTHROCYTE [DISTWIDTH] IN BLOOD BY AUTOMATED COUNT: 13.3 % (ref 10–15)
GFR SERPL CREATININE-BSD FRML MDRD: 60 ML/MIN/1.73M2
GLUCOSE SERPL-MCNC: 103 MG/DL (ref 70–99)
HCT VFR BLD AUTO: 48.7 % (ref 40–53)
HGB BLD-MCNC: 15.4 G/DL (ref 13.3–17.7)
MCH RBC QN AUTO: 29.4 PG (ref 26.5–33)
MCHC RBC AUTO-ENTMCNC: 31.6 G/DL (ref 31.5–36.5)
MCV RBC AUTO: 93 FL (ref 78–100)
NT-PROBNP SERPL-MCNC: 37 PG/ML (ref 0–900)
PLATELET # BLD AUTO: 205 10E3/UL (ref 150–450)
POTASSIUM SERPL-SCNC: 4.7 MMOL/L (ref 3.4–5.3)
PROT SERPL-MCNC: 7.4 G/DL (ref 6.4–8.3)
RBC # BLD AUTO: 5.23 10E6/UL (ref 4.4–5.9)
SODIUM SERPL-SCNC: 138 MMOL/L (ref 136–145)
WBC # BLD AUTO: 9.3 10E3/UL (ref 4–11)

## 2022-12-05 PROCEDURE — 80053 COMPREHEN METABOLIC PANEL: CPT

## 2022-12-05 PROCEDURE — 85027 COMPLETE CBC AUTOMATED: CPT

## 2022-12-05 PROCEDURE — 83880 ASSAY OF NATRIURETIC PEPTIDE: CPT

## 2022-12-05 PROCEDURE — 36415 COLL VENOUS BLD VENIPUNCTURE: CPT

## 2022-12-05 NOTE — TELEPHONE ENCOUNTER
Message  Received: 1 week ago  June Monreal PharmD Sayegh, Kamil Nadim, MD  There is a potential for hypotension with Entresto - no dose adjustment recommended unless hypotension occurs.  No other issues other than the atorvastatin you mentioned based on his current medication list in Epic.   June Monreal PharmD, [facility]   Medication Therapy Management Provider   Pager: [phone]           Previous Messages     ----- Message -----   From: Min Wu MD   Sent: 11/25/2022   9:50 AM CST   To: Kadie Sanders, please can you review if anything else need to be stopped other than atorvastatin     Started pt in Paxlovid     Thank you,   Min

## 2023-01-03 DIAGNOSIS — E78.00 HYPERCHOLESTEREMIA: ICD-10-CM

## 2023-01-04 RX ORDER — ATORVASTATIN CALCIUM 40 MG/1
TABLET, FILM COATED ORAL
Qty: 90 TABLET | Refills: 0 | Status: SHIPPED | OUTPATIENT
Start: 2023-01-04 | End: 2023-04-12

## 2023-01-04 NOTE — TELEPHONE ENCOUNTER
Prescription approved per [facility] Refill Protocol.  Manisha ROBERTS RN, BSN

## 2023-01-16 DIAGNOSIS — K21.9 GERD (GASTROESOPHAGEAL REFLUX DISEASE): ICD-10-CM

## 2023-01-16 RX ORDER — OMEPRAZOLE 40 MG/1
40 CAPSULE, DELAYED RELEASE ORAL DAILY
Qty: 30 CAPSULE | Refills: 11 | Status: SHIPPED | OUTPATIENT
Start: 2023-01-16 | End: 2023-09-13

## 2023-02-01 ENCOUNTER — OFFICE VISIT (OUTPATIENT)
Dept: PULMONOLOGY | Facility: CLINIC | Age: 68
End: 2023-02-01
Attending: PHYSICIAN ASSISTANT
Payer: MEDICARE

## 2023-02-01 ENCOUNTER — MYC MEDICAL ADVICE (OUTPATIENT)
Dept: INTERNAL MEDICINE | Facility: CLINIC | Age: 68
End: 2023-02-01

## 2023-02-01 VITALS
HEIGHT: 70 IN | SYSTOLIC BLOOD PRESSURE: 102 MMHG | OXYGEN SATURATION: 94 % | BODY MASS INDEX: 38.37 KG/M2 | HEART RATE: 59 BPM | DIASTOLIC BLOOD PRESSURE: 65 MMHG | WEIGHT: 268 LBS

## 2023-02-01 DIAGNOSIS — D86.9 SARCOIDOSIS: Primary | ICD-10-CM

## 2023-02-01 DIAGNOSIS — D86.9 SARCOIDOSIS: ICD-10-CM

## 2023-02-01 DIAGNOSIS — E55.9 VITAMIN D DEFICIENCY, UNSPECIFIED: ICD-10-CM

## 2023-02-01 LAB
DLCOUNC-%PRED-PRE: 54 %
DLCOUNC-PRE: 15.15 ML/MIN/MMHG
DLCOUNC-PRED: 27.9 ML/MIN/MMHG
ERV-%PRED-PRE: 130 %
ERV-PRE: 1.02 L
ERV-PRED: 0.78 L
EXPTIME-PRE: 6.82 SEC
FEF2575-%PRED-PRE: 67 %
FEF2575-PRE: 1.71 L/SEC
FEF2575-PRED: 2.54 L/SEC
FEFMAX-%PRED-PRE: 58 %
FEFMAX-PRE: 5.23 L/SEC
FEFMAX-PRED: 9.01 L/SEC
FEV1-%PRED-PRE: 57 %
FEV1-PRE: 1.87 L
FEV1FEV6-PRE: 81 %
FEV1FEV6-PRED: 78 %
FEV1FVC-PRE: 81 %
FEV1FVC-PRED: 77 %
FEV1SVC-PRE: 71 %
FEV1SVC-PRED: 63 %
FIFMAX-PRE: 1.93 L/SEC
FRCPLETH-%PRED-PRE: 77 %
FRCPLETH-PRE: 2.96 L
FRCPLETH-PRED: 3.8 L
FVC-%PRED-PRE: 54 %
FVC-PRE: 2.32 L
FVC-PRED: 4.27 L
IC-%PRED-PRE: 36 %
IC-PRE: 1.62 L
IC-PRED: 4.37 L
RVPLETH-%PRED-PRE: 72 %
RVPLETH-PRE: 1.94 L
RVPLETH-PRED: 2.66 L
TLCPLETH-%PRED-PRE: 60 %
TLCPLETH-PRE: 4.58 L
TLCPLETH-PRED: 7.53 L
VA-%PRED-PRE: 45 %
VA-PRE: 3.14 L
VC-%PRED-PRE: 51 %
VC-PRE: 2.64 L
VC-PRED: 5.16 L

## 2023-02-01 PROCEDURE — 99214 OFFICE O/P EST MOD 30 MIN: CPT | Mod: 25 | Performed by: INTERNAL MEDICINE

## 2023-02-01 PROCEDURE — G0463 HOSPITAL OUTPT CLINIC VISIT: HCPCS | Performed by: INTERNAL MEDICINE

## 2023-02-01 PROCEDURE — 94726 PLETHYSMOGRAPHY LUNG VOLUMES: CPT | Performed by: INTERNAL MEDICINE

## 2023-02-01 PROCEDURE — 94375 RESPIRATORY FLOW VOLUME LOOP: CPT | Performed by: INTERNAL MEDICINE

## 2023-02-01 PROCEDURE — 94729 DIFFUSING CAPACITY: CPT | Performed by: INTERNAL MEDICINE

## 2023-02-01 ASSESSMENT — PAIN SCALES - GENERAL: PAINLEVEL: NO PAIN (0)

## 2023-02-01 NOTE — PROGRESS NOTES
Reason for Visit  Derek Contreras is a 67 year old year old male who is being seen for sarcoidosis.  Pulmonary HPI    The patient was seen and examined by Jamie Martin MD     Mr. Contreras comes in for followup of his sarcoidosis.  He was last seen in the Pulmonary Clinic by me on 08/08/2022.  At that time, he was not on any systemic anti-inflammatory therapy.  He does have pulmonary hypertension for which he follows with Dr. Hirsch and has been seen by him in the interim.  There is concern that he may have hypoxia and that he could benefit with oxygen supplementation, especially with activity.  He was also found to have frequent PVCs on ambulatory EKG monitoring.  He has been followed by Dr. Stanford with EP.      Today he tells me that he has no new symptoms.  He does have a cough, which is unchanged and he produces a small amount of sputum.  Denies any fatigue.  No nausea, vomiting, diarrhea.  No tingling.  No eye symptoms.    He had COVID around Thanksgiving with mild symptoms.  He received Paxlovid for it.    Current Outpatient Medications   Medication     acetaminophen (TYLENOL) 500 MG tablet     albuterol (PROAIR HFA/PROVENTIL HFA/VENTOLIN HFA) 108 (90 Base) MCG/ACT inhaler     aspirin (ASA) 81 MG tablet     atorvastatin (LIPITOR) 40 MG tablet     benzonatate (TESSALON) 100 MG capsule     cetirizine (ZYRTEC) 10 MG tablet     empagliflozin (JARDIANCE) 10 MG TABS tablet     fluticasone-vilanterol (BREO ELLIPTA) 200-25 MCG/ACT inhaler     hydrochlorothiazide (HYDRODIURIL) 12.5 MG tablet     metoprolol succinate ER (TOPROL-XL) 100 MG 24 hr tablet     montelukast (SINGULAIR) 10 MG tablet     multivitamin (ONE-DAILY) tablet     omeprazole (PRILOSEC) 40 MG DR capsule     sacubitril-valsartan (ENTRESTO)  MG per tablet     spironolactone (ALDACTONE) 25 MG tablet     diphenhydrAMINE (BENADRYL) 25 MG tablet     montelukast (SINGULAIR) 10 MG tablet     umeclidinium (INCRUSE ELLIPTA) 62.5 MCG/INH inhaler      No current facility-administered medications for this visit.     Allergies   Allergen Reactions     Cat Hair Extract Itching     itchy tearful eyes     Adhesive Tape Rash     Iodine Rash     Past Medical History:   Diagnosis Date     Asthma      Claustrophobia      CPAP (continuous positive airway pressure) dependence      Dyslipidemia      [place] (hard of hearing)      Hypercholesteremia      Hypertension      Iron deficiency      Mediastinal adenopathy      Obesity      BEULAH (obstructive sleep apnea)      Prostate cancer (H)      Pulmonary hypertension (H)      Sarcoidosis        Past Surgical History:   Procedure Laterality Date     APPENDECTOMY       BIOPSY MASS NECK Left 7/15/2020    Procedure: Left trapezius muscle biopsy;  Surgeon: Ade Villa MD;  Location: [facility] OR     CLOSED REDUCTION HIP Left 10/31/2022    Procedure: Closed reduction left total hip arthroplasty;  Surgeon: Antonio Ann MD;  Location:  OR     CV RIGHT HEART CATH MEASUREMENTS RECORDED N/A 12/11/2019    Procedure: CV RIGHT HEART CATH;  Surgeon: Torrey Hirsch MD;  Location:  HEART CARDIAC CATH LAB     CV RIGHT HEART CATH MEASUREMENTS RECORDED N/A 1/19/2022    Procedure: CV RIGHT HEART CATH;  Surgeon: Torrey Hirsch MD;  Location:  HEART CARDIAC CATH LAB     CV RIGHT HEART CATH MEASUREMENTS RECORDED N/A 11/4/2022    Procedure: Heart Cath Right Heart Cath;  Surgeon: Torrey Hirsch MD;  Location:  HEART CARDIAC CATH LAB     DAVINCI PROSTATECTOMY, LYMPHADENECTOMY N/A 5/23/2019    Procedure: ROBOTIC ASSISTED LAPAROSCOPIC RADICAL PROSTATECTOMY WITH BILATERAL PELVIC LYMPH NODE DISSECTION;  Surgeon: Matteo Coughlin MD;  Location:  OR     ENDOBRONCHIAL ULTRASOUND FLEXIBLE N/A 3/29/2019    Procedure: Flexible Bronchoscopy, Endobronchial Ultrasound;  Surgeon: Curtis Leger MD;  Location: [facility] OR     VASECTOMY       [facility] TOTAL HIP ARTHROPLASTY Bilateral      [facility] TOTAL KNEE ARTHROPLASTY  "Bilateral        Social History     Socioeconomic History     Marital status:      Spouse name: Not on file     Number of children: Not on file     Years of education: Not on file     Highest education level: Not on file   Occupational History     Not on file   Tobacco Use     Smoking status: Never     Smokeless tobacco: Never   Vaping Use     Vaping Use: Never used   Substance and Sexual Activity     Alcohol use: Yes     Comment: once a week     Drug use: No     Sexual activity: Yes     Partners: Female   Other Topics Concern     Parent/sibling w/ CABG, MI or angioplasty before 65F 55M? Not Asked   Social History Narrative    12/03/20         ENVIRONMENTAL HISTORY: [place] The home is heated with a gas furnace. They does have central air conditioning. The patient's bedroom is furnished with Indoor plants and carpeting in bedroom.  Pets inside the house include 0 pets. There is no history of cockroach or mice infestation. There is/are 0 smokers in the house.  The house does not have a damp basement.      Social Determinants of Health     Financial Resource Strain: Not on file   Food Insecurity: Not on file   Transportation Needs: Not on file   Physical Activity: Not on file   Stress: Not on file   Social Connections: Not on file   Intimate Partner Violence: Not At Risk     Fear of Current or Ex-Partner: No     Emotionally Abused: No     Physically Abused: No     Sexually Abused: No   Housing Stability: Not on file       ROS Pulmonary  A complete ROS was otherwise negative except as noted in the HPI.  /65 (BP Location: Right arm, Patient Position: Sitting, Cuff Size: Adult Large)   Pulse 59   Ht 1.778 m (5' 10\")   Wt 121.6 kg (268 lb)   SpO2 94%   BMI 38.45 kg/m    Exam:   GENERAL APPEARANCE: Alert, and in no apparent distress.  EYES: PERRL, EOMI  HENT: Nasal mucosa with no edema and no hyperemia.   MOUTH: Oral mucosa is moist, without any lesions, no " tonsillar enlargement, no oropharyngeal exudate.  NECK: supple, no masses, no thyromegaly.  LYMPHATICS: No significant axillary, cervical, or supraclavicular nodes.  RESP: normal percussion, good air flow throughout.  No crackles. No rhonchi. No wheezes.  CV: Normal S1, S2, regular rhythm, normal rate. No murmur.  No rub. No gallop. No LE edema.   MS: extremities normal. No clubbing. No cyanosis.  SKIN: no rash on limited exam  NEURO: Mentation intact, speech normal, normal strength and tone, normal gait and stance    Results:  PFTs done today were reviewed by me with the patient.  FVC 2.32 (54%), Z score negative 3.13.  FEV1 1.87 (57%), Z score negative 2.49.  The ratio is 81.  Total lung capacity 4.58 (60%), Z score negative 4.22.  DLCO not corrected for hemoglobin  15.15 (54%), Z score negative 3.14.  My interpretation is that he has moderate restriction and moderately decreased diffusion capacity.  In comparison to prior spirometry in August, no significant change in FVC, but DLCO and total lung capacity are lower today.  This could be because of inability to inhale more than 90% of the vital capacity.    Assessment and plan: 67 year-old male with history of HTN, HLD, obesity, prostate cancer, abnormal chest imaging with TBNA (3/29/2019) demonstrating granulomatous Inflammation (station 7 and 12 L lymph nodes) and  BAL demonstrating 102 white cells and 11% lymphocytes.  The CD4 CD8 ratio was 2.29.  Repeat cardiac MRI with no LGE and cPET with no myocardiac uptake to suggest cardiac sarcoidosis but abnormal EF of 41%. RV dilatation and mild pulmonary hypertension based on right heart cath with mean PAP 27 mm Hg (1/22). Concern for  truncal/diaphragmatic weakness but trapezius biopsy in June 2020 without any convincing evidence.  1.  Pulmonary:  Restrictive lung disease in absence of parenchymal involvement.  No clear evidence of neuromuscular weakness.  FVC is stable, but total lung capacity is lower today.  This  could be because of technical problems.  We will continue to monitor.  2.  Extrapulmonary:  Frequent PVCs on ambulatory EKG monitoring, but no evidence of myocardial inflammation.  No ocular inflammation, but due for eye exam.  We will check lab studies today, including vitamin D/calcium.  Doing O2 titration study to see if he needs oxygen with activity, which could help his pulmonary hypertension.  3.  COVID-19 in 11/2022.  Minimal symptoms.  Received Paxlovid.  Vaccinations up to date at this point.  4.  Obesity:  Following a regular exercise program where he walks up to 30 minutes most days of the week.  Tolerating well.    I will see him back in 6 months.  O2 titration study and labs are being ordered today.    Addendum: 6-minute walk test was performed.  The 6-minute walk distance is below the lower limit of normal at 104 5 feet ( 2 feet).  O2 saturation at the beginning of the walk is 97 and the lowest O2 saturation is 90%.This is leaky secondary to his pulmonary HTN.  Other lab studies reviewed.  Electrolyte panel is in the normal range.  LFTs are in the normal range.  Vitamin D, 125 dihydroxy vitamin D and calcium are in the normal range.  PTH is in the normal range.  Soluble IL-2 and ACE are in the normal range.  He follows with sleep providers, and it will be critical to make sure that he does not have any nocturnal desaturations.  This note consists of symbols derived from keyboarding, dictation and/or voice recognition software. As a result, there may be errors in the script that have gone undetected. Please consider this when interpreting information found in this chart

## 2023-02-01 NOTE — NURSING NOTE
Chief Complaint   Patient presents with     Interstitial Lung Disease (ILD)     ILD Follow up      Vitals were taken and medications were reconciled.     Keisha Hernandez RMA  7:56 AM

## 2023-02-01 NOTE — LETTER
2/1/2023         RE: Derek Contreras  [address] [facility] [address]        Dear Colleague,    Thank you for referring your patient, Derek Contreras, to the [facility] [place]. Please see a copy of my visit note below.    Reason for Visit  Derek Contreras is a 67 year old year old male who is being seen for sarcoidosis.  Pulmonary HPI    The patient was seen and examined by Jamie Martin MD     Mr. Contreras comes in for followup of his sarcoidosis.  He was last seen in the Pulmonary Clinic by me on 08/08/2022.  At that time, he was not on any systemic anti-inflammatory therapy.  He does have pulmonary hypertension for which he follows with Dr. Hirsch and has been seen by him in the interim.  There is concern that he may have hypoxia and that he could benefit with oxygen supplementation, especially with activity.  He was also found to have frequent PVCs on ambulatory EKG monitoring.  He has been followed by Dr. Stanford with EP.      Today he tells me that he has no new symptoms.  He does have a cough, which is unchanged and he produces a small amount of sputum.  Denies any fatigue.  No nausea, vomiting, diarrhea.  No tingling.  No eye symptoms.    He had COVID around Thanksgiving with mild symptoms.  He received Paxlovid for it.    Current Outpatient Medications   Medication     acetaminophen (TYLENOL) 500 MG tablet     albuterol (PROAIR HFA/PROVENTIL HFA/VENTOLIN HFA) 108 (90 Base) MCG/ACT inhaler     aspirin (ASA) 81 MG tablet     atorvastatin (LIPITOR) 40 MG tablet     benzonatate (TESSALON) 100 MG capsule     cetirizine (ZYRTEC) 10 MG tablet     empagliflozin (JARDIANCE) 10 MG TABS tablet     fluticasone-vilanterol (BREO ELLIPTA) 200-25 MCG/ACT inhaler     hydrochlorothiazide (HYDRODIURIL) 12.5 MG tablet     metoprolol succinate ER (TOPROL-XL) 100 MG 24 hr tablet     montelukast (SINGULAIR) 10 MG tablet     multivitamin  (ONE-DAILY) tablet     omeprazole (PRILOSEC) 40 MG DR capsule     sacubitril-valsartan (ENTRESTO)  MG per tablet     spironolactone (ALDACTONE) 25 MG tablet     diphenhydrAMINE (BENADRYL) 25 MG tablet     montelukast (SINGULAIR) 10 MG tablet     umeclidinium (INCRUSE ELLIPTA) 62.5 MCG/INH inhaler     No current facility-administered medications for this visit.     Allergies   Allergen Reactions     Cat Hair Extract Itching     itchy tearful eyes     Adhesive Tape Rash     Iodine Rash     Past Medical History:   Diagnosis Date     Asthma      Claustrophobia      CPAP (continuous positive airway pressure) dependence      Dyslipidemia      [place] (hard of hearing)      Hypercholesteremia      Hypertension      Iron deficiency      Mediastinal adenopathy      Obesity      BEULAH (obstructive sleep apnea)      Prostate cancer (H)      Pulmonary hypertension (H)      Sarcoidosis        Past Surgical History:   Procedure Laterality Date     APPENDECTOMY       BIOPSY MASS NECK Left 7/15/2020    Procedure: Left trapezius muscle biopsy;  Surgeon: Ade Villa MD;  [facility]     CLOSED REDUCTION HIP Left 10/31/2022    Procedure: Closed reduction left total hip arthroplasty;  Surgeon: Antonio Ann MD;  Location: [facility] OR     CV RIGHT HEART CATH MEASUREMENTS RECORDED N/A 12/11/2019    Procedure: CV RIGHT HEART CATH;  Surgeon: Torrey Hirsch MD;  [facility]     CV RIGHT HEART CATH MEASUREMENTS RECORDED N/A 1/19/2022    Procedure: CV RIGHT HEART CATH;  Surgeon: Torrey Hirsch MD;  [facility]     CV RIGHT HEART CATH MEASUREMENTS RECORDED N/A 11/4/2022    Procedure: Heart Cath Right Heart Cath;  Surgeon: Torrey Hirsch MD;  [facility]     DAVINCI PROSTATECTOMY, LYMPHADENECTOMY N/A 5/23/2019    Procedure: ROBOTIC ASSISTED LAPAROSCOPIC RADICAL PROSTATECTOMY WITH BILATERAL PELVIC LYMPH NODE DISSECTION;  Surgeon:  Matteo Coughlin MD;  Location: [facility] OR     ENDOBRONCHIAL ULTRASOUND FLEXIBLE N/A 3/29/2019    Procedure: Flexible Bronchoscopy, Endobronchial Ultrasound;  Surgeon: Curtis Leger MD;  [facility]     VASECTOMY       ZZC TOTAL HIP ARTHROPLASTY Bilateral      ZZC TOTAL KNEE ARTHROPLASTY Bilateral        Social History     Socioeconomic History     Marital status:      Spouse name: Not on file     Number of children: Not on file     Years of education: Not on file     Highest education level: Not on file   Occupational History     Not on file   Tobacco Use     Smoking status: Never     Smokeless tobacco: Never   Vaping Use     Vaping Use: Never used   Substance and Sexual Activity     Alcohol use: Yes     Comment: once a week     Drug use: No     Sexual activity: Yes     Partners: Female   Other Topics Concern     Parent/sibling w/ CABG, MI or angioplasty before 65F 55M? Not Asked   Social History Narrative    12/03/20         ENVIRONMENTAL HISTORY: [place] The home is heated with a gas furnace. They does have central air conditioning. The patient's bedroom is furnished with Indoor plants and carpeting in bedroom.  Pets inside the house include 0 pets. There is no history of cockroach or mice infestation. There is/are 0 smokers in the house.  The house does not have a damp basement.      Social Determinants of Health     Financial Resource Strain: Not on file   Food Insecurity: Not on file   Transportation Needs: Not on file   Physical Activity: Not on file   Stress: Not on file   Social Connections: Not on file   Intimate Partner Violence: Not At Risk     Fear of Current or Ex-Partner: No     Emotionally Abused: No     Physically Abused: No     Sexually Abused: No   Housing Stability: Not on file       ROS Pulmonary  A complete ROS was otherwise negative except as noted in the HPI.  /65 (BP Location: Right arm, Patient Position: Sitting, Cuff  "Size: Adult Large)   Pulse 59   Ht 1.778 m (5' 10\")   Wt 121.6 kg (268 lb)   SpO2 94%   BMI 38.45 kg/m    Exam:   GENERAL APPEARANCE: Alert, and in no apparent distress.  EYES: PERRL, EOMI  HENT: Nasal mucosa with no edema and no hyperemia.   MOUTH: Oral mucosa is moist, without any lesions, no tonsillar enlargement, no oropharyngeal exudate.  NECK: supple, no masses, no thyromegaly.  LYMPHATICS: No significant axillary, cervical, or supraclavicular nodes.  RESP: normal percussion, good air flow throughout.  No crackles. No rhonchi. No wheezes.  CV: Normal S1, S2, regular rhythm, normal rate. No murmur.  No rub. No gallop. No LE edema.   MS: extremities normal. No clubbing. No cyanosis.  SKIN: no rash on limited exam  NEURO: Mentation intact, speech normal, normal strength and tone, normal gait and stance    Results:  PFTs done today were reviewed by me with the patient.  FVC 2.32 (54%), Z score negative 3.13.  FEV1 1.87 (57%), Z score negative 2.49.  The ratio is 81.  Total lung capacity 4.58 (60%), Z score negative 4.22.  DLCO not corrected for hemoglobin  15.15 (54%), Z score negative 3.14.  My interpretation is that he has moderate restriction and moderately decreased diffusion capacity.  In comparison to prior spirometry in August, no significant change in FVC, but DLCO and total lung capacity are lower today.  This could be because of inability to inhale more than 90% of the vital capacity.    Assessment and plan: 67 year-old male with history of HTN, HLD, obesity, prostate cancer, abnormal chest imaging with TBNA (3/29/2019) demonstrating granulomatous Inflammation (station 7 and 12 L lymph nodes) and  BAL demonstrating 102 white cells and 11% lymphocytes.  The CD4 CD8 ratio was 2.29.  Repeat cardiac MRI with no LGE and cPET with no myocardiac uptake to suggest cardiac sarcoidosis but abnormal EF of 41%. RV dilatation and mild pulmonary hypertension based on right heart cath with mean PAP 27 mm Hg " (1/22). Concern for  truncal/diaphragmatic weakness but trapezius biopsy in June 2020 without any convincing evidence.  1.  Pulmonary:  Restrictive lung disease in absence of parenchymal involvement.  No clear evidence of neuromuscular weakness.  FVC is stable, but total lung capacity is lower today.  This could be because of technical problems.  We will continue to monitor.  2.  Extrapulmonary:  Frequent PVCs on ambulatory EKG monitoring, but no evidence of myocardial inflammation.  No ocular inflammation, but due for eye exam.  We will check lab studies today, including vitamin D/calcium.  Doing O2 titration study to see if he needs oxygen with activity, which could help his pulmonary hypertension.  3.  COVID-19 in 11/2022.  Minimal symptoms.  Received Paxlovid.  Vaccinations up to date at this point.  4.  Obesity:  Following a regular exercise program where he walks up to 30 minutes most days of the week.  Tolerating well.    I will see him back in 6 months.  O2 titration study and labs are being ordered today.    Addendum: 6-minute walk test was performed.  The 6-minute walk distance is below the lower limit of normal at 104 5 feet ( 2 feet).  O2 saturation at the beginning of the walk is 97 and the lowest O2 saturation is 90%.This is leaky secondary to his pulmonary HTN.  Other lab studies reviewed.  Electrolyte panel is in the normal range.  LFTs are in the normal range.  Vitamin D, 125 dihydroxy vitamin D and calcium are in the normal range.  PTH is in the normal range.  Soluble IL-2 and ACE are in the normal range.  He follows with sleep providers, and it will be critical to make sure that he does not have any nocturnal desaturations.  This note consists of symbols derived from keyboarding, dictation and/or voice recognition software. As a result, there may be errors in the script that have gone undetected. Please consider this when interpreting information found in this chart        Again, thank you  for allowing me to participate in the care of your patient.        Sincerely,        Jamie Martin MD

## 2023-02-02 ENCOUNTER — LAB (OUTPATIENT)
Dept: LAB | Facility: CLINIC | Age: 68
End: 2023-02-02
Payer: MEDICARE

## 2023-02-02 DIAGNOSIS — D86.9 SARCOIDOSIS: ICD-10-CM

## 2023-02-02 DIAGNOSIS — E55.9 VITAMIN D DEFICIENCY, UNSPECIFIED: ICD-10-CM

## 2023-02-02 LAB
1,25(OH)2D SERPL-MCNC: 45.1 PG/ML (ref 19.9–79.3)
6 MIN WALK (FT): 1045 FT
6 MIN WALK (M): 319 M
ALBUMIN SERPL BCG-MCNC: 4.1 G/DL (ref 3.5–5.2)
ALP SERPL-CCNC: 85 U/L (ref 40–129)
ALT SERPL W P-5'-P-CCNC: 25 U/L (ref 10–50)
ANION GAP SERPL CALCULATED.3IONS-SCNC: 10 MMOL/L (ref 7–15)
AST SERPL W P-5'-P-CCNC: 38 U/L (ref 10–50)
BASOPHILS # BLD AUTO: 0.1 10E3/UL (ref 0–0.2)
BASOPHILS NFR BLD AUTO: 1 %
BILIRUB SERPL-MCNC: 0.7 MG/DL
BUN SERPL-MCNC: 17.6 MG/DL (ref 8–23)
CALCIUM SERPL-MCNC: 9.8 MG/DL (ref 8.8–10.2)
CHLORIDE SERPL-SCNC: 103 MMOL/L (ref 98–107)
CREAT SERPL-MCNC: 1.13 MG/DL (ref 0.67–1.17)
DEPRECATED HCO3 PLAS-SCNC: 25 MMOL/L (ref 22–29)
EOSINOPHIL # BLD AUTO: 0.2 10E3/UL (ref 0–0.7)
EOSINOPHIL NFR BLD AUTO: 2 %
ERYTHROCYTE [DISTWIDTH] IN BLOOD BY AUTOMATED COUNT: 13.3 % (ref 10–15)
GFR SERPL CREATININE-BSD FRML MDRD: 71 ML/MIN/1.73M2
GLUCOSE SERPL-MCNC: 110 MG/DL (ref 70–99)
HCT VFR BLD AUTO: 48.9 % (ref 40–53)
HGB BLD-MCNC: 16.2 G/DL (ref 13.3–17.7)
IMM GRANULOCYTES # BLD: 0 10E3/UL
IMM GRANULOCYTES NFR BLD: 0 %
LYMPHOCYTES # BLD AUTO: 1.2 10E3/UL (ref 0.8–5.3)
LYMPHOCYTES NFR BLD AUTO: 16 %
MCH RBC QN AUTO: 30.2 PG (ref 26.5–33)
MCHC RBC AUTO-ENTMCNC: 33.1 G/DL (ref 31.5–36.5)
MCV RBC AUTO: 91 FL (ref 78–100)
MONOCYTES # BLD AUTO: 0.7 10E3/UL (ref 0–1.3)
MONOCYTES NFR BLD AUTO: 9 %
NEUTROPHILS # BLD AUTO: 5.5 10E3/UL (ref 1.6–8.3)
NEUTROPHILS NFR BLD AUTO: 72 %
NRBC # BLD AUTO: 0 10E3/UL
NRBC BLD AUTO-RTO: 0 /100
PLATELET # BLD AUTO: 166 10E3/UL (ref 150–450)
POTASSIUM SERPL-SCNC: 4.7 MMOL/L (ref 3.4–5.3)
PROT SERPL-MCNC: 7.6 G/DL (ref 6.4–8.3)
PTH-INTACT SERPL-MCNC: 32 PG/ML (ref 15–65)
RBC # BLD AUTO: 5.37 10E6/UL (ref 4.4–5.9)
SODIUM SERPL-SCNC: 138 MMOL/L (ref 136–145)
WBC # BLD AUTO: 7.7 10E3/UL (ref 4–11)

## 2023-02-02 PROCEDURE — 83970 ASSAY OF PARATHORMONE: CPT | Performed by: PATHOLOGY

## 2023-02-02 PROCEDURE — 82306 VITAMIN D 25 HYDROXY: CPT | Performed by: INTERNAL MEDICINE

## 2023-02-02 PROCEDURE — 83520 IMMUNOASSAY QUANT NOS NONAB: CPT | Performed by: INTERNAL MEDICINE

## 2023-02-02 PROCEDURE — 82164 ANGIOTENSIN I ENZYME TEST: CPT | Mod: 90 | Performed by: PATHOLOGY

## 2023-02-02 PROCEDURE — 99000 SPECIMEN HANDLING OFFICE-LAB: CPT | Performed by: PATHOLOGY

## 2023-02-02 PROCEDURE — 94618 PULMONARY STRESS TESTING: CPT | Performed by: INTERNAL MEDICINE

## 2023-02-02 PROCEDURE — 36415 COLL VENOUS BLD VENIPUNCTURE: CPT | Performed by: PATHOLOGY

## 2023-02-02 PROCEDURE — 80053 COMPREHEN METABOLIC PANEL: CPT | Performed by: PATHOLOGY

## 2023-02-02 PROCEDURE — 85025 COMPLETE CBC W/AUTO DIFF WBC: CPT | Performed by: PATHOLOGY

## 2023-02-02 PROCEDURE — 82652 VIT D 1 25-DIHYDROXY: CPT | Performed by: INTERNAL MEDICINE

## 2023-02-03 LAB
ACE SERPL-CCNC: 55 U/L
DEPRECATED CALCIDIOL+CALCIFEROL SERPL-MC: 29 UG/L (ref 20–75)
SCANNED LAB RESULT: NORMAL

## 2023-02-18 DIAGNOSIS — I10 ESSENTIAL HYPERTENSION: ICD-10-CM

## 2023-02-19 ENCOUNTER — MYC MEDICAL ADVICE (OUTPATIENT)
Dept: PULMONOLOGY | Facility: CLINIC | Age: 68
End: 2023-02-19
Payer: MEDICARE

## 2023-02-19 DIAGNOSIS — J84.9 ILD (INTERSTITIAL LUNG DISEASE) (H): Primary | ICD-10-CM

## 2023-02-19 DIAGNOSIS — J06.9 UPPER RESPIRATORY TRACT INFECTION, UNSPECIFIED TYPE: ICD-10-CM

## 2023-02-20 ENCOUNTER — MYC MEDICAL ADVICE (OUTPATIENT)
Dept: INTERNAL MEDICINE | Facility: CLINIC | Age: 68
End: 2023-02-20
Payer: MEDICARE

## 2023-02-20 DIAGNOSIS — I10 ESSENTIAL HYPERTENSION: ICD-10-CM

## 2023-02-20 RX ORDER — AZITHROMYCIN 250 MG/1
TABLET, FILM COATED ORAL
Qty: 6 TABLET | Refills: 0 | Status: SHIPPED | OUTPATIENT
Start: 2023-02-20 | End: 2023-05-08

## 2023-02-20 RX ORDER — PREDNISONE 20 MG/1
40 TABLET ORAL DAILY
Qty: 14 TABLET | Refills: 0 | Status: SHIPPED | OUTPATIENT
Start: 2023-02-20 | End: 2023-04-17

## 2023-02-21 RX ORDER — HYDROCHLOROTHIAZIDE 12.5 MG/1
TABLET ORAL
Qty: 90 TABLET | Refills: 3 | OUTPATIENT
Start: 2023-02-21

## 2023-02-22 RX ORDER — HYDROCHLOROTHIAZIDE 12.5 MG/1
TABLET ORAL
Qty: 90 TABLET | Refills: 0 | Status: SHIPPED | OUTPATIENT
Start: 2023-02-22 | End: 2023-05-31

## 2023-02-27 ENCOUNTER — MYC MEDICAL ADVICE (OUTPATIENT)
Dept: SLEEP MEDICINE | Facility: CLINIC | Age: 68
End: 2023-02-27
Payer: MEDICARE

## 2023-02-27 DIAGNOSIS — G47.33 OSA ON CPAP: Primary | ICD-10-CM

## 2023-02-27 NOTE — TELEPHONE ENCOUNTER
Patient lost a considerable amount of weight  (45lbs).  He is wondering about an adjustment to his pressure settings.

## 2023-03-03 DIAGNOSIS — G47.33 OSA ON CPAP: Primary | ICD-10-CM

## 2023-03-08 NOTE — TELEPHONE ENCOUNTER
-Continue on 4mg prednisone daily x30 total days. Then decrease to 3mg once daily x30 days, then decrease to 2mg once daily x30 days, then decrease to 1mg once daily x30 days then stop  -We will call you to schedule  --follow-up in about 4 months  --labs in the week or so prior to that follow-up appointment to recheck your inflammatory markers (ESR and CRP)   I have not denied his medication, medication has been refilled

## 2023-03-10 ENCOUNTER — DOCUMENTATION ONLY (OUTPATIENT)
Dept: SLEEP MEDICINE | Facility: CLINIC | Age: 68
End: 2023-03-10
Payer: MEDICARE

## 2023-03-31 DIAGNOSIS — I49.3 PVC'S (PREMATURE VENTRICULAR CONTRACTIONS): ICD-10-CM

## 2023-04-04 RX ORDER — METOPROLOL SUCCINATE 100 MG/1
150 TABLET, EXTENDED RELEASE ORAL DAILY
Qty: 135 TABLET | Refills: 2 | Status: SHIPPED | OUTPATIENT
Start: 2023-04-04 | End: 2024-01-02

## 2023-04-11 DIAGNOSIS — E78.00 HYPERCHOLESTEREMIA: ICD-10-CM

## 2023-04-12 RX ORDER — ATORVASTATIN CALCIUM 40 MG/1
TABLET, FILM COATED ORAL
Qty: 90 TABLET | Refills: 0 | Status: SHIPPED | OUTPATIENT
Start: 2023-04-12 | End: 2023-05-31

## 2023-04-12 NOTE — TELEPHONE ENCOUNTER
Atorvastatin (Lipitor) 40 mg tab  Medication is being filled for 1 time refill only due to:  Patient needs to be seen because it has been more than one year since last visit.   Appointments in Next Year    May 05, 2023  8:00 AM  LAB with [facility]  [facility] ([facility] ) [phone]   May 08, 2023  8:00 AM  (Arrive by 7:45 AM)  RETURN HEART FAILURE with Torrey Hirsch MD  [facility] ([facility] ) [phone]   Jun 05, 2023  8:00 AM  (Arrive by 7:40 AM)  Annual Wellness Visit with Carlyn Payne MD  [facility] ([facility] ) [phone]   Aug 02, 2023  8:00 AM  (Arrive by 7:45 AM)  Six Minute Walk with [facility] 6 MINUTE WALK 1  [facility] [place] ([facility] ) [phone]   Aug 02, 2023  8:30 AM  (Arrive by 8:15 AM)  FULL PULMONARY FUNCTION with [facility] B  [facility] [place] ([facility] ) [phone]   Aug 02, 2023  9:30 AM  (Arrive by 9:15 AM)  Return Interstitial Lung with Jamie Martin MD  [facility] [place] ([facility] ) [phone]

## 2023-04-15 DIAGNOSIS — E78.00 HYPERCHOLESTEREMIA: ICD-10-CM

## 2023-04-17 RX ORDER — ATORVASTATIN CALCIUM 40 MG/1
TABLET, FILM COATED ORAL
Qty: 90 TABLET | Refills: 0 | OUTPATIENT
Start: 2023-04-17

## 2023-05-05 ENCOUNTER — LAB (OUTPATIENT)
Dept: LAB | Facility: CLINIC | Age: 68
End: 2023-05-05
Payer: MEDICARE

## 2023-05-05 DIAGNOSIS — I50.22 CHRONIC SYSTOLIC HEART FAILURE (H): ICD-10-CM

## 2023-05-05 DIAGNOSIS — R06.09 DOE (DYSPNEA ON EXERTION): ICD-10-CM

## 2023-05-05 LAB
ALBUMIN SERPL BCG-MCNC: 3.7 G/DL (ref 3.5–5.2)
ALP SERPL-CCNC: 85 U/L (ref 40–129)
ALT SERPL W P-5'-P-CCNC: 23 U/L (ref 10–50)
ANION GAP SERPL CALCULATED.3IONS-SCNC: 11 MMOL/L (ref 7–15)
AST SERPL W P-5'-P-CCNC: 25 U/L (ref 10–50)
BILIRUB SERPL-MCNC: 0.6 MG/DL
BUN SERPL-MCNC: 18.6 MG/DL (ref 8–23)
CALCIUM SERPL-MCNC: 9.3 MG/DL (ref 8.8–10.2)
CHLORIDE SERPL-SCNC: 101 MMOL/L (ref 98–107)
CREAT SERPL-MCNC: 1.23 MG/DL (ref 0.67–1.17)
DEPRECATED HCO3 PLAS-SCNC: 27 MMOL/L (ref 22–29)
ERYTHROCYTE [DISTWIDTH] IN BLOOD BY AUTOMATED COUNT: 13.6 % (ref 10–15)
GFR SERPL CREATININE-BSD FRML MDRD: 64 ML/MIN/1.73M2
GLUCOSE SERPL-MCNC: 94 MG/DL (ref 70–99)
HCT VFR BLD AUTO: 48.6 % (ref 40–53)
HGB BLD-MCNC: 15.9 G/DL (ref 13.3–17.7)
MCH RBC QN AUTO: 29.6 PG (ref 26.5–33)
MCHC RBC AUTO-ENTMCNC: 32.7 G/DL (ref 31.5–36.5)
MCV RBC AUTO: 90 FL (ref 78–100)
NT-PROBNP SERPL-MCNC: 36 PG/ML (ref 0–900)
PLATELET # BLD AUTO: 163 10E3/UL (ref 150–450)
POTASSIUM SERPL-SCNC: 4.2 MMOL/L (ref 3.4–5.3)
PROT SERPL-MCNC: 7.1 G/DL (ref 6.4–8.3)
RBC # BLD AUTO: 5.38 10E6/UL (ref 4.4–5.9)
SODIUM SERPL-SCNC: 139 MMOL/L (ref 136–145)
WBC # BLD AUTO: 8.2 10E3/UL (ref 4–11)

## 2023-05-05 PROCEDURE — 36415 COLL VENOUS BLD VENIPUNCTURE: CPT

## 2023-05-05 PROCEDURE — 80053 COMPREHEN METABOLIC PANEL: CPT

## 2023-05-05 PROCEDURE — 85027 COMPLETE CBC AUTOMATED: CPT

## 2023-05-05 PROCEDURE — 83880 ASSAY OF NATRIURETIC PEPTIDE: CPT

## 2023-05-07 ENCOUNTER — OFFICE VISIT (OUTPATIENT)
Dept: URGENT CARE | Facility: URGENT CARE | Age: 68
End: 2023-05-07
Payer: MEDICARE

## 2023-05-07 ENCOUNTER — HEALTH MAINTENANCE LETTER (OUTPATIENT)
Age: 68
End: 2023-05-07

## 2023-05-07 VITALS
BODY MASS INDEX: 39.29 KG/M2 | TEMPERATURE: 98.1 F | WEIGHT: 273.8 LBS | DIASTOLIC BLOOD PRESSURE: 68 MMHG | SYSTOLIC BLOOD PRESSURE: 102 MMHG | RESPIRATION RATE: 16 BRPM | OXYGEN SATURATION: 95 % | HEART RATE: 65 BPM

## 2023-05-07 DIAGNOSIS — M79.671 RIGHT FOOT PAIN: Primary | ICD-10-CM

## 2023-05-07 LAB — URATE SERPL-MCNC: 4.8 MG/DL (ref 3.4–7)

## 2023-05-07 PROCEDURE — 99213 OFFICE O/P EST LOW 20 MIN: CPT | Performed by: PHYSICIAN ASSISTANT

## 2023-05-07 PROCEDURE — 84550 ASSAY OF BLOOD/URIC ACID: CPT | Performed by: PHYSICIAN ASSISTANT

## 2023-05-07 PROCEDURE — 36415 COLL VENOUS BLD VENIPUNCTURE: CPT | Performed by: PHYSICIAN ASSISTANT

## 2023-05-07 RX ORDER — METHYLPREDNISOLONE 4 MG
TABLET, DOSE PACK ORAL
Qty: 21 TABLET | Refills: 0 | Status: SHIPPED | OUTPATIENT
Start: 2023-05-07 | End: 2023-05-31

## 2023-05-07 NOTE — PATIENT INSTRUCTIONS
(W65.886) Right foot pain  (primary encounter diagnosis)  Comment:   Plan: Uric acid, methylPREDNISolone (MEDROL DOSEPAK)         4 MG tablet therapy pack            See handout      
Statement Selected

## 2023-05-07 NOTE — PROGRESS NOTES
Patient presents with:  Musculoskeletal Problem: 68 yo M presents with the following possible gout in let foot, flare up began last night     (M79.671) Right foot pain  (primary encounter diagnosis)  Comment: consistent with Gout.  Last uric acid in January 2022 was wnl.  Will draw uric acid today.  Plan: Uric acid, methylPREDNISolone (MEDROL DOSEPAK)         4 MG tablet therapy pack        Uric acid pending on discharge    See handout      SUBJECTIVE:   Derek Contreras is a 67 year old male who presents today with left foot pain at base of great toe onset last night.  He does have a history of gout.  Denies any trauma.  Denies any fever.  He is otherwise in his usual state of health.  His last gout flare was approximately a year ago.      Past Medical History:   Diagnosis Date     Asthma      Claustrophobia      CPAP (continuous positive airway pressure) dependence      Dyslipidemia      [place] (hard of hearing)      Hypercholesteremia      Hypertension      Iron deficiency      Mediastinal adenopathy      Obesity      BEULAH (obstructive sleep apnea)      Prostate cancer (H)      Pulmonary hypertension (H)      Sarcoidosis          Current Outpatient Medications   Medication Sig Dispense Refill     Multiple Vitamins-Iron (DAILY-PEDRITO/IRON/BETA-CAROTENE) TABS TAKE 1 TABLET BY MOUTH DAILY. (Patient not taking: Reported on 10/19/2020) 30 tablet 7     Social History     Tobacco Use     Smoking status: Never Smoker     Smokeless tobacco: Never Used   Substance Use Topics     Alcohol use: Not on file     Family History   Problem Relation Age of Onset     Diabetes Mother      Diabetes Father          ROS:    10 point ROS of systems including Constitutional, Eyes, Respiratory, Cardiovascular, Gastroenterology, Genitourinary, Integumentary, Muscularskeletal, Psychiatric ,neurological were all negative except for pertinent positives noted in my HPI       OBJECTIVE:  /68   Pulse 65   Temp 98.1  F (36.7  C)   Resp 16    Wt 124.2 kg (273 lb 12.8 oz)   SpO2 95%   BMI 39.29 kg/m    Physical Exam:  GENERAL APPEARANCE: healthy, alert and no distress  Extremities: left great toe: tenderness.  No edema or obvious bony deformity.  Feeling you are welcome to be seen in the emergency  It is likely that they would is stable is all when they are all present in clinic so that was just time is the kindness and Johanna and I think it is hard to  NEURO: Normal strength and tone, sensory exam grossly normal,  normal speech and mentation  SKIN: no suspicious lesions or rashes

## 2023-05-08 ENCOUNTER — OFFICE VISIT (OUTPATIENT)
Dept: CARDIOLOGY | Facility: CLINIC | Age: 68
End: 2023-05-08
Attending: INTERNAL MEDICINE
Payer: MEDICARE

## 2023-05-08 VITALS
HEART RATE: 78 BPM | HEIGHT: 70 IN | SYSTOLIC BLOOD PRESSURE: 115 MMHG | OXYGEN SATURATION: 94 % | BODY MASS INDEX: 39.2 KG/M2 | WEIGHT: 273.8 LBS | DIASTOLIC BLOOD PRESSURE: 67 MMHG

## 2023-05-08 DIAGNOSIS — I50.22 CHRONIC SYSTOLIC HEART FAILURE (H): ICD-10-CM

## 2023-05-08 DIAGNOSIS — R06.09 DOE (DYSPNEA ON EXERTION): ICD-10-CM

## 2023-05-08 PROCEDURE — G0463 HOSPITAL OUTPT CLINIC VISIT: HCPCS | Performed by: INTERNAL MEDICINE

## 2023-05-08 PROCEDURE — 99215 OFFICE O/P EST HI 40 MIN: CPT | Performed by: INTERNAL MEDICINE

## 2023-05-08 ASSESSMENT — PAIN SCALES - GENERAL: PAINLEVEL: NO PAIN (0)

## 2023-05-08 NOTE — PATIENT INSTRUCTIONS
Medication Changes:  - None    Patient Instructions:  -Continue staying active and eat a heart healthy diet.  -Please keep a current list of medications with you at all times.      Follow up Appointment Information:  - 6 month follow up with [facility], labs prior  - 1 year follow up with Dr. Hirsch, labs prior    Results:  Component      Latest Ref Rng 5/5/2023  7:51 AM   Sodium      136 - 145 mmol/L 139    Potassium      3.4 - 5.3 mmol/L 4.2    Chloride      98 - 107 mmol/L 101    Carbon Dioxide (CO2)      22 - 29 mmol/L 27    Anion Gap      7 - 15 mmol/L 11    Urea Nitrogen      8.0 - 23.0 mg/dL 18.6    Creatinine      0.67 - 1.17 mg/dL 1.23 (H)    Calcium      8.8 - 10.2 mg/dL 9.3    Glucose      70 - 99 mg/dL 94    Alkaline Phosphatase      40 - 129 U/L 85    AST      10 - 50 U/L 25    ALT      10 - 50 U/L 23    Protein Total      6.4 - 8.3 g/dL 7.1    Albumin      3.5 - 5.2 g/dL 3.7    Bilirubin Total      <=1.2 mg/dL 0.6    GFR Estimate      >60 mL/min/1.73m2 64    WBC      4.0 - 11.0 10e3/uL 8.2    RBC Count      4.40 - 5.90 10e6/uL 5.38    Hemoglobin      13.3 - 17.7 g/dL 15.9    Hematocrit      40.0 - 53.0 % 48.6    MCV      78 - 100 fL 90    MCH      26.5 - 33.0 pg 29.6    MCHC      31.5 - 36.5 g/dL 32.7    RDW      10.0 - 15.0 % 13.6    Platelet Count      150 - 450 10e3/uL 163    N-Terminal Pro Bnp      0 - 900 pg/mL 36       Legend:  (H) High    We are located on the third floor of the [facility] ([facility]) on the [facility].  Our address is     [address] [place] [address] [place] [address] [place] [address]    Rafaela Barnett*, RN  [facility]  Cardiology Care Coordinator-Heart Failure    Thank you for allowing us to be a part of your care here at the [facility]    If you have questions or concerns please contact us at:    Appointment scheduling or nurse questions: [phone]    On Call Cardiologist for after hours or on  weekends: [phone]    option #4

## 2023-05-08 NOTE — PROGRESS NOTES
[facility]  Advanced HF & Pulmonary Hypertension Clinic  Service Date:  05/09/2022    Dear Doctors Mario and Elmer:       We had the pleasure of seeing Mr. Derek Contreras follow-up in our heart failure and pulm hypertension clinic.  As you know, he is a very pleasant 67-year-old male with past medical history significant for     1. Pulmonary sarcoidosis  2.  LV systolic dysfunction -nonischemic in etiology likely related to uncontrolled hypertension  3.  Pulmonary hypertension and RV dysfunction secondary to pulmonary sarcoidosis    He returns today for follow-up.  His blood pressures are under control.  He does not have shortness of breath at rest or with his usual activities.   I would characterize him as functional class II.  He has not had any lower extremity swelling or abdominal distention.  No exertional presyncope or syncope.  No exertional chest pain or chest pressure.  He has not had any recent hospitalizations but was in the ER recently for a hip dislocation.  He is compliant with his medication.        PAST MEDICAL HISTORY:  Past Medical History:   Diagnosis Date     Asthma      Claustrophobia      CPAP (continuous positive airway pressure) dependence      Dyslipidemia      [place] (hard of hearing)      Hypercholesteremia      Hypertension      Iron deficiency      Mediastinal adenopathy      Obesity      BEULAH (obstructive sleep apnea)      Prostate cancer (H)      Pulmonary hypertension (H)      Sarcoidosis      CURRENT MEDICATIONS:  Current Outpatient Medications   Medication Sig     acetaminophen (TYLENOL) 500 MG tablet Take 2 tablets (1,000 mg) by mouth every 4 hours as needed for mild pain     albuterol (PROAIR HFA/PROVENTIL HFA/VENTOLIN HFA) 108 (90 Base) MCG/ACT inhaler Inhale 1-2 puffs into the lungs every 4 hours as needed for shortness of breath / dyspnea     aspirin (ASA) 81 MG tablet Take 81 mg by mouth every morning      atorvastatin (LIPITOR) 40 MG tablet TAKE 1 TABLET(40 MG) BY  "MOUTH EVERY MORNING     cetirizine (ZYRTEC) 10 MG tablet Take 10 mg by mouth every morning      empagliflozin (JARDIANCE) 10 MG TABS tablet Take 1 tablet (10 mg) by mouth every other day     fluticasone-vilanterol (BREO ELLIPTA) 200-25 MCG/ACT inhaler INHALE 1 PUFF BY MOUTH ONE TIME DAILY Strength: 200-25 MCG/INH     hydrochlorothiazide (HYDRODIURIL) 12.5 MG tablet TAKE ONE TABLET BY MOUTH EVERY MORNING     methylPREDNISolone (MEDROL DOSEPAK) 4 MG tablet therapy pack Follow Package Directions     metoprolol succinate ER (TOPROL XL) 100 MG 24 hr tablet Take 1.5 tablets (150 mg) by mouth daily     montelukast (SINGULAIR) 10 MG tablet Take 1 tablet (10 mg) by mouth At Bedtime     multivitamin (ONE-DAILY) tablet Take 1 tablet by mouth every morning      omeprazole (PRILOSEC) 40 MG DR capsule Take 1 capsule (40 mg) by mouth daily     sacubitril-valsartan (ENTRESTO)  MG per tablet Take 1 tablet by mouth 2 times daily     spironolactone (ALDACTONE) 25 MG tablet Take 1 tablet (25 mg) by mouth daily     diphenhydrAMINE (BENADRYL) 25 MG tablet Take 1 tablet (25 mg) by mouth every 8 hours as needed for itching or allergies     montelukast (SINGULAIR) 10 MG tablet Take 1 tablet (10 mg) by mouth every evening     umeclidinium (INCRUSE ELLIPTA) 62.5 MCG/INH inhaler Inhale 1 puff into the lungs daily     No current facility-administered medications for this visit.       ROS:   10 point ROS negative except as discussed in above HPI.    EXAM:  /67 (BP Location: Right arm, Patient Position: Sitting, Cuff Size: Adult Regular)   Pulse 78   Ht 1.778 m (5' 10\")   Wt 124.2 kg (273 lb 12.8 oz)   SpO2 94%   BMI 39.29 kg/m    General: appears comfortable, alert and articulate  Head: normocephalic, atraumatic  Eyes: anicteric sclera, EOMI  Neck: no adenopathy  Orophyarynx: moist mucosa, no lesions, dentition intact  Heart: regular, normal S1/S2, no murmur, gallop, rub, no jugular venous distention  Lungs: clear to " auscultation bilaterally, no rales or wheezing  Abdomen: soft, non-tender, bowel sounds present, no hepatosplenomegaly  Extremities: no clubbing, cyanosis or edema  Neurological: normal speech and affect, no gross motor deficits    Labs:  Recent Results (from the past 168 hour(s))   Comprehensive metabolic panel    Collection Time: 05/05/23  7:51 AM   Result Value Ref Range    Sodium 139 136 - 145 mmol/L    Potassium 4.2 3.4 - 5.3 mmol/L    Chloride 101 98 - 107 mmol/L    Carbon Dioxide (CO2) 27 22 - 29 mmol/L    Anion Gap 11 7 - 15 mmol/L    Urea Nitrogen 18.6 8.0 - 23.0 mg/dL    Creatinine 1.23 (H) 0.67 - 1.17 mg/dL    Calcium 9.3 8.8 - 10.2 mg/dL    Glucose 94 70 - 99 mg/dL    Alkaline Phosphatase 85 40 - 129 U/L    AST 25 10 - 50 U/L    ALT 23 10 - 50 U/L    Protein Total 7.1 6.4 - 8.3 g/dL    Albumin 3.7 3.5 - 5.2 g/dL    Bilirubin Total 0.6 <=1.2 mg/dL    GFR Estimate 64 >60 mL/min/1.73m2   CBC with platelets    Collection Time: 05/05/23  7:51 AM   Result Value Ref Range    WBC Count 8.2 4.0 - 11.0 10e3/uL    RBC Count 5.38 4.40 - 5.90 10e6/uL    Hemoglobin 15.9 13.3 - 17.7 g/dL    Hematocrit 48.6 40.0 - 53.0 %    MCV 90 78 - 100 fL    MCH 29.6 26.5 - 33.0 pg    MCHC 32.7 31.5 - 36.5 g/dL    RDW 13.6 10.0 - 15.0 %    Platelet Count 163 150 - 450 10e3/uL   N terminal pro BNP outpatient    Collection Time: 05/05/23  7:51 AM   Result Value Ref Range    N Terminal Pro BNP Outpatient 36 0 - 900 pg/mL   Uric acid    Collection Time: 05/07/23  9:27 AM   Result Value Ref Range    Uric Acid 4.8 3.4 - 7.0 mg/dL       6MWT (02/2022)  He walked for 304 meters.  He desaturated to 90% on room air.      EKG 2/2022  Sinus rhythm with bifascicular block, PVCs    Cardiac MRI 05/02/2022  Comparison CMR: 12/22/2021     1. The LV is normal in cavity size and wall thickness. The global systolic function is normal. The LVEF is  58 %. There are no regional wall motion abnormalities.     2. The RV is mildly enlarged in cavity size.  The global systolic function is mildly reduced. The RVEF is  44%.      3. Both atria are normal in size.     4. There is no significant valvular disease.      5. Late gadolinium enhancement imaging shows anterior septal and inferior septal LGE at the RV insertion  points similar to prior     6.  There is no pericardial effusion or thickening.      7.  There is no intracardiac thrombus.      CONCLUSIONS:  No evidence of active cardiac sarcoidosis. Improved biventricular function with LVEF 58% and  RVEF 44% (previously 41% and 34% respectively). Septal systolic paradoxical bowing still suggestive of RV  pressure overload, and LGE in the septal RV insertion points into the LV; together suggestive of pulmonary  hypertension.     Cardiac PET 2/2020  1. No FDG active cardiac sarcoid.  2. Decreased perfusion in the inferoseptal wall, findings may be  related to sequela of previous cardiac sarcoid with microvascular  injury.  3. Enlarged left ventricle with borderline low ejection fraction.  4. Decreased myocardial blood flow in the RCA distribution.  5. Findings of a dilated cardiomyopathy a concern for possible  ischemia/myocardial injury of the septum, consider CTA or coronary  angiography for further evaluation of this region.  6. Chest and upper abdominal hypermetabolic lymphadenopathy which is  indicative of active systemic sarcoid.    PFTs (02/2022):   FVC 44%, FEV1 46%, FEV1/FVC 79%, DLCO 75% predicted.    HRCT 10/2020:  Mediastinal and perihilar lymphadenoapthy and numerous solitary pulmonary nodules, no parenchymal lung disease.    RHC (11/2022)  RA 2/5/2 mmHg  RV 24/3 mmHg  PA 24/10/16 mmHg  CWP 3 mmHg  CO (Lucas) 4.74 L/min  CI (Lucas) 2.02 L/min/m2  CO (TD) 7.3 L/min  CI (TD) 3.11 L/min/m2    NIBP 107/56/75 mmHg  PVR 2.74 ROJAS    Assessment and Plan:     In summary, Mr. Contreras is a 67-year-old gentleman with pulmonary sarcoidosis, systemic hypertension, mild biventricular systolic dysfunction, and pulmonary  hypertension who returns today for follow-up.      1. Pulmonary Hypertension    He has mildly elevated PA pressure but his PVR is upper limits of normal.  This is likely due to combination of his high cardiac output as well as pulmonary sarcoidosis.  Since his PVR is up on the upper limits of normal, cardiac output is preserved, and right-sided filling pressures are normal we will defer pulmonary vasodilator therapy at this time point.  He is not interested in adding additional medication if possible.  He does have mild RV dysfunction, but clinically he is not in right heart failure.    Given his coexisting lung disease (he has significantly reduced lung volume but parenchymal abnormality on CT scan), he is at risk of VQ mismatch with systemic pulmonary vasodilator therapy.  He could potentially benefit from inhaled treprostinil if his pulmonary hypertension and RV dysfunction get worse.    2. New biventricular dysfunction/cardiomyopathy    His left ventricular systolic function has normalized on full dose valsartan/sacubitril, metoprolol  mg daily, empagliflozin, and spironolactone 25 mg daily.  His creatinine is slightly on the higher side. He is not on any diuretics. I have encouraged him to increase PO fluid intake.     3.  Frequent PVCs    He is being followed by Dr. Figueroa.  His PVC burden apparently has decreased on metoprolol XL.  As his ejection fraction has normalized and PVC burden has decreased after glucoses planning to hold off on biopsying him.  His noninvasive testing including cardiac MRI and PET has been negative for cardiac sarcoidosis.    4.  Pulmonary sarcoidosis    He is being followed by Dr. Romario Martin.  He is not desaturating significantly with exertion.  He has dropped his oxygen saturation briefly to 89%.  If he were to have exertional hypoxemia having supplemental oxygen would decrease the chance of progression of his pulm vascular disease.    He will return to see us in our  CORE clinic in 6 months with labs and me in a year. We will repeat a cardiac MRI in a year. He will call us in the interim of any further worsening symptoms.      Total time today was 46 minutes reviewing notes, imaging, labs, patient visit, orders and documentation     Sincerely,    Torrey Hirsch MD   [facility]  Heart Failure, Transplant, and Mechanical Circulatory Support Cardiology   Cardiovascular Division  [facility]   [phone]

## 2023-05-08 NOTE — NURSING NOTE
Chief Complaint   Patient presents with     Follow Up     RTN HF: sarcoid and HFpEF. 6MO follow-up with labs prior         Vitals were taken, medications reconciled.    Cristela West, EMT   7:45 AM

## 2023-05-08 NOTE — LETTER
5/8/2023      RE: Derek Contreras  [id number] [address] [place] [address]       Dear Colleague,    Thank you for the opportunity to participate in the care of your patient, Derek Contreras, at the [facility] [place] at [facility]. Please see a copy of my visit note below.    [facility]  Advanced HF & Pulmonary Hypertension Clinic  Service Date:  05/09/2022    Dear Doctors Mairo and Elmer:       We had the pleasure of seeing Mr. Derek Contreras follow-up in our heart failure and pulm hypertension clinic.  As you know, he is a very pleasant 67-year-old male with past medical history significant for     1. Pulmonary sarcoidosis  2.  LV systolic dysfunction -nonischemic in etiology likely related to uncontrolled hypertension  3.  Pulmonary hypertension and RV dysfunction secondary to pulmonary sarcoidosis    He returns today for follow-up.  His blood pressures are under control.  He does not have shortness of breath at rest or with his usual activities.   I would characterize him as functional class II.  He has not had any lower extremity swelling or abdominal distention.  No exertional presyncope or syncope.  No exertional chest pain or chest pressure.  He has not had any recent hospitalizations but was in the ER recently for a hip dislocation.  He is compliant with his medication.        PAST MEDICAL HISTORY:  Past Medical History:   Diagnosis Date    Asthma     Claustrophobia     CPAP (continuous positive airway pressure) dependence     Dyslipidemia     [place] (hard of hearing)     Hypercholesteremia     Hypertension     Iron deficiency     Mediastinal adenopathy     Obesity     BEULAH (obstructive sleep apnea)     Prostate cancer (H)     Pulmonary hypertension (H)     Sarcoidosis      CURRENT MEDICATIONS:  Current Outpatient Medications   Medication Sig    acetaminophen (TYLENOL) 500 MG tablet Take 2 tablets (1,000 mg) by mouth  "every 4 hours as needed for mild pain    albuterol (PROAIR HFA/PROVENTIL HFA/VENTOLIN HFA) 108 (90 Base) MCG/ACT inhaler Inhale 1-2 puffs into the lungs every 4 hours as needed for shortness of breath / dyspnea    aspirin (ASA) 81 MG tablet Take 81 mg by mouth every morning     atorvastatin (LIPITOR) 40 MG tablet TAKE 1 TABLET(40 MG) BY MOUTH EVERY MORNING    cetirizine (ZYRTEC) 10 MG tablet Take 10 mg by mouth every morning     empagliflozin (JARDIANCE) 10 MG TABS tablet Take 1 tablet (10 mg) by mouth every other day    fluticasone-vilanterol (BREO ELLIPTA) 200-25 MCG/ACT inhaler INHALE 1 PUFF BY MOUTH ONE TIME DAILY Strength: 200-25 MCG/INH    hydrochlorothiazide (HYDRODIURIL) 12.5 MG tablet TAKE ONE TABLET BY MOUTH EVERY MORNING    methylPREDNISolone (MEDROL DOSEPAK) 4 MG tablet therapy pack Follow Package Directions    metoprolol succinate ER (TOPROL XL) 100 MG 24 hr tablet Take 1.5 tablets (150 mg) by mouth daily    montelukast (SINGULAIR) 10 MG tablet Take 1 tablet (10 mg) by mouth At Bedtime    multivitamin (ONE-DAILY) tablet Take 1 tablet by mouth every morning     omeprazole (PRILOSEC) 40 MG DR capsule Take 1 capsule (40 mg) by mouth daily    sacubitril-valsartan (ENTRESTO)  MG per tablet Take 1 tablet by mouth 2 times daily    spironolactone (ALDACTONE) 25 MG tablet Take 1 tablet (25 mg) by mouth daily    diphenhydrAMINE (BENADRYL) 25 MG tablet Take 1 tablet (25 mg) by mouth every 8 hours as needed for itching or allergies    montelukast (SINGULAIR) 10 MG tablet Take 1 tablet (10 mg) by mouth every evening    umeclidinium (INCRUSE ELLIPTA) 62.5 MCG/INH inhaler Inhale 1 puff into the lungs daily     No current facility-administered medications for this visit.       ROS:   10 point ROS negative except as discussed in above HPI.    EXAM:  /67 (BP Location: Right arm, Patient Position: Sitting, Cuff Size: Adult Regular)   Pulse 78   Ht 1.778 m (5' 10\")   Wt 124.2 kg (273 lb 12.8 oz)   SpO2 94%  "  BMI 39.29 kg/m    General: appears comfortable, alert and articulate  Head: normocephalic, atraumatic  Eyes: anicteric sclera, EOMI  Neck: no adenopathy  Orophyarynx: moist mucosa, no lesions, dentition intact  Heart: regular, normal S1/S2, no murmur, gallop, rub, no jugular venous distention  Lungs: clear to auscultation bilaterally, no rales or wheezing  Abdomen: soft, non-tender, bowel sounds present, no hepatosplenomegaly  Extremities: no clubbing, cyanosis or edema  Neurological: normal speech and affect, no gross motor deficits    Labs:  Recent Results (from the past 168 hour(s))   Comprehensive metabolic panel    Collection Time: 05/05/23  7:51 AM   Result Value Ref Range    Sodium 139 136 - 145 mmol/L    Potassium 4.2 3.4 - 5.3 mmol/L    Chloride 101 98 - 107 mmol/L    Carbon Dioxide (CO2) 27 22 - 29 mmol/L    Anion Gap 11 7 - 15 mmol/L    Urea Nitrogen 18.6 8.0 - 23.0 mg/dL    Creatinine 1.23 (H) 0.67 - 1.17 mg/dL    Calcium 9.3 8.8 - 10.2 mg/dL    Glucose 94 70 - 99 mg/dL    Alkaline Phosphatase 85 40 - 129 U/L    AST 25 10 - 50 U/L    ALT 23 10 - 50 U/L    Protein Total 7.1 6.4 - 8.3 g/dL    Albumin 3.7 3.5 - 5.2 g/dL    Bilirubin Total 0.6 <=1.2 mg/dL    GFR Estimate 64 >60 mL/min/1.73m2   CBC with platelets    Collection Time: 05/05/23  7:51 AM   Result Value Ref Range    WBC Count 8.2 4.0 - 11.0 10e3/uL    RBC Count 5.38 4.40 - 5.90 10e6/uL    Hemoglobin 15.9 13.3 - 17.7 g/dL    Hematocrit 48.6 40.0 - 53.0 %    MCV 90 78 - 100 fL    MCH 29.6 26.5 - 33.0 pg    MCHC 32.7 31.5 - 36.5 g/dL    RDW 13.6 10.0 - 15.0 %    Platelet Count 163 150 - 450 10e3/uL   N terminal pro BNP outpatient    Collection Time: 05/05/23  7:51 AM   Result Value Ref Range    N Terminal Pro BNP Outpatient 36 0 - 900 pg/mL   Uric acid    Collection Time: 05/07/23  9:27 AM   Result Value Ref Range    Uric Acid 4.8 3.4 - 7.0 mg/dL       6MWT (02/2022)  He walked for 304 meters.  He desaturated to 90% on room air.      EKG  2/2022  Sinus rhythm with bifascicular block, PVCs    Cardiac MRI 05/02/2022  Comparison CMR: 12/22/2021     1. The LV is normal in cavity size and wall thickness. The global systolic function is normal. The LVEF is  58 %. There are no regional wall motion abnormalities.     2. The RV is mildly enlarged in cavity size. The global systolic function is mildly reduced. The RVEF is  44%.      3. Both atria are normal in size.     4. There is no significant valvular disease.      5. Late gadolinium enhancement imaging shows anterior septal and inferior septal LGE at the RV insertion  points similar to prior     6.  There is no pericardial effusion or thickening.      7.  There is no intracardiac thrombus.      CONCLUSIONS:  No evidence of active cardiac sarcoidosis. Improved biventricular function with LVEF 58% and  RVEF 44% (previously 41% and 34% respectively). Septal systolic paradoxical bowing still suggestive of RV  pressure overload, and LGE in the septal RV insertion points into the LV; together suggestive of pulmonary  hypertension.     Cardiac PET 2/2020  1. No FDG active cardiac sarcoid.  2. Decreased perfusion in the inferoseptal wall, findings may be  related to sequela of previous cardiac sarcoid with microvascular  injury.  3. Enlarged left ventricle with borderline low ejection fraction.  4. Decreased myocardial blood flow in the RCA distribution.  5. Findings of a dilated cardiomyopathy a concern for possible  ischemia/myocardial injury of the septum, consider CTA or coronary  angiography for further evaluation of this region.  6. Chest and upper abdominal hypermetabolic lymphadenopathy which is  indicative of active systemic sarcoid.    PFTs (02/2022):   FVC 44%, FEV1 46%, FEV1/FVC 79%, DLCO 75% predicted.    HRCT 10/2020:  Mediastinal and perihilar lymphadenoapthy and numerous solitary pulmonary nodules, no parenchymal lung disease.    RHC (11/2022)  RA 2/5/2 mmHg  RV 24/3 mmHg  PA 24/10/16 mmHg  CWP 3  mmHg  CO (Lucas) 4.74 L/min  CI (Lucas) 2.02 L/min/m2  CO (TD) 7.3 L/min  CI (TD) 3.11 L/min/m2    NIBP 107/56/75 mmHg  PVR 2.74 ROJAS    Assessment and Plan:     In summary, Mr. Contreras is a 67-year-old gentleman with pulmonary sarcoidosis, systemic hypertension, mild biventricular systolic dysfunction, and pulmonary hypertension who returns today for follow-up.      1. Pulmonary Hypertension    He has mildly elevated PA pressure but his PVR is upper limits of normal.  This is likely due to combination of his high cardiac output as well as pulmonary sarcoidosis.  Since his PVR is up on the upper limits of normal, cardiac output is preserved, and right-sided filling pressures are normal we will defer pulmonary vasodilator therapy at this time point.  He is not interested in adding additional medication if possible.  He does have mild RV dysfunction, but clinically he is not in right heart failure.    Given his coexisting lung disease (he has significantly reduced lung volume but parenchymal abnormality on CT scan), he is at risk of VQ mismatch with systemic pulmonary vasodilator therapy.  He could potentially benefit from inhaled treprostinil if his pulmonary hypertension and RV dysfunction get worse.    2. New biventricular dysfunction/cardiomyopathy    His left ventricular systolic function has normalized on full dose valsartan/sacubitril, metoprolol  mg daily, empagliflozin, and spironolactone 25 mg daily.  His creatinine is slightly on the higher side. He is not on any diuretics. I have encouraged him to increase PO fluid intake.     3.  Frequent PVCs    He is being followed by Dr. Figueroa.  His PVC burden apparently has decreased on metoprolol XL.  As his ejection fraction has normalized and PVC burden has decreased after glucoses planning to hold off on biopsying him.  His noninvasive testing including cardiac MRI and PET has been negative for cardiac sarcoidosis.    4.  Pulmonary sarcoidosis    He is being  followed by Dr. Romario Martin.  He is not desaturating significantly with exertion.  He has dropped his oxygen saturation briefly to 89%.  If he were to have exertional hypoxemia having supplemental oxygen would decrease the chance of progression of his pulm vascular disease.    He will return to see us in our CORE clinic in 6 months with labs and me in a year. We will repeat a cardiac MRI in a year. He will call us in the interim of any further worsening symptoms.      Total time today was 46 minutes reviewing notes, imaging, labs, patient visit, orders and documentation     Sincerely,    Torrey Hirsch MD   [facility]  Heart Failure, Transplant, and Mechanical Circulatory Support Cardiology   Cardiovascular Division  [facility]   [phone]

## 2023-05-31 ENCOUNTER — OFFICE VISIT (OUTPATIENT)
Dept: INTERNAL MEDICINE | Facility: CLINIC | Age: 68
End: 2023-05-31
Payer: MEDICARE

## 2023-05-31 VITALS
DIASTOLIC BLOOD PRESSURE: 64 MMHG | HEART RATE: 68 BPM | SYSTOLIC BLOOD PRESSURE: 93 MMHG | BODY MASS INDEX: 38.94 KG/M2 | HEIGHT: 70 IN | RESPIRATION RATE: 16 BRPM | WEIGHT: 272 LBS | OXYGEN SATURATION: 96 % | TEMPERATURE: 97.7 F

## 2023-05-31 DIAGNOSIS — Z12.5 ENCOUNTER FOR SCREENING FOR MALIGNANT NEOPLASM OF PROSTATE: ICD-10-CM

## 2023-05-31 DIAGNOSIS — I10 ESSENTIAL HYPERTENSION: ICD-10-CM

## 2023-05-31 DIAGNOSIS — E78.00 HYPERCHOLESTEREMIA: ICD-10-CM

## 2023-05-31 DIAGNOSIS — M79.671 RIGHT FOOT PAIN: ICD-10-CM

## 2023-05-31 DIAGNOSIS — Z00.00 PREVENTATIVE HEALTH CARE: Primary | ICD-10-CM

## 2023-05-31 LAB
CHOLEST SERPL-MCNC: 90 MG/DL
HDLC SERPL-MCNC: 38 MG/DL
LDLC SERPL CALC-MCNC: 30 MG/DL
NONHDLC SERPL-MCNC: 52 MG/DL
PSA SERPL DL<=0.01 NG/ML-MCNC: <0.01 NG/ML (ref 0–4.5)
TRIGL SERPL-MCNC: 108 MG/DL
TSH SERPL DL<=0.005 MIU/L-ACNC: 3.18 UIU/ML (ref 0.3–4.2)

## 2023-05-31 PROCEDURE — 84443 ASSAY THYROID STIM HORMONE: CPT | Performed by: INTERNAL MEDICINE

## 2023-05-31 PROCEDURE — 36415 COLL VENOUS BLD VENIPUNCTURE: CPT | Performed by: INTERNAL MEDICINE

## 2023-05-31 PROCEDURE — 80061 LIPID PANEL: CPT | Performed by: INTERNAL MEDICINE

## 2023-05-31 PROCEDURE — G0103 PSA SCREENING: HCPCS | Performed by: INTERNAL MEDICINE

## 2023-05-31 PROCEDURE — G0439 PPPS, SUBSEQ VISIT: HCPCS | Performed by: INTERNAL MEDICINE

## 2023-05-31 PROCEDURE — 99214 OFFICE O/P EST MOD 30 MIN: CPT | Mod: 25 | Performed by: INTERNAL MEDICINE

## 2023-05-31 RX ORDER — HYDROCHLOROTHIAZIDE 12.5 MG/1
TABLET ORAL
Qty: 90 TABLET | Refills: 0 | Status: SHIPPED | OUTPATIENT
Start: 2023-05-31 | End: 2023-08-29

## 2023-05-31 RX ORDER — METHYLPREDNISOLONE 4 MG
TABLET, DOSE PACK ORAL
Qty: 21 TABLET | Refills: 0 | Status: SHIPPED | OUTPATIENT
Start: 2023-05-31 | End: 2023-10-28

## 2023-05-31 RX ORDER — ATORVASTATIN CALCIUM 40 MG/1
40 TABLET, FILM COATED ORAL EVERY MORNING
Qty: 90 TABLET | Refills: 0 | Status: SHIPPED | OUTPATIENT
Start: 2023-05-31 | End: 2023-10-09

## 2023-05-31 ASSESSMENT — ENCOUNTER SYMPTOMS
HEMATURIA: 0
HEARTBURN: 0
MYALGIAS: 0
COUGH: 0
CHILLS: 0
SHORTNESS OF BREATH: 0
CONSTIPATION: 0
WEAKNESS: 0
DIZZINESS: 0
NERVOUS/ANXIOUS: 0
FEVER: 0
PALPITATIONS: 0
ABDOMINAL PAIN: 0
EYE PAIN: 0
FREQUENCY: 0
HEMATOCHEZIA: 0
ARTHRALGIAS: 0
NAUSEA: 0
PARESTHESIAS: 0
DIARRHEA: 0
SORE THROAT: 0
HEADACHES: 0
JOINT SWELLING: 0
DYSURIA: 0

## 2023-05-31 ASSESSMENT — ACTIVITIES OF DAILY LIVING (ADL): CURRENT_FUNCTION: NO ASSISTANCE NEEDED

## 2023-05-31 NOTE — PROGRESS NOTES
"SUBJECTIVE:   Derek is a 67 year old who presents for Preventive Visit.    Fasting.         5/31/2023     7:39 AM   Additional Questions   Roomed by ALISA Rubalcava LPN   Accompanied by none         5/31/2023     7:39 AM   Patient Reported Additional Medications   Patient reports taking the following new medications none     Patient has been advised of split billing requirements and indicates understanding: Yes  Are you in the first 12 months of your Medicare coverage?  No    Healthy Habits:     In general, how would you rate your overall health?  Good    Frequency of exercise:  2-3 days/week    Duration of exercise:  15-30 minutes    Do you usually eat at least 4 servings of fruit and vegetables a day, include whole grains    & fiber and avoid regularly eating high fat or \"junk\" foods?  No    Taking medications regularly:  Yes    Medication side effects:  Not applicable    Ability to successfully perform activities of daily living:  No assistance needed    Home Safety:  No safety concerns identified    Hearing Impairment:  Difficulty following a conversation in a noisy restaurant or crowded room    In the past 6 months, have you been bothered by leaking of urine?  No    In general, how would you rate your overall mental or emotional health?  Good      PHQ-2 Total Score: 0    Additional concerns today:  No        Have you ever done Advance Care Planning? (For example, a Health Directive, POLST, or a discussion with a medical provider or your loved ones about your wishes): Yes, advance care planning is on file.      Fall risk  Fallen 2 or more times in the past year?: No  Any fall with injury in the past year?: No    Cognitive Screening   1) Repeat 3 items (Leader, Season, Table)    2) Clock draw: NORMAL  3) 3 item recall: Recalls 3 objects  Results: 3 items recalled: COGNITIVE IMPAIRMENT LESS LIKELY    Mini-CogTM Copyright KRISTYN Stockton. Licensed by the author for use in [facility]; reprinted with permission " (aliyah@[facility]). All rights reserved.      Do you have sleep apnea, excessive snoring or daytime drowsiness?: yes, not using c-pap at this time.     Reviewed and updated as needed this visit by clinical staff   Tobacco  Allergies  Meds  Problems  Med Hx  Surg Hx  Fam Hx          Reviewed and updated as needed this visit by Provider                 Social History     Tobacco Use     Smoking status: Never     Smokeless tobacco: Never   Vaping Use     Vaping status: Never Used   Substance Use Topics     Alcohol use: Yes     Comment: once a week             5/31/2023     7:34 AM   Alcohol Use   Prescreen: >3 drinks/day or >7 drinks/week? No     Do you have a current opioid prescription? No  Do you use any other controlled substances or medications that are not prescribed by a provider? Alcohol          Current providers sharing in care for this patient include:   Patient Care Team:  Carlyn Payne MD as PCP - General (Internal Medicine)  Sherry Velazquez, RN as Specialty Care Coordinator (Cardiology)  Anuel Figueroa MD as MD (Clinical Cardiac Electrophysiology)  Radha Trimble MD as MD (Hematology & Oncology)  Glynn Omer MD as Referring Physician (Neurology)  Frank Barron MD as Assigned Sleep Provider  Matteo Coughlin MD as MD (Urology)  Torrey Hirsch MD as Assigned Heart and Vascular Provider  Jamie Martin MD as Assigned PCP  Malena Way DO as Assigned Cancer Care Provider  Miguelito Luna MD as Assigned Musculoskeletal Provider  Rafaela Barnett, RN as Specialty Care Coordinator (Cardiology)    The following health maintenance items are reviewed in Epic and correct as of today:  Health Maintenance   Topic Date Due     ANNUAL REVIEW OF HM ORDERS  Never done     COPD ACTION PLAN  Never done     ZOSTER IMMUNIZATION (1 of 2) 02/21/2017     DTAP/TDAP/TD IMMUNIZATION (2 - Td or Tdap) 04/18/2021     MEDICARE ANNUAL WELLNESS VISIT   "01/12/2023     Pneumococcal Vaccine: 65+ Years (3 - PPSV23 if available, else PCV20) 06/19/2023     FALL RISK ASSESSMENT  05/31/2024     COLORECTAL CANCER SCREENING  02/07/2025     LIPID  01/12/2027     ADVANCE CARE PLANNING  01/12/2027     SPIROMETRY  Completed     HEPATITIS C SCREENING  Completed     PHQ-2 (once per calendar year)  Completed     INFLUENZA VACCINE  Completed     AORTIC ANEURYSM SCREENING (SYSTEM ASSIGNED)  Completed     COVID-19 Vaccine  Completed     IPV IMMUNIZATION  Aged Out     MENINGITIS IMMUNIZATION  Aged Out     BP Readings from Last 3 Encounters:   05/31/23 93/64   05/08/23 115/67   05/07/23 102/68    Wt Readings from Last 3 Encounters:   05/31/23 123.4 kg (272 lb)   05/08/23 124.2 kg (273 lb 12.8 oz)   05/07/23 124.2 kg (273 lb 12.8 oz)                     Review of Systems   Constitutional: Negative for chills and fever.   HENT: Negative for congestion, ear pain, hearing loss and sore throat.    Eyes: Negative for pain and visual disturbance.   Respiratory: Negative for cough and shortness of breath.    Cardiovascular: Negative for chest pain, palpitations and peripheral edema.   Gastrointestinal: Negative for abdominal pain, constipation, diarrhea, heartburn, hematochezia and nausea.   Genitourinary: Negative for dysuria, frequency, genital sores, hematuria, impotence, penile discharge and urgency.   Musculoskeletal: Negative for arthralgias, joint swelling and myalgias.   Skin: Negative for rash.   Neurological: Negative for dizziness, weakness, headaches and paresthesias.   Psychiatric/Behavioral: Negative for mood changes. The patient is not nervous/anxious.        OBJECTIVE:   BP 93/64   Pulse 68   Temp 97.7  F (36.5  C) (Oral)   Resp 16   Ht 1.778 m (5' 10\")   Wt 123.4 kg (272 lb)   SpO2 96%   BMI 39.03 kg/m   Estimated body mass index is 39.03 kg/m  as calculated from the following:    Height as of this encounter: 1.778 m (5' 10\").    Weight as of this encounter: 123.4 kg " (272 lb).  Physical Exam  GENERAL: healthy, alert and no distress  EYES: Eyes grossly normal to inspection, PERRL and conjunctivae and sclerae normal  HENT: ear canals and TM's normal, nose and mouth without ulcers or lesions  NECK: no adenopathy, no asymmetry, masses, or scars and thyroid normal to palpation  RESP: lungs clear to auscultation - no rales, rhonchi or wheezes  CV: regular rate and rhythm, normal S1 S2, no S3 or S4, no murmur, click or rub, no peripheral edema and peripheral pulses strong  ABDOMEN: soft, nontender, no hepatosplenomegaly, no masses and bowel sounds normal  MS: no gross musculoskeletal defects noted, no edema  SKIN: no suspicious lesions or rashes  NEURO: Normal strength and tone, mentation intact and speech normal  PSYCH: mentation appears normal, affect normal/bright      ASSESSMENT / PLAN:   (Z00.00) Preventative health care  (primary encounter diagnosis)  Comment:    Plan: REVIEW OF HEALTH MAINTENANCE PROTOCOL ORDERS,         PSA, screen, Lipid Profile (Chol, Trig, HDL,         LDL calc), TSH with free T4 reflex             (E78.00) Hypercholesteremia  Comment:  Due for fasting lipid panel   Plan: atorvastatin (LIPITOR) 40 MG tablet             (I10) Essential hypertension  Comment: under good control   Plan: hydrochlorothiazide (HYDRODIURIL) 12.5 MG         tablet             (M79.671) Right foot pain  Comment: gets gout every 1-2 years would like rx on hand as it is so painful  Plan: methylPREDNISolone (MEDROL DOSEPAK) 4 MG tablet        therapy pack             (Z12.5) Encounter for screening for malignant neoplasm of prostate  Comment:    Plan: PSA, screen               Patient has been advised of split billing requirements and indicates understanding: Yes      COUNSELING:  Reviewed preventive health counseling, as reflected in patient instructions       Regular exercise       Healthy diet/nutrition      BMI:   Estimated body mass index is 39.03 kg/m  as calculated from the  "following:    Height as of this encounter: 1.778 m (5' 10\").    Weight as of this encounter: 123.4 kg (272 lb).   Weight management plan: Discussed healthy diet and exercise guidelines      He reports that he has never smoked. He has never used smokeless tobacco.      Appropriate preventive services were discussed with this patient, including applicable screening as appropriate for cardiovascular disease, diabetes, osteopenia/osteoporosis, and glaucoma.  As appropriate for age/gender, discussed screening for colorectal cancer, prostate cancer, breast cancer, and cervical cancer. Checklist reviewing preventive services available has been given to the patient.    Reviewed patients plan of care and provided an AVS. The Basic Care Plan (routine screening as documented in Health Maintenance) for Derek meets the Care Plan requirement. This Care Plan has been established and reviewed with the Patient.          Carlyn Payne MD  [facility]    Identified Health Risks:    "

## 2023-06-02 RX ORDER — HYDROCHLOROTHIAZIDE 12.5 MG/1
TABLET ORAL
Qty: 90 TABLET | Refills: 0 | OUTPATIENT
Start: 2023-06-02

## 2023-06-26 ENCOUNTER — TELEPHONE (OUTPATIENT)
Dept: PULMONOLOGY | Facility: CLINIC | Age: 68
End: 2023-06-26
Payer: MEDICARE

## 2023-06-26 DIAGNOSIS — J84.9 ILD (INTERSTITIAL LUNG DISEASE) (H): Primary | ICD-10-CM

## 2023-06-26 DIAGNOSIS — J06.9 UPPER RESPIRATORY TRACT INFECTION, UNSPECIFIED TYPE: ICD-10-CM

## 2023-06-26 RX ORDER — PREDNISONE 10 MG/1
30 TABLET ORAL DAILY
Qty: 15 TABLET | Refills: 0 | Status: SHIPPED | OUTPATIENT
Start: 2023-06-26 | End: 2023-10-28

## 2023-06-26 RX ORDER — PREDNISONE 10 MG/1
30 TABLET ORAL DAILY
Qty: 15 TABLET | Refills: 0 | Status: SHIPPED | OUTPATIENT
Start: 2023-06-26 | End: 2023-06-26

## 2023-06-26 RX ORDER — AZITHROMYCIN 250 MG/1
TABLET, FILM COATED ORAL
Qty: 6 TABLET | Refills: 0 | Status: SHIPPED | OUTPATIENT
Start: 2023-06-26 | End: 2023-07-01

## 2023-06-26 NOTE — TELEPHONE ENCOUNTER
Spoke to pt and sent rxs to pt preferred pharmacy. Pt will reach out with any questions or concerns.     Malena Shepard RN, BSN  ILD Nurse   [phone]      ----- Message from Jamie Martin MD sent at 6/26/2023  1:33 PM CDT -----  Regarding: RE: cough  Hello     IT sine to do Zpack and pred 30mg x 5 days.     Jamie  ----- Message -----  From: Radha Monae RN  Sent: 6/26/2023   1:26 PM CDT  To: Jamie Martin MD; Pulm Sarcoid Ild Nurses  Subject: cough                                            Hi Jamie,    Patient sent this mychart message:  Every 3 months or so I get treated for a cough. It is close to that time. At this time I have a cough that is now keeping me up at night. I am now bringing up phlegm. In the past I have been treated with a z pack and pregazone (sp?). I am going to be traveling soon thus I would like to have that medication with me on my trip. I have taken a Covid test and that has come back negative.  Thank you for your time.  Derek Contreras   =================================================    Want to treat?    Deepa Monae RN  ILD Care Coordinator  [phone]

## 2023-07-14 ENCOUNTER — TELEPHONE (OUTPATIENT)
Dept: CARDIOLOGY | Facility: CLINIC | Age: 68
End: 2023-07-14
Payer: MEDICARE

## 2023-07-14 NOTE — TELEPHONE ENCOUNTER
Left Voicemail (1st Attempt) and Sent Mychart (1st Attempt) for the patient to call back and schedule the following:    Appointment type: Cardiology- Return EP  Provider: [place]  Return date: 11/09/23  Specialty phone number: [phone]  Additional appointment(s) needed: none  Additonal Notes: none    Osman Mayo, Visit Facilitator/MA.

## 2023-08-01 ENCOUNTER — MYC MEDICAL ADVICE (OUTPATIENT)
Dept: PULMONOLOGY | Facility: CLINIC | Age: 68
End: 2023-08-01
Payer: MEDICARE

## 2023-08-01 ENCOUNTER — MYC REFILL (OUTPATIENT)
Dept: INTERNAL MEDICINE | Facility: CLINIC | Age: 68
End: 2023-08-01
Payer: MEDICARE

## 2023-08-01 DIAGNOSIS — J45.909 MODERATE ASTHMA WITHOUT COMPLICATION, UNSPECIFIED WHETHER PERSISTENT: ICD-10-CM

## 2023-08-01 DIAGNOSIS — D86.9 SARCOIDOSIS: ICD-10-CM

## 2023-08-01 RX ORDER — MONTELUKAST SODIUM 10 MG/1
10 TABLET ORAL AT BEDTIME
Qty: 90 TABLET | Refills: 0 | OUTPATIENT
Start: 2023-08-01

## 2023-08-01 RX ORDER — MONTELUKAST SODIUM 10 MG/1
10 TABLET ORAL AT BEDTIME
Qty: 90 TABLET | Refills: 0 | Status: SHIPPED | OUTPATIENT
Start: 2023-08-01 | End: 2023-10-30

## 2023-08-07 ENCOUNTER — MYC MEDICAL ADVICE (OUTPATIENT)
Dept: PULMONOLOGY | Facility: CLINIC | Age: 68
End: 2023-08-07
Payer: MEDICARE

## 2023-08-07 DIAGNOSIS — D86.0 PULMONARY SARCOIDOSIS (H): ICD-10-CM

## 2023-08-07 DIAGNOSIS — D86.9 SARCOIDOSIS: Primary | ICD-10-CM

## 2023-08-07 DIAGNOSIS — E55.9 VITAMIN D DEFICIENCY, UNSPECIFIED: ICD-10-CM

## 2023-08-08 RX ORDER — PREDNISONE 10 MG/1
30 TABLET ORAL DAILY
Qty: 21 TABLET | Refills: 0 | Status: SHIPPED | OUTPATIENT
Start: 2023-08-08 | End: 2023-10-28

## 2023-08-28 DIAGNOSIS — I10 ESSENTIAL HYPERTENSION: ICD-10-CM

## 2023-08-29 RX ORDER — HYDROCHLOROTHIAZIDE 12.5 MG/1
TABLET ORAL
Qty: 90 TABLET | Refills: 0 | Status: SHIPPED | OUTPATIENT
Start: 2023-08-29 | End: 2023-12-04

## 2023-09-05 ENCOUNTER — LAB (OUTPATIENT)
Dept: LAB | Facility: CLINIC | Age: 68
End: 2023-09-05
Payer: MEDICARE

## 2023-09-05 DIAGNOSIS — D47.2 MGUS (MONOCLONAL GAMMOPATHY OF UNKNOWN SIGNIFICANCE): ICD-10-CM

## 2023-09-05 LAB
ALBUMIN SERPL BCG-MCNC: 3.8 G/DL (ref 3.5–5.2)
ALP SERPL-CCNC: 75 U/L (ref 40–129)
ALT SERPL W P-5'-P-CCNC: 24 U/L (ref 0–70)
ANION GAP SERPL CALCULATED.3IONS-SCNC: 10 MMOL/L (ref 7–15)
AST SERPL W P-5'-P-CCNC: 31 U/L (ref 0–45)
BASOPHILS # BLD AUTO: 0 10E3/UL (ref 0–0.2)
BASOPHILS NFR BLD AUTO: 1 %
BILIRUB SERPL-MCNC: 0.5 MG/DL
BUN SERPL-MCNC: 13.7 MG/DL (ref 8–23)
CALCIUM SERPL-MCNC: 9.3 MG/DL (ref 8.8–10.2)
CHLORIDE SERPL-SCNC: 104 MMOL/L (ref 98–107)
CREAT SERPL-MCNC: 0.93 MG/DL (ref 0.67–1.17)
DEPRECATED HCO3 PLAS-SCNC: 24 MMOL/L (ref 22–29)
EOSINOPHIL # BLD AUTO: 0.2 10E3/UL (ref 0–0.7)
EOSINOPHIL NFR BLD AUTO: 3 %
ERYTHROCYTE [DISTWIDTH] IN BLOOD BY AUTOMATED COUNT: 13.2 % (ref 10–15)
GFR SERPL CREATININE-BSD FRML MDRD: 89 ML/MIN/1.73M2
GLUCOSE SERPL-MCNC: 105 MG/DL (ref 70–99)
HCT VFR BLD AUTO: 47.1 % (ref 40–53)
HGB BLD-MCNC: 16.1 G/DL (ref 13.3–17.7)
IGA SERPL-MCNC: 630 MG/DL (ref 84–499)
IGG SERPL-MCNC: 1087 MG/DL (ref 610–1616)
IGM SERPL-MCNC: 68 MG/DL (ref 35–242)
IMM GRANULOCYTES # BLD: 0 10E3/UL
IMM GRANULOCYTES NFR BLD: 0 %
KAPPA LC FREE SER-MCNC: 15.47 MG/DL (ref 0.33–1.94)
KAPPA LC FREE/LAMBDA FREE SER NEPH: 5.97 {RATIO} (ref 0.26–1.65)
LAMBDA LC FREE SERPL-MCNC: 2.59 MG/DL (ref 0.57–2.63)
LYMPHOCYTES # BLD AUTO: 1.4 10E3/UL (ref 0.8–5.3)
LYMPHOCYTES NFR BLD AUTO: 20 %
MCH RBC QN AUTO: 30.2 PG (ref 26.5–33)
MCHC RBC AUTO-ENTMCNC: 34.2 G/DL (ref 31.5–36.5)
MCV RBC AUTO: 88 FL (ref 78–100)
MONOCYTES # BLD AUTO: 0.8 10E3/UL (ref 0–1.3)
MONOCYTES NFR BLD AUTO: 12 %
NEUTROPHILS # BLD AUTO: 4.3 10E3/UL (ref 1.6–8.3)
NEUTROPHILS NFR BLD AUTO: 64 %
PLATELET # BLD AUTO: 173 10E3/UL (ref 150–450)
POTASSIUM SERPL-SCNC: 4.3 MMOL/L (ref 3.4–5.3)
PROT SERPL-MCNC: 6.9 G/DL (ref 6.4–8.3)
RBC # BLD AUTO: 5.33 10E6/UL (ref 4.4–5.9)
SODIUM SERPL-SCNC: 138 MMOL/L (ref 136–145)
TOTAL PROTEIN SERUM FOR ELP: 6.7 G/DL (ref 6.4–8.3)
WBC # BLD AUTO: 6.7 10E3/UL (ref 4–11)

## 2023-09-05 PROCEDURE — 83521 IG LIGHT CHAINS FREE EACH: CPT

## 2023-09-05 PROCEDURE — 36415 COLL VENOUS BLD VENIPUNCTURE: CPT

## 2023-09-05 PROCEDURE — 85025 COMPLETE CBC W/AUTO DIFF WBC: CPT

## 2023-09-05 PROCEDURE — 86334 IMMUNOFIX E-PHORESIS SERUM: CPT

## 2023-09-05 PROCEDURE — 80053 COMPREHEN METABOLIC PANEL: CPT

## 2023-09-05 PROCEDURE — 82784 ASSAY IGA/IGD/IGG/IGM EACH: CPT

## 2023-09-05 PROCEDURE — 84165 PROTEIN E-PHORESIS SERUM: CPT

## 2023-09-05 PROCEDURE — 84155 ASSAY OF PROTEIN SERUM: CPT | Mod: 59

## 2023-09-06 LAB
ALBUMIN SERPL ELPH-MCNC: 3.5 G/DL (ref 3.7–5.1)
ALPHA1 GLOB SERPL ELPH-MCNC: 0.3 G/DL (ref 0.2–0.4)
ALPHA2 GLOB SERPL ELPH-MCNC: 0.7 G/DL (ref 0.5–0.9)
B-GLOBULIN SERPL ELPH-MCNC: 1.1 G/DL (ref 0.6–1)
GAMMA GLOB SERPL ELPH-MCNC: 1.1 G/DL (ref 0.7–1.6)
M PROTEIN SERPL ELPH-MCNC: 0.4 G/DL
PROT PATTERN SERPL ELPH-IMP: ABNORMAL
PROT PATTERN SERPL IFE-IMP: NORMAL

## 2023-09-06 PROCEDURE — 81050 URINALYSIS VOLUME MEASURE: CPT

## 2023-09-06 PROCEDURE — 86335 IMMUNFIX E-PHORSIS/URINE/CSF: CPT

## 2023-09-06 PROCEDURE — 84166 PROTEIN E-PHORESIS/URINE/CSF: CPT

## 2023-09-07 LAB — PROT ELPH PNL UR ELPH: NORMAL

## 2023-09-08 LAB
ALBUMIN MFR UR ELPH: 25.9 %
ALPHA1 GLOB MFR UR ELPH: 12.8 %
ALPHA2 GLOB MFR UR ELPH: 7.1 %
B-GLOBULIN MFR UR ELPH: 32.8 %
GAMMA GLOB MFR UR ELPH: 10.6 %
M PROTEIN MFR UR ELPH: 23.6 %
PROT PATTERN UR ELPH-IMP: ABNORMAL

## 2023-09-12 ENCOUNTER — ONCOLOGY VISIT (OUTPATIENT)
Dept: ONCOLOGY | Facility: CLINIC | Age: 68
End: 2023-09-12
Attending: INTERNAL MEDICINE
Payer: MEDICARE

## 2023-09-12 VITALS
TEMPERATURE: 97.9 F | BODY MASS INDEX: 39.16 KG/M2 | WEIGHT: 273.5 LBS | SYSTOLIC BLOOD PRESSURE: 122 MMHG | DIASTOLIC BLOOD PRESSURE: 61 MMHG | HEIGHT: 70 IN | OXYGEN SATURATION: 94 % | RESPIRATION RATE: 16 BRPM | HEART RATE: 81 BPM

## 2023-09-12 DIAGNOSIS — D47.2 MGUS (MONOCLONAL GAMMOPATHY OF UNKNOWN SIGNIFICANCE): Primary | ICD-10-CM

## 2023-09-12 PROCEDURE — G0463 HOSPITAL OUTPT CLINIC VISIT: HCPCS | Performed by: INTERNAL MEDICINE

## 2023-09-12 PROCEDURE — 99214 OFFICE O/P EST MOD 30 MIN: CPT | Performed by: INTERNAL MEDICINE

## 2023-09-12 ASSESSMENT — PAIN SCALES - GENERAL: PAINLEVEL: NO PAIN (0)

## 2023-09-12 NOTE — PROGRESS NOTES
[facility]    Hematology/Oncology Established Patient Note      Today's Date: 9/12/23    Reason for Follow-up: MGUS      HISTORY OF PRESENT ILLNESS: Derek Contreras is a 67 year old male with PMHx of sleep apnea, sarcoidosis, COPD, prostate cancer s/p prostatectomy, history of hip and knee replacement, HTN who presents with MGUS. An VLAD was checked by his neurologist, which showed an elevated IgA level. He was previously followed by Dr. Trimble.    He says that he feels fantastic currently.      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  Kappa light chain is elevated to 10.73, with kappa/lambda ratio of 4.47.  His hemoglobin, calcium, and renal function are normal.          INTERIM HISTORY:   Patient continues to do well. He had a hip dislocation in the fall of 2022, which was managed conservatively.    He continues to follow with pulmonology and cardiology for sarcoidosis.      REVIEW OF SYSTEMS:   14 point ROS was reviewed and is negative other than as noted above in HPI.       HOME MEDICATIONS:  Current Outpatient Medications   Medication Sig Dispense Refill    acetaminophen (TYLENOL) 500 MG tablet Take 2 tablets (1,000 mg) by mouth every 4 hours as needed for mild pain      albuterol (PROAIR HFA/PROVENTIL HFA/VENTOLIN HFA) 108 (90 Base) MCG/ACT inhaler Inhale 1-2 puffs into the lungs every 4 hours as needed for shortness of breath / dyspnea 1 Inhaler 4    aspirin (ASA) 81 MG tablet Take 81 mg by mouth every morning  90 tablet 3    atorvastatin (LIPITOR) 40 MG tablet Take 1 tablet (40 mg) by mouth every morning 90 tablet 0    cetirizine (ZYRTEC) 10 MG tablet Take 10 mg by mouth every morning  90 tablet 3    empagliflozin (JARDIANCE) 10 MG TABS tablet Take 1 tablet (10 mg) by mouth every other day 45 tablet 3    fluticasone-vilanterol (BREO ELLIPTA) 200-25 MCG/ACT inhaler INHALE 1 PUFF BY MOUTH ONE TIME DAILY Strength: 200-25 MCG/INH  60 each 11    hydrochlorothiazide (HYDRODIURIL) 12.5 MG tablet TAKE 1 TABLET BY MOUTH EVERY MORNING 90 tablet 0    methylPREDNISolone (MEDROL DOSEPAK) 4 MG tablet therapy pack Follow Package Directions 21 tablet 0    metoprolol succinate ER (TOPROL XL) 100 MG 24 hr tablet Take 1.5 tablets (150 mg) by mouth daily 135 tablet 2    montelukast (SINGULAIR) 10 MG tablet Take 1 tablet (10 mg) by mouth At Bedtime 90 tablet 0    multivitamin (ONE-DAILY) tablet Take 1 tablet by mouth every morning  90 tablet 3    omeprazole (PRILOSEC) 40 MG DR capsule Take 1 capsule (40 mg) by mouth daily 30 capsule 11    predniSONE (DELTASONE) 10 MG tablet Take 3 tablets (30 mg) by mouth daily 21 tablet 0    predniSONE (DELTASONE) 10 MG tablet Take 3 tablets (30 mg) by mouth daily X 5 days 15 tablet 0    sacubitril-valsartan (ENTRESTO)  MG per tablet Take 1 tablet by mouth 2 times daily 180 tablet 3    spironolactone (ALDACTONE) 25 MG tablet Take 1 tablet (25 mg) by mouth daily 90 tablet 3         ALLERGIES:  Allergies   Allergen Reactions    Cat Hair Extract Itching     itchy tearful eyes    Adhesive Tape Rash    Iodine Rash         PAST MEDICAL HISTORY:  Past Medical History:   Diagnosis Date    Asthma     Claustrophobia     CPAP (continuous positive airway pressure) dependence     Dyslipidemia     [place] (hard of hearing)     Hypercholesteremia     Hypertension     Iron deficiency     Mediastinal adenopathy     Obesity     BEULAH (obstructive sleep apnea)     Prostate cancer (H)     Pulmonary hypertension (H)     Sarcoidosis          PAST SURGICAL HISTORY:  Past Surgical History:   Procedure Laterality Date    APPENDECTOMY      BIOPSY MASS NECK Left 7/15/2020    Procedure: Left trapezius muscle biopsy;  Surgeon: Ade Villa MD;  Location: [facility] OR    CLOSED REDUCTION HIP Left 10/31/2022    Procedure: Closed reduction left total hip arthroplasty;  Surgeon: Antonio Ann MD;  Location: [facility] OR    CV RIGHT HEART CATH  MEASUREMENTS RECORDED N/A 12/11/2019    Procedure: CV RIGHT HEART CATH;  Surgeon: Torrey Hirsch MD;  Location:  HEART CARDIAC CATH LAB    CV RIGHT HEART CATH MEASUREMENTS RECORDED N/A 1/19/2022    Procedure: CV RIGHT HEART CATH;  Surgeon: Torrey Hirsch MD;  Location:  HEART CARDIAC CATH LAB    CV RIGHT HEART CATH MEASUREMENTS RECORDED N/A 11/4/2022    Procedure: Heart Cath Right Heart Cath;  Surgeon: Torrey Hirsch MD;  Location:  HEART CARDIAC CATH LAB    DAVINCI PROSTATECTOMY, LYMPHADENECTOMY N/A 5/23/2019    Procedure: ROBOTIC ASSISTED LAPAROSCOPIC RADICAL PROSTATECTOMY WITH BILATERAL PELVIC LYMPH NODE DISSECTION;  Surgeon: Matteo Coughlin MD;  Location:  OR    ENDOBRONCHIAL ULTRASOUND FLEXIBLE N/A 3/29/2019    Procedure: Flexible Bronchoscopy, Endobronchial Ultrasound;  Surgeon: Curtis Leger MD;  Location: [facility] OR    VASECTOMY      ZZC TOTAL HIP ARTHROPLASTY Bilateral     ZZC TOTAL KNEE ARTHROPLASTY Bilateral          SOCIAL HISTORY:  Social History     Socioeconomic History    Marital status:      Spouse name: Not on file    Number of children: Not on file    Years of education: Not on file    Highest education level: Not on file   Occupational History    Not on file   Tobacco Use    Smoking status: Never    Smokeless tobacco: Never   Vaping Use    Vaping Use: Never used   Substance and Sexual Activity    Alcohol use: Yes     Comment: once a week    Drug use: No    Sexual activity: Yes     Partners: Female   Other Topics Concern    Parent/sibling w/ CABG, MI or angioplasty before 65F 55M? Not Asked   Social History Narrative    12/03/20         ENVIRONMENTAL HISTORY: [place] The home is heated with a gas furnace. They does have central air conditioning. The patient's bedroom is furnished with Indoor plants and carpeting in bedroom.  Pets inside the house include 0 pets. There is no history of cockroach or mice  infestation. There is/are 0 smokers in the house.  The house does not have a damp basement.      Social Determinants of Health     Financial Resource Strain: Not on file   Food Insecurity: Not on file   Transportation Needs: Not on file   Physical Activity: Not on file   Stress: Not on file   Social Connections: Not on file   Intimate Partner Violence: Not At Risk (2022)    Humiliation, Afraid, Rape, and Kick questionnaire     Fear of Current or Ex-Partner: No     Emotionally Abused: No     Physically Abused: No     Sexually Abused: No   Housing Stability: Not on file         FAMILY HISTORY:  Family History   Problem Relation Age of Onset    Alzheimer Disease Mother     Heart Disease Father     Glaucoma No family hx of     Macular Degeneration No family hx of     Diabetes No family hx of     Thyroid Disease No family hx of          PHYSICAL EXAM:  There were no vitals taken for this visit.  PHYSICAL EXAMINATION:   ECO  GENERAL/CONSTITUTIONAL: No acute distress.  MUSCULOSKELETAL: Warm and well-perfused, no cyanosis, clubbing, or edema.  NEUROLOGIC: Cranial nerves II-XII are intact. Alert, oriented, answers questions appropriately.  INTEGUMENTARY: No rashes or jaundice.  GAIT: Steady, does not use assistive device              LABS:   Latest Reference Range & Units 23 08:03   Sodium 136 - 145 mmol/L 138   Potassium 3.4 - 5.3 mmol/L 4.3   Chloride 98 - 107 mmol/L 104   Carbon Dioxide (CO2) 22 - 29 mmol/L 24   Urea Nitrogen 8.0 - 23.0 mg/dL 13.7   Creatinine 0.67 - 1.17 mg/dL 0.93   GFR Estimate >60 mL/min/1.73m2 89   Calcium 8.8 - 10.2 mg/dL 9.3   Anion Gap 7 - 15 mmol/L 10   Albumin 3.5 - 5.2 g/dL 3.8   Protein Total 6.4 - 8.3 g/dL 6.9   Alkaline Phosphatase 40 - 129 U/L 75   ALT 0 - 70 U/L 24   AST 0 - 45 U/L 31   Bilirubin Total <=1.2 mg/dL 0.5   Glucose 70 - 99 mg/dL 105 (H)   WBC 4.0 - 11.0 10e3/uL 6.7   Hemoglobin 13.3 - 17.7 g/dL 16.1   Hematocrit 40.0 - 53.0 % 47.1   Platelet Count 150 - 450  10e3/uL 173   RBC Count 4.40 - 5.90 10e6/uL 5.33   MCV 78 - 100 fL 88   MCH 26.5 - 33.0 pg 30.2   MCHC 31.5 - 36.5 g/dL 34.2   RDW 10.0 - 15.0 % 13.2   % Neutrophils % 64   % Lymphocytes % 20   % Monocytes % 12   % Eosinophils % 3   % Basophils % 1   Absolute Basophils 0.0 - 0.2 10e3/uL 0.0   Absolute Eosinophils 0.0 - 0.7 10e3/uL 0.2   Absolute Immature Granulocytes <=0.4 10e3/uL 0.0   Absolute Lymphocytes 0.8 - 5.3 10e3/uL 1.4   Absolute Monocytes 0.0 - 1.3 10e3/uL 0.8   % Immature Granulocytes % 0   Absolute Neutrophils 1.6 - 8.3 10e3/uL 4.3     Serum M-protein (g/dL):  5/27/20: 0.5 g/dL IgA kappa  9/1/20: 0.4   2/23/21: 0.5   8/11/21: 0.4   8/30/22: 0.4  9/5/23: 0.4    UPIEP (%)  9/6/23: 23.6% (kappa light chain)    Total IgG (mg/dL), IgA (mg/dL), IgM (mg/dL):  9/5/23: 1087, 630, 68    Free kappa light chains (mg/dL), lambda (mg/dL), kappa/lambda ratio:  5/27/20: 10.73, 2.40, 4.47  8/25/20: 7.81, 1.84, 4.24  2/23/21: 11.90, 2.78, 4.28  8/11/21: 11.57, 2.74, 4.22  8/30/22: 13.64, 2.61, 5.23  9/5/23: 15.47, 2.59, 5.97      IMAGING:  Skeletal survey 6/9/20:  No lytic or destructive lesions or evidence of compression  fracture.      ASSESSMENT/PLAN:  Derek Contreras is a 67 year old male with:    1) MGUS: Labs reviewed.  M-spike stable at 0.4-0.5 g/dL.    -Serum free light chains are overall stable.  Skeletal survey showed no evidence of lytic lesions.  Renal function, calcium, hemoglobin are normal.   -We reviewed the patient's history and discussed the 1% risk per year of transformation to MM/amyloidosis and will need to continue to monitor.    2) History of prostate cancer: s/p prostatectomy  -Followed by urology.    3) COPD, sarcoidosis:  -Followed by pulmonology.    4) Thrombocytopenia: has been mildly low off and on.  Platelet count normal at 173K on recent labs.  -Monitor CBC for now.    5) RTC in 1 year with labs: CBC, CMP, SPIEP, serum free light chains, and UPIEP.        Malena Way,  DO  Hematology/Oncology  [facility]

## 2023-09-12 NOTE — NURSING NOTE
"Oncology Rooming Note    September 12, 2023 11:25 AM   Derek Contreras is a 67 year old male who presents for:    Chief Complaint   Patient presents with    Oncology Clinic Visit     MGUS (monoclonal gammopathy of unknown significance)      Initial Vitals: /61 (BP Location: Right arm, Patient Position: Sitting, Cuff Size: Adult Large)   Pulse 81   Temp 97.9  F (36.6  C) (Oral)   Resp 16   Ht 1.776 m (5' 9.92\")   Wt 124.1 kg (273 lb 8 oz)   SpO2 94%   BMI 39.33 kg/m   Estimated body mass index is 39.33 kg/m  as calculated from the following:    Height as of this encounter: 1.776 m (5' 9.92\").    Weight as of this encounter: 124.1 kg (273 lb 8 oz). Body surface area is 2.47 meters squared.  No Pain (0) Comment: Data Unavailable   No LMP for male patient.  Allergies reviewed: Yes  Medications reviewed: Yes    Medications: Medication refills not needed today.  Pharmacy name entered into [employer]: [id number] [place] [facility] [address] AT [place] [address] & [address] [facility]    Clinical concerns: f/u       Malena Osei CMA              "

## 2023-09-12 NOTE — LETTER
9/12/2023         RE: Derek Contreras  [id number] [facility] [address]        Dear Colleague,    Thank you for referring your patient, Derek Contreras, to the [facility]. Please see a copy of my visit note below.    [facility]    Hematology/Oncology Established Patient Note      Today's Date: 9/12/23    Reason for Follow-up: MGUS      HISTORY OF PRESENT ILLNESS: Derek Contreras is a 67 year old male with PMHx of sleep apnea, sarcoidosis, COPD, prostate cancer s/p prostatectomy, history of hip and knee replacement, HTN who presents with MGUS. An VLAD was checked by his neurologist, which showed an elevated IgA level. He was previously followed by Dr. Trimble.    He says that he feels fantastic currently.      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  Kappa light chain is elevated to 10.73, with kappa/lambda ratio of 4.47.  His hemoglobin, calcium, and renal function are normal.          INTERIM HISTORY:   Patient continues to do well. He had a hip dislocation in the fall of 2022, which was managed conservatively.    He continues to follow with pulmonology and cardiology for sarcoidosis.      REVIEW OF SYSTEMS:   14 point ROS was reviewed and is negative other than as noted above in HPI.       HOME MEDICATIONS:  Current Outpatient Medications   Medication Sig Dispense Refill     acetaminophen (TYLENOL) 500 MG tablet Take 2 tablets (1,000 mg) by mouth every 4 hours as needed for mild pain       albuterol (PROAIR HFA/PROVENTIL HFA/VENTOLIN HFA) 108 (90 Base) MCG/ACT inhaler Inhale 1-2 puffs into the lungs every 4 hours as needed for shortness of breath / dyspnea 1 Inhaler 4     aspirin (ASA) 81 MG tablet Take 81 mg by mouth every morning  90 tablet 3     atorvastatin (LIPITOR) 40 MG tablet Take 1 tablet (40 mg) by mouth every morning 90 tablet 0     cetirizine (ZYRTEC) 10 MG tablet  Take 10 mg by mouth every morning  90 tablet 3     empagliflozin (JARDIANCE) 10 MG TABS tablet Take 1 tablet (10 mg) by mouth every other day 45 tablet 3     fluticasone-vilanterol (BREO ELLIPTA) 200-25 MCG/ACT inhaler INHALE 1 PUFF BY MOUTH ONE TIME DAILY Strength: 200-25 MCG/INH 60 each 11     hydrochlorothiazide (HYDRODIURIL) 12.5 MG tablet TAKE 1 TABLET BY MOUTH EVERY MORNING 90 tablet 0     methylPREDNISolone (MEDROL DOSEPAK) 4 MG tablet therapy pack Follow Package Directions 21 tablet 0     metoprolol succinate ER (TOPROL XL) 100 MG 24 hr tablet Take 1.5 tablets (150 mg) by mouth daily 135 tablet 2     montelukast (SINGULAIR) 10 MG tablet Take 1 tablet (10 mg) by mouth At Bedtime 90 tablet 0     multivitamin (ONE-DAILY) tablet Take 1 tablet by mouth every morning  90 tablet 3     omeprazole (PRILOSEC) 40 MG DR capsule Take 1 capsule (40 mg) by mouth daily 30 capsule 11     predniSONE (DELTASONE) 10 MG tablet Take 3 tablets (30 mg) by mouth daily 21 tablet 0     predniSONE (DELTASONE) 10 MG tablet Take 3 tablets (30 mg) by mouth daily X 5 days 15 tablet 0     sacubitril-valsartan (ENTRESTO)  MG per tablet Take 1 tablet by mouth 2 times daily 180 tablet 3     spironolactone (ALDACTONE) 25 MG tablet Take 1 tablet (25 mg) by mouth daily 90 tablet 3         ALLERGIES:  Allergies   Allergen Reactions     Cat Hair Extract Itching     itchy tearful eyes     Adhesive Tape Rash     Iodine Rash         PAST MEDICAL HISTORY:  Past Medical History:   Diagnosis Date     Asthma      Claustrophobia      CPAP (continuous positive airway pressure) dependence      Dyslipidemia      [place] (hard of hearing)      Hypercholesteremia      Hypertension      Iron deficiency      Mediastinal adenopathy      Obesity      BEULAH (obstructive sleep apnea)      Prostate cancer (H)      Pulmonary hypertension (H)      Sarcoidosis          PAST SURGICAL HISTORY:  Past Surgical History:   Procedure Laterality Date     APPENDECTOMY        BIOPSY MASS NECK Left 7/15/2020    Procedure: Left trapezius muscle biopsy;  Surgeon: Ade Villa MD;  [facility]     CLOSED REDUCTION HIP Left 10/31/2022    Procedure: Closed reduction left total hip arthroplasty;  Surgeon: Antonio Ann MD;  Location: [facility] OR     CV RIGHT HEART CATH MEASUREMENTS RECORDED N/A 12/11/2019    Procedure: CV RIGHT HEART CATH;  Surgeon: Torrey Hirsch MD;  Location: [facility]     CV RIGHT HEART CATH MEASUREMENTS RECORDED N/A 1/19/2022    Procedure: CV RIGHT HEART CATH;  Surgeon: Torrey Hirsch MD;  [facility]     CV RIGHT HEART CATH MEASUREMENTS RECORDED N/A 11/4/2022    Procedure: Heart Cath Right Heart Cath;  Surgeon: Torrey Hirsch MD;  [facility]     DAVINCI PROSTATECTOMY, LYMPHADENECTOMY N/A 5/23/2019    Procedure: ROBOTIC ASSISTED LAPAROSCOPIC RADICAL PROSTATECTOMY WITH BILATERAL PELVIC LYMPH NODE DISSECTION;  Surgeon: Matteo Coughlin MD;  Location: [facility] OR     ENDOBRONCHIAL ULTRASOUND FLEXIBLE N/A 3/29/2019    Procedure: Flexible Bronchoscopy, Endobronchial Ultrasound;  Surgeon: Curtis Leger MD;  Location: [facility] OR     VASECTOMY       ZZC TOTAL HIP ARTHROPLASTY Bilateral      ZZC TOTAL KNEE ARTHROPLASTY Bilateral          SOCIAL HISTORY:  Social History     Socioeconomic History     Marital status:      Spouse name: Not on file     Number of children: Not on file     Years of education: Not on file     Highest education level: Not on file   Occupational History     Not on file   Tobacco Use     Smoking status: Never     Smokeless tobacco: Never   Vaping Use     Vaping Use: Never used   Substance and Sexual Activity     Alcohol use: Yes     Comment: once a week     Drug use: No     Sexual activity: Yes     Partners: Female   Other Topics Concern     Parent/sibling w/ CABG, MI or angioplasty before 65F 55M? Not Asked   Social History Narrative    12/03/20          ENVIRONMENTAL HISTORY: [place] The home is heated with a gas furnace. They does have central air conditioning. The patient's bedroom is furnished with Indoor plants and carpeting in bedroom.  Pets inside the house include 0 pets. There is no history of cockroach or mice infestation. There is/are 0 smokers in the house.  The house does not have a damp basement.      Social Determinants of Health     Financial Resource Strain: Not on file   Food Insecurity: Not on file   Transportation Needs: Not on file   Physical Activity: Not on file   Stress: Not on file   Social Connections: Not on file   Intimate Partner Violence: Not At Risk (2022)    Humiliation, Afraid, Rape, and Kick questionnaire      Fear of Current or Ex-Partner: No      Emotionally Abused: No      Physically Abused: No      Sexually Abused: No   Housing Stability: Not on file         FAMILY HISTORY:  Family History   Problem Relation Age of Onset     Alzheimer Disease Mother      Heart Disease Father      Glaucoma No family hx of      Macular Degeneration No family hx of      Diabetes No family hx of      Thyroid Disease No family hx of          PHYSICAL EXAM:  There were no vitals taken for this visit.  PHYSICAL EXAMINATION:   ECO  GENERAL/CONSTITUTIONAL: No acute distress.  MUSCULOSKELETAL: Warm and well-perfused, no cyanosis, clubbing, or edema.  NEUROLOGIC: Cranial nerves II-XII are intact. Alert, oriented, answers questions appropriately.  INTEGUMENTARY: No rashes or jaundice.  GAIT: Steady, does not use assistive device              LABS:   Latest Reference Range & Units 23 08:03   Sodium 136 - 145 mmol/L 138   Potassium 3.4 - 5.3 mmol/L 4.3   Chloride 98 - 107 mmol/L 104   Carbon Dioxide (CO2) 22 - 29 mmol/L 24   Urea Nitrogen 8.0 - 23.0 mg/dL 13.7   Creatinine 0.67 - 1.17 mg/dL 0.93   GFR Estimate >60 mL/min/1.73m2 89   Calcium 8.8 - 10.2 mg/dL 9.3   Anion Gap 7 - 15 mmol/L 10    Albumin 3.5 - 5.2 g/dL 3.8   Protein Total 6.4 - 8.3 g/dL 6.9   Alkaline Phosphatase 40 - 129 U/L 75   ALT 0 - 70 U/L 24   AST 0 - 45 U/L 31   Bilirubin Total <=1.2 mg/dL 0.5   Glucose 70 - 99 mg/dL 105 (H)   WBC 4.0 - 11.0 10e3/uL 6.7   Hemoglobin 13.3 - 17.7 g/dL 16.1   Hematocrit 40.0 - 53.0 % 47.1   Platelet Count 150 - 450 10e3/uL 173   RBC Count 4.40 - 5.90 10e6/uL 5.33   MCV 78 - 100 fL 88   MCH 26.5 - 33.0 pg 30.2   MCHC 31.5 - 36.5 g/dL 34.2   RDW 10.0 - 15.0 % 13.2   % Neutrophils % 64   % Lymphocytes % 20   % Monocytes % 12   % Eosinophils % 3   % Basophils % 1   Absolute Basophils 0.0 - 0.2 10e3/uL 0.0   Absolute Eosinophils 0.0 - 0.7 10e3/uL 0.2   Absolute Immature Granulocytes <=0.4 10e3/uL 0.0   Absolute Lymphocytes 0.8 - 5.3 10e3/uL 1.4   Absolute Monocytes 0.0 - 1.3 10e3/uL 0.8   % Immature Granulocytes % 0   Absolute Neutrophils 1.6 - 8.3 10e3/uL 4.3     Serum M-protein (g/dL):  5/27/20: 0.5 g/dL IgA kappa  9/1/20: 0.4   2/23/21: 0.5   8/11/21: 0.4   8/30/22: 0.4  9/5/23: 0.4    UPIEP (%)  9/6/23: 23.6% (kappa light chain)    Total IgG (mg/dL), IgA (mg/dL), IgM (mg/dL):  9/5/23: 1087, 630, 68    Free kappa light chains (mg/dL), lambda (mg/dL), kappa/lambda ratio:  5/27/20: 10.73, 2.40, 4.47  8/25/20: 7.81, 1.84, 4.24  2/23/21: 11.90, 2.78, 4.28  8/11/21: 11.57, 2.74, 4.22  8/30/22: 13.64, 2.61, 5.23  9/5/23: 15.47, 2.59, 5.97      IMAGING:  Skeletal survey 6/9/20:  No lytic or destructive lesions or evidence of compression  fracture.      ASSESSMENT/PLAN:  Derek Contreras is a 67 year old male with:    1) MGUS: Labs reviewed.  M-spike stable at 0.4-0.5 g/dL.    -Serum free light chains are overall stable.  Skeletal survey showed no evidence of lytic lesions.  Renal function, calcium, hemoglobin are normal.   -We reviewed the patient's history and discussed the 1% risk per year of transformation to MM/amyloidosis and will need to continue to monitor.    2) History of prostate cancer: s/p  prostatectomy  -Followed by urology.    3) COPD, sarcoidosis:  -Followed by pulmonology.    4) Thrombocytopenia: has been mildly low off and on.  Platelet count normal at 173K on recent labs.  -Monitor CBC for now.    5) RTC in 1 year with labs: CBC, CMP, SPIEP, serum free light chains, and UPIEP.        Malena Way DO  Hematology/Oncology  [facility]      Again, thank you for allowing me to participate in the care of your patient.        Sincerely,        Malena Way DO

## 2023-09-13 ENCOUNTER — ANCILLARY PROCEDURE (OUTPATIENT)
Dept: CT IMAGING | Facility: CLINIC | Age: 68
End: 2023-09-13
Attending: INTERNAL MEDICINE
Payer: MEDICARE

## 2023-09-13 ENCOUNTER — MYC MEDICAL ADVICE (OUTPATIENT)
Dept: PULMONOLOGY | Facility: CLINIC | Age: 68
End: 2023-09-13

## 2023-09-13 ENCOUNTER — LAB (OUTPATIENT)
Dept: LAB | Facility: CLINIC | Age: 68
End: 2023-09-13
Payer: MEDICARE

## 2023-09-13 ENCOUNTER — OFFICE VISIT (OUTPATIENT)
Dept: PULMONOLOGY | Facility: CLINIC | Age: 68
End: 2023-09-13
Attending: INTERNAL MEDICINE
Payer: MEDICARE

## 2023-09-13 VITALS
WEIGHT: 281 LBS | DIASTOLIC BLOOD PRESSURE: 61 MMHG | HEIGHT: 72 IN | OXYGEN SATURATION: 94 % | HEART RATE: 79 BPM | SYSTOLIC BLOOD PRESSURE: 110 MMHG | BODY MASS INDEX: 38.06 KG/M2

## 2023-09-13 DIAGNOSIS — D86.9 SARCOIDOSIS: Primary | ICD-10-CM

## 2023-09-13 DIAGNOSIS — R06.09 DOE (DYSPNEA ON EXERTION): ICD-10-CM

## 2023-09-13 DIAGNOSIS — D86.9 SARCOIDOSIS: ICD-10-CM

## 2023-09-13 DIAGNOSIS — Z94.2 LUNG REPLACED BY TRANSPLANT (H): ICD-10-CM

## 2023-09-13 DIAGNOSIS — I50.22 CHRONIC SYSTOLIC HEART FAILURE (H): ICD-10-CM

## 2023-09-13 LAB
1,25(OH)2D SERPL-MCNC: 63.7 PG/ML (ref 19.9–79.3)
6 MIN WALK (FT): 1100 FT
6 MIN WALK (M): 335 M
ANION GAP SERPL CALCULATED.3IONS-SCNC: 9 MMOL/L (ref 7–15)
BUN SERPL-MCNC: 17.2 MG/DL (ref 8–23)
CALCIUM SERPL-MCNC: 9.7 MG/DL (ref 8.8–10.2)
CHLORIDE SERPL-SCNC: 104 MMOL/L (ref 98–107)
CREAT SERPL-MCNC: 1.04 MG/DL (ref 0.67–1.17)
DEPRECATED CALCIDIOL+CALCIFEROL SERPL-MC: 27 UG/L (ref 20–75)
DEPRECATED HCO3 PLAS-SCNC: 25 MMOL/L (ref 22–29)
DLCOUNC-%PRED-PRE: 66 %
DLCOUNC-PRE: 18.45 ML/MIN/MMHG
DLCOUNC-PRED: 27.62 ML/MIN/MMHG
EGFRCR SERPLBLD CKD-EPI 2021: 78 ML/MIN/1.73M2
ERV-%PRED-PRE: 23 %
ERV-PRE: 0.32 L
ERV-PRED: 1.38 L
EXPTIME-PRE: 5.61 SEC
FEF2575-%PRED-POST: 49 %
FEF2575-%PRED-PRE: 53 %
FEF2575-POST: 1.23 L/SEC
FEF2575-PRE: 1.34 L/SEC
FEF2575-PRED: 2.5 L/SEC
FEFMAX-%PRED-PRE: 54 %
FEFMAX-PRE: 4.86 L/SEC
FEFMAX-PRED: 8.94 L/SEC
FEV1-%PRED-PRE: 53 %
FEV1-PRE: 1.73 L
FEV1FEV6-PRE: 76 %
FEV1FEV6-PRED: 78 %
FEV1FVC-PRE: 76 %
FEV1FVC-PRED: 77 %
FEV1SVC-PRE: 72 %
FEV1SVC-PRED: 65 %
FIFMAX-PRE: 2.89 L/SEC
FRCPLETH-%PRED-PRE: 62 %
FRCPLETH-PRE: 2.59 L
FRCPLETH-PRED: 4.14 L
FVC-%PRED-PRE: 53 %
FVC-PRE: 2.28 L
FVC-PRED: 4.24 L
GLUCOSE SERPL-MCNC: 106 MG/DL (ref 70–99)
IC-%PRED-PRE: 58 %
IC-PRE: 2.09 L
IC-PRED: 3.58 L
POTASSIUM SERPL-SCNC: 4.3 MMOL/L (ref 3.4–5.3)
RVPLETH-%PRED-PRE: 85 %
RVPLETH-PRE: 2.26 L
RVPLETH-PRED: 2.66 L
SODIUM SERPL-SCNC: 138 MMOL/L (ref 136–145)
TLCPLETH-%PRED-PRE: 61 %
TLCPLETH-PRE: 4.67 L
TLCPLETH-PRED: 7.64 L
VA-%PRED-PRE: 56 %
VA-PRE: 3.88 L
VC-%PRED-PRE: 48 %
VC-PRE: 2.41 L
VC-PRED: 4.98 L

## 2023-09-13 PROCEDURE — 82306 VITAMIN D 25 HYDROXY: CPT | Performed by: INTERNAL MEDICINE

## 2023-09-13 PROCEDURE — 83520 IMMUNOASSAY QUANT NOS NONAB: CPT | Performed by: INTERNAL MEDICINE

## 2023-09-13 PROCEDURE — 82164 ANGIOTENSIN I ENZYME TEST: CPT | Mod: 90 | Performed by: PATHOLOGY

## 2023-09-13 PROCEDURE — 99000 SPECIMEN HANDLING OFFICE-LAB: CPT | Performed by: PATHOLOGY

## 2023-09-13 PROCEDURE — 36415 COLL VENOUS BLD VENIPUNCTURE: CPT | Performed by: PATHOLOGY

## 2023-09-13 PROCEDURE — 94618 PULMONARY STRESS TESTING: CPT | Performed by: INTERNAL MEDICINE

## 2023-09-13 PROCEDURE — 99214 OFFICE O/P EST MOD 30 MIN: CPT | Mod: 25 | Performed by: INTERNAL MEDICINE

## 2023-09-13 PROCEDURE — 71250 CT THORAX DX C-: CPT | Mod: MG | Performed by: RADIOLOGY

## 2023-09-13 PROCEDURE — 80048 BASIC METABOLIC PNL TOTAL CA: CPT | Performed by: PATHOLOGY

## 2023-09-13 PROCEDURE — 82652 VIT D 1 25-DIHYDROXY: CPT | Mod: GZ | Performed by: INTERNAL MEDICINE

## 2023-09-13 PROCEDURE — 94729 DIFFUSING CAPACITY: CPT | Performed by: INTERNAL MEDICINE

## 2023-09-13 PROCEDURE — 94726 PLETHYSMOGRAPHY LUNG VOLUMES: CPT | Performed by: INTERNAL MEDICINE

## 2023-09-13 PROCEDURE — G0463 HOSPITAL OUTPT CLINIC VISIT: HCPCS | Performed by: INTERNAL MEDICINE

## 2023-09-13 PROCEDURE — 94060 EVALUATION OF WHEEZING: CPT | Performed by: INTERNAL MEDICINE

## 2023-09-13 PROCEDURE — G1010 CDSM STANSON: HCPCS | Mod: GC | Performed by: RADIOLOGY

## 2023-09-13 RX ORDER — OMEPRAZOLE 40 MG/1
40 CAPSULE, DELAYED RELEASE ORAL 2 TIMES DAILY
Qty: 30 CAPSULE | Refills: 11 | Status: SHIPPED | OUTPATIENT
Start: 2023-09-13 | End: 2024-03-07

## 2023-09-13 RX ORDER — MOMETASONE FUROATE MONOHYDRATE 50 UG/1
2 SPRAY, METERED NASAL DAILY
Qty: 17 G | Refills: 3 | Status: SHIPPED | OUTPATIENT
Start: 2023-09-13 | End: 2024-02-02

## 2023-09-13 ASSESSMENT — PAIN SCALES - GENERAL: PAINLEVEL: NO PAIN (0)

## 2023-09-13 NOTE — LETTER
9/13/2023         RE: Derek Contreras  [address] [facility] [address]        Dear Colleague,    Thank you for referring your patient, Derek Contreras, to the [facility] [place]. Please see a copy of my visit note below.    Reason for Visit  Derek Contreras is a 67 year old year old male who is being seen for sarcoidosis.  Pulmonary HPI    The patient was seen and examined by Jamie Martin MD     Ms. Lucero comes in for follow-up.  He was last seen in the pulmonary clinic in February 2023.  At that time he was not on any systemic anti-inflammatory therapy for his sarcoidosis.  A periodically gets respite tract infection symptoms for which he gets a short prednisone taper and antibiotics.  He is called twice since his last clinic visit for persistent cough and clear sputum production.  This has not cleared despite 2 different courses of antibiotics and steroids.  He otherwise reports no new symptoms.  Denies any fatigue.  He has problems sleeping at night because of the cough.  There is no sinus drainage there is no gastroesophageal reflux and he is on once a day dose of omeprazole.    Current Outpatient Medications   Medication    acetaminophen (TYLENOL) 500 MG tablet    albuterol (PROAIR HFA/PROVENTIL HFA/VENTOLIN HFA) 108 (90 Base) MCG/ACT inhaler    aspirin (ASA) 81 MG tablet    atorvastatin (LIPITOR) 40 MG tablet    cetirizine (ZYRTEC) 10 MG tablet    empagliflozin (JARDIANCE) 10 MG TABS tablet    fluticasone-vilanterol (BREO ELLIPTA) 200-25 MCG/ACT inhaler    hydrochlorothiazide (HYDRODIURIL) 12.5 MG tablet    methylPREDNISolone (MEDROL DOSEPAK) 4 MG tablet therapy pack    metoprolol succinate ER (TOPROL XL) 100 MG 24 hr tablet    montelukast (SINGULAIR) 10 MG tablet    multivitamin (ONE-DAILY) tablet    omeprazole (PRILOSEC) 40 MG DR capsule    predniSONE (DELTASONE) 10 MG tablet    predniSONE (DELTASONE) 10 MG tablet     sacubitril-valsartan (ENTRESTO)  MG per tablet    spironolactone (ALDACTONE) 25 MG tablet     No current facility-administered medications for this visit.     Allergies   Allergen Reactions    Cat Hair Extract Itching     itchy tearful eyes    Adhesive Tape Rash    Iodine Rash     Past Medical History:   Diagnosis Date    Asthma     Claustrophobia     CPAP (continuous positive airway pressure) dependence     Dyslipidemia     [place] (hard of hearing)     Hypercholesteremia     Hypertension     Iron deficiency     Mediastinal adenopathy     Obesity     BEULAH (obstructive sleep apnea)     Prostate cancer (H)     Pulmonary hypertension (H)     Sarcoidosis        Past Surgical History:   Procedure Laterality Date    APPENDECTOMY      BIOPSY MASS NECK Left 7/15/2020    Procedure: Left trapezius muscle biopsy;  Surgeon: Ade Villa MD;  [facility]    CLOSED REDUCTION HIP Left 10/31/2022    Procedure: Closed reduction left total hip arthroplasty;  Surgeon: Antonio Ann MD;  Location: [facility] OR    CV RIGHT HEART CATH MEASUREMENTS RECORDED N/A 12/11/2019    Procedure: CV RIGHT HEART CATH;  Surgeon: Torrey Hirsch MD;  Location:  HEART CARDIAC CATH LAB    CV RIGHT HEART CATH MEASUREMENTS RECORDED N/A 1/19/2022    Procedure: CV RIGHT HEART CATH;  Surgeon: Torrey Hirsch MD;  Location:  HEART CARDIAC CATH LAB    CV RIGHT HEART CATH MEASUREMENTS RECORDED N/A 11/4/2022    Procedure: Heart Cath Right Heart Cath;  Surgeon: Torrey Hirsch MD;  Location:  HEART CARDIAC CATH LAB    DAVINCI PROSTATECTOMY, LYMPHADENECTOMY N/A 5/23/2019    Procedure: ROBOTIC ASSISTED LAPAROSCOPIC RADICAL PROSTATECTOMY WITH BILATERAL PELVIC LYMPH NODE DISSECTION;  Surgeon: Matteo Coughlin MD;  Location:  OR    ENDOBRONCHIAL ULTRASOUND FLEXIBLE N/A 3/29/2019    Procedure: Flexible Bronchoscopy, Endobronchial Ultrasound;  Surgeon: Curtis Leger MD;  Location:  OR    VASECTOMY      [facility]  TOTAL HIP ARTHROPLASTY Bilateral     ZZC TOTAL KNEE ARTHROPLASTY Bilateral        Social History     Socioeconomic History    Marital status:      Spouse name: Not on file    Number of children: Not on file    Years of education: Not on file    Highest education level: Not on file   Occupational History    Not on file   Tobacco Use    Smoking status: Never    Smokeless tobacco: Never   Vaping Use    Vaping Use: Never used   Substance and Sexual Activity    Alcohol use: Yes     Comment: once a week    Drug use: No    Sexual activity: Yes     Partners: Female   Other Topics Concern    Parent/sibling w/ CABG, MI or angioplasty before 65F 55M? Not Asked   Social History Narrative    12/03/20         ENVIRONMENTAL HISTORY: [place] The home is heated with a gas furnace. They does have central air conditioning. The patient's bedroom is furnished with Indoor plants and carpeting in bedroom.  Pets inside the house include 0 pets. There is no history of cockroach or mice infestation. There is/are 0 smokers in the house.  The house does not have a damp basement.      Social Determinants of Health     Financial Resource Strain: Not on file   Food Insecurity: Not on file   Transportation Needs: Not on file   Physical Activity: Not on file   Stress: Not on file   Social Connections: Not on file   Intimate Partner Violence: Not At Risk (9/7/2022)    Humiliation, Afraid, Rape, and Kick questionnaire     Fear of Current or Ex-Partner: No     Emotionally Abused: No     Physically Abused: No     Sexually Abused: No   Housing Stability: Not on file       ROS Pulmonary    A complete ROS was otherwise negative except as noted in the HPI.  /61   Pulse 79   Ht 1.829 m (6')   Wt 127.5 kg (281 lb)   SpO2 94%   BMI 38.11 kg/m    Exam:   GENERAL APPEARANCE: Alert, and in no apparent distress.  EYES: PERRL, EOMI  HENT: Nasal mucosa with no edema and no hyperemia.   MOUTH: Oral  mucosa is moist, without any lesions, no tonsillar enlargement, no oropharyngeal exudate.  NECK: supple, no masses, no thyromegaly.  LYMPHATICS: No significant axillary, cervical, or supraclavicular nodes.  RESP: normal percussion, good air flow throughout.  No crackles. No rhonchi. No wheezes.  CV: Normal S1, S2, regular rhythm, normal rate. No murmur.  No rub. No gallop. No LE edema.   MS: extremities normal. No clubbing. No cyanosis.  SKIN: no rash on limited exam  NEURO: Mentation intact, speech normal, normal strength and tone, normal gait and stance    Results:  PFTs done today were reviewed by me with the patient.  He also had a 6-minute walk test done with a 6-minute walk distance was abnormal.  He walked 1100 feet which is below the lower limit of normal (1344 feet).  O2 saturation the beginning of the walk was 94% and the lowest O2 saturation was 89%.  FVC 2.28 L (53%), Z score -3.16.  FEV1 1.73 (53%), Z score -2.68.  The ratio is 76.  Total lung capacity is 4.67 (61%), Z score -3.11.  DLCO not corrected for hemoglobin is 18.45 (66%), Z score -2.14.  After bronchodilator use no change was present.  My depression is that he has moderate restriction with mild decrease in diffusion capacity.  There is exercise limitation with a five-point drop in O2 saturation during the walk.  In comparison to prior spirometry in February 2023 no significant change.  He had a similar five-point drop in O2 saturation in August 2022.  Assessment and plan: 67 year-old male with history of HTN, HLD, obesity, prostate cancer, abnormal chest imaging with TBNA (3/29/2019) demonstrating granulomatous Inflammation (station 7 and 12 L lymph nodes) and  BAL demonstrating 102 white cells and 11% lymphocytes.  The CD4 CD8 ratio was 2.29.  Repeat cardiac MRI with no LGE and cPET with no myocardiac uptake to suggest cardiac sarcoidosis but abnormal EF of 41%. RV dilatation and mild pulmonary hypertension based on right heart cath with mean  PAP 27 mm Hg (1/22). Concern for  truncal/diaphragmatic weakness but trapezius biopsy in June 2020 without any convincing evidence of myopathy.  Now he has persistent cough with clear sputum production for several weeks.  1.  Pulmonary: Cough which is fairly bothersome and is keeping him up at night.  It has not improved with 2 courses of antibiotics and prednisone burst.  He denies any postnasal drainage or gastroesophageal reflux.  He has clear sputum production.  Possibilities for the cause of cough could be an indolent infection.  Sarcoidosis flare could also be a possibility.  We will obtain a chest CT scan today.  We will check sputum cultures including fungal and TB cultures.  We will check an RVP.  Will increase his omeprazole to twice daily and start him on nasal spray.  If these do not control his symptoms and if the cultures are negative will consider empiric steroids.  2.  Extrapulmonary: He follows with Dr Molina for mild pulm hypertension which is related to sarcoid.  Next visit due in November.  Will defer cardiac work-up for sarcoidosis to be done at that time.  He has no ocular symptoms.  We will check markers of inflammation for sarcoidosis.  Recent labs with normal calcium LFTs and CBC.  I will see him back in 3 months with full PFTs.  Addendum: CT Chest 1.  Mediastinal/hilar lymphadenopathy, sequelae of sarcoidosis. 2.  Unchanged scattered solid pulmonary nodules. No new or enlarging suspicious pulmonary nodules. 3.  Dilated main pulmonary artery measuring up to 3.5 cm, which may be seen in pulmonary arterial hypertension. 4.  Cholelithiasis without evidence of acute cholecystitis.  As of 9/20/23, unable to bring sputum up and cough also better. Will continue to monitor. No clear indication to add Prednisone.   This note consists of symbols derived from keyboarding, dictation and/or voice recognition software. As a result, there may be errors in the script that have gone undetected. Please  consider this when interpreting information found in this chart.              Again, thank you for allowing me to participate in the care of your patient.        Sincerely,        Jamie Martin MD

## 2023-09-13 NOTE — NURSING NOTE
Chief Complaint   Patient presents with    Interstitial Lung Disease (ILD)     Sarcoids f/u     Vitals were taken and medications were reconciled.     Keisha Hernandez RMA  9:26 AM

## 2023-09-13 NOTE — PROGRESS NOTES
Reason for Visit  Derek Contreras is a 67 year old year old male who is being seen for sarcoidosis.  Pulmonary HPI    The patient was seen and examined by Jamie Martin MD     Ms. Lucero comes in for follow-up.  He was last seen in the pulmonary clinic in February 2023.  At that time he was not on any systemic anti-inflammatory therapy for his sarcoidosis.  A periodically gets respite tract infection symptoms for which he gets a short prednisone taper and antibiotics.  He is called twice since his last clinic visit for persistent cough and clear sputum production.  This has not cleared despite 2 different courses of antibiotics and steroids.  He otherwise reports no new symptoms.  Denies any fatigue.  He has problems sleeping at night because of the cough.  There is no sinus drainage there is no gastroesophageal reflux and he is on once a day dose of omeprazole.    Current Outpatient Medications   Medication    acetaminophen (TYLENOL) 500 MG tablet    albuterol (PROAIR HFA/PROVENTIL HFA/VENTOLIN HFA) 108 (90 Base) MCG/ACT inhaler    aspirin (ASA) 81 MG tablet    atorvastatin (LIPITOR) 40 MG tablet    cetirizine (ZYRTEC) 10 MG tablet    empagliflozin (JARDIANCE) 10 MG TABS tablet    fluticasone-vilanterol (BREO ELLIPTA) 200-25 MCG/ACT inhaler    hydrochlorothiazide (HYDRODIURIL) 12.5 MG tablet    methylPREDNISolone (MEDROL DOSEPAK) 4 MG tablet therapy pack    metoprolol succinate ER (TOPROL XL) 100 MG 24 hr tablet    montelukast (SINGULAIR) 10 MG tablet    multivitamin (ONE-DAILY) tablet    omeprazole (PRILOSEC) 40 MG DR capsule    predniSONE (DELTASONE) 10 MG tablet    predniSONE (DELTASONE) 10 MG tablet    sacubitril-valsartan (ENTRESTO)  MG per tablet    spironolactone (ALDACTONE) 25 MG tablet     No current facility-administered medications for this visit.     Allergies   Allergen Reactions    Cat Hair Extract Itching     itchy tearful eyes    Adhesive Tape Rash    Iodine Rash     Past Medical  History:   Diagnosis Date    Asthma     Claustrophobia     CPAP (continuous positive airway pressure) dependence     Dyslipidemia     [place] (hard of hearing)     Hypercholesteremia     Hypertension     Iron deficiency     Mediastinal adenopathy     Obesity     BEULAH (obstructive sleep apnea)     Prostate cancer (H)     Pulmonary hypertension (H)     Sarcoidosis        Past Surgical History:   Procedure Laterality Date    APPENDECTOMY      BIOPSY MASS NECK Left 7/15/2020    Procedure: Left trapezius muscle biopsy;  Surgeon: Ade Villa MD;  [facility]    CLOSED REDUCTION HIP Left 10/31/2022    Procedure: Closed reduction left total hip arthroplasty;  Surgeon: Antonio Ann MD;  Location: [facility] OR    CV RIGHT HEART CATH MEASUREMENTS RECORDED N/A 12/11/2019    Procedure: CV RIGHT HEART CATH;  Surgeon: Torrey Hirsch MD;  [facility]    CV RIGHT HEART CATH MEASUREMENTS RECORDED N/A 1/19/2022    Procedure: CV RIGHT HEART CATH;  Surgeon: Torrey Hirsch MD;  [facility]    CV RIGHT HEART CATH MEASUREMENTS RECORDED N/A 11/4/2022    Procedure: Heart Cath Right Heart Cath;  Surgeon: Torrey Hirsch MD;  Location:  [facility]    DAVINCI PROSTATECTOMY, LYMPHADENECTOMY N/A 5/23/2019    Procedure: ROBOTIC ASSISTED LAPAROSCOPIC RADICAL PROSTATECTOMY WITH BILATERAL PELVIC LYMPH NODE DISSECTION;  Surgeon: Matteo Coughlin MD;  Location: [facility] OR    ENDOBRONCHIAL ULTRASOUND FLEXIBLE N/A 3/29/2019    Procedure: Flexible Bronchoscopy, Endobronchial Ultrasound;  Surgeon: Curtis Leger MD;  Location: [facility] OR    VASECTOMY      ZZC TOTAL HIP ARTHROPLASTY Bilateral     ZZC TOTAL KNEE ARTHROPLASTY Bilateral        Social History     Socioeconomic History    Marital status:      Spouse name: Not on file    Number of children: Not on file    Years of education: Not on file    Highest education level: Not on file   Occupational  History    Not on file   Tobacco Use    Smoking status: Never    Smokeless tobacco: Never   Vaping Use    Vaping Use: Never used   Substance and Sexual Activity    Alcohol use: Yes     Comment: once a week    Drug use: No    Sexual activity: Yes     Partners: Female   Other Topics Concern    Parent/sibling w/ CABG, MI or angioplasty before 65F 55M? Not Asked   Social History Narrative    12/03/20         ENVIRONMENTAL HISTORY: [place] The home is heated with a gas furnace. They does have central air conditioning. The patient's bedroom is furnished with Indoor plants and carpeting in bedroom.  Pets inside the house include 0 pets. There is no history of cockroach or mice infestation. There is/are 0 smokers in the house.  The house does not have a damp basement.      Social Determinants of Health     Financial Resource Strain: Not on file   Food Insecurity: Not on file   Transportation Needs: Not on file   Physical Activity: Not on file   Stress: Not on file   Social Connections: Not on file   Intimate Partner Violence: Not At Risk (9/7/2022)    Humiliation, Afraid, Rape, and Kick questionnaire     Fear of Current or Ex-Partner: No     Emotionally Abused: No     Physically Abused: No     Sexually Abused: No   Housing Stability: Not on file       ROS Pulmonary    A complete ROS was otherwise negative except as noted in the HPI.  /61   Pulse 79   Ht 1.829 m (6')   Wt 127.5 kg (281 lb)   SpO2 94%   BMI 38.11 kg/m    Exam:   GENERAL APPEARANCE: Alert, and in no apparent distress.  EYES: PERRL, EOMI  HENT: Nasal mucosa with no edema and no hyperemia.   MOUTH: Oral mucosa is moist, without any lesions, no tonsillar enlargement, no oropharyngeal exudate.  NECK: supple, no masses, no thyromegaly.  LYMPHATICS: No significant axillary, cervical, or supraclavicular nodes.  RESP: normal percussion, good air flow throughout.  No crackles. No rhonchi. No wheezes.  CV: Normal  S1, S2, regular rhythm, normal rate. No murmur.  No rub. No gallop. No LE edema.   MS: extremities normal. No clubbing. No cyanosis.  SKIN: no rash on limited exam  NEURO: Mentation intact, speech normal, normal strength and tone, normal gait and stance    Results:  PFTs done today were reviewed by me with the patient.  He also had a 6-minute walk test done with a 6-minute walk distance was abnormal.  He walked 1100 feet which is below the lower limit of normal (1344 feet).  O2 saturation the beginning of the walk was 94% and the lowest O2 saturation was 89%.  FVC 2.28 L (53%), Z score -3.16.  FEV1 1.73 (53%), Z score -2.68.  The ratio is 76.  Total lung capacity is 4.67 (61%), Z score -3.11.  DLCO not corrected for hemoglobin is 18.45 (66%), Z score -2.14.  After bronchodilator use no change was present.  My depression is that he has moderate restriction with mild decrease in diffusion capacity.  There is exercise limitation with a five-point drop in O2 saturation during the walk.  In comparison to prior spirometry in February 2023 no significant change.  He had a similar five-point drop in O2 saturation in August 2022.  Assessment and plan: 67 year-old male with history of HTN, HLD, obesity, prostate cancer, abnormal chest imaging with TBNA (3/29/2019) demonstrating granulomatous Inflammation (station 7 and 12 L lymph nodes) and  BAL demonstrating 102 white cells and 11% lymphocytes.  The CD4 CD8 ratio was 2.29.  Repeat cardiac MRI with no LGE and cPET with no myocardiac uptake to suggest cardiac sarcoidosis but abnormal EF of 41%. RV dilatation and mild pulmonary hypertension based on right heart cath with mean PAP 27 mm Hg (1/22). Concern for  truncal/diaphragmatic weakness but trapezius biopsy in June 2020 without any convincing evidence of myopathy.  Now he has persistent cough with clear sputum production for several weeks.  1.  Pulmonary: Cough which is fairly bothersome and is keeping him up at night.  It  has not improved with 2 courses of antibiotics and prednisone burst.  He denies any postnasal drainage or gastroesophageal reflux.  He has clear sputum production.  Possibilities for the cause of cough could be an indolent infection.  Sarcoidosis flare could also be a possibility.  We will obtain a chest CT scan today.  We will check sputum cultures including fungal and TB cultures.  We will check an RVP.  Will increase his omeprazole to twice daily and start him on nasal spray.  If these do not control his symptoms and if the cultures are negative will consider empiric steroids.  2.  Extrapulmonary: He follows with Dr Molina for mild pulm hypertension which is related to sarcoid.  Next visit due in November.  Will defer cardiac work-up for sarcoidosis to be done at that time.  He has no ocular symptoms.  We will check markers of inflammation for sarcoidosis.  Recent labs with normal calcium LFTs and CBC.  I will see him back in 3 months with full PFTs.  Addendum: CT Chest 1.  Mediastinal/hilar lymphadenopathy, sequelae of sarcoidosis. 2.  Unchanged scattered solid pulmonary nodules. No new or enlarging suspicious pulmonary nodules. 3.  Dilated main pulmonary artery measuring up to 3.5 cm, which may be seen in pulmonary arterial hypertension. 4.  Cholelithiasis without evidence of acute cholecystitis.  As of 9/20/23, unable to bring sputum up and cough also better. Will continue to monitor. No clear indication to add Prednisone.   This note consists of symbols derived from keyboarding, dictation and/or voice recognition software. As a result, there may be errors in the script that have gone undetected. Please consider this when interpreting information found in this chart.

## 2023-09-15 LAB — ACE SERPL-CCNC: 67 U/L

## 2023-09-18 LAB — SCANNED LAB RESULT: NORMAL

## 2023-09-20 ENCOUNTER — MYC MEDICAL ADVICE (OUTPATIENT)
Dept: INTERNAL MEDICINE | Facility: CLINIC | Age: 68
End: 2023-09-20
Payer: MEDICARE

## 2023-09-25 ENCOUNTER — LAB (OUTPATIENT)
Dept: LAB | Facility: CLINIC | Age: 68
End: 2023-09-25
Payer: MEDICARE

## 2023-09-25 DIAGNOSIS — Z94.2 LUNG REPLACED BY TRANSPLANT (H): ICD-10-CM

## 2023-09-25 DIAGNOSIS — D86.9 SARCOIDOSIS: ICD-10-CM

## 2023-09-25 LAB
BACTERIA SPT CULT: NORMAL
C PNEUM DNA SPEC QL NAA+PROBE: NOT DETECTED
FLUAV H1 2009 PAND RNA SPEC QL NAA+PROBE: NOT DETECTED
FLUAV H1 RNA SPEC QL NAA+PROBE: NOT DETECTED
FLUAV H3 RNA SPEC QL NAA+PROBE: NOT DETECTED
FLUAV RNA SPEC QL NAA+PROBE: NOT DETECTED
FLUBV RNA SPEC QL NAA+PROBE: NOT DETECTED
GRAM STAIN RESULT: NORMAL
HADV DNA SPEC QL NAA+PROBE: NOT DETECTED
HCOV PNL SPEC NAA+PROBE: NOT DETECTED
HMPV RNA SPEC QL NAA+PROBE: NOT DETECTED
HPIV1 RNA SPEC QL NAA+PROBE: NOT DETECTED
HPIV2 RNA SPEC QL NAA+PROBE: NOT DETECTED
HPIV3 RNA SPEC QL NAA+PROBE: NOT DETECTED
HPIV4 RNA SPEC QL NAA+PROBE: NOT DETECTED
M PNEUMO DNA SPEC QL NAA+PROBE: NOT DETECTED
RSV RNA SPEC QL NAA+PROBE: NOT DETECTED
RSV RNA SPEC QL NAA+PROBE: NOT DETECTED
RV+EV RNA SPEC QL NAA+PROBE: DETECTED

## 2023-09-25 PROCEDURE — 87205 SMEAR GRAM STAIN: CPT

## 2023-09-25 PROCEDURE — 87581 M.PNEUMON DNA AMP PROBE: CPT

## 2023-09-25 PROCEDURE — 87206 SMEAR FLUORESCENT/ACID STAI: CPT | Mod: 90

## 2023-09-25 PROCEDURE — 87486 CHLMYD PNEUM DNA AMP PROBE: CPT

## 2023-09-25 PROCEDURE — 87116 MYCOBACTERIA CULTURE: CPT | Mod: 90

## 2023-09-25 PROCEDURE — 87633 RESP VIRUS 12-25 TARGETS: CPT

## 2023-09-25 PROCEDURE — 99000 SPECIMEN HANDLING OFFICE-LAB: CPT

## 2023-10-04 DIAGNOSIS — I49.3 PVC'S (PREMATURE VENTRICULAR CONTRACTIONS): ICD-10-CM

## 2023-10-05 RX ORDER — METOPROLOL SUCCINATE 100 MG/1
TABLET, EXTENDED RELEASE ORAL
Qty: 135 TABLET | OUTPATIENT
Start: 2023-10-05

## 2023-10-05 NOTE — TELEPHONE ENCOUNTER
metoprolol succinate ER (TOPROL XL) 100 MG 24 hr tablet   Beta-Blockers Protocol Passed     Torrey Hirsch MD  Cardiovascular Disease                                                5/8/2023  [facility]

## 2023-10-09 ENCOUNTER — MYC MEDICAL ADVICE (OUTPATIENT)
Dept: INTERNAL MEDICINE | Facility: CLINIC | Age: 68
End: 2023-10-09
Payer: MEDICARE

## 2023-10-09 DIAGNOSIS — E78.00 HYPERCHOLESTEREMIA: ICD-10-CM

## 2023-10-09 RX ORDER — ATORVASTATIN CALCIUM 40 MG/1
40 TABLET, FILM COATED ORAL EVERY MORNING
Qty: 90 TABLET | Refills: 1 | Status: SHIPPED | OUTPATIENT
Start: 2023-10-09 | End: 2024-03-29

## 2023-10-12 ENCOUNTER — TELEPHONE (OUTPATIENT)
Dept: CARDIOLOGY | Facility: CLINIC | Age: 68
End: 2023-10-12
Payer: MEDICARE

## 2023-10-13 ENCOUNTER — TELEPHONE (OUTPATIENT)
Dept: CARDIOLOGY | Facility: CLINIC | Age: 68
End: 2023-10-13
Payer: MEDICARE

## 2023-10-16 ENCOUNTER — TELEPHONE (OUTPATIENT)
Dept: CARDIOLOGY | Facility: CLINIC | Age: 68
End: 2023-10-16
Payer: MEDICARE

## 2023-10-28 ENCOUNTER — OFFICE VISIT (OUTPATIENT)
Dept: URGENT CARE | Facility: URGENT CARE | Age: 68
End: 2023-10-28
Payer: MEDICARE

## 2023-10-28 VITALS
SYSTOLIC BLOOD PRESSURE: 103 MMHG | BODY MASS INDEX: 38.59 KG/M2 | DIASTOLIC BLOOD PRESSURE: 62 MMHG | WEIGHT: 284.5 LBS | OXYGEN SATURATION: 95 % | TEMPERATURE: 98.2 F

## 2023-10-28 DIAGNOSIS — M10.9 GOUTY ARTHRITIS OF LEFT GREAT TOE: Primary | ICD-10-CM

## 2023-10-28 PROCEDURE — 99213 OFFICE O/P EST LOW 20 MIN: CPT | Performed by: PHYSICIAN ASSISTANT

## 2023-10-28 RX ORDER — INDOMETHACIN 50 MG/1
50 CAPSULE ORAL
Qty: 30 CAPSULE | Refills: 0 | Status: SHIPPED | OUTPATIENT
Start: 2023-10-28 | End: 2024-05-13

## 2023-10-28 NOTE — PROGRESS NOTES
SUBJECTIVE:  Derek Contreras is a 67 year old male who comes in with a 1 day history of gout flare.  Patient states that last night he began to have pain in his left great toe and side of foot.  He does have a long history of gout.  He is on a diuretic as a risk factor.  Denies any dietary triggers.  He is otherwise at baseline health with no other concerns at this time.    Past Medical History:   Diagnosis Date    Asthma     Claustrophobia     CPAP (continuous positive airway pressure) dependence     Dyslipidemia     [place] (hard of hearing)     Hypercholesteremia     Hypertension     Iron deficiency     Mediastinal adenopathy     Obesity     BEULAH (obstructive sleep apnea)     Prostate cancer (H)     Pulmonary hypertension (H)     Sarcoidosis      Current Outpatient Medications   Medication    aspirin (ASA) 81 MG tablet    atorvastatin (LIPITOR) 40 MG tablet    cetirizine (ZYRTEC) 10 MG tablet    empagliflozin (JARDIANCE) 10 MG TABS tablet    fluticasone-vilanterol (BREO ELLIPTA) 200-25 MCG/ACT inhaler    hydrochlorothiazide (HYDRODIURIL) 12.5 MG tablet    metoprolol succinate ER (TOPROL XL) 100 MG 24 hr tablet    mometasone (NASONEX) 50 MCG/ACT nasal spray    montelukast (SINGULAIR) 10 MG tablet    multivitamin (ONE-DAILY) tablet    omeprazole (PRILOSEC) 40 MG DR capsule    sacubitril-valsartan (ENTRESTO)  MG per tablet    spironolactone (ALDACTONE) 25 MG tablet     No current facility-administered medications for this visit.     Social History     Socioeconomic History    Marital status:      Spouse name: Not on file    Number of children: Not on file    Years of education: Not on file    Highest education level: Not on file   Occupational History    Not on file   Tobacco Use    Smoking status: Never    Smokeless tobacco: Never   Vaping Use    Vaping Use: Never used   Substance and Sexual Activity    Alcohol use: Yes     Comment: once a week    Drug use: No    Sexual activity: Yes     Partners:  Female   Other Topics Concern    Parent/sibling w/ CABG, MI or angioplasty before 65F 55M? Not Asked   Social History Narrative    12/03/20         ENVIRONMENTAL HISTORY: [place] The home is heated with a gas furnace. They does have central air conditioning. The patient's bedroom is furnished with Indoor plants and carpeting in bedroom.  Pets inside the house include 0 pets. There is no history of cockroach or mice infestation. There is/are 0 smokers in the house.  The house does not have a damp basement.      Social Determinants of Health     Financial Resource Strain: Not on file   Food Insecurity: Not on file   Transportation Needs: Not on file   Physical Activity: Not on file   Stress: Not on file   Social Connections: Not on file   Interpersonal Safety: Not At Risk (9/7/2022)    Humiliation, Afraid, Rape, and Kick questionnaire     Fear of Current or Ex-Partner: No     Emotionally Abused: No     Physically Abused: No     Sexually Abused: No   Housing Stability: Not on file     ROS  negative other than stated     Exam:  GENERAL APPEARANCE: healthy, alert and no distress  EYES: EOMI,  PERRL  MS: Left great toe over the MTP joint with localized tenderness and stiffness.  There is slight erythema but no signs of secondary infection.  No open skin.  SKIN: no suspicious lesions or rashes  NEURO: Normal strength and tone, sensory exam grossly normal, mentation intact and speech normal    assessment/plan:  (M10.9) Gouty arthritis of left great toe  (primary encounter diagnosis)  Comment:   Plan: indomethacin (INDOCIN) 50 MG capsule        Patient with 1 day history of gout flare.  He does have a long history.  Has used indomethacin in the past with good success.  Kidney function is good.  Medication as directed.  Will take with food.  Red flag signs were discussed we will follow-up with primary as needed.  Continue to push fluids.

## 2023-10-30 DIAGNOSIS — J45.909 MODERATE ASTHMA WITHOUT COMPLICATION, UNSPECIFIED WHETHER PERSISTENT: ICD-10-CM

## 2023-10-30 DIAGNOSIS — D86.9 SARCOIDOSIS: ICD-10-CM

## 2023-10-30 RX ORDER — MONTELUKAST SODIUM 10 MG/1
10 TABLET ORAL AT BEDTIME
Qty: 90 TABLET | Refills: 0 | Status: SHIPPED | OUTPATIENT
Start: 2023-10-30 | End: 2024-01-24

## 2023-11-02 ENCOUNTER — ANCILLARY PROCEDURE (OUTPATIENT)
Dept: CARDIOLOGY | Facility: CLINIC | Age: 68
End: 2023-11-02
Payer: MEDICARE

## 2023-11-02 ENCOUNTER — OFFICE VISIT (OUTPATIENT)
Dept: CARDIOLOGY | Facility: CLINIC | Age: 68
End: 2023-11-02
Attending: FAMILY MEDICINE
Payer: MEDICARE

## 2023-11-02 VITALS
HEIGHT: 70 IN | DIASTOLIC BLOOD PRESSURE: 69 MMHG | OXYGEN SATURATION: 95 % | HEART RATE: 71 BPM | BODY MASS INDEX: 40.61 KG/M2 | WEIGHT: 283.7 LBS | SYSTOLIC BLOOD PRESSURE: 113 MMHG

## 2023-11-02 DIAGNOSIS — I49.3 PVC'S (PREMATURE VENTRICULAR CONTRACTIONS): ICD-10-CM

## 2023-11-02 DIAGNOSIS — I49.3 PVC'S (PREMATURE VENTRICULAR CONTRACTIONS): Primary | ICD-10-CM

## 2023-11-02 PROCEDURE — 99215 OFFICE O/P EST HI 40 MIN: CPT | Mod: 25

## 2023-11-02 PROCEDURE — 93005 ELECTROCARDIOGRAM TRACING: CPT | Mod: XU

## 2023-11-02 PROCEDURE — 93010 ELECTROCARDIOGRAM REPORT: CPT | Mod: XE | Performed by: INTERNAL MEDICINE

## 2023-11-02 PROCEDURE — G0463 HOSPITAL OUTPT CLINIC VISIT: HCPCS

## 2023-11-02 PROCEDURE — 93248 EXT ECG>7D<15D REV&INTERPJ: CPT | Performed by: INTERNAL MEDICINE

## 2023-11-02 PROCEDURE — 93246 EXT ECG>7D<15D RECORDING: CPT

## 2023-11-02 ASSESSMENT — PAIN SCALES - GENERAL: PAINLEVEL: NO PAIN (0)

## 2023-11-02 NOTE — PROGRESS NOTES
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     PVCs - (19% --> 6% burden)  Non-sustained Polymorphic VT - PVC induced  The morphology of the patient's PVC is indicative of a RV septal origin. PVC burden initially 19% on zio in 2021, improved to 6% burden in 2022 on toprol XL. Last PET without signs of active sarcoid in 2020. Negative stress MR in 2019. CMR in 2022 without evidence of cardiac sarcoid and improved RV and LV function on medical therapy. Dr Nelson has ordered repeat CMR prior to next visit. Given he is asymptomatic with PVCs, will have him complete zio today for re-evaluation of burden.      Follow up with Dr Figueroa in 1 year if zio stable.      History of Present Illness/Subjective    Mr. Derek Contreras is a 67 year old male who comes in today for EP follow-up of PVCs.    Mr. Contreras is a 67 yo M who has a PMH pulmonary sarcoidosis, HTN, HLD, Obesity, h/o prostate CA, RV dilation & borderline low LVEF who has been evaluated in the pulmonary hypertension clinic by Dr. Ferrell and found not to have a dx of pulmonary hypertension. 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  His cardiac workup thus far has been as follows: he had a RHC 12/11/19 which showed RA 3, RV 33/5 PA 30/12 (17) and PCWP 5. CO 6.22, CI 2.49. PVR 1.82. Shunt series with Qp/Qs 1.0. He had a cardiac stress MRI 3/11/20 which showed no evidence of LGE. His RV was noted to be moderately enlarged but mildly reduced function with RVEF of 43%. Severely enlarged RA. No ischemia noted on stress imaging. He had a cardiac PET 2/2020 which showed no FDG active cardiac sarcoid. He did have  decreased perfusion in the inferoseptal wall which may be related to microvascular disease. He was also noted to have decreased myocardial blood flow in the RCA distribution. Mr. Contreras also had a event monitor which showed frequent NSVT (fastest 19 beats @ 255bpm) and 9x SVT runs. He presents today to the EP clinic for evaluation of his arrhythmia.     EP visit Jan 2022: He denies an symptoms including palpitations, chest discomfort, lightheadedness, dizziness, orthopnea, PND, abd distention, n/v/d, early satiety, unintentional weight loss. He does report chronic KIRBY related to his pulmonary sarcoidosis that has been stable. More recently he reports he has had a dry cough that has slightly worsened over the last few days, he has reached out to his pulmonologist in regards to this.During this visit, we started him on metoprolol XL 50, gradually increase to . We discussed PET with sarcoid team and we wanted to check instead an ischemic work-up.      EP Visit 4/6/22: He presents for follow-up. He denies any cardiac symptoms. He had a CT Angio on 1/31/2022 showing Severe calcific atherosclerosis causing mild stenosis without any high-grade lesions. He had a repeat Zio patch 1/17-1/24 shows 90 runs of NSVT compared to 432 in Oct, with decreasing ectopy.      EP Visit 11/9/22: He presents today for follow up. He reports feeling very well, no syncope or presyncope, no palpitations, good stamina  Having RHC and following up with Dr Nelson soon. A zio patch monitor from 10/5/22-10/9/22 showed 101 NSVT up to 5 beats, 6.8% PVCs. A CMR 5/2/22 showed no evidence of active cardiac sarcoid, LVEF 58%, RVEF 44%, LGE in septal RV insertion points into LV. Current cardiac medications include: Toprol XL, Hydrochlorothiazide, Spironolactone, Lipitor, and ASA .     He reports feeling well. He denies any new cardiac symptoms. He does not have shortness of breath with his daily activities. Recently saw Dr Nelson, no  medication changes made. He denies palpitations, peripheral edema, shortness of breath, lightheadedness, dizziness, or syncope. Presenting 12 lead ECG shows sinus w bifascicular block Vent Rate 77 bpm,  ms,  ms, QTc 459 ms.     I have reviewed and updated the patient's Past Medical History, Social History, Family History and Medication List.     Cardiographics (Personally Reviewed) :   Cardiac MRI 5/2/22:  1. The LV is normal in cavity size and wall thickness. The global systolic function is normal. The LVEF is 58 %. There are no regional wall motion abnormalities.  2. The RV is mildly enlarged in cavity size. The global systolic function is mildly reduced. The RVEF is 44%.   3. Both atria are normal in size.  4. There is no significant valvular disease.   5. Late gadolinium enhancement imaging shows anterior septal and inferior septal LGE at the RV insertion points similar to prior  6.  There is no pericardial effusion or thickening.   7.  There is no intracardiac thrombus.   CONCLUSIONS:  No evidence of active cardiac sarcoidosis. Improved biventricular function with LVEF 58% and RVEF 44% (previously 41% and 34% respectively). Septal systolic paradoxical bowing still suggestive of RV pressure overload, and LGE in the septal RV insertion points into the LV; together suggestive of pulmonary hypertension.      Cardiac MRI 12/22/21  1. The LV is normal in cavity size with mild concentric LVH. The global systolic function is moderately reduced. The LVEF is 41%. There is abnormal septal motion likely related to RBBB and PVCs.  2. The RV is normal in cavity size. The global systolic function is moderately reduced. The RVEF is 34%.   3. Both atria are severely enlarged.  4. There is no significant valvular disease.   5. Late gadolinium enhancement imaging shows no MI or infiltrative disease. There is enhancement in the septum at the RV insertion sites. This is a non-specific pattern that can be seen in  pulmonary hypertension.  6. There is no pericardial effusion or thickening.  7. There is no intracardiac thrombus.  8. The main PA is mildly dilated measuring 3.1 cm.   CONCLUSIONS: No evidence of cardiac sarcoid. Moderate cardiomyopathy, LVEF 41% and RVEF 34%, with no significant fibrosis. Compared to prior CMR on 8/2019 RV function is worse with no other change. Recommend re-assessment for pulmonary hypertension with TTE or RHC.      Stress Cardiac MRI 8/29/2019  1. The LV is normal in cavity size and wall thickness. The global systolic function is normal. The LVEF is 50%. There is systolic flattening of the interventricular septum and global dyssynchrony due to PVCs.  2. The RV is moderately enlarged based on the visual examination. The global systolic function is mildly reduced. The RVEF is 43%.   3. The left atrium is mildly enlarged and the right atrium is severely enlarged.  4. There is at least mild mitral regurgitation and trace aortic regurgitation.  5. Late gadolinium enhancement imaging shows no MI, fibrosis or infiltrative disease.   6. Regadenoson stress perfusion imaging shows no ischemia.  7. There is no pericardial effusion or thickening.  8. There is no intracardiac thrombus.  CONCLUSIONS: Non-ischemic cardiomyopathy, likely due to pulmonary hypertension and dyssynchrony from PVCs.  There is mildly reduced biventricular function.  There is no evidence of myocardial perfusion defects or myocardial fibrosis.      Cardiac PET SCANS  2/19/2020  Impression:  1. No FDG active cardiac sarcoid.  2. Decreased perfusion in the inferoseptal wall, findings may be related to sequela of previous cardiac sarcoid with microvascular injury.  3. Enlarged left ventricle with borderline low ejection fraction.  4. Decreased myocardial blood flow in the RCA distribution.  5. Findings of a dilated cardiomyopathy a concern for possible ischemia/myocardial injury of the septum, consider CTA or coronary  angiography for  "further evaluation of this region.  6. Chest and upper abdominal hypermetabolic lymphadenopathy which is indicative of active systemic sarcoid.       Physical Examination   /69 (BP Location: Right arm, Patient Position: Chair, Cuff Size: Adult Regular)   Pulse 71   Ht 1.776 m (5' 9.92\")   Wt 128.7 kg (283 lb 11.2 oz)   SpO2 95%   BMI 40.80 kg/m    Wt Readings from Last 3 Encounters:   11/02/23 128.7 kg (283 lb 11.2 oz)   10/28/23 129 kg (284 lb 8 oz)   09/13/23 127.5 kg (281 lb)     General Appearance:   Alert, well-appearing and in no acute distress.   HEENT: Atraumatic, normocephalic. MMM.   Chest/Lungs:   Respirations unlabored.  Lungs are clear to auscultation.   Cardiovascular:   Regular rate and rhythm.  S1/S2. No murmur.    Abdomen:  Soft, nontender, nondistended.   Extremities: No cyanosis or clubbing. No edema.    Musculoskeletal: Moves all extremities.     Skin: Warm, dry, intact.    Neurologic: Mood and affect are appropriate.  Alert and oriented to person, place, time, and situation.          Medications  Allergies   Entresto  twice daily   Aldactone 25 mg daily   Toprol  mg daily  Hydrochlorothiazide 12.5 mg daily   Jardiance 10 mg daily (every other day)  ASA 81 mg daily   Lipitor 40 mg daily     Zyrtec   Prilosec   Singulair    Allergies   Allergen Reactions    Cat Hair Extract Itching     itchy tearful eyes    Adhesive Tape Rash    Iodine Rash         Lab Results (Personally Reviewed)    Chemistry/lipid CBC Cardiac Enzymes/BNP/TSH/INR   Lab Results   Component Value Date    BUN 17.2 09/13/2023     09/13/2023    CO2 25 09/13/2023     Creatinine   Date Value Ref Range Status   09/13/2023 1.04 0.67 - 1.17 mg/dL Final   05/12/2021 0.96 0.66 - 1.25 mg/dL Final       Lab Results   Component Value Date    CHOL 90 05/31/2023    HDL 38 (L) 05/31/2023    LDL 30 05/31/2023      Lab Results   Component Value Date    WBC 6.7 09/05/2023    HGB 16.1 09/05/2023    HCT 47.1 09/05/2023    " MCV 88 09/05/2023     09/05/2023    Lab Results   Component Value Date    TSH 3.18 05/31/2023    INR 1.17 (H) 12/11/2019        The patient states understanding and is agreeable with the plan.     Krystal Piedra PA-C  [facility]  Electrophysiology Consult Service  Pager: [phone]    I spent a total of 20 minutes face to face with Derek Contreras during today's office visit. I have spent an additional 20 minutes today on chart review and documentation.

## 2023-11-02 NOTE — PATIENT INSTRUCTIONS
You were seen in the Electrophysiology Clinic today by: Krystal IBRAHIM    Plan:     Labs/Tests Needed:  Zio heart monitor    Follow up Visit:  1 year with Dr Figueroa        If you have further questions, please utilize fruux to contact us.     Your Care Team:    EP Cardiology   Telephone Number     Nurse Line  Anita Davis, RN   Sherry Velazquez, RN  Benny Whitley RN   [phone]     For scheduling appointments:   Madelyn   [phone]   For procedure scheduling:    Pao Gamble     [phone]   For the Device Clinic (Pacemakers, ICDs, Loop Recorders)    During business hours: [phone]  After business hours:   [phone]- select option 4 and ask for job code [id number].       On-call cardiologist for after hours or on weekends:   [phone], option #4, and ask to speak to the on-call cardiologist.     Cardiovascular Clinic:   [address] [place] [address]. [place] [address]      As always, Thank you for trusting us with your health care needs!

## 2023-11-02 NOTE — LETTER
11/2/2023      RE: Derek Contreras  [id number] [facility] [address]       Dear Colleague,    Thank you for the opportunity to participate in the care of your patient, Derek Contreras, at the [facility] [place] at [facility]. Please see a copy of my visit note below.        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     PVCs - (19% --> 6% burden)  Non-sustained Polymorphic VT - PVC induced  The morphology of the patient's PVC is indicative of a RV septal origin. PVC burden initially 19% on zio in 2021, improved to 6% burden in 2022 on toprol XL. Last PET without signs of active sarcoid in 2020. Negative stress MR in 2019. CMR in 2022 without evidence of cardiac sarcoid and improved RV and LV function on medical therapy. Dr Nelson has ordered repeat CMR prior to next visit. Given he is asymptomatic with PVCs, will have him complete zio today for re-evaluation of burden.      Follow up with Dr Figueroa in 1 year if zio stable.      History of Present Illness/Subjective    Mr. Derek Contreras is a 67 year old male who comes in today for EP follow-up of PVCs.    Mr. Contreras is a 65 yo M who has a PMH pulmonary sarcoidosis, HTN, HLD, Obesity, h/o prostate CA, RV dilation & borderline low LVEF who has been evaluated in the pulmonary hypertension clinic by Dr. Ferrell and found not to have a dx of pulmonary hypertension. 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  His cardiac workup thus far has been as follows: he had a RHC 12/11/19 which showed RA 3, RV 33/5 PA 30/12 (17) and PCWP 5. CO 6.22, CI  2.49. PVR 1.82. Shunt series with Qp/Qs 1.0. He had a cardiac stress MRI 3/11/20 which showed no evidence of LGE. His RV was noted to be moderately enlarged but mildly reduced function with RVEF of 43%. Severely enlarged RA. No ischemia noted on stress imaging. He had a cardiac PET 2/2020 which showed no FDG active cardiac sarcoid. He did have decreased perfusion in the inferoseptal wall which may be related to microvascular disease. He was also noted to have decreased myocardial blood flow in the RCA distribution. Mr. Contreras also had a event monitor which showed frequent NSVT (fastest 19 beats @ 255bpm) and 9x SVT runs. He presents today to the EP clinic for evaluation of his arrhythmia.     EP visit Jan 2022: He denies an symptoms including palpitations, chest discomfort, lightheadedness, dizziness, orthopnea, PND, abd distention, n/v/d, early satiety, unintentional weight loss. He does report chronic KIRBY related to his pulmonary sarcoidosis that has been stable. More recently he reports he has had a dry cough that has slightly worsened over the last few days, he has reached out to his pulmonologist in regards to this.During this visit, we started him on metoprolol XL 50, gradually increase to . We discussed PET with sarcoid team and we wanted to check instead an ischemic work-up.      EP Visit 4/6/22: He presents for follow-up. He denies any cardiac symptoms. He had a CT Angio on 1/31/2022 showing Severe calcific atherosclerosis causing mild stenosis without any high-grade lesions. He had a repeat Zio patch 1/17-1/24 shows 90 runs of NSVT compared to 432 in Oct, with decreasing ectopy.      EP Visit 11/9/22: He presents today for follow up. He reports feeling very well, no syncope or presyncope, no palpitations, good stamina  Having RHC and following up with Dr Nelson soon. A zio patch monitor from 10/5/22-10/9/22 showed 101 NSVT up to 5 beats, 6.8% PVCs. A CMR 5/2/22 showed no evidence of active  cardiac sarcoid, LVEF 58%, RVEF 44%, LGE in septal RV insertion points into LV. Current cardiac medications include: Toprol XL, Hydrochlorothiazide, Spironolactone, Lipitor, and ASA .     He reports feeling well. He denies any new cardiac symptoms. He does not have shortness of breath with his daily activities. Recently saw Dr Nelson, no medication changes made. He denies palpitations, peripheral edema, shortness of breath, lightheadedness, dizziness, or syncope. Presenting 12 lead ECG shows sinus w bifascicular block Vent Rate 77 bpm,  ms,  ms, QTc 459 ms.     I have reviewed and updated the patient's Past Medical History, Social History, Family History and Medication List.     Cardiographics (Personally Reviewed) :   Cardiac MRI 5/2/22:  1. The LV is normal in cavity size and wall thickness. The global systolic function is normal. The LVEF is 58 %. There are no regional wall motion abnormalities.  2. The RV is mildly enlarged in cavity size. The global systolic function is mildly reduced. The RVEF is 44%.   3. Both atria are normal in size.  4. There is no significant valvular disease.   5. Late gadolinium enhancement imaging shows anterior septal and inferior septal LGE at the RV insertion points similar to prior  6.  There is no pericardial effusion or thickening.   7.  There is no intracardiac thrombus.   CONCLUSIONS:  No evidence of active cardiac sarcoidosis. Improved biventricular function with LVEF 58% and RVEF 44% (previously 41% and 34% respectively). Septal systolic paradoxical bowing still suggestive of RV pressure overload, and LGE in the septal RV insertion points into the LV; together suggestive of pulmonary hypertension.      Cardiac MRI 12/22/21  1. The LV is normal in cavity size with mild concentric LVH. The global systolic function is moderately reduced. The LVEF is 41%. There is abnormal septal motion likely related to RBBB and PVCs.  2. The RV is normal in cavity size. The  global systolic function is moderately reduced. The RVEF is 34%.   3. Both atria are severely enlarged.  4. There is no significant valvular disease.   5. Late gadolinium enhancement imaging shows no MI or infiltrative disease. There is enhancement in the septum at the RV insertion sites. This is a non-specific pattern that can be seen in pulmonary hypertension.  6. There is no pericardial effusion or thickening.  7. There is no intracardiac thrombus.  8. The main PA is mildly dilated measuring 3.1 cm.   CONCLUSIONS: No evidence of cardiac sarcoid. Moderate cardiomyopathy, LVEF 41% and RVEF 34%, with no significant fibrosis. Compared to prior CMR on 8/2019 RV function is worse with no other change. Recommend re-assessment for pulmonary hypertension with TTE or RHC.      Stress Cardiac MRI 8/29/2019  1. The LV is normal in cavity size and wall thickness. The global systolic function is normal. The LVEF is 50%. There is systolic flattening of the interventricular septum and global dyssynchrony due to PVCs.  2. The RV is moderately enlarged based on the visual examination. The global systolic function is mildly reduced. The RVEF is 43%.   3. The left atrium is mildly enlarged and the right atrium is severely enlarged.  4. There is at least mild mitral regurgitation and trace aortic regurgitation.  5. Late gadolinium enhancement imaging shows no MI, fibrosis or infiltrative disease.   6. Regadenoson stress perfusion imaging shows no ischemia.  7. There is no pericardial effusion or thickening.  8. There is no intracardiac thrombus.  CONCLUSIONS: Non-ischemic cardiomyopathy, likely due to pulmonary hypertension and dyssynchrony from PVCs.  There is mildly reduced biventricular function.  There is no evidence of myocardial perfusion defects or myocardial fibrosis.      Cardiac PET SCANS  2/19/2020  Impression:  1. No FDG active cardiac sarcoid.  2. Decreased perfusion in the inferoseptal wall, findings may be related to  "sequela of previous cardiac sarcoid with microvascular injury.  3. Enlarged left ventricle with borderline low ejection fraction.  4. Decreased myocardial blood flow in the RCA distribution.  5. Findings of a dilated cardiomyopathy a concern for possible ischemia/myocardial injury of the septum, consider CTA or coronary  angiography for further evaluation of this region.  6. Chest and upper abdominal hypermetabolic lymphadenopathy which is indicative of active systemic sarcoid.       Physical Examination   /69 (BP Location: Right arm, Patient Position: Chair, Cuff Size: Adult Regular)   Pulse 71   Ht 1.776 m (5' 9.92\")   Wt 128.7 kg (283 lb 11.2 oz)   SpO2 95%   BMI 40.80 kg/m    Wt Readings from Last 3 Encounters:   11/02/23 128.7 kg (283 lb 11.2 oz)   10/28/23 129 kg (284 lb 8 oz)   09/13/23 127.5 kg (281 lb)     General Appearance:   Alert, well-appearing and in no acute distress.   HEENT: Atraumatic, normocephalic. MMM.   Chest/Lungs:   Respirations unlabored.  Lungs are clear to auscultation.   Cardiovascular:   Regular rate and rhythm.  S1/S2. No murmur.    Abdomen:  Soft, nontender, nondistended.   Extremities: No cyanosis or clubbing. No edema.    Musculoskeletal: Moves all extremities.     Skin: Warm, dry, intact.    Neurologic: Mood and affect are appropriate.  Alert and oriented to person, place, time, and situation.          Medications  Allergies   Entresto  twice daily   Aldactone 25 mg daily   Toprol  mg daily  Hydrochlorothiazide 12.5 mg daily   Jardiance 10 mg daily (every other day)  ASA 81 mg daily   Lipitor 40 mg daily     Zyrtec   Prilosec   Singulair    Allergies   Allergen Reactions    Cat Hair Extract Itching     itchy tearful eyes    Adhesive Tape Rash    Iodine Rash         Lab Results (Personally Reviewed)    Chemistry/lipid CBC Cardiac Enzymes/BNP/TSH/INR   Lab Results   Component Value Date    BUN 17.2 09/13/2023     09/13/2023    CO2 25 09/13/2023 "     Creatinine   Date Value Ref Range Status   09/13/2023 1.04 0.67 - 1.17 mg/dL Final   05/12/2021 0.96 0.66 - 1.25 mg/dL Final       Lab Results   Component Value Date    CHOL 90 05/31/2023    HDL 38 (L) 05/31/2023    LDL 30 05/31/2023      Lab Results   Component Value Date    WBC 6.7 09/05/2023    HGB 16.1 09/05/2023    HCT 47.1 09/05/2023    MCV 88 09/05/2023     09/05/2023    Lab Results   Component Value Date    TSH 3.18 05/31/2023    INR 1.17 (H) 12/11/2019        The patient states understanding and is agreeable with the plan.     Krystal Piedra PA-C  [facility]  Electrophysiology Consult Service  Pager: [phone]    I spent a total of 20 minutes face to face with Derek Contreras during today's office visit. I have spent an additional 20 minutes today on chart review and documentation.

## 2023-11-02 NOTE — NURSING NOTE
Chief Complaint   Patient presents with    Follow Up     1 yr follow up for pvcs, pulm sarcoid       Vitals were taken, medications reconciled and EKG performed.    Virgil Dan, Facilitator  3:22 PM

## 2023-11-02 NOTE — PROGRESS NOTES
Per ALEXANDRIA Marte, patient to have 14 day Zio monitor placed.  Diagnosis: PVC's [I49.3]  Monitor placed: Yes  Patient Instructed: Yes  Patient verbalized understanding: Yes  Holter # [id number]

## 2023-11-07 LAB
ATRIAL RATE - MUSE: 77 BPM
DIASTOLIC BLOOD PRESSURE - MUSE: NORMAL MMHG
INTERPRETATION ECG - MUSE: NORMAL
P AXIS - MUSE: 32 DEGREES
PR INTERVAL - MUSE: 188 MS
QRS DURATION - MUSE: 150 MS
QT - MUSE: 406 MS
QTC - MUSE: 459 MS
R AXIS - MUSE: -56 DEGREES
SYSTOLIC BLOOD PRESSURE - MUSE: NORMAL MMHG
T AXIS - MUSE: -12 DEGREES
VENTRICULAR RATE- MUSE: 77 BPM

## 2023-11-17 DIAGNOSIS — I50.22 CHRONIC SYSTOLIC HEART FAILURE (H): Primary | ICD-10-CM

## 2023-11-20 LAB
ACID FAST STAIN (ARUP): NORMAL

## 2023-12-01 ENCOUNTER — LAB (OUTPATIENT)
Dept: LAB | Facility: CLINIC | Age: 68
End: 2023-12-01
Attending: INTERNAL MEDICINE
Payer: MEDICARE

## 2023-12-01 ENCOUNTER — OFFICE VISIT (OUTPATIENT)
Dept: CARDIOLOGY | Facility: CLINIC | Age: 68
End: 2023-12-01
Attending: INTERNAL MEDICINE
Payer: MEDICARE

## 2023-12-01 VITALS
HEART RATE: 75 BPM | BODY MASS INDEX: 40.69 KG/M2 | HEIGHT: 70 IN | WEIGHT: 284.2 LBS | SYSTOLIC BLOOD PRESSURE: 98 MMHG | OXYGEN SATURATION: 94 % | DIASTOLIC BLOOD PRESSURE: 57 MMHG

## 2023-12-01 DIAGNOSIS — I42.8 NICM (NONISCHEMIC CARDIOMYOPATHY) (H): ICD-10-CM

## 2023-12-01 DIAGNOSIS — R06.09 DOE (DYSPNEA ON EXERTION): ICD-10-CM

## 2023-12-01 DIAGNOSIS — I50.22 CHRONIC SYSTOLIC HEART FAILURE (H): Primary | ICD-10-CM

## 2023-12-01 DIAGNOSIS — I27.20 PULMONARY HYPERTENSION (H): ICD-10-CM

## 2023-12-01 DIAGNOSIS — I50.22 CHRONIC SYSTOLIC HEART FAILURE (H): ICD-10-CM

## 2023-12-01 DIAGNOSIS — D86.0 PULMONARY SARCOIDOSIS (H): ICD-10-CM

## 2023-12-01 LAB
ANION GAP SERPL CALCULATED.3IONS-SCNC: 9 MMOL/L (ref 7–15)
BUN SERPL-MCNC: 18.1 MG/DL (ref 8–23)
CALCIUM SERPL-MCNC: 9.4 MG/DL (ref 8.8–10.2)
CHLORIDE SERPL-SCNC: 102 MMOL/L (ref 98–107)
CREAT SERPL-MCNC: 1.07 MG/DL (ref 0.67–1.17)
DEPRECATED HCO3 PLAS-SCNC: 28 MMOL/L (ref 22–29)
EGFRCR SERPLBLD CKD-EPI 2021: 76 ML/MIN/1.73M2
ERYTHROCYTE [DISTWIDTH] IN BLOOD BY AUTOMATED COUNT: 13.2 % (ref 10–15)
GLUCOSE SERPL-MCNC: 127 MG/DL (ref 70–99)
HCT VFR BLD AUTO: 52.4 % (ref 40–53)
HGB BLD-MCNC: 17.3 G/DL (ref 13.3–17.7)
MCH RBC QN AUTO: 29.8 PG (ref 26.5–33)
MCHC RBC AUTO-ENTMCNC: 33 G/DL (ref 31.5–36.5)
MCV RBC AUTO: 90 FL (ref 78–100)
NT-PROBNP SERPL-MCNC: 38 PG/ML (ref 0–900)
PLATELET # BLD AUTO: 198 10E3/UL (ref 150–450)
POTASSIUM SERPL-SCNC: 4.5 MMOL/L (ref 3.4–5.3)
RBC # BLD AUTO: 5.81 10E6/UL (ref 4.4–5.9)
SODIUM SERPL-SCNC: 139 MMOL/L (ref 135–145)
WBC # BLD AUTO: 8.1 10E3/UL (ref 4–11)

## 2023-12-01 PROCEDURE — 36415 COLL VENOUS BLD VENIPUNCTURE: CPT | Performed by: PATHOLOGY

## 2023-12-01 PROCEDURE — 83880 ASSAY OF NATRIURETIC PEPTIDE: CPT | Performed by: PATHOLOGY

## 2023-12-01 PROCEDURE — G0463 HOSPITAL OUTPT CLINIC VISIT: HCPCS | Performed by: NURSE PRACTITIONER

## 2023-12-01 PROCEDURE — 80048 BASIC METABOLIC PNL TOTAL CA: CPT | Performed by: PATHOLOGY

## 2023-12-01 PROCEDURE — 99214 OFFICE O/P EST MOD 30 MIN: CPT | Performed by: NURSE PRACTITIONER

## 2023-12-01 PROCEDURE — 85027 COMPLETE CBC AUTOMATED: CPT | Performed by: PATHOLOGY

## 2023-12-01 ASSESSMENT — PAIN SCALES - GENERAL: PAINLEVEL: NO PAIN (0)

## 2023-12-01 NOTE — NURSING NOTE
Labs: Patient was given results of the laboratory testing obtained today. Patient demonstrated understanding of this information and agreed to call with further questions or concerns.     Med Reconcile: Reviewed and verified all current medications with the patient. The updated medication list was printed and given to the patient.    Return Appointment: Patient given instructions regarding scheduling next clinic visit. Patient demonstrated understanding of this information and agreed to call with further questions or concerns. Dr. Hirsch in May, CORE in 1 year.    Patient stated he understood all health information given and agreed to call with further questions or concerns.    Rianna Shen RN

## 2023-12-01 NOTE — PATIENT INSTRUCTIONS
Take your medicines every day, as directed    Changes made today:  none     Monitor Your Weight and Symptoms    Contact us if you:    Gain 2 pounds in one day or 5 pounds in one week  Feel more short of breath  Notice more leg swelling  Feel lightheadeded   Change your lifestyle    Limit Salt or Sodium:  2000 mg  Limit Fluids:  2000 mL or approximately 64 ounces  Eat a Heart Healthy Diet  Low in saturated fats  Stay Active:  Aim to move at least 150 minutes every  week         To Contact us    During Business Hours:  [phone], option # 1      After hours, weekends or holidays:   [phone], Option #4  Ask to speak to the On-Call Cardiologist. Inform them you are a CORE/heart failure patient at the [place].     Use [employer] allows you to communicate directly with your heart team through secure messaging.  [email or web address] can be accessed any time on your phone, computer, or tablet.  If you need assistance, we'd be happy to help!         Keep your Heart Appointments:    Dr. Hirsch in May    CORE in 1 year     Please consider attending our virtual support group which is held monthly. Please reach out to Douglas at [phone] for more information if you are interested in attending.       2023 dates:      - Monday, December 4th, 1-2pm

## 2023-12-01 NOTE — NURSING NOTE
Chief Complaint   Patient presents with    New Patient     CORE Enrollment, 68 yo male with chronic systolic heart failure presents for initial visit with labs prior.       Vitals were taken, medications reviewed.    Cristela West, EMT   7:56 AM

## 2023-12-01 NOTE — PROGRESS NOTES
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      He has no SOB at rest. He has KIRBY with stairs and he feels its gotten better, but his wife feels it hasn't gotten better but also not worse.  He uses a walking stick and that helps. He has no swelling in the legs/feet/ankles.  Appetite is fine. Doesn't weigh himself at home. He is able to lay flat and has no cough or difficulty breathing.  He does see a pulmonologist for his sarcoid, which he says has been stable.  In clinic the weights appear to be stable for the last few months.        Denies SOB, KIRBY, PND, orthopnea, edema, weight gain, chest pain, palpitations, lightheadedness, dizziness, near syncopal/syncopal episodes. Derek has been following salt restrictions.      PAST MEDICAL HISTORY:  Past Medical History:   Diagnosis Date    Asthma     Claustrophobia     CPAP (continuous positive airway pressure) dependence     Dyslipidemia     [place] (hard of hearing)     Hypercholesteremia     Hypertension     Iron deficiency     Mediastinal adenopathy     Obesity     BEULAH (obstructive sleep apnea)     Prostate cancer (H)     Pulmonary hypertension (H)     Sarcoidosis        FAMILY HISTORY:  Family History   Problem Relation Age of Onset    Alzheimer Disease Mother     Heart Disease Father     Glaucoma No family hx of     Macular Degeneration No family hx of     Diabetes No family hx of     Thyroid Disease No family hx of        SOCIAL HISTORY:  Social History     Tobacco Use    Smoking status: Never    Smokeless tobacco: Never   Vaping Use    Vaping Use: Never used   Substance Use Topics    Alcohol use: Yes     Comment: once a week    Drug use: No           CURRENT MEDICATIONS:  Current Outpatient Medications   Medication Sig Dispense Refill    aspirin (ASA) 81 MG tablet Take 81 mg by mouth every morning  90 tablet 3    atorvastatin  "(LIPITOR) 40 MG tablet TAKE 1 TABLET(40 MG) BY MOUTH EVERY MORNING 90 tablet 1    cetirizine (ZYRTEC) 10 MG tablet Take 10 mg by mouth every morning  90 tablet 3    empagliflozin (JARDIANCE) 10 MG TABS tablet Take 1 tablet (10 mg) by mouth every other day 45 tablet 3    fluticasone-vilanterol (BREO ELLIPTA) 200-25 MCG/ACT inhaler INHALE 1 PUFF BY MOUTH ONE TIME DAILY Strength: 200-25 MCG/INH 60 each 11    hydrochlorothiazide (HYDRODIURIL) 12.5 MG tablet TAKE 1 TABLET BY MOUTH EVERY MORNING 90 tablet 0    indomethacin (INDOCIN) 50 MG capsule Take 1 capsule (50 mg) by mouth 3 times daily (with meals) 30 capsule 0    metoprolol succinate ER (TOPROL XL) 100 MG 24 hr tablet Take 1.5 tablets (150 mg) by mouth daily 135 tablet 2    mometasone (NASONEX) 50 MCG/ACT nasal spray Spray 2 sprays into both nostrils daily 17 g 3    montelukast (SINGULAIR) 10 MG tablet Take 1 tablet (10 mg) by mouth at bedtime 90 tablet 0    multivitamin (ONE-DAILY) tablet Take 1 tablet by mouth every morning  90 tablet 3    omeprazole (PRILOSEC) 40 MG DR capsule Take 1 capsule (40 mg) by mouth 2 times daily 30 capsule 11    sacubitril-valsartan (ENTRESTO)  MG per tablet Take 1 tablet by mouth 2 times daily 180 tablet 3    spironolactone (ALDACTONE) 25 MG tablet Take 1 tablet (25 mg) by mouth daily 90 tablet 3    azithromycin (ZITHROMAX) 250 MG tablet Take 2 tablets (500 mg) the first day, then take 1 tablet (250 mg) by mouth daily for a total of 5 days. (Patient not taking: Reported on 12/1/2023) 6 tablet 0       ROS:  Review Of Systems  Per HPI      EXAM:  BP 98/57 (BP Location: Right arm, Patient Position: Sitting, Cuff Size: Adult Large)   Pulse 75   Ht 1.776 m (5' 9.92\")   Wt 128.9 kg (284 lb 3.2 oz)   SpO2 94%   BMI 40.87 kg/m    Home weight:  General: alert, articulate, and in no acute distress.  HEENT: normocephalic, atraumatic, anicteric sclera, EOMI, mucosa moist, no cyanosis.   Neck: neck supple.  No adenopathy, masses, or " carotid bruits.  JVP not elevated at 90 degrees  Heart: regular rhythm, normal S1/S2, no murmur, gallop, rub.  Precordium quiet with normal PMI.     Lungs: clear, no rales, ronchi, or wheezing.  No accessory muscle use, respirations unlabored.   Abdomen: soft, non-tender, bowel sounds present, no hepatomegaly  Extremities: no pitting edema.   No cyanosis.    Neurological: alert and oriented x 3.  normal speech and affect, no gross motor deficits  Skin:  No ecchymoses, rashes, or clubbing.    Labs:  CBC RESULTS:  Lab Results   Component Value Date    WBC 8.1 12/01/2023    WBC 7.7 05/12/2021    RBC 5.81 12/01/2023    RBC 5.61 05/12/2021    HGB 17.3 12/01/2023    HGB 15.8 05/12/2021    HCT 52.4 12/01/2023    HCT 49.0 05/12/2021    MCV 90 12/01/2023    MCV 87 05/12/2021    MCH 29.8 12/01/2023    MCH 28.2 05/12/2021    MCHC 33.0 12/01/2023    MCHC 32.2 05/12/2021    RDW 13.2 12/01/2023    RDW 13.7 05/12/2021     12/01/2023     05/12/2021       CMP RESULTS:  Lab Results   Component Value Date     12/01/2023     05/12/2021    POTASSIUM 4.5 12/01/2023    POTASSIUM 4.7 08/10/2022    POTASSIUM 3.3 (L) 05/12/2021    CHLORIDE 102 12/01/2023    CHLORIDE 105 08/10/2022    CHLORIDE 106 05/12/2021    CO2 28 12/01/2023    CO2 27 08/10/2022    CO2 29 05/12/2021    ANIONGAP 9 12/01/2023    ANIONGAP 7 08/10/2022    ANIONGAP 2 (L) 05/12/2021     (H) 12/01/2023    GLC 91 08/10/2022     (H) 05/12/2021    BUN 18.1 12/01/2023    BUN 18 08/10/2022    BUN 14 05/12/2021    CR 1.07 12/01/2023    CR 0.96 05/12/2021    GFRESTIMATED 76 12/01/2023    GFRESTIMATED 82 05/12/2021    GFRESTBLACK >90 05/12/2021    ANTONIO 9.4 12/01/2023    ANTONIO 9.0 05/12/2021    BILITOTAL 0.5 09/05/2023    BILITOTAL 0.8 05/12/2021    ALBUMIN 3.8 09/05/2023    ALBUMIN 3.6 08/10/2022    ALBUMIN 3.6 05/12/2021    ALKPHOS 75 09/05/2023    ALKPHOS 101 05/12/2021    ALT 24 09/05/2023    ALT 33 05/12/2021    AST 31 09/05/2023    AST 26  "2021        INR RESULTS:  Lab Results   Component Value Date    INR 1.17 (H) 2019       No components found for: \"CK\"  Lab Results   Component Value Date    MAG 2.5 (H) 2019     Lab Results   Component Value Date    NTBNP 36 2023    NTBNP 21 2019     @BRIEFLABR (dig)@    Most recent echocardiogram:  No results found for this or any previous visit (from the past 8760 hour(s)).      Assessment and Plan:    In summary, Mr. Contreras is a 67-year-old gentleman with pulmonary sarcoidosis, systemic hypertension, mild biventricular systolic dysfunction, and pulmonary hypertension who is here for an initial CORE appt.     Patient is doing well. End organ function is stable. We will not make any medication changes today.  We discussed helpful hints for managing the HF .  Patient and his wife had no questions.  We will see him in a year.      ACEi/ARB: yes- max   BB: yes- max  Aldosterone antagonist: yes  SGLT2i- Jardiance 10 mg   Fluid status: euvolemic  Anticoagulation:   Antiplatelet:  ASA dose   Sleep apnea:  NSAID use:  Contraindicated.  Avoid use.  Remote Monitorin. Pulmonary Hypertension     He has mildly elevated PA pressure but his PVR is upper limits of normal.  This is likely due to combination of his high cardiac output as well as pulmonary sarcoidosis.  Since his PVR is up on the upper limits of normal, cardiac output is preserved, and right-sided filling pressures are normal we will defer pulmonary vasodilator therapy at this time point.  He is not interested in adding additional medication if possible.  He does have mild RV dysfunction, but clinically he is not in right heart failure.     Given his coexisting lung disease (he has significantly reduced lung volume but parenchymal abnormality on CT scan), he is at risk of VQ mismatch with systemic pulmonary vasodilator therapy.  He could potentially benefit from inhaled treprostinil if his pulmonary hypertension and RV " dysfunction get worse.     2. New biventricular dysfunction/cardiomyopathy     His left ventricular systolic function has normalized on full dose valsartan/sacubitril, metoprolol  mg daily, empagliflozin, and spironolactone 25 mg daily.  His creatinine is slightly on the higher side. He is not on any diuretics.      3.  Frequent PVCs     He is being followed by Dr. Figueroa.  His PVC burden apparently has decreased on metoprolol XL.  As his ejection fraction has normalized and PVC burden has decreased after glucoses planning to hold off on biopsying him.  His noninvasive testing including cardiac MRI and PET has been negative for cardiac sarcoidosis.     4.  Pulmonary sarcoidosis     He is being followed by Dr. Romario Martin.  He is not desaturating significantly with exertion.  He has dropped his oxygen saturation briefly to 89%.  If he were to have exertional hypoxemia having supplemental oxygen would decrease the chance of progression of his pulm vascular disease.      3.  Follow-up   Torrey MEZA appt in May   [facility] in a year.    Clarence BARCLAY CNP  [facility]

## 2023-12-01 NOTE — LETTER
12/1/2023      RE: Derek Contreras  [id number] [facility] [id number]       Dear Colleague,    Thank you for the opportunity to participate in the care of your patient, Derek Contreras, at the [facility] [place] [facility]. Please see a copy of my visit note below.      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      He has no SOB at rest. He has KIRBY with stairs and he feels its gotten better, but his wife feels it hasn't gotten better but also not worse.  He uses a walking stick and that helps. He has no swelling in the legs/feet/ankles.  Appetite is fine. Doesn't weigh himself at home. He is able to lay flat and has no cough or difficulty breathing.  He does see a pulmonologist for his sarcoid, which he says has been stable.  In clinic the weights appear to be stable for the last few months.        Denies SOB, KIRBY, PND, orthopnea, edema, weight gain, chest pain, palpitations, lightheadedness, dizziness, near syncopal/syncopal episodes. Derek has been following salt restrictions.      PAST MEDICAL HISTORY:  Past Medical History:   Diagnosis Date    Asthma     Claustrophobia     CPAP (continuous positive airway pressure) dependence     Dyslipidemia     [place] (hard of hearing)     Hypercholesteremia     Hypertension     Iron deficiency     Mediastinal adenopathy     Obesity     BEULAH (obstructive sleep apnea)     Prostate cancer (H)     Pulmonary hypertension (H)     Sarcoidosis        FAMILY HISTORY:  Family History   Problem Relation Age of Onset    Alzheimer Disease Mother     Heart Disease Father     Glaucoma No family hx of     Macular Degeneration No family hx of     Diabetes No family hx of     Thyroid Disease No family hx of        SOCIAL HISTORY:  Social History     Tobacco Use    Smoking  status: Never    Smokeless tobacco: Never   Vaping Use    Vaping Use: Never used   Substance Use Topics    Alcohol use: Yes     Comment: once a week    Drug use: No           CURRENT MEDICATIONS:  Current Outpatient Medications   Medication Sig Dispense Refill    aspirin (ASA) 81 MG tablet Take 81 mg by mouth every morning  90 tablet 3    atorvastatin (LIPITOR) 40 MG tablet TAKE 1 TABLET(40 MG) BY MOUTH EVERY MORNING 90 tablet 1    cetirizine (ZYRTEC) 10 MG tablet Take 10 mg by mouth every morning  90 tablet 3    empagliflozin (JARDIANCE) 10 MG TABS tablet Take 1 tablet (10 mg) by mouth every other day 45 tablet 3    fluticasone-vilanterol (BREO ELLIPTA) 200-25 MCG/ACT inhaler INHALE 1 PUFF BY MOUTH ONE TIME DAILY Strength: 200-25 MCG/INH 60 each 11    hydrochlorothiazide (HYDRODIURIL) 12.5 MG tablet TAKE 1 TABLET BY MOUTH EVERY MORNING 90 tablet 0    indomethacin (INDOCIN) 50 MG capsule Take 1 capsule (50 mg) by mouth 3 times daily (with meals) 30 capsule 0    metoprolol succinate ER (TOPROL XL) 100 MG 24 hr tablet Take 1.5 tablets (150 mg) by mouth daily 135 tablet 2    mometasone (NASONEX) 50 MCG/ACT nasal spray Spray 2 sprays into both nostrils daily 17 g 3    montelukast (SINGULAIR) 10 MG tablet Take 1 tablet (10 mg) by mouth at bedtime 90 tablet 0    multivitamin (ONE-DAILY) tablet Take 1 tablet by mouth every morning  90 tablet 3    omeprazole (PRILOSEC) 40 MG DR capsule Take 1 capsule (40 mg) by mouth 2 times daily 30 capsule 11    sacubitril-valsartan (ENTRESTO)  MG per tablet Take 1 tablet by mouth 2 times daily 180 tablet 3    spironolactone (ALDACTONE) 25 MG tablet Take 1 tablet (25 mg) by mouth daily 90 tablet 3    azithromycin (ZITHROMAX) 250 MG tablet Take 2 tablets (500 mg) the first day, then take 1 tablet (250 mg) by mouth daily for a total of 5 days. (Patient not taking: Reported on 12/1/2023) 6 tablet 0       ROS:  Review Of Systems  Per HPI      EXAM:  BP 98/57 (BP Location: Right arm,  "Patient Position: Sitting, Cuff Size: Adult Large)   Pulse 75   Ht 1.776 m (5' 9.92\")   Wt 128.9 kg (284 lb 3.2 oz)   SpO2 94%   BMI 40.87 kg/m    Home weight:  General: alert, articulate, and in no acute distress.  HEENT: normocephalic, atraumatic, anicteric sclera, EOMI, mucosa moist, no cyanosis.   Neck: neck supple.  No adenopathy, masses, or carotid bruits.  JVP not elevated at 90 degrees  Heart: regular rhythm, normal S1/S2, no murmur, gallop, rub.  Precordium quiet with normal PMI.     Lungs: clear, no rales, ronchi, or wheezing.  No accessory muscle use, respirations unlabored.   Abdomen: soft, non-tender, bowel sounds present, no hepatomegaly  Extremities: no pitting edema.   No cyanosis.    Neurological: alert and oriented x 3.  normal speech and affect, no gross motor deficits  Skin:  No ecchymoses, rashes, or clubbing.    Labs:  CBC RESULTS:  Lab Results   Component Value Date    WBC 8.1 12/01/2023    WBC 7.7 05/12/2021    RBC 5.81 12/01/2023    RBC 5.61 05/12/2021    HGB 17.3 12/01/2023    HGB 15.8 05/12/2021    HCT 52.4 12/01/2023    HCT 49.0 05/12/2021    MCV 90 12/01/2023    MCV 87 05/12/2021    MCH 29.8 12/01/2023    MCH 28.2 05/12/2021    MCHC 33.0 12/01/2023    MCHC 32.2 05/12/2021    RDW 13.2 12/01/2023    RDW 13.7 05/12/2021     12/01/2023     05/12/2021       CMP RESULTS:  Lab Results   Component Value Date     12/01/2023     05/12/2021    POTASSIUM 4.5 12/01/2023    POTASSIUM 4.7 08/10/2022    POTASSIUM 3.3 (L) 05/12/2021    CHLORIDE 102 12/01/2023    CHLORIDE 105 08/10/2022    CHLORIDE 106 05/12/2021    CO2 28 12/01/2023    CO2 27 08/10/2022    CO2 29 05/12/2021    ANIONGAP 9 12/01/2023    ANIONGAP 7 08/10/2022    ANIONGAP 2 (L) 05/12/2021     (H) 12/01/2023    GLC 91 08/10/2022     (H) 05/12/2021    BUN 18.1 12/01/2023    BUN 18 08/10/2022    BUN 14 05/12/2021    CR 1.07 12/01/2023    CR 0.96 05/12/2021    GFRESTIMATED 76 12/01/2023    " "GFRESTIMATED 82 2021    GFRESTBLACK >90 2021    ANTONIO 9.4 2023    ANTONIO 9.0 2021    BILITOTAL 0.5 2023    BILITOTAL 0.8 2021    ALBUMIN 3.8 2023    ALBUMIN 3.6 08/10/2022    ALBUMIN 3.6 2021    ALKPHOS 75 2023    ALKPHOS 101 2021    ALT 24 2023    ALT 33 2021    AST 31 2023    AST 26 2021        INR RESULTS:  Lab Results   Component Value Date    INR 1.17 (H) 2019       No components found for: \"CK\"  Lab Results   Component Value Date    MAG 2.5 (H) 2019     Lab Results   Component Value Date    NTBNP 36 2023    NTBNP 21 2019     @BRIEFLABR (dig)@    Most recent echocardiogram:  No results found for this or any previous visit (from the past 8760 hour(s)).      Assessment and Plan:    In summary, Mr. Contreras is a 67-year-old gentleman with pulmonary sarcoidosis, systemic hypertension, mild biventricular systolic dysfunction, and pulmonary hypertension who is here for an initial [facility] appt.     Patient is doing well. End organ function is stable. We will not make any medication changes today.  We discussed helpful hints for managing the HF .  Patient and his wife had no questions.  We will see him in a year.      ACEi/ARB: yes- max   BB: yes- max  Aldosterone antagonist: yes  SGLT2i- Jardiance 10 mg   Fluid status: euvolemic  Anticoagulation:   Antiplatelet:  ASA dose   Sleep apnea:  NSAID use:  Contraindicated.  Avoid use.  Remote Monitorin. Pulmonary Hypertension     He has mildly elevated PA pressure but his PVR is upper limits of normal.  This is likely due to combination of his high cardiac output as well as pulmonary sarcoidosis.  Since his PVR is up on the upper limits of normal, cardiac output is preserved, and right-sided filling pressures are normal we will defer pulmonary vasodilator therapy at this time point.  He is not interested in adding additional medication if possible.  He does have " mild RV dysfunction, but clinically he is not in right heart failure.     Given his coexisting lung disease (he has significantly reduced lung volume but parenchymal abnormality on CT scan), he is at risk of VQ mismatch with systemic pulmonary vasodilator therapy.  He could potentially benefit from inhaled treprostinil if his pulmonary hypertension and RV dysfunction get worse.     2. New biventricular dysfunction/cardiomyopathy     His left ventricular systolic function has normalized on full dose valsartan/sacubitril, metoprolol  mg daily, empagliflozin, and spironolactone 25 mg daily.  His creatinine is slightly on the higher side. He is not on any diuretics.      3.  Frequent PVCs     He is being followed by Dr. Figueroa.  His PVC burden apparently has decreased on metoprolol XL.  As his ejection fraction has normalized and PVC burden has decreased after glucoses planning to hold off on biopsying him.  His noninvasive testing including cardiac MRI and PET has been negative for cardiac sarcoidosis.     4.  Pulmonary sarcoidosis     He is being followed by Dr. Romario Martin.  He is not desaturating significantly with exertion.  He has dropped his oxygen saturation briefly to 89%.  If he were to have exertional hypoxemia having supplemental oxygen would decrease the chance of progression of his pulm vascular disease.      3.  Follow-up   Torrey MEZA appt in May   [facility] in a year.    Clarence BARCLAY CNP  [facility]               Please do not hesitate to contact me if you have any questions/concerns.     Sincerely,     DENY Fabian CNP

## 2023-12-03 DIAGNOSIS — I10 ESSENTIAL HYPERTENSION: ICD-10-CM

## 2023-12-04 RX ORDER — HYDROCHLOROTHIAZIDE 12.5 MG/1
TABLET ORAL
Qty: 90 TABLET | Refills: 0 | Status: SHIPPED | OUTPATIENT
Start: 2023-12-04 | End: 2024-02-28

## 2023-12-05 NOTE — NURSING NOTE
Chief Complaints and History of Present Illnesses   Patient presents with     Eye Exam For Sarcoidosis     Chief Complaint(s) and History of Present Illness(es)     Eye Exam For Sarcoidosis     Laterality: both eyes    Timing: throughout the day    Associated symptoms: Negative for eye pain, redness, tearing, dryness, glare, haloes, floaters, flashes, photophobia and headache    Treatments tried: no treatments    Pain scale: 0/10              Comments     Chief Complaints and History of Present Illnesses   Patient presents with     Eye Exam For Sarcoidosis     Chief Complaint(s) and History of Present Illness(es)     Eye Exam For Sarcoidosis     Laterality: both eyes    Timing: throughout the day    Associated symptoms: Negative for eye pain, redness, tearing, dryness, glare, haloes, floaters, flashes, photophobia and headache    Treatments tried: no treatments    Pain scale: 0/10              Comments     Chief Complaints and History of Present Illnesses   Patient presents with     Eye Exam For Sarcoidosis     Chief Complaint(s) and History of Present Illness(es)     Eye Exam For Sarcoidosis     Laterality: both eyes    Timing: throughout the day    Associated symptoms: Negative for eye pain, redness, tearing, dryness, glare, haloes, floaters, flashes, photophobia and headache    Treatments tried: no treatments    Pain scale: 0/10              Comments     Baseline exam for sarcoidosis.  Dx with sarcoidosis  about 5 to 10 years ago at [place]. Follows with Jamie Martin, in [facility].  [facility] / [facility]  glasses 25 years old which wears mainly for driving or TV. / last eye exam 18 months ago / pt states no Mrx changes from [facility].  Denies Hx: ocular surgery, trauma, amblyopia, diplopia, AMD, glaucoma.  Denies FOH for eye diseases, diabetes, thyroid condidtion.    Chief Complaints and History of Present Illnesses   Patient presents with     Eye Exam For Sarcoidosis     Chief  Subjective


Progress Note Date: 12/05/23











This is a very pleasant 78-year-old male patient who follows with the [facility] as

for his primary care needs.  He has a history of COPD, hypertension, chronic 

catheter, coronary disease with previous stent placement, former smoker and quit

back in 1980.  No home oxygen.  He presented here to the emergency room 

yesterday with complaints of increasing shortness of breath, cough and 

congestion.  His x-ray revealed mild cardiomegaly with mild pulmonary vascular 

congestion.  Vague patchy opacity in the right upper lobe and hazy opacity in 

the left lung base.  Count 13.6.  Hemoglobin 17.9.  Platelets 261.  Sodium 139. 

Potassium 4.1.  Bicarb 28.  BUN 14.  Creatinine 0.68.  Glucose 171.  Lactic acid

0.7.  Pro-calcitonin 0.11.  Viral screen negative.  [facility].  He is currently sitting up in bed.  Awake 

and alert in no acute distress.  He is breathing a bit easier today compared to 

yesterday.  He is maintaining good O2 saturations in the mid 90s on 2 L/m per 

nasal cannula.  He is afebrile.  Hemodynamically stable.  He has been initiated 

and DuoNeb inhalations, Symbicort, Solu-Medrol.  Antibiotics in the form of 

ceftriaxone and azithromycin.  Normal saline at 100 ML's per hour.





On today's evaluation of 12/04/2023, the patient is feeling slightly improved 

compared to yesterday.  The patient is oxygenating is currently on 4 L nasal 

cannula.  Doing well.  No specific complaints.  His copd, CAD, previous coronary

intervention and stenting and hypertension.  He has a chronic indwelling Jama 

catheter in place.  His chest x-ray showed some increased pulmonary vascular 

marking in a vague infiltrate in the right upper lobe and left lung base.The 

patient remains on IV Rocephin and Zithromax.  The patient is on Symbicort.  The

patient on IV Solu-Medrol 60 mg every 6 hours.  He is also on DuoNeb updrafts.  

Echocardiogram showed an ejection fraction of 40-45%.








on 12/05/2023, slightly improved compared to yesterday.  No new complaints.  He 

is having nasal congestion and he was started on Flonase and Afrin nasal spray. 

He remains on O2 at 2 L per minute nasal cannula.  He remains on Rocephin and 

Zithromax.  He remains on bronchodilators.  He remains on steroids.Labs from 

today showed a white cell count of 17, hemoglobin of 15.8 and a platelet count 

of 219, sodium is at 143, BUN is at 60 with a creatinine of 0.7.  UA was 

negative.  The viral screen was negative.  AST is at 54, ALTs at 65, alkaline 

phosphatase is 103.  Normal coagulation profile.  Normal urinalysis.





Objective





- Vital Signs


Vital signs: 


                                   Vital Signs











Temp  97.5 F L  12/05/23 07:40


 


Pulse  80   12/05/23 08:07


 


Resp  19   12/05/23 07:40


 


BP  156/86   12/05/23 07:40


 


Pulse Ox  92 L  12/05/23 07:48


 


FiO2      








                                 Intake & Output











 12/04/23 12/05/23 12/05/23





 18:59 06:59 18:59


 


Output Total 900 1550 


 


Balance -900 -1550 


 


Output:   


 


  Urine 900 1550 


 


Other:   


 


  Voiding Method Indwelling Catheter Indwelling Catheter Indwelling Catheter














- Exam











GENERAL EXAM: Alert, pleasant 78-year-old male patient, on 2 L nasal cannula, 

comfortable in no apparent distress.


HEAD: Normocephalic.


EYES: Normal reaction of pupils, equal size.


NOSE: Clear with pink turbinates.


THROAT: No erythema or exudates.


NECK: No masses, no JVD.


CHEST: No chest wall deformity.


LUNGS: Equal air entry with faint end extremely wheeze, few scattered rhonchi.


CVS: S1 and S2 normal with no audible murmur, regular rhythm.


ABDOMEN: No hepatosplenomegaly, normal bowel sounds, no guarding or rigidity.


SPINE: No scoliosis or deformity


SKIN: No rashes


CENTRAL NERVOUS SYSTEM: No focal deficits, tone is normal in all 4 extremities.


EXTREMITIES: There is no peripheral edema.  No clubbing, no cyanosis.  

Peripheral pulses are intact.





- Labs


CBC & Chem 7: 


                                 12/05/23 05:37





                                 12/05/23 05:37


Labs: 


                  Abnormal Lab Results - Last 24 Hours (Table)











  12/04/23 12/05/23 12/05/23 Range/Units





  20:56 05:37 05:37 


 


WBC   17.13 H   (4.50-10.00)  X 10*3/uL


 


RBC   5.69 H   (4.40-5.60)  X 10*6/uL


 


Hct   50.9 H   (39.6-50.0)  %


 


MCHC   31.0 L   (32.0-37.0)  g/dL


 


RDW   15.3 H   (11.5-14.5)  %


 


BUN/Creatinine Ratio    21.57 H  (12.00-20.00)  Ratio


 


Glucose    151 H  ()  mg/dL


 


POC Glucose (mg/dL)  213 H    ()  mg/dL


 


AST    54 H  (14-35)  U/L


 


ALT    65 H  (10-49)  U/L


 


Total Protein    5.7 L  (6.2-8.2)  g/dL


 


Albumin    3.2 L  (3.8-4.9)  g/dL


 


Albumin/Globulin Ratio    1.28 L  (1.60-3.17)  Ratio














  12/05/23 Range/Units





  05:44 


 


WBC   (4.50-10.00)  X 10*3/uL


 


RBC   (4.40-5.60)  X 10*6/uL


 


Hct   (39.6-50.0)  %


 


MCHC   (32.0-37.0)  g/dL


 


RDW   (11.5-14.5)  %


 


BUN/Creatinine Ratio   (12.00-20.00)  Ratio


 


Glucose   ()  mg/dL


 


POC Glucose (mg/dL)  133 H  ()  mg/dL


 


AST   (14-35)  U/L


 


ALT   (10-49)  U/L


 


Total Protein   (6.2-8.2)  g/dL


 


Albumin   (3.8-4.9)  g/dL


 


Albumin/Globulin Ratio   (1.60-3.17)  Ratio








                      Microbiology - Last 24 Hours (Table)











 12/02/23 23:20 Gram Stain - Final





 Sputum Sputum Culture - Final


 


 12/02/23 17:25 Blood Culture - Preliminary





 Blood 


 


 12/02/23 17:25 Blood Culture - Preliminary





 Blood 














Assessment and Plan


Plan: 











Acute hypoxemic respiratory failure secondary to suspected chronic obstructive 

pulmonary disease exacerbation in addition to an early pneumonia. Procalcitonin 

0.11.  Viral screen negative, clinical improving and he has no worsening in the 

breathing





Acute COPD exacerbation





Former smoker





Hypertension





Hyperlipidemia





Coronary disease with previous stent placement





Chronic Jama catheter





Plan:





Clinically improving, we'll continue to same treatment for now


Procalcitonin 0.11


Continue antibiotics for now


Continue bronchodilators, steroids and the patient remains on IV Solu-Medrol


We will add Flonase


We will add Afrin


Titrate down the FiO2 as tolerated


Increase his activity as tolerated


We'll continue to follow and make further recommendations based on his clinical 

status Complaint(s) and History of Present Illness(es)     Eye Exam For Sarcoidosis     Laterality: both eyes    Timing: throughout the day    Associated symptoms: Negative for eye pain, redness, tearing, dryness, glare, haloes, floaters, flashes, photophobia and headache    Treatments tried: no treatments    Pain scale: 0/10              Comments     Chief Complaints and History of Present Illnesses   Patient presents with     Eye Exam For Sarcoidosis     Chief Complaint(s) and History of Present Illness(es)     Eye Exam For Sarcoidosis     Laterality: both eyes    Timing: throughout the day    Associated symptoms: Negative for eye pain, redness, tearing, dryness, glare, haloes, floaters, flashes, photophobia and headache    Treatments tried: no treatments    Pain scale: 0/10              Comments     Baseline exam for sarcoidosis.  Dx with sarcoidosis  about 5 to 10 years ago at [place]. Follows with Jamie Martin, in [facility].  [facility] / [facility]  glasses 25 years old which wears mainly for driving or TV. / last eye exam 18 months ago / pt states no Mrx changes from [facility].  Denies Hx: ocular surgery, trauma, amblyopia, diplopia, AMD, glaucoma.  Denies FOH for eye diseases, diabetes, thyroid condidtion.  Cesilia Zambrano, COT COT 8:24 AM 10/28/2019        Cesilia Zambrano, COT COT 8:21 AM 10/28/2019                                                                      Cesilia Zambrano, COT COT 8:21 AM 10/28/2019

## 2023-12-14 DIAGNOSIS — I50.30 HEART FAILURE WITH PRESERVED EJECTION FRACTION, NYHA CLASS I (H): ICD-10-CM

## 2023-12-15 RX ORDER — SACUBITRIL AND VALSARTAN 97; 103 MG/1; MG/1
1 TABLET, FILM COATED ORAL 2 TIMES DAILY
Qty: 180 TABLET | Refills: 3 | Status: SHIPPED | OUTPATIENT
Start: 2023-12-15 | End: 2024-09-24

## 2023-12-30 ENCOUNTER — MYC MEDICAL ADVICE (OUTPATIENT)
Dept: CARDIOLOGY | Facility: CLINIC | Age: 68
End: 2023-12-30
Payer: MEDICARE

## 2023-12-30 DIAGNOSIS — I49.3 PVC'S (PREMATURE VENTRICULAR CONTRACTIONS): ICD-10-CM

## 2024-01-02 RX ORDER — METOPROLOL SUCCINATE 100 MG/1
150 TABLET, EXTENDED RELEASE ORAL DAILY
Qty: 135 TABLET | Refills: 3 | Status: SHIPPED | OUTPATIENT
Start: 2024-01-02

## 2024-01-04 ENCOUNTER — MYC MEDICAL ADVICE (OUTPATIENT)
Dept: PULMONOLOGY | Facility: CLINIC | Age: 69
End: 2024-01-04
Payer: MEDICARE

## 2024-01-04 ENCOUNTER — DOCUMENTATION ONLY (OUTPATIENT)
Dept: PULMONOLOGY | Facility: CLINIC | Age: 69
End: 2024-01-04
Payer: MEDICARE

## 2024-01-08 ENCOUNTER — DOCUMENTATION ONLY (OUTPATIENT)
Dept: PULMONOLOGY | Facility: CLINIC | Age: 69
End: 2024-01-08
Payer: MEDICARE

## 2024-01-10 ENCOUNTER — OFFICE VISIT (OUTPATIENT)
Dept: PULMONOLOGY | Facility: CLINIC | Age: 69
End: 2024-01-10
Payer: MEDICARE

## 2024-01-10 ENCOUNTER — OFFICE VISIT (OUTPATIENT)
Dept: PULMONOLOGY | Facility: CLINIC | Age: 69
End: 2024-01-10
Attending: INTERNAL MEDICINE
Payer: MEDICARE

## 2024-01-10 ENCOUNTER — LAB (OUTPATIENT)
Dept: LAB | Facility: CLINIC | Age: 69
End: 2024-01-10
Payer: MEDICARE

## 2024-01-10 VITALS
SYSTOLIC BLOOD PRESSURE: 112 MMHG | WEIGHT: 284 LBS | BODY MASS INDEX: 40.66 KG/M2 | OXYGEN SATURATION: 94 % | HEART RATE: 89 BPM | HEIGHT: 70 IN | DIASTOLIC BLOOD PRESSURE: 68 MMHG | RESPIRATION RATE: 17 BRPM

## 2024-01-10 DIAGNOSIS — E83.50 UNSPECIFIED DISORDER OF CALCIUM METABOLISM: ICD-10-CM

## 2024-01-10 DIAGNOSIS — D86.9 SARCOIDOSIS: Primary | ICD-10-CM

## 2024-01-10 DIAGNOSIS — D86.9 SARCOIDOSIS: ICD-10-CM

## 2024-01-10 LAB
ALBUMIN SERPL BCG-MCNC: 4 G/DL (ref 3.5–5.2)
ALP SERPL-CCNC: 82 U/L (ref 40–150)
ALT SERPL W P-5'-P-CCNC: 29 U/L (ref 0–70)
ANION GAP SERPL CALCULATED.3IONS-SCNC: 10 MMOL/L (ref 7–15)
AST SERPL W P-5'-P-CCNC: 30 U/L (ref 0–45)
BASOPHILS # BLD AUTO: 0.1 10E3/UL (ref 0–0.2)
BASOPHILS NFR BLD AUTO: 1 %
BILIRUB SERPL-MCNC: 0.5 MG/DL
BUN SERPL-MCNC: 15.8 MG/DL (ref 8–23)
CALCIUM SERPL-MCNC: 9.6 MG/DL (ref 8.8–10.2)
CHLORIDE SERPL-SCNC: 101 MMOL/L (ref 98–107)
CREAT SERPL-MCNC: 1.08 MG/DL (ref 0.67–1.17)
DEPRECATED HCO3 PLAS-SCNC: 28 MMOL/L (ref 22–29)
EGFRCR SERPLBLD CKD-EPI 2021: 74 ML/MIN/1.73M2
EOSINOPHIL # BLD AUTO: 0.1 10E3/UL (ref 0–0.7)
EOSINOPHIL NFR BLD AUTO: 1 %
ERYTHROCYTE [DISTWIDTH] IN BLOOD BY AUTOMATED COUNT: 13.5 % (ref 10–15)
GLUCOSE SERPL-MCNC: 112 MG/DL (ref 70–99)
HCT VFR BLD AUTO: 52.7 % (ref 40–53)
HGB BLD-MCNC: 17.7 G/DL (ref 13.3–17.7)
IMM GRANULOCYTES # BLD: 0.1 10E3/UL
IMM GRANULOCYTES NFR BLD: 1 %
LYMPHOCYTES # BLD AUTO: 1.3 10E3/UL (ref 0.8–5.3)
LYMPHOCYTES NFR BLD AUTO: 12 %
MCH RBC QN AUTO: 30.1 PG (ref 26.5–33)
MCHC RBC AUTO-ENTMCNC: 33.6 G/DL (ref 31.5–36.5)
MCV RBC AUTO: 90 FL (ref 78–100)
MONOCYTES # BLD AUTO: 0.9 10E3/UL (ref 0–1.3)
MONOCYTES NFR BLD AUTO: 8 %
NEUTROPHILS # BLD AUTO: 8.2 10E3/UL (ref 1.6–8.3)
NEUTROPHILS NFR BLD AUTO: 77 %
NRBC # BLD AUTO: 0 10E3/UL
NRBC BLD AUTO-RTO: 0 /100
PLATELET # BLD AUTO: 163 10E3/UL (ref 150–450)
POTASSIUM SERPL-SCNC: 4.4 MMOL/L (ref 3.4–5.3)
PROT SERPL-MCNC: 7.7 G/DL (ref 6.4–8.3)
PTH-INTACT SERPL-MCNC: 35 PG/ML (ref 15–65)
RBC # BLD AUTO: 5.89 10E6/UL (ref 4.4–5.9)
SODIUM SERPL-SCNC: 139 MMOL/L (ref 135–145)
VIT D+METAB SERPL-MCNC: 25 NG/ML (ref 20–50)
WBC # BLD AUTO: 10.6 10E3/UL (ref 4–11)

## 2024-01-10 PROCEDURE — 94726 PLETHYSMOGRAPHY LUNG VOLUMES: CPT | Performed by: INTERNAL MEDICINE

## 2024-01-10 PROCEDURE — 82164 ANGIOTENSIN I ENZYME TEST: CPT | Mod: 90 | Performed by: PATHOLOGY

## 2024-01-10 PROCEDURE — G0463 HOSPITAL OUTPT CLINIC VISIT: HCPCS | Performed by: INTERNAL MEDICINE

## 2024-01-10 PROCEDURE — 83520 IMMUNOASSAY QUANT NOS NONAB: CPT | Mod: 90 | Performed by: PATHOLOGY

## 2024-01-10 PROCEDURE — 80053 COMPREHEN METABOLIC PANEL: CPT | Performed by: PATHOLOGY

## 2024-01-10 PROCEDURE — 94375 RESPIRATORY FLOW VOLUME LOOP: CPT | Performed by: INTERNAL MEDICINE

## 2024-01-10 PROCEDURE — 94729 DIFFUSING CAPACITY: CPT | Performed by: INTERNAL MEDICINE

## 2024-01-10 PROCEDURE — 85025 COMPLETE CBC W/AUTO DIFF WBC: CPT | Performed by: PATHOLOGY

## 2024-01-10 PROCEDURE — 99214 OFFICE O/P EST MOD 30 MIN: CPT | Mod: 25 | Performed by: INTERNAL MEDICINE

## 2024-01-10 PROCEDURE — 36415 COLL VENOUS BLD VENIPUNCTURE: CPT | Performed by: PATHOLOGY

## 2024-01-10 PROCEDURE — 82306 VITAMIN D 25 HYDROXY: CPT | Performed by: INTERNAL MEDICINE

## 2024-01-10 PROCEDURE — 99000 SPECIMEN HANDLING OFFICE-LAB: CPT | Performed by: PATHOLOGY

## 2024-01-10 PROCEDURE — 82652 VIT D 1 25-DIHYDROXY: CPT | Performed by: INTERNAL MEDICINE

## 2024-01-10 PROCEDURE — 83970 ASSAY OF PARATHORMONE: CPT | Performed by: PATHOLOGY

## 2024-01-10 ASSESSMENT — PAIN SCALES - GENERAL: PAINLEVEL: NO PAIN (0)

## 2024-01-10 NOTE — LETTER
1/10/2024         RE: Derek Contreras  [id number] [facility] [address]        Dear Colleague,    Thank you for referring your patient, Derek Contreras, to the [facility] [place]. Please see a copy of my visit note below.    Reason for Visit  Derek Contreras is a 68 year old year old male who is being seen for sarcoidosis  Pulmonary HPI    The patient was seen and examined by Jamie Martin MD     Mr. Contreras comes in for follow-up of his sarcoidosis.  He was last seen in the pulmonary clinic in September.  At that time there was no clear indication for him to be on systemic anti-inflammatory treatment.  In the interim he has seen providers in the pulmonary hypertension clinic.  He also had an episode of respite tract infection for which he needed prednisone burst and short course of antibiotics.    Today he reports no new symptoms.  He denies any changes in endurance although he acknowledges a fairly sedentary lifestyle.  No vision symptoms.  He is followed by EP and pulm hypertension providers.    Current Outpatient Medications   Medication    aspirin (ASA) 81 MG tablet    atorvastatin (LIPITOR) 40 MG tablet    azithromycin (ZITHROMAX) 250 MG tablet    azithromycin (ZITHROMAX) 250 MG tablet    cetirizine (ZYRTEC) 10 MG tablet    empagliflozin (JARDIANCE) 10 MG TABS tablet    fluticasone-vilanterol (BREO ELLIPTA) 200-25 MCG/ACT inhaler    hydrochlorothiazide (HYDRODIURIL) 12.5 MG tablet    indomethacin (INDOCIN) 50 MG capsule    metoprolol succinate ER (TOPROL XL) 100 MG 24 hr tablet    mometasone (NASONEX) 50 MCG/ACT nasal spray    montelukast (SINGULAIR) 10 MG tablet    multivitamin (ONE-DAILY) tablet    omeprazole (PRILOSEC) 40 MG DR capsule    sacubitril-valsartan (ENTRESTO)  MG per tablet    spironolactone (ALDACTONE) 25 MG tablet     No current facility-administered medications for this visit.     Allergies   Allergen  Reactions    Cat Hair Extract Itching     itchy tearful eyes    Adhesive Tape Rash    Iodine Rash     Past Medical History:   Diagnosis Date    Asthma     Claustrophobia     CPAP (continuous positive airway pressure) dependence     Dyslipidemia     [place] (hard of hearing)     Hypercholesteremia     Hypertension     Iron deficiency     Mediastinal adenopathy     Obesity     BEULAH (obstructive sleep apnea)     Prostate cancer (H)     Pulmonary hypertension (H)     Sarcoidosis        Past Surgical History:   Procedure Laterality Date    APPENDECTOMY      BIOPSY MASS NECK Left 7/15/2020    Procedure: Left trapezius muscle biopsy;  Surgeon: Ade Villa MD;  [facility]    CLOSED REDUCTION HIP Left 10/31/2022    Procedure: Closed reduction left total hip arthroplasty;  Surgeon: Antonio Ann MD;  Location: [facility]    CV RIGHT HEART CATH MEASUREMENTS RECORDED N/A 12/11/2019    Procedure: CV RIGHT HEART CATH;  Surgeon: Torrey Hirsch MD;  [facility]    CV RIGHT HEART CATH MEASUREMENTS RECORDED N/A 1/19/2022    Procedure: CV RIGHT HEART CATH;  Surgeon: Torrey Hirsch MD;  [facility]    CV RIGHT HEART CATH MEASUREMENTS RECORDED N/A 11/4/2022    Procedure: Heart Cath Right Heart Cath;  Surgeon: Torrey Hirsch MD;  [facility]    DAVINCI PROSTATECTOMY, LYMPHADENECTOMY N/A 5/23/2019    Procedure: ROBOTIC ASSISTED LAPAROSCOPIC RADICAL PROSTATECTOMY WITH BILATERAL PELVIC LYMPH NODE DISSECTION;  Surgeon: Matteo Coughlin MD;  Location: [facility]    ENDOBRONCHIAL ULTRASOUND FLEXIBLE N/A 3/29/2019    Procedure: Flexible Bronchoscopy, Endobronchial Ultrasound;  Surgeon: Curtis Leger MD;  [facility]    VASECTOMY      ZZC TOTAL HIP ARTHROPLASTY Bilateral     ZZC TOTAL KNEE ARTHROPLASTY Bilateral        Social History     Socioeconomic History    Marital status:      Spouse name: Not on file    Number  "of children: Not on file    Years of education: Not on file    Highest education level: Not on file   Occupational History    Not on file   Tobacco Use    Smoking status: Never    Smokeless tobacco: Never   Vaping Use    Vaping Use: Never used   Substance and Sexual Activity    Alcohol use: Yes     Comment: once a week    Drug use: No    Sexual activity: Yes     Partners: Female   Other Topics Concern    Parent/sibling w/ CABG, MI or angioplasty before 65F 55M? Not Asked   Social History Narrative    12/03/20         ENVIRONMENTAL HISTORY: [place] The home is heated with a gas furnace. They does have central air conditioning. The patient's bedroom is furnished with Indoor plants and carpeting in bedroom.  Pets inside the house include 0 pets. There is no history of cockroach or mice infestation. There is/are 0 smokers in the house.  The house does not have a damp basement.      Social Determinants of Health     Financial Resource Strain: Not on file   Food Insecurity: Not on file   Transportation Needs: Not on file   Physical Activity: Not on file   Stress: Not on file   Social Connections: Not on file   Interpersonal Safety: Not At Risk (9/7/2022)    Humiliation, Afraid, Rape, and Kick questionnaire     Fear of Current or Ex-Partner: No     Emotionally Abused: No     Physically Abused: No     Sexually Abused: No   Housing Stability: Not on file       ROS Pulmonary    A complete ROS was otherwise negative except as noted in the HPI.  /68   Pulse 89   Resp 17   Ht 1.776 m (5' 9.92\")   Wt 128.8 kg (284 lb)   SpO2 94%   BMI 40.84 kg/m    Exam:   GENERAL APPEARANCE: Alert, and in no apparent distress.  EYES: PERRL, EOMI  HENT: Nasal mucosa with no edema and no hyperemia.   MOUTH: Oral mucosa is moist, without any lesions, no tonsillar enlargement, no oropharyngeal exudate.  NECK: supple, no masses, no thyromegaly.  LYMPHATICS: No significant axillary, cervical, " or supraclavicular nodes.  RESP: normal percussion, good air flow throughout.  No crackles. No rhonchi. No wheezes.  CV: Normal S1, S2, regular rhythm, normal rate. No murmur.  No rub. No gallop. No LE edema.   MS: extremities normal. No clubbing. No cyanosis.  SKIN: no rash on limited exam  NEURO: Mentation intact, speech normal, normal strength and tone, normal gait and stance    Results:\  CT CHest  9/13/23 1.  Mediastinal/hilar lymphadenopathy, sequelae of sarcoidosis. 2.  Unchanged scattered solid pulmonary nodules. No new or enlarging suspicious pulmonary nodules 3.  Dilated main pulmonary artery measuring up to 3.5 cm, which may be seen in pulmonary arterial hypertension. 4.  Cholelithiasis without evidence of acute cholecystitis.  PFTs done today were reviewed by me with the patient. FVC 2.21 (55%), Z-score -3.0.  FEV1 1.72 (56%), Z-score -2.50.  Field FVC ratio is 78.  Total lung capacity is 4.43 (62%), Z-score -3.86.  DLCO not corrected for hemoglobin is 19.78 (75%, Z-score -1.49.  My interpretation is that he has moderate restriction with mild decrease in diffusion capacity.  In comparison to prior spirometry, the FVC has decreased by 70 cc an total lung capacity has decreased by 240 cc.  However he has had numbers in the current range in the past.  Assessment and plan:   68 year-old male with history of HTN, HLD, obesity, prostate cancer, abnormal chest imaging with TBNA (3/29/2019) demonstrating granulomatous Inflammation (station 7 and 12 L lymph nodes) and  BAL demonstrating 102 white cells and 11% lymphocytes.  The CD4 CD8 ratio was 2.29.  Repeat cardiac MRI with no LGE and cPET with no myocardiac uptake to suggest cardiac sarcoidosis but abnormal EF of 41%. RV dilatation and mild pulmonary hypertension based on right heart cath with mean PAP 27 mm Hg (1/22). Concern for  truncal/diaphragmatic weakness but trapezius biopsy in June 2020 without any convincing evidence of myopathy.  Now he has  persistent cough with clear sputum production for several weeks.   1.  Pulmonary: Slight decrease in spirometry and total lung capacity today.  Will repeat in 6 months.  Patient's wife is concerned that as infections are getting a little more frequent.  We reviewed how there are airways abnormalities evident on CT scan which might be a predisposition for him to get frequent infections.  In the past his IgG levels have been in the normal range.  Will try Aerobika to improve secretion clearance to see if that will decrease the frequency of infections.  He can use it on a daily basis.  2.  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  Will check metabolic labs today.  No ocular symptoms.  3.  Question of sleep disordered breathing currently not on NIPPV because of adherence issues.  Months can follow-up with his sleep provider.\  4.  I am asking him to increase his activity level.  They have a treadmill at home which she will try to use on a daily basis.  Follow-up in 6 months with full PFTs including walk test.  This is ordered by me today.  Addendum: Labs revived. CMP is WNL. CBC WNL. sIL2R is WNL. Plan as outlined above.  This note consists of symbols derived from keyboarding, dictation and/or voice recognition software. As a result, there may be errors in the script that have gone undetected. Please consider this when interpreting information found in this chart    Again, thank you for allowing me to participate in the care of your patient.        Sincerely,        Jamie Martin MD

## 2024-01-10 NOTE — PROGRESS NOTES
Reason for Visit  Derek Contreras is a 68 year old year old male who is being seen for sarcoidosis  Pulmonary HPI    The patient was seen and examined by Jamie Martin MD     Mr. Contreras comes in for follow-up of his sarcoidosis.  He was last seen in the pulmonary clinic in September.  At that time there was no clear indication for him to be on systemic anti-inflammatory treatment.  In the interim he has seen providers in the pulmonary hypertension clinic.  He also had an episode of respite tract infection for which he needed prednisone burst and short course of antibiotics.    Today he reports no new symptoms.  He denies any changes in endurance although he acknowledges a fairly sedentary lifestyle.  No vision symptoms.  He is followed by EP and pulm hypertension providers.    Current Outpatient Medications   Medication    aspirin (ASA) 81 MG tablet    atorvastatin (LIPITOR) 40 MG tablet    azithromycin (ZITHROMAX) 250 MG tablet    azithromycin (ZITHROMAX) 250 MG tablet    cetirizine (ZYRTEC) 10 MG tablet    empagliflozin (JARDIANCE) 10 MG TABS tablet    fluticasone-vilanterol (BREO ELLIPTA) 200-25 MCG/ACT inhaler    hydrochlorothiazide (HYDRODIURIL) 12.5 MG tablet    indomethacin (INDOCIN) 50 MG capsule    metoprolol succinate ER (TOPROL XL) 100 MG 24 hr tablet    mometasone (NASONEX) 50 MCG/ACT nasal spray    montelukast (SINGULAIR) 10 MG tablet    multivitamin (ONE-DAILY) tablet    omeprazole (PRILOSEC) 40 MG DR capsule    sacubitril-valsartan (ENTRESTO)  MG per tablet    spironolactone (ALDACTONE) 25 MG tablet     No current facility-administered medications for this visit.     Allergies   Allergen Reactions    Cat Hair Extract Itching     itchy tearful eyes    Adhesive Tape Rash    Iodine Rash     Past Medical History:   Diagnosis Date    Asthma     Claustrophobia     CPAP (continuous positive airway pressure) dependence     Dyslipidemia     [place] (hard of hearing)     Hypercholesteremia      Hypertension     Iron deficiency     Mediastinal adenopathy     Obesity     BEULAH (obstructive sleep apnea)     Prostate cancer (H)     Pulmonary hypertension (H)     Sarcoidosis        Past Surgical History:   Procedure Laterality Date    APPENDECTOMY      BIOPSY MASS NECK Left 7/15/2020    Procedure: Left trapezius muscle biopsy;  Surgeon: Ade Villa MD;  [facility]    CLOSED REDUCTION HIP Left 10/31/2022    Procedure: Closed reduction left total hip arthroplasty;  Surgeon: Antonio Ann MD;  [facility]    CV RIGHT HEART CATH MEASUREMENTS RECORDED N/A 12/11/2019    Procedure: CV RIGHT HEART CATH;  Surgeon: Torrey Hirsch MD;  [facility]    CV RIGHT HEART CATH MEASUREMENTS RECORDED N/A 1/19/2022    Procedure: CV RIGHT HEART CATH;  Surgeon: Torrey Hirsch MD;  [facility]    CV RIGHT HEART CATH MEASUREMENTS RECORDED N/A 11/4/2022    Procedure: Heart Cath Right Heart Cath;  Surgeon: Torrey Hirsch MD;  [facility]    DAVINCI PROSTATECTOMY, LYMPHADENECTOMY N/A 5/23/2019    Procedure: ROBOTIC ASSISTED LAPAROSCOPIC RADICAL PROSTATECTOMY WITH BILATERAL PELVIC LYMPH NODE DISSECTION;  Surgeon: Matteo Coughlin MD;  Location:  OR    ENDOBRONCHIAL ULTRASOUND FLEXIBLE N/A 3/29/2019    Procedure: Flexible Bronchoscopy, Endobronchial Ultrasound;  Surgeon: Curtis Leger MD;  [facility]: [facility] OR    VASECTOMY      ZZC TOTAL HIP ARTHROPLASTY Bilateral     ZZC TOTAL KNEE ARTHROPLASTY Bilateral        Social History     Socioeconomic History    Marital status:      Spouse name: Not on file    Number of children: Not on file    Years of education: Not on file    Highest education level: Not on file   Occupational History    Not on file   Tobacco Use    Smoking status: Never    Smokeless tobacco: Never   Vaping Use    Vaping Use: Never used   Substance and Sexual Activity    Alcohol use: Yes      "Comment: once a week    Drug use: No    Sexual activity: Yes     Partners: Female   Other Topics Concern    Parent/sibling w/ CABG, MI or angioplasty before 65F 55M? Not Asked   Social History Narrative    12/03/20         ENVIRONMENTAL HISTORY: [place] The home is heated with a gas furnace. They does have central air conditioning. The patient's bedroom is furnished with Indoor plants and carpeting in bedroom.  Pets inside the house include 0 pets. There is no history of cockroach or mice infestation. There is/are 0 smokers in the house.  The house does not have a damp basement.      Social Determinants of Health     Financial Resource Strain: Not on file   Food Insecurity: Not on file   Transportation Needs: Not on file   Physical Activity: Not on file   Stress: Not on file   Social Connections: Not on file   Interpersonal Safety: Not At Risk (9/7/2022)    Humiliation, Afraid, Rape, and Kick questionnaire     Fear of Current or Ex-Partner: No     Emotionally Abused: No     Physically Abused: No     Sexually Abused: No   Housing Stability: Not on file       ROS Pulmonary    A complete ROS was otherwise negative except as noted in the HPI.  /68   Pulse 89   Resp 17   Ht 1.776 m (5' 9.92\")   Wt 128.8 kg (284 lb)   SpO2 94%   BMI 40.84 kg/m    Exam:   GENERAL APPEARANCE: Alert, and in no apparent distress.  EYES: PERRL, EOMI  HENT: Nasal mucosa with no edema and no hyperemia.   MOUTH: Oral mucosa is moist, without any lesions, no tonsillar enlargement, no oropharyngeal exudate.  NECK: supple, no masses, no thyromegaly.  LYMPHATICS: No significant axillary, cervical, or supraclavicular nodes.  RESP: normal percussion, good air flow throughout.  No crackles. No rhonchi. No wheezes.  CV: Normal S1, S2, regular rhythm, normal rate. No murmur.  No rub. No gallop. No LE edema.   MS: extremities normal. No clubbing. No cyanosis.  SKIN: no rash on limited exam  NEURO: " Mentation intact, speech normal, normal strength and tone, normal gait and stance    Results:\  CT CHest  9/13/23 1.  Mediastinal/hilar lymphadenopathy, sequelae of sarcoidosis. 2.  Unchanged scattered solid pulmonary nodules. No new or enlarging suspicious pulmonary nodules 3.  Dilated main pulmonary artery measuring up to 3.5 cm, which may be seen in pulmonary arterial hypertension. 4.  Cholelithiasis without evidence of acute cholecystitis.  PFTs done today were reviewed by me with the patient. FVC 2.21 (55%), Z-score -3.0.  FEV1 1.72 (56%), Z-score -2.50.  Field FVC ratio is 78.  Total lung capacity is 4.43 (62%), Z-score -3.86.  DLCO not corrected for hemoglobin is 19.78 (75%, Z-score -1.49.  My interpretation is that he has moderate restriction with mild decrease in diffusion capacity.  In comparison to prior spirometry, the FVC has decreased by 70 cc an total lung capacity has decreased by 240 cc.  However he has had numbers in the current range in the past.  Assessment and plan:   68 year-old male with history of HTN, HLD, obesity, prostate cancer, abnormal chest imaging with TBNA (3/29/2019) demonstrating granulomatous Inflammation (station 7 and 12 L lymph nodes) and  BAL demonstrating 102 white cells and 11% lymphocytes.  The CD4 CD8 ratio was 2.29.  Repeat cardiac MRI with no LGE and cPET with no myocardiac uptake to suggest cardiac sarcoidosis but abnormal EF of 41%. RV dilatation and mild pulmonary hypertension based on right heart cath with mean PAP 27 mm Hg (1/22). Concern for  truncal/diaphragmatic weakness but trapezius biopsy in June 2020 without any convincing evidence of myopathy.  Now he has persistent cough with clear sputum production for several weeks.   1.  Pulmonary: Slight decrease in spirometry and total lung capacity today.  Will repeat in 6 months.  Patient's wife is concerned that as infections are getting a little more frequent.  We reviewed how there are airways abnormalities  evident on CT scan which might be a predisposition for him to get frequent infections.  In the past his IgG levels have been in the normal range.  Will try Aerobika to improve secretion clearance to see if that will decrease the frequency of infections.  He can use it on a daily basis.  2.  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  Will check metabolic labs today.  No ocular symptoms.  3.  Question of sleep disordered breathing currently not on NIPPV because of adherence issues.  Months can follow-up with his sleep provider.\  4.  I am asking him to increase his activity level.  They have a treadmill at home which she will try to use on a daily basis.  Follow-up in 6 months with full PFTs including walk test.  This is ordered by me today.  Addendum: Labs revived. CMP is WNL. CBC WNL. sIL2R is WNL. Plan as outlined above.  This note consists of symbols derived from keyboarding, dictation and/or voice recognition software. As a result, there may be errors in the script that have gone undetected. Please consider this when interpreting information found in this chart

## 2024-01-10 NOTE — NURSING NOTE
Chief Complaint   Patient presents with    RECHECK     Return Interstitial Lung     Medications reviewed and vital signs taken.   Lavelle Childs, CMA

## 2024-01-11 LAB — 1,25(OH)2D SERPL-MCNC: 49 PG/ML (ref 19.9–79.3)

## 2024-01-12 LAB
ACE SERPL-CCNC: 49 U/L
DLCOUNC-%PRED-PRE: 75 %
DLCOUNC-PRE: 19.78 ML/MIN/MMHG
DLCOUNC-PRED: 26.04 ML/MIN/MMHG
ERV-%PRED-PRE: 20 %
ERV-PRE: 0.31 L
ERV-PRED: 1.47 L
EXPTIME-PRE: 5.26 SEC
FEF2575-%PRED-PRE: 59 %
FEF2575-PRE: 1.41 L/SEC
FEF2575-PRED: 2.37 L/SEC
FEFMAX-%PRED-PRE: 54 %
FEFMAX-PRE: 4.61 L/SEC
FEFMAX-PRED: 8.45 L/SEC
FEV1-%PRED-PRE: 56 %
FEV1-PRE: 1.72 L
FEV1FEV6-PRE: 79 %
FEV1FEV6-PRED: 78 %
FEV1FVC-PRE: 78 %
FEV1FVC-PRED: 77 %
FEV1SVC-PRE: 74 %
FEV1SVC-PRED: 72 %
FIFMAX-PRE: 2.44 L/SEC
FRCPLETH-%PRED-PRE: 65 %
FRCPLETH-PRE: 2.4 L
FRCPLETH-PRED: 3.69 L
FVC-%PRED-PRE: 55 %
FVC-PRE: 2.21 L
FVC-PRED: 3.95 L
IC-%PRED-PRE: 68 %
IC-PRE: 2.02 L
IC-PRED: 2.94 L
RVPLETH-%PRED-PRE: 79 %
RVPLETH-PRE: 2.1 L
RVPLETH-PRED: 2.62 L
SOL IL2 RECEP SERPL-MCNC: 1171.6 PG/ML
TLCPLETH-%PRED-PRE: 62 %
TLCPLETH-PRE: 4.43 L
TLCPLETH-PRED: 7.13 L
VA-%PRED-PRE: 62 %
VA-PRE: 4.01 L
VC-%PRED-PRE: 55 %
VC-PRE: 2.33 L
VC-PRED: 4.23 L

## 2024-01-24 DIAGNOSIS — D86.9 SARCOIDOSIS: ICD-10-CM

## 2024-01-24 DIAGNOSIS — J45.909 MODERATE ASTHMA WITHOUT COMPLICATION, UNSPECIFIED WHETHER PERSISTENT: ICD-10-CM

## 2024-01-24 RX ORDER — MONTELUKAST SODIUM 10 MG/1
10 TABLET ORAL AT BEDTIME
Qty: 90 TABLET | Refills: 0 | Status: SHIPPED | OUTPATIENT
Start: 2024-01-24 | End: 2024-04-19

## 2024-02-02 ENCOUNTER — MYC REFILL (OUTPATIENT)
Dept: PULMONOLOGY | Facility: CLINIC | Age: 69
End: 2024-02-02
Payer: MEDICARE

## 2024-02-02 DIAGNOSIS — R05.3 CHRONIC COUGH: Primary | ICD-10-CM

## 2024-02-04 DIAGNOSIS — I50.22 CHRONIC SYSTOLIC HEART FAILURE (H): ICD-10-CM

## 2024-02-05 RX ORDER — SPIRONOLACTONE 25 MG/1
25 TABLET ORAL DAILY
Qty: 90 TABLET | Refills: 3 | Status: SHIPPED | OUTPATIENT
Start: 2024-02-05

## 2024-02-05 RX ORDER — MOMETASONE FUROATE MONOHYDRATE 50 UG/1
2 SPRAY, METERED NASAL DAILY
Qty: 17 G | Refills: 3 | Status: SHIPPED | OUTPATIENT
Start: 2024-02-05 | End: 2024-06-06

## 2024-02-28 DIAGNOSIS — I10 ESSENTIAL HYPERTENSION: ICD-10-CM

## 2024-02-28 RX ORDER — HYDROCHLOROTHIAZIDE 12.5 MG/1
TABLET ORAL
Qty: 90 TABLET | Refills: 0 | Status: SHIPPED | OUTPATIENT
Start: 2024-02-28 | End: 2024-05-13

## 2024-03-07 DIAGNOSIS — K21.9 GERD (GASTROESOPHAGEAL REFLUX DISEASE): Primary | ICD-10-CM

## 2024-03-07 DIAGNOSIS — D86.9 SARCOIDOSIS: ICD-10-CM

## 2024-03-07 DIAGNOSIS — R05.9 COUGH: ICD-10-CM

## 2024-03-07 RX ORDER — OMEPRAZOLE 40 MG/1
40 CAPSULE, DELAYED RELEASE ORAL 2 TIMES DAILY
Qty: 30 CAPSULE | Refills: 11 | Status: SHIPPED | OUTPATIENT
Start: 2024-03-07 | End: 2024-07-10

## 2024-03-29 DIAGNOSIS — E78.00 HYPERCHOLESTEREMIA: ICD-10-CM

## 2024-03-29 RX ORDER — ATORVASTATIN CALCIUM 40 MG/1
40 TABLET, FILM COATED ORAL EVERY MORNING
Qty: 90 TABLET | Refills: 0 | Status: SHIPPED | OUTPATIENT
Start: 2024-03-29 | End: 2024-05-13

## 2024-04-09 ENCOUNTER — TELEPHONE (OUTPATIENT)
Dept: PULMONOLOGY | Facility: CLINIC | Age: 69
End: 2024-04-09
Payer: MEDICARE

## 2024-04-09 NOTE — TELEPHONE ENCOUNTER
Patient confirmed scheduled appointment:  Date: 24  Time: 9am  Visit type: RIL  Provider: STAN  Location: [facility]  Testing/imaginMWT, FPFT  Additional notes: N/A

## 2024-04-19 DIAGNOSIS — J45.909 MODERATE ASTHMA WITHOUT COMPLICATION, UNSPECIFIED WHETHER PERSISTENT: ICD-10-CM

## 2024-04-19 DIAGNOSIS — D86.9 SARCOIDOSIS: ICD-10-CM

## 2024-04-19 RX ORDER — MONTELUKAST SODIUM 10 MG/1
10 TABLET ORAL AT BEDTIME
Qty: 90 TABLET | Refills: 0 | Status: SHIPPED | OUTPATIENT
Start: 2024-04-19 | End: 2024-07-16

## 2024-04-30 DIAGNOSIS — I50.22 CHRONIC SYSTOLIC HEART FAILURE (H): Primary | ICD-10-CM

## 2024-05-06 ENCOUNTER — MYC MEDICAL ADVICE (OUTPATIENT)
Dept: PULMONOLOGY | Facility: CLINIC | Age: 69
End: 2024-05-06
Payer: MEDICARE

## 2024-05-06 DIAGNOSIS — J06.9 URI (UPPER RESPIRATORY INFECTION): Primary | ICD-10-CM

## 2024-05-07 RX ORDER — PREDNISONE 10 MG/1
TABLET ORAL
Qty: 21 TABLET | Refills: 0 | Status: SHIPPED | OUTPATIENT
Start: 2024-05-07 | End: 2024-06-14

## 2024-05-07 RX ORDER — AZITHROMYCIN 250 MG/1
TABLET, FILM COATED ORAL
Qty: 6 TABLET | Refills: 0 | Status: SHIPPED | OUTPATIENT
Start: 2024-05-07 | End: 2024-05-13

## 2024-05-10 ENCOUNTER — LAB (OUTPATIENT)
Dept: LAB | Facility: CLINIC | Age: 69
End: 2024-05-10
Payer: MEDICARE

## 2024-05-10 DIAGNOSIS — E78.00 HYPERCHOLESTEREMIA: Primary | ICD-10-CM

## 2024-05-10 DIAGNOSIS — I50.22 CHRONIC SYSTOLIC HEART FAILURE (H): ICD-10-CM

## 2024-05-10 LAB
ANION GAP SERPL CALCULATED.3IONS-SCNC: 11 MMOL/L (ref 7–15)
BUN SERPL-MCNC: 20 MG/DL (ref 8–23)
CALCIUM SERPL-MCNC: 9.3 MG/DL (ref 8.8–10.2)
CHLORIDE SERPL-SCNC: 103 MMOL/L (ref 98–107)
CHOLEST SERPL-MCNC: 89 MG/DL
CREAT SERPL-MCNC: 1.03 MG/DL (ref 0.67–1.17)
DEPRECATED HCO3 PLAS-SCNC: 25 MMOL/L (ref 22–29)
EGFRCR SERPLBLD CKD-EPI 2021: 79 ML/MIN/1.73M2
ERYTHROCYTE [DISTWIDTH] IN BLOOD BY AUTOMATED COUNT: 13 % (ref 10–15)
FASTING STATUS PATIENT QL REPORTED: YES
FASTING STATUS PATIENT QL REPORTED: YES
GLUCOSE SERPL-MCNC: 86 MG/DL (ref 70–99)
HCT VFR BLD AUTO: 48.3 % (ref 40–53)
HDLC SERPL-MCNC: 43 MG/DL
HGB BLD-MCNC: 16.2 G/DL (ref 13.3–17.7)
LDLC SERPL CALC-MCNC: 32 MG/DL
MCH RBC QN AUTO: 30.1 PG (ref 26.5–33)
MCHC RBC AUTO-ENTMCNC: 33.5 G/DL (ref 31.5–36.5)
MCV RBC AUTO: 90 FL (ref 78–100)
NONHDLC SERPL-MCNC: 46 MG/DL
NT-PROBNP SERPL-MCNC: 67 PG/ML (ref 0–900)
PLATELET # BLD AUTO: 201 10E3/UL (ref 150–450)
POTASSIUM SERPL-SCNC: 4 MMOL/L (ref 3.4–5.3)
RBC # BLD AUTO: 5.39 10E6/UL (ref 4.4–5.9)
SODIUM SERPL-SCNC: 139 MMOL/L (ref 135–145)
TRIGL SERPL-MCNC: 69 MG/DL
WBC # BLD AUTO: 9.4 10E3/UL (ref 4–11)

## 2024-05-10 PROCEDURE — 36415 COLL VENOUS BLD VENIPUNCTURE: CPT

## 2024-05-10 PROCEDURE — 85027 COMPLETE CBC AUTOMATED: CPT

## 2024-05-10 PROCEDURE — 80061 LIPID PANEL: CPT

## 2024-05-10 PROCEDURE — 83880 ASSAY OF NATRIURETIC PEPTIDE: CPT

## 2024-05-10 PROCEDURE — 80048 BASIC METABOLIC PNL TOTAL CA: CPT

## 2024-05-13 ENCOUNTER — OFFICE VISIT (OUTPATIENT)
Dept: CARDIOLOGY | Facility: CLINIC | Age: 69
End: 2024-05-13
Attending: PSYCHIATRY & NEUROLOGY
Payer: MEDICARE

## 2024-05-13 VITALS
DIASTOLIC BLOOD PRESSURE: 73 MMHG | HEART RATE: 68 BPM | BODY MASS INDEX: 41.66 KG/M2 | OXYGEN SATURATION: 95 % | SYSTOLIC BLOOD PRESSURE: 115 MMHG | WEIGHT: 289.7 LBS

## 2024-05-13 DIAGNOSIS — I50.22 CHRONIC SYSTOLIC HEART FAILURE (H): ICD-10-CM

## 2024-05-13 DIAGNOSIS — I10 ESSENTIAL HYPERTENSION: ICD-10-CM

## 2024-05-13 DIAGNOSIS — I47.29 PVT (PAROXYSMAL VENTRICULAR TACHYCARDIA) (H): Primary | ICD-10-CM

## 2024-05-13 DIAGNOSIS — E78.00 HYPERCHOLESTEREMIA: ICD-10-CM

## 2024-05-13 DIAGNOSIS — R06.09 DOE (DYSPNEA ON EXERTION): ICD-10-CM

## 2024-05-13 PROCEDURE — G0463 HOSPITAL OUTPT CLINIC VISIT: HCPCS | Performed by: INTERNAL MEDICINE

## 2024-05-13 PROCEDURE — 99215 OFFICE O/P EST HI 40 MIN: CPT | Performed by: INTERNAL MEDICINE

## 2024-05-13 RX ORDER — HYDROCHLOROTHIAZIDE 12.5 MG/1
TABLET ORAL
Qty: 90 TABLET | Refills: 3 | Status: SHIPPED | OUTPATIENT
Start: 2024-05-13

## 2024-05-13 RX ORDER — ATORVASTATIN CALCIUM 40 MG/1
40 TABLET, FILM COATED ORAL EVERY MORNING
Qty: 90 TABLET | Refills: 3 | Status: SHIPPED | OUTPATIENT
Start: 2024-05-13

## 2024-05-13 ASSESSMENT — PAIN SCALES - GENERAL: PAINLEVEL: NO PAIN (0)

## 2024-05-13 NOTE — PATIENT INSTRUCTIONS
"You were seen today in the Cardiovascular Clinic at the [facility].       Cardiology Providers you saw during your visit: Dr. Hirsch      Medication Changes:   1. No changes      Follow up Appointment Information:  1. FOllow up in 6 months with: Labs, cardiac MRI, 7 day ziopatch monitor, Core clinic ([place]) and to see Dr Figueroa.            [address] [place] [address] [place] [address]        Thank you for allowing us to be a part of your care here at the [facility]      If you have questions or concerns please contact us at:      Rafaela Barnett RN BSN   Cardiology Care Coordinator  [facility]   Questions and [phone]   press #1 to \"send a message to your care team\"       "

## 2024-05-13 NOTE — PROGRESS NOTES
[facility]  Advanced HF & Pulmonary Hypertension Clinic  Service Date:  May 13, 2024      Dear Doctors Mario and Elmer:       We had the pleasure of seeing Mr. Derek Contreras follow-up in our heart failure and pulm hypertension clinic.  As you know, he is a very pleasant 68-year-old male with past medical history significant for     1. Pulmonary sarcoidosis  2.  LV systolic dysfunction -nonischemic in etiology likely related to uncontrolled hypertension  3.  Pulmonary hypertension and RV dysfunction secondary to pulmonary sarcoidosis    He returns today for follow-up.  He is doing well.  He denies having any exertional shortness of breath.  He exercises on a regular basis.  I would currently characterize him as a functional class II.  He denies having exertional chest pain or chest pressure.  No PND orthopnea.  No exertional presyncope or syncope.  No lower extremity swelling or abdominal distention.  No interim hospitalization or ER visits.    He had a Holter monitor in November that showed 5.8% PVCs with stable when compared to his previous Holter.    PAST MEDICAL HISTORY:  Past Medical History:   Diagnosis Date    Asthma     Claustrophobia     CPAP (continuous positive airway pressure) dependence     Dyslipidemia     [place] (hard of hearing)     Hypercholesteremia     Hypertension     Iron deficiency     Mediastinal adenopathy     Obesity     BEULAH (obstructive sleep apnea)     Prostate cancer (H)     Pulmonary hypertension (H)     Sarcoidosis      CURRENT MEDICATIONS:  Current Outpatient Medications   Medication Sig Dispense Refill    aspirin (ASA) 81 MG tablet Take 81 mg by mouth every morning  90 tablet 3    atorvastatin (LIPITOR) 40 MG tablet TAKE 1 TABLET(40 MG) BY MOUTH EVERY MORNING 90 tablet 0    cetirizine (ZYRTEC) 10 MG tablet Take 10 mg by mouth every morning  90 tablet 3    empagliflozin (JARDIANCE) 10 MG TABS tablet Take 1 tablet (10 mg) by mouth every other day 45 tablet 3     fluticasone-vilanterol (BREO ELLIPTA) 200-25 MCG/ACT inhaler INHALE 1 PUFF BY MOUTH ONE TIME DAILY Strength: 200-25 MCG/INH 60 each 11    hydroCHLOROthiazide 12.5 MG tablet TAKE 1 TABLET BY MOUTH EVERY MORNING 90 tablet 0    metoprolol succinate ER (TOPROL XL) 100 MG 24 hr tablet Take 1.5 tablets (150 mg) by mouth daily 135 tablet 3    mometasone (NASONEX) 50 MCG/ACT nasal spray Spray 2 sprays into both nostrils daily 17 g 3    montelukast (SINGULAIR) 10 MG tablet Take 1 tablet (10 mg) by mouth at bedtime 90 tablet 0    multivitamin (ONE-DAILY) tablet Take 1 tablet by mouth every morning  90 tablet 3    omeprazole (PRILOSEC) 40 MG DR capsule Take 1 capsule (40 mg) by mouth 2 times daily 30 capsule 11    predniSONE (DELTASONE) 10 MG tablet 30 mg daily x 7 days 21 tablet 0    sacubitril-valsartan (ENTRESTO)  MG per tablet Take 1 tablet by mouth 2 times daily 180 tablet 3    spironolactone (ALDACTONE) 25 MG tablet Take 1 tablet (25 mg) by mouth daily 90 tablet 3    azithromycin (ZITHROMAX) 250 MG tablet Take 2 tablets (500 mg) the first day, then take 1 tablet (250 mg) by mouth daily for a total of 5 days. (Patient not taking: Reported on 5/13/2024) 6 tablet 0    azithromycin (ZITHROMAX) 250 MG tablet Take 2 tablets (500 mg) the first day, then take 1 tablet (250 mg) by mouth daily for a total of 5 days. (Patient not taking: Reported on 5/13/2024) 6 tablet 0    azithromycin (ZITHROMAX) 250 MG tablet Take 2 tablets (500 mg) the first day, then take 1 tablet (250 mg) by mouth daily for a total of 5 days. 6 tablet 0    azithromycin (ZITHROMAX) 250 MG tablet Take 2 tablets (500 mg) the first day, then take 1 tablet (250 mg) by mouth daily for a total of 5 days. 6 tablet 0    indomethacin (INDOCIN) 50 MG capsule Take 1 capsule (50 mg) by mouth 3 times daily (with meals) (Patient not taking: Reported on 5/13/2024) 30 capsule 0     No current facility-administered medications for this visit.       ROS:   10 point ROS  negative except as discussed in above HPI.    EXAM:  /73 (BP Location: Right arm, Patient Position: Chair, Cuff Size: Adult Large)   Pulse 68   Wt 131.4 kg (289 lb 11.2 oz)   SpO2 95%   BMI 41.66 kg/m    General: appears comfortable, alert and articulate  Head: normocephalic, atraumatic  Eyes: anicteric sclera, EOMI  Neck: no adenopathy  Orophyarynx: moist mucosa, no lesions, dentition intact  Heart: regular, normal S1/S2, no murmur, gallop, rub, no jugular venous distention  Lungs: clear to auscultation bilaterally, no rales or wheezing  Abdomen: soft, non-tender, bowel sounds present, no hepatosplenomegaly  Extremities: no clubbing, cyanosis or edema  Neurological: normal speech and affect, no gross motor deficits    Labs:  Recent Results (from the past 168 hour(s))   Basic metabolic panel    Collection Time: 05/10/24  7:27 AM   Result Value Ref Range    Sodium 139 135 - 145 mmol/L    Potassium 4.0 3.4 - 5.3 mmol/L    Chloride 103 98 - 107 mmol/L    Carbon Dioxide (CO2) 25 22 - 29 mmol/L    Anion Gap 11 7 - 15 mmol/L    Urea Nitrogen 20.0 8.0 - 23.0 mg/dL    Creatinine 1.03 0.67 - 1.17 mg/dL    GFR Estimate 79 >60 mL/min/1.73m2    Calcium 9.3 8.8 - 10.2 mg/dL    Glucose 86 70 - 99 mg/dL    Patient Fasting > 8hrs? Yes    CBC with platelets    Collection Time: 05/10/24  7:27 AM   Result Value Ref Range    WBC Count 9.4 4.0 - 11.0 10e3/uL    RBC Count 5.39 4.40 - 5.90 10e6/uL    Hemoglobin 16.2 13.3 - 17.7 g/dL    Hematocrit 48.3 40.0 - 53.0 %    MCV 90 78 - 100 fL    MCH 30.1 26.5 - 33.0 pg    MCHC 33.5 31.5 - 36.5 g/dL    RDW 13.0 10.0 - 15.0 %    Platelet Count 201 150 - 450 10e3/uL   N terminal pro BNP outpatient    Collection Time: 05/10/24  7:27 AM   Result Value Ref Range    N Terminal Pro BNP Outpatient 67 0 - 900 pg/mL   Lipid panel reflex to direct LDL Non-fasting    Collection Time: 05/10/24  7:27 AM   Result Value Ref Range    Cholesterol 89 <200 mg/dL    Triglycerides 69 <150 mg/dL    Direct  Measure HDL 43 >=40 mg/dL    LDL Cholesterol Calculated 32 <=100 mg/dL    Non HDL Cholesterol 46 <130 mg/dL    Patient Fasting > 8hrs? Yes         (2022)Cardiac MRI 05/02/2022  Comparison CMR: 12/22/2021     1. The LV is normal in cavity size and wall thickness. The global systolic function is normal. The LVEF is  58 %. There are no regional wall motion abnormalities.     2. The RV is mildly enlarged in cavity size. The global systolic function is mildly reduced. The RVEF is  44%.      3. Both atria are normal in size.     4. There is no significant valvular disease.      5. Late gadolinium enhancement imaging shows anterior septal and inferior septal LGE at the RV insertion  points similar to prior     6.  There is no pericardial effusion or thickening.      7.  There is no intracardiac thrombus.      CONCLUSIONS:  No evidence of active cardiac sarcoidosis. Improved biventricular function with LVEF 58% and  RVEF 44% (previously 41% and 34% respectively). Septal systolic paradoxical bowing still suggestive of RV  pressure overload, and LGE in the septal RV insertion points into the LV; together suggestive of pulmonary  hypertension.     Coronary CTA (2022)    1.  Severe calcific atherosclerosis causing mild stenosis and without  any high-grade lesions.  2.  Total Agatston score 442 placing the patient in the 76th  percentile when compared to age and gender matched control group.  3.  Please review Radiology report for incidental noncardiac findings  that will follow separately.    RHC (11/2022)  RA 2/5/2 mmHg  RV 24/3 mmHg  PA 24/10/16 mmHg  CWP 3 mmHg  CO (Lucas) 4.74 L/min  CI (Lucas) 2.02 L/min/m2  CO (TD) 7.3 L/min  CI (TD) 3.11 L/min/m2    NIBP 107/56/75 mmHg  PVR 2.74 ROJAS    Cardiac PET 2/2020  1. No FDG active cardiac sarcoid.  2. Decreased perfusion in the inferoseptal wall, findings may be  related to sequela of previous cardiac sarcoid with microvascular  injury.  3. Enlarged left ventricle with borderline  low ejection fraction.  4. Decreased myocardial blood flow in the RCA distribution.  5. Findings of a dilated cardiomyopathy a concern for possible  ischemia/myocardial injury of the septum, consider CTA or coronary  angiography for further evaluation of this region.  6. Chest and upper abdominal hypermetabolic lymphadenopathy which is  indicative of active systemic sarcoid.    Assessment and Plan:     In summary, Mr. Contreras is a 68-year-old gentleman with pulmonary sarcoidosis, systemic hypertension, mild biventricular systolic dysfunction, and pulmonary hypertension who returns today for follow-up.      1.  Nonischemic cardiomyopathy in the setting of sarcoidosis and uncontrolled systemic blood pressure  2.  Frequent PVCs   3.  Nonobstructive coronary artery disease  4.  Pulmonary sarcoidosis    He is doing well.  He is functional class II.  He is euvolemic.  His endorgan function is normal.  His NT proBNP is within normal limits.  His most recent Zio monitor showed 5.8% PVCs which was comparable and stable to his prior ZIO monitoring.    I have recommended him to continue the current 4 drug neurohormonal therapy.  He is not requiring any diuretics.  No indication for pulmonary vasodilator therapy as his last pulmonary pressures were normal.  If he were to have worsening pulmonary vascular disease and RV dysfunction, we will consider inhaled treprostinil therapy which is minimal VQ mismatch effect.    His PVCs are under control on high-dose metoprolol.  He is also following with Dr. Mejia.    He is on low-dose aspirin and statin for his nonobstructive coronary disease per primary prevention.    I recommend him to return to see us in 6 months with my colleagues in the core clinic with repeat cardiac MRI with gadolinium to assess his biventricular section fraction and a ZIO monitor to assess his PVC burden.  I will also recommend him to schedule a follow-up visit with Dr. Figueroa. He will call us in the interim of  any further worsening symptoms.      Total time today was 44 minutes reviewing notes, imaging, labs, patient visit, orders and documentation     Sincerely,    Torrey Hirsch MD   [facility]  Heart Failure, Transplant, and Mechanical Circulatory Support Cardiology   Cardiovascular Division  [facility]   [phone]

## 2024-05-13 NOTE — NURSING NOTE
Chief Complaint   Patient presents with    Follow Up     Return HF, history of pulmonary sarcoid and heart failure, ef 43% for follow up with labs prior     Vitals were taken and medications reconciled.    Cody Truong, EMT  8:23 AM

## 2024-05-13 NOTE — NURSING NOTE
Diet: Patient instructed regarding a heart failure healthy diet, including discussion of reduced fat and 2000 mg daily sodium restriction, daily weights, medication purpose and compliance, fluid restrictions and resources for patient and family to access for assistance with heart failure management.       Labs: Patient was given results of the laboratory testing obtained today and patient was instructed about when to return for the next laboratory testing.     Med Reconcile: Reviewed and verified all current medications with the patient. The updated medication list was printed and given to the patient. No changes.     Return Appointment: Patient given instructions regarding scheduling next clinic visit. RTC for [facility] in 6 months with labs, cardiac MRI and 7 day ziopatch prior. Follow up with dr mays in  6 months.     Patient stated he understood all health information given and agreed to call with further questions or concerns.       Rafaela Barnett RN

## 2024-06-06 ENCOUNTER — MYC MEDICAL ADVICE (OUTPATIENT)
Dept: PULMONOLOGY | Facility: CLINIC | Age: 69
End: 2024-06-06
Payer: MEDICARE

## 2024-06-06 DIAGNOSIS — R05.3 CHRONIC COUGH: ICD-10-CM

## 2024-06-06 RX ORDER — MOMETASONE FUROATE MONOHYDRATE 50 UG/1
2 SPRAY, METERED NASAL DAILY
Qty: 17 G | Refills: 3 | Status: SHIPPED | OUTPATIENT
Start: 2024-06-06

## 2024-06-20 ENCOUNTER — OFFICE VISIT (OUTPATIENT)
Dept: INTERNAL MEDICINE | Facility: CLINIC | Age: 69
End: 2024-06-20
Payer: MEDICARE

## 2024-06-20 VITALS
BODY MASS INDEX: 42.21 KG/M2 | DIASTOLIC BLOOD PRESSURE: 68 MMHG | SYSTOLIC BLOOD PRESSURE: 108 MMHG | HEIGHT: 69 IN | TEMPERATURE: 97.6 F | HEART RATE: 74 BPM | RESPIRATION RATE: 18 BRPM | OXYGEN SATURATION: 98 % | WEIGHT: 285 LBS

## 2024-06-20 DIAGNOSIS — D69.6 THROMBOCYTOPENIA, UNSPECIFIED (H): ICD-10-CM

## 2024-06-20 DIAGNOSIS — Z00.00 ENCOUNTER FOR ANNUAL WELLNESS EXAM IN MEDICARE PATIENT: Primary | ICD-10-CM

## 2024-06-20 DIAGNOSIS — M21.611 BUNION, RIGHT: ICD-10-CM

## 2024-06-20 DIAGNOSIS — D82.4 HYPERIMMUNOGLOBULIN E (IGE) SYNDROME (H): ICD-10-CM

## 2024-06-20 DIAGNOSIS — E66.01 MORBID OBESITY (H): ICD-10-CM

## 2024-06-20 DIAGNOSIS — E78.00 HYPERCHOLESTEREMIA: ICD-10-CM

## 2024-06-20 DIAGNOSIS — B35.1 ONYCHOMYCOSIS: ICD-10-CM

## 2024-06-20 DIAGNOSIS — J45.909 MODERATE ASTHMA WITHOUT COMPLICATION, UNSPECIFIED WHETHER PERSISTENT: ICD-10-CM

## 2024-06-20 DIAGNOSIS — Z94.2 LUNG REPLACED BY TRANSPLANT (H): ICD-10-CM

## 2024-06-20 DIAGNOSIS — C61 PROSTATE CANCER (H): ICD-10-CM

## 2024-06-20 DIAGNOSIS — I10 HYPERTENSION, UNSPECIFIED TYPE: ICD-10-CM

## 2024-06-20 PROBLEM — R06.02 SOB (SHORTNESS OF BREATH): Status: RESOLVED | Noted: 2019-11-11 | Resolved: 2024-06-20

## 2024-06-20 LAB — PSA SERPL DL<=0.01 NG/ML-MCNC: <0.01 NG/ML (ref 0–4.5)

## 2024-06-20 PROCEDURE — G0439 PPPS, SUBSEQ VISIT: HCPCS | Performed by: INTERNAL MEDICINE

## 2024-06-20 PROCEDURE — 36415 COLL VENOUS BLD VENIPUNCTURE: CPT | Performed by: INTERNAL MEDICINE

## 2024-06-20 PROCEDURE — G0103 PSA SCREENING: HCPCS | Performed by: INTERNAL MEDICINE

## 2024-06-20 PROCEDURE — 99214 OFFICE O/P EST MOD 30 MIN: CPT | Mod: 25 | Performed by: INTERNAL MEDICINE

## 2024-06-20 RX ORDER — RESPIRATORY SYNCYTIAL VIRUS VACCINE 120MCG/0.5
0.5 KIT INTRAMUSCULAR ONCE
Qty: 1 EACH | Refills: 0 | Status: CANCELLED | OUTPATIENT
Start: 2024-06-20 | End: 2024-06-20

## 2024-06-20 SDOH — HEALTH STABILITY: PHYSICAL HEALTH: ON AVERAGE, HOW MANY DAYS PER WEEK DO YOU ENGAGE IN MODERATE TO STRENUOUS EXERCISE (LIKE A BRISK WALK)?: 4 DAYS

## 2024-06-20 ASSESSMENT — SOCIAL DETERMINANTS OF HEALTH (SDOH): HOW OFTEN DO YOU GET TOGETHER WITH FRIENDS OR RELATIVES?: ONCE A WEEK

## 2024-06-20 ASSESSMENT — ASTHMA QUESTIONNAIRES
QUESTION_3 LAST FOUR WEEKS HOW OFTEN DID YOUR ASTHMA SYMPTOMS (WHEEZING, COUGHING, SHORTNESS OF BREATH, CHEST TIGHTNESS OR PAIN) WAKE YOU UP AT NIGHT OR EARLIER THAN USUAL IN THE MORNING: ONCE A WEEK
QUESTION_1 LAST FOUR WEEKS HOW MUCH OF THE TIME DID YOUR ASTHMA KEEP YOU FROM GETTING AS MUCH DONE AT WORK, SCHOOL OR AT HOME: NONE OF THE TIME
ACT_TOTALSCORE: 23
QUESTION_4 LAST FOUR WEEKS HOW OFTEN HAVE YOU USED YOUR RESCUE INHALER OR NEBULIZER MEDICATION (SUCH AS ALBUTEROL): NOT AT ALL
QUESTION_2 LAST FOUR WEEKS HOW OFTEN HAVE YOU HAD SHORTNESS OF BREATH: NOT AT ALL
QUESTION_5 LAST FOUR WEEKS HOW WOULD YOU RATE YOUR ASTHMA CONTROL: COMPLETELY CONTROLLED
ACT_TOTALSCORE: 23

## 2024-06-20 NOTE — PROGRESS NOTES
Preventive Care Visit  [facility]  Abdon Shelton MD, Internal Medicine  Jun 20, 2024      Assessment & Plan     Encounter for annual wellness exam in Medicare patient  Adult wellness plan reviewed.    Hypercholesteremia  Patient's recent cholesterol panel results look excellent.  He will continue his atorvastatin at 40 mg by mouth per day as prescribed by his cardiologist.    Hypertension, unspecified type  Patient's blood pressure is currently at goal.  He did have a recent metabolic panel in May 2024 that showed no concerning findings.  He will continue his Entresto 97 mg / 100 mg tablets twice per day, hydrochlorothiazide 12.5 mg daily, and metoprolol succinate 150 mg daily.  Side effects of each medication were reviewed.  Again, these medications have been managed by his cardiologist.  Patient was encouraged to monitor his blood pressure outside the clinic setting.    Moderate asthma without complication, unspecified whether persistent  Patient's asthma symptoms appear to be under good control.  He will continue his Breo Ellipta 200 mcg / 25 mcg/puff instruction perform 1 puff inhaled each day.  Patient is followed by pulmonology.  Asthma action plan completed today.    Lung replaced by transplant (H)  Followed by pulmonology.    Hyperimmunoglobulin e (ige) syndrome (H24)  Chronic condition no change in status.    Thrombocytopenia, unspecified (H24)  Followed by hematology/oncology.    Morbid obesity (H)  BMI is 42.09 with history of hypertension.  Weight loss encouraged.    Prostate cancer (H)  Status post prostatectomy.  Followed by urology repeat PSA is currently pending.    Bunion, right  Patient is noted to have a prominent bunion on his right foot.  He did decline any referrals to podiatry today for further evaluation and treatment recommendations.  Patient states that he will monitor this lesion for any changes, and should he change his mind he will let us know if he  "would like a referral.    Onychomycosis  Patient is also noted to have he is on multiple toenails consistent with onychomycosis.  Patient was reluctant to take a course of Lamisil for treatment of this issue.  He did want to try some over-the-counter topical agents to see if this would resolve his infection.  He would reconsider the oral medication should he fail to improve utilizing topical agents.    Patient has been advised of split billing requirements and indicates understanding: Yes        BMI  Estimated body mass index is 42.09 kg/m  as calculated from the following:    Height as of this encounter: 1.753 m (5' 9\").    Weight as of this encounter: 129.3 kg (285 lb).   Weight management plan: Discussed healthy diet and exercise guidelines    Counseling  Appropriate preventive services were discussed with this patient, including applicable screening as appropriate for fall prevention, nutrition, physical activity, Tobacco-use cessation, weight loss and cognition.  Checklist reviewing preventive services available has been given to the patient.  Reviewed patient's diet, addressing concerns and/or questions.       See Patient Instructions    Flavio   Derek is a 68 year old, presenting for the following:  Medicare Visit        6/20/2024     7:09 AM   Additional Questions   Roomed by ROSIO Ayala       Health Care Directive  Patient has a Health Care Directive on file  Advance care planning document is on file and is current.    Patient is a 68-year-old  male with a Cockett past medical history presents to the clinic for his annual wellness visit.  Patient's main concerns today are in regards to his right foot.  Patient reports that he has been dealing with a persistent bunion for approximately 1 year.  He does report that it can be occasionally painful when wearing tight fitting shoes.  He is wondering what treatment options are available to him.  He also notes that he has had longstanding issues with " fungus involving multiple toenails on both feet.  He is wondering if there are any topical medication options that might be helpful for this issue.  Otherwise, patient is doing well.  He reports stable appetite.  Patient is stooling and voiding per his usual routine. Patient does take hydrochlorothiazide 12.5 mg daily along with Entresto 97 mg / 103 mg tablets twice per day for management of his blood pressure.  He is also on metoprolol succinate  mg per day.  These medications are managed by his cardiologist.  Patient also takes 40 mg of atorvastatin for his cholesterol.  He did have a cholesterol panel collected in May 2024 that did reveal a total cholesterol level of 89 with an LDL of 32.  Patient does have a history of lung transplant as well as pulmonary sarcoidosis.  He is followed by the pulmonology clinic who is managing his prednisone for flares of his sarcoidosis.  They are also managing his Breo Ellipta that he utilizes for management of asthma.  Patient states that he does not require the use of any other inhalers.  He does have a history of prostate cancer, and he has been followed by urology.  His last PSA was less than 0.01 in May 2023.  He would like a repeat PSA collected today.      Hyperlipidemia Follow-Up    Are you regularly taking any medication or supplement to lower your cholesterol?   Yes- Lipitor  Are you having muscle aches or other side effects that you think could be caused by your cholesterol lowering medication?  No        6/20/2024   General Health   How would you rate your overall physical health? Good   Feel stress (tense, anxious, or unable to sleep) Not at all            6/20/2024   Nutrition   Diet: Regular (no restrictions)            6/20/2024   Exercise   Days per week of moderate/strenous exercise 4 days            6/20/2024   Social Factors   Frequency of gathering with friends or relatives Once a week   Worry food won't last until get money to buy more No   Food not  last or not have enough money for food? No   Do you have housing? (Housing is defined as stable permanent housing and does not include staying ouside in a car, in a tent, in an abandoned building, in an overnight shelter, or couch-surfing.) Yes   Are you worried about losing your housing? No   Lack of transportation? No   Unable to get utilities (heat,electricity)? No            6/20/2024   Fall Risk   Fallen 2 or more times in the past year? No    No   Trouble with walking or balance? No    No       Multiple values from one day are sorted in reverse-chronological order          6/20/2024   Activities of Daily Living- Home Safety   Needs help with the following daily activites None of the above   Safety concerns in the home None of the above            6/20/2024   Dental   Dentist two times every year? Yes            6/20/2024   Hearing Screening   Hearing concerns? None of the above            6/20/2024   Driving Risk Screening   Patient/family members have concerns about driving No            6/20/2024   General Alertness/Fatigue Screening   Have you been more tired than usual lately? No            6/20/2024   Urinary Incontinence Screening   Bothered by leaking urine in past 6 months No               Today's PHQ-2 Score:       6/20/2024     7:06 AM   PHQ-2 ( 1999 [employer])   Q1: Little interest or pleasure in doing things 0   Q2: Feeling down, depressed or hopeless 0   PHQ-2 Score 0   Q1: Little interest or pleasure in doing things Not at all   Q2: Feeling down, depressed or hopeless Not at all   PHQ-2 Score 0           6/20/2024   Substance Use   Alcohol more than 3/day or more than 7/wk No   Do you have a current opioid prescription? No   How severe/bad is pain from 1 to 10? 0/10 (No Pain)   Do you use any other substances recreationally? No        Social History     Tobacco Use    Smoking status: Never    Smokeless tobacco: Never   Vaping Use    Vaping status: Never Used   Substance Use Topics    Alcohol use:  Yes     Comment: once a week    Drug use: No           6/20/2024   AAA Screening   Family history of Abdominal Aortic Aneurysm (AAA)? Unsure      Last PSA:   PSA   Date Value Ref Range Status   11/17/2020 <0.01 0 - 4 ug/L Final     Comment:     Assay Method:  Chemiluminescence using Siemens Vista analyzer     Prostate Specific Antigen Screen   Date Value Ref Range Status   05/31/2023 <0.01 0.00 - 4.50 ng/mL Final   01/12/2022 <0.01 0.00 - 4.00 ug/L Final     ASCVD Risk   The ASCVD Risk score (Ester HADLEY, et al., 2019) failed to calculate for the following reasons:    The valid total cholesterol range is 130 to 320 mg/dL      Reviewed and updated as needed this visit by Provider                    Lab work is in process  Labs reviewed in EPIC  Current providers sharing in care for this patient include:  Patient Care Team:  Carlyn Payne MD as PCP - General (Internal Medicine)  Sherry Velazquez RN as Specialty Care Coordinator (Cardiology)  Anuel Figueroa MD as MD (Clinical Cardiac Electrophysiology)  Radha Trimble MD as MD (Hematology & Oncology)  Glynn Omer MD as Referring Physician (Neurology)  Matteo Coughlin MD as MD (Urology)  Torrey Hirsch MD as Assigned Heart and Vascular Provider  Malena Way DO as Assigned Cancer Care Provider  Rafaela Barnett, RN as Specialty Care Coordinator (Cardiology)  Kavya Mock, PIOTR as Specialty Care Coordinator (Hematology & Oncology)  Rianna Shen, RN as Specialty Care Coordinator (Cardiology)  Clarence Santacruz APRN CNP as Nurse Practitioner (Cardiovascular Disease)  Carlyn Payne MD as Assigned PCP    The following health maintenance items are reviewed in Epic and correct as of today:  Health Maintenance   Topic Date Due    HF ACTION PLAN  Never done    ASTHMA ACTION PLAN  Never done    RSV VACCINE (Pregnancy & 60+) (1 - 1-dose 60+ series) Never done    ZOSTER IMMUNIZATION (1 of 2) 02/21/2017     "Pneumococcal Vaccine: 65+ Years (3 of 3 - PPSV23 or PCV20) 06/19/2023    COVID-19 Vaccine (8 - 2023-24 season) 11/22/2023    ANNUAL REVIEW OF HM ORDERS  05/31/2024    MEDICARE ANNUAL WELLNESS VISIT  05/31/2024    BMP  11/10/2024    ASTHMA CONTROL TEST  12/20/2024    ALT  01/10/2025    COLORECTAL CANCER SCREENING  02/07/2025    LIPID  05/10/2025    CBC  05/10/2025    FALL RISK ASSESSMENT  06/20/2025    GLUCOSE  05/10/2027    ADVANCE CARE PLANNING  06/20/2029    DTAP/TDAP/TD IMMUNIZATION (3 - Td or Tdap) 06/01/2033    TSH W/FREE T4 REFLEX  Completed    HEPATITIS C SCREENING  Completed    PHQ-2 (once per calendar year)  Completed    INFLUENZA VACCINE  Completed    IPV IMMUNIZATION  Aged Out    HPV IMMUNIZATION  Aged Out    MENINGITIS IMMUNIZATION  Aged Out    RSV MONOCLONAL ANTIBODY  Aged Out         Review of Systems  CONSTITUTIONAL: NEGATIVE for fever, chills, change in weight  INTEGUMENTARY/SKIN: NEGATIVE for worrisome rashes, moles or lesions  ENT/MOUTH: NEGATIVE for ear, mouth and throat problems  RESP: NEGATIVE for significant cough or SOB  CV: NEGATIVE for chest pain, palpitations or peripheral edema  GI: NEGATIVE for nausea, abdominal pain, heartburn, or change in bowel habits  : NEGATIVE for frequency, dysuria, or hematuria  MUSCULOSKELETAL: NEGATIVE for significant arthralgias or myalgia  NEURO: NEGATIVE for weakness, dizziness or paresthesias     Objective    Exam  /68   Pulse 74   Temp 97.6  F (36.4  C)   Resp 18   Ht 1.753 m (5' 9\")   Wt 129.3 kg (285 lb)   SpO2 98%   BMI 42.09 kg/m     Estimated body mass index is 42.09 kg/m  as calculated from the following:    Height as of this encounter: 1.753 m (5' 9\").    Weight as of this encounter: 129.3 kg (285 lb).    Physical Exam  Vitals reviewed.   Constitutional:       Appearance: Normal appearance.   HENT:      Head: Normocephalic and atraumatic.      Right Ear: Tympanic membrane, ear canal and external ear normal.      Left Ear: Tympanic " membrane, ear canal and external ear normal.      Mouth/Throat:      Mouth: Mucous membranes are moist.      Pharynx: Oropharynx is clear.   Eyes:      Extraocular Movements: Extraocular movements intact.      Conjunctiva/sclera: Conjunctivae normal.      Pupils: Pupils are equal, round, and reactive to light.   Cardiovascular:      Rate and Rhythm: Normal rate and regular rhythm.      Pulses: Normal pulses.      Heart sounds: Normal heart sounds.   Pulmonary:      Effort: Pulmonary effort is normal.      Breath sounds: Normal breath sounds.   Abdominal:      General: Bowel sounds are normal.      Palpations: Abdomen is soft.   Musculoskeletal:      Cervical back: Normal range of motion and neck supple.      Comments: Bunion noted on right MTP joint of great toe.  Patient is also noted to have thickened and yellowish toenails on multiple toes on both feet.   Skin:     General: Skin is warm and dry.      Capillary Refill: Capillary refill takes less than 2 seconds.   Neurological:      General: No focal deficit present.      Mental Status: He is alert and oriented to person, place, and time.     Diagnostic testing: PSA is pending.         6/20/2024   Mini Cog   Clock Draw Score 2 Normal   3 Item Recall 3 objects recalled   Mini Cog Total Score 5             Signed Electronically by: Abdon Shelton MD

## 2024-06-20 NOTE — PATIENT INSTRUCTIONS
Patient Education   Well Visit, Over 65: Care Instructions  Well visits can help you stay healthy. Your doctor has checked your overall health and may have suggested ways to take good care of yourself. Your doctor also may have recommended tests. You can help prevent illness with healthy eating, good sleep, vaccinations, regular exercise, and other steps.    Get the tests that you and your doctor decide on. Depending on your age and risks, examples might include hearing tests as well as screening for colon, breast, and lung cancer. Screening helps find diseases before any symptoms appear.   Eat healthy foods. Choose fruits, vegetables, whole grains, lean protein, and low-fat dairy foods. Limit saturated fat, and reduce salt.     Limit alcohol. Men should have no more than 2 drinks a day. Women should have no more than 1. For some people, no alcohol is the best choice.   Exercise. It can help prevent falls. Get at least 30 minutes of exercise on most days of the week. Walking, yoga, and andrae chi can be good choices.     Reach and stay at your healthy weight. This will lower your risk for many health problems.   Take care of your mental health. Try to stay connected with friends, family, and community, and find ways to manage stress.     If you're feeling depressed or hopeless, talk to someone. A counselor can help. If you don't have a counselor, talk to your doctor.   Talk to your doctor if you think you may have a problem with alcohol or drug use. This includes prescription medicines and illegal drugs.     Avoid tobacco and nicotine: Don't smoke, vape, or chew. If you need help quitting, talk to your doctor.   Practice safer sex. Getting tested, using condoms or dental dams, and limiting sex partners can help prevent STIs.     Make an advance directive. This is a legal way to tell your family and doctor what you want to happen at the end of your life or when you can't speak for yourself.   Prevent problems where you  "can. Protect your skin from too much sun, wash your hands, brush your teeth twice a day, and wear a seat belt in the car.   Where can you learn more?  Go to https://www.[employer].net/patiented  Enter K859 in the search box to learn more about \"Well Visit, Over 65: Care Instructions.\"  Current as of: August 6, 2023               Content Version: 14.0    6953-1313 [employer].   Care instructions adapted under license by your healthcare professional. If you have questions about a medical condition or this instruction, always ask your healthcare professional. [employer] disclaims any warranty or liability for your use of this information.       My Asthma Action Plan    Name: Derek Contreras   YOB: 1955  Date: 6/20/2024   My doctor: Abdon Shelton MD   My clinic: [facility]        My Control Medicine: Fluticasone furoate + vilanterol (Breo Ellipta)  -  200/25mcg 1 puff daily  My Rescue Medicine: Albuterol (Proair/Ventolin/Proventil HFA) 2-4 puffs EVERY 4 HOURS as needed. Use a spacer if recommended by your provider.   My Asthma Severity:   Moderate Persistent  Know your asthma triggers: smoke and upper respiratory infections               GREEN ZONE   Good Control  I feel good  No cough or wheeze  Can work, sleep and play without asthma symptoms       Take your asthma control medicine every day.     If exercise triggers your asthma, take your rescue medication  15 minutes before exercise or sports, and  During exercise if you have asthma symptoms  Spacer to use with inhaler: If you have a spacer, make sure to use it with your inhaler             YELLOW ZONE Getting Worse  I have ANY of these:  I do not feel good  Cough or wheeze  Chest feels tight  Wake up at night   Keep taking your Green Zone medications  Start taking your rescue medicine:  every 20 minutes for up to 1 hour. Then every 4 hours for 24-48 hours.  If you stay in the Yellow " Zone for more than 12-24 hours, contact your doctor.  If you do not return to the Green Zone in 12-24 hours or you get worse, start taking your oral steroid medicine if prescribed by your provider.           RED ZONE Medical Alert - Get Help  I have ANY of these:  I feel awful  Medicine is not helping  Breathing getting harder  Trouble walking or talking  Nose opens wide to breathe       Take your rescue medicine NOW  If your provider has prescribed an oral steroid medicine, start taking it NOW  Call your doctor NOW  If you are still in the Red Zone after 20 minutes and you have not reached your doctor:  Take your rescue medicine again and  Call 911 or go to the emergency room right away    See your regular doctor within 2 weeks of an Emergency Room or Urgent Care visit for follow-up treatment.          Annual Reminders:  Meet with Asthma Educator,  Flu Shot in the Fall, consider Pneumonia Vaccination for patients with asthma (aged 19 and older).    Pharmacy: [id number] - [place], [place] - [address] AT [address] [address] & [facility]    Electronically signed by Abdon Shelton MD   Date: 06/20/24                      Asthma Triggers  How To Control Things That Make Your Asthma Worse    Triggers are things that make your asthma worse.  Look at the list below to help you find your triggers and what you can do about them.  You can help prevent asthma flare-ups by staying away from your triggers.      Trigger                                                          What you can do   Cigarette Smoke  Tobacco smoke can make asthma worse. Do not allow smoking in your home, car or around you.  Be sure no one smokes at a child s day care or school.  If you smoke, ask your health care provider for ways to help you quit.  Ask family members to quit too.  Ask your health care provider for a referral to Quit Plan to help you quit smoking, or call 9-017-226-PLAN.     Colds, Flu, Bronchitis  These  are common triggers of asthma. Wash your hands often.  Don t touch your eyes, nose or mouth.  Get a flu shot every year.     Dust Mites  These are tiny bugs that live in cloth or carpet. They are too small to see. Wash sheets and blankets in hot water every week.   Encase pillows and mattress in dust mite proof covers.  Avoid having carpet if you can. If you have carpet, vacuum weekly.   Use a dust mask and HEPA vacuum.   Pollen and Outdoor Mold  Some people are allergic to trees, grass, or weed pollen, or molds. Try to keep your windows closed.  Limit time out doors when pollen count is high.   Ask you health care provider about taking medicine during allergy season.     Animal Dander  Some people are allergic to skin flakes, urine or saliva from pets with fur or feathers. Keep pets with fur or feathers out of your home.    If you can t keep the pet outdoors, then keep the pet out of your bedroom.  Keep the bedroom door closed.  Keep pets off cloth furniture and away from stuffed toys.     Mice, Rats, and Cockroaches   Some people are allergic to the waste from these pests.   Cover food and garbage.  Clean up spills and food crumbs.  Store grease in the refrigerator.   Keep food out of the bedroom.   Indoor Mold  This can be a trigger if your home has high moisture. Fix leaking faucets, pipes, or other sources of water.   Clean moldy surfaces.  Dehumidify basement if it is damp and smelly.   Smoke, Strong Odors, and Sprays  These can reduce air quality. Stay away from strong odors and sprays, such as perfume, powder, hair spray, paints, smoke incense, paint, cleaning products, candles and new carpet.   Exercise or Sports  Some people with asthma have this trigger. Be active!  Ask your doctor about taking medicine before sports or exercise to prevent symptoms.    Warm up for 5-10 minutes before and after sports or exercise.     Other Triggers of Asthma  Cold air:  Cover your nose and mouth with a scarf.  Sometimes  laughing or crying can be a trigger.  Some medicines and food can trigger asthma.

## 2024-07-10 ENCOUNTER — MYC MEDICAL ADVICE (OUTPATIENT)
Dept: PULMONOLOGY | Facility: CLINIC | Age: 69
End: 2024-07-10

## 2024-07-10 DIAGNOSIS — R05.9 COUGH: ICD-10-CM

## 2024-07-10 DIAGNOSIS — K21.9 GERD (GASTROESOPHAGEAL REFLUX DISEASE): ICD-10-CM

## 2024-07-10 DIAGNOSIS — D86.0 PULMONARY SARCOIDOSIS (H): ICD-10-CM

## 2024-07-10 DIAGNOSIS — R05.9 COUGH: Primary | ICD-10-CM

## 2024-07-10 RX ORDER — OMEPRAZOLE 40 MG/1
40 CAPSULE, DELAYED RELEASE ORAL 2 TIMES DAILY
Qty: 180 CAPSULE | Refills: 3 | Status: SHIPPED | OUTPATIENT
Start: 2024-07-10

## 2024-07-10 NOTE — TELEPHONE ENCOUNTER
Marleny Phelan states that the rx change to 180 tablets with 3 refills has reflected on their end. No further action needed.

## 2024-07-11 ENCOUNTER — TELEPHONE (OUTPATIENT)
Dept: PULMONOLOGY | Facility: CLINIC | Age: 69
End: 2024-07-11
Payer: MEDICARE

## 2024-07-11 DIAGNOSIS — R05.9 COUGH: Primary | ICD-10-CM

## 2024-07-11 DIAGNOSIS — D86.0 PULMONARY SARCOIDOSIS (H): ICD-10-CM

## 2024-07-11 NOTE — TELEPHONE ENCOUNTER
Patient confirmed scheduled appointment:    7/12: 11:30AM 6 Minute Walk Test  7/12: 12:00PM HRCT STAT    7/17: 8:00AM FPFT    Called pt to reschedule FPFT/6MWT to sooner than 7/24, scheduled HRCT. Messaged nursing pool about order for 6MWT

## 2024-07-12 ENCOUNTER — OFFICE VISIT (OUTPATIENT)
Dept: PULMONOLOGY | Facility: CLINIC | Age: 69
End: 2024-07-12
Payer: MEDICARE

## 2024-07-12 ENCOUNTER — ANCILLARY PROCEDURE (OUTPATIENT)
Dept: CT IMAGING | Facility: CLINIC | Age: 69
End: 2024-07-12
Attending: INTERNAL MEDICINE
Payer: MEDICARE

## 2024-07-12 DIAGNOSIS — D86.9 SARCOIDOSIS: ICD-10-CM

## 2024-07-12 DIAGNOSIS — R05.9 COUGH: ICD-10-CM

## 2024-07-12 DIAGNOSIS — D86.0 PULMONARY SARCOIDOSIS (H): ICD-10-CM

## 2024-07-12 LAB
6 MIN WALK (FT): 875 FT
6 MIN WALK (M): 267 M

## 2024-07-12 PROCEDURE — G1010 CDSM STANSON: HCPCS | Performed by: RADIOLOGY

## 2024-07-12 PROCEDURE — 71250 CT THORAX DX C-: CPT | Mod: MG | Performed by: RADIOLOGY

## 2024-07-13 LAB — FIO2-PRE: 28 %

## 2024-07-16 DIAGNOSIS — J45.909 MODERATE ASTHMA WITHOUT COMPLICATION, UNSPECIFIED WHETHER PERSISTENT: ICD-10-CM

## 2024-07-16 DIAGNOSIS — D86.9 SARCOIDOSIS: ICD-10-CM

## 2024-07-16 RX ORDER — MONTELUKAST SODIUM 10 MG/1
10 TABLET ORAL AT BEDTIME
Qty: 90 TABLET | Refills: 0 | Status: SHIPPED | OUTPATIENT
Start: 2024-07-16

## 2024-07-17 ENCOUNTER — OFFICE VISIT (OUTPATIENT)
Dept: PULMONOLOGY | Facility: CLINIC | Age: 69
End: 2024-07-17
Attending: INTERNAL MEDICINE
Payer: MEDICARE

## 2024-07-17 DIAGNOSIS — R05.9 COUGH: ICD-10-CM

## 2024-07-17 DIAGNOSIS — D86.0 PULMONARY SARCOIDOSIS (H): ICD-10-CM

## 2024-07-17 PROCEDURE — 94375 RESPIRATORY FLOW VOLUME LOOP: CPT | Performed by: INTERNAL MEDICINE

## 2024-07-17 PROCEDURE — 94726 PLETHYSMOGRAPHY LUNG VOLUMES: CPT | Performed by: INTERNAL MEDICINE

## 2024-07-17 PROCEDURE — 94729 DIFFUSING CAPACITY: CPT | Performed by: INTERNAL MEDICINE

## 2024-07-18 LAB
DLCOUNC-%PRED-PRE: 63 %
DLCOUNC-PRE: 17.42 ML/MIN/MMHG
DLCOUNC-PRED: 27.47 ML/MIN/MMHG
ERV-%PRED-PRE: 11 %
ERV-PRE: 0.18 L
ERV-PRED: 1.58 L
EXPTIME-PRE: 7.57 SEC
FEF2575-%PRED-PRE: 50 %
FEF2575-PRE: 1.24 L/SEC
FEF2575-PRED: 2.46 L/SEC
FEFMAX-%PRED-PRE: 47 %
FEFMAX-PRE: 4.2 L/SEC
FEFMAX-PRED: 8.85 L/SEC
FEV1-%PRED-PRE: 47 %
FEV1-PRE: 1.52 L
FEV1FEV6-PRE: 77 %
FEV1FEV6-PRED: 78 %
FEV1FVC-PRE: 77 %
FEV1FVC-PRED: 77 %
FEV1SVC-PRE: 70 %
FEV1SVC-PRED: 72 %
FIFMAX-PRE: 2.9 L/SEC
FRCPLETH-%PRED-PRE: 66 %
FRCPLETH-PRE: 2.54 L
FRCPLETH-PRED: 3.82 L
FVC-%PRED-PRE: 46 %
FVC-PRE: 1.97 L
FVC-PRED: 4.2 L
IC-%PRED-PRE: 62 %
IC-PRE: 1.98 L
IC-PRED: 3.16 L
RVPLETH-%PRED-PRE: 87 %
RVPLETH-PRE: 2.36 L
RVPLETH-PRED: 2.7 L
TLCPLETH-%PRED-PRE: 59 %
TLCPLETH-PRE: 4.52 L
TLCPLETH-PRED: 7.53 L
VA-%PRED-PRE: 53 %
VA-PRE: 3.69 L
VC-%PRED-PRE: 48 %
VC-PRE: 2.16 L
VC-PRED: 4.43 L

## 2024-07-23 ENCOUNTER — TEAM CONFERENCE (OUTPATIENT)
Dept: PULMONOLOGY | Facility: CLINIC | Age: 69
End: 2024-07-23
Payer: MEDICARE

## 2024-07-23 NOTE — TELEPHONE ENCOUNTER
Pulmonary Multidisciplinary Sarcoid Conference      Patient Name: Derek Contreras    Physician: Jamie Martin MD (pulm)    Reason for conference discussion/Specific Question:  Treatment,     Radiology Interpretation: PET scan 2020- extensive hypermetabolic lymph adenopathy and abdominal lymph nodes. Lung findings as well.  July 2024- Chest CT scan- compared to 2020, looks similar, does have peribronchial wall thickening in lower lobes now. Extensive mediastinal  and hilar nodes lymph adenopathy. Elevated right corey diaphragm.   Gaetano Kent MD    Plan: Persistent cough, follow up tomorrow, consider symptomatic management of cough vs low dose steroids.

## 2024-07-24 ENCOUNTER — LAB (OUTPATIENT)
Dept: LAB | Facility: CLINIC | Age: 69
End: 2024-07-24
Payer: MEDICARE

## 2024-07-24 ENCOUNTER — OFFICE VISIT (OUTPATIENT)
Dept: PULMONOLOGY | Facility: CLINIC | Age: 69
End: 2024-07-24
Attending: INTERNAL MEDICINE
Payer: MEDICARE

## 2024-07-24 ENCOUNTER — OFFICE VISIT (OUTPATIENT)
Dept: PULMONOLOGY | Facility: CLINIC | Age: 69
End: 2024-07-24
Payer: MEDICARE

## 2024-07-24 VITALS
HEART RATE: 69 BPM | SYSTOLIC BLOOD PRESSURE: 110 MMHG | HEIGHT: 72 IN | BODY MASS INDEX: 39.28 KG/M2 | DIASTOLIC BLOOD PRESSURE: 67 MMHG | WEIGHT: 290 LBS | OXYGEN SATURATION: 94 %

## 2024-07-24 DIAGNOSIS — R05.9 COUGH, UNSPECIFIED TYPE: Primary | ICD-10-CM

## 2024-07-24 DIAGNOSIS — E83.50 UNSPECIFIED DISORDER OF CALCIUM METABOLISM: ICD-10-CM

## 2024-07-24 DIAGNOSIS — D86.85 CARDIAC SARCOIDOSIS: ICD-10-CM

## 2024-07-24 DIAGNOSIS — R09.02 HYPOXIA: ICD-10-CM

## 2024-07-24 DIAGNOSIS — D86.9 SARCOIDOSIS: ICD-10-CM

## 2024-07-24 DIAGNOSIS — R06.09 DYSPNEA ON EXERTION: ICD-10-CM

## 2024-07-24 DIAGNOSIS — I27.20 PULMONARY HYPERTENSION (H): ICD-10-CM

## 2024-07-24 LAB
1,25(OH)2D SERPL-MCNC: 57.9 PG/ML (ref 19.9–79.3)
ALBUMIN SERPL BCG-MCNC: 3.9 G/DL (ref 3.5–5.2)
ALP SERPL-CCNC: 68 U/L (ref 40–150)
ALT SERPL W P-5'-P-CCNC: 17 U/L (ref 0–70)
ANION GAP SERPL CALCULATED.3IONS-SCNC: 11 MMOL/L (ref 7–15)
AST SERPL W P-5'-P-CCNC: 29 U/L (ref 0–45)
BASOPHILS # BLD AUTO: 0.1 10E3/UL (ref 0–0.2)
BASOPHILS NFR BLD AUTO: 1 %
BILIRUB SERPL-MCNC: 0.6 MG/DL
BUN SERPL-MCNC: 13.5 MG/DL (ref 8–23)
CALCIUM SERPL-MCNC: 9.6 MG/DL (ref 8.8–10.4)
CHLORIDE SERPL-SCNC: 105 MMOL/L (ref 98–107)
CREAT SERPL-MCNC: 1 MG/DL (ref 0.67–1.17)
EGFRCR SERPLBLD CKD-EPI 2021: 81 ML/MIN/1.73M2
EOSINOPHIL # BLD AUTO: 0.1 10E3/UL (ref 0–0.7)
EOSINOPHIL NFR BLD AUTO: 1 %
ERYTHROCYTE [DISTWIDTH] IN BLOOD BY AUTOMATED COUNT: 13.9 % (ref 10–15)
GLUCOSE SERPL-MCNC: 108 MG/DL (ref 70–99)
HCO3 SERPL-SCNC: 23 MMOL/L (ref 22–29)
HCT VFR BLD AUTO: 48.9 % (ref 40–53)
HGB BLD-MCNC: 16.4 G/DL (ref 13.3–17.7)
IMM GRANULOCYTES # BLD: 0.1 10E3/UL
IMM GRANULOCYTES NFR BLD: 1 %
LYMPHOCYTES # BLD AUTO: 1.6 10E3/UL (ref 0.8–5.3)
LYMPHOCYTES NFR BLD AUTO: 19 %
MCH RBC QN AUTO: 30 PG (ref 26.5–33)
MCHC RBC AUTO-ENTMCNC: 33.5 G/DL (ref 31.5–36.5)
MCV RBC AUTO: 89 FL (ref 78–100)
MONOCYTES # BLD AUTO: 0.9 10E3/UL (ref 0–1.3)
MONOCYTES NFR BLD AUTO: 11 %
NEUTROPHILS # BLD AUTO: 5.6 10E3/UL (ref 1.6–8.3)
NEUTROPHILS NFR BLD AUTO: 67 %
NRBC # BLD AUTO: 0 10E3/UL
NRBC BLD AUTO-RTO: 0 /100
PLATELET # BLD AUTO: 182 10E3/UL (ref 150–450)
POTASSIUM SERPL-SCNC: 4.2 MMOL/L (ref 3.4–5.3)
PROT SERPL-MCNC: 7.2 G/DL (ref 6.4–8.3)
PTH-INTACT SERPL-MCNC: 32 PG/ML (ref 15–65)
RBC # BLD AUTO: 5.47 10E6/UL (ref 4.4–5.9)
SODIUM SERPL-SCNC: 139 MMOL/L (ref 135–145)
VIT D+METAB SERPL-MCNC: 27 NG/ML (ref 20–50)
WBC # BLD AUTO: 8.3 10E3/UL (ref 4–11)

## 2024-07-24 PROCEDURE — 99214 OFFICE O/P EST MOD 30 MIN: CPT | Performed by: INTERNAL MEDICINE

## 2024-07-24 PROCEDURE — 82164 ANGIOTENSIN I ENZYME TEST: CPT | Mod: 90 | Performed by: PATHOLOGY

## 2024-07-24 PROCEDURE — 99000 SPECIMEN HANDLING OFFICE-LAB: CPT | Performed by: PATHOLOGY

## 2024-07-24 PROCEDURE — 85025 COMPLETE CBC W/AUTO DIFF WBC: CPT | Performed by: PATHOLOGY

## 2024-07-24 PROCEDURE — 82306 VITAMIN D 25 HYDROXY: CPT | Performed by: INTERNAL MEDICINE

## 2024-07-24 PROCEDURE — 82652 VIT D 1 25-DIHYDROXY: CPT | Performed by: INTERNAL MEDICINE

## 2024-07-24 PROCEDURE — 80053 COMPREHEN METABOLIC PANEL: CPT | Performed by: PATHOLOGY

## 2024-07-24 PROCEDURE — 36415 COLL VENOUS BLD VENIPUNCTURE: CPT | Performed by: PATHOLOGY

## 2024-07-24 PROCEDURE — 83520 IMMUNOASSAY QUANT NOS NONAB: CPT | Mod: 90 | Performed by: PATHOLOGY

## 2024-07-24 PROCEDURE — G0463 HOSPITAL OUTPT CLINIC VISIT: HCPCS | Performed by: INTERNAL MEDICINE

## 2024-07-24 PROCEDURE — 83970 ASSAY OF PARATHORMONE: CPT | Performed by: PATHOLOGY

## 2024-07-24 PROCEDURE — 94799 UNLISTED PULMONARY SVC/PX: CPT | Performed by: INTERNAL MEDICINE

## 2024-07-24 RX ORDER — BENZONATATE 100 MG/1
100 CAPSULE ORAL 3 TIMES DAILY PRN
Qty: 60 CAPSULE | Refills: 3 | Status: SHIPPED | OUTPATIENT
Start: 2024-07-24

## 2024-07-24 RX ORDER — GUAIFENESIN 200 MG/10ML
200 LIQUID ORAL EVERY 4 HOURS PRN
Qty: 200 ML | Refills: 3 | Status: SHIPPED | OUTPATIENT
Start: 2024-07-24

## 2024-07-24 ASSESSMENT — PAIN SCALES - GENERAL: PAINLEVEL: NO PAIN (0)

## 2024-07-24 NOTE — NURSING NOTE
Chief Complaint   Patient presents with    Interstitial Lung Disease (ILD)     6 month ILD follow up      Vitals were taken and medications were reconciled.    Keisha Hernandez RMA  8:50 AM

## 2024-07-24 NOTE — LETTER
7/24/2024      Derek Contreras  [id number] [facility] [id number]      Dear Colleague,    Thank you for referring your patient, Derek Contreras, to the [facility] [place]. Please see a copy of my visit note below.    Reason for Visit  Derek Contreras is a 68 year old year old male who is being seen for Worsening cough and decreased endurance.  Pulmonary HPI    The patient was seen and examined by Jamie Martin MD     Mr. Aragon comes in for a follow-up visit.  He was last seen in the pulmonary clinic on 1/10/2024.  A slight decrease in his spirometry was noted and the plan was to follow-up in 6 months.  Also there was question that there could be airways disease.    Today he comes in with decreased endurance, worsening shortness of breath, troublesome cough even at night.  Cough is worse when he goes from sitting to recumbent position although it is present in a sitting position also he produces clear sputum.  Denies any sinus drainage.  He has been told that he has seasonal allergies, and is currently on Singulair and Zyrtec which he is taking.  He is also reports intermittent edema in the lower extremities.    A distinct change is his endurance.  He has to stop multiple times when he is walking.  As an example on vacation last week and from his car to the hotel which was around 200 yards he had to stop twice.  He denies any chest pain or wheezing at   No palipitatinos.    He does report walking 4-5 times a week on a treadmill at a pace of 1.5 miles an hour.    He has stopped using CPAP around a year or so back.      Current Outpatient Medications   Medication Sig Dispense Refill     aspirin (ASA) 81 MG tablet Take 81 mg by mouth every morning  90 tablet 3     atorvastatin (LIPITOR) 40 MG tablet Take 1 tablet (40 mg) by mouth every morning 90 tablet 3     cetirizine (ZYRTEC) 10 MG tablet Take 10 mg by mouth every morning  90 tablet 3      empagliflozin (JARDIANCE) 10 MG TABS tablet Take 1 tablet (10 mg) by mouth every other day 45 tablet 3     fluticasone-vilanterol (BREO ELLIPTA) 200-25 MCG/ACT inhaler INHALE 1 PUFF BY MOUTH ONE TIME DAILY Strength: 200-25 MCG/INH 60 each 11     hydroCHLOROthiazide 12.5 MG tablet TAKE 1 TABLET BY MOUTH EVERY MORNING 90 tablet 3     metoprolol succinate ER (TOPROL XL) 100 MG 24 hr tablet Take 1.5 tablets (150 mg) by mouth daily 135 tablet 3     mometasone (NASONEX) 50 MCG/ACT nasal spray Spray 2 sprays into both nostrils daily 17 g 3     montelukast (SINGULAIR) 10 MG tablet Take 1 tablet (10 mg) by mouth at bedtime 90 tablet 0     multivitamin (ONE-DAILY) tablet Take 1 tablet by mouth every morning  90 tablet 3     omeprazole (PRILOSEC) 40 MG DR capsule Take 1 capsule (40 mg) by mouth 2 times daily 180 capsule 3     predniSONE (DELTASONE) 10 MG tablet 30 mg daily x 7 days 21 tablet 0     sacubitril-valsartan (ENTRESTO)  MG per tablet Take 1 tablet by mouth 2 times daily 180 tablet 3     spironolactone (ALDACTONE) 25 MG tablet Take 1 tablet (25 mg) by mouth daily 90 tablet 3     No current facility-administered medications for this visit.     Allergies   Allergen Reactions     Cat Hair Extract Itching     itchy tearful eyes     Adhesive Tape Rash     Iodine Rash     Past Medical History:   Diagnosis Date     Asthma      Claustrophobia      CPAP (continuous positive airway pressure) dependence      Dyslipidemia      [place] (hard of hearing)      Hypercholesteremia      Hypertension      Iron deficiency      Mediastinal adenopathy      Obesity      BEULAH (obstructive sleep apnea)      Prostate cancer (H)      Pulmonary hypertension (H)      Sarcoidosis        Past Surgical History:   Procedure Laterality Date     APPENDECTOMY       BIOPSY MASS NECK Left 7/15/2020    Procedure: Left trapezius muscle biopsy;  Surgeon: Ade Villa MD;  Location: [facility] OR     CLOSED REDUCTION HIP Left 10/31/2022    Procedure:  Closed reduction left total hip arthroplasty;  Surgeon: Antonio Ann MD;  Location: [facility] OR     CV RIGHT HEART CATH MEASUREMENTS RECORDED N/A 12/11/2019    Procedure: CV RIGHT HEART CATH;  Surgeon: Torrey Hirsch MD;  Location: [facility]     CV RIGHT HEART CATH MEASUREMENTS RECORDED N/A 1/19/2022    Procedure: CV RIGHT HEART CATH;  Surgeon: Torrey Hirsch MD;  Location: [facility]     CV RIGHT HEART CATH MEASUREMENTS RECORDED N/A 11/4/2022    Procedure: Heart Cath Right Heart Cath;  Surgeon: Torrey Hirsch MD;  Location: [facility]     DAVINCI PROSTATECTOMY, LYMPHADENECTOMY N/A 5/23/2019    Procedure: ROBOTIC ASSISTED LAPAROSCOPIC RADICAL PROSTATECTOMY WITH BILATERAL PELVIC LYMPH NODE DISSECTION;  Surgeon: Matteo Coughlin MD;  Location: [facility] OR     ENDOBRONCHIAL ULTRASOUND FLEXIBLE N/A 3/29/2019    Procedure: Flexible Bronchoscopy, Endobronchial Ultrasound;  Surgeon: Curtis Leger MD;  Location: [facility] OR     VASECTOMY       ZZC TOTAL HIP ARTHROPLASTY Bilateral      ZZC TOTAL KNEE ARTHROPLASTY Bilateral        Social History     Socioeconomic History     Marital status:      Spouse name: Not on file     Number of children: Not on file     Years of education: Not on file     Highest education level: Not on file   Occupational History     Not on file   Tobacco Use     Smoking status: Never     Smokeless tobacco: Never   Vaping Use     Vaping status: Never Used   Substance and Sexual Activity     Alcohol use: Yes     Comment: once a week     Drug use: No     Sexual activity: Yes     Partners: Female   Other Topics Concern     Parent/sibling w/ CABG, MI or angioplasty before 65F 55M? Not Asked   Social History Narrative    12/03/20         ENVIRONMENTAL HISTORY: [place] The home is heated with a gas furnace. They does have central air conditioning. The patient's bedroom is  furnished with Indoor plants and carpeting in bedroom.  Pets inside the house include 0 pets. There is no history of cockroach or mice infestation. There is/are 0 smokers in the house.  The house does not have a damp basement.      Social Determinants of Health     Financial Resource Strain: Low Risk  (6/20/2024)    Financial Resource Strain      Within the past 12 months, have you or your family members you live with been unable to get utilities (heat, electricity) when it was really needed?: No   Food Insecurity: Low Risk  (6/20/2024)    Food Insecurity      Within the past 12 months, did you worry that your food would run out before you got money to buy more?: No      Within the past 12 months, did the food you bought just not last and you didn t have money to get more?: No   Transportation Needs: Low Risk  (6/20/2024)    Transportation Needs      Within the past 12 months, has lack of transportation kept you from medical appointments, getting your medicines, non-medical meetings or appointments, work, or from getting things that you need?: No   Physical Activity: Unknown (6/20/2024)    Exercise Vital Sign      Days of Exercise per Week: 4 days      Minutes of Exercise per Session: Not on file   Stress: No Stress Concern Present (6/20/2024)    [place] [employer] - Occupational Stress Questionnaire      Feeling of Stress : Not at all   Social Connections: Unknown (6/20/2024)    Social Connection and Isolation Panel [NHANES]      Frequency of Communication with Friends and Family: Not on file      Frequency of Social Gatherings with Friends and Family: Once a week      Attends Mu-ism Services: Not on file      Active Member of Clubs or Organizations: Not on file      Attends Club or Organization Meetings: Not on file      Marital Status: Not on file   Interpersonal Safety: Low Risk  (6/20/2024)    Interpersonal Safety      Do you feel physically and emotionally safe where you currently  live?: Yes      Within the past 12 months, have you been hit, slapped, kicked or otherwise physically hurt by someone?: No      Within the past 12 months, have you been humiliated or emotionally abused in other ways by your partner or ex-partner?: No   Housing Stability: Low Risk  (6/20/2024)    Housing Stability      Do you have housing? : Yes      Are you worried about losing your housing?: No       ROS Pulmonary  A complete ROS was otherwise negative except as noted in the HPI.  /67   Pulse 69   Ht 1.829 m (6')   Wt 131.5 kg (290 lb)   SpO2 94%   BMI 39.33 kg/m    Exam:   GENERAL APPEARANCE: Alert, and in no apparent distress.  EYES: PERRL, EOMI  HENT: Nasal mucosa with no edema and no hyperemia.   MOUTH: Oral mucosa is moist, without any lesions, no tonsillar enlargement, no oropharyngeal exudate.  NECK: supple, no masses, no thyromegaly.  LYMPHATICS: No significant axillary, cervical, or supraclavicular nodes.  RESP: normal percussion, good air flow throughout.  No crackles. No rhonchi. No wheezes.  CV: Normal S1, S2, regular rhythm, normal rate. No murmur.  No rub. No gallop.  LE edema present.   MS: extremities normal. No clubbing. No cyanosis.  SKIN: no rash on limited exam  NEURO: Mentation intact, speech normal, normal strength and tone, normal gait and stance    Results:  CT Chest: 1. Stable mediastinal and hilar lymphadenopathy secondary to sarcoidosis. 2. Unchanged scattered solid pulmonary nodules throughout the lungs. No new or enlarging pulmonary nodules. 3. Mid pulmonary artery, which can be seen in pulmonary arterial hypertension. 4. Cholelithiasis without evidence of acute cholecystitis.    PFTs done 7/17/2024 were reviewed by me with the patient.  1.97 (46%), Z-score -3.62.  FEV1 1.52 (47%), Z-score -2.97.  The FEV1 FVC ratio is 77.  Total lung capacity 4.54 (60%), Z-score -4.28.  DLCO not corrected for hemoglobin is 17.42 (63%), Z-score -2.38.  My interpretation is that he has  moderate restriction with moderately decreased diffusion capacity (not corrected for hemoglobin).  In comparison to prior spirometry the FVC has decreased by 240 cc but the total lung capacity has not changed since January 2024.  He desatted to 88% at 2 minutes on a 6-minute walk test on room air.  With 2 L nasal cannula oxygen, lowest O2 saturation 93%.    Assessment and plan:   68 year-old male with history of HTN, HLD, obesity, prostate cancer, abnormal chest imaging with TBNA (3/29/2019) demonstrating granulomatous Inflammation (station 7 and 12 L lymph nodes) and  BAL demonstrating 102 white cells and 11% lymphocytes.  The CD4 CD8 ratio was 2.29.  Repeat cardiac MRI with no LGE and cPET with no myocardiac uptake to suggest cardiac sarcoidosis.  Question of cardiomyopathy likely hypertensive.  RV dilatation and mild pulmonary hypertension based on right heart cath with mean PAP 27 mm Hg (1/22). Concern for truncal/diaphragmatic weakness but trapezius biopsy in June 2020 without any convincing evidence of myopathy.    1.  Today the main symptom is cough with clear sputum production, decreased endurance, exertional hypoxia, lower extremity edema on exam.  Broad differential for exertional hypoxia and decreased endurance.  There is a concern for hypertensive cardiomyopathy.  He will need an echo with bubble study to evaluate his heart function.  Given that he has a history of sarcoidosis, will obtain a cardiac PET to assess myocardial involvement by sarcoidosis.  There is also been concern about truncal muscle weakness.  Will obtain MIP/MEP.  14-day ambulatory EKG monitor was planned in November.  Will do this today.  I am also asking him to reach out to cardiology clinic to see if he can be seen sooner.  2.  Pulmonary: No major change in PFTs with unchanged total lung capacity.  CT scan also not dramatically different.  There is some bronchial wall thickening to indicate airway involvement.  This could be related  to his allergies or could be because of sarcoidosis.  Will do a CBC with differential.  Also check markers for granulomatous inflammation.  Will place him on Robitussin and Tessalon for symptomatic management while the workup is being done to evaluate the exact cause of his worsening symptoms.  3.  Extrapulmonary: Extensive workup previously including trapezius muscle biopsy with no clear evidence of muscle involvement.  His FVC is lower than expected based on his CT scan.  Will check MIP/MEP today.  Will check metabolic labs as well.  He follows with cardiology.  4.  Exertional hypoxia: Oxygen will be arranged.  5.  Sleep disordered breathing: Currently not on CPAP.  I am asking him to follow-up with his sleep providers to see if he can have increased adherence to his sleep apnea treatment.  Follow-up in 3 months.  Will follow-up the results of the above tests we found.  Labs reviewed.  Electrolyte panel and LFT are normal range.  1, 25-dihydroxy vitamin D is 57.9 normal.  CBC is in normal range.  Differential does not show eosinophilia.  Plan as outlined above.    This note consists of symbols derived from keyboarding, dictation and/or voice recognition software. As a result, there may be errors in the script that have gone undetected. Please consider this when interpreting information found in this chart              Again, thank you for allowing me to participate in the care of your patient.        Sincerely,        Jamie Martin MD

## 2024-07-24 NOTE — PATIENT INSTRUCTIONS
Echo with bubble study  MIP / MEP in PFT lab today  Need oxygen with activity  Need to resume CPAP  Labs today  Follow up in 3 months with Dr Perlman  Try to get Cardilogy sooner than November  Zio patch x 14 day

## 2024-07-24 NOTE — PROGRESS NOTES
Reason for Visit  Derek Contreras is a 68 year old year old male who is being seen for Worsening cough and decreased endurance.  Pulmonary HPI    The patient was seen and examined by Jamie Martin MD     Mr. Aragon comes in for a follow-up visit.  He was last seen in the pulmonary clinic on 1/10/2024.  A slight decrease in his spirometry was noted and the plan was to follow-up in 6 months.  Also there was question that there could be airways disease.    Today he comes in with decreased endurance, worsening shortness of breath, troublesome cough even at night.  Cough is worse when he goes from sitting to recumbent position although it is present in a sitting position also he produces clear sputum.  Denies any sinus drainage.  He has been told that he has seasonal allergies, and is currently on Singulair and Zyrtec which he is taking.  He is also reports intermittent edema in the lower extremities.    A distinct change is his endurance.  He has to stop multiple times when he is walking.  As an example on vacation last week and from his car to the hotel which was around 200 yards he had to stop twice.  He denies any chest pain or wheezing at   No palipitatinos.    He does report walking 4-5 times a week on a treadmill at a pace of 1.5 miles an hour.    He has stopped using CPAP around a year or so back.      Current Outpatient Medications   Medication Sig Dispense Refill    aspirin (ASA) 81 MG tablet Take 81 mg by mouth every morning  90 tablet 3    atorvastatin (LIPITOR) 40 MG tablet Take 1 tablet (40 mg) by mouth every morning 90 tablet 3    cetirizine (ZYRTEC) 10 MG tablet Take 10 mg by mouth every morning  90 tablet 3    empagliflozin (JARDIANCE) 10 MG TABS tablet Take 1 tablet (10 mg) by mouth every other day 45 tablet 3    fluticasone-vilanterol (BREO ELLIPTA) 200-25 MCG/ACT inhaler INHALE 1 PUFF BY MOUTH ONE TIME DAILY Strength: 200-25 MCG/INH 60 each 11    hydroCHLOROthiazide 12.5 MG tablet TAKE 1  TABLET BY MOUTH EVERY MORNING 90 tablet 3    metoprolol succinate ER (TOPROL XL) 100 MG 24 hr tablet Take 1.5 tablets (150 mg) by mouth daily 135 tablet 3    mometasone (NASONEX) 50 MCG/ACT nasal spray Spray 2 sprays into both nostrils daily 17 g 3    montelukast (SINGULAIR) 10 MG tablet Take 1 tablet (10 mg) by mouth at bedtime 90 tablet 0    multivitamin (ONE-DAILY) tablet Take 1 tablet by mouth every morning  90 tablet 3    omeprazole (PRILOSEC) 40 MG DR capsule Take 1 capsule (40 mg) by mouth 2 times daily 180 capsule 3    predniSONE (DELTASONE) 10 MG tablet 30 mg daily x 7 days 21 tablet 0    sacubitril-valsartan (ENTRESTO)  MG per tablet Take 1 tablet by mouth 2 times daily 180 tablet 3    spironolactone (ALDACTONE) 25 MG tablet Take 1 tablet (25 mg) by mouth daily 90 tablet 3     No current facility-administered medications for this visit.     Allergies   Allergen Reactions    Cat Hair Extract Itching     itchy tearful eyes    Adhesive Tape Rash    Iodine Rash     Past Medical History:   Diagnosis Date    Asthma     Claustrophobia     CPAP (continuous positive airway pressure) dependence     Dyslipidemia     Sam[place] (hard of hearing)     Hypercholesteremia     Hypertension     Iron deficiency     Mediastinal adenopathy     Obesity     BEULAH (obstructive sleep apnea)     Prostate cancer (H)     Pulmonary hypertension (H)     Sarcoidosis        Past Surgical History:   Procedure Laterality Date    APPENDECTOMY      BIOPSY MASS NECK Left 7/15/2020    Procedure: Left trapezius muscle biopsy;  Surgeon: Ade Villa MD;  [facility]    CLOSED REDUCTION HIP Left 10/31/2022    Procedure: Closed reduction left total hip arthroplasty;  Surgeon: Antonio Ann MD;  Location: [facility] OR    CV RIGHT HEART CATH MEASUREMENTS RECORDED N/A 12/11/2019    Procedure: CV RIGHT HEART CATH;  Surgeon: Torrey Hirsch MD;  Location:  HEART CARDIAC CATH LAB    CV RIGHT HEART CATH MEASUREMENTS RECORDED N/A  1/19/2022    Procedure: CV RIGHT HEART CATH;  Surgeon: Torrey Hirsch MD;  Location:  HEART CARDIAC CATH LAB    CV RIGHT HEART CATH MEASUREMENTS RECORDED N/A 11/4/2022    Procedure: Heart Cath Right Heart Cath;  Surgeon: Torrey Hirsch MD;  [facility]    DAVINCI PROSTATECTOMY, LYMPHADENECTOMY N/A 5/23/2019    Procedure: ROBOTIC ASSISTED LAPAROSCOPIC RADICAL PROSTATECTOMY WITH BILATERAL PELVIC LYMPH NODE DISSECTION;  Surgeon: Matteo Coughlin MD;  Location: [facility] OR    ENDOBRONCHIAL ULTRASOUND FLEXIBLE N/A 3/29/2019    Procedure: Flexible Bronchoscopy, Endobronchial Ultrasound;  Surgeon: Curtis Leger MD;  Location: [facility] OR    VASECTOMY      ZZC TOTAL HIP ARTHROPLASTY Bilateral     ZZC TOTAL KNEE ARTHROPLASTY Bilateral        Social History     Socioeconomic History    Marital status:      Spouse name: Not on file    Number of children: Not on file    Years of education: Not on file    Highest education level: Not on file   Occupational History    Not on file   Tobacco Use    Smoking status: Never    Smokeless tobacco: Never   Vaping Use    Vaping status: Never Used   Substance and Sexual Activity    Alcohol use: Yes     Comment: once a week    Drug use: No    Sexual activity: Yes     Partners: Female   Other Topics Concern    Parent/sibling w/ CABG, MI or angioplasty before 65F 55M? Not Asked   Social History Narrative    12/03/20         ENVIRONMENTAL HISTORY: [place] The home is heated with a gas furnace. They does have central air conditioning. The patient's bedroom is furnished with Indoor plants and carpeting in bedroom.  Pets inside the house include 0 pets. There is no history of cockroach or mice infestation. There is/are 0 smokers in the house.  The house does not have a damp basement.      Social Determinants of Health     Financial Resource Strain: Low Risk  (6/20/2024)    Financial Resource Strain      Within the past 12 months, have you or your family members you live with been unable to get utilities (heat, electricity) when it was really needed?: No   Food Insecurity: Low Risk  (6/20/2024)    Food Insecurity     Within the past 12 months, did you worry that your food would run out before you got money to buy more?: No     Within the past 12 months, did the food you bought just not last and you didn t have money to get more?: No   Transportation Needs: Low Risk  (6/20/2024)    Transportation Needs     Within the past 12 months, has lack of transportation kept you from medical appointments, getting your medicines, non-medical meetings or appointments, work, or from getting things that you need?: No   Physical Activity: Unknown (6/20/2024)    Exercise Vital Sign     Days of Exercise per Week: 4 days     Minutes of Exercise per Session: Not on file   Stress: No Stress Concern Present (6/20/2024)    [employer] [place] - Occupational Stress Questionnaire     Feeling of Stress : Not at all   Social Connections: Unknown (6/20/2024)    Social Connection and Isolation Panel [NHANES]     Frequency of Communication with Friends and Family: Not on file     Frequency of Social Gatherings with Friends and Family: Once a week     Attends Holiness Services: Not on file     Active Member of Clubs or Organizations: Not on file     Attends Club or Organization Meetings: Not on file     Marital Status: Not on file   Interpersonal Safety: Low Risk  (6/20/2024)    Interpersonal Safety     Do you feel physically and emotionally safe where you currently live?: Yes     Within the past 12 months, have you been hit, slapped, kicked or otherwise physically hurt by someone?: No     Within the past 12 months, have you been humiliated or emotionally abused in other ways by your partner or ex-partner?: No   Housing Stability: Low Risk  (6/20/2024)    Housing Stability     Do you have housing? : Yes     Are you worried  about losing your housing?: No       ROS Pulmonary  A complete ROS was otherwise negative except as noted in the HPI.  /67   Pulse 69   Ht 1.829 m (6')   Wt 131.5 kg (290 lb)   SpO2 94%   BMI 39.33 kg/m    Exam:   GENERAL APPEARANCE: Alert, and in no apparent distress.  EYES: PERRL, EOMI  HENT: Nasal mucosa with no edema and no hyperemia.   MOUTH: Oral mucosa is moist, without any lesions, no tonsillar enlargement, no oropharyngeal exudate.  NECK: supple, no masses, no thyromegaly.  LYMPHATICS: No significant axillary, cervical, or supraclavicular nodes.  RESP: normal percussion, good air flow throughout.  No crackles. No rhonchi. No wheezes.  CV: Normal S1, S2, regular rhythm, normal rate. No murmur.  No rub. No gallop.  LE edema present.   MS: extremities normal. No clubbing. No cyanosis.  SKIN: no rash on limited exam  NEURO: Mentation intact, speech normal, normal strength and tone, normal gait and stance    Results:  CT Chest: 1. Stable mediastinal and hilar lymphadenopathy secondary to sarcoidosis. 2. Unchanged scattered solid pulmonary nodules throughout the lungs. No new or enlarging pulmonary nodules. 3. Mid pulmonary artery, which can be seen in pulmonary arterial hypertension. 4. Cholelithiasis without evidence of acute cholecystitis.    PFTs done 7/17/2024 were reviewed by me with the patient.  1.97 (46%), Z-score -3.62.  FEV1 1.52 (47%), Z-score -2.97.  The FEV1 FVC ratio is 77.  Total lung capacity 4.54 (60%), Z-score -4.28.  DLCO not corrected for hemoglobin is 17.42 (63%), Z-score -2.38.  My interpretation is that he has moderate restriction with moderately decreased diffusion capacity (not corrected for hemoglobin).  In comparison to prior spirometry the FVC has decreased by 240 cc but the total lung capacity has not changed since January 2024.  He desatted to 88% at 2 minutes on a 6-minute walk test on room air.  With 2 L nasal cannula oxygen, lowest O2 saturation 93%.    Assessment and  plan:   68 year-old male with history of HTN, HLD, obesity, prostate cancer, abnormal chest imaging with TBNA (3/29/2019) demonstrating granulomatous Inflammation (station 7 and 12 L lymph nodes) and  BAL demonstrating 102 white cells and 11% lymphocytes.  The CD4 CD8 ratio was 2.29.  Repeat cardiac MRI with no LGE and cPET with no myocardiac uptake to suggest cardiac sarcoidosis.  Question of cardiomyopathy likely hypertensive.  RV dilatation and mild pulmonary hypertension based on right heart cath with mean PAP 27 mm Hg (1/22). Concern for truncal/diaphragmatic weakness but trapezius biopsy in June 2020 without any convincing evidence of myopathy.    1.  Today the main symptom is cough with clear sputum production, decreased endurance, exertional hypoxia, lower extremity edema on exam.  Broad differential for exertional hypoxia and decreased endurance.  There is a concern for hypertensive cardiomyopathy.  He will need an echo with bubble study to evaluate his heart function.  Given that he has a history of sarcoidosis, will obtain a cardiac PET to assess myocardial involvement by sarcoidosis.  There is also been concern about truncal muscle weakness.  Will obtain MIP/MEP.  14-day ambulatory EKG monitor was planned in November.  Will do this today.  I am also asking him to reach out to cardiology clinic to see if he can be seen sooner.  2.  Pulmonary: No major change in PFTs with unchanged total lung capacity.  CT scan also not dramatically different.  There is some bronchial wall thickening to indicate airway involvement.  This could be related to his allergies or could be because of sarcoidosis.  Will do a CBC with differential.  Also check markers for granulomatous inflammation.  Will place him on Robitussin and Tessalon for symptomatic management while the workup is being done to evaluate the exact cause of his worsening symptoms.  3.  Extrapulmonary: Extensive workup previously including trapezius muscle  biopsy with no clear evidence of muscle involvement.  His FVC is lower than expected based on his CT scan.  Will check MIP/MEP today.  Will check metabolic labs as well.  He follows with cardiology.  4.  Exertional hypoxia: Oxygen will be arranged.  5.  Sleep disordered breathing: Currently not on CPAP.  I am asking him to follow-up with his sleep providers to see if he can have increased adherence to his sleep apnea treatment.  Follow-up in 3 months.  Will follow-up the results of the above tests we found.  Labs reviewed.  Electrolyte panel and LFT are normal range.  1, 25-dihydroxy vitamin D is 57.9 normal.  CBC is in normal range.  Differential does not show eosinophilia.  Plan as outlined above.    This note consists of symbols derived from keyboarding, dictation and/or voice recognition software. As a result, there may be errors in the script that have gone undetected. Please consider this when interpreting information found in this chart

## 2024-07-25 LAB
MEP-PRE: 120 CMH2O
MIP-PRE: -67 CMH2O

## 2024-07-26 LAB
ACE SERPL-CCNC: 63 U/L
SOL IL2 RECEP SERPL-MCNC: 1182.7 PG/ML

## 2024-07-29 ENCOUNTER — TELEPHONE (OUTPATIENT)
Dept: PULMONOLOGY | Facility: CLINIC | Age: 69
End: 2024-07-29
Payer: MEDICARE

## 2024-07-29 ENCOUNTER — TELEPHONE (OUTPATIENT)
Dept: CARDIOLOGY | Facility: CLINIC | Age: 69
End: 2024-07-29
Payer: MEDICARE

## 2024-07-29 NOTE — TELEPHONE ENCOUNTER
7/29 Patient confirmed scheduled appointment:  Date: 8/19/2024  Time: 10:30 am  Visit type: Return Heart Failure  Provider: Torrey  Location: [facility]  Testing/imaging: N/A  Additional notes: pt stated that he wants to see Thenappan and not core

## 2024-07-29 NOTE — TELEPHONE ENCOUNTER
Patient Contacted for the patient to call back and schedule the following:    Appointment type: PFT  Provider: Perlman  Return date: 11/22/2024  Specialty phone number: [phone]  Additional appointment(s) needed: MIP/MEP  Additonal Notes: na

## 2024-07-30 ENCOUNTER — ANCILLARY PROCEDURE (OUTPATIENT)
Dept: PET IMAGING | Facility: CLINIC | Age: 69
End: 2024-07-30
Attending: INTERNAL MEDICINE
Payer: MEDICARE

## 2024-07-30 DIAGNOSIS — D86.85 CARDIAC SARCOIDOSIS: ICD-10-CM

## 2024-07-30 DIAGNOSIS — Z09 ENCOUNTER FOR FOLLOW-UP EXAMINATION AFTER COMPLETED TREATMENT FOR CONDITIONS OTHER THAN MALIGNANT NEOPLASM: ICD-10-CM

## 2024-07-30 LAB — GLUCOSE SERPL-MCNC: 68 MG/DL (ref 70–99)

## 2024-07-30 RX ORDER — FLUDEOXYGLUCOSE F 18 200 MCI/ML
8-18 INJECTION, SOLUTION INTRAVENOUS ONCE
Status: COMPLETED | OUTPATIENT
Start: 2024-07-30 | End: 2024-07-30

## 2024-07-30 RX ADMIN — FLUDEOXYGLUCOSE F 18 16.8 MILLICURIE: 200 INJECTION, SOLUTION INTRAVENOUS at 10:26

## 2024-08-01 ENCOUNTER — ORDERS ONLY (AUTO-RELEASED) (OUTPATIENT)
Dept: PULMONOLOGY | Facility: CLINIC | Age: 69
End: 2024-08-01
Payer: MEDICARE

## 2024-08-01 DIAGNOSIS — R06.09 DYSPNEA ON EXERTION: ICD-10-CM

## 2024-08-01 DIAGNOSIS — D86.9 SARCOIDOSIS: ICD-10-CM

## 2024-08-02 ENCOUNTER — ORDERS ONLY (AUTO-RELEASED) (OUTPATIENT)
Dept: CARDIOLOGY | Facility: CLINIC | Age: 69
End: 2024-08-02
Payer: MEDICARE

## 2024-08-02 DIAGNOSIS — I50.22 CHRONIC SYSTOLIC HEART FAILURE (H): ICD-10-CM

## 2024-08-02 DIAGNOSIS — I47.29 PVT (PAROXYSMAL VENTRICULAR TACHYCARDIA) (H): ICD-10-CM

## 2024-08-06 ENCOUNTER — ANCILLARY PROCEDURE (OUTPATIENT)
Dept: CARDIOLOGY | Facility: CLINIC | Age: 69
End: 2024-08-06
Attending: INTERNAL MEDICINE
Payer: MEDICARE

## 2024-08-06 DIAGNOSIS — D86.9 SARCOIDOSIS: ICD-10-CM

## 2024-08-06 DIAGNOSIS — R06.09 DYSPNEA ON EXERTION: ICD-10-CM

## 2024-08-06 LAB — LVEF ECHO: NORMAL

## 2024-08-06 PROCEDURE — 93306 TTE W/DOPPLER COMPLETE: CPT | Performed by: INTERNAL MEDICINE

## 2024-08-06 RX ORDER — ACYCLOVIR 200 MG/1
3 CAPSULE ORAL ONCE
Status: ACTIVE | OUTPATIENT
Start: 2024-08-06

## 2024-08-06 RX ADMIN — Medication 5 ML: at 17:33

## 2024-08-14 ENCOUNTER — TRANSFERRED RECORDS (OUTPATIENT)
Dept: HEALTH INFORMATION MANAGEMENT | Facility: CLINIC | Age: 69
End: 2024-08-14
Payer: MEDICARE

## 2024-08-19 ENCOUNTER — OFFICE VISIT (OUTPATIENT)
Dept: CARDIOLOGY | Facility: CLINIC | Age: 69
End: 2024-08-19
Attending: INTERNAL MEDICINE
Payer: MEDICARE

## 2024-08-19 ENCOUNTER — LAB (OUTPATIENT)
Dept: LAB | Facility: CLINIC | Age: 69
End: 2024-08-19
Payer: MEDICARE

## 2024-08-19 ENCOUNTER — TELEPHONE (OUTPATIENT)
Dept: CARDIOLOGY | Facility: CLINIC | Age: 69
End: 2024-08-19

## 2024-08-19 VITALS
HEART RATE: 62 BPM | BODY MASS INDEX: 39.11 KG/M2 | DIASTOLIC BLOOD PRESSURE: 72 MMHG | OXYGEN SATURATION: 96 % | SYSTOLIC BLOOD PRESSURE: 118 MMHG | WEIGHT: 288.4 LBS

## 2024-08-19 DIAGNOSIS — R06.09 DYSPNEA ON EXERTION: Primary | ICD-10-CM

## 2024-08-19 DIAGNOSIS — I50.22 CHRONIC SYSTOLIC HEART FAILURE (H): ICD-10-CM

## 2024-08-19 DIAGNOSIS — D86.9 SARCOIDOSIS: ICD-10-CM

## 2024-08-19 DIAGNOSIS — R06.09 DYSPNEA ON EXERTION: ICD-10-CM

## 2024-08-19 LAB
ANION GAP SERPL CALCULATED.3IONS-SCNC: 11 MMOL/L (ref 7–15)
BUN SERPL-MCNC: 18.7 MG/DL (ref 8–23)
CALCIUM SERPL-MCNC: 9.3 MG/DL (ref 8.8–10.4)
CHLORIDE SERPL-SCNC: 102 MMOL/L (ref 98–107)
CREAT SERPL-MCNC: 1.12 MG/DL (ref 0.67–1.17)
EGFRCR SERPLBLD CKD-EPI 2021: 71 ML/MIN/1.73M2
ERYTHROCYTE [DISTWIDTH] IN BLOOD BY AUTOMATED COUNT: 13.3 % (ref 10–15)
GLUCOSE SERPL-MCNC: 99 MG/DL (ref 70–99)
HCO3 SERPL-SCNC: 26 MMOL/L (ref 22–29)
HCT VFR BLD AUTO: 48.1 % (ref 40–53)
HGB BLD-MCNC: 16.1 G/DL (ref 13.3–17.7)
MCH RBC QN AUTO: 30.3 PG (ref 26.5–33)
MCHC RBC AUTO-ENTMCNC: 33.5 G/DL (ref 31.5–36.5)
MCV RBC AUTO: 90 FL (ref 78–100)
NT-PROBNP SERPL-MCNC: <36 PG/ML (ref 0–900)
PLATELET # BLD AUTO: 169 10E3/UL (ref 150–450)
POTASSIUM SERPL-SCNC: 4.4 MMOL/L (ref 3.4–5.3)
RBC # BLD AUTO: 5.32 10E6/UL (ref 4.4–5.9)
SODIUM SERPL-SCNC: 139 MMOL/L (ref 135–145)
WBC # BLD AUTO: 9.2 10E3/UL (ref 4–11)

## 2024-08-19 PROCEDURE — 80053 COMPREHEN METABOLIC PANEL: CPT | Performed by: PATHOLOGY

## 2024-08-19 PROCEDURE — 99215 OFFICE O/P EST HI 40 MIN: CPT | Mod: GC | Performed by: INTERNAL MEDICINE

## 2024-08-19 PROCEDURE — 82248 BILIRUBIN DIRECT: CPT | Performed by: PATHOLOGY

## 2024-08-19 PROCEDURE — 85027 COMPLETE CBC AUTOMATED: CPT | Performed by: PATHOLOGY

## 2024-08-19 PROCEDURE — G0463 HOSPITAL OUTPT CLINIC VISIT: HCPCS | Performed by: INTERNAL MEDICINE

## 2024-08-19 PROCEDURE — 83880 ASSAY OF NATRIURETIC PEPTIDE: CPT | Performed by: PATHOLOGY

## 2024-08-19 PROCEDURE — 36415 COLL VENOUS BLD VENIPUNCTURE: CPT | Performed by: PATHOLOGY

## 2024-08-19 RX ORDER — LIDOCAINE 40 MG/G
CREAM TOPICAL
Status: CANCELLED | OUTPATIENT
Start: 2024-08-19

## 2024-08-19 ASSESSMENT — PAIN SCALES - GENERAL: PAINLEVEL: NO PAIN (0)

## 2024-08-19 NOTE — PATIENT INSTRUCTIONS
"You were seen today in the Cardiovascular Clinic at the [facility].       Cardiology Providers you saw during your visit: Dr. Hirsch      Medication Changes:   1. None      Follow up Appointment Information:  1. Labs today before you leave  2. Right heart catheterization in the next 2-6 weeks with Dr. Hirsch  3. 6 month follow up with labs prior    Pre-procedure instructions - Right Heart Cath and/or Biopsy and/or Pulmonary Angiogram  Patient Education    Your arrival time is TBD.  Location is [address] Waiting Room  Please plan on being at the hospital all day.  At any time, emergencies and/or urgent cases may come up which could delay the start of your procedure.    Pre-procedure instructions - Right heart catheterization  No solid food for 8 hours prior  Nothing to drink 2 hours prior to arrival time  You can take your morning medications (except diabetic and blood thinners) with sips of water  We recommend you arrange for a ride to drop you off and pick you up, in the instance, you are unable to drive home, however you should be able to function as you normally would after the procedure              Anticoagulation Medication Instructions   NA    You will need to follow up with one of our cardiology APPs 1-2 weeks after your procedure. If you need help scheduling or rescheduling your appointment, please call [phone]        [address] [place] [address] on [address] [place] [address]        Thank you for allowing us to be a part of your care here at the [facility]      If you have questions or concerns please contact us at:      Rafaela Barnett RN BSN   Cardiology Care Coordinator  [facility]   Questions and [phone]   press #1 to \"send a message to your care team\"       "

## 2024-08-19 NOTE — NURSING NOTE
Follow Up Plan:  - Labs today before you leave  - RHC with Dr. Hirsch in the next month or so  - 6 month follow up with labs prior    AVS reviewed and patient escorted to Pods to schedule F/U testing. Camryn Garcia RN on 8/19/2024 at 12:09 PM

## 2024-08-19 NOTE — TELEPHONE ENCOUNTER
Cath Lab Case Request/Order    Location: [address] [address] [facility] Waiting Room    Procedure: Right Heart Cath (RHC)    Procedure Date: 9/13/2024    Patient Arrival Time: 7AM (labs), 7:30AM (RHC)    Procedure Time: 0830 (pending emergency)    Ordering Provider: Dr. Torrey Hirsch    Performing Cardiologist: Dr. Torrey Hirsch    Inpatient Bed Needed: No    Post-  Procedure SUSANNE appointment scheduled (1 - 2 weeks): N/A, RHC     Date: NA     Provider: NA    Communicated Patient Instructions:     NPO, nothing to eat 8 hours and drink 2 hours before arrival time: Yes     , need to arrange a ride home - unable to drive post- procedure: N/A, RHC     Adult at home, need a responsible adult to stay with patient 24 hours post- procedure: N/A, RHC    Appointment was scheduled: Face to Face    Patient expressed understanding of above instructions and denied further questions at this time.    Leidy Morales

## 2024-08-19 NOTE — PROGRESS NOTES
[facility]  Advanced HF & Pulmonary Hypertension Clinic  Service Date:  August 19, 2024      Dear Doctors Mario and Elmer:       We had the pleasure of seeing Mr. Derek Contreras follow-up in our heart failure and pulm hypertension clinic.  As you know, he is a very pleasant 68-year-old male with past medical history significant for     1. Pulmonary sarcoidosis  2.  LV systolic dysfunction -nonischemic in etiology likely related to uncontrolled hypertension  3.  Pulmonary hypertension and RV dysfunction secondary to pulmonary sarcoidosis    Derek has been having more issues with coughing since we last saw him and purvis seen Dr Martin for the same. He was sent for repeat CT scan, PET, cardiac MRI, echocardiogram and he is also still wearing his Holter to assess for his PVC burden. His exercise tolerance is limited by this cough which has been progressively worsening over the past 3 months. He is accompanied by his wife who also reports that he is more easily winded. He denies chest pain, PND or orthopnea.  We would currently characterize him as a functional class II. No interim hospitalization or ER visits.    His ejection fraction remains stable on echocardiogram, his PET does not show evidence of cardiac sarcoid but does show increased lung nodule activity, his MRI and Holter are pending. He is tolerating his GDMT well with no side effects.     He had a Holter monitor in November that showed 5.8% PVCs with stable when compared to his previous Holter- we have sent a new Holter which will be detached tomorrow.     PAST MEDICAL HISTORY:  Past Medical History:   Diagnosis Date    Asthma     Claustrophobia     CPAP (continuous positive airway pressure) dependence     Dyslipidemia     [place] (hard of hearing)     Hypercholesteremia     Hypertension     Iron deficiency     Mediastinal adenopathy     Obesity     BEULAH (obstructive sleep apnea)     Prostate cancer (H)     Pulmonary hypertension (H)     Sarcoidosis       CURRENT MEDICATIONS:  Current Outpatient Medications   Medication Sig Dispense Refill    aspirin (ASA) 81 MG tablet Take 81 mg by mouth every morning  90 tablet 3    atorvastatin (LIPITOR) 40 MG tablet Take 1 tablet (40 mg) by mouth every morning 90 tablet 3    benzonatate (TESSALON) 100 MG capsule Take 1 capsule (100 mg) by mouth 3 times daily as needed for cough 60 capsule 3    cetirizine (ZYRTEC) 10 MG tablet Take 10 mg by mouth every morning  90 tablet 3    empagliflozin (JARDIANCE) 10 MG TABS tablet Take 1 tablet (10 mg) by mouth every other day 45 tablet 3    fluticasone-vilanterol (BREO ELLIPTA) 200-25 MCG/ACT inhaler INHALE 1 PUFF BY MOUTH ONE TIME DAILY Strength: 200-25 MCG/INH 60 each 11    guaiFENesin (ROBITUSSIN) 20 mg/mL liquid Take 10 mLs (200 mg) by mouth every 4 hours as needed for cough 200 mL 3    hydroCHLOROthiazide 12.5 MG tablet TAKE 1 TABLET BY MOUTH EVERY MORNING 90 tablet 3    metoprolol succinate ER (TOPROL XL) 100 MG 24 hr tablet Take 1.5 tablets (150 mg) by mouth daily 135 tablet 3    mometasone (NASONEX) 50 MCG/ACT nasal spray Spray 2 sprays into both nostrils daily 17 g 3    montelukast (SINGULAIR) 10 MG tablet Take 1 tablet (10 mg) by mouth at bedtime 90 tablet 0    multivitamin (ONE-DAILY) tablet Take 1 tablet by mouth every morning  90 tablet 3    omeprazole (PRILOSEC) 40 MG DR capsule Take 1 capsule (40 mg) by mouth 2 times daily 180 capsule 3    sacubitril-valsartan (ENTRESTO)  MG per tablet Take 1 tablet by mouth 2 times daily 180 tablet 3    spironolactone (ALDACTONE) 25 MG tablet Take 1 tablet (25 mg) by mouth daily 90 tablet 3    predniSONE (DELTASONE) 10 MG tablet 30 mg daily x 7 days 21 tablet 0     No current facility-administered medications for this visit.     Facility-Administered Medications Ordered in Other Visits   Medication Dose Route Frequency Provider Last Rate Last Admin    sodium chloride bacteriostatic 0.9 % flush 3 mL  3 mL Intravenous Once Chavis,  Dewayne RIGGINS MD           ROS:   10 point ROS negative except as discussed in above HPI.    EXAM:  /72 (BP Location: Right arm, Patient Position: Chair, Cuff Size: Adult Large)   Pulse 62   Wt 130.8 kg (288 lb 6.4 oz)   SpO2 96%   BMI 39.11 kg/m    General: appears comfortable, alert and articulate  Head: normocephalic, atraumatic  Eyes: anicteric sclera, EOMI  Neck: no adenopathy  Orophyarynx: moist mucosa, no lesions, dentition intact  Heart: regular, normal S1/S2, no murmur, gallop, rub, no jugular venous distention  Lungs: Bibasilar crackles, scattered expiratory wheezing   Abdomen: soft, non-tender, bowel sounds present, no hepatosplenomegaly  Extremities: no clubbing, cyanosis or edema  Neurological: normal speech and affect, no gross motor deficits    Labs:  No results found for this or any previous visit (from the past 168 hour(s)).       (2022)Cardiac MRI 05/02/2022  Comparison CMR: 12/22/2021     1. The LV is normal in cavity size and wall thickness. The global systolic function is normal. The LVEF is  58 %. There are no regional wall motion abnormalities.     2. The RV is mildly enlarged in cavity size. The global systolic function is mildly reduced. The RVEF is  44%.      3. Both atria are normal in size.     4. There is no significant valvular disease.      5. Late gadolinium enhancement imaging shows anterior septal and inferior septal LGE at the RV insertion  points similar to prior     6.  There is no pericardial effusion or thickening.      7.  There is no intracardiac thrombus.      CONCLUSIONS:  No evidence of active cardiac sarcoidosis. Improved biventricular function with LVEF 58% and  RVEF 44% (previously 41% and 34% respectively). Septal systolic paradoxical bowing still suggestive of RV  pressure overload, and LGE in the septal RV insertion points into the LV; together suggestive of pulmonary  hypertension.     Coronary CTA (2022)    1.  Severe calcific atherosclerosis causing mild  stenosis and without  any high-grade lesions.  2.  Total Agatston score 442 placing the patient in the 76th  percentile when compared to age and gender matched control group.  3.  Please review Radiology report for incidental noncardiac findings  that will follow separately.    RHC (11/2022)  RA 2/5/2 mmHg  RV 24/3 mmHg  PA 24/10/16 mmHg  CWP 3 mmHg  CO (Lucas) 4.74 L/min  CI (Lucas) 2.02 L/min/m2  CO (TD) 7.3 L/min  CI (TD) 3.11 L/min/m2    NIBP 107/56/75 mmHg  PVR 2.74 ROJAS    Cardiac PET 2/2020  1. No FDG active cardiac sarcoid.  2. Decreased perfusion in the inferoseptal wall, findings may be  related to sequela of previous cardiac sarcoid with microvascular  injury.  3. Enlarged left ventricle with borderline low ejection fraction.  4. Decreased myocardial blood flow in the RCA distribution.  5. Findings of a dilated cardiomyopathy a concern for possible  ischemia/myocardial injury of the septum, consider CTA or coronary  angiography for further evaluation of this region.  6. Chest and upper abdominal hypermetabolic lymphadenopathy which is  indicative of active systemic sarcoid.    Echocardiogram 08/2024  Interpretation Summary  Global and regional left ventricular function is normal with an EF of 55-60%.  Borderline right ventricular enlargement.  Global right ventricular function is borderline reduced.  No significant valvular abnormalities present.  No significant changes noted.    Cardiac PET 2024  Impression:  1. No FDG active cardiac sarcoid.  2. Cardiomegaly     3. Active systemic sarcoidosis - hypermetabolic thoracic nodes and  lung nodules  3a. Lymphadenopathy - Mediastinal and hilar extensive hypermetabolic  lymphadenopathy persists, slightly decreased from 2/19/2020.  Interval  resolution of 3 hypermetabolic retroperitoneal lymph nodes.   4. Lung nodules - Multiple hypermetabolic subcentimeter upper lobe  hypermetabolic lung nodules consistent with active  sarcoidosis.   Increased in FDG and marginally  increased in size from PET/CT  2/19/2020. Not significantly changed in size  from CT 9/13/2023.  5. Enlarged pulmonary artery can be seen with pulmonary hypertension  (concordant with mild pulmonary hypertension right heart cath 1/22)     Assessment and Plan:     In summary, Mr. Contreras is a 68-year-old gentleman with pulmonary sarcoidosis, systemic hypertension, mild biventricular systolic dysfunction, and pulmonary hypertension who returns today for follow-up.    1.  Nonischemic cardiomyopathy in the setting of sarcoidosis and uncontrolled systemic blood pressure  2.  Frequent PVCs   3.  Nonobstructive coronary artery disease  4.  Pulmonary sarcoidosis      Derek is having increasing cough which we suspect is due to his pulmonary sarcoid and likely not related to worsening pulmonary hypertension. His echocardiogram is unchanged and his cardiac PET does not show evidence of cardiac sarcoid. He is euvolemic today. However, since worsening of pulmonary sarcoid can cause worsening pulmonary hypertension, we will repeat a RHC for him and reassess the pulmonary pressures. He may need to be on maintenance steroid therapy as he does feel better when treated with Prednisone - we will defer the management of his pulmonary sarcoid to Pulmonology. He is tolerating his GDMT well and we will not make any changes to these dosages today. His most recent proBNP in July was normal. He is functional class II.     His Holter to assess his PVC burden is pending.     We weill continue the current 4 drug neurohormonal therapy.  He is not requiring any diuretics.  We will readdress the need for pulmonary vasodilator therapy based on the results of his RHC.  If he were to have worsening pulmonary vascular disease and RV dysfunction, we will consider inhaled treprostinil therapy which is minimal VQ mismatch effect.    His PVCs are under control on high-dose metoprolol.  He is also following with Dr. Mejia.    He is on low-dose aspirin and  statin for his nonobstructive coronary disease per primary prevention.      Plan  - RHC   - Continue present medications  - proBNP  - Return to clinic in 6 months     Deana Grant PGY 7  Advanced Heart Failure Fellow     Addendum  Patient had a repeat right heart catheterization which showed normal biventricular filling pressure, normal PA pressure and cardiac output.  He does not have pulm hypertension.  His worsening cough is likely related to sarcoidosis.  His left-sided filling pressures are normal.  Cardiac output is also preserved.  Recommended him to follow-up with his pulmonologist for further management of his pulmonary sarcoidosis.    Sincerely,  Torrey Hirsch MD   [facility]  Heart Failure, Transplant, and Mechanical Circulatory Support Cardiology   Cardiovascular Division  [facility]   [phone]

## 2024-08-19 NOTE — LETTER
8/19/2024      RE: Derek Contreras  [address] [facility] [address]       Dear Colleague,    Thank you for the opportunity to participate in the care of your patient, Derek Contreras, at the [facility]ART [facility] [place] at [facility]. Please see a copy of my visit note below.    [facility]  Advanced HF & Pulmonary Hypertension Clinic  Service Date:  August 19, 2024      Dear Doctors Mario and Elmer:       We had the pleasure of seeing Mr. Derek Contreras follow-up in our heart failure and pulm hypertension clinic.  As you know, he is a very pleasant 68-year-old male with past medical history significant for     1. Pulmonary sarcoidosis  2.  LV systolic dysfunction -nonischemic in etiology likely related to uncontrolled hypertension  3.  Pulmonary hypertension and RV dysfunction secondary to pulmonary sarcoidosis    Derek has been having more issues with coughing since we last saw him and purvis seen Dr Martin for the same. He was sent for repeat CT scan, PET, cardiac MRI, echocardiogram and he is also still wearing his Holter to assess for his PVC burden. His exercise tolerance is limited by this cough which has been progressively worsening over the past 3 months. He is accompanied by his wife who also reports that he is more easily winded. He denies chest pain, PND or orthopnea.  We would currently characterize him as a functional class II. No interim hospitalization or ER visits.    His ejection fraction remains stable on echocardiogram, his PET does not show evidence of cardiac sarcoid but does show increased lung nodule activity, his MRI and Holter are pending. He is tolerating his GDMT well with no side effects.     He had a Holter monitor in November that showed 5.8% PVCs with stable when compared to his previous Holter- we have sent a new Holter which will be detached tomorrow.     PAST MEDICAL HISTORY:  Past Medical  History:   Diagnosis Date     Asthma      Claustrophobia      CPAP (continuous positive airway pressure) dependence      Dyslipidemia      [place] (hard of hearing)      Hypercholesteremia      Hypertension      Iron deficiency      Mediastinal adenopathy      Obesity      BEULAH (obstructive sleep apnea)      Prostate cancer (H)      Pulmonary hypertension (H)      Sarcoidosis      CURRENT MEDICATIONS:  Current Outpatient Medications   Medication Sig Dispense Refill     aspirin (ASA) 81 MG tablet Take 81 mg by mouth every morning  90 tablet 3     atorvastatin (LIPITOR) 40 MG tablet Take 1 tablet (40 mg) by mouth every morning 90 tablet 3     benzonatate (TESSALON) 100 MG capsule Take 1 capsule (100 mg) by mouth 3 times daily as needed for cough 60 capsule 3     cetirizine (ZYRTEC) 10 MG tablet Take 10 mg by mouth every morning  90 tablet 3     empagliflozin (JARDIANCE) 10 MG TABS tablet Take 1 tablet (10 mg) by mouth every other day 45 tablet 3     fluticasone-vilanterol (BREO ELLIPTA) 200-25 MCG/ACT inhaler INHALE 1 PUFF BY MOUTH ONE TIME DAILY Strength: 200-25 MCG/INH 60 each 11     guaiFENesin (ROBITUSSIN) 20 mg/mL liquid Take 10 mLs (200 mg) by mouth every 4 hours as needed for cough 200 mL 3     hydroCHLOROthiazide 12.5 MG tablet TAKE 1 TABLET BY MOUTH EVERY MORNING 90 tablet 3     metoprolol succinate ER (TOPROL XL) 100 MG 24 hr tablet Take 1.5 tablets (150 mg) by mouth daily 135 tablet 3     mometasone (NASONEX) 50 MCG/ACT nasal spray Spray 2 sprays into both nostrils daily 17 g 3     montelukast (SINGULAIR) 10 MG tablet Take 1 tablet (10 mg) by mouth at bedtime 90 tablet 0     multivitamin (ONE-DAILY) tablet Take 1 tablet by mouth every morning  90 tablet 3     omeprazole (PRILOSEC) 40 MG DR capsule Take 1 capsule (40 mg) by mouth 2 times daily 180 capsule 3     sacubitril-valsartan (ENTRESTO)  MG per tablet Take 1 tablet by mouth 2 times daily 180 tablet 3     spironolactone (ALDACTONE) 25 MG tablet Take 1  tablet (25 mg) by mouth daily 90 tablet 3     predniSONE (DELTASONE) 10 MG tablet 30 mg daily x 7 days 21 tablet 0     No current facility-administered medications for this visit.     Facility-Administered Medications Ordered in Other Visits   Medication Dose Route Frequency Provider Last Rate Last Admin     sodium chloride bacteriostatic 0.9 % flush 3 mL  3 mL Intravenous Once Dewayne Chavis MD           ROS:   10 point ROS negative except as discussed in above HPI.    EXAM:  /72 (BP Location: Right arm, Patient Position: Chair, Cuff Size: Adult Large)   Pulse 62   Wt 130.8 kg (288 lb 6.4 oz)   SpO2 96%   BMI 39.11 kg/m    General: appears comfortable, alert and articulate  Head: normocephalic, atraumatic  Eyes: anicteric sclera, EOMI  Neck: no adenopathy  Orophyarynx: moist mucosa, no lesions, dentition intact  Heart: regular, normal S1/S2, no murmur, gallop, rub, no jugular venous distention  Lungs: Bibasilar crackles, scattered expiratory wheezing   Abdomen: soft, non-tender, bowel sounds present, no hepatosplenomegaly  Extremities: no clubbing, cyanosis or edema  Neurological: normal speech and affect, no gross motor deficits    Labs:  No results found for this or any previous visit (from the past 168 hour(s)).       (2022)Cardiac MRI 05/02/2022  Comparison CMR: 12/22/2021     1. The LV is normal in cavity size and wall thickness. The global systolic function is normal. The LVEF is  58 %. There are no regional wall motion abnormalities.     2. The RV is mildly enlarged in cavity size. The global systolic function is mildly reduced. The RVEF is  44%.      3. Both atria are normal in size.     4. There is no significant valvular disease.      5. Late gadolinium enhancement imaging shows anterior septal and inferior septal LGE at the RV insertion  points similar to prior     6.  There is no pericardial effusion or thickening.      7.  There is no intracardiac thrombus.      CONCLUSIONS:  No evidence  of active cardiac sarcoidosis. Improved biventricular function with LVEF 58% and  RVEF 44% (previously 41% and 34% respectively). Septal systolic paradoxical bowing still suggestive of RV  pressure overload, and LGE in the septal RV insertion points into the LV; together suggestive of pulmonary  hypertension.     Coronary CTA (2022)    1.  Severe calcific atherosclerosis causing mild stenosis and without  any high-grade lesions.  2.  Total Agatston score 442 placing the patient in the 76th  percentile when compared to age and gender matched control group.  3.  Please review Radiology report for incidental noncardiac findings  that will follow separately.    RHC (11/2022)  RA 2/5/2 mmHg  RV 24/3 mmHg  PA 24/10/16 mmHg  CWP 3 mmHg  CO (Lucas) 4.74 L/min  CI (Lucas) 2.02 L/min/m2  CO (TD) 7.3 L/min  CI (TD) 3.11 L/min/m2    NIBP 107/56/75 mmHg  PVR 2.74 ROJAS    Cardiac PET 2/2020  1. No FDG active cardiac sarcoid.  2. Decreased perfusion in the inferoseptal wall, findings may be  related to sequela of previous cardiac sarcoid with microvascular  injury.  3. Enlarged left ventricle with borderline low ejection fraction.  4. Decreased myocardial blood flow in the RCA distribution.  5. Findings of a dilated cardiomyopathy a concern for possible  ischemia/myocardial injury of the septum, consider CTA or coronary  angiography for further evaluation of this region.  6. Chest and upper abdominal hypermetabolic lymphadenopathy which is  indicative of active systemic sarcoid.    Echocardiogram 08/2024  Interpretation Summary  Global and regional left ventricular function is normal with an EF of 55-60%.  Borderline right ventricular enlargement.  Global right ventricular function is borderline reduced.  No significant valvular abnormalities present.  No significant changes noted.    Cardiac PET 2024  Impression:  1. No FDG active cardiac sarcoid.  2. Cardiomegaly     3. Active systemic sarcoidosis - hypermetabolic thoracic nodes  and  lung nodules  3a. Lymphadenopathy - Mediastinal and hilar extensive hypermetabolic  lymphadenopathy persists, slightly decreased from 2/19/2020.  Interval  resolution of 3 hypermetabolic retroperitoneal lymph nodes.   4. Lung nodules - Multiple hypermetabolic subcentimeter upper lobe  hypermetabolic lung nodules consistent with active  sarcoidosis.   Increased in FDG and marginally increased in size from PET/CT  2/19/2020. Not significantly changed in size  from CT 9/13/2023.  5. Enlarged pulmonary artery can be seen with pulmonary hypertension  (concordant with mild pulmonary hypertension right heart cath 1/22)     Assessment and Plan:     In summary, Mr. Contreras is a 68-year-old gentleman with pulmonary sarcoidosis, systemic hypertension, mild biventricular systolic dysfunction, and pulmonary hypertension who returns today for follow-up.    1.  Nonischemic cardiomyopathy in the setting of sarcoidosis and uncontrolled systemic blood pressure  2.  Frequent PVCs   3.  Nonobstructive coronary artery disease  4.  Pulmonary sarcoidosis      Derek is having increasing cough which we suspect is due to his pulmonary sarcoid and likely not related to worsening pulmonary hypertension. His echocardiogram is unchanged and his cardiac PET does not show evidence of cardiac sarcoid. He is euvolemic today. However, since worsening of pulmonary sarcoid can cause worsening pulmonary hypertension, we will repeat a RHC for him and reassess the pulmonary pressures. He may need to be on maintenance steroid therapy as he does feel better when treated with Prednisone - we will defer the management of his pulmonary sarcoid to Pulmonology. He is tolerating his GDMT well and we will not make any changes to these dosages today. His most recent proBNP in July was normal. He is functional class II.     His Holter to assess his PVC burden is pending.     We weill continue the current 4 drug neurohormonal therapy.  He is not requiring any  diuretics.  We will readdress the need for pulmonary vasodilator therapy based on the results of his RHC.  If he were to have worsening pulmonary vascular disease and RV dysfunction, we will consider inhaled treprostinil therapy which is minimal VQ mismatch effect.    His PVCs are under control on high-dose metoprolol.  He is also following with Dr. Mejia.    He is on low-dose aspirin and statin for his nonobstructive coronary disease per primary prevention.      Plan  - RHC   - Continue present medications  - proBNP  - Return to clinic in 6 months     Deana Grant PGY 7  Advanced Heart Failure Fellow               Attestation signed by Torrey Hirsch MD at 8/20/2024  4:33 PM:    Staff Attestation:   I saw this patient with the resident/fellow and agree with the resident/fellow's findings and plan of care as documented in the note. I personally reviewed vital signs, hemodynamics, medications, laboratory values, and diagnostic testing.     Date of Service (When I saw the patient): August 20, 2024    Torrey Hirsch MD   Cardiovascular Division  [facility]   [phone]      Please do not hesitate to contact me if you have any questions/concerns.     Sincerely,     Torrey Hirsch MD

## 2024-08-19 NOTE — NURSING NOTE
Chief Complaint   Patient presents with    Follow Up     August 19, 2024: Reason for visit: RTN HF: 68 year old male presents with history of pulmonary sarcoid and heart failure, ef 43% for follow up with labs prior       Vitals were taken and medications reconciled.    Cody Truong, EMT  10:27 AM

## 2024-08-20 ENCOUNTER — MYC MEDICAL ADVICE (OUTPATIENT)
Dept: PULMONOLOGY | Facility: CLINIC | Age: 69
End: 2024-08-20
Payer: MEDICARE

## 2024-08-21 ENCOUNTER — MYC MEDICAL ADVICE (OUTPATIENT)
Dept: PULMONOLOGY | Facility: CLINIC | Age: 69
End: 2024-08-21
Payer: MEDICARE

## 2024-08-22 ENCOUNTER — VIRTUAL VISIT (OUTPATIENT)
Dept: PULMONOLOGY | Facility: CLINIC | Age: 69
End: 2024-08-22
Attending: INTERNAL MEDICINE
Payer: MEDICARE

## 2024-08-22 VITALS — WEIGHT: 285 LBS | HEIGHT: 71 IN | BODY MASS INDEX: 39.9 KG/M2

## 2024-08-22 DIAGNOSIS — D86.9 SARCOIDOSIS: Primary | ICD-10-CM

## 2024-08-22 LAB
ALBUMIN SERPL BCG-MCNC: 3.9 G/DL (ref 3.5–5.2)
ALP SERPL-CCNC: 76 U/L (ref 40–150)
ALT SERPL W P-5'-P-CCNC: 28 U/L (ref 0–70)
AST SERPL W P-5'-P-CCNC: 31 U/L (ref 0–45)
BILIRUB DIRECT SERPL-MCNC: <0.2 MG/DL (ref 0–0.3)
BILIRUB SERPL-MCNC: 0.3 MG/DL
PROT SERPL-MCNC: 7.1 G/DL (ref 6.4–8.3)

## 2024-08-22 PROCEDURE — 99214 OFFICE O/P EST MOD 30 MIN: CPT | Mod: 95 | Performed by: INTERNAL MEDICINE

## 2024-08-22 PROCEDURE — G2211 COMPLEX E/M VISIT ADD ON: HCPCS | Performed by: INTERNAL MEDICINE

## 2024-08-22 RX ORDER — PREDNISONE 10 MG/1
TABLET ORAL
Qty: 146 TABLET | Refills: 0 | Status: SHIPPED | OUTPATIENT
Start: 2024-08-22 | End: 2024-12-11

## 2024-08-22 RX ORDER — FOLIC ACID 1 MG/1
1 TABLET ORAL DAILY
Qty: 90 TABLET | Refills: 3 | Status: SHIPPED | OUTPATIENT
Start: 2024-08-22

## 2024-08-22 RX ORDER — METHOTREXATE 2.5 MG/1
15 TABLET ORAL
Qty: 24 TABLET | Refills: 3 | Status: SHIPPED | OUTPATIENT
Start: 2024-08-22

## 2024-08-22 RX ORDER — SULFAMETHOXAZOLE AND TRIMETHOPRIM 400; 80 MG/1; MG/1
1 TABLET ORAL DAILY
Qty: 90 TABLET | Refills: 3 | Status: SHIPPED | OUTPATIENT
Start: 2024-08-22

## 2024-08-22 ASSESSMENT — PAIN SCALES - GENERAL: PAINLEVEL: NO PAIN (0)

## 2024-08-22 NOTE — PROGRESS NOTES
Virtual Visit Details    Type of service:  Video Visit   Video Start Time: 10:31 AM  Video End Time:10:48 AM    Originating Location (pt. Location): Home    Distant Location (provider location):  On-site  Platform used for Video Visit: AmWell    Reason for Visit  Derek Contreras is a 68 year old year old male who is being seen for RECHECK    ILD HPI    Derek Contreras is a 68-year-old male with a history of pulmonary sarcoidosis previously followed by Dr. Martin.  He has a history of some respiratory symptoms and PET scan showing extensive bilateral mediastinal and hilar FDG avid adenopathy.  He has not been treated in the past.  He also has a history of nonischemic cardiomyopathy and pulmonary hypertension though is cardiomyopathy is not felt to be due to cardiac sarcoid.  He is evaluated today via virtual video visit for ongoing symptoms.  He has had significant symptoms with cough and production with clear phlegm.  This has been going on for several months.  He notes that this got better when he had a course of prednisone and azithromycin however after several weeks his symptoms recurred.  There has not been felt to be any significant infection.  Otherwise no other new complaints today.      Current Outpatient Medications   Medication Sig Dispense Refill    aspirin (ASA) 81 MG tablet Take 81 mg by mouth every morning  90 tablet 3    atorvastatin (LIPITOR) 40 MG tablet Take 1 tablet (40 mg) by mouth every morning 90 tablet 3    benzonatate (TESSALON) 100 MG capsule Take 1 capsule (100 mg) by mouth 3 times daily as needed for cough 60 capsule 3    cetirizine (ZYRTEC) 10 MG tablet Take 10 mg by mouth every morning  90 tablet 3    empagliflozin (JARDIANCE) 10 MG TABS tablet Take 1 tablet (10 mg) by mouth every other day 45 tablet 3    fluticasone-vilanterol (BREO ELLIPTA) 200-25 MCG/ACT inhaler INHALE 1 PUFF BY MOUTH ONE TIME DAILY Strength: 200-25 MCG/INH 60 each 11    guaiFENesin (ROBITUSSIN) 20 mg/mL  liquid Take 10 mLs (200 mg) by mouth every 4 hours as needed for cough 200 mL 3    hydroCHLOROthiazide 12.5 MG tablet TAKE 1 TABLET BY MOUTH EVERY MORNING 90 tablet 3    metoprolol succinate ER (TOPROL XL) 100 MG 24 hr tablet Take 1.5 tablets (150 mg) by mouth daily 135 tablet 3    mometasone (NASONEX) 50 MCG/ACT nasal spray Spray 2 sprays into both nostrils daily 17 g 3    montelukast (SINGULAIR) 10 MG tablet Take 1 tablet (10 mg) by mouth at bedtime 90 tablet 0    multivitamin (ONE-DAILY) tablet Take 1 tablet by mouth every morning  90 tablet 3    omeprazole (PRILOSEC) 40 MG DR capsule Take 1 capsule (40 mg) by mouth 2 times daily 180 capsule 3    predniSONE (DELTASONE) 10 MG tablet 30 mg daily x 7 days 21 tablet 0    sacubitril-valsartan (ENTRESTO)  MG per tablet Take 1 tablet by mouth 2 times daily 180 tablet 3    spironolactone (ALDACTONE) 25 MG tablet Take 1 tablet (25 mg) by mouth daily 90 tablet 3     No current facility-administered medications for this visit.     Facility-Administered Medications Ordered in Other Visits   Medication Dose Route Frequency Provider Last Rate Last Admin    sodium chloride bacteriostatic 0.9 % flush 3 mL  3 mL Intravenous Once Dewayne Chavis MD         Allergies   Allergen Reactions    Cat Hair Extract Itching     itchy tearful eyes    Adhesive Tape Rash    Iodine Rash     Past Medical History:   Diagnosis Date    Asthma     Claustrophobia     CPAP (continuous positive airway pressure) dependence     Dyslipidemia     [place] (hard of hearing)     Hypercholesteremia     Hypertension     Iron deficiency     Mediastinal adenopathy     Obesity     BEULAH (obstructive sleep apnea)     Prostate cancer (H)     Pulmonary hypertension (H)     Sarcoidosis        Past Surgical History:   Procedure Laterality Date    APPENDECTOMY      BIOPSY MASS NECK Left 7/15/2020    Procedure: Left trapezius muscle biopsy;  Surgeon: Ade Villa MD;  [facility]    CLOSED REDUCTION HIP Left  10/31/2022    Procedure: Closed reduction left total hip arthroplasty;  Surgeon: Antonio Ann MD;  [facility]    CV RIGHT HEART CATH MEASUREMENTS RECORDED N/A 12/11/2019    Procedure: CV RIGHT HEART CATH;  Surgeon: Torrey Hirsch MD;  [facility]    CV RIGHT HEART CATH MEASUREMENTS RECORDED N/A 1/19/2022    Procedure: CV RIGHT HEART CATH;  Surgeon: Torrey Hirsch MD;  [facility]    CV RIGHT HEART CATH MEASUREMENTS RECORDED N/A 11/4/2022    Procedure: Heart Cath Right Heart Cath;  Surgeon: Torrey Hirsch MD;  [facility]    DAVINCI PROSTATECTOMY, LYMPHADENECTOMY N/A 5/23/2019    Procedure: ROBOTIC ASSISTED LAPAROSCOPIC RADICAL PROSTATECTOMY WITH BILATERAL PELVIC LYMPH NODE DISSECTION;  Surgeon: Matteo Coughlin MD;  Location[facility]    ENDOBRONCHIAL ULTRASOUND FLEXIBLE N/A 3/29/2019    Procedure: Flexible Bronchoscopy, Endobronchial Ultrasound;  Surgeon: Curtis Leger MD;  [facility]    VASECTOMY      ZZC TOTAL HIP ARTHROPLASTY Bilateral     ZZC TOTAL KNEE ARTHROPLASTY Bilateral        Social History     Socioeconomic History    Marital status:      Spouse name: Not on file    Number of children: Not on file    Years of education: Not on file    Highest education level: Not on file   Occupational History    Not on file   Tobacco Use    Smoking status: Never    Smokeless tobacco: Never   Vaping Use    Vaping status: Never Used   Substance and Sexual Activity    Alcohol use: Yes     Comment: once a week    Drug use: No    Sexual activity: Yes     Partners: Female   Other Topics Concern    Parent/sibling w/ CABG, MI or angioplasty before 65F 55M? Not Asked   Social History Narrative    12/03/20         ENVIRONMENTAL HISTORY: [place] The home is heated with a gas furnace. They does have central air conditioning. The patient's bedroom is  furnished with Indoor plants and carpeting in bedroom.  Pets inside the house include 0 pets. There is no history of cockroach or mice infestation. There is/are 0 smokers in the house.  The house does not have a damp basement.      Social Determinants of Health     Financial Resource Strain: Low Risk  (6/20/2024)    Financial Resource Strain     Within the past 12 months, have you or your family members you live with been unable to get utilities (heat, electricity) when it was really needed?: No   Food Insecurity: Low Risk  (6/20/2024)    Food Insecurity     Within the past 12 months, did you worry that your food would run out before you got money to buy more?: No     Within the past 12 months, did the food you bought just not last and you didn t have money to get more?: No   Transportation Needs: Low Risk  (6/20/2024)    Transportation Needs     Within the past 12 months, has lack of transportation kept you from medical appointments, getting your medicines, non-medical meetings or appointments, work, or from getting things that you need?: No   Physical Activity: Unknown (6/20/2024)    Exercise Vital Sign     Days of Exercise per Week: 4 days     Minutes of Exercise per Session: Not on file   Stress: No Stress Concern Present (6/20/2024)    [employer] - Occupational Stress Questionnaire     Feeling of Stress : Not at all   Social Connections: Unknown (6/20/2024)    Social Connection and Isolation Panel [NHANES]     Frequency of Communication with Friends and Family: Not on file     Frequency of Social Gatherings with Friends and Family: Once a week     Attends Roman Catholic Services: Not on file     Active Member of Clubs or Organizations: Not on file     Attends Club or Organization Meetings: Not on file     Marital Status: Not on file   Interpersonal Safety: Low Risk  (6/20/2024)    Interpersonal Safety     Do you feel physically and emotionally safe where you currently live?: Yes      "Within the past 12 months, have you been hit, slapped, kicked or otherwise physically hurt by someone?: No     Within the past 12 months, have you been humiliated or emotionally abused in other ways by your partner or ex-partner?: No   Housing Stability: Low Risk  (6/20/2024)    Housing Stability     Do you have housing? : Yes     Are you worried about losing your housing?: No       Family History   Problem Relation Age of Onset    Alzheimer Disease Mother     Heart Disease Father     Glaucoma No family hx of     Macular Degeneration No family hx of     Diabetes No family hx of     Thyroid Disease No family hx of        ROS Pulmonary    A complete ROS was otherwise negative except as noted in the HPI.  Vitals:    08/22/24 1016   Weight: 129.3 kg (285 lb)   Height: 1.803 m (5' 11\")       Results: I have reviewed all imaging, PFTs and other relavent tests, please see below for details, PFT and imaging results were reviewed with the patient.    Assessment and plan:    68-year-old male with a history of pulmonary sarcoidosis and significant cough.  As I discussed with the patient and his wife treatment indications for sarcoid are symptoms or fibrosis.  There is no evidence of significant fibrotic changes in his lungs however certainly has significant symptoms that he feels improved on the prednisone and so I think it is reasonable to give him a trial of anti-inflammatory therapy.  I am going to start him on prednisone 30 mg with a fairly rapid taper down to 10 mg.  I am also going initiate methotrexate with the plan to get him up to 15 mg per our standard protocol.  We will see how his symptoms respond to this.  He will follow-up with cardiology as previously scheduled.  I will see the patient back in 3 months with pulmonary function test however I have instructed him to keep in touch with our coordinators and make sure that he lets us know how he is responding to the prednisone.    I spent 30 minutes on the date of the " encounter doing chart review, history and exam, interpretation of any new PFTs and imaging, documentation and further activities as noted above.    The longitudinal plan of care for the diagnosis(es)/condition(s) as documented were addressed during this visit. Due to the added complexity in care, I will continue to support Derek in the subsequent management and with ongoing continuity of care.      CBC   Recent Labs   Lab Test 08/19/24  1231 07/24/24  1103   RBC 5.32 5.47   HGB 16.1 16.4   HCT 48.1 48.9    182       Basic Metabolic Panel  Recent Labs   Lab Test 08/19/24  1231 07/30/24  1020 07/24/24  1103     --  139   POTASSIUM 4.4  --  4.2   CHLORIDE 102  --  105   CO2 26  --  23   BUN 18.7  --  13.5   GLC 99 68* 108*   ANTONIO 9.3  --  9.6       INR  Recent Labs   Lab Test 12/11/19  1254   INR 1.17*       PFT      Latest Ref Rng & Units 7/17/2024     7:44 AM   PFT   FVC L 1.97    FEV1 L 1.52    FVC% % 46    FEV1% % 47            CC:

## 2024-08-22 NOTE — NURSING NOTE
Current patient location:  [place], [place]    Is the patient currently in the state of [place]? YES    Visit mode:VIDEO    If the visit is dropped, the patient can be reconnected by: VIDEO VISIT: Send to e-mail at: [email or web address]    Will anyone else be joining the visit? Wife, Araceli, is with pt  (If patient encounters technical issues they should call [phone])    How would you like to obtain your AVS? MyChart    Are changes needed to the allergy or medication list? Pt stated no changes to allergies and Pt stated no med changes    Are refills needed on medications prescribed by this physician? NO    Rooming Documentation:  Patient declined to complete questionnaire(s)    Reason for visit: RECHECK     Luba VÁZQUZE

## 2024-08-22 NOTE — LETTER
8/22/2024      Derek Contreras  [address] [facility] [address]      Dear Colleague,    Thank you for referring your patient, Derek Contreras, to the [facility] [place]. Please see a copy of my visit note below.    Virtual Visit Details    Type of service:  Video Visit   Video Start Time: 10:31 AM  Video End Time:10:48 AM    Originating Location (pt. Location): Home    Distant Location (provider location):  On-site  Platform used for Video Visit: [email or web address]    Reason for Visit  Derek Contreras is a 68 year old year old male who is being seen for RECHECK    ILD HPI    Derek Contreras is a 68-year-old male with a history of pulmonary sarcoidosis previously followed by Dr. Martin.  He has a history of some respiratory symptoms and PET scan showing extensive bilateral mediastinal and hilar FDG avid adenopathy.  He has not been treated in the past.  He also has a history of nonischemic cardiomyopathy and pulmonary hypertension though is cardiomyopathy is not felt to be due to cardiac sarcoid.  He is evaluated today via virtual video visit for ongoing symptoms.  He has had significant symptoms with cough and production with clear phlegm.  This has been going on for several months.  He notes that this got better when he had a course of prednisone and azithromycin however after several weeks his symptoms recurred.  There has not been felt to be any significant infection.  Otherwise no other new complaints today.      Current Outpatient Medications   Medication Sig Dispense Refill     aspirin (ASA) 81 MG tablet Take 81 mg by mouth every morning  90 tablet 3     atorvastatin (LIPITOR) 40 MG tablet Take 1 tablet (40 mg) by mouth every morning 90 tablet 3     benzonatate (TESSALON) 100 MG capsule Take 1 capsule (100 mg) by mouth 3 times daily as needed for cough 60 capsule 3     cetirizine (ZYRTEC) 10 MG tablet Take 10 mg by mouth every morning  90  tablet 3     empagliflozin (JARDIANCE) 10 MG TABS tablet Take 1 tablet (10 mg) by mouth every other day 45 tablet 3     fluticasone-vilanterol (BREO ELLIPTA) 200-25 MCG/ACT inhaler INHALE 1 PUFF BY MOUTH ONE TIME DAILY Strength: 200-25 MCG/INH 60 each 11     guaiFENesin (ROBITUSSIN) 20 mg/mL liquid Take 10 mLs (200 mg) by mouth every 4 hours as needed for cough 200 mL 3     hydroCHLOROthiazide 12.5 MG tablet TAKE 1 TABLET BY MOUTH EVERY MORNING 90 tablet 3     metoprolol succinate ER (TOPROL XL) 100 MG 24 hr tablet Take 1.5 tablets (150 mg) by mouth daily 135 tablet 3     mometasone (NASONEX) 50 MCG/ACT nasal spray Spray 2 sprays into both nostrils daily 17 g 3     montelukast (SINGULAIR) 10 MG tablet Take 1 tablet (10 mg) by mouth at bedtime 90 tablet 0     multivitamin (ONE-DAILY) tablet Take 1 tablet by mouth every morning  90 tablet 3     omeprazole (PRILOSEC) 40 MG DR capsule Take 1 capsule (40 mg) by mouth 2 times daily 180 capsule 3     predniSONE (DELTASONE) 10 MG tablet 30 mg daily x 7 days 21 tablet 0     sacubitril-valsartan (ENTRESTO)  MG per tablet Take 1 tablet by mouth 2 times daily 180 tablet 3     spironolactone (ALDACTONE) 25 MG tablet Take 1 tablet (25 mg) by mouth daily 90 tablet 3     No current facility-administered medications for this visit.     Facility-Administered Medications Ordered in Other Visits   Medication Dose Route Frequency Provider Last Rate Last Admin     sodium chloride bacteriostatic 0.9 % flush 3 mL  3 mL Intravenous Once Dewayne Chavis MD         Allergies   Allergen Reactions     Cat Hair Extract Itching     itchy tearful eyes     Adhesive Tape Rash     Iodine Rash     Past Medical History:   Diagnosis Date     Asthma      Claustrophobia      CPAP (continuous positive airway pressure) dependence      Dyslipidemia      [place] (hard of hearing)      Hypercholesteremia      Hypertension      Iron deficiency      Mediastinal adenopathy      Obesity      BEULAH (obstructive  sleep apnea)      Prostate cancer (H)      Pulmonary hypertension (H)      Sarcoidosis        Past Surgical History:   Procedure Laterality Date     APPENDECTOMY       BIOPSY MASS NECK Left 7/15/2020    Procedure: Left trapezius muscle biopsy;  Surgeon: Ade Villa MD;  [facility]     CLOSED REDUCTION HIP Left 10/31/2022    Procedure: Closed reduction left total hip arthroplasty;  Surgeon: Antonio Ann MD;  [facility]: [facility]     CV RIGHT HEART CATH MEASUREMENTS RECORDED N/A 12/11/2019    Procedure: CV RIGHT HEART CATH;  Surgeon: Torrey Hirsch MD;  [facility]     CV RIGHT HEART CATH MEASUREMENTS RECORDED N/A 1/19/2022    Procedure: CV RIGHT HEART CATH;  Surgeon: Torrey Hirsch MD;  [facility]     CV RIGHT HEART CATH MEASUREMENTS RECORDED N/A 11/4/2022    Procedure: Heart Cath Right Heart Cath;  Surgeon: Torrey Hirsch MD;  [facility]     DAVINCI PROSTATECTOMY, LYMPHADENECTOMY N/A 5/23/2019    Procedure: ROBOTIC ASSISTED LAPAROSCOPIC RADICAL PROSTATECTOMY WITH BILATERAL PELVIC LYMPH NODE DISSECTION;  Surgeon: Matteo Coughlin MD;  [facility]     ENDOBRONCHIAL ULTRASOUND FLEXIBLE N/A 3/29/2019    Procedure: Flexible Bronchoscopy, Endobronchial Ultrasound;  Surgeon: Curtis Leger MD;  [facility]     VASECTOMY       ZZC TOTAL HIP ARTHROPLASTY Bilateral      ZZC TOTAL KNEE ARTHROPLASTY Bilateral        Social History     Socioeconomic History     Marital status:      Spouse name: Not on file     Number of children: Not on file     Years of education: Not on file     Highest education level: Not on file   Occupational History     Not on file   Tobacco Use     Smoking status: Never     Smokeless tobacco: Never   Vaping Use     Vaping status: Never Used   Substance and Sexual Activity     Alcohol use: Yes     Comment: once a week     Drug use: No     Sexual activity: Yes      Partners: Female   Other Topics Concern     Parent/sibling w/ CABG, MI or angioplasty before 65F 55M? Not Asked   Social History Narrative    12/03/20         ENVIRONMENTAL HISTORY: [place] The home is heated with a gas furnace. They does have central air conditioning. The patient's bedroom is furnished with Indoor plants and carpeting in bedroom.  Pets inside the house include 0 pets. There is no history of cockroach or mice infestation. There is/are 0 smokers in the house.  The house does not have a damp basement.      Social Determinants of Health     Financial Resource Strain: Low Risk  (6/20/2024)    Financial Resource Strain      Within the past 12 months, have you or your family members you live with been unable to get utilities (heat, electricity) when it was really needed?: No   Food Insecurity: Low Risk  (6/20/2024)    Food Insecurity      Within the past 12 months, did you worry that your food would run out before you got money to buy more?: No      Within the past 12 months, did the food you bought just not last and you didn t have money to get more?: No   Transportation Needs: Low Risk  (6/20/2024)    Transportation Needs      Within the past 12 months, has lack of transportation kept you from medical appointments, getting your medicines, non-medical meetings or appointments, work, or from getting things that you need?: No   Physical Activity: Unknown (6/20/2024)    Exercise Vital Sign      Days of Exercise per Week: 4 days      Minutes of Exercise per Session: Not on file   Stress: No Stress Concern Present (6/20/2024)    [employer] - Occupational Stress Questionnaire      Feeling of Stress : Not at all   Social Connections: Unknown (6/20/2024)    Social Connection and Isolation Panel [NHANES]      Frequency of Communication with Friends and Family: Not on file      Frequency of Social Gatherings with Friends and Family: Once a  "week      Attends Christianity Services: Not on file      Active Member of Clubs or Organizations: Not on file      Attends Club or Organization Meetings: Not on file      Marital Status: Not on file   Interpersonal Safety: Low Risk  (6/20/2024)    Interpersonal Safety      Do you feel physically and emotionally safe where you currently live?: Yes      Within the past 12 months, have you been hit, slapped, kicked or otherwise physically hurt by someone?: No      Within the past 12 months, have you been humiliated or emotionally abused in other ways by your partner or ex-partner?: No   Housing Stability: Low Risk  (6/20/2024)    Housing Stability      Do you have housing? : Yes      Are you worried about losing your housing?: No       Family History   Problem Relation Age of Onset     Alzheimer Disease Mother      Heart Disease Father      Glaucoma No family hx of      Macular Degeneration No family hx of      Diabetes No family hx of      Thyroid Disease No family hx of        ROS Pulmonary    A complete ROS was otherwise negative except as noted in the HPI.  Vitals:    08/22/24 1016   Weight: 129.3 kg (285 lb)   Height: 1.803 m (5' 11\")       Results: I have reviewed all imaging, PFTs and other relavent tests, please see below for details, PFT and imaging results were reviewed with the patient.    Assessment and plan:    68-year-old male with a history of pulmonary sarcoidosis and significant cough.  As I discussed with the patient and his wife treatment indications for sarcoid are symptoms or fibrosis.  There is no evidence of significant fibrotic changes in his lungs however certainly has significant symptoms that he feels improved on the prednisone and so I think it is reasonable to give him a trial of anti-inflammatory therapy.  I am going to start him on prednisone 30 mg with a fairly rapid taper down to 10 mg.  I am also going initiate methotrexate with the plan to get him up to 15 mg per our standard protocol.  " We will see how his symptoms respond to this.  He will follow-up with cardiology as previously scheduled.  I will see the patient back in 3 months with pulmonary function test however I have instructed him to keep in touch with our coordinators and make sure that he lets us know how he is responding to the prednisone.    I spent 30 minutes on the date of the encounter doing chart review, history and exam, interpretation of any new PFTs and imaging, documentation and further activities as noted above.    The longitudinal plan of care for the diagnosis(es)/condition(s) as documented were addressed during this visit. Due to the added complexity in care, I will continue to support Derek in the subsequent management and with ongoing continuity of care.      CBC   Recent Labs   Lab Test 08/19/24  1231 07/24/24  1103   RBC 5.32 5.47   HGB 16.1 16.4   HCT 48.1 48.9    182       Basic Metabolic Panel  Recent Labs   Lab Test 08/19/24  1231 07/30/24  1020 07/24/24  1103     --  139   POTASSIUM 4.4  --  4.2   CHLORIDE 102  --  105   CO2 26  --  23   BUN 18.7  --  13.5   GLC 99 68* 108*   ANTONIO 9.3  --  9.6       INR  Recent Labs   Lab Test 12/11/19  1254   INR 1.17*       PFT      Latest Ref Rng & Units 7/17/2024     7:44 AM   PFT   FVC L 1.97    FEV1 L 1.52    FVC% % 46    FEV1% % 47            CC:        Again, thank you for allowing me to participate in the care of your patient.        Sincerely,        David Morris Perlman, MD

## 2024-08-27 ENCOUNTER — TELEPHONE (OUTPATIENT)
Dept: CARDIOLOGY | Facility: CLINIC | Age: 69
End: 2024-08-27
Payer: MEDICARE

## 2024-08-27 NOTE — TELEPHONE ENCOUNTER
8/27 Left Voicemail (1st Attempt) and Sent Mychart (1st Attempt) for the patient to call back and schedule the following:    Appointment type: Return Core  Provider: Clarence  Return date: 11/8/2024  Specialty phone number: [phone] opt 1  Additional appointment(s) needed: reschedule labs prior at [place]   Additonal Notes: n/a

## 2024-08-31 ENCOUNTER — TELEPHONE (OUTPATIENT)
Dept: CARDIOLOGY | Facility: CLINIC | Age: 69
End: 2024-08-31
Payer: MEDICARE

## 2024-08-31 NOTE — TELEPHONE ENCOUNTER
8/31 Patient confirmed scheduled appointment:  Date: 11/14/2024  Time: 8:20 am  Visit type: Return CORE  Provider: Natty   Location: [facility]  Testing/imaging: labs prior at [place] 11/13  Additional notes: n/a

## 2024-09-09 ENCOUNTER — LAB (OUTPATIENT)
Dept: LAB | Facility: CLINIC | Age: 69
End: 2024-09-09
Payer: MEDICARE

## 2024-09-09 DIAGNOSIS — D47.2 MGUS (MONOCLONAL GAMMOPATHY OF UNKNOWN SIGNIFICANCE): ICD-10-CM

## 2024-09-09 LAB
ALBUMIN SERPL BCG-MCNC: 3.9 G/DL (ref 3.5–5.2)
ALP SERPL-CCNC: 73 U/L (ref 40–150)
ALT SERPL W P-5'-P-CCNC: 51 U/L (ref 0–70)
ANION GAP SERPL CALCULATED.3IONS-SCNC: 10 MMOL/L (ref 7–15)
AST SERPL W P-5'-P-CCNC: 34 U/L (ref 0–45)
BASOPHILS # BLD AUTO: 0 10E3/UL (ref 0–0.2)
BASOPHILS NFR BLD AUTO: 0 %
BILIRUB SERPL-MCNC: 0.8 MG/DL
BUN SERPL-MCNC: 20.6 MG/DL (ref 8–23)
CALCIUM SERPL-MCNC: 9.2 MG/DL (ref 8.8–10.4)
CHLORIDE SERPL-SCNC: 100 MMOL/L (ref 98–107)
CREAT SERPL-MCNC: 1.03 MG/DL (ref 0.67–1.17)
EGFRCR SERPLBLD CKD-EPI 2021: 79 ML/MIN/1.73M2
EOSINOPHIL # BLD AUTO: 0.1 10E3/UL (ref 0–0.7)
EOSINOPHIL NFR BLD AUTO: 1 %
ERYTHROCYTE [DISTWIDTH] IN BLOOD BY AUTOMATED COUNT: 13.8 % (ref 10–15)
GLUCOSE SERPL-MCNC: 97 MG/DL (ref 70–99)
HCO3 SERPL-SCNC: 26 MMOL/L (ref 22–29)
HCT VFR BLD AUTO: 50.9 % (ref 40–53)
HGB BLD-MCNC: 16.9 G/DL (ref 13.3–17.7)
IMM GRANULOCYTES # BLD: 0.1 10E3/UL
IMM GRANULOCYTES NFR BLD: 1 %
LYMPHOCYTES # BLD AUTO: 2.3 10E3/UL (ref 0.8–5.3)
LYMPHOCYTES NFR BLD AUTO: 18 %
MCH RBC QN AUTO: 30.2 PG (ref 26.5–33)
MCHC RBC AUTO-ENTMCNC: 33.2 G/DL (ref 31.5–36.5)
MCV RBC AUTO: 91 FL (ref 78–100)
MONOCYTES # BLD AUTO: 1 10E3/UL (ref 0–1.3)
MONOCYTES NFR BLD AUTO: 8 %
NEUTROPHILS # BLD AUTO: 9.7 10E3/UL (ref 1.6–8.3)
NEUTROPHILS NFR BLD AUTO: 73 %
PLATELET # BLD AUTO: 154 10E3/UL (ref 150–450)
POTASSIUM SERPL-SCNC: 4.5 MMOL/L (ref 3.4–5.3)
PROT SERPL-MCNC: 6.9 G/DL (ref 6.4–8.3)
RBC # BLD AUTO: 5.6 10E6/UL (ref 4.4–5.9)
SODIUM SERPL-SCNC: 136 MMOL/L (ref 135–145)
TOTAL PROTEIN SERUM FOR ELP: 6.6 G/DL (ref 6.4–8.3)
WBC # BLD AUTO: 13.2 10E3/UL (ref 4–11)

## 2024-09-09 PROCEDURE — 85025 COMPLETE CBC W/AUTO DIFF WBC: CPT

## 2024-09-09 PROCEDURE — 84166 PROTEIN E-PHORESIS/URINE/CSF: CPT | Performed by: PATHOLOGY

## 2024-09-09 PROCEDURE — 83521 IG LIGHT CHAINS FREE EACH: CPT

## 2024-09-09 PROCEDURE — 84155 ASSAY OF PROTEIN SERUM: CPT

## 2024-09-09 PROCEDURE — 36415 COLL VENOUS BLD VENIPUNCTURE: CPT

## 2024-09-09 PROCEDURE — 84165 PROTEIN E-PHORESIS SERUM: CPT | Performed by: PATHOLOGY

## 2024-09-09 PROCEDURE — 86334 IMMUNOFIX E-PHORESIS SERUM: CPT | Performed by: PATHOLOGY

## 2024-09-09 PROCEDURE — 86335 IMMUNFIX E-PHORSIS/URINE/CSF: CPT | Performed by: PATHOLOGY

## 2024-09-09 PROCEDURE — 81050 URINALYSIS VOLUME MEASURE: CPT | Performed by: PATHOLOGY

## 2024-09-09 PROCEDURE — 80053 COMPREHEN METABOLIC PANEL: CPT

## 2024-09-09 PROCEDURE — 82784 ASSAY IGA/IGD/IGG/IGM EACH: CPT

## 2024-09-10 LAB
ALBUMIN SERPL ELPH-MCNC: 3.6 G/DL (ref 3.7–5.1)
ALPHA1 GLOB SERPL ELPH-MCNC: 0.2 G/DL (ref 0.2–0.4)
ALPHA2 GLOB SERPL ELPH-MCNC: 0.8 G/DL (ref 0.5–0.9)
B-GLOBULIN SERPL ELPH-MCNC: 1.1 G/DL (ref 0.6–1)
GAMMA GLOB SERPL ELPH-MCNC: 1 G/DL (ref 0.7–1.6)
IGA SERPL-MCNC: 554 MG/DL (ref 84–499)
IGG SERPL-MCNC: 981 MG/DL (ref 610–1616)
IGM SERPL-MCNC: 69 MG/DL (ref 35–242)
KAPPA LC FREE SER-MCNC: 10.42 MG/DL (ref 0.33–1.94)
KAPPA LC FREE/LAMBDA FREE SER NEPH: 6.77 {RATIO} (ref 0.26–1.65)
LAMBDA LC FREE SERPL-MCNC: 1.54 MG/DL (ref 0.57–2.63)
M PROTEIN SERPL ELPH-MCNC: 0.3 G/DL
PROT PATTERN SERPL ELPH-IMP: ABNORMAL
PROT PATTERN SERPL IFE-IMP: NORMAL

## 2024-09-10 PROCEDURE — 93248 EXT ECG>7D<15D REV&INTERPJ: CPT | Performed by: INTERNAL MEDICINE

## 2024-09-11 ENCOUNTER — ONCOLOGY VISIT (OUTPATIENT)
Dept: ONCOLOGY | Facility: CLINIC | Age: 69
End: 2024-09-11
Attending: INTERNAL MEDICINE
Payer: MEDICARE

## 2024-09-11 VITALS
TEMPERATURE: 97.2 F | SYSTOLIC BLOOD PRESSURE: 109 MMHG | HEART RATE: 71 BPM | DIASTOLIC BLOOD PRESSURE: 63 MMHG | WEIGHT: 281.5 LBS | RESPIRATION RATE: 20 BRPM | OXYGEN SATURATION: 96 % | BODY MASS INDEX: 39.26 KG/M2

## 2024-09-11 DIAGNOSIS — R97.20 ELEVATED PROSTATE SPECIFIC ANTIGEN (PSA): ICD-10-CM

## 2024-09-11 DIAGNOSIS — H91.93 BILATERAL HEARING LOSS, UNSPECIFIED HEARING LOSS TYPE: ICD-10-CM

## 2024-09-11 DIAGNOSIS — D47.2 MGUS (MONOCLONAL GAMMOPATHY OF UNKNOWN SIGNIFICANCE): Primary | ICD-10-CM

## 2024-09-11 DIAGNOSIS — C61 PROSTATE CANCER (H): ICD-10-CM

## 2024-09-11 DIAGNOSIS — E61.1 IRON DEFICIENCY: ICD-10-CM

## 2024-09-11 PROCEDURE — G2211 COMPLEX E/M VISIT ADD ON: HCPCS | Performed by: INTERNAL MEDICINE

## 2024-09-11 PROCEDURE — 99213 OFFICE O/P EST LOW 20 MIN: CPT | Performed by: INTERNAL MEDICINE

## 2024-09-11 PROCEDURE — G0463 HOSPITAL OUTPT CLINIC VISIT: HCPCS | Performed by: INTERNAL MEDICINE

## 2024-09-11 ASSESSMENT — PAIN SCALES - GENERAL: PAINLEVEL: NO PAIN (0)

## 2024-09-11 NOTE — PROGRESS NOTES
[facility]    Hematology/Oncology Established Patient Note      Today's Date: 09/11/24     Reason for Follow-up: MGUS    ASSESSMENT/PLAN:  Derek Contreras is a 67 year old male with    1) MGUS: Labs reviewed.  M-spike stable at 0.3-0.5 g/dL.    -Serum free light chains are overall stable.  Skeletal survey showed no evidence of lytic lesions.  Renal function, calcium, hemoglobin are normal.   -We reviewed the patient's history and discussed the 1% risk per year of transformation to MM/amyloidosis and will need to continue to monitor.    2) History of prostate cancer: s/p prostatectomy  -Followed by urology.    3) COPD, sarcoidosis:  -Followed by pulmonology- currently on prednisone and plan to switch to Mehtotrexate    4) Thrombocytopenia: has been mildly low off and on.  Platelet currently normal    Recommendation.   Reassured the patient. RTC in 1 year with labs: CBC, CMP, SPEP, serum free light chains. Check PSA if not already ordered by urologist within the past 3-6 months      HISTORY OF PRESENT ILLNESS: Derek Contreras is a 68 year old male with PMHx of sleep apnea, sarcoidosis, COPD, prostate cancer s/p prostatectomy, history of hip and knee replacement, HTN who presents with MGUS. An VLAD was checked by his neurologist, which showed an elevated IgA level. He was previously followed by Dr. Trimble and Dr. Way.     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  Kappa light chain is elevated to 1042, with kappa/lambda ratio of 6.77.  His hemoglobin, calcium, and renal function are normal.          INTERIM HISTORY:   Patient continues to do well. He is actively being treated for bronchitis and sarcoidosis- on prednisone and bactrim- plan to switch to methotrexate. He had a hip dislocation in the fall of 2022, which was managed conservatively.    He continues to follow with pulmonology and cardiology for sarcoidosis.      REVIEW  OF SYSTEMS:   14 point ROS was reviewed and is negative other than as noted above in HPI.       HOME MEDICATIONS:  Current Outpatient Medications   Medication Sig Dispense Refill    aspirin (ASA) 81 MG tablet Take 81 mg by mouth every morning  90 tablet 3    atorvastatin (LIPITOR) 40 MG tablet Take 1 tablet (40 mg) by mouth every morning 90 tablet 3    benzonatate (TESSALON) 100 MG capsule Take 1 capsule (100 mg) by mouth 3 times daily as needed for cough 60 capsule 3    cetirizine (ZYRTEC) 10 MG tablet Take 10 mg by mouth every morning  90 tablet 3    empagliflozin (JARDIANCE) 10 MG TABS tablet Take 1 tablet (10 mg) by mouth every other day 45 tablet 3    fluticasone-vilanterol (BREO ELLIPTA) 200-25 MCG/ACT inhaler INHALE 1 PUFF BY MOUTH ONE TIME DAILY Strength: 200-25 MCG/INH 60 each 11    folic acid (FOLVITE) 1 MG tablet Take 1 tablet (1 mg) by mouth daily. . Do not take on the day you take Methotrexate. 90 tablet 3    guaiFENesin (ROBITUSSIN) 20 mg/mL liquid Take 10 mLs (200 mg) by mouth every 4 hours as needed for cough 200 mL 3    hydroCHLOROthiazide 12.5 MG tablet TAKE 1 TABLET BY MOUTH EVERY MORNING 90 tablet 3    methotrexate 2.5 MG tablet Take 6 tablets (15 mg) by mouth every 7 days. Take 7.5 mg (3 tabs) once a week for 2 weeks, then 10 mg (4 tabs) once a week for 2 weeks, then 12.5 mg (5 tabs) once a week thereafter, Disp-24 tablet, R-3, E-Prescribe 24 tablet 3    metoprolol succinate ER (TOPROL XL) 100 MG 24 hr tablet Take 1.5 tablets (150 mg) by mouth daily 135 tablet 3    mometasone (NASONEX) 50 MCG/ACT nasal spray Spray 2 sprays into both nostrils daily 17 g 3    montelukast (SINGULAIR) 10 MG tablet Take 1 tablet (10 mg) by mouth at bedtime 90 tablet 0    multivitamin (ONE-DAILY) tablet Take 1 tablet by mouth every morning  90 tablet 3    omeprazole (PRILOSEC) 40 MG DR capsule Take 1 capsule (40 mg) by mouth 2 times daily 180 capsule 3    predniSONE (DELTASONE) 10 MG tablet Take 3 tablets (30 mg) by  mouth daily for 14 days, THEN 2 tablets (20 mg) daily for 7 days, THEN 1 tablet (10 mg) daily. 146 tablet 0    sacubitril-valsartan (ENTRESTO)  MG per tablet Take 1 tablet by mouth 2 times daily 180 tablet 3    spironolactone (ALDACTONE) 25 MG tablet Take 1 tablet (25 mg) by mouth daily 90 tablet 3    sulfamethoxazole-trimethoprim (BACTRIM) 400-80 MG tablet Take 1 tablet by mouth daily. 90 tablet 3         ALLERGIES:  Allergies   Allergen Reactions    Cat Hair Extract Itching     itchy tearful eyes    Adhesive Tape Rash    Iodine Rash         PAST MEDICAL HISTORY:  Past Medical History:   Diagnosis Date    Asthma     Claustrophobia     CPAP (continuous positive airway pressure) dependence     Dyslipidemia     [place] (hard of hearing)     Hypercholesteremia     Hypertension     Iron deficiency     Mediastinal adenopathy     Obesity     BEULAH (obstructive sleep apnea)     Prostate cancer (H)     Pulmonary hypertension (H)     Sarcoidosis          PAST SURGICAL HISTORY:  Past Surgical History:   Procedure Laterality Date    APPENDECTOMY      BIOPSY MASS NECK Left 7/15/2020    Procedure: Left trapezius muscle biopsy;  Surgeon: Ade Villa MD;  [facility]    CLOSED REDUCTION HIP Left 10/31/2022    Procedure: Closed reduction left total hip arthroplasty;  Surgeon: Antonio Ann MD;  Location: [facility] OR    CV RIGHT HEART CATH MEASUREMENTS RECORDED N/A 12/11/2019    Procedure: CV RIGHT HEART CATH;  Surgeon: Torrey Hirsch MD;  Location:  [facility] CATH [facility]    CV RIGHT HEART CATH MEASUREMENTS RECORDED N/A 1/19/2022    Procedure: CV RIGHT HEART CATH;  Surgeon: Torrey Hirsch MD;  [facility]    CV RIGHT HEART CATH MEASUREMENTS RECORDED N/A 11/4/2022    Procedure: Heart Cath Right Heart Cath;  Surgeon: Torrey Hirsch MD;  [facility]    DAVINCI PROSTATECTOMY, LYMPHADENECTOMY N/A 5/23/2019    Procedure: ROBOTIC ASSISTED LAPAROSCOPIC  RADICAL PROSTATECTOMY WITH BILATERAL PELVIC LYMPH NODE DISSECTION;  Surgeon: Matteo Coughlin MD;  Location: [facility] OR    ENDOBRONCHIAL ULTRASOUND FLEXIBLE N/A 3/29/2019    Procedure: Flexible Bronchoscopy, Endobronchial Ultrasound;  Surgeon: Curtis Leger MD;  Location: [facility]    VASECTOMY      ZZC TOTAL HIP ARTHROPLASTY Bilateral     ZZC TOTAL KNEE ARTHROPLASTY Bilateral          SOCIAL HISTORY:  Social History     Socioeconomic History    Marital status:      Spouse name: Not on file    Number of children: Not on file    Years of education: Not on file    Highest education level: Not on file   Occupational History    Not on file   Tobacco Use    Smoking status: Never    Smokeless tobacco: Never   Vaping Use    Vaping status: Never Used   Substance and Sexual Activity    Alcohol use: Yes     Comment: once a week    Drug use: No    Sexual activity: Yes     Partners: Female   Other Topics Concern    Parent/sibling w/ CABG, MI or angioplasty before 65F 55M? Not Asked   Social History Narrative    12/03/20         ENVIRONMENTAL HISTORY: [place] The home is heated with a gas furnace. They does have central air conditioning. The patient's bedroom is furnished with Indoor plants and carpeting in bedroom.  Pets inside the house include 0 pets. There is no history of cockroach or mice infestation. There is/are 0 smokers in the house.  The house does not have a damp basement.      Social Determinants of Health     Financial Resource Strain: Low Risk  (6/20/2024)    Financial Resource Strain     Within the past 12 months, have you or your family members you live with been unable to get utilities (heat, electricity) when it was really needed?: No   Food Insecurity: Low Risk  (6/20/2024)    Food Insecurity     Within the past 12 months, did you worry that your food would run out before you got money to buy more?: No     Within the past 12 months, did the food you  bought just not last and you didn t have money to get more?: No   Transportation Needs: Low Risk  (6/20/2024)    Transportation Needs     Within the past 12 months, has lack of transportation kept you from medical appointments, getting your medicines, non-medical meetings or appointments, work, or from getting things that you need?: No   Physical Activity: Unknown (6/20/2024)    Exercise Vital Sign     Days of Exercise per Week: 4 days     Minutes of Exercise per Session: Not on file   Stress: No Stress Concern Present (6/20/2024)    [employer] - Occupational Stress Questionnaire     Feeling of Stress : Not at all   Social Connections: Unknown (6/20/2024)    Social Connection and Isolation Panel [NHANES]     Frequency of Communication with Friends and Family: Not on file     Frequency of Social Gatherings with Friends and Family: Once a week     Attends Synagogue Services: Not on file     Active Member of Clubs or Organizations: Not on file     Attends Club or Organization Meetings: Not on file     Marital Status: Not on file   Interpersonal Safety: Low Risk  (6/20/2024)    Interpersonal Safety     Do you feel physically and emotionally safe where you currently live?: Yes     Within the past 12 months, have you been hit, slapped, kicked or otherwise physically hurt by someone?: No     Within the past 12 months, have you been humiliated or emotionally abused in other ways by your partner or ex-partner?: No   Housing Stability: Low Risk  (6/20/2024)    Housing Stability     Do you have housing? : Yes     Are you worried about losing your housing?: No         FAMILY HISTORY:  Family History   Problem Relation Age of Onset    Alzheimer Disease Mother     Heart Disease Father     Glaucoma No family hx of     Macular Degeneration No family hx of     Diabetes No family hx of     Thyroid Disease No family hx of          PHYSICAL EXAM:  /63   Pulse 71   Temp 97.2  F (36.2  C) (Temporal)    Resp 20   Wt 127.7 kg (281 lb 8 oz)   SpO2 96%   BMI 39.26 kg/m    PHYSICAL EXAMINATION:   ECO  GENERAL/CONSTITUTIONAL: No acute distress.  MUSCULOSKELETAL: Warm and well-perfused, no cyanosis, clubbing, or edema.  NEUROLOGIC: Cranial nerves II-XII are intact. Alert, oriented, answers questions appropriately.  INTEGUMENTARY: No rashes or jaundice.  GAIT: Steady, does not use assistive device              LABS:   Latest Reference Range & Units 23 08:03   Sodium 136 - 145 mmol/L 138   Potassium 3.4 - 5.3 mmol/L 4.3   Chloride 98 - 107 mmol/L 104   Carbon Dioxide (CO2) 22 - 29 mmol/L 24   Urea Nitrogen 8.0 - 23.0 mg/dL 13.7   Creatinine 0.67 - 1.17 mg/dL 0.93   GFR Estimate >60 mL/min/1.73m2 89   Calcium 8.8 - 10.2 mg/dL 9.3   Anion Gap 7 - 15 mmol/L 10   Albumin 3.5 - 5.2 g/dL 3.8   Protein Total 6.4 - 8.3 g/dL 6.9   Alkaline Phosphatase 40 - 129 U/L 75   ALT 0 - 70 U/L 24   AST 0 - 45 U/L 31   Bilirubin Total <=1.2 mg/dL 0.5   Glucose 70 - 99 mg/dL 105 (H)   WBC 4.0 - 11.0 10e3/uL 6.7   Hemoglobin 13.3 - 17.7 g/dL 16.1   Hematocrit 40.0 - 53.0 % 47.1   Platelet Count 150 - 450 10e3/uL 173   RBC Count 4.40 - 5.90 10e6/uL 5.33   MCV 78 - 100 fL 88   MCH 26.5 - 33.0 pg 30.2   MCHC 31.5 - 36.5 g/dL 34.2   RDW 10.0 - 15.0 % 13.2   % Neutrophils % 64   % Lymphocytes % 20   % Monocytes % 12   % Eosinophils % 3   % Basophils % 1   Absolute Basophils 0.0 - 0.2 10e3/uL 0.0   Absolute Eosinophils 0.0 - 0.7 10e3/uL 0.2   Absolute Immature Granulocytes <=0.4 10e3/uL 0.0   Absolute Lymphocytes 0.8 - 5.3 10e3/uL 1.4   Absolute Monocytes 0.0 - 1.3 10e3/uL 0.8   % Immature Granulocytes % 0   Absolute Neutrophils 1.6 - 8.3 10e3/uL 4.3     Serum M-protein (g/dL):  20: 0.5 g/dL IgA kappa  20: 0.4   21: 0.5   21: 0.4   22: 0.4  23: 0.4    UPIEP (%)  23: 23.6% (kappa light chain)    Total IgG (mg/dL), IgA (mg/dL), IgM (mg/dL):  23: 1087, 630, 68    Free kappa light chains (mg/dL), lambda  (mg/dL), kappa/lambda ratio:  5/27/20: 10.73, 2.40, 4.47  8/25/20: 7.81, 1.84, 4.24  2/23/21: 11.90, 2.78, 4.28  8/11/21: 11.57, 2.74, 4.22  8/30/22: 13.64, 2.61, 5.23  9/5/23: 15.47, 2.59, 5.97      IMAGING:  Skeletal survey 6/9/20:  No lytic or destructive lesions or evidence of compression  fracture.    25 minutes spent on the date of the encounter doing chart review, review of test results, interpretation of tests, patient visit, and documentation      The longitudinal plan of care for the diagnosis(es)/condition(s) as documented were addressed during this visit. Due to the added complexity in care, I will continue to support Derek in the subsequent management and with ongoing continuity of care.     Abiola Lange  Hematology/Oncology  [facility]

## 2024-09-11 NOTE — NURSING NOTE
"Oncology Rooming Note    September 11, 2024 8:23 AM   Derek Contreras is a 68 year old male who presents for:    Chief Complaint   Patient presents with    Oncology Clinic Visit     Initial Vitals: /63   Pulse 71   Temp 97.2  F (36.2  C) (Temporal)   Resp 20   Wt 127.7 kg (281 lb 8 oz)   SpO2 96%   BMI 39.26 kg/m   Estimated body mass index is 39.26 kg/m  as calculated from the following:    Height as of 8/22/24: 1.803 m (5' 11\").    Weight as of this encounter: 127.7 kg (281 lb 8 oz). Body surface area is 2.53 meters squared.  No Pain (0) Comment: Data Unavailable   No LMP for male patient.  Allergies reviewed: Yes  Medications reviewed: Yes    Medications: Medication refills not needed today.  Pharmacy name entered into [employer]: [id number] - [facility] [address] AT [place] [id number] & [facility]    Frailty Screening:   Is the patient here for a new oncology consult visit in cancer care? 2. No      Clinical concerns: f/u       Camryn Reid CMA              "

## 2024-09-11 NOTE — LETTER
9/11/2024      Derek Contreras  [id number] [facility] [address]      Dear Colleague,    Thank you for referring your patient, Derek Contreras, to the [facility]. Please see a copy of my visit note below.    [facility]    Hematology/Oncology Established Patient Note      Today's Date: 09/11/24     Reason for Follow-up: MGUS    ASSESSMENT/PLAN:  Derek Contreras is a 67 year old male with    1) MGUS: Labs reviewed.  M-spike stable at 0.3-0.5 g/dL.    -Serum free light chains are overall stable.  Skeletal survey showed no evidence of lytic lesions.  Renal function, calcium, hemoglobin are normal.   -We reviewed the patient's history and discussed the 1% risk per year of transformation to MM/amyloidosis and will need to continue to monitor.    2) History of prostate cancer: s/p prostatectomy  -Followed by urology.    3) COPD, sarcoidosis:  -Followed by pulmonology- currently on prednisone and plan to switch to Mehtotrexate    4) Thrombocytopenia: has been mildly low off and on.  Platelet currently normal    Recommendation.   Reassured the patient. RTC in 1 year with labs: CBC, CMP, SPEP, serum free light chains. Check PSA if not already ordered by urologist within the past 3-6 months      HISTORY OF PRESENT ILLNESS: Derek Contreras is a 68 year old male with PMHx of sleep apnea, sarcoidosis, COPD, prostate cancer s/p prostatectomy, history of hip and knee replacement, HTN who presents with MGUS. An VLAD was checked by his neurologist, which showed an elevated IgA level. He was previously followed by Dr. Trimble and Dr. Way.     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  Kappa light chain is elevated to 1042, with kappa/lambda ratio of 6.77.  His hemoglobin, calcium, and renal function are normal.          INTERIM HISTORY:   Patient continues to do well. He is actively being treated  for bronchitis and sarcoidosis- on prednisone and bactrim- plan to switch to methotrexate. He had a hip dislocation in the fall of 2022, which was managed conservatively.    He continues to follow with pulmonology and cardiology for sarcoidosis.      REVIEW OF SYSTEMS:   14 point ROS was reviewed and is negative other than as noted above in HPI.       HOME MEDICATIONS:  Current Outpatient Medications   Medication Sig Dispense Refill     aspirin (ASA) 81 MG tablet Take 81 mg by mouth every morning  90 tablet 3     atorvastatin (LIPITOR) 40 MG tablet Take 1 tablet (40 mg) by mouth every morning 90 tablet 3     benzonatate (TESSALON) 100 MG capsule Take 1 capsule (100 mg) by mouth 3 times daily as needed for cough 60 capsule 3     cetirizine (ZYRTEC) 10 MG tablet Take 10 mg by mouth every morning  90 tablet 3     empagliflozin (JARDIANCE) 10 MG TABS tablet Take 1 tablet (10 mg) by mouth every other day 45 tablet 3     fluticasone-vilanterol (BREO ELLIPTA) 200-25 MCG/ACT inhaler INHALE 1 PUFF BY MOUTH ONE TIME DAILY Strength: 200-25 MCG/INH 60 each 11     folic acid (FOLVITE) 1 MG tablet Take 1 tablet (1 mg) by mouth daily. . Do not take on the day you take Methotrexate. 90 tablet 3     guaiFENesin (ROBITUSSIN) 20 mg/mL liquid Take 10 mLs (200 mg) by mouth every 4 hours as needed for cough 200 mL 3     hydroCHLOROthiazide 12.5 MG tablet TAKE 1 TABLET BY MOUTH EVERY MORNING 90 tablet 3     methotrexate 2.5 MG tablet Take 6 tablets (15 mg) by mouth every 7 days. Take 7.5 mg (3 tabs) once a week for 2 weeks, then 10 mg (4 tabs) once a week for 2 weeks, then 12.5 mg (5 tabs) once a week thereafter, Disp-24 tablet, R-3, E-Prescribe 24 tablet 3     metoprolol succinate ER (TOPROL XL) 100 MG 24 hr tablet Take 1.5 tablets (150 mg) by mouth daily 135 tablet 3     mometasone (NASONEX) 50 MCG/ACT nasal spray Spray 2 sprays into both nostrils daily 17 g 3     montelukast (SINGULAIR) 10 MG tablet Take 1 tablet (10 mg) by mouth at  bedtime 90 tablet 0     multivitamin (ONE-DAILY) tablet Take 1 tablet by mouth every morning  90 tablet 3     omeprazole (PRILOSEC) 40 MG DR capsule Take 1 capsule (40 mg) by mouth 2 times daily 180 capsule 3     predniSONE (DELTASONE) 10 MG tablet Take 3 tablets (30 mg) by mouth daily for 14 days, THEN 2 tablets (20 mg) daily for 7 days, THEN 1 tablet (10 mg) daily. 146 tablet 0     sacubitril-valsartan (ENTRESTO)  MG per tablet Take 1 tablet by mouth 2 times daily 180 tablet 3     spironolactone (ALDACTONE) 25 MG tablet Take 1 tablet (25 mg) by mouth daily 90 tablet 3     sulfamethoxazole-trimethoprim (BACTRIM) 400-80 MG tablet Take 1 tablet by mouth daily. 90 tablet 3         ALLERGIES:  Allergies   Allergen Reactions     Cat Hair Extract Itching     itchy tearful eyes     Adhesive Tape Rash     Iodine Rash         PAST MEDICAL HISTORY:  Past Medical History:   Diagnosis Date     Asthma      Claustrophobia      CPAP (continuous positive airway pressure) dependence      Dyslipidemia      [place] (hard of hearing)      Hypercholesteremia      Hypertension      Iron deficiency      Mediastinal adenopathy      Obesity      BEULAH (obstructive sleep apnea)      Prostate cancer (H)      Pulmonary hypertension (H)      Sarcoidosis          PAST SURGICAL HISTORY:  Past Surgical History:   Procedure Laterality Date     APPENDECTOMY       BIOPSY MASS NECK Left 7/15/2020    Procedure: Left trapezius muscle biopsy;  Surgeon: Ade Villa MD;  [facility]: [facility]     CLOSED REDUCTION HIP Left 10/31/2022    Procedure: Closed reduction left total hip arthroplasty;  Surgeon: Antonio Ann MD;  Location: [facility] OR     CV RIGHT HEART CATH MEASUREMENTS RECORDED N/A 12/11/2019    Procedure: CV RIGHT HEART CATH;  Surgeon: Torrey Hirsch MD;  Location:  HEART CARDIAC CATH LAB     CV RIGHT HEART CATH MEASUREMENTS RECORDED N/A 1/19/2022    Procedure: CV RIGHT HEART CATH;  Surgeon: Torrey Hirsch MD;  Location:  [facility] HEART CARDIAC CATH LAB     CV RIGHT HEART CATH MEASUREMENTS RECORDED N/A 11/4/2022    Procedure: Heart Cath Right Heart Cath;  Surgeon: Torrey Hirsch MD;  Location:  HEART CARDIAC CATH LAB     DAVINCI PROSTATECTOMY, LYMPHADENECTOMY N/A 5/23/2019    Procedure: ROBOTIC ASSISTED LAPAROSCOPIC RADICAL PROSTATECTOMY WITH BILATERAL PELVIC LYMPH NODE DISSECTION;  Surgeon: Matteo Coughlin MD;  Location: [facility] OR     ENDOBRONCHIAL ULTRASOUND FLEXIBLE N/A 3/29/2019    Procedure: Flexible Bronchoscopy, Endobronchial Ultrasound;  Surgeon: Curtis Leger MD;  Location: [facility] OR     VASECTOMY       ZZC TOTAL HIP ARTHROPLASTY Bilateral      ZZC TOTAL KNEE ARTHROPLASTY Bilateral          SOCIAL HISTORY:  Social History     Socioeconomic History     Marital status:      Spouse name: Not on file     Number of children: Not on file     Years of education: Not on file     Highest education level: Not on file   Occupational History     Not on file   Tobacco Use     Smoking status: Never     Smokeless tobacco: Never   Vaping Use     Vaping status: Never Used   Substance and Sexual Activity     Alcohol use: Yes     Comment: once a week     Drug use: No     Sexual activity: Yes     Partners: Female   Other Topics Concern     Parent/sibling w/ CABG, MI or angioplasty before 65F 55M? Not Asked   Social History Narrative    12/03/20         ENVIRONMENTAL HISTORY: [place] The home is heated with a gas furnace. They does have central air conditioning. The patient's bedroom is furnished with Indoor plants and carpeting in bedroom.  Pets inside the house include 0 pets. There is no history of cockroach or mice infestation. There is/are 0 smokers in the house.  The house does not have a damp basement.      Social Determinants of Health     Financial Resource Strain: Low Risk  (6/20/2024)    Financial Resource Strain      Within the past 12 months, have you or your family  members you live with been unable to get utilities (heat, electricity) when it was really needed?: No   Food Insecurity: Low Risk  (6/20/2024)    Food Insecurity      Within the past 12 months, did you worry that your food would run out before you got money to buy more?: No      Within the past 12 months, did the food you bought just not last and you didn t have money to get more?: No   Transportation Needs: Low Risk  (6/20/2024)    Transportation Needs      Within the past 12 months, has lack of transportation kept you from medical appointments, getting your medicines, non-medical meetings or appointments, work, or from getting things that you need?: No   Physical Activity: Unknown (6/20/2024)    Exercise Vital Sign      Days of Exercise per Week: 4 days      Minutes of Exercise per Session: Not on file   Stress: No Stress Concern Present (6/20/2024)    [employer] - Occupational Stress Questionnaire      Feeling of Stress : Not at all   Social Connections: Unknown (6/20/2024)    Social Connection and Isolation Panel [NHANES]      Frequency of Communication with Friends and Family: Not on file      Frequency of Social Gatherings with Friends and Family: Once a week      Attends Baptism Services: Not on file      Active Member of Clubs or Organizations: Not on file      Attends Club or Organization Meetings: Not on file      Marital Status: Not on file   Interpersonal Safety: Low Risk  (6/20/2024)    Interpersonal Safety      Do you feel physically and emotionally safe where you currently live?: Yes      Within the past 12 months, have you been hit, slapped, kicked or otherwise physically hurt by someone?: No      Within the past 12 months, have you been humiliated or emotionally abused in other ways by your partner or ex-partner?: No   Housing Stability: Low Risk  (6/20/2024)    Housing Stability      Do you have housing? : Yes      Are you worried about losing your housing?: No          FAMILY HISTORY:  Family History   Problem Relation Age of Onset     Alzheimer Disease Mother      Heart Disease Father      Glaucoma No family hx of      Macular Degeneration No family hx of      Diabetes No family hx of      Thyroid Disease No family hx of          PHYSICAL EXAM:  /63   Pulse 71   Temp 97.2  F (36.2  C) (Temporal)   Resp 20   Wt 127.7 kg (281 lb 8 oz)   SpO2 96%   BMI 39.26 kg/m    PHYSICAL EXAMINATION:   ECO  GENERAL/CONSTITUTIONAL: No acute distress.  MUSCULOSKELETAL: Warm and well-perfused, no cyanosis, clubbing, or edema.  NEUROLOGIC: Cranial nerves II-XII are intact. Alert, oriented, answers questions appropriately.  INTEGUMENTARY: No rashes or jaundice.  GAIT: Steady, does not use assistive device              LABS:   Latest Reference Range & Units 23 08:03   Sodium 136 - 145 mmol/L 138   Potassium 3.4 - 5.3 mmol/L 4.3   Chloride 98 - 107 mmol/L 104   Carbon Dioxide (CO2) 22 - 29 mmol/L 24   Urea Nitrogen 8.0 - 23.0 mg/dL 13.7   Creatinine 0.67 - 1.17 mg/dL 0.93   GFR Estimate >60 mL/min/1.73m2 89   Calcium 8.8 - 10.2 mg/dL 9.3   Anion Gap 7 - 15 mmol/L 10   Albumin 3.5 - 5.2 g/dL 3.8   Protein Total 6.4 - 8.3 g/dL 6.9   Alkaline Phosphatase 40 - 129 U/L 75   ALT 0 - 70 U/L 24   AST 0 - 45 U/L 31   Bilirubin Total <=1.2 mg/dL 0.5   Glucose 70 - 99 mg/dL 105 (H)   WBC 4.0 - 11.0 10e3/uL 6.7   Hemoglobin 13.3 - 17.7 g/dL 16.1   Hematocrit 40.0 - 53.0 % 47.1   Platelet Count 150 - 450 10e3/uL 173   RBC Count 4.40 - 5.90 10e6/uL 5.33   MCV 78 - 100 fL 88   MCH 26.5 - 33.0 pg 30.2   MCHC 31.5 - 36.5 g/dL 34.2   RDW 10.0 - 15.0 % 13.2   % Neutrophils % 64   % Lymphocytes % 20   % Monocytes % 12   % Eosinophils % 3   % Basophils % 1   Absolute Basophils 0.0 - 0.2 10e3/uL 0.0   Absolute Eosinophils 0.0 - 0.7 10e3/uL 0.2   Absolute Immature Granulocytes <=0.4 10e3/uL 0.0   Absolute Lymphocytes 0.8 - 5.3 10e3/uL 1.4   Absolute Monocytes 0.0 - 1.3 10e3/uL 0.8   % Immature  Granulocytes % 0   Absolute Neutrophils 1.6 - 8.3 10e3/uL 4.3     Serum M-protein (g/dL):  5/27/20: 0.5 g/dL IgA kappa  9/1/20: 0.4   2/23/21: 0.5   8/11/21: 0.4   8/30/22: 0.4  9/5/23: 0.4    UPIEP (%)  9/6/23: 23.6% (kappa light chain)    Total IgG (mg/dL), IgA (mg/dL), IgM (mg/dL):  9/5/23: 1087, 630, 68    Free kappa light chains (mg/dL), lambda (mg/dL), kappa/lambda ratio:  5/27/20: 10.73, 2.40, 4.47  8/25/20: 7.81, 1.84, 4.24  2/23/21: 11.90, 2.78, 4.28  8/11/21: 11.57, 2.74, 4.22  8/30/22: 13.64, 2.61, 5.23  9/5/23: 15.47, 2.59, 5.97      IMAGING:  Skeletal survey 6/9/20:  No lytic or destructive lesions or evidence of compression  fracture.      The longitudinal plan of care for the diagnosis(es)/condition(s) as documented were addressed during this visit. Due to the added complexity in care, I will continue to support Derek in the subsequent management and with ongoing continuity of care.     Abiola Lange  Hematology/Oncology  [facility]    Again, thank you for allowing me to participate in the care of your patient.        Sincerely,        Abiola Lange MD

## 2024-09-12 LAB
ALBUMIN MFR UR ELPH: 33.5 %
ALPHA1 GLOB MFR UR ELPH: 9.7 %
ALPHA2 GLOB MFR UR ELPH: 8.8 %
B-GLOBULIN MFR UR ELPH: 38 %
GAMMA GLOB MFR UR ELPH: 10 %
LOCATION OF TASK: ABNORMAL
LOCATION OF TASK: NORMAL
M PROTEIN MFR UR ELPH: 21.7 %
PROT ELPH PNL UR ELPH: NORMAL
PROT PATTERN UR ELPH-IMP: ABNORMAL

## 2024-09-13 ENCOUNTER — APPOINTMENT (OUTPATIENT)
Dept: LAB | Facility: CLINIC | Age: 69
End: 2024-09-13
Attending: INTERNAL MEDICINE
Payer: MEDICARE

## 2024-09-13 ENCOUNTER — APPOINTMENT (OUTPATIENT)
Dept: MEDSURG UNIT | Facility: CLINIC | Age: 69
End: 2024-09-13
Attending: INTERNAL MEDICINE
Payer: MEDICARE

## 2024-09-13 ENCOUNTER — HOSPITAL ENCOUNTER (OUTPATIENT)
Facility: CLINIC | Age: 69
Discharge: HOME | End: 2024-09-13
Attending: INTERNAL MEDICINE | Admitting: INTERNAL MEDICINE
Payer: MEDICARE

## 2024-09-13 VITALS
BODY MASS INDEX: 39.32 KG/M2 | HEART RATE: 80 BPM | DIASTOLIC BLOOD PRESSURE: 78 MMHG | OXYGEN SATURATION: 93 % | WEIGHT: 280.9 LBS | TEMPERATURE: 98.4 F | RESPIRATION RATE: 16 BRPM | SYSTOLIC BLOOD PRESSURE: 118 MMHG | HEIGHT: 71 IN

## 2024-09-13 DIAGNOSIS — I50.22 CHRONIC SYSTOLIC HEART FAILURE (H): ICD-10-CM

## 2024-09-13 DIAGNOSIS — R06.09 DYSPNEA ON EXERTION: ICD-10-CM

## 2024-09-13 LAB
ANION GAP SERPL CALCULATED.3IONS-SCNC: 11 MMOL/L (ref 7–15)
BASOPHILS # BLD AUTO: 0.1 10E3/UL (ref 0–0.2)
BASOPHILS NFR BLD AUTO: 0 %
BUN SERPL-MCNC: 29.4 MG/DL (ref 8–23)
CALCIUM SERPL-MCNC: 9.1 MG/DL (ref 8.8–10.4)
CHLORIDE SERPL-SCNC: 101 MMOL/L (ref 98–107)
CREAT SERPL-MCNC: 1.15 MG/DL (ref 0.67–1.17)
EGFRCR SERPLBLD CKD-EPI 2021: 69 ML/MIN/1.73M2
EOSINOPHIL # BLD AUTO: 0.2 10E3/UL (ref 0–0.7)
EOSINOPHIL NFR BLD AUTO: 2 %
ERYTHROCYTE [DISTWIDTH] IN BLOOD BY AUTOMATED COUNT: 14.1 % (ref 10–15)
GLUCOSE SERPL-MCNC: 93 MG/DL (ref 70–99)
HCO3 SERPL-SCNC: 23 MMOL/L (ref 22–29)
HCT VFR BLD AUTO: 52.5 % (ref 40–53)
HGB BLD-MCNC: 17.4 G/DL (ref 13.3–17.7)
IMM GRANULOCYTES # BLD: 0.1 10E3/UL
IMM GRANULOCYTES NFR BLD: 1 %
INR PPP: 1.07 (ref 0.85–1.15)
LYMPHOCYTES # BLD AUTO: 2.7 10E3/UL (ref 0.8–5.3)
LYMPHOCYTES NFR BLD AUTO: 24 %
MCH RBC QN AUTO: 30.7 PG (ref 26.5–33)
MCHC RBC AUTO-ENTMCNC: 33.1 G/DL (ref 31.5–36.5)
MCV RBC AUTO: 93 FL (ref 78–100)
MONOCYTES # BLD AUTO: 1 10E3/UL (ref 0–1.3)
MONOCYTES NFR BLD AUTO: 9 %
NEUTROPHILS # BLD AUTO: 7.2 10E3/UL (ref 1.6–8.3)
NEUTROPHILS NFR BLD AUTO: 64 %
NRBC # BLD AUTO: 0 10E3/UL
NRBC BLD AUTO-RTO: 0 /100
NT-PROBNP SERPL-MCNC: 53 PG/ML (ref 0–900)
PLATELET # BLD AUTO: 150 10E3/UL (ref 150–450)
POTASSIUM SERPL-SCNC: 4.5 MMOL/L (ref 3.4–5.3)
RBC # BLD AUTO: 5.67 10E6/UL (ref 4.4–5.9)
SODIUM SERPL-SCNC: 135 MMOL/L (ref 135–145)
WBC # BLD AUTO: 11.2 10E3/UL (ref 4–11)

## 2024-09-13 PROCEDURE — C1894 INTRO/SHEATH, NON-LASER: HCPCS | Performed by: INTERNAL MEDICINE

## 2024-09-13 PROCEDURE — C1751 CATH, INF, PER/CENT/MIDLINE: HCPCS | Performed by: INTERNAL MEDICINE

## 2024-09-13 PROCEDURE — 36415 COLL VENOUS BLD VENIPUNCTURE: CPT | Performed by: INTERNAL MEDICINE

## 2024-09-13 PROCEDURE — 93451 RIGHT HEART CATH: CPT | Performed by: INTERNAL MEDICINE

## 2024-09-13 PROCEDURE — 272N000001 HC OR GENERAL SUPPLY STERILE: Performed by: INTERNAL MEDICINE

## 2024-09-13 PROCEDURE — 85048 AUTOMATED LEUKOCYTE COUNT: CPT | Performed by: INTERNAL MEDICINE

## 2024-09-13 PROCEDURE — 80048 BASIC METABOLIC PNL TOTAL CA: CPT | Performed by: INTERNAL MEDICINE

## 2024-09-13 PROCEDURE — 250N000009 HC RX 250: Performed by: INTERNAL MEDICINE

## 2024-09-13 PROCEDURE — 93451 RIGHT HEART CATH: CPT | Mod: 26 | Performed by: INTERNAL MEDICINE

## 2024-09-13 PROCEDURE — 999N000132 HC STATISTIC PP CARE STAGE 1

## 2024-09-13 PROCEDURE — 83880 ASSAY OF NATRIURETIC PEPTIDE: CPT | Performed by: INTERNAL MEDICINE

## 2024-09-13 PROCEDURE — 999N000142 HC STATISTIC PROCEDURE PREP ONLY

## 2024-09-13 PROCEDURE — 85610 PROTHROMBIN TIME: CPT | Performed by: INTERNAL MEDICINE

## 2024-09-13 RX ORDER — LIDOCAINE 40 MG/G
CREAM TOPICAL
Status: COMPLETED | OUTPATIENT
Start: 2024-09-13 | End: 2024-09-13

## 2024-09-13 RX ADMIN — LIDOCAINE: 40 CREAM TOPICAL at 07:48

## 2024-09-13 ASSESSMENT — ACTIVITIES OF DAILY LIVING (ADL)
ADLS_ACUITY_SCORE: 38

## 2024-09-13 NOTE — PRE-PROCEDURE
"Consenting/Education for Cardiology Procedure: Right heart catheterization     Patient understands we would like to perform the listed procedure(s) due to pulmonary sarcoidosis     The patient understands the following:      The procedure was described to the patient in detail.     No sedation is planned for this procedure. Patient understands risks and complications of the procedure which include but are not limited to bruising/swelling around the incision site, infection, bleeding, allergic reaction to local anesthetic, air embolism, arterial puncture, stroke, heart attack, need for emergency heart surgery, death.        Patient verbalized understanding of risks and benefits and has elected to proceed with the procedure or procedures listed above.    Caitlin Escalante DNP, APRN CNP  [facility]  ICU Cardiology-CICU Service  Send message or 10 digit call back number Securely via [employer] with the [employer] Web Console (learn more here)    AC: No  VS:   /74 (BP Location: Right arm)   Pulse 80   Temp 98.4  F (36.9  C) (Oral)   Resp 16   Ht 1.803 m (5' 11\")   Wt 127.4 kg (280 lb 14.4 oz)   SpO2 95%   BMI 39.18 kg/m    LABS:  Recent Labs   Lab 09/13/24  0712 09/09/24  0812    136   POTASSIUM 4.5 4.5   CHLORIDE 101 100   CO2 23 26   BUN 29.4* 20.6   CR 1.15 1.03     Estimated Creatinine Clearance: 82.4 mL/min (based on SCr of 1.15 mg/dL).                 Recent Labs   Lab 09/13/24  0712   INR 1.07     Recent Labs   Lab 09/13/24  0712 09/09/24  0812   HGB 17.4 16.9   HCT 52.5 50.9    154      "

## 2024-09-13 NOTE — PROGRESS NOTES
Pt returns to 2a s/p RHC. Right neck site CDI, soft, flat, non tender. Pt denies pain. Pt declines PO. Awaiting Dr Hirsch to speak with pt and spouse.   0930 Dr Hirsch spoke with pt, pt ok to discharge. Pt ambulated off unit with steady gait, with spouse.

## 2024-09-13 NOTE — PROGRESS NOTES
Pt arrives to 2a, with spouse, for RHC. Consent needs to be signed. Labs in process.   Discharge instructions reviewed with pt pre procedure, pt verbalizes understanding.

## 2024-09-13 NOTE — DISCHARGE INSTRUCTIONS
[facility]                        Interventional Cardiology  Discharge Instructions   Post Right Heart Cath      AFTER YOU GO HOME:  DO drink plenty of fluids  DO resume your regular diet and medications unless otherwise instructed by your Primary Physician  Do Not scrub the procedure site vigorously  No lotion or powder to the puncture site for 3 days    CALL YOUR PRIMARY PHYSICIAN IF: You may resume all normal activity.  Monitor neck site for bleeding, swelling, or voice changes. If you notice bleeding or swelling immediately apply pressure to the site and call number below to speak with Cardiology Fellow.  If you experience any changes in your breathing you should call your doctor immediately or come to the closest Emergency Department.  Do not drive yourself.    ADDITIONAL INSTRUCTIONS: Medications: You are to resume all home medications including anticoagulation therapy unless otherwise advised by your primary cardiologist or nurse coordinator.    Follow Up: Per your primary cardiology team    If you have any questions or concerns regarding your procedure site please call [phone] at anytime and ask for Cardiology Fellow on call.  They are available 24 hours a day.  You may also contact the Cardiology Clinic after hours number at [phone].                                                       Telephone Numbers [phone] Monday-Friday 8:00 am to 4:30 pm    [phone] After 4:30 pm Monday-Friday, Weekends & Holidays  Ask for Interventional Cardiologist on call. Someone is on call 24 hours/day   [facility] toll free number [phone] Monday-Friday 8:00 am to 4:30 pm   [facility] Emergency Dept [phone]

## 2024-09-18 DIAGNOSIS — I50.22 CHRONIC SYSTOLIC HEART FAILURE (H): ICD-10-CM

## 2024-09-18 DIAGNOSIS — I50.30 HEART FAILURE WITH PRESERVED EJECTION FRACTION, NYHA CLASS I (H): ICD-10-CM

## 2024-09-18 DIAGNOSIS — R06.09 DOE (DYSPNEA ON EXERTION): ICD-10-CM

## 2024-09-20 ENCOUNTER — MYC MEDICAL ADVICE (OUTPATIENT)
Dept: PULMONOLOGY | Facility: CLINIC | Age: 69
End: 2024-09-20
Payer: MEDICARE

## 2024-09-23 DIAGNOSIS — I49.3 PVC'S (PREMATURE VENTRICULAR CONTRACTIONS): ICD-10-CM

## 2024-09-24 RX ORDER — SACUBITRIL AND VALSARTAN 97; 103 MG/1; MG/1
1 TABLET, FILM COATED ORAL 2 TIMES DAILY
Qty: 180 TABLET | Refills: 3 | Status: SHIPPED | OUTPATIENT
Start: 2024-09-24

## 2024-09-24 NOTE — TELEPHONE ENCOUNTER
empagliflozin (JARDIANCE) 10 MG TABS tablet   Last Written Prescription Date:  12/27/2023  Last Fill Quantity: 45,   # refills: 3  Last Office Visit : 8/19/2024  [facility]  Future Office visit:  11/1/2024  Routing empagliflozin (JARDIANCE) 10 MG TABS tablet refill request to provider for review/approval because:  Medication-Sodium Glucose Co-Transport Inhibitor Agents not on the Cardiology refill protocol.       ENTRESTO  MG per tablet: passed medication protocol  - refills sent

## 2024-09-30 RX ORDER — METOPROLOL SUCCINATE 100 MG/1
150 TABLET, EXTENDED RELEASE ORAL DAILY
Qty: 135 TABLET | Refills: 3 | OUTPATIENT
Start: 2024-09-30

## 2024-10-02 ENCOUNTER — TELEPHONE (OUTPATIENT)
Dept: CARDIOLOGY | Facility: CLINIC | Age: 69
End: 2024-10-02
Payer: MEDICARE

## 2024-10-02 NOTE — TELEPHONE ENCOUNTER
----- Message from Leidy RIGGINS sent at 8/19/2024 12:20 PM CDT -----  Regarding: RHF w/ Dr. Hirsch w/ labs prior ~2/19/2025  Pt needs [facility] tidwell/ Dr. Torrey tidwell/ labs prior ~2/19/2025. No HF schedule for Feb. Pt declined [facility]. Pt on waitlist. Pt aware that we will call back in a couple weeks to schedule.     Thanks,    Leidy Morales

## 2024-10-02 NOTE — TELEPHONE ENCOUNTER
10/4/2024 12:38PM Leidy Andrew  Left Voicemail (2nd Attempt) MAX ATTEMPTS REACHED- LVM for the patient to call back and schedule the following:    Appointment type: Return Heart Failure  Provider: Dr. Hirsch  Return date: 6 month follow-up (2/19/2025)  Specialty phone number: [phone] option 1  Additional appointment(s) needed: labs prior  Additonal Notes: 10/4 MAX ATTEMPTS REACHED- LVM for pt to schedule RHF w/ Dr. Hirsch w/ labs prior Feb 2025. BUCK Morales 10/4/2024 12:38PM        10/2/2024 10AM Leidy Andrew  Left Voicemail (1st Attempt) and Sent Mychart (1st Attempt) for the patient to call back and schedule the following:    Appointment type: Return Heart Failure  Provider: Dr. Hirsch  Return date: 6 month follow-up (2/19/2025)  Specialty phone number: [phone] option 1  Additional appointment(s) needed: labs prior  Additonal Notes: 10/2 LVM and MYC for pt to schedule RHF w/ Dr. Hirsch w/ labs prior in Feb 2025. BUCK Morales 10/2/2024 10AM

## 2024-10-03 ENCOUNTER — MYC MEDICAL ADVICE (OUTPATIENT)
Dept: PULMONOLOGY | Facility: CLINIC | Age: 69
End: 2024-10-03
Payer: MEDICARE

## 2024-10-03 DIAGNOSIS — R05.9 COUGH, UNSPECIFIED TYPE: Primary | ICD-10-CM

## 2024-10-03 RX ORDER — PREDNISONE 10 MG/1
10 TABLET ORAL DAILY
Qty: 60 TABLET | Refills: 0 | Status: SHIPPED | OUTPATIENT
Start: 2024-10-03

## 2024-10-09 ENCOUNTER — MYC MEDICAL ADVICE (OUTPATIENT)
Dept: INTERNAL MEDICINE | Facility: CLINIC | Age: 69
End: 2024-10-09
Payer: MEDICARE

## 2024-10-10 DIAGNOSIS — J45.909 MODERATE ASTHMA WITHOUT COMPLICATION, UNSPECIFIED WHETHER PERSISTENT: ICD-10-CM

## 2024-10-10 DIAGNOSIS — D86.9 SARCOIDOSIS: ICD-10-CM

## 2024-10-10 RX ORDER — MONTELUKAST SODIUM 10 MG/1
10 TABLET ORAL AT BEDTIME
Qty: 90 TABLET | Refills: 0 | Status: SHIPPED | OUTPATIENT
Start: 2024-10-10

## 2024-10-16 ENCOUNTER — DOCUMENTATION ONLY (OUTPATIENT)
Dept: CARDIOLOGY | Facility: CLINIC | Age: 69
End: 2024-10-16
Payer: MEDICARE

## 2024-10-16 NOTE — PROGRESS NOTES
"Patient has lab only appt 11/13/25 for \"[facility] labs\", the only cardio orders in chart have an expected date of 2/19/25. We cannot draw those more than two weeks ahead of that date. If you want them done sooner, please change the expected date or place FUTURE orders in Epic if appropriate.    Thanks,   [facility]    "

## 2024-10-17 DIAGNOSIS — I50.30 HEART FAILURE WITH PRESERVED EJECTION FRACTION, NYHA CLASS I (H): Primary | ICD-10-CM

## 2024-10-18 ENCOUNTER — MYC MEDICAL ADVICE (OUTPATIENT)
Dept: PULMONOLOGY | Facility: CLINIC | Age: 69
End: 2024-10-18
Payer: MEDICARE

## 2024-10-21 ENCOUNTER — TELEPHONE (OUTPATIENT)
Dept: CARDIOLOGY | Facility: CLINIC | Age: 69
End: 2024-10-21
Payer: MEDICARE

## 2024-10-21 NOTE — TELEPHONE ENCOUNTER
10/21 Patient confirmed scheduled appointment:  Date: 10/25/2024  Time: 8:30 am  Visit type: Return Heart Failure  Provider: Torrey  Location: [facility]  Testing/imaging: labs prior  Additional notes: n/a

## 2024-10-22 ENCOUNTER — DOCUMENTATION ONLY (OUTPATIENT)
Dept: CARDIOLOGY | Facility: CLINIC | Age: 69
End: 2024-10-22

## 2024-10-24 DIAGNOSIS — I50.30 HEART FAILURE WITH PRESERVED EJECTION FRACTION, NYHA CLASS I (H): Primary | ICD-10-CM

## 2024-10-25 ENCOUNTER — OFFICE VISIT (OUTPATIENT)
Dept: CARDIOLOGY | Facility: CLINIC | Age: 69
End: 2024-10-25
Attending: INTERNAL MEDICINE
Payer: MEDICARE

## 2024-10-25 ENCOUNTER — LAB (OUTPATIENT)
Dept: LAB | Facility: CLINIC | Age: 69
End: 2024-10-25
Attending: INTERNAL MEDICINE
Payer: MEDICARE

## 2024-10-25 VITALS
OXYGEN SATURATION: 95 % | HEART RATE: 80 BPM | BODY MASS INDEX: 40.39 KG/M2 | SYSTOLIC BLOOD PRESSURE: 113 MMHG | WEIGHT: 289.6 LBS | DIASTOLIC BLOOD PRESSURE: 65 MMHG

## 2024-10-25 DIAGNOSIS — D86.9 SARCOIDOSIS: ICD-10-CM

## 2024-10-25 DIAGNOSIS — D47.2 MGUS (MONOCLONAL GAMMOPATHY OF UNKNOWN SIGNIFICANCE): ICD-10-CM

## 2024-10-25 DIAGNOSIS — I50.30 HEART FAILURE WITH PRESERVED EJECTION FRACTION, NYHA CLASS I (H): ICD-10-CM

## 2024-10-25 DIAGNOSIS — R06.02 SOB (SHORTNESS OF BREATH): Primary | ICD-10-CM

## 2024-10-25 DIAGNOSIS — C61 PROSTATE CANCER (H): ICD-10-CM

## 2024-10-25 DIAGNOSIS — I50.22 CHRONIC SYSTOLIC HEART FAILURE (H): ICD-10-CM

## 2024-10-25 LAB
ALBUMIN SERPL BCG-MCNC: 4.1 G/DL (ref 3.5–5.2)
ALP SERPL-CCNC: 68 U/L (ref 40–150)
ALT SERPL W P-5'-P-CCNC: 28 U/L (ref 0–70)
ANION GAP SERPL CALCULATED.3IONS-SCNC: 10 MMOL/L (ref 7–15)
AST SERPL W P-5'-P-CCNC: 24 U/L (ref 0–45)
BILIRUB SERPL-MCNC: 0.8 MG/DL
BUN SERPL-MCNC: 20.5 MG/DL (ref 8–23)
CALCIUM SERPL-MCNC: 9.6 MG/DL (ref 8.8–10.4)
CHLORIDE SERPL-SCNC: 103 MMOL/L (ref 98–107)
CREAT SERPL-MCNC: 1.13 MG/DL (ref 0.67–1.17)
EGFRCR SERPLBLD CKD-EPI 2021: 70 ML/MIN/1.73M2
ERYTHROCYTE [DISTWIDTH] IN BLOOD BY AUTOMATED COUNT: 14.8 % (ref 10–15)
GLUCOSE SERPL-MCNC: 92 MG/DL (ref 70–99)
HCO3 SERPL-SCNC: 26 MMOL/L (ref 22–29)
HCT VFR BLD AUTO: 48.6 % (ref 40–53)
HGB BLD-MCNC: 16.2 G/DL (ref 13.3–17.7)
MCH RBC QN AUTO: 30.6 PG (ref 26.5–33)
MCHC RBC AUTO-ENTMCNC: 33.3 G/DL (ref 31.5–36.5)
MCV RBC AUTO: 92 FL (ref 78–100)
NT-PROBNP SERPL-MCNC: 64 PG/ML (ref 0–900)
PLATELET # BLD AUTO: 174 10E3/UL (ref 150–450)
POTASSIUM SERPL-SCNC: 4.2 MMOL/L (ref 3.4–5.3)
PROT SERPL-MCNC: 7.4 G/DL (ref 6.4–8.3)
RBC # BLD AUTO: 5.3 10E6/UL (ref 4.4–5.9)
SODIUM SERPL-SCNC: 139 MMOL/L (ref 135–145)
WBC # BLD AUTO: 8.9 10E3/UL (ref 4–11)

## 2024-10-25 PROCEDURE — 85027 COMPLETE CBC AUTOMATED: CPT | Performed by: PATHOLOGY

## 2024-10-25 PROCEDURE — 36415 COLL VENOUS BLD VENIPUNCTURE: CPT | Performed by: PATHOLOGY

## 2024-10-25 PROCEDURE — G0463 HOSPITAL OUTPT CLINIC VISIT: HCPCS | Performed by: INTERNAL MEDICINE

## 2024-10-25 PROCEDURE — 80053 COMPREHEN METABOLIC PANEL: CPT | Performed by: PATHOLOGY

## 2024-10-25 PROCEDURE — 99215 OFFICE O/P EST HI 40 MIN: CPT | Performed by: INTERNAL MEDICINE

## 2024-10-25 PROCEDURE — 83880 ASSAY OF NATRIURETIC PEPTIDE: CPT | Performed by: PATHOLOGY

## 2024-10-25 ASSESSMENT — PAIN SCALES - GENERAL: PAINLEVEL_OUTOF10: NO PAIN (0)

## 2024-10-25 NOTE — LETTER
10/25/2024      RE: Derek Contreras  [id number] [facility] [id number]       Dear Colleague,    Thank you for the opportunity to participate in the care of your patient, Derek Contreras, at the [facility] [place] at [facility]. Please see a copy of my visit note below.    [facility]  Advanced HF & Pulmonary Hypertension Clinic  Service Date:  November 1, 2024    Dear Doctors Mario and Elmer:       We had the pleasure of seeing Mr. Derek Contreras follow-up in our heart failure and pulm hypertension clinic.  As you know, he is a very pleasant 68-year-old male with past medical history significant for     1. Pulmonary sarcoidosis  2.  LV systolic dysfunction -nonischemic in etiology likely related to uncontrolled hypertension  3.  Pulmonary hypertension and RV dysfunction secondary to pulmonary sarcoidosis    He returns today for follow-up.  He is doing well.  He denies having any exertional shortness of breath.  He exercises on a regular basis.  I would currently characterize him as a functional class II.  He denies having exertional chest pain or chest pressure.  No PND orthopnea.  No exertional presyncope or syncope.  No lower extremity swelling or abdominal distention.  No interim hospitalization or ER visits.    His main symptom is cough for which is on tapering doses of prednisone. His cough is symptomatically better on prednisone.     PAST MEDICAL HISTORY:  Past Medical History:   Diagnosis Date     Asthma      Claustrophobia      CPAP (continuous positive airway pressure) dependence      Dyslipidemia      [place] (hard of hearing)      Hypercholesteremia      Hypertension      Iron deficiency      Mediastinal adenopathy      Obesity      BEULAH (obstructive sleep apnea)      Prostate cancer (H)      Pulmonary hypertension (H)      Sarcoidosis      CURRENT MEDICATIONS:  Current Outpatient Medications   Medication Sig  Dispense Refill     aspirin (ASA) 81 MG tablet Take 81 mg by mouth every morning  90 tablet 3     atorvastatin (LIPITOR) 40 MG tablet Take 1 tablet (40 mg) by mouth every morning 90 tablet 3     benzonatate (TESSALON) 100 MG capsule Take 1 capsule (100 mg) by mouth 3 times daily as needed for cough 60 capsule 3     cetirizine (ZYRTEC) 10 MG tablet Take 10 mg by mouth every morning  90 tablet 3     empagliflozin (JARDIANCE) 10 MG TABS tablet Take 1 tablet (10 mg) by mouth every other day. 45 tablet 3     fluticasone-vilanterol (BREO ELLIPTA) 200-25 MCG/ACT inhaler INHALE 1 PUFF BY MOUTH ONE TIME DAILY Strength: 200-25 MCG/INH 60 each 11     folic acid (FOLVITE) 1 MG tablet Take 1 tablet (1 mg) by mouth daily. . Do not take on the day you take Methotrexate. 90 tablet 3     guaiFENesin (ROBITUSSIN) 20 mg/mL liquid Take 10 mLs (200 mg) by mouth every 4 hours as needed for cough 200 mL 3     hydroCHLOROthiazide 12.5 MG tablet TAKE 1 TABLET BY MOUTH EVERY MORNING 90 tablet 3     methotrexate 2.5 MG tablet Take 6 tablets (15 mg) by mouth every 7 days. Take 7.5 mg (3 tabs) once a week for 2 weeks, then 10 mg (4 tabs) once a week for 2 weeks, then 12.5 mg (5 tabs) once a week thereafter, Disp-24 tablet, R-3, E-Prescribe 24 tablet 3     metoprolol succinate ER (TOPROL XL) 100 MG 24 hr tablet Take 1.5 tablets (150 mg) by mouth daily 135 tablet 3     mometasone (NASONEX) 50 MCG/ACT nasal spray Spray 2 sprays into both nostrils daily 17 g 3     montelukast (SINGULAIR) 10 MG tablet Take 1 tablet (10 mg) by mouth at bedtime. 90 tablet 0     multivitamin (ONE-DAILY) tablet Take 1 tablet by mouth every morning  90 tablet 3     omeprazole (PRILOSEC) 40 MG DR capsule Take 1 capsule (40 mg) by mouth 2 times daily 180 capsule 3     predniSONE (DELTASONE) 10 MG tablet Take 1 tablet (10 mg) by mouth daily. 60 tablet 0     sacubitril-valsartan (ENTRESTO)  MG per tablet Take 1 tablet by mouth 2 times daily. 180 tablet 3      spironolactone (ALDACTONE) 25 MG tablet Take 1 tablet (25 mg) by mouth daily 90 tablet 3     sulfamethoxazole-trimethoprim (BACTRIM) 400-80 MG tablet Take 1 tablet by mouth daily. 90 tablet 3     No current facility-administered medications for this visit.     Facility-Administered Medications Ordered in Other Visits   Medication Dose Route Frequency Provider Last Rate Last Admin     sodium chloride bacteriostatic 0.9 % flush 3 mL  3 mL Intravenous Once Dewayne Chavis MD           ROS:   10 point ROS negative except as discussed in above HPI.    EXAM:  /65 (BP Location: Right arm, Patient Position: Chair, Cuff Size: Adult Large)   Pulse 80   Wt 131.4 kg (289 lb 9.6 oz)   SpO2 95%   BMI 40.39 kg/m    General: appears comfortable, alert and articulate  Head: normocephalic, atraumatic  Eyes: anicteric sclera, EOMI  Neck: no adenopathy  Orophyarynx: moist mucosa, no lesions, dentition intact  Heart: regular, normal S1/S2, no murmur, gallop, rub, no jugular venous distention  Lungs: clear to auscultation bilaterally, no rales or wheezing  Abdomen: soft, non-tender, bowel sounds present, no hepatosplenomegaly  Extremities: no clubbing, cyanosis or edema  Neurological: normal speech and affect, no gross motor deficits    Labs:  Recent Results (from the past 6 weeks)   Comprehensive metabolic panel    Collection Time: 10/25/24  7:41 AM   Result Value Ref Range    Sodium 139 135 - 145 mmol/L    Potassium 4.2 3.4 - 5.3 mmol/L    Carbon Dioxide (CO2) 26 22 - 29 mmol/L    Anion Gap 10 7 - 15 mmol/L    Urea Nitrogen 20.5 8.0 - 23.0 mg/dL    Creatinine 1.13 0.67 - 1.17 mg/dL    GFR Estimate 70 >60 mL/min/1.73m2    Calcium 9.6 8.8 - 10.4 mg/dL    Chloride 103 98 - 107 mmol/L    Glucose 92 70 - 99 mg/dL    Alkaline Phosphatase 68 40 - 150 U/L    AST 24 0 - 45 U/L    ALT 28 0 - 70 U/L    Protein Total 7.4 6.4 - 8.3 g/dL    Albumin 4.1 3.5 - 5.2 g/dL    Bilirubin Total 0.8 <=1.2 mg/dL   CBC with platelets    Collection  Time: 10/25/24  7:41 AM   Result Value Ref Range    WBC Count 8.9 4.0 - 11.0 10e3/uL    RBC Count 5.30 4.40 - 5.90 10e6/uL    Hemoglobin 16.2 13.3 - 17.7 g/dL    Hematocrit 48.6 40.0 - 53.0 %    MCV 92 78 - 100 fL    MCH 30.6 26.5 - 33.0 pg    MCHC 33.3 31.5 - 36.5 g/dL    RDW 14.8 10.0 - 15.0 %    Platelet Count 174 150 - 450 10e3/uL   N terminal pro BNP outpatient    Collection Time: 10/25/24  7:41 AM   Result Value Ref Range    N Terminal Pro BNP Outpatient 64 0 - 900 pg/mL   EKG 12-lead, tracing only (Same Day)    Collection Time: 11/01/24 10:02 AM   Result Value Ref Range    Systolic Blood Pressure  mmHg    Diastolic Blood Pressure  mmHg    Ventricular Rate 76 BPM    Atrial Rate 76 BPM    LA Interval 172 ms    QRS Duration 148 ms     ms    QTc 447 ms    P Axis 24 degrees    R AXIS -55 degrees    T Axis -9 degrees    Interpretation ECG       Sinus rhythm with occasional Premature ventricular complexes  Right bundle branch block  Left anterior fascicular block  ** Bifascicular block **  Abnormal ECG  When compared with ECG of 02-Nov-2023 15:23,  No significant change was found       Echo (08/2024)  Global and regional left ventricular function is normal with an EF of 55-60%.  Borderline right ventricular enlargement.  Global right ventricular function is borderline reduced.  No significant valvular abnormalities present.  No significant changes noted.    RHC (10/2024)  RA 4/7/4  Rv 30/5  PA 30/12/17  PCWP 5    Lucas CO 9.3  Lucas CI 3.82    TDCO 6.57  TDCI 2.7    Pa Sat 74.2  Hb 16.9   PVR 1.9    Cardiac MRI 05/02/2022  Comparison CMR: 12/22/2021     1. The LV is normal in cavity size and wall thickness. The global systolic function is normal. The LVEF is  58 %. There are no regional wall motion abnormalities.     2. The RV is mildly enlarged in cavity size. The global systolic function is mildly reduced. The RVEF is  44%.      3. Both atria are normal in size.     4. There is no significant valvular disease.       5. Late gadolinium enhancement imaging shows anterior septal and inferior septal LGE at the RV insertion  points similar to prior     6.  There is no pericardial effusion or thickening.      7.  There is no intracardiac thrombus.      CONCLUSIONS:  No evidence of active cardiac sarcoidosis. Improved biventricular function with LVEF 58% and  RVEF 44% (previously 41% and 34% respectively). Septal systolic paradoxical bowing still suggestive of RV  pressure overload, and LGE in the septal RV insertion points into the LV; together suggestive of pulmonary  hypertension.     Coronary CTA (2022)    1.  Severe calcific atherosclerosis causing mild stenosis and without  any high-grade lesions.  2.  Total Agatston score 442 placing the patient in the 76th  percentile when compared to age and gender matched control group.  3.  Please review Radiology report for incidental noncardiac findings  that will follow separately.    Cardiac PET 2/2020  1. No FDG active cardiac sarcoid.  2. Decreased perfusion in the inferoseptal wall, findings may be  related to sequela of previous cardiac sarcoid with microvascular  injury.  3. Enlarged left ventricle with borderline low ejection fraction.  4. Decreased myocardial blood flow in the RCA distribution.  5. Findings of a dilated cardiomyopathy a concern for possible  ischemia/myocardial injury of the septum, consider CTA or coronary  angiography for further evaluation of this region.  6. Chest and upper abdominal hypermetabolic lymphadenopathy which is  indicative of active systemic sarcoid.    Assessment and Plan:     In summary, Mr. Contreras is a 68-year-old gentleman with pulmonary sarcoidosis, systemic hypertension, mild biventricular systolic dysfunction, and pulmonary hypertension who returns today for follow-up.    1.  Nonischemic cardiomyopathy in the setting of sarcoidosis and uncontrolled systemic blood pressure  2.  Frequent PVCs   3.  Nonobstructive coronary artery  disease  4.  Pulmonary sarcoidosis    He is doing well.  He is functional class II.  He is euvolemic.  His endorgan function is normal.  His NT proBNP is within normal limits.  His most recent Zio monitor showed 5.8% PVCs which was comparable and stable to his prior ZIO monitoring. His echocardiogram shows normal left ventricular size and function.  He always had mild right ventricular dilatation and dysfunction.  His repeat right heart catheterization shows normal biventricular filling pressures with normal cardiac output.  He has no pulmonary hypertension.    Thus, his current symptoms of cough is less likely due to cardiomyopathy.  He also has no evidence of pulm hypertension.    I have recommended him to continue the current 4 drug neurohormonal therapy.  He is not requiring any diuretics.  No indication for pulmonary vasodilator therapy as his last pulmonary pressures were normal.  If he were to have worsening pulmonary vascular disease and RV dysfunction, we will consider inhaled treprostinil therapy which is minimal VQ mismatch effect.    His PVCs are under control on high-dose metoprolol.  He is also following with Dr. Figueroa    He is on low-dose aspirin and statin for his nonobstructive coronary disease per primary prevention.    I recommend him to return to see us in 6 months with my colleagues in the core clinic with labs.  I also recommended him to schedule a follow-up visit with Dr. Figueroa. He will call us in the interim of any further worsening symptoms.      Total time today was 40 minutes reviewing notes, imaging, labs, patient visit, orders and documentation     Sincerely,    Torrey Hirsch MD   [facility]  Heart Failure, Transplant, and Mechanical Circulatory Support Cardiology   Cardiovascular Division  [facility]   [phone]          Please do not hesitate to contact me if you have any questions/concerns.     Sincerely,     Torrey  MD Torrey

## 2024-10-25 NOTE — NURSING NOTE
Chief Complaint   Patient presents with    Follow Up     RTN HF: 68 year old male presents with history of pulmonary sarcoid and heart failure, ef 43% for follow up with labs prior         Vitals were taken, medications reconciled.    Virgil Dan, Clinic Assistant   8:14 AM

## 2024-10-25 NOTE — PATIENT INSTRUCTIONS
Medication Changes & Instructions:  No changes today    Results:  Component      Latest Ref Rng 10/25/2024  7:41 AM   Sodium      135 - 145 mmol/L 139    Potassium      3.4 - 5.3 mmol/L 4.2    Carbon Dioxide (CO2)      22 - 29 mmol/L 26    Anion Gap      7 - 15 mmol/L 10    Urea Nitrogen      8.0 - 23.0 mg/dL 20.5    Creatinine      0.67 - 1.17 mg/dL 1.13    GFR Estimate      >60 mL/min/1.73m2 70    Calcium      8.8 - 10.4 mg/dL 9.6    Chloride      98 - 107 mmol/L 103    Glucose      70 - 99 mg/dL 92    Alkaline Phosphatase      40 - 150 U/L 68    AST      0 - 45 U/L 24    ALT      0 - 70 U/L 28    Protein Total      6.4 - 8.3 g/dL 7.4    Albumin      3.5 - 5.2 g/dL 4.1    Bilirubin Total      <=1.2 mg/dL 0.8    WBC      4.0 - 11.0 10e3/uL 8.9    RBC Count      4.40 - 5.90 10e6/uL 5.30    Hemoglobin      13.3 - 17.7 g/dL 16.2    Hematocrit      40.0 - 53.0 % 48.6    MCV      78 - 100 fL 92    MCH      26.5 - 33.0 pg 30.6    MCHC      31.5 - 36.5 g/dL 33.3    RDW      10.0 - 15.0 % 14.8    Platelet Count      150 - 450 10e3/uL 174    N-Terminal Pro Bnp      0 - 900 pg/mL 64          Follow up Appointment Information:  6 month follow-up with Torrey with labs prior.      Thank you for allowing us to be a part of your care here at the [facility].      If you have questions or concerns please contact us at:  Cardiovascular Clinic  [facility]   [phone] Press #1 to send a message to your care team  On Call Cardiologist for after hours or on weekends: [phone]  option #4

## 2024-10-27 ENCOUNTER — MYC MEDICAL ADVICE (OUTPATIENT)
Dept: INTERNAL MEDICINE | Facility: CLINIC | Age: 69
End: 2024-10-27
Payer: MEDICARE

## 2024-10-31 NOTE — PROGRESS NOTES
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     PVCs - (19% --> 6% burden)  Non-sustained Polymorphic VT - PVC induced  The morphology of the patient's PVC is indicative of a RV septal origin. PVC burden initially 19% on zio in 2021, improved to 6% burden in 2022 on toprol XL. Last PET without signs of active sarcoid in 2020. Negative stress MR in 2019. CMR in 2022 without evidence of cardiac sarcoid and improved RV and LV function on medical therapy. Dr Nelson has ordered repeat CMR upcoming. Given he is asymptomatic with PVCs, No intervention at this stage with no evidence of CS     Follow up with EP SUSANNE 1 year     History of Present Illness/Subjective    Mr. Derek Contreras is a 68 year old male who comes in today for EP follow-up of PVCs.    Mr. Contreras is a 67 yo M who has a PMH pulmonary sarcoidosis, HTN, HLD, Obesity, h/o prostate CA, RV dilation & borderline low LVEF who has been evaluated in the pulmonary hypertension clinic by Dr. Ferrell and found not to have a dx of pulmonary hypertension. 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  His cardiac workup thus far has been as follows: he had a RHC 12/11/19 which showed RA 3, RV 33/5 PA 30/12 (17) and PCWP 5. CO 6.22, CI 2.49. PVR 1.82. Shunt series with Qp/Qs 1.0. He had a cardiac stress MRI 3/11/20 which showed no evidence of LGE. His RV was noted to be moderately enlarged but mildly reduced function with RVEF of 43%. Severely enlarged RA. No ischemia noted on stress imaging. He had a cardiac PET 2/2020 which showed no FDG active cardiac sarcoid. He did have decreased perfusion in the inferoseptal wall which  may be related to microvascular disease. He was also noted to have decreased myocardial blood flow in the RCA distribution. Mr. Contreras also had a event monitor which showed frequent NSVT (fastest 19 beats @ 255bpm) and 9x SVT runs. He presents today to the EP clinic for evaluation of his arrhythmia.     EP visit Jan 2022: He denies an symptoms including palpitations, chest discomfort, lightheadedness, dizziness, orthopnea, PND, abd distention, n/v/d, early satiety, unintentional weight loss. He does report chronic KIRBY related to his pulmonary sarcoidosis that has been stable. More recently he reports he has had a dry cough that has slightly worsened over the last few days, he has reached out to his pulmonologist in regards to this.During this visit, we started him on metoprolol XL 50, gradually increase to . We discussed PET with sarcoid team and we wanted to check instead an ischemic work-up.      EP Visit 4/6/22: He presents for follow-up. He denies any cardiac symptoms. He had a CT Angio on 1/31/2022 showing Severe calcific atherosclerosis causing mild stenosis without any high-grade lesions. He had a repeat Zio patch 1/17-1/24 shows 90 runs of NSVT compared to 432 in Oct, with decreasing ectopy.      EP Visit 11/9/22: He presents today for follow up. He reports feeling very well, no syncope or presyncope, no palpitations, good stamina  Having RHC and following up with Dr Nelson soon. A zio patch monitor from 10/5/22-10/9/22 showed 101 NSVT up to 5 beats, 6.8% PVCs. A CMR 5/2/22 showed no evidence of active cardiac sarcoid, LVEF 58%, RVEF 44%, LGE in septal RV insertion points into LV. Current cardiac medications include: Toprol XL, Hydrochlorothiazide, Spironolactone, Lipitor, and ASA .     EP Visit 11/2/23: He reports feeling well. He denies any new cardiac symptoms. He does not have shortness of breath with his daily activities. Recently saw Dr Nelson, no medication changes made. He denies  palpitations, peripheral edema, shortness of breath, lightheadedness, dizziness, or syncope. Presenting 12 lead ECG shows sinus w bifascicular block Vent Rate 77 bpm,  ms,  ms, QTc 459 ms.     He presents today for follow up. He reports feeling at baseline. He walks multiple blocks without SOB. He has CMR schedule upcoming. He denies chest discomfort, palpitations, abdominal fullness/bloating or peripheral edema, shortness of breath, paroxysmal nocturnal dyspnea, orthopnea, lightheadedness, dizziness, pre-syncope, or syncope.A zio patch monitor from 8/6/24-8/20/24 65 NSVT up to 4 beats, 2 PSVT up to 7 beats, 1.7% PVCs, 6.2% Presenting 12 lead ECG shows SR PVCs Vent Rate 76 bpm,  ms,  ms, QTc 447 ms. Current cardiac medications include: Entresto, Jardiance, Lipitor, Hydrochlorothizide, Sprionolactone, Toprol , and ASA.     I have reviewed and updated the patient's Past Medical History, Social History, Family History and Medication List.     Cardiographics (Personally Reviewed) :   Cardiac MRI 5/2/22:  1. The LV is normal in cavity size and wall thickness. The global systolic function is normal. The LVEF is 58 %. There are no regional wall motion abnormalities.  2. The RV is mildly enlarged in cavity size. The global systolic function is mildly reduced. The RVEF is 44%.   3. Both atria are normal in size.  4. There is no significant valvular disease.   5. Late gadolinium enhancement imaging shows anterior septal and inferior septal LGE at the RV insertion points similar to prior  6.  There is no pericardial effusion or thickening.   7.  There is no intracardiac thrombus.   CONCLUSIONS:  No evidence of active cardiac sarcoidosis. Improved biventricular function with LVEF 58% and RVEF 44% (previously 41% and 34% respectively). Septal systolic paradoxical bowing still suggestive of RV pressure overload, and LGE in the septal RV insertion points into the LV; together suggestive of pulmonary  hypertension.      Cardiac MRI 12/22/21  1. The LV is normal in cavity size with mild concentric LVH. The global systolic function is moderately reduced. The LVEF is 41%. There is abnormal septal motion likely related to RBBB and PVCs.  2. The RV is normal in cavity size. The global systolic function is moderately reduced. The RVEF is 34%.   3. Both atria are severely enlarged.  4. There is no significant valvular disease.   5. Late gadolinium enhancement imaging shows no MI or infiltrative disease. There is enhancement in the septum at the RV insertion sites. This is a non-specific pattern that can be seen in pulmonary hypertension.  6. There is no pericardial effusion or thickening.  7. There is no intracardiac thrombus.  8. The main PA is mildly dilated measuring 3.1 cm.   CONCLUSIONS: No evidence of cardiac sarcoid. Moderate cardiomyopathy, LVEF 41% and RVEF 34%, with no significant fibrosis. Compared to prior CMR on 8/2019 RV function is worse with no other change. Recommend re-assessment for pulmonary hypertension with TTE or RHC.      Stress Cardiac MRI 8/29/2019  1. The LV is normal in cavity size and wall thickness. The global systolic function is normal. The LVEF is 50%. There is systolic flattening of the interventricular septum and global dyssynchrony due to PVCs.  2. The RV is moderately enlarged based on the visual examination. The global systolic function is mildly reduced. The RVEF is 43%.   3. The left atrium is mildly enlarged and the right atrium is severely enlarged.  4. There is at least mild mitral regurgitation and trace aortic regurgitation.  5. Late gadolinium enhancement imaging shows no MI, fibrosis or infiltrative disease.   6. Regadenoson stress perfusion imaging shows no ischemia.  7. There is no pericardial effusion or thickening.  8. There is no intracardiac thrombus.  CONCLUSIONS: Non-ischemic cardiomyopathy, likely due to pulmonary hypertension and dyssynchrony from PVCs.  There is  mildly reduced biventricular function.  There is no evidence of myocardial perfusion defects or myocardial fibrosis.      Cardiac PET SCANS  2/19/2020  Impression:  1. No FDG active cardiac sarcoid.  2. Decreased perfusion in the inferoseptal wall, findings may be related to sequela of previous cardiac sarcoid with microvascular injury.  3. Enlarged left ventricle with borderline low ejection fraction.  4. Decreased myocardial blood flow in the RCA distribution.  5. Findings of a dilated cardiomyopathy a concern for possible ischemia/myocardial injury of the septum, consider CTA or coronary  angiography for further evaluation of this region.  6. Chest and upper abdominal hypermetabolic lymphadenopathy which is indicative of active systemic sarcoid.    Echo 8/6/24  Interpretation Summary  Global and regional left ventricular function is normal with an EF of 55-60%.  Borderline right ventricular enlargement.  Global right ventricular function is borderline reduced.  No significant valvular abnormalities present.  No significant changes noted.     Physical Examination   /71 (BP Location: Right arm, Patient Position: Chair, Cuff Size: Adult Large)   Pulse 68   Wt 130.6 kg (287 lb 14.4 oz)   SpO2 95%   BMI 40.15 kg/m    Wt Readings from Last 3 Encounters:   10/25/24 131.4 kg (289 lb 9.6 oz)   09/13/24 127.4 kg (280 lb 14.4 oz)   09/11/24 127.7 kg (281 lb 8 oz)     General Appearance:   Alert, well-appearing and in no acute distress.   HEENT: Atraumatic, normocephalic. MMM.   Chest/Lungs:   Respirations unlabored.  Lungs are clear to auscultation.   Cardiovascular:   Regular rate and rhythm.  S1/S2. No murmur.    Abdomen:  Soft, nontender, nondistended.   Extremities: No cyanosis or clubbing. No edema.    Musculoskeletal: Moves all extremities.     Skin: Warm, dry, intact.    Neurologic: Mood and affect are appropriate.  Alert and oriented to person, place, time, and situation.          Medications  Allergies    Entresto  twice daily   Aldactone 25 mg daily   Toprol  mg daily  Hydrochlorothiazide 12.5 mg daily   Jardiance 10 mg daily (every other day)  ASA 81 mg daily   Lipitor 40 mg daily     Zyrtec   Prilosec   Singulair    Allergies   Allergen Reactions    Cat Hair Extract Itching     itchy tearful eyes    Adhesive Tape Rash    Iodine Rash         Lab Results (Personally Reviewed)    Chemistry/lipid CBC Cardiac Enzymes/BNP/TSH/INR   Lab Results   Component Value Date    BUN 20.5 10/25/2024     10/25/2024    CO2 26 10/25/2024     Creatinine   Date Value Ref Range Status   10/25/2024 1.13 0.67 - 1.17 mg/dL Final   05/12/2021 0.96 0.66 - 1.25 mg/dL Final       Lab Results   Component Value Date    CHOL 89 05/10/2024    HDL 43 05/10/2024    LDL 32 05/10/2024      Lab Results   Component Value Date    WBC 8.9 10/25/2024    HGB 16.2 10/25/2024    HCT 48.6 10/25/2024    MCV 92 10/25/2024     10/25/2024    Lab Results   Component Value Date    TSH 3.18 05/31/2023    INR 1.07 09/13/2024        The patient states understanding and is agreeable with the plan.   Anuel Figueroa MD [facility]  Cardiology - Electrophysiology    Total time spent on patient visit, reviewing notes, imaging, labs, orders, and completing necessary documentation: 30 minutes.  >50% of visit spent on counseling patient and/or coordination of care.

## 2024-11-01 ENCOUNTER — OFFICE VISIT (OUTPATIENT)
Dept: CARDIOLOGY | Facility: CLINIC | Age: 69
End: 2024-11-01
Payer: MEDICARE

## 2024-11-01 VITALS
OXYGEN SATURATION: 95 % | DIASTOLIC BLOOD PRESSURE: 71 MMHG | WEIGHT: 287.9 LBS | BODY MASS INDEX: 40.15 KG/M2 | HEART RATE: 68 BPM | SYSTOLIC BLOOD PRESSURE: 115 MMHG

## 2024-11-01 DIAGNOSIS — I49.3 PVC'S (PREMATURE VENTRICULAR CONTRACTIONS): ICD-10-CM

## 2024-11-01 PROCEDURE — G0463 HOSPITAL OUTPT CLINIC VISIT: HCPCS | Performed by: INTERNAL MEDICINE

## 2024-11-01 PROCEDURE — 99214 OFFICE O/P EST MOD 30 MIN: CPT | Performed by: INTERNAL MEDICINE

## 2024-11-01 PROCEDURE — 93005 ELECTROCARDIOGRAM TRACING: CPT

## 2024-11-01 ASSESSMENT — PAIN SCALES - GENERAL: PAINLEVEL_OUTOF10: NO PAIN (0)

## 2024-11-01 NOTE — PROGRESS NOTES
[facility]  Advanced HF & Pulmonary Hypertension Clinic  Service Date:  November 1, 2024    Dear Doctors Mario and Elmer:       We had the pleasure of seeing Mr. Derek Contreras follow-up in our heart failure and pulm hypertension clinic.  As you know, he is a very pleasant 68-year-old male with past medical history significant for     1. Pulmonary sarcoidosis  2.  LV systolic dysfunction -nonischemic in etiology likely related to uncontrolled hypertension  3.  Pulmonary hypertension and RV dysfunction secondary to pulmonary sarcoidosis    He returns today for follow-up.  He is doing well.  He denies having any exertional shortness of breath.  He exercises on a regular basis.  I would currently characterize him as a functional class II.  He denies having exertional chest pain or chest pressure.  No PND orthopnea.  No exertional presyncope or syncope.  No lower extremity swelling or abdominal distention.  No interim hospitalization or ER visits.    His main symptom is cough for which is on tapering doses of prednisone. His cough is symptomatically better on prednisone.     PAST MEDICAL HISTORY:  Past Medical History:   Diagnosis Date    Asthma     Claustrophobia     CPAP (continuous positive airway pressure) dependence     Dyslipidemia     [place] (hard of hearing)     Hypercholesteremia     Hypertension     Iron deficiency     Mediastinal adenopathy     Obesity     BEULAH (obstructive sleep apnea)     Prostate cancer (H)     Pulmonary hypertension (H)     Sarcoidosis      CURRENT MEDICATIONS:  Current Outpatient Medications   Medication Sig Dispense Refill    aspirin (ASA) 81 MG tablet Take 81 mg by mouth every morning  90 tablet 3    atorvastatin (LIPITOR) 40 MG tablet Take 1 tablet (40 mg) by mouth every morning 90 tablet 3    benzonatate (TESSALON) 100 MG capsule Take 1 capsule (100 mg) by mouth 3 times daily as needed for cough 60 capsule 3    cetirizine (ZYRTEC) 10 MG tablet Take 10 mg by mouth every  morning  90 tablet 3    empagliflozin (JARDIANCE) 10 MG TABS tablet Take 1 tablet (10 mg) by mouth every other day. 45 tablet 3    fluticasone-vilanterol (BREO ELLIPTA) 200-25 MCG/ACT inhaler INHALE 1 PUFF BY MOUTH ONE TIME DAILY Strength: 200-25 MCG/INH 60 each 11    folic acid (FOLVITE) 1 MG tablet Take 1 tablet (1 mg) by mouth daily. . Do not take on the day you take Methotrexate. 90 tablet 3    guaiFENesin (ROBITUSSIN) 20 mg/mL liquid Take 10 mLs (200 mg) by mouth every 4 hours as needed for cough 200 mL 3    hydroCHLOROthiazide 12.5 MG tablet TAKE 1 TABLET BY MOUTH EVERY MORNING 90 tablet 3    methotrexate 2.5 MG tablet Take 6 tablets (15 mg) by mouth every 7 days. Take 7.5 mg (3 tabs) once a week for 2 weeks, then 10 mg (4 tabs) once a week for 2 weeks, then 12.5 mg (5 tabs) once a week thereafter, Disp-24 tablet, R-3, E-Prescribe 24 tablet 3    metoprolol succinate ER (TOPROL XL) 100 MG 24 hr tablet Take 1.5 tablets (150 mg) by mouth daily 135 tablet 3    mometasone (NASONEX) 50 MCG/ACT nasal spray Spray 2 sprays into both nostrils daily 17 g 3    montelukast (SINGULAIR) 10 MG tablet Take 1 tablet (10 mg) by mouth at bedtime. 90 tablet 0    multivitamin (ONE-DAILY) tablet Take 1 tablet by mouth every morning  90 tablet 3    omeprazole (PRILOSEC) 40 MG DR capsule Take 1 capsule (40 mg) by mouth 2 times daily 180 capsule 3    predniSONE (DELTASONE) 10 MG tablet Take 1 tablet (10 mg) by mouth daily. 60 tablet 0    sacubitril-valsartan (ENTRESTO)  MG per tablet Take 1 tablet by mouth 2 times daily. 180 tablet 3    spironolactone (ALDACTONE) 25 MG tablet Take 1 tablet (25 mg) by mouth daily 90 tablet 3    sulfamethoxazole-trimethoprim (BACTRIM) 400-80 MG tablet Take 1 tablet by mouth daily. 90 tablet 3     No current facility-administered medications for this visit.     Facility-Administered Medications Ordered in Other Visits   Medication Dose Route Frequency Provider Last Rate Last Admin    sodium chloride  bacteriostatic 0.9 % flush 3 mL  3 mL Intravenous Once Dewayne Chavis MD           ROS:   10 point ROS negative except as discussed in above HPI.    EXAM:  /65 (BP Location: Right arm, Patient Position: Chair, Cuff Size: Adult Large)   Pulse 80   Wt 131.4 kg (289 lb 9.6 oz)   SpO2 95%   BMI 40.39 kg/m    General: appears comfortable, alert and articulate  Head: normocephalic, atraumatic  Eyes: anicteric sclera, EOMI  Neck: no adenopathy  Orophyarynx: moist mucosa, no lesions, dentition intact  Heart: regular, normal S1/S2, no murmur, gallop, rub, no jugular venous distention  Lungs: clear to auscultation bilaterally, no rales or wheezing  Abdomen: soft, non-tender, bowel sounds present, no hepatosplenomegaly  Extremities: no clubbing, cyanosis or edema  Neurological: normal speech and affect, no gross motor deficits    Labs:  Recent Results (from the past 6 weeks)   Comprehensive metabolic panel    Collection Time: 10/25/24  7:41 AM   Result Value Ref Range    Sodium 139 135 - 145 mmol/L    Potassium 4.2 3.4 - 5.3 mmol/L    Carbon Dioxide (CO2) 26 22 - 29 mmol/L    Anion Gap 10 7 - 15 mmol/L    Urea Nitrogen 20.5 8.0 - 23.0 mg/dL    Creatinine 1.13 0.67 - 1.17 mg/dL    GFR Estimate 70 >60 mL/min/1.73m2    Calcium 9.6 8.8 - 10.4 mg/dL    Chloride 103 98 - 107 mmol/L    Glucose 92 70 - 99 mg/dL    Alkaline Phosphatase 68 40 - 150 U/L    AST 24 0 - 45 U/L    ALT 28 0 - 70 U/L    Protein Total 7.4 6.4 - 8.3 g/dL    Albumin 4.1 3.5 - 5.2 g/dL    Bilirubin Total 0.8 <=1.2 mg/dL   CBC with platelets    Collection Time: 10/25/24  7:41 AM   Result Value Ref Range    WBC Count 8.9 4.0 - 11.0 10e3/uL    RBC Count 5.30 4.40 - 5.90 10e6/uL    Hemoglobin 16.2 13.3 - 17.7 g/dL    Hematocrit 48.6 40.0 - 53.0 %    MCV 92 78 - 100 fL    MCH 30.6 26.5 - 33.0 pg    MCHC 33.3 31.5 - 36.5 g/dL    RDW 14.8 10.0 - 15.0 %    Platelet Count 174 150 - 450 10e3/uL   N terminal pro BNP outpatient    Collection Time: 10/25/24  7:41  AM   Result Value Ref Range    N Terminal Pro BNP Outpatient 64 0 - 900 pg/mL   EKG 12-lead, tracing only (Same Day)    Collection Time: 11/01/24 10:02 AM   Result Value Ref Range    Systolic Blood Pressure  mmHg    Diastolic Blood Pressure  mmHg    Ventricular Rate 76 BPM    Atrial Rate 76 BPM    NY Interval 172 ms    QRS Duration 148 ms     ms    QTc 447 ms    P Axis 24 degrees    R AXIS -55 degrees    T Axis -9 degrees    Interpretation ECG       Sinus rhythm with occasional Premature ventricular complexes  Right bundle branch block  Left anterior fascicular block  ** Bifascicular block **  Abnormal ECG  When compared with ECG of 02-Nov-2023 15:23,  No significant change was found       Echo (08/2024)  Global and regional left ventricular function is normal with an EF of 55-60%.  Borderline right ventricular enlargement.  Global right ventricular function is borderline reduced.  No significant valvular abnormalities present.  No significant changes noted.    RHC (10/2024)  RA 4/7/4  Rv 30/5  PA 30/12/17  PCWP 5    Lucas CO 9.3  Lucas CI 3.82    TDCO 6.57  TDCI 2.7    Pa Sat 74.2  Hb 16.9   PVR 1.9    Cardiac MRI 05/02/2022  Comparison CMR: 12/22/2021     1. The LV is normal in cavity size and wall thickness. The global systolic function is normal. The LVEF is  58 %. There are no regional wall motion abnormalities.     2. The RV is mildly enlarged in cavity size. The global systolic function is mildly reduced. The RVEF is  44%.      3. Both atria are normal in size.     4. There is no significant valvular disease.      5. Late gadolinium enhancement imaging shows anterior septal and inferior septal LGE at the RV insertion  points similar to prior     6.  There is no pericardial effusion or thickening.      7.  There is no intracardiac thrombus.      CONCLUSIONS:  No evidence of active cardiac sarcoidosis. Improved biventricular function with LVEF 58% and  RVEF 44% (previously 41% and 34% respectively). Septal  systolic paradoxical bowing still suggestive of RV  pressure overload, and LGE in the septal RV insertion points into the LV; together suggestive of pulmonary  hypertension.     Coronary CTA (2022)    1.  Severe calcific atherosclerosis causing mild stenosis and without  any high-grade lesions.  2.  Total Agatston score 442 placing the patient in the 76th  percentile when compared to age and gender matched control group.  3.  Please review Radiology report for incidental noncardiac findings  that will follow separately.    Cardiac PET 2/2020  1. No FDG active cardiac sarcoid.  2. Decreased perfusion in the inferoseptal wall, findings may be  related to sequela of previous cardiac sarcoid with microvascular  injury.  3. Enlarged left ventricle with borderline low ejection fraction.  4. Decreased myocardial blood flow in the RCA distribution.  5. Findings of a dilated cardiomyopathy a concern for possible  ischemia/myocardial injury of the septum, consider CTA or coronary  angiography for further evaluation of this region.  6. Chest and upper abdominal hypermetabolic lymphadenopathy which is  indicative of active systemic sarcoid.    Assessment and Plan:     In summary, Mr. Contreras is a 68-year-old gentleman with pulmonary sarcoidosis, systemic hypertension, mild biventricular systolic dysfunction, and pulmonary hypertension who returns today for follow-up.    1.  Nonischemic cardiomyopathy in the setting of sarcoidosis and uncontrolled systemic blood pressure  2.  Frequent PVCs   3.  Nonobstructive coronary artery disease  4.  Pulmonary sarcoidosis    He is doing well.  He is functional class II.  He is euvolemic.  His endorgan function is normal.  His NT proBNP is within normal limits.  His most recent Zio monitor showed 5.8% PVCs which was comparable and stable to his prior ZIO monitoring. His echocardiogram shows normal left ventricular size and function.  He always had mild right ventricular dilatation and  dysfunction.  His repeat right heart catheterization shows normal biventricular filling pressures with normal cardiac output.  He has no pulmonary hypertension.    Thus, his current symptoms of cough is less likely due to cardiomyopathy.  He also has no evidence of pulm hypertension.    I have recommended him to continue the current 4 drug neurohormonal therapy.  He is not requiring any diuretics.  No indication for pulmonary vasodilator therapy as his last pulmonary pressures were normal.  If he were to have worsening pulmonary vascular disease and RV dysfunction, we will consider inhaled treprostinil therapy which is minimal VQ mismatch effect.    His PVCs are under control on high-dose metoprolol.  He is also following with Dr. Fiugeroa    He is on low-dose aspirin and statin for his nonobstructive coronary disease per primary prevention.    I recommend him to return to see us in 6 months with my colleagues in the core clinic with labs.  I also recommended him to schedule a follow-up visit with Dr. Figueroa. He will call us in the interim of any further worsening symptoms.      Total time today was 40 minutes reviewing notes, imaging, labs, patient visit, orders and documentation     Sincerely,    Torrey Hirsch MD   [facility]  Heart Failure, Transplant, and Mechanical Circulatory Support Cardiology   Cardiovascular Division  [facility]   [phone]

## 2024-11-01 NOTE — PATIENT INSTRUCTIONS
Plan:    Follow-up in 1 year with EP SUSANNE       Your Care Team:  EP Cardiology   Telephone Number     Sherry Velazquez RN [phone]    After business hours: [phone], ask for cardiologist on-call   Non-procedure scheduling:    Marilyn KEE   [phone]   Procedure scheduling:    Pao Gamble   [phone]   Device Clinic (Pacemakers, ICDs, Loop Recorders)    During business hours: [phone]  After business hours:   [phone]- select option 4 and ask for job code [phone].       Cardiovascular Clinic:   [address] [place] [address]. [place] [address]      As always, thank you for trusting us with your health care needs!

## 2024-11-01 NOTE — LETTER
11/1/2024      RE: Derek Contreras  [id number] [facility] [address]       Dear Colleague,    Thank you for the opportunity to participate in the care of your patient, Derek Contreras, at the [facility] HEART [facility] [place] at [facility]. Please see a copy of my visit note below.        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     PVCs - (19% --> 6% burden)  Non-sustained Polymorphic VT - PVC induced  The morphology of the patient's PVC is indicative of a RV septal origin. PVC burden initially 19% on zio in 2021, improved to 6% burden in 2022 on toprol XL. Last PET without signs of active sarcoid in 2020. Negative stress MR in 2019. CMR in 2022 without evidence of cardiac sarcoid and improved RV and LV function on medical therapy. Dr Nelson has ordered repeat CMR upcoming. Given he is asymptomatic with PVCs, No intervention at this stage with no evidence of CS     Follow up with EP SUSANNE 1 year     History of Present Illness/Subjective    Mr. Derek Contreras is a 68 year old male who comes in today for EP follow-up of PVCs.    Mr. Contreras is a 67 yo M who has a PMH pulmonary sarcoidosis, HTN, HLD, Obesity, h/o prostate CA, RV dilation & borderline low LVEF who has been evaluated in the pulmonary hypertension clinic by Dr. Ferrell and found not to have a dx of pulmonary hypertension. 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  His cardiac workup thus far has been as follows: he had a RHC 12/11/19 which showed RA 3, RV 33/5 PA 30/12 (17) and PCWP 5. CO 6.22, CI 2.49. PVR 1.82. Shunt series with Qp/Qs  1.0. He had a cardiac stress MRI 3/11/20 which showed no evidence of LGE. His RV was noted to be moderately enlarged but mildly reduced function with RVEF of 43%. Severely enlarged RA. No ischemia noted on stress imaging. He had a cardiac PET 2/2020 which showed no FDG active cardiac sarcoid. He did have decreased perfusion in the inferoseptal wall which may be related to microvascular disease. He was also noted to have decreased myocardial blood flow in the RCA distribution. Mr. Contreras also had a event monitor which showed frequent NSVT (fastest 19 beats @ 255bpm) and 9x SVT runs. He presents today to the EP clinic for evaluation of his arrhythmia.     EP visit Jan 2022: He denies an symptoms including palpitations, chest discomfort, lightheadedness, dizziness, orthopnea, PND, abd distention, n/v/d, early satiety, unintentional weight loss. He does report chronic KIRBY related to his pulmonary sarcoidosis that has been stable. More recently he reports he has had a dry cough that has slightly worsened over the last few days, he has reached out to his pulmonologist in regards to this.During this visit, we started him on metoprolol XL 50, gradually increase to . We discussed PET with sarcoid team and we wanted to check instead an ischemic work-up.      EP Visit 4/6/22: He presents for follow-up. He denies any cardiac symptoms. He had a CT Angio on 1/31/2022 showing Severe calcific atherosclerosis causing mild stenosis without any high-grade lesions. He had a repeat Zio patch 1/17-1/24 shows 90 runs of NSVT compared to 432 in Oct, with decreasing ectopy.      EP Visit 11/9/22: He presents today for follow up. He reports feeling very well, no syncope or presyncope, no palpitations, good stamina  Having RHC and following up with Dr Nelson soon. A zio patch monitor from 10/5/22-10/9/22 showed 101 NSVT up to 5 beats, 6.8% PVCs. A CMR 5/2/22 showed no evidence of active cardiac sarcoid, LVEF 58%, RVEF 44%, LGE in  septal RV insertion points into LV. Current cardiac medications include: Toprol XL, Hydrochlorothiazide, Spironolactone, Lipitor, and ASA .     EP Visit 11/2/23: He reports feeling well. He denies any new cardiac symptoms. He does not have shortness of breath with his daily activities. Recently saw Dr Nelson, no medication changes made. He denies palpitations, peripheral edema, shortness of breath, lightheadedness, dizziness, or syncope. Presenting 12 lead ECG shows sinus w bifascicular block Vent Rate 77 bpm,  ms,  ms, QTc 459 ms.     He presents today for follow up. He reports feeling at baseline. He walks multiple blocks without SOB. He has CMR schedule upcoming. He denies chest discomfort, palpitations, abdominal fullness/bloating or peripheral edema, shortness of breath, paroxysmal nocturnal dyspnea, orthopnea, lightheadedness, dizziness, pre-syncope, or syncope.A zio patch monitor from 8/6/24-8/20/24 65 NSVT up to 4 beats, 2 PSVT up to 7 beats, 1.7% PVCs, 6.2% Presenting 12 lead ECG shows SR PVCs Vent Rate 76 bpm,  ms,  ms, QTc 447 ms. Current cardiac medications include: Entresto, Jardiance, Lipitor, Hydrochlorothizide, Sprionolactone, Toprol , and ASA.     I have reviewed and updated the patient's Past Medical History, Social History, Family History and Medication List.     Cardiographics (Personally Reviewed) :   Cardiac MRI 5/2/22:  1. The LV is normal in cavity size and wall thickness. The global systolic function is normal. The LVEF is 58 %. There are no regional wall motion abnormalities.  2. The RV is mildly enlarged in cavity size. The global systolic function is mildly reduced. The RVEF is 44%.   3. Both atria are normal in size.  4. There is no significant valvular disease.   5. Late gadolinium enhancement imaging shows anterior septal and inferior septal LGE at the RV insertion points similar to prior  6.  There is no pericardial effusion or thickening.   7.   There is no intracardiac thrombus.   CONCLUSIONS:  No evidence of active cardiac sarcoidosis. Improved biventricular function with LVEF 58% and RVEF 44% (previously 41% and 34% respectively). Septal systolic paradoxical bowing still suggestive of RV pressure overload, and LGE in the septal RV insertion points into the LV; together suggestive of pulmonary hypertension.      Cardiac MRI 12/22/21  1. The LV is normal in cavity size with mild concentric LVH. The global systolic function is moderately reduced. The LVEF is 41%. There is abnormal septal motion likely related to RBBB and PVCs.  2. The RV is normal in cavity size. The global systolic function is moderately reduced. The RVEF is 34%.   3. Both atria are severely enlarged.  4. There is no significant valvular disease.   5. Late gadolinium enhancement imaging shows no MI or infiltrative disease. There is enhancement in the septum at the RV insertion sites. This is a non-specific pattern that can be seen in pulmonary hypertension.  6. There is no pericardial effusion or thickening.  7. There is no intracardiac thrombus.  8. The main PA is mildly dilated measuring 3.1 cm.   CONCLUSIONS: No evidence of cardiac sarcoid. Moderate cardiomyopathy, LVEF 41% and RVEF 34%, with no significant fibrosis. Compared to prior CMR on 8/2019 RV function is worse with no other change. Recommend re-assessment for pulmonary hypertension with TTE or RHC.      Stress Cardiac MRI 8/29/2019  1. The LV is normal in cavity size and wall thickness. The global systolic function is normal. The LVEF is 50%. There is systolic flattening of the interventricular septum and global dyssynchrony due to PVCs.  2. The RV is moderately enlarged based on the visual examination. The global systolic function is mildly reduced. The RVEF is 43%.   3. The left atrium is mildly enlarged and the right atrium is severely enlarged.  4. There is at least mild mitral regurgitation and trace aortic  regurgitation.  5. Late gadolinium enhancement imaging shows no MI, fibrosis or infiltrative disease.   6. Regadenoson stress perfusion imaging shows no ischemia.  7. There is no pericardial effusion or thickening.  8. There is no intracardiac thrombus.  CONCLUSIONS: Non-ischemic cardiomyopathy, likely due to pulmonary hypertension and dyssynchrony from PVCs.  There is mildly reduced biventricular function.  There is no evidence of myocardial perfusion defects or myocardial fibrosis.      Cardiac PET SCANS  2/19/2020  Impression:  1. No FDG active cardiac sarcoid.  2. Decreased perfusion in the inferoseptal wall, findings may be related to sequela of previous cardiac sarcoid with microvascular injury.  3. Enlarged left ventricle with borderline low ejection fraction.  4. Decreased myocardial blood flow in the RCA distribution.  5. Findings of a dilated cardiomyopathy a concern for possible ischemia/myocardial injury of the septum, consider CTA or coronary  angiography for further evaluation of this region.  6. Chest and upper abdominal hypermetabolic lymphadenopathy which is indicative of active systemic sarcoid.    Echo 8/6/24  Interpretation Summary  Global and regional left ventricular function is normal with an EF of 55-60%.  Borderline right ventricular enlargement.  Global right ventricular function is borderline reduced.  No significant valvular abnormalities present.  No significant changes noted.     Physical Examination   /71 (BP Location: Right arm, Patient Position: Chair, Cuff Size: Adult Large)   Pulse 68   Wt 130.6 kg (287 lb 14.4 oz)   SpO2 95%   BMI 40.15 kg/m    Wt Readings from Last 3 Encounters:   10/25/24 131.4 kg (289 lb 9.6 oz)   09/13/24 127.4 kg (280 lb 14.4 oz)   09/11/24 127.7 kg (281 lb 8 oz)     General Appearance:   Alert, well-appearing and in no acute distress.   HEENT: Atraumatic, normocephalic. MMM.   Chest/Lungs:   Respirations unlabored.  Lungs are clear to auscultation.    Cardiovascular:   Regular rate and rhythm.  S1/S2. No murmur.    Abdomen:  Soft, nontender, nondistended.   Extremities: No cyanosis or clubbing. No edema.    Musculoskeletal: Moves all extremities.     Skin: Warm, dry, intact.    Neurologic: Mood and affect are appropriate.  Alert and oriented to person, place, time, and situation.          Medications  Allergies   Entresto  twice daily   Aldactone 25 mg daily   Toprol  mg daily  Hydrochlorothiazide 12.5 mg daily   Jardiance 10 mg daily (every other day)  ASA 81 mg daily   Lipitor 40 mg daily     Zyrtec   Prilosec   Singulair    Allergies   Allergen Reactions     Cat Hair Extract Itching     itchy tearful eyes     Adhesive Tape Rash     Iodine Rash         Lab Results (Personally Reviewed)    Chemistry/lipid CBC Cardiac Enzymes/BNP/TSH/INR   Lab Results   Component Value Date    BUN 20.5 10/25/2024     10/25/2024    CO2 26 10/25/2024     Creatinine   Date Value Ref Range Status   10/25/2024 1.13 0.67 - 1.17 mg/dL Final   05/12/2021 0.96 0.66 - 1.25 mg/dL Final       Lab Results   Component Value Date    CHOL 89 05/10/2024    HDL 43 05/10/2024    LDL 32 05/10/2024      Lab Results   Component Value Date    WBC 8.9 10/25/2024    HGB 16.2 10/25/2024    HCT 48.6 10/25/2024    MCV 92 10/25/2024     10/25/2024    Lab Results   Component Value Date    TSH 3.18 05/31/2023    INR 1.07 09/13/2024        The patient states understanding and is agreeable with the plan.   Anuel Figueroa MD [facility]  Cardiology - Electrophysiology    Total time spent on patient visit, reviewing notes, imaging, labs, orders, and completing necessary documentation: 30 minutes.  >50% of visit spent on counseling patient and/or coordination of care.                       Please do not hesitate to contact me if you have any questions/concerns.     Sincerely,     Anuel Figueroa MD

## 2024-11-01 NOTE — NURSING NOTE
Chief Complaint   Patient presents with    Follow Up     Vitals were taken, medications reconciled, and EKG was performed.    Chyna Palacios EMT  9:52 AM

## 2024-11-04 LAB
ATRIAL RATE - MUSE: 76 BPM
DIASTOLIC BLOOD PRESSURE - MUSE: NORMAL MMHG
INTERPRETATION ECG - MUSE: NORMAL
P AXIS - MUSE: 24 DEGREES
PR INTERVAL - MUSE: 172 MS
QRS DURATION - MUSE: 148 MS
QT - MUSE: 398 MS
QTC - MUSE: 447 MS
R AXIS - MUSE: -55 DEGREES
SYSTOLIC BLOOD PRESSURE - MUSE: NORMAL MMHG
T AXIS - MUSE: -9 DEGREES
VENTRICULAR RATE- MUSE: 76 BPM

## 2024-11-05 ENCOUNTER — HOSPITAL ENCOUNTER (OUTPATIENT)
Dept: MRI IMAGING | Facility: CLINIC | Age: 69
Discharge: HOME | End: 2024-11-05
Attending: INTERNAL MEDICINE | Admitting: INTERNAL MEDICINE
Payer: MEDICARE

## 2024-11-05 DIAGNOSIS — I50.22 CHRONIC SYSTOLIC HEART FAILURE (H): ICD-10-CM

## 2024-11-05 PROCEDURE — G1010 CDSM STANSON: HCPCS | Performed by: STUDENT IN AN ORGANIZED HEALTH CARE EDUCATION/TRAINING PROGRAM

## 2024-11-05 PROCEDURE — G1010 CDSM STANSON: HCPCS

## 2024-11-05 PROCEDURE — A9585 GADOBUTROL INJECTION: HCPCS | Performed by: INTERNAL MEDICINE

## 2024-11-05 PROCEDURE — 75561 CARDIAC MRI FOR MORPH W/DYE: CPT | Mod: 26 | Performed by: STUDENT IN AN ORGANIZED HEALTH CARE EDUCATION/TRAINING PROGRAM

## 2024-11-05 PROCEDURE — 255N000002 HC RX 255 OP 636: Performed by: INTERNAL MEDICINE

## 2024-11-05 RX ORDER — GADOBUTROL 604.72 MG/ML
0.12 INJECTION INTRAVENOUS ONCE
Status: COMPLETED | OUTPATIENT
Start: 2024-11-05 | End: 2024-11-05

## 2024-11-05 RX ADMIN — GADOBUTROL 15 ML: 604.72 INJECTION INTRAVENOUS at 09:29

## 2024-11-06 DIAGNOSIS — I50.22 CHRONIC SYSTOLIC HEART FAILURE (H): ICD-10-CM

## 2024-11-08 RX ORDER — SPIRONOLACTONE 25 MG/1
25 TABLET ORAL DAILY
Qty: 90 TABLET | Refills: 3 | OUTPATIENT
Start: 2024-11-08

## 2024-11-26 ENCOUNTER — MYC MEDICAL ADVICE (OUTPATIENT)
Dept: PULMONOLOGY | Facility: CLINIC | Age: 69
End: 2024-11-26
Payer: MEDICARE

## 2024-11-26 ENCOUNTER — TELEPHONE (OUTPATIENT)
Dept: PULMONOLOGY | Facility: CLINIC | Age: 69
End: 2024-11-26
Payer: MEDICARE

## 2024-11-26 NOTE — TELEPHONE ENCOUNTER
Left Voicemail (1st Attempt) and Sent Mychart (1st Attempt) for the patient to call back and schedule the following:    Appointment type: Return ILD  Provider: Perlman  Return date: 5/22/2025  Specialty phone number: [phone]  Additional appointment(s) needed: full pft  Additonal Notes: na

## 2024-12-04 ENCOUNTER — MYC MEDICAL ADVICE (OUTPATIENT)
Dept: PULMONOLOGY | Facility: CLINIC | Age: 69
End: 2024-12-04
Payer: MEDICARE

## 2024-12-04 DIAGNOSIS — J84.9 ILD (INTERSTITIAL LUNG DISEASE) (H): ICD-10-CM

## 2024-12-04 RX ORDER — FLUTICASONE FUROATE AND VILANTEROL 200; 25 UG/1; UG/1
POWDER RESPIRATORY (INHALATION)
Qty: 60 EACH | Refills: 11 | Status: SHIPPED | OUTPATIENT
Start: 2024-12-04

## 2024-12-09 ENCOUNTER — MYC MEDICAL ADVICE (OUTPATIENT)
Dept: PULMONOLOGY | Facility: CLINIC | Age: 69
End: 2024-12-09
Payer: MEDICARE

## 2024-12-23 ENCOUNTER — MYC MEDICAL ADVICE (OUTPATIENT)
Dept: CARDIOLOGY | Facility: CLINIC | Age: 69
End: 2024-12-23
Payer: MEDICARE

## 2024-12-23 DIAGNOSIS — I49.3 PVC'S (PREMATURE VENTRICULAR CONTRACTIONS): ICD-10-CM

## 2024-12-23 RX ORDER — METOPROLOL SUCCINATE 100 MG/1
150 TABLET, EXTENDED RELEASE ORAL DAILY
Qty: 135 TABLET | Refills: 3 | Status: SHIPPED | OUTPATIENT
Start: 2024-12-23

## 2025-01-02 ENCOUNTER — MYC MEDICAL ADVICE (OUTPATIENT)
Dept: CARDIOLOGY | Facility: CLINIC | Age: 70
End: 2025-01-02
Payer: MEDICARE

## 2025-01-02 DIAGNOSIS — I50.22 CHRONIC SYSTOLIC HEART FAILURE (H): ICD-10-CM

## 2025-01-02 DIAGNOSIS — R06.09 DOE (DYSPNEA ON EXERTION): ICD-10-CM

## 2025-01-02 NOTE — TELEPHONE ENCOUNTER
Chart reviewed. Seen October 2024 by Dr. Hirsch. Continue current regimen of neurohormonal therapy. Jardiance refill approved.   Rani Oswald RN

## 2025-01-13 ENCOUNTER — MYC MEDICAL ADVICE (OUTPATIENT)
Dept: PULMONOLOGY | Facility: CLINIC | Age: 70
End: 2025-01-13
Payer: MEDICARE

## 2025-01-13 DIAGNOSIS — J84.9 ILD (INTERSTITIAL LUNG DISEASE) (H): ICD-10-CM

## 2025-01-13 DIAGNOSIS — J45.909 MODERATE ASTHMA WITHOUT COMPLICATION, UNSPECIFIED WHETHER PERSISTENT: ICD-10-CM

## 2025-01-13 DIAGNOSIS — D86.0 PULMONARY SARCOIDOSIS: Primary | ICD-10-CM

## 2025-01-15 RX ORDER — FLUTICASONE FUROATE AND VILANTEROL TRIFENATATE 200; 25 UG/1; UG/1
1 POWDER RESPIRATORY (INHALATION) DAILY
Qty: 60 EACH | Refills: 3 | Status: SHIPPED | OUTPATIENT
Start: 2025-01-15

## 2025-01-27 ENCOUNTER — MYC MEDICAL ADVICE (OUTPATIENT)
Dept: PULMONOLOGY | Facility: CLINIC | Age: 70
End: 2025-01-27
Payer: MEDICARE

## 2025-01-27 DIAGNOSIS — J45.909 MODERATE ASTHMA WITHOUT COMPLICATION, UNSPECIFIED WHETHER PERSISTENT: ICD-10-CM

## 2025-01-27 DIAGNOSIS — D86.9 SARCOIDOSIS: ICD-10-CM

## 2025-01-27 RX ORDER — MONTELUKAST SODIUM 10 MG/1
10 TABLET ORAL AT BEDTIME
Qty: 90 TABLET | Refills: 3 | Status: SHIPPED | OUTPATIENT
Start: 2025-01-27

## 2025-02-05 ENCOUNTER — PATIENT OUTREACH (OUTPATIENT)
Dept: CARE COORDINATION | Facility: CLINIC | Age: 70
End: 2025-02-05
Payer: MEDICARE

## 2025-02-06 DIAGNOSIS — Z12.11 COLON CANCER SCREENING: ICD-10-CM

## 2025-02-12 DIAGNOSIS — I50.22 CHRONIC SYSTOLIC HEART FAILURE (H): ICD-10-CM

## 2025-02-13 RX ORDER — SPIRONOLACTONE 25 MG/1
25 TABLET ORAL DAILY
Qty: 90 TABLET | Refills: 3 | OUTPATIENT
Start: 2025-02-13

## 2025-02-19 ENCOUNTER — MYC MEDICAL ADVICE (OUTPATIENT)
Dept: INTERNAL MEDICINE | Facility: CLINIC | Age: 70
End: 2025-02-19
Payer: MEDICARE

## 2025-02-19 ENCOUNTER — ORDERS ONLY (AUTO-RELEASED) (OUTPATIENT)
Dept: URGENT CARE | Facility: CLINIC | Age: 70
End: 2025-02-19
Payer: MEDICARE

## 2025-02-19 DIAGNOSIS — Z12.11 COLON CANCER SCREENING: ICD-10-CM

## 2025-04-09 ENCOUNTER — LAB (OUTPATIENT)
Dept: LAB | Facility: CLINIC | Age: 70
End: 2025-04-09
Payer: MEDICARE

## 2025-04-09 DIAGNOSIS — I50.30 HEART FAILURE WITH PRESERVED EJECTION FRACTION, NYHA CLASS I (H): ICD-10-CM

## 2025-04-09 DIAGNOSIS — R06.02 SOB (SHORTNESS OF BREATH): ICD-10-CM

## 2025-04-09 DIAGNOSIS — D47.2 MGUS (MONOCLONAL GAMMOPATHY OF UNKNOWN SIGNIFICANCE): ICD-10-CM

## 2025-04-09 DIAGNOSIS — I50.22 CHRONIC SYSTOLIC HEART FAILURE (H): ICD-10-CM

## 2025-04-09 DIAGNOSIS — R06.09 DYSPNEA ON EXERTION: ICD-10-CM

## 2025-04-09 DIAGNOSIS — R97.20 ELEVATED PROSTATE SPECIFIC ANTIGEN (PSA): ICD-10-CM

## 2025-04-09 LAB
ANION GAP SERPL CALCULATED.3IONS-SCNC: 10 MMOL/L (ref 7–15)
BUN SERPL-MCNC: 23 MG/DL (ref 8–23)
CALCIUM SERPL-MCNC: 9.9 MG/DL (ref 8.8–10.4)
CHLORIDE SERPL-SCNC: 99 MMOL/L (ref 98–107)
CREAT SERPL-MCNC: 1.09 MG/DL (ref 0.67–1.17)
EGFRCR SERPLBLD CKD-EPI 2021: 73 ML/MIN/1.73M2
ERYTHROCYTE [DISTWIDTH] IN BLOOD BY AUTOMATED COUNT: 13.5 % (ref 10–15)
GLUCOSE SERPL-MCNC: 89 MG/DL (ref 70–99)
HCO3 SERPL-SCNC: 28 MMOL/L (ref 22–29)
HCT VFR BLD AUTO: 49.5 % (ref 40–53)
HGB BLD-MCNC: 16.3 G/DL (ref 13.3–17.7)
MCH RBC QN AUTO: 29.2 PG (ref 26.5–33)
MCHC RBC AUTO-ENTMCNC: 32.9 G/DL (ref 31.5–36.5)
MCV RBC AUTO: 89 FL (ref 78–100)
NT-PROBNP SERPL-MCNC: 37 PG/ML (ref 0–900)
PLATELET # BLD AUTO: 174 10E3/UL (ref 150–450)
POTASSIUM SERPL-SCNC: 4.8 MMOL/L (ref 3.4–5.3)
PSA SERPL DL<=0.01 NG/ML-MCNC: <0.01 NG/ML (ref 0–6.5)
RBC # BLD AUTO: 5.58 10E6/UL (ref 4.4–5.9)
SODIUM SERPL-SCNC: 137 MMOL/L (ref 135–145)
WBC # BLD AUTO: 7.9 10E3/UL (ref 4–11)

## 2025-04-09 PROCEDURE — 84153 ASSAY OF PSA TOTAL: CPT

## 2025-04-09 PROCEDURE — 85027 COMPLETE CBC AUTOMATED: CPT

## 2025-04-09 PROCEDURE — 83880 ASSAY OF NATRIURETIC PEPTIDE: CPT

## 2025-04-09 PROCEDURE — 80048 BASIC METABOLIC PNL TOTAL CA: CPT

## 2025-04-09 PROCEDURE — 36415 COLL VENOUS BLD VENIPUNCTURE: CPT

## 2025-04-10 DIAGNOSIS — I50.22 CHRONIC SYSTOLIC HEART FAILURE (H): Primary | ICD-10-CM

## 2025-04-14 ENCOUNTER — OFFICE VISIT (OUTPATIENT)
Dept: CARDIOLOGY | Facility: CLINIC | Age: 70
End: 2025-04-14
Attending: INTERNAL MEDICINE
Payer: MEDICARE

## 2025-04-14 VITALS
DIASTOLIC BLOOD PRESSURE: 66 MMHG | WEIGHT: 295.9 LBS | OXYGEN SATURATION: 92 % | BODY MASS INDEX: 40.13 KG/M2 | HEART RATE: 60 BPM | SYSTOLIC BLOOD PRESSURE: 123 MMHG

## 2025-04-14 DIAGNOSIS — I50.22 CHRONIC SYSTOLIC HEART FAILURE (H): ICD-10-CM

## 2025-04-14 DIAGNOSIS — R06.02 SOB (SHORTNESS OF BREATH): ICD-10-CM

## 2025-04-14 PROCEDURE — G0463 HOSPITAL OUTPT CLINIC VISIT: HCPCS | Performed by: INTERNAL MEDICINE

## 2025-04-14 ASSESSMENT — PAIN SCALES - GENERAL: PAINLEVEL_OUTOF10: NO PAIN (0)

## 2025-04-14 NOTE — PROGRESS NOTES
[facility]  Advanced HF & Pulmonary Hypertension Clinic  Service Date:  April 14, 2025      Dear Doctors Mario and Elmer:       We had the pleasure of seeing Mr. Derek Contreras follow-up in our heart failure and pulm hypertension clinic.  As you know, he is a very pleasant 69-year-old male with past medical history significant for     1.  Pulmonary sarcoidosis  2.  LV systolic dysfunction -nonischemic in etiology likely related to uncontrolled hypertension  3.  Pulmonary hypertension and RV dysfunction secondary to pulmonary sarcoidosis    He returns today for follow-up.  He is doing well.  He denies having any exertional shortness of breath.  He exercises on a regular basis.  I would currently characterize him as a functional class II.  He denies having exertional chest pain or chest pressure.  No PND orthopnea.  No exertional presyncope or syncope.  No lower extremity swelling or abdominal distention.  No interim hospitalization or ER visits.  His cough is much better on albuterol inhaler and Mucinex.    PAST MEDICAL HISTORY:  Past Medical History:   Diagnosis Date    Asthma     Claustrophobia     CPAP (continuous positive airway pressure) dependence     Dyslipidemia     [place] (hard of hearing)     Hypercholesteremia     Hypertension     Iron deficiency     Mediastinal adenopathy     Obesity     BEULAH (obstructive sleep apnea)     Prostate cancer (H)     Pulmonary hypertension (H)     Sarcoidosis      CURRENT MEDICATIONS:  Current Outpatient Medications   Medication Sig Dispense Refill    albuterol (PROVENTIL) (2.5 MG/3ML) 0.083% neb solution Take 1 vial (2.5 mg) by nebulization 3 times daily as needed for shortness of breath, wheezing or cough. 180 mL 3    aspirin (ASA) 81 MG tablet Take 81 mg by mouth every morning  90 tablet 3    atorvastatin (LIPITOR) 40 MG tablet Take 1 tablet (40 mg) by mouth every morning 90 tablet 3    azithromycin (ZITHROMAX) 250 MG tablet Take 2 tablets (500 mg) the first  day, then take 1 tablet (250 mg) by mouth daily for a total of 5 days. 6 tablet 0    BREO ELLIPTA 200-25 MCG/ACT inhaler Inhale 1 puff into the lungs daily. 60 each 3    cetirizine (ZYRTEC) 10 MG tablet Take 10 mg by mouth every morning  90 tablet 3    empagliflozin (JARDIANCE) 10 MG TABS tablet Take 1 tablet (10 mg) by mouth every other day. 45 tablet 3    hydroCHLOROthiazide 12.5 MG tablet TAKE 1 TABLET BY MOUTH EVERY MORNING 90 tablet 3    metoprolol succinate ER (TOPROL XL) 100 MG 24 hr tablet Take 1.5 tablets (150 mg) by mouth daily. 135 tablet 3    mometasone (NASONEX) 50 MCG/ACT nasal spray Spray 2 sprays into both nostrils daily 17 g 3    montelukast (SINGULAIR) 10 MG tablet Take 1 tablet (10 mg) by mouth at bedtime. 90 tablet 3    multivitamin (ONE-DAILY) tablet Take 1 tablet by mouth every morning  90 tablet 3    omeprazole (PRILOSEC) 40 MG DR capsule Take 1 capsule (40 mg) by mouth 2 times daily 180 capsule 3    sacubitril-valsartan (ENTRESTO)  MG per tablet Take 1 tablet by mouth 2 times daily. 180 tablet 3    spironolactone (ALDACTONE) 25 MG tablet Take 1 tablet (25 mg) by mouth daily. 90 tablet 3     No current facility-administered medications for this visit.     Facility-Administered Medications Ordered in Other Visits   Medication Dose Route Frequency Provider Last Rate Last Admin    sodium chloride bacteriostatic 0.9 % flush 3 mL  3 mL Intravenous Once Dewayne Chavis MD           ROS:   10 point ROS negative except as discussed in above HPI.    EXAM:  /66 (BP Location: Right arm, Patient Position: Chair, Cuff Size: Adult Large)   Pulse 60   Wt 134.2 kg (295 lb 14.4 oz)   SpO2 92%   BMI 40.13 kg/m    General: appears comfortable, alert and articulate  Head: normocephalic, atraumatic  Eyes: anicteric sclera, EOMI  Neck: no adenopathy  Orophyarynx: moist mucosa, no lesions, dentition intact  Heart: regular, normal S1/S2, no murmur, gallop, rub, no jugular venous distention  Lungs:  clear to auscultation bilaterally, no rales or wheezing  Abdomen: soft, non-tender, bowel sounds present, no hepatosplenomegaly  Extremities: no clubbing, cyanosis or edema  Neurological: normal speech and affect, no gross motor deficits    Labs:  Recent Results (from the past 6 weeks)   Basic metabolic panel    Collection Time: 04/09/25 10:41 AM   Result Value Ref Range    Sodium 137 135 - 145 mmol/L    Potassium 4.8 3.4 - 5.3 mmol/L    Chloride 99 98 - 107 mmol/L    Carbon Dioxide (CO2) 28 22 - 29 mmol/L    Anion Gap 10 7 - 15 mmol/L    Urea Nitrogen 23.0 8.0 - 23.0 mg/dL    Creatinine 1.09 0.67 - 1.17 mg/dL    GFR Estimate 73 >60 mL/min/1.73m2    Calcium 9.9 8.8 - 10.4 mg/dL    Glucose 89 70 - 99 mg/dL   CBC with platelets    Collection Time: 04/09/25 10:41 AM   Result Value Ref Range    WBC Count 7.9 4.0 - 11.0 10e3/uL    RBC Count 5.58 4.40 - 5.90 10e6/uL    Hemoglobin 16.3 13.3 - 17.7 g/dL    Hematocrit 49.5 40.0 - 53.0 %    MCV 89 78 - 100 fL    MCH 29.2 26.5 - 33.0 pg    MCHC 32.9 31.5 - 36.5 g/dL    RDW 13.5 10.0 - 15.0 %    Platelet Count 174 150 - 450 10e3/uL   N terminal pro BNP outpatient    Collection Time: 04/09/25 10:41 AM   Result Value Ref Range    N Terminal Pro BNP Outpatient 37 0 - 900 pg/mL   PSA, tumor marker    Collection Time: 04/09/25 10:41 AM   Result Value Ref Range    PSA Tumor Marker <0.01 0.00 - 6.50 ng/mL     Echo (08/2024)  Global and regional left ventricular function is normal with an EF of 55-60%.  Borderline right ventricular enlargement.  Global right ventricular function is borderline reduced.  No significant valvular abnormalities present.  No significant changes noted.    RHC (10/2024)  RA 4/7/4  Rv 30/5  PA 30/12/17  PCWP 5    Lucas CO 9.3  Lucas CI 3.82    TDCO 6.57  TDCI 2.7    Pa Sat 74.2  Hb 16.9   PVR 1.9    Cardiac MRI 05/02/2022  Comparison CMR: 12/22/2021     1. The LV is normal in cavity size and wall thickness. The global systolic function is normal. The LVEF is  58  %. There are no regional wall motion abnormalities.     2. The RV is mildly enlarged in cavity size. The global systolic function is mildly reduced. The RVEF is  44%.      3. Both atria are normal in size.     4. There is no significant valvular disease.      5. Late gadolinium enhancement imaging shows anterior septal and inferior septal LGE at the RV insertion  points similar to prior     6.  There is no pericardial effusion or thickening.      7.  There is no intracardiac thrombus.      CONCLUSIONS:  No evidence of active cardiac sarcoidosis. Improved biventricular function with LVEF 58% and  RVEF 44% (previously 41% and 34% respectively). Septal systolic paradoxical bowing still suggestive of RV  pressure overload, and LGE in the septal RV insertion points into the LV; together suggestive of pulmonary  hypertension.     Coronary CTA (2022)    1.  Severe calcific atherosclerosis causing mild stenosis and without  any high-grade lesions.  2.  Total Agatston score 442 placing the patient in the 76th  percentile when compared to age and gender matched control group.  3.  Please review Radiology report for incidental noncardiac findings  that will follow separately.    Cardiac PET 2/2020  1. No FDG active cardiac sarcoid.  2. Decreased perfusion in the inferoseptal wall, findings may be  related to sequela of previous cardiac sarcoid with microvascular  injury.  3. Enlarged left ventricle with borderline low ejection fraction.  4. Decreased myocardial blood flow in the RCA distribution.  5. Findings of a dilated cardiomyopathy a concern for possible  ischemia/myocardial injury of the septum, consider CTA or coronary  angiography for further evaluation of this region.  6. Chest and upper abdominal hypermetabolic lymphadenopathy which is  indicative of active systemic sarcoid.    Assessment and Plan:     In summary, Mr. Contreras is a 69-year-old gentleman with pulmonary sarcoidosis, systemic hypertension, mild  biventricular systolic dysfunction, and pulmonary hypertension who returns today for follow-up.    1.  Nonischemic cardiomyopathy in the setting of sarcoidosis and uncontrolled systemic blood pressure  2.  Frequent PVCs   3.  Nonobstructive coronary artery disease  4.  Pulmonary sarcoidosis    He is doing well.  He is functional class II.  He is euvolemic.  His endorgan function is normal.  His NT proBNP is within normal limits.  His most recent Zio monitor showed 5.8% PVCs which was comparable and stable to his prior ZIO monitoring. His last echocardiogram in the Fall of 2024 showed normal left ventricular size and function.  He always had mild right ventricular dilatation and dysfunction.  His repeat right heart catheterization in 2024 Fall showed normal biventricular filling pressures with normal cardiac output.  He has no pulmonary hypertension.    I have recommended him to continue the current 4 drug neurohormonal therapy.  He is not requiring any diuretics.  No indication for pulmonary vasodilator therapy as his last pulmonary pressures were normal.  If he were to have worsening pulmonary vascular disease and RV dysfunction, we will consider inhaled treprostinil therapy which is minimal VQ mismatch effect.    His PVCs are under control on high-dose metoprolol.  He is also following with Dr. Figueroa    He is on low-dose aspirin and statin for his nonobstructive coronary disease per primary prevention.    I recommend him to return to see us in 6 months with echocardiogram.  He sees Dr. Figueroa once a year. He will call us in the interim of any further worsening symptoms.      Total time today was 41 minutes reviewing notes, imaging, labs, patient visit, orders and documentation     Sincerely,    Torrey Hirsch MD   [facility]  Heart Failure, Transplant, and Mechanical Circulatory Support Cardiology   Cardiovascular Division  [facility]    [phone]

## 2025-04-14 NOTE — NURSING NOTE
Chief Complaint   Patient presents with    Follow Up     RTN HF: 69 year old male presents with history of pulmonary sarcoid and heart failure, ef 43% for follow up with labs prior         Vitals were taken, medications reconciled     Ang Coughlin, Clinic Assistant     8:44 AM

## 2025-04-14 NOTE — NURSING NOTE
Diet: Patient instructed regarding a heart failure healthy diet, including discussion of reduced fat and 2000 mg daily sodium restriction, daily weights, medication purpose and compliance, fluid restrictions and resources for patient and family to access for assistance with heart failure management.       Labs: Patient was given results of the laboratory testing obtained today and patient was instructed about when to return for the next laboratory testing.     Med Reconcile: Reviewed and verified all current medications with the patient. The updated medication list was printed and given to the patient. No changes    Return Appointment: Patient given instructions regarding scheduling next clinic visit. RTC in 6months with labs prior and echo prior    Patient stated he understood all health information given and agreed to call with further questions or concerns.       Rafaela Barnett RN

## 2025-04-14 NOTE — PATIENT INSTRUCTIONS
"You were seen today in the Cardiovascular Clinic at the [facility].       Cardiology Providers you saw during your visit: Dr. Hirsch      Medication Changes:   1. No changes      Follow up Appointment Information:  1. Follow up in 6 months with labs and echo prior          [address] [facility] on [address]   [place] [id number]        Thank you for allowing us to be a part of your care here at the [facility]      If you have questions or concerns please contact us at:      Rafaela Barnett RN BSN   Cardiology Care Coordinator  [facility]   Questions and [phone]   press #1 to \"send a message to your care team\"     "

## 2025-04-14 NOTE — LETTER
4/14/2025      RE: Derek Contreras  [address] [address] [facility] [id number]       Dear Colleague,    Thank you for the opportunity to participate in the care of your patient, Derek Contreras, at the [facility] [place] [facility]. Please see a copy of my visit note below.    [facility]  Advanced HF & Pulmonary Hypertension Clinic  Service Date:  April 14, 2025      Dear Doctors Mario and Elmer:       We had the pleasure of seeing Mr. Derek Contreras follow-up in our heart failure and pulm hypertension clinic.  As you know, he is a very pleasant 69-year-old male with past medical history significant for     1.  Pulmonary sarcoidosis  2.  LV systolic dysfunction -nonischemic in etiology likely related to uncontrolled hypertension  3.  Pulmonary hypertension and RV dysfunction secondary to pulmonary sarcoidosis    He returns today for follow-up.  He is doing well.  He denies having any exertional shortness of breath.  He exercises on a regular basis.  I would currently characterize him as a functional class II.  He denies having exertional chest pain or chest pressure.  No PND orthopnea.  No exertional presyncope or syncope.  No lower extremity swelling or abdominal distention.  No interim hospitalization or ER visits.  His cough is much better on albuterol inhaler and Mucinex.    PAST MEDICAL HISTORY:  Past Medical History:   Diagnosis Date     Asthma      Claustrophobia      CPAP (continuous positive airway pressure) dependence      Dyslipidemia      [place] (hard of hearing)      Hypercholesteremia      Hypertension      Iron deficiency      Mediastinal adenopathy      Obesity      BEULAH (obstructive sleep apnea)      Prostate cancer (H)      Pulmonary hypertension (H)      Sarcoidosis      CURRENT MEDICATIONS:  Current Outpatient Medications   Medication Sig Dispense Refill     albuterol (PROVENTIL) (2.5 MG/3ML) 0.083% neb  solution Take 1 vial (2.5 mg) by nebulization 3 times daily as needed for shortness of breath, wheezing or cough. 180 mL 3     aspirin (ASA) 81 MG tablet Take 81 mg by mouth every morning  90 tablet 3     atorvastatin (LIPITOR) 40 MG tablet Take 1 tablet (40 mg) by mouth every morning 90 tablet 3     azithromycin (ZITHROMAX) 250 MG tablet Take 2 tablets (500 mg) the first day, then take 1 tablet (250 mg) by mouth daily for a total of 5 days. 6 tablet 0     BREO ELLIPTA 200-25 MCG/ACT inhaler Inhale 1 puff into the lungs daily. 60 each 3     cetirizine (ZYRTEC) 10 MG tablet Take 10 mg by mouth every morning  90 tablet 3     empagliflozin (JARDIANCE) 10 MG TABS tablet Take 1 tablet (10 mg) by mouth every other day. 45 tablet 3     hydroCHLOROthiazide 12.5 MG tablet TAKE 1 TABLET BY MOUTH EVERY MORNING 90 tablet 3     metoprolol succinate ER (TOPROL XL) 100 MG 24 hr tablet Take 1.5 tablets (150 mg) by mouth daily. 135 tablet 3     mometasone (NASONEX) 50 MCG/ACT nasal spray Spray 2 sprays into both nostrils daily 17 g 3     montelukast (SINGULAIR) 10 MG tablet Take 1 tablet (10 mg) by mouth at bedtime. 90 tablet 3     multivitamin (ONE-DAILY) tablet Take 1 tablet by mouth every morning  90 tablet 3     omeprazole (PRILOSEC) 40 MG DR capsule Take 1 capsule (40 mg) by mouth 2 times daily 180 capsule 3     sacubitril-valsartan (ENTRESTO)  MG per tablet Take 1 tablet by mouth 2 times daily. 180 tablet 3     spironolactone (ALDACTONE) 25 MG tablet Take 1 tablet (25 mg) by mouth daily. 90 tablet 3     No current facility-administered medications for this visit.     Facility-Administered Medications Ordered in Other Visits   Medication Dose Route Frequency Provider Last Rate Last Admin     sodium chloride bacteriostatic 0.9 % flush 3 mL  3 mL Intravenous Once Dewayne Chavis MD           ROS:   10 point ROS negative except as discussed in above HPI.    EXAM:  /66 (BP Location: Right arm, Patient Position: Chair,  Cuff Size: Adult Large)   Pulse 60   Wt 134.2 kg (295 lb 14.4 oz)   SpO2 92%   BMI 40.13 kg/m    General: appears comfortable, alert and articulate  Head: normocephalic, atraumatic  Eyes: anicteric sclera, EOMI  Neck: no adenopathy  Orophyarynx: moist mucosa, no lesions, dentition intact  Heart: regular, normal S1/S2, no murmur, gallop, rub, no jugular venous distention  Lungs: clear to auscultation bilaterally, no rales or wheezing  Abdomen: soft, non-tender, bowel sounds present, no hepatosplenomegaly  Extremities: no clubbing, cyanosis or edema  Neurological: normal speech and affect, no gross motor deficits    Labs:  Recent Results (from the past 6 weeks)   Basic metabolic panel    Collection Time: 04/09/25 10:41 AM   Result Value Ref Range    Sodium 137 135 - 145 mmol/L    Potassium 4.8 3.4 - 5.3 mmol/L    Chloride 99 98 - 107 mmol/L    Carbon Dioxide (CO2) 28 22 - 29 mmol/L    Anion Gap 10 7 - 15 mmol/L    Urea Nitrogen 23.0 8.0 - 23.0 mg/dL    Creatinine 1.09 0.67 - 1.17 mg/dL    GFR Estimate 73 >60 mL/min/1.73m2    Calcium 9.9 8.8 - 10.4 mg/dL    Glucose 89 70 - 99 mg/dL   CBC with platelets    Collection Time: 04/09/25 10:41 AM   Result Value Ref Range    WBC Count 7.9 4.0 - 11.0 10e3/uL    RBC Count 5.58 4.40 - 5.90 10e6/uL    Hemoglobin 16.3 13.3 - 17.7 g/dL    Hematocrit 49.5 40.0 - 53.0 %    MCV 89 78 - 100 fL    MCH 29.2 26.5 - 33.0 pg    MCHC 32.9 31.5 - 36.5 g/dL    RDW 13.5 10.0 - 15.0 %    Platelet Count 174 150 - 450 10e3/uL   N terminal pro BNP outpatient    Collection Time: 04/09/25 10:41 AM   Result Value Ref Range    N Terminal Pro BNP Outpatient 37 0 - 900 pg/mL   PSA, tumor marker    Collection Time: 04/09/25 10:41 AM   Result Value Ref Range    PSA Tumor Marker <0.01 0.00 - 6.50 ng/mL     Echo (08/2024)  Global and regional left ventricular function is normal with an EF of 55-60%.  Borderline right ventricular enlargement.  Global right ventricular function is borderline reduced.  No  significant valvular abnormalities present.  No significant changes noted.    RHC (10/2024)  RA 4/7/4  Rv 30/5  PA 30/12/17  PCWP 5    Lucas CO 9.3  Lucas CI 3.82    TDCO 6.57  TDCI 2.7    Pa Sat 74.2  Hb 16.9   PVR 1.9    Cardiac MRI 05/02/2022  Comparison CMR: 12/22/2021     1. The LV is normal in cavity size and wall thickness. The global systolic function is normal. The LVEF is  58 %. There are no regional wall motion abnormalities.     2. The RV is mildly enlarged in cavity size. The global systolic function is mildly reduced. The RVEF is  44%.      3. Both atria are normal in size.     4. There is no significant valvular disease.      5. Late gadolinium enhancement imaging shows anterior septal and inferior septal LGE at the RV insertion  points similar to prior     6.  There is no pericardial effusion or thickening.      7.  There is no intracardiac thrombus.      CONCLUSIONS:  No evidence of active cardiac sarcoidosis. Improved biventricular function with LVEF 58% and  RVEF 44% (previously 41% and 34% respectively). Septal systolic paradoxical bowing still suggestive of RV  pressure overload, and LGE in the septal RV insertion points into the LV; together suggestive of pulmonary  hypertension.     Coronary CTA (2022)    1.  Severe calcific atherosclerosis causing mild stenosis and without  any high-grade lesions.  2.  Total Agatston score 442 placing the patient in the 76th  percentile when compared to age and gender matched control group.  3.  Please review Radiology report for incidental noncardiac findings  that will follow separately.    Cardiac PET 2/2020  1. No FDG active cardiac sarcoid.  2. Decreased perfusion in the inferoseptal wall, findings may be  related to sequela of previous cardiac sarcoid with microvascular  injury.  3. Enlarged left ventricle with borderline low ejection fraction.  4. Decreased myocardial blood flow in the RCA distribution.  5. Findings of a dilated cardiomyopathy a concern  for possible  ischemia/myocardial injury of the septum, consider CTA or coronary  angiography for further evaluation of this region.  6. Chest and upper abdominal hypermetabolic lymphadenopathy which is  indicative of active systemic sarcoid.    Assessment and Plan:     In summary, Mr. Contreras is a 69-year-old gentleman with pulmonary sarcoidosis, systemic hypertension, mild biventricular systolic dysfunction, and pulmonary hypertension who returns today for follow-up.    1.  Nonischemic cardiomyopathy in the setting of sarcoidosis and uncontrolled systemic blood pressure  2.  Frequent PVCs   3.  Nonobstructive coronary artery disease  4.  Pulmonary sarcoidosis    He is doing well.  He is functional class II.  He is euvolemic.  His endorgan function is normal.  His NT proBNP is within normal limits.  His most recent Zio monitor showed 5.8% PVCs which was comparable and stable to his prior ZIO monitoring. His last echocardiogram in the Fall of 2024 showed normal left ventricular size and function.  He always had mild right ventricular dilatation and dysfunction.  His repeat right heart catheterization in 2024 Fall showed normal biventricular filling pressures with normal cardiac output.  He has no pulmonary hypertension.    I have recommended him to continue the current 4 drug neurohormonal therapy.  He is not requiring any diuretics.  No indication for pulmonary vasodilator therapy as his last pulmonary pressures were normal.  If he were to have worsening pulmonary vascular disease and RV dysfunction, we will consider inhaled treprostinil therapy which is minimal VQ mismatch effect.    His PVCs are under control on high-dose metoprolol.  He is also following with Dr. Figueroa    He is on low-dose aspirin and statin for his nonobstructive coronary disease per primary prevention.    I recommend him to return to see us in 6 months with echocardiogram.  He sees Dr. Figueroa once a year. He will call us in the interim of any  further worsening symptoms.      Total time today was 41 minutes reviewing notes, imaging, labs, patient visit, orders and documentation     Sincerely,    Torrey Hirsch MD   [facility]  Heart Failure, Transplant, and Mechanical Circulatory Support Cardiology   Cardiovascular Division  [facility]   [phone]          Please do not hesitate to contact me if you have any questions/concerns.     Sincerely,     Torrey Hirsch MD

## 2025-04-15 ENCOUNTER — MYC MEDICAL ADVICE (OUTPATIENT)
Dept: INTERNAL MEDICINE | Facility: CLINIC | Age: 70
End: 2025-04-15
Payer: MEDICARE

## 2025-05-17 DIAGNOSIS — I10 ESSENTIAL HYPERTENSION: ICD-10-CM

## 2025-05-20 RX ORDER — HYDROCHLOROTHIAZIDE 12.5 MG/1
12.5 TABLET ORAL EVERY MORNING
Qty: 90 TABLET | Refills: 3 | Status: SHIPPED | OUTPATIENT
Start: 2025-05-20

## 2025-05-20 NOTE — TELEPHONE ENCOUNTER
Last Written Prescription:     hydroCHLOROthiazide 12.5 MG tablet 90 tablet 3 5/13/2024 -- No   Sig: TAKE 1 TABLET BY MOUTH EVERY MORNING     ----------------------  Last Visit Date: 4/14/25  Future Visit Date: 10/13/2025  ----------------------      Refill decision: Medication refilled per  Medication Refill in Ambulatory Care  policy.       Request from pharmacy:  Requested Prescriptions   Pending Prescriptions Disp Refills    hydroCHLOROthiazide 12.5 MG tablet [Pharmacy Med Name: HYDROCHLOROTHIAZIDE 12.5MG TABLETS] 90 tablet 3     Sig: TAKE 1 TABLET BY MOUTH EVERY MORNING       Diuretics (Including Combos) Protocol Passed - 5/20/2025  2:07 PM        Passed - Most recent blood pressure under 140/90 in past 12 months     BP Readings from Last 3 Encounters:   04/14/25 123/66   11/22/24 99/64   11/01/24 115/71       No data recorded            Passed - Potassium level on file in past 12 months        Passed - Medication is active on med list and the sig matches. RN to manually verify dose and sig if red X/fail.     If the protocol passes (green check), you do not need to verify med dose and sig.    A prescription matches if they are the same clinical intention.    For Example: once daily and every morning are the same.    The protocol can not identify upper and lower case letters as matching and will fail.     For Example: Take 1 tablet (50 mg) by mouth daily     TAKE 1 TABLET (50 MG) BY MOUTH DAILY    For all fails (red x), verify dose and sig.    If the refill does match what is on file, the RN can still proceed to approve the refill request.       If they do not match, route to the appropriate provider.             Passed - Has GFR on file in past 12 months and most recent value is normal        Passed - Recent (12 month) or future (90 days) visit with authorizing provider's specialty (provided they have been seen in the past 15 months)     The patient must have completed an in-person or virtual visit within the  past 12 months or has a future visit scheduled within the next 90 days with the authorizing provider s specialty.  Urgent care and e-visits do not qualify as an office visit for this protocol.          Passed - Patient is age 18 or older

## 2025-05-21 ENCOUNTER — PATIENT OUTREACH (OUTPATIENT)
Dept: CARE COORDINATION | Facility: CLINIC | Age: 70
End: 2025-05-21
Payer: MEDICARE

## 2025-06-24 ENCOUNTER — MYC REFILL (OUTPATIENT)
Dept: CARDIOLOGY | Facility: CLINIC | Age: 70
End: 2025-06-24
Payer: MEDICARE

## 2025-06-24 DIAGNOSIS — E78.00 HYPERCHOLESTEREMIA: ICD-10-CM

## 2025-06-26 ENCOUNTER — OFFICE VISIT (OUTPATIENT)
Dept: PULMONOLOGY | Facility: CLINIC | Age: 70
End: 2025-06-26
Attending: INTERNAL MEDICINE
Payer: MEDICARE

## 2025-06-26 VITALS — OXYGEN SATURATION: 92 % | DIASTOLIC BLOOD PRESSURE: 69 MMHG | HEART RATE: 73 BPM | SYSTOLIC BLOOD PRESSURE: 114 MMHG

## 2025-06-26 DIAGNOSIS — D86.9 SARCOIDOSIS: ICD-10-CM

## 2025-06-26 DIAGNOSIS — J84.9 ILD (INTERSTITIAL LUNG DISEASE) (H): Primary | ICD-10-CM

## 2025-06-26 LAB
6 MIN WALK (FT): 815 FT
6 MIN WALK (M): 248 M
DLCOUNC-%PRED-PRE: 77 %
DLCOUNC-PRE: 20.04 ML/MIN/MMHG
DLCOUNC-PRED: 25.8 ML/MIN/MMHG
ERV-%PRED-PRE: 11 %
ERV-PRE: 0.17 L
ERV-PRED: 1.45 L
EXPTIME-PRE: 6.08 SEC
FEF2575-%PRED-PRE: 40 %
FEF2575-PRE: 0.93 L/SEC
FEF2575-PRED: 2.3 L/SEC
FEFMAX-%PRED-PRE: 57 %
FEFMAX-PRE: 4.8 L/SEC
FEFMAX-PRED: 8.3 L/SEC
FEV1-%PRED-PRE: 48 %
FEV1-PRE: 1.45 L
FEV1FEV6-PRE: 71 %
FEV1FEV6-PRED: 78 %
FEV1FVC-PRE: 71 %
FEV1FVC-PRED: 77 %
FEV1SVC-PRE: 71 %
FEV1SVC-PRED: 71 %
FIFMAX-PRE: 2.33 L/SEC
FIO2-PRE: 28 %
FRCPLETH-%PRED-PRE: 65 %
FRCPLETH-PRE: 2.45 L
FRCPLETH-PRED: 3.74 L
FVC-%PRED-PRE: 52 %
FVC-PRE: 2.05 L
FVC-PRED: 3.89 L
IC-%PRED-PRE: 64 %
IC-PRE: 1.88 L
IC-PRED: 2.91 L
RVPLETH-%PRED-PRE: 85 %
RVPLETH-PRE: 2.28 L
RVPLETH-PRED: 2.65 L
TLCPLETH-%PRED-PRE: 60 %
TLCPLETH-PRE: 4.33 L
TLCPLETH-PRED: 7.13 L
VA-%PRED-PRE: 65 %
VA-PRE: 4.2 L
VC-%PRED-PRE: 49 %
VC-PRE: 2.05 L
VC-PRED: 4.19 L

## 2025-06-26 PROCEDURE — G0463 HOSPITAL OUTPT CLINIC VISIT: HCPCS | Performed by: INTERNAL MEDICINE

## 2025-06-26 RX ORDER — ATORVASTATIN CALCIUM 40 MG/1
40 TABLET, FILM COATED ORAL EVERY MORNING
Qty: 90 TABLET | Refills: 3 | Status: SHIPPED | OUTPATIENT
Start: 2025-06-26

## 2025-06-26 ASSESSMENT — PAIN SCALES - GENERAL: PAINLEVEL_OUTOF10: NO PAIN (0)

## 2025-06-26 NOTE — PROGRESS NOTES
Reason for Visit  Derek Contreras is a 69 year old year old male who is being seen for Return interstitial lung    ILD HPI    Derek Contreras is a 69-year-old male with sarcoidosis.  The patient has a history of biopsy-proven sarcoidosis with mainly thoracic lymph node involvement without significant parenchymal involvement.  He was previously followed by Dr. Martin.  He was diagnosed via bronchoscopy in March 2019 with samples from station 7 showing nonnecrotizing granulomas station 4R and 12L were non diagnostic.  He also has a history of nonischemic cardiomyopathy that is not felt to be secondary to sarcoidosis.  He had cardiac MRIs done in August 2019 and December 2021 which did not show any LGE.  He had a right heart catheterization in September 2024 which did not show any significant pulmonary hypertension.  PET scan in July 2024 showed extensive hypermetabolic mediastinal and hilar lymphadenopathy.  He was not initially treated systemically for his diagnosis of sarcoidosis as he did not have significant parenchymal involvement and no evidence of fibrosis..  My first visit with the patient which was a virtual visit in August 2024 he was complaining of significant cough and production of sputum so we decided to give him a trial of anti-inflammatory therapy with prednisone and methotrexate however his symptoms did not improve with this so this was discontinued in November 2024.  At that time we decided to focus mostly on airway clearance therapy.  He did have some restriction on his pulmonary function test however this was felt to be due to his body habitus and possibly elevated right hemidiaphragm.    INTERVAL HISTORY      - Experiencing coughing fits approximately 5 times a day, producing clear phlegm.  - No coughing fits at night, but occasionally wakes up coughing.  - Using albuterol nebulizer twice a day, and flutter valve once daily..  - Taking Mucinex DM, which seems to help manage  symptoms.  - Occasionally uses a portable oxygen machine during walks.  - Experiences shortness of breath, requiring frequent stops during walks after walking less than a block, even with portable oxygen.  - Oxygen levels typically around 95-96% when measured but he does not check them frequently.  - History of sarcoidosis, primarily affecting lymph nodes in the chest, with possible airway involvement causing inflammation and phlegm production.  - Previous treatment with prednisone did not improve symptoms.  - Taking omeprazole daily for heartburn.  - Right heart dysfunction noted, despite normal pressures during heart catheterization, contributing to shortness of breath.        Current Outpatient Medications   Medication Sig Dispense Refill    albuterol (PROVENTIL) (2.5 MG/3ML) 0.083% neb solution Take 1 vial (2.5 mg) by nebulization 3 times daily as needed for shortness of breath, wheezing or cough. 180 mL 3    aspirin (ASA) 81 MG tablet Take 81 mg by mouth every morning  90 tablet 3    atorvastatin (LIPITOR) 40 MG tablet Take 1 tablet (40 mg) by mouth every morning 90 tablet 3    azithromycin (ZITHROMAX) 250 MG tablet Take 2 tablets (500 mg) the first day, then take 1 tablet (250 mg) by mouth daily for a total of 5 days. 6 tablet 0    BREO ELLIPTA 200-25 MCG/ACT inhaler Inhale 1 puff into the lungs daily. 60 each 3    cetirizine (ZYRTEC) 10 MG tablet Take 10 mg by mouth every morning  90 tablet 3    empagliflozin (JARDIANCE) 10 MG TABS tablet Take 1 tablet (10 mg) by mouth every other day. 45 tablet 3    hydroCHLOROthiazide 12.5 MG tablet TAKE 1 TABLET BY MOUTH EVERY MORNING 90 tablet 3    metoprolol succinate ER (TOPROL XL) 100 MG 24 hr tablet Take 1.5 tablets (150 mg) by mouth daily. 135 tablet 3    mometasone (NASONEX) 50 MCG/ACT nasal spray Spray 2 sprays into both nostrils daily 17 g 3    montelukast (SINGULAIR) 10 MG tablet Take 1 tablet (10 mg) by mouth at bedtime. 90 tablet 3    multivitamin (ONE-DAILY)  tablet Take 1 tablet by mouth every morning  90 tablet 3    omeprazole (PRILOSEC) 40 MG DR capsule Take 1 capsule (40 mg) by mouth 2 times daily 180 capsule 3    sacubitril-valsartan (ENTRESTO)  MG per tablet Take 1 tablet by mouth 2 times daily. 180 tablet 3    spironolactone (ALDACTONE) 25 MG tablet Take 1 tablet (25 mg) by mouth daily. 90 tablet 3     No current facility-administered medications for this visit.     Facility-Administered Medications Ordered in Other Visits   Medication Dose Route Frequency Provider Last Rate Last Admin    sodium chloride bacteriostatic 0.9 % flush 3 mL  3 mL Intravenous Once Dewayne Chavis MD         Allergies   Allergen Reactions    Cat Dander Itching     itchy tearful eyes    Adhesive Tape Rash    Iodine Rash     Past Medical History:   Diagnosis Date    Asthma     Claustrophobia     CPAP (continuous positive airway pressure) dependence     Dyslipidemia     [place]     Hypercholesteremia     Hypertension     Iron deficiency     Mediastinal adenopathy     Obesity     BEULAH (obstructive sleep apnea)     Prostate cancer (H)     Pulmonary hypertension (H)     Sarcoidosis        Past Surgical History:   Procedure Laterality Date    APPENDECTOMY      BIOPSY MASS NECK Left 7/15/2020    Procedure: Left trapezius muscle biopsy;  Surgeon: Ade Villa MD;  Location: [facility]    CLOSED REDUCTION HIP Left 10/31/2022    Procedure: Closed reduction left total hip arthroplasty;  Surgeon: Antonio Ann MD;  Location: [facility] OR    CV RIGHT HEART CATH MEASUREMENTS RECORDED N/A 12/11/2019    Procedure: CV RIGHT HEART CATH;  Surgeon: Torrey Hirsch MD;  Location:  [facility]ATH [facility]    CV RIGHT HEART CATH MEASUREMENTS RECORDED N/A 1/19/2022    Procedure: CV RIGHT HEART CATH;  Surgeon: Torrey Hirsch MD;  Location:  [facility] CARD[facility] CATH LAB    CV RIGHT HEART CATH MEASUREMENTS RECORDED N/A 11/4/2022    Procedure: Heart Cath Right Heart Cath;   Surgeon: Torrey Hirsch MD;  Location:  [facility]ARD[facility] CATH LAB    CV RIGHT HEART CATH MEASUREMENTS RECORDED N/A 9/13/2024    Procedure: Heart Cath Right Heart Cath;  Surgeon: Torrey Hirsch MD;  Location:  HEART CARD[facility]AC CATH LAB    DAVINCI PROSTATECTOMY, LYMPHADENECTOMY N/A 5/23/2019    Procedure: ROBOTIC ASSISTED LAPAROSCOPIC RADICAL PROSTATECTOMY WITH BILATERAL PELVIC LYMPH NODE DISSECTION;  Surgeon: Matteo Coughlin MD;  Location: [facility] OR    ENDOBRONCHIAL ULTRASOUND FLEXIBLE N/A 3/29/2019    Procedure: Flexible Bronchoscopy, Endobronchial Ultrasound;  Surgeon: Curtis Leger MD;  Location: [facility] OR    VASECTOMY      ZZC TOTAL HIP ARTHROPLASTY Bilateral     ZZC TOTAL KNEE ARTHROPLASTY Bilateral        Social History     Socioeconomic History    Marital status:      Spouse name: Not on file    Number of children: Not on file    Years of education: Not on file    Highest education level: Not on file   Occupational History    Not on file   Tobacco Use    Smoking status: Never    Smokeless tobacco: Never   Vaping Use    Vaping status: Never Used   Substance and Sexual Activity    Alcohol use: Yes     Comment: once a week    Drug use: No    Sexual activity: Yes     Partners: Female   Other Topics Concern    Parent/sibling w/ CABG, MI or angioplasty before 65F 55M? Not Asked   Social History Narrative    12/03/20         ENVIRONMENTAL HISTORY: [place] The home is heated with a gas furnace. They does have central air conditioning. The patient's bedroom is furnished with Indoor plants and carpeting in bedroom.  Pets inside the house include 0 pets. There is no history of cockroach or mice infestation. There is/are 0 smokers in the house.  The house does not have a damp basement.      Social Drivers of Health     Financial Resource Strain: Low Risk  (6/20/2024)    Financial Resource Strain     Within the past 12 months, have you or your family  members you live with been unable to get utilities (heat, electricity) when it was really needed?: No   Food Insecurity: Low Risk  (6/20/2024)    Food Insecurity     Within the past 12 months, did you worry that your food would run out before you got money to buy more?: No     Within the past 12 months, did the food you bought just not last and you didn t have money to get more?: No   Transportation Needs: Low Risk  (6/20/2024)    Transportation Needs     Within the past 12 months, has lack of transportation kept you from medical appointments, getting your medicines, non-medical meetings or appointments, work, or from getting things that you need?: No   Physical Activity: Unknown (6/20/2024)    Exercise Vital Sign     Days of Exercise per Week: 4 days     Minutes of Exercise per Session: Not on file   Stress: No Stress Concern Present (6/20/2024)    [employer] - Occupational Stress Questionnaire     Feeling of Stress : Not at all   Social Connections: Unknown (6/20/2024)    Social Connection and Isolation Panel [NHANES]     Frequency of Communication with Friends and Family: Not on file     Frequency of Social Gatherings with Friends and Family: Once a week     Attends [employer] Services: Not on file     Active Member of Clubs or Organizations: Not on file     Attends Club or Organization Meetings: Not on file     Marital Status: Not on file   Interpersonal Safety: Unknown (9/13/2024)    Interpersonal Safety     Do you feel physically and emotionally safe where you currently live?: Patient unable to answer     Within the past 12 months, have you been hit, slapped, kicked or otherwise physically hurt by someone?: Patient unable to answer     Within the past 12 months, have you been humiliated or emotionally abused in other ways by your partner or ex-partner?: Patient unable to answer   Housing Stability: Low Risk  (6/20/2024)    Housing Stability     Do you have housing? : Yes     Are  you worried about losing your housing?: No       Family History   Problem Relation Age of Onset    Alzheimer Disease Mother     Heart Disease Father     Glaucoma No family hx of     Macular Degeneration No family hx of     Diabetes No family hx of     Thyroid Disease No family hx of        ROS Pulmonary    A complete ROS was otherwise negative except as noted in the HPI.  Vitals:    06/26/25 0825   BP: 114/69   BP Location: Right arm   Patient Position: Sitting   Cuff Size: Adult Regular   Pulse: 73   SpO2: 92%     Exam:   GENERAL APPEARANCE: Well developed, obese, alert, and in no apparent distress.  EYES: PERRL, EOMI  HENT: nasal mucosa with out hyperemia or edema, no nasal polyps.  MOUTH: Oral mucosa is moist, without any lesions, no tonsillar enlargement, no oropharyngeal exudate.  NECK: supple, no masses, no thyromegaly.  LYMPHATICS: No significant axillary, cervical, or supraclavicular nodes.  RESP: good air flow throughout, - no crackles, rhonchi or wheezes.  CV: Normal S1, S2, regular rhythm, normal rate, no rub, no murmur,  no gallop, no LE edema.   ABDOMEN:  Bowel sounds normal, soft, nontender, no HSM or masses.   MS: extremities normal- no clubbing, no cyanosis.  PSYCH: mentation appears normal. and affect normal/bright  Results: I have reviewed all imaging, PFTs and other relavent tests, please see below for details, PFT and imaging results were reviewed with the patient.  PFTs: Moderate restriction with normal diffusion.  Overall PFTs are relatively stable over the past 6 years.    Assessment and plan:    Sarcoidosis:  - Sarcoidosis appears stable with no significant parenchymal disease and no evidence of progressive scarring in the lungs.   - Primarily affects lymph nodes in the chest with potential airway involvement causing inflammation and phlegm production.  - Continue monitoring. Regular ophthalmologist check-ups are sufficient unless evidence of disease arises.  - Follow up 4 months with  PFTs    Dyspnea:  - Likely contributed to by right heart dysfunction and possibly airway inflammation from sarcoidosis.  - Repeat O2 titration showed no changel needs 2L with exertion.  - Refer to pulmonary rehab at [facility].   - Discussed that Weight loss may help improve breathing.    Cough with phlegm production:  - Likely due to airway inflammation from sarcoidosis. No improvement with prednisone.  - Use flutter valve and nebulizers to manage mucus clearance. No new interventions suggested.    Right heart dysfunction:  - Right heart dysfunction is present despite normal pressures on heart catheterization. Contributes to dyspnea.  - The etiology of the patient's right heart dysfunction is unclear to me as he does not have significant parenchymal disease and I am not sure we can attribute his right heart dysfunction to lung disease in the absence of elevated pulmonary artery pressures and a normal pulmonary vascular resistance.        I spent 40 minutes on the date of the encounter doing chart review, history and exam, interpretation of any new PFTs and imaging, documentation and further activities as noted above.    The longitudinal plan of care for the diagnosis(es)/condition(s) as documented were addressed during this visit. Due to the added complexity in care, I will continue to support Derek in the subsequent management and with ongoing continuity of care.      CBC   Recent Labs   Lab Test 04/09/25  1041 10/25/24  0741   RBC 5.58 5.30   HGB 16.3 16.2   HCT 49.5 48.6    174       Basic Metabolic Panel  Recent Labs   Lab Test 04/09/25  1041 10/25/24  0741    139   POTASSIUM 4.8 4.2   CHLORIDE 99 103   CO2 28 26   BUN 23.0 20.5   GLC 89 92   ANTONIO 9.9 9.6       INR  Recent Labs   Lab Test 09/13/24  0712 12/11/19  1254   INR 1.07 1.17*       PFT      Latest Ref Rng & Units 6/26/2025     7:47 AM   PFT   FVC L 2.05  P   FEV1 L 1.45  P   FVC% % 52  P   FEV1% % 48  P      P Preliminary result            CC:

## 2025-06-26 NOTE — NURSING NOTE
Chief Complaint   Patient presents with    Return interstitial lung     Medications reviewed and vital signs taken.   Sonu Mcneal, EMT

## 2025-06-26 NOTE — LETTER
6/26/2025      Derek Contreras  [id number] [facility] [place] [address]      Dear Colleague,    Thank you for referring your patient, Derek Contreras, to the [facility] AND [facility]. Please see a copy of my visit note below.    Reason for Visit  Derek Contreras is a 69 year old year old male who is being seen for Return interstitial lung    ILD HPI    Derek Contreras is a 69-year-old male with sarcoidosis.  The patient has a history of biopsy-proven sarcoidosis with mainly thoracic lymph node involvement without significant parenchymal involvement.  He was previously followed by Dr. Martin.  He was diagnosed via bronchoscopy in March 2019 with samples from station 7 showing nonnecrotizing granulomas station 4R and 12L were non diagnostic.  He also has a history of nonischemic cardiomyopathy that is not felt to be secondary to sarcoidosis.  He had cardiac MRIs done in August 2019 and December 2021 which did not show any LGE.  He had a right heart catheterization in September 2024 which did not show any significant pulmonary hypertension.  PET scan in July 2024 showed extensive hypermetabolic mediastinal and hilar lymphadenopathy.  He was not initially treated systemically for his diagnosis of sarcoidosis as he did not have significant parenchymal involvement and no evidence of fibrosis..  My first visit with the patient which was a virtual visit in August 2024 he was complaining of significant cough and production of sputum so we decided to give him a trial of anti-inflammatory therapy with prednisone and methotrexate however his symptoms did not improve with this so this was discontinued in November 2024.  At that time we decided to focus mostly on airway clearance therapy.  He did have some restriction on his pulmonary function test however this was felt to be due to his body habitus and possibly elevated right hemidiaphragm.    INTERVAL  HISTORY      - Experiencing coughing fits approximately 5 times a day, producing clear phlegm.  - No coughing fits at night, but occasionally wakes up coughing.  - Using albuterol nebulizer twice a day, and flutter valve once daily..  - Taking Mucinex DM, which seems to help manage symptoms.  - Occasionally uses a portable oxygen machine during walks.  - Experiences shortness of breath, requiring frequent stops during walks after walking less than a block, even with portable oxygen.  - Oxygen levels typically around 95-96% when measured but he does not check them frequently.  - History of sarcoidosis, primarily affecting lymph nodes in the chest, with possible airway involvement causing inflammation and phlegm production.  - Previous treatment with prednisone did not improve symptoms.  - Taking omeprazole daily for heartburn.  - Right heart dysfunction noted, despite normal pressures during heart catheterization, contributing to shortness of breath.        Current Outpatient Medications   Medication Sig Dispense Refill     albuterol (PROVENTIL) (2.5 MG/3ML) 0.083% neb solution Take 1 vial (2.5 mg) by nebulization 3 times daily as needed for shortness of breath, wheezing or cough. 180 mL 3     aspirin (ASA) 81 MG tablet Take 81 mg by mouth every morning  90 tablet 3     atorvastatin (LIPITOR) 40 MG tablet Take 1 tablet (40 mg) by mouth every morning 90 tablet 3     azithromycin (ZITHROMAX) 250 MG tablet Take 2 tablets (500 mg) the first day, then take 1 tablet (250 mg) by mouth daily for a total of 5 days. 6 tablet 0     BREO ELLIPTA 200-25 MCG/ACT inhaler Inhale 1 puff into the lungs daily. 60 each 3     cetirizine (ZYRTEC) 10 MG tablet Take 10 mg by mouth every morning  90 tablet 3     empagliflozin (JARDIANCE) 10 MG TABS tablet Take 1 tablet (10 mg) by mouth every other day. 45 tablet 3     hydroCHLOROthiazide 12.5 MG tablet TAKE 1 TABLET BY MOUTH EVERY MORNING 90 tablet 3     metoprolol succinate ER (TOPROL XL)  100 MG 24 hr tablet Take 1.5 tablets (150 mg) by mouth daily. 135 tablet 3     mometasone (NASONEX) 50 MCG/ACT nasal spray Spray 2 sprays into both nostrils daily 17 g 3     montelukast (SINGULAIR) 10 MG tablet Take 1 tablet (10 mg) by mouth at bedtime. 90 tablet 3     multivitamin (ONE-DAILY) tablet Take 1 tablet by mouth every morning  90 tablet 3     omeprazole (PRILOSEC) 40 MG DR capsule Take 1 capsule (40 mg) by mouth 2 times daily 180 capsule 3     sacubitril-valsartan (ENTRESTO)  MG per tablet Take 1 tablet by mouth 2 times daily. 180 tablet 3     spironolactone (ALDACTONE) 25 MG tablet Take 1 tablet (25 mg) by mouth daily. 90 tablet 3     No current facility-administered medications for this visit.     Facility-Administered Medications Ordered in Other Visits   Medication Dose Route Frequency Provider Last Rate Last Admin     sodium chloride bacteriostatic 0.9 % flush 3 mL  3 mL Intravenous Once Dewayne Chavis MD         Allergies   Allergen Reactions     Cat Dander Itching     itchy tearful eyes     Adhesive Tape Rash     Iodine Rash     Past Medical History:   Diagnosis Date     Asthma      Claustrophobia      CPAP (continuous positive airway pressure) dependence      Dyslipidemia      [place] (hard of hearing)      Hypercholesteremia      Hypertension      Iron deficiency      Mediastinal adenopathy      Obesity      BEULAH (obstructive sleep apnea)      Prostate cancer (H)      Pulmonary hypertension (H)      Sarcoidosis        Past Surgical History:   Procedure Laterality Date     APPENDECTOMY       BIOPSY MASS NECK Left 7/15/2020    Procedure: Left trapezius muscle biopsy;  Surgeon: Ade Villa MD;  Location: [facility] OR     CLOSED REDUCTION HIP Left 10/31/2022    Procedure: Closed reduction left total hip arthroplasty;  Surgeon: Antonio Ann MD;  Location: [facility] OR     CV RIGHT HEART CATH MEASUREMENTS RECORDED N/A 12/11/2019    Procedure: CV RIGHT HEART CATH;  Surgeon: Torrey  MD Torrey;  Location:  HEART CARDIAC CATH LAB     CV RIGHT HEART CATH MEASUREMENTS RECORDED N/A 1/19/2022    Procedure: CV RIGHT HEART CATH;  Surgeon: Torrey Hirsch MD;  Location:  HEART CARDIAC CATH LAB     CV RIGHT HEART CATH MEASUREMENTS RECORDED N/A 11/4/2022    Procedure: Heart Cath Right Heart Cath;  Surgeon: Torrey Hirsch MD;  Location:  HEART CARDIAC CATH LAB     CV RIGHT HEART CATH MEASUREMENTS RECORDED N/A 9/13/2024    Procedure: Heart Cath Right Heart Cath;  Surgeon: Torrey Hirsch MD;  Location:  HEART CARDIAC CATH LAB     DAVINCI PROSTATECTOMY, LYMPHADENECTOMY N/A 5/23/2019    Procedure: ROBOTIC ASSISTED LAPAROSCOPIC RADICAL PROSTATECTOMY WITH BILATERAL PELVIC LYMPH NODE DISSECTION;  Surgeon: Matteo Coughlin MD;  Location:  OR     ENDOBRONCHIAL ULTRASOUND FLEXIBLE N/A 3/29/2019    Procedure: Flexible Bronchoscopy, Endobronchial Ultrasound;  Surgeon: Curtis Leger MD;  Location: [facility] OR     VASECTOMY       ZZC TOTAL HIP ARTHROPLASTY Bilateral      ZZC TOTAL KNEE ARTHROPLASTY Bilateral        Social History     Socioeconomic History     Marital status:      Spouse name: Not on file     Number of children: Not on file     Years of education: Not on file     Highest education level: Not on file   Occupational History     Not on file   Tobacco Use     Smoking status: Never     Smokeless tobacco: Never   Vaping Use     Vaping status: Never Used   Substance and Sexual Activity     Alcohol use: Yes     Comment: once a week     Drug use: No     Sexual activity: Yes     Partners: Female   Other Topics Concern     Parent/sibling w/ CABG, MI or angioplasty before 65F 55M? Not Asked   Social History Narrative    12/03/20         ENVIRONMENTAL HISTORY: [place] The home is heated with a gas furnace. They does have central air conditioning. The patient's bedroom is furnished with Indoor plants and carpeting in  bedroom.  Pets inside the house include 0 pets. There is no history of cockroach or mice infestation. There is/are 0 smokers in the house.  The house does not have a damp basement.      Social Drivers of Health     Financial Resource Strain: Low Risk  (6/20/2024)    Financial Resource Strain      Within the past 12 months, have you or your family members you live with been unable to get utilities (heat, electricity) when it was really needed?: No   Food Insecurity: Low Risk  (6/20/2024)    Food Insecurity      Within the past 12 months, did you worry that your food would run out before you got money to buy more?: No      Within the past 12 months, did the food you bought just not last and you didn t have money to get more?: No   Transportation Needs: Low Risk  (6/20/2024)    Transportation Needs      Within the past 12 months, has lack of transportation kept you from medical appointments, getting your medicines, non-medical meetings or appointments, work, or from getting things that you need?: No   Physical Activity: Unknown (6/20/2024)    Exercise Vital Sign      Days of Exercise per Week: 4 days      Minutes of Exercise per Session: Not on file   Stress: No Stress Concern Present (6/20/2024)    [employer] [place] [employer] - Occupational Stress Questionnaire      Feeling of Stress : Not at all   Social Connections: Unknown (6/20/2024)    Social Connection and Isolation Panel [NHANES]      Frequency of Communication with Friends and Family: Not on file      Frequency of Social Gatherings with Friends and Family: Once a week      Attends Baptism Services: Not on file      Active Member of Clubs or Organizations: Not on file      Attends Club or Organization Meetings: Not on file      Marital Status: Not on file   Interpersonal Safety: Unknown (9/13/2024)    Interpersonal Safety      Do you feel physically and emotionally safe where you currently live?: Patient unable to answer      Within the past 12  months, have you been hit, slapped, kicked or otherwise physically hurt by someone?: Patient unable to answer      Within the past 12 months, have you been humiliated or emotionally abused in other ways by your partner or ex-partner?: Patient unable to answer   Housing Stability: Low Risk  (6/20/2024)    Housing Stability      Do you have housing? : Yes      Are you worried about losing your housing?: No       Family History   Problem Relation Age of Onset     Alzheimer Disease Mother      Heart Disease Father      Glaucoma No family hx of      Macular Degeneration No family hx of      Diabetes No family hx of      Thyroid Disease No family hx of        ROS Pulmonary    A complete ROS was otherwise negative except as noted in the HPI.  Vitals:    06/26/25 0825   BP: 114/69   BP Location: Right arm   Patient Position: Sitting   Cuff Size: Adult Regular   Pulse: 73   SpO2: 92%     Exam:   GENERAL APPEARANCE: Well developed, obese, alert, and in no apparent distress.  EYES: PERRL, EOMI  HENT: nasal mucosa with out hyperemia or edema, no nasal polyps.  MOUTH: Oral mucosa is moist, without any lesions, no tonsillar enlargement, no oropharyngeal exudate.  NECK: supple, no masses, no thyromegaly.  LYMPHATICS: No significant axillary, cervical, or supraclavicular nodes.  RESP: good air flow throughout, - no crackles, rhonchi or wheezes.  CV: Normal S1, S2, regular rhythm, normal rate, no rub, no murmur,  no gallop, no LE edema.   ABDOMEN:  Bowel sounds normal, soft, nontender, no HSM or masses.   MS: extremities normal- no clubbing, no cyanosis.  PSYCH: mentation appears normal. and affect normal/bright  Results: I have reviewed all imaging, PFTs and other relavent tests, please see below for details, PFT and imaging results were reviewed with the patient.  PFTs: Moderate restriction with normal diffusion.  Overall PFTs are relatively stable over the past 6 years.    Assessment and plan:    Sarcoidosis:  - Sarcoidosis  appears stable with no significant parenchymal disease and no evidence of progressive scarring in the lungs.   - Primarily affects lymph nodes in the chest with potential airway involvement causing inflammation and phlegm production.  - Continue monitoring. Regular ophthalmologist check-ups are sufficient unless evidence of disease arises.  - Follow up 4 months with PFTs    Dyspnea:  - Likely contributed to by right heart dysfunction and possibly airway inflammation from sarcoidosis.  - Repeat O2 titration showed no changel needs 2L with exertion.  - Refer to pulmonary rehab at [place].   - Discussed that Weight loss may help improve breathing.    Cough with phlegm production:  - Likely due to airway inflammation from sarcoidosis. No improvement with prednisone.  - Use flutter valve and nebulizers to manage mucus clearance. No new interventions suggested.    Right heart dysfunction:  - Right heart dysfunction is present despite normal pressures on heart catheterization. Contributes to dyspnea.  - The etiology of the patient's right heart dysfunction is unclear to me as he does not have significant parenchymal disease and I am not sure we can attribute his right heart dysfunction to lung disease in the absence of elevated pulmonary artery pressures and a normal pulmonary vascular resistance.        I spent 40 minutes on the date of the encounter doing chart review, history and exam, interpretation of any new PFTs and imaging, documentation and further activities as noted above.    The longitudinal plan of care for the diagnosis(es)/condition(s) as documented were addressed during this visit. Due to the added complexity in care, I will continue to support Derek in the subsequent management and with ongoing continuity of care.      CBC   Recent Labs   Lab Test 04/09/25  1041 10/25/24  0741   RBC 5.58 5.30   HGB 16.3 16.2   HCT 49.5 48.6    174       Basic Metabolic Panel  Recent Labs   Lab Test 04/09/25  1041  10/25/24  0741    139   POTASSIUM 4.8 4.2   CHLORIDE 99 103   CO2 28 26   BUN 23.0 20.5   GLC 89 92   ANTONIO 9.9 9.6       INR  Recent Labs   Lab Test 09/13/24  0712 12/11/19  1254   INR 1.07 1.17*       PFT      Latest Ref Rng & Units 6/26/2025     7:47 AM   PFT   FVC L 2.05  P   FEV1 L 1.45  P   FVC% % 52  P   FEV1% % 48  P      P Preliminary result           CC:        Again, thank you for allowing me to participate in the care of your patient.        Sincerely,        David Morris Perlman, MD    Electronically signed

## 2025-07-08 ENCOUNTER — HOSPITAL ENCOUNTER (OUTPATIENT)
Dept: CARDIAC REHAB | Facility: CLINIC | Age: 70
Discharge: HOME | End: 2025-07-08
Attending: INTERNAL MEDICINE
Payer: MEDICARE

## 2025-07-08 DIAGNOSIS — D86.9 SARCOIDOSIS: ICD-10-CM

## 2025-07-08 PROCEDURE — G0238 OTH RESP PROC, INDIV: HCPCS

## 2025-07-15 ENCOUNTER — HOSPITAL ENCOUNTER (OUTPATIENT)
Dept: CARDIAC REHAB | Facility: CLINIC | Age: 70
Discharge: HOME | End: 2025-07-15
Attending: INTERNAL MEDICINE
Payer: MEDICARE

## 2025-07-15 PROCEDURE — G0238 OTH RESP PROC, INDIV: HCPCS

## 2025-07-21 DIAGNOSIS — K21.9 GERD (GASTROESOPHAGEAL REFLUX DISEASE): ICD-10-CM

## 2025-07-21 DIAGNOSIS — R05.9 COUGH: ICD-10-CM

## 2025-07-21 RX ORDER — OMEPRAZOLE 40 MG/1
40 CAPSULE, DELAYED RELEASE ORAL 2 TIMES DAILY
Qty: 180 CAPSULE | Refills: 3 | Status: SHIPPED | OUTPATIENT
Start: 2025-07-21

## 2025-07-22 ENCOUNTER — HOSPITAL ENCOUNTER (OUTPATIENT)
Dept: CARDIAC REHAB | Facility: CLINIC | Age: 70
Discharge: HOME | End: 2025-07-22
Attending: INTERNAL MEDICINE
Payer: MEDICARE

## 2025-07-22 PROCEDURE — G0239 OTH RESP PROC, GROUP: HCPCS | Performed by: REHABILITATION PRACTITIONER

## 2025-07-26 SDOH — HEALTH STABILITY: PHYSICAL HEALTH: ON AVERAGE, HOW MANY DAYS PER WEEK DO YOU ENGAGE IN MODERATE TO STRENUOUS EXERCISE (LIKE A BRISK WALK)?: 5 DAYS

## 2025-07-26 SDOH — HEALTH STABILITY: PHYSICAL HEALTH: ON AVERAGE, HOW MANY MINUTES DO YOU ENGAGE IN EXERCISE AT THIS LEVEL?: 60 MIN

## 2025-07-26 ASSESSMENT — SOCIAL DETERMINANTS OF HEALTH (SDOH): HOW OFTEN DO YOU GET TOGETHER WITH FRIENDS OR RELATIVES?: PATIENT DECLINED

## 2025-07-29 ENCOUNTER — HOSPITAL ENCOUNTER (OUTPATIENT)
Dept: CARDIAC REHAB | Facility: CLINIC | Age: 70
Discharge: HOME | End: 2025-07-29
Attending: INTERNAL MEDICINE
Payer: MEDICARE

## 2025-07-29 PROCEDURE — G0239 OTH RESP PROC, GROUP: HCPCS

## 2025-07-31 ENCOUNTER — OFFICE VISIT (OUTPATIENT)
Dept: PEDIATRICS | Facility: CLINIC | Age: 70
End: 2025-07-31
Payer: MEDICARE

## 2025-07-31 VITALS
WEIGHT: 289.6 LBS | OXYGEN SATURATION: 96 % | BODY MASS INDEX: 41.46 KG/M2 | HEART RATE: 78 BPM | TEMPERATURE: 98.5 F | HEIGHT: 70 IN | RESPIRATION RATE: 20 BRPM | SYSTOLIC BLOOD PRESSURE: 104 MMHG | DIASTOLIC BLOOD PRESSURE: 60 MMHG

## 2025-07-31 DIAGNOSIS — D47.2 MGUS (MONOCLONAL GAMMOPATHY OF UNKNOWN SIGNIFICANCE): ICD-10-CM

## 2025-07-31 DIAGNOSIS — D86.0 PULMONARY SARCOIDOSIS: ICD-10-CM

## 2025-07-31 DIAGNOSIS — B35.1 TOENAIL FUNGUS: ICD-10-CM

## 2025-07-31 DIAGNOSIS — I47.20 PVT (PAROXYSMAL VENTRICULAR TACHYCARDIA) (H): ICD-10-CM

## 2025-07-31 DIAGNOSIS — R23.3 ABNORMAL BRUISING: ICD-10-CM

## 2025-07-31 DIAGNOSIS — L98.9 SKIN LESION OF LEFT LEG: ICD-10-CM

## 2025-07-31 DIAGNOSIS — C61 PROSTATE CANCER (H): ICD-10-CM

## 2025-07-31 DIAGNOSIS — Z00.00 ENCOUNTER FOR MEDICARE ANNUAL WELLNESS EXAM: Primary | ICD-10-CM

## 2025-07-31 DIAGNOSIS — I10 HYPERTENSION, UNSPECIFIED TYPE: ICD-10-CM

## 2025-07-31 DIAGNOSIS — E78.00 HYPERCHOLESTEREMIA: ICD-10-CM

## 2025-07-31 DIAGNOSIS — E66.813 CLASS 3 OBESITY (H): ICD-10-CM

## 2025-07-31 LAB
BASOPHILS # BLD AUTO: 0 10E3/UL (ref 0–0.2)
BASOPHILS NFR BLD AUTO: 0 %
EOSINOPHIL # BLD AUTO: 0.2 10E3/UL (ref 0–0.7)
EOSINOPHIL NFR BLD AUTO: 2 %
ERYTHROCYTE [DISTWIDTH] IN BLOOD BY AUTOMATED COUNT: 13.3 % (ref 10–15)
HCT VFR BLD AUTO: 50.2 % (ref 40–53)
HGB BLD-MCNC: 16.9 G/DL (ref 13.3–17.7)
IMM GRANULOCYTES # BLD: 0 10E3/UL
IMM GRANULOCYTES NFR BLD: 0 %
LYMPHOCYTES # BLD AUTO: 1.2 10E3/UL (ref 0.8–5.3)
LYMPHOCYTES NFR BLD AUTO: 13 %
MCH RBC QN AUTO: 30.5 PG (ref 26.5–33)
MCHC RBC AUTO-ENTMCNC: 33.7 G/DL (ref 31.5–36.5)
MCV RBC AUTO: 91 FL (ref 78–100)
MONOCYTES # BLD AUTO: 1 10E3/UL (ref 0–1.3)
MONOCYTES NFR BLD AUTO: 10 %
NEUTROPHILS # BLD AUTO: 6.8 10E3/UL (ref 1.6–8.3)
NEUTROPHILS NFR BLD AUTO: 74 %
PLATELET # BLD AUTO: 203 10E3/UL (ref 150–450)
RBC # BLD AUTO: 5.54 10E6/UL (ref 4.4–5.9)
WBC # BLD AUTO: 9.3 10E3/UL (ref 4–11)

## 2025-07-31 PROCEDURE — 36415 COLL VENOUS BLD VENIPUNCTURE: CPT

## 2025-07-31 PROCEDURE — 85025 COMPLETE CBC W/AUTO DIFF WBC: CPT

## 2025-07-31 PROCEDURE — 90480 ADMN SARSCOV2 VAC 1/ONLY CMP: CPT

## 2025-07-31 PROCEDURE — 3048F LDL-C <100 MG/DL: CPT

## 2025-07-31 PROCEDURE — 80053 COMPREHEN METABOLIC PANEL: CPT

## 2025-07-31 PROCEDURE — G0439 PPPS, SUBSEQ VISIT: HCPCS | Mod: GC

## 2025-07-31 PROCEDURE — 80061 LIPID PANEL: CPT

## 2025-07-31 PROCEDURE — G0009 ADMIN PNEUMOCOCCAL VACCINE: HCPCS | Mod: 59

## 2025-07-31 PROCEDURE — 90677 PCV20 VACCINE IM: CPT

## 2025-07-31 PROCEDURE — 3078F DIAST BP <80 MM HG: CPT

## 2025-07-31 PROCEDURE — 91320 SARSCV2 VAC 30MCG TRS-SUC IM: CPT

## 2025-07-31 PROCEDURE — 1126F AMNT PAIN NOTED NONE PRSNT: CPT

## 2025-07-31 PROCEDURE — 3074F SYST BP LT 130 MM HG: CPT

## 2025-07-31 ASSESSMENT — PAIN SCALES - GENERAL: PAINLEVEL_OUTOF10: NO PAIN (0)

## 2025-07-31 ASSESSMENT — ACTIVITIES OF DAILY LIVING (ADL): CURRENT_FUNCTION: NO ASSISTANCE NEEDED

## 2025-08-01 LAB
ALBUMIN SERPL BCG-MCNC: 3.9 G/DL (ref 3.5–5.2)
ALP SERPL-CCNC: 75 U/L (ref 40–150)
ALT SERPL W P-5'-P-CCNC: 21 U/L (ref 0–70)
ANION GAP SERPL CALCULATED.3IONS-SCNC: 13 MMOL/L (ref 7–15)
AST SERPL W P-5'-P-CCNC: 29 U/L (ref 0–45)
BILIRUB SERPL-MCNC: 0.4 MG/DL
BUN SERPL-MCNC: 20.6 MG/DL (ref 8–23)
CALCIUM SERPL-MCNC: 9.6 MG/DL (ref 8.8–10.4)
CHLORIDE SERPL-SCNC: 103 MMOL/L (ref 98–107)
CHOLEST SERPL-MCNC: 82 MG/DL
CREAT SERPL-MCNC: 1.05 MG/DL (ref 0.67–1.17)
EGFRCR SERPLBLD CKD-EPI 2021: 76 ML/MIN/1.73M2
FASTING STATUS PATIENT QL REPORTED: ABNORMAL
FASTING STATUS PATIENT QL REPORTED: ABNORMAL
GLUCOSE SERPL-MCNC: 105 MG/DL (ref 70–99)
HCO3 SERPL-SCNC: 23 MMOL/L (ref 22–29)
HDLC SERPL-MCNC: 33 MG/DL
LDLC SERPL CALC-MCNC: 22 MG/DL
NONHDLC SERPL-MCNC: 49 MG/DL
POTASSIUM SERPL-SCNC: 4.1 MMOL/L (ref 3.4–5.3)
PROT SERPL-MCNC: 7.2 G/DL (ref 6.4–8.3)
SODIUM SERPL-SCNC: 139 MMOL/L (ref 135–145)
TRIGL SERPL-MCNC: 136 MG/DL

## 2025-08-04 ENCOUNTER — TELEPHONE (OUTPATIENT)
Dept: DERMATOLOGY | Facility: CLINIC | Age: 70
End: 2025-08-04
Payer: MEDICARE

## 2025-08-05 ENCOUNTER — HOSPITAL ENCOUNTER (OUTPATIENT)
Dept: CARDIAC REHAB | Facility: CLINIC | Age: 70
Discharge: HOME | End: 2025-08-05
Attending: INTERNAL MEDICINE
Payer: MEDICARE

## 2025-08-05 PROCEDURE — G0239 OTH RESP PROC, GROUP: HCPCS | Performed by: REHABILITATION PRACTITIONER

## 2025-08-06 ENCOUNTER — OFFICE VISIT (OUTPATIENT)
Dept: DERMATOLOGY | Facility: CLINIC | Age: 70
End: 2025-08-06
Attending: INTERNAL MEDICINE
Payer: MEDICARE

## 2025-08-06 DIAGNOSIS — D48.9 NEOPLASM OF UNCERTAIN BEHAVIOR: ICD-10-CM

## 2025-08-11 LAB
PATH REPORT.COMMENTS IMP SPEC: NORMAL
PATH REPORT.COMMENTS IMP SPEC: NORMAL
PATH REPORT.FINAL DX SPEC: NORMAL
PATH REPORT.GROSS SPEC: NORMAL
PATH REPORT.MICROSCOPIC SPEC OTHER STN: NORMAL
PATH REPORT.RELEVANT HX SPEC: NORMAL

## 2025-08-12 ENCOUNTER — HOSPITAL ENCOUNTER (OUTPATIENT)
Dept: CARDIAC REHAB | Facility: CLINIC | Age: 70
Discharge: HOME | End: 2025-08-12
Attending: INTERNAL MEDICINE
Payer: MEDICARE

## 2025-08-12 PROCEDURE — G0239 OTH RESP PROC, GROUP: HCPCS

## 2025-08-19 ENCOUNTER — HOSPITAL ENCOUNTER (OUTPATIENT)
Dept: CARDIAC REHAB | Facility: CLINIC | Age: 70
Discharge: HOME | End: 2025-08-19
Attending: INTERNAL MEDICINE
Payer: MEDICARE

## 2025-08-19 PROCEDURE — G0239 OTH RESP PROC, GROUP: HCPCS

## 2025-08-20 ENCOUNTER — OFFICE VISIT (OUTPATIENT)
Dept: ORTHOPEDICS | Facility: CLINIC | Age: 70
End: 2025-08-20
Attending: INTERNAL MEDICINE
Payer: MEDICARE

## 2025-08-20 DIAGNOSIS — M20.5X2 HALLUX LIMITUS OF LEFT FOOT: ICD-10-CM

## 2025-08-20 DIAGNOSIS — B35.1 OM (ONYCHOMYCOSIS): ICD-10-CM

## 2025-08-20 DIAGNOSIS — M20.21 HALLUX RIGIDUS OF RIGHT FOOT: ICD-10-CM

## 2025-08-20 DIAGNOSIS — L84 TYLOMA: Primary | ICD-10-CM

## 2025-08-20 PROCEDURE — 99203 OFFICE O/P NEW LOW 30 MIN: CPT | Performed by: PODIATRIST

## 2025-08-20 RX ORDER — TERBINAFINE HYDROCHLORIDE 250 MG/1
250 TABLET ORAL DAILY
Qty: 90 TABLET | Refills: 0 | Status: SHIPPED | OUTPATIENT
Start: 2025-08-20 | End: 2025-11-18

## 2025-08-26 ENCOUNTER — HOSPITAL ENCOUNTER (OUTPATIENT)
Dept: CARDIAC REHAB | Facility: CLINIC | Age: 70
Discharge: HOME | End: 2025-08-26
Attending: INTERNAL MEDICINE
Payer: MEDICARE

## 2025-08-26 PROCEDURE — G0239 OTH RESP PROC, GROUP: HCPCS

## 2025-08-28 ENCOUNTER — LAB (OUTPATIENT)
Dept: LAB | Facility: CLINIC | Age: 70
End: 2025-08-28
Payer: MEDICARE

## 2025-08-28 DIAGNOSIS — D47.2 MGUS (MONOCLONAL GAMMOPATHY OF UNKNOWN SIGNIFICANCE): ICD-10-CM

## 2025-08-28 LAB
BASOPHILS # BLD AUTO: 0.04 10E3/UL (ref 0–0.2)
BASOPHILS NFR BLD AUTO: 0.5 %
EOSINOPHIL # BLD AUTO: 0.17 10E3/UL (ref 0–0.7)
EOSINOPHIL NFR BLD AUTO: 2 %
ERYTHROCYTE [DISTWIDTH] IN BLOOD BY AUTOMATED COUNT: 13.5 % (ref 10–15)
HCT VFR BLD AUTO: 49 % (ref 40–53)
HGB BLD-MCNC: 16.4 G/DL (ref 13.3–17.7)
IMM GRANULOCYTES # BLD: <0.04 10E3/UL
IMM GRANULOCYTES NFR BLD: 0.2 %
LYMPHOCYTES # BLD AUTO: 1.48 10E3/UL (ref 0.8–5.3)
LYMPHOCYTES NFR BLD AUTO: 17.5 %
MCH RBC QN AUTO: 30 PG (ref 26.5–33)
MCHC RBC AUTO-ENTMCNC: 33.5 G/DL (ref 31.5–36.5)
MCV RBC AUTO: 89.7 FL (ref 78–100)
MONOCYTES # BLD AUTO: 0.75 10E3/UL (ref 0–1.3)
MONOCYTES NFR BLD AUTO: 8.9 %
NEUTROPHILS # BLD AUTO: 5.99 10E3/UL (ref 1.6–8.3)
NEUTROPHILS NFR BLD AUTO: 70.9 %
PLATELET # BLD AUTO: 161 10E3/UL (ref 150–450)
PROT SERPL-MCNC: 7 G/DL (ref 6.4–8.3)
RBC # BLD AUTO: 5.46 10E6/UL (ref 4.4–5.9)
WBC # BLD AUTO: 8.45 10E3/UL (ref 4–11)

## 2025-09-02 ENCOUNTER — HOSPITAL ENCOUNTER (OUTPATIENT)
Dept: CARDIAC REHAB | Facility: CLINIC | Age: 70
Discharge: HOME | End: 2025-09-02
Attending: INTERNAL MEDICINE
Payer: MEDICARE

## 2025-09-02 PROCEDURE — G0239 OTH RESP PROC, GROUP: HCPCS

## 2025-09-03 ENCOUNTER — PRE VISIT (OUTPATIENT)
Dept: ORTHOPEDICS | Facility: CLINIC | Age: 70
End: 2025-09-03

## (undated) DEVICE — TROCAR AIRSEAL BLADELESS 12X120MM IAS12-120

## (undated) DEVICE — SUCTION MANIFOLD NEPTUNE 2 SYS 4 PORT 0702-020-000

## (undated) DEVICE — SURGICEL HEMOSTAT 2X14" 1951

## (undated) DEVICE — Device

## (undated) DEVICE — TAPE CLOTH ADHESIVE 3" LATEX FREE

## (undated) DEVICE — UMMC CONVENIENCE KIT FORMALLY H9656021017160 NEW# 602101716

## (undated) DEVICE — GLOVE PROTEXIS W/NEU-THERA 7.5  2D73TE75

## (undated) DEVICE — DRAPE SHEET REV FOLD 3/4 9349

## (undated) DEVICE — CATH FOLEY 20FR 5ML SILVER COAT LATEX 0165SI20

## (undated) DEVICE — CATH HOLDER STRAP 36600

## (undated) DEVICE — JELLY LUBRICATING SURGILUBE 2OZ TUBE

## (undated) DEVICE — GLOVE PROTEXIS MICRO 6.0  2D73PM60

## (undated) DEVICE — INTRODUCER SHEATH 4FRX40CM MICROPUNC PED G47946

## (undated) DEVICE — ESU GROUND PAD ADULT W/CORD E7507

## (undated) DEVICE — LABEL MEDICATION SYSTEM 3303-P

## (undated) DEVICE — LUBRICANT INST KIT ENDO-LUBE 220-90

## (undated) DEVICE — INTRO SHEATH 7FRX10CM PINNACLE RSS702

## (undated) DEVICE — BLADE CLIPPER SGL USE 9680

## (undated) DEVICE — ADH SKIN CLOSURE PREMIERPRO EXOFIN 1.0ML 3470

## (undated) DEVICE — PREP SKIN SCRUB TRAY 4461A

## (undated) DEVICE — DAVINCI XI DRAPE COLUMN 470341

## (undated) DEVICE — PACK DAVINCI UROLOGY SBA15UDFSG

## (undated) DEVICE — SU MONOCRYL 4-0 PS-2 18" UND Y496G

## (undated) DEVICE — SU SILK 3-0 TIE 12X30" A304H

## (undated) DEVICE — SYR 30ML SLIP TIP W/O NDL 302833

## (undated) DEVICE — DRSG TELFA 3"X8" NON25720

## (undated) DEVICE — SU MONOCRYL 4-0 PS-2 27" UND Y426H

## (undated) DEVICE — KIT MICROINTRODUCER VASCULAR  4FRX21GAX4CM

## (undated) DEVICE — SU STRATAFIX PDS PLUS 2-0 SPIRAL SH 30CM SXPP1B416

## (undated) DEVICE — ENDO VALVE SUCTION BRONCH EVIS MAJ-209

## (undated) DEVICE — DAVINCI XI NDL DRIVER LARGE 470006

## (undated) DEVICE — PACK HEART RIGHT CUSTOM SAN32RHF18

## (undated) DEVICE — ESU ELEC BLADE 2.75" COATED/INSULATED E1455

## (undated) DEVICE — SU WND CLOSURE VLOC 180 ABS 2-0 12" GS-21 VLOCL0315

## (undated) DEVICE — ENDO POUCH UNIV RETRIEVAL SYSTEM INZII 10MM CD001

## (undated) DEVICE — LINEN TOWEL PACK X5 5464

## (undated) DEVICE — PROTECTOR ARM ONE-STEP TRENDELENBURG 40418

## (undated) DEVICE — ENDO ADPT BRONCH SWIVEL Y A1002

## (undated) DEVICE — SPECIMEN TRAP MUCOUS 40ML LUKI C30200A

## (undated) DEVICE — SU SILK 2-0 FSL 18" 677G

## (undated) DEVICE — DAVINCI XI GRASPER ENDOWRIST PROGRASP 470093

## (undated) DEVICE — DRAPE MAYO STAND 23X54 8337

## (undated) DEVICE — TUBING CONMED AIRSEAL SMOKE EVAC INSUFFLATION ASM-EVAC

## (undated) DEVICE — GLOVE PROTEXIS BLUE W/NEU-THERA 6.5  2D73EB65

## (undated) DEVICE — PACK ENT MINOR CUSTOM ASC

## (undated) DEVICE — ENDO BRUSH CYTOLOGY 2.0MMX10MM 115CM BRONCH BC-202D-2010

## (undated) DEVICE — ENDO VALVE SYR NDL KIT ULTRASOUND BRONCH NA-201SX-4022-A

## (undated) DEVICE — IMM KNEE 20" 79-80020

## (undated) DEVICE — INTRO SHEATH MICRO PLATINUM TIP 4FRX40CM 7274

## (undated) DEVICE — SOL WATER IRRIG 1000ML BOTTLE 2F7114

## (undated) DEVICE — DAVINCI XI DRAPE ARM 470015

## (undated) DEVICE — SOL NACL 0.9% IRRIG 1000ML BOTTLE 2F7124

## (undated) DEVICE — ENDO VALVE BX EVIS MAJ-210

## (undated) DEVICE — KIT PATIENT POSITIONING PIGAZZI LATEX FREE 40580

## (undated) DEVICE — PREP POVIDONE IODINE SOLUTION 10% 120ML

## (undated) DEVICE — SOL WATER 10ML VIAL 6332318510

## (undated) DEVICE — SOL NACL 0.9% INJ 1000ML BAG 2B1324X

## (undated) DEVICE — SPONGE RAY-TEC 4X8" 7318

## (undated) DEVICE — SU SILK 2-0 TIE 12X30" A305H

## (undated) DEVICE — SUCTION IRR STRYKERFLOW II W/TIP 250-070-520

## (undated) DEVICE — DAVINCI XI ESU FCP BIPOLAR MARYLAND 470172

## (undated) DEVICE — DAVINCI XI MONOPOLAR SCISSORS HOT SHEARS 8MM 470179

## (undated) DEVICE — TUBING SUCTION 10'X3/16" N510

## (undated) DEVICE — DAVINCI HOT SHEARS TIP COVER  400180

## (undated) DEVICE — CLIP ENDO HEMO-LOC PURPLE LG 544240

## (undated) DEVICE — DRAIN JACKSON PRATT 15FR ROUND SU130-1323

## (undated) DEVICE — ESU GROUND PAD UNIVERSAL W/O CORD

## (undated) DEVICE — SU WND CLOSURE VLOC 90 ABS 3-0 VIOLET 6" CV-23 VLOCM0804

## (undated) DEVICE — SU VICRYL 3-0 SH 8X18" UND J864D

## (undated) DEVICE — ENDO VALVE SUCTION ULTRASOUND BRONCH MAJ-1414

## (undated) DEVICE — KIT ENDO FIRST STEP DISINFECTANT 200ML W/POUCH EP-4

## (undated) DEVICE — DAVINCI XI OBTURATOR BLADELESS 8MM 470359

## (undated) DEVICE — NDL INSUFFLATION 13GA 150MM C2202

## (undated) DEVICE — SYR 10ML LL W/O NDL 302995

## (undated) DEVICE — SU WND CLOSURE V-LOC 3-0 CV-23 6" VLOCM1904

## (undated) DEVICE — DAVINCI XI SEAL UNIVERSAL 5-8MM 470361

## (undated) DEVICE — DRAIN JACKSON PRATT RESERVOIR 100ML SU130-1305

## (undated) DEVICE — SYR 10ML SLIP TIP W/O NDL 303134

## (undated) DEVICE — PREP POVIDONE IODINE SCRUB 7.5% 120ML

## (undated) DEVICE — SU PDS II 1 CT MONOFIL Z353H

## (undated) DEVICE — SUCTION CANISTER MEDIVAC LINER 3000ML W/LID 65651-530

## (undated) DEVICE — PITCHER STERILE 1000ML  SSK9004A

## (undated) RX ORDER — NEOSTIGMINE METHYLSULFATE 1 MG/ML
VIAL (ML) INJECTION
Status: DISPENSED
Start: 2019-05-23

## (undated) RX ORDER — ONDANSETRON 2 MG/ML
INJECTION INTRAMUSCULAR; INTRAVENOUS
Status: DISPENSED
Start: 2022-10-31

## (undated) RX ORDER — DEXAMETHASONE SODIUM PHOSPHATE 4 MG/ML
INJECTION, SOLUTION INTRA-ARTICULAR; INTRALESIONAL; INTRAMUSCULAR; INTRAVENOUS; SOFT TISSUE
Status: DISPENSED
Start: 2019-05-23

## (undated) RX ORDER — PHENYLEPHRINE HCL IN 0.9% NACL 1 MG/10 ML
SYRINGE (ML) INTRAVENOUS
Status: DISPENSED
Start: 2020-07-15

## (undated) RX ORDER — IVABRADINE 5 MG/1
TABLET, FILM COATED ORAL
Status: DISPENSED
Start: 2022-02-28

## (undated) RX ORDER — LIDOCAINE 40 MG/G
CREAM TOPICAL
Status: DISPENSED
Start: 2019-12-11

## (undated) RX ORDER — ESMOLOL HYDROCHLORIDE 10 MG/ML
INJECTION INTRAVENOUS
Status: DISPENSED
Start: 2019-05-23

## (undated) RX ORDER — CEFAZOLIN SODIUM 2 G/100ML
INJECTION, SOLUTION INTRAVENOUS
Status: DISPENSED
Start: 2019-05-23

## (undated) RX ORDER — REGADENOSON 0.08 MG/ML
INJECTION, SOLUTION INTRAVENOUS
Status: DISPENSED
Start: 2019-08-29

## (undated) RX ORDER — CEFAZOLIN SODIUM IN 0.9 % NACL 3 G/100 ML
INTRAVENOUS SOLUTION, PIGGYBACK (ML) INTRAVENOUS
Status: DISPENSED
Start: 2019-05-23

## (undated) RX ORDER — PROPOFOL 10 MG/ML
INJECTION, EMULSION INTRAVENOUS
Status: DISPENSED
Start: 2022-10-31

## (undated) RX ORDER — LIDOCAINE HYDROCHLORIDE 10 MG/ML
INJECTION, SOLUTION EPIDURAL; INFILTRATION; INTRACAUDAL; PERINEURAL
Status: DISPENSED
Start: 2022-11-04

## (undated) RX ORDER — ONDANSETRON 2 MG/ML
INJECTION INTRAMUSCULAR; INTRAVENOUS
Status: DISPENSED
Start: 2019-05-23

## (undated) RX ORDER — METOPROLOL TARTRATE 50 MG
TABLET ORAL
Status: DISPENSED
Start: 2022-02-28

## (undated) RX ORDER — FENTANYL CITRATE 50 UG/ML
INJECTION, SOLUTION INTRAMUSCULAR; INTRAVENOUS
Status: DISPENSED
Start: 2019-03-29

## (undated) RX ORDER — LIDOCAINE 40 MG/G
CREAM TOPICAL
Status: DISPENSED
Start: 2024-09-13

## (undated) RX ORDER — ONDANSETRON 2 MG/ML
INJECTION INTRAMUSCULAR; INTRAVENOUS
Status: DISPENSED
Start: 2020-07-15

## (undated) RX ORDER — ESMOLOL HYDROCHLORIDE 10 MG/ML
INJECTION INTRAVENOUS
Status: DISPENSED
Start: 2019-03-29

## (undated) RX ORDER — PROPOFOL 10 MG/ML
INJECTION, EMULSION INTRAVENOUS
Status: DISPENSED
Start: 2019-05-23

## (undated) RX ORDER — LIDOCAINE 40 MG/G
CREAM TOPICAL
Status: DISPENSED
Start: 2022-01-19

## (undated) RX ORDER — GLYCOPYRROLATE 0.2 MG/ML
INJECTION INTRAMUSCULAR; INTRAVENOUS
Status: DISPENSED
Start: 2020-07-15

## (undated) RX ORDER — CEFAZOLIN SODIUM 1 G/3ML
INJECTION, POWDER, FOR SOLUTION INTRAMUSCULAR; INTRAVENOUS
Status: DISPENSED
Start: 2019-05-23

## (undated) RX ORDER — LIDOCAINE 40 MG/G
CREAM TOPICAL
Status: DISPENSED
Start: 2022-11-04

## (undated) RX ORDER — NITROGLYCERIN 0.4 MG/1
TABLET SUBLINGUAL
Status: DISPENSED
Start: 2022-02-28

## (undated) RX ORDER — DEXAMETHASONE SODIUM PHOSPHATE 4 MG/ML
INJECTION, SOLUTION INTRA-ARTICULAR; INTRALESIONAL; INTRAMUSCULAR; INTRAVENOUS; SOFT TISSUE
Status: DISPENSED
Start: 2022-10-31

## (undated) RX ORDER — METOPROLOL TARTRATE 1 MG/ML
INJECTION, SOLUTION INTRAVENOUS
Status: DISPENSED
Start: 2022-02-28

## (undated) RX ORDER — HYDROMORPHONE HYDROCHLORIDE 1 MG/ML
INJECTION, SOLUTION INTRAMUSCULAR; INTRAVENOUS; SUBCUTANEOUS
Status: DISPENSED
Start: 2019-05-23

## (undated) RX ORDER — CEFAZOLIN SODIUM 1 G/3ML
INJECTION, POWDER, FOR SOLUTION INTRAMUSCULAR; INTRAVENOUS
Status: DISPENSED
Start: 2020-07-15

## (undated) RX ORDER — FENTANYL CITRATE 50 UG/ML
INJECTION, SOLUTION INTRAMUSCULAR; INTRAVENOUS
Status: DISPENSED
Start: 2019-05-23

## (undated) RX ORDER — GLYCOPYRROLATE 0.2 MG/ML
INJECTION, SOLUTION INTRAMUSCULAR; INTRAVENOUS
Status: DISPENSED
Start: 2019-05-23

## (undated) RX ORDER — BUPIVACAINE HYDROCHLORIDE 2.5 MG/ML
INJECTION, SOLUTION EPIDURAL; INFILTRATION; INTRACAUDAL
Status: DISPENSED
Start: 2019-05-23

## (undated) RX ORDER — GLYCOPYRROLATE 0.2 MG/ML
INJECTION INTRAMUSCULAR; INTRAVENOUS
Status: DISPENSED
Start: 2022-10-31

## (undated) RX ORDER — LIDOCAINE HYDROCHLORIDE 10 MG/ML
INJECTION, SOLUTION EPIDURAL; INFILTRATION; INTRACAUDAL; PERINEURAL
Status: DISPENSED
Start: 2022-10-31

## (undated) RX ORDER — FENTANYL CITRATE 50 UG/ML
INJECTION, SOLUTION INTRAMUSCULAR; INTRAVENOUS
Status: DISPENSED
Start: 2022-10-31

## (undated) RX ORDER — LIDOCAINE HYDROCHLORIDE 20 MG/ML
INJECTION, SOLUTION EPIDURAL; INFILTRATION; INTRACAUDAL; PERINEURAL
Status: DISPENSED
Start: 2020-07-15

## (undated) RX ORDER — PROPOFOL 10 MG/ML
INJECTION, EMULSION INTRAVENOUS
Status: DISPENSED
Start: 2020-07-15